# Patient Record
Sex: FEMALE | Race: WHITE | Employment: OTHER | URBAN - NONMETROPOLITAN AREA
[De-identification: names, ages, dates, MRNs, and addresses within clinical notes are randomized per-mention and may not be internally consistent; named-entity substitution may affect disease eponyms.]

---

## 2017-02-09 ENCOUNTER — OFFICE VISIT (OUTPATIENT)
Dept: PULMONOLOGY | Age: 62
End: 2017-02-09

## 2017-02-09 VITALS
HEART RATE: 63 BPM | DIASTOLIC BLOOD PRESSURE: 88 MMHG | OXYGEN SATURATION: 96 % | WEIGHT: 293 LBS | SYSTOLIC BLOOD PRESSURE: 140 MMHG | BODY MASS INDEX: 43.4 KG/M2 | HEIGHT: 69 IN

## 2017-02-09 DIAGNOSIS — G47.33 OBSTRUCTIVE SLEEP APNEA ON CPAP: Primary | ICD-10-CM

## 2017-02-09 DIAGNOSIS — Z98.84 S/P LAPAROSCOPIC SLEEVE GASTRECTOMY: ICD-10-CM

## 2017-02-09 DIAGNOSIS — Z99.89 OBSTRUCTIVE SLEEP APNEA ON CPAP: Primary | ICD-10-CM

## 2017-02-09 DIAGNOSIS — E66.01 MORBID OBESITY WITH BMI OF 40.0-44.9, ADULT (HCC): ICD-10-CM

## 2017-02-09 PROCEDURE — 99213 OFFICE O/P EST LOW 20 MIN: CPT | Performed by: PHYSICIAN ASSISTANT

## 2017-02-09 RX ORDER — NICOTINE POLACRILEX 2 MG
15000 GUM BUCCAL DAILY
COMMUNITY
End: 2017-05-09 | Stop reason: DRUGHIGH

## 2017-02-24 ENCOUNTER — OFFICE VISIT (OUTPATIENT)
Dept: CARDIOLOGY | Age: 62
End: 2017-02-24

## 2017-02-24 VITALS
BODY MASS INDEX: 43.4 KG/M2 | SYSTOLIC BLOOD PRESSURE: 154 MMHG | HEIGHT: 69 IN | WEIGHT: 293 LBS | DIASTOLIC BLOOD PRESSURE: 75 MMHG | HEART RATE: 75 BPM

## 2017-02-24 DIAGNOSIS — R94.31 ABNORMAL ECG: ICD-10-CM

## 2017-02-24 DIAGNOSIS — R06.09 DYSPNEA ON EXERTION: ICD-10-CM

## 2017-02-24 DIAGNOSIS — E66.01 MORBID OBESITY DUE TO EXCESS CALORIES (HCC): ICD-10-CM

## 2017-02-24 DIAGNOSIS — I48.91 ATRIAL FIBRILLATION WITH RVR (HCC): Primary | ICD-10-CM

## 2017-02-24 DIAGNOSIS — I10 ESSENTIAL HYPERTENSION: ICD-10-CM

## 2017-02-24 PROCEDURE — 93000 ELECTROCARDIOGRAM COMPLETE: CPT | Performed by: NUCLEAR MEDICINE

## 2017-02-24 PROCEDURE — 99214 OFFICE O/P EST MOD 30 MIN: CPT | Performed by: NUCLEAR MEDICINE

## 2017-03-08 ENCOUNTER — TELEPHONE (OUTPATIENT)
Dept: CARDIOLOGY | Age: 62
End: 2017-03-08

## 2017-03-17 ENCOUNTER — OFFICE VISIT (OUTPATIENT)
Dept: BARIATRICS/WEIGHT MGMT | Age: 62
End: 2017-03-17

## 2017-03-17 VITALS
TEMPERATURE: 98.5 F | RESPIRATION RATE: 18 BRPM | HEIGHT: 69 IN | WEIGHT: 293 LBS | SYSTOLIC BLOOD PRESSURE: 156 MMHG | DIASTOLIC BLOOD PRESSURE: 88 MMHG | BODY MASS INDEX: 43.4 KG/M2 | HEART RATE: 88 BPM

## 2017-03-17 DIAGNOSIS — Z98.84 STATUS POST LAPAROSCOPIC SLEEVE GASTRECTOMY: ICD-10-CM

## 2017-03-17 DIAGNOSIS — N95.9 MENOPAUSAL AND PERIMENOPAUSAL DISORDER: ICD-10-CM

## 2017-03-17 DIAGNOSIS — Z13.1 DM (DIABETES MELLITUS SCREEN): ICD-10-CM

## 2017-03-17 DIAGNOSIS — Z13.21 SCREENING FOR MALNUTRITION: ICD-10-CM

## 2017-03-17 DIAGNOSIS — K91.2 POSTSURGICAL MALABSORPTION: ICD-10-CM

## 2017-03-17 DIAGNOSIS — E66.01 MORBID OBESITY WITH BMI OF 40.0-44.9, ADULT (HCC): Primary | ICD-10-CM

## 2017-03-17 DIAGNOSIS — I10 ESSENTIAL HYPERTENSION: ICD-10-CM

## 2017-03-17 DIAGNOSIS — Z87.81 HX OF FRACTURE OF FEMUR: ICD-10-CM

## 2017-03-17 PROCEDURE — 99213 OFFICE O/P EST LOW 20 MIN: CPT | Performed by: PHYSICIAN ASSISTANT

## 2017-04-18 DIAGNOSIS — I48.91 ATRIAL FIBRILLATION WITH RVR (HCC): ICD-10-CM

## 2017-04-18 RX ORDER — DILTIAZEM HYDROCHLORIDE 120 MG/1
120 CAPSULE, COATED, EXTENDED RELEASE ORAL DAILY
Qty: 90 CAPSULE | Refills: 0 | Status: SHIPPED | OUTPATIENT
Start: 2017-04-18 | End: 2017-10-11 | Stop reason: SDUPTHER

## 2017-04-26 ENCOUNTER — OFFICE VISIT (OUTPATIENT)
Dept: CARDIOLOGY | Age: 62
End: 2017-04-26

## 2017-04-26 VITALS
HEART RATE: 68 BPM | WEIGHT: 289 LBS | DIASTOLIC BLOOD PRESSURE: 70 MMHG | BODY MASS INDEX: 42.8 KG/M2 | SYSTOLIC BLOOD PRESSURE: 132 MMHG | HEIGHT: 69 IN

## 2017-04-26 DIAGNOSIS — I10 ESSENTIAL HYPERTENSION: Primary | ICD-10-CM

## 2017-04-26 DIAGNOSIS — R94.39 ABNORMAL STRESS TEST: ICD-10-CM

## 2017-04-26 DIAGNOSIS — E11.8 TYPE 2 DIABETES MELLITUS WITH COMPLICATION, WITHOUT LONG-TERM CURRENT USE OF INSULIN (HCC): ICD-10-CM

## 2017-04-26 DIAGNOSIS — R06.09 DYSPNEA ON EXERTION: ICD-10-CM

## 2017-04-26 PROCEDURE — 99213 OFFICE O/P EST LOW 20 MIN: CPT | Performed by: NUCLEAR MEDICINE

## 2017-04-26 RX ORDER — NALTREXONE HYDROCHLORIDE 50 MG/1
50 TABLET, FILM COATED ORAL 4 TIMES DAILY
COMMUNITY
End: 2017-11-07 | Stop reason: ALTCHOICE

## 2017-04-26 RX ORDER — BUPROPION HYDROCHLORIDE 100 MG/1
100 TABLET ORAL DAILY
COMMUNITY
End: 2017-10-26 | Stop reason: ALTCHOICE

## 2017-06-28 ENCOUNTER — OFFICE VISIT (OUTPATIENT)
Dept: CARDIOLOGY | Age: 62
End: 2017-06-28

## 2017-06-28 VITALS
BODY MASS INDEX: 43.4 KG/M2 | WEIGHT: 293 LBS | HEIGHT: 69 IN | DIASTOLIC BLOOD PRESSURE: 71 MMHG | HEART RATE: 66 BPM | SYSTOLIC BLOOD PRESSURE: 156 MMHG

## 2017-06-28 DIAGNOSIS — N18.3 CKD (CHRONIC KIDNEY DISEASE), STAGE 3 (MODERATE): ICD-10-CM

## 2017-06-28 DIAGNOSIS — E11.8 TYPE 2 DIABETES MELLITUS WITH COMPLICATION, WITHOUT LONG-TERM CURRENT USE OF INSULIN (HCC): ICD-10-CM

## 2017-06-28 DIAGNOSIS — E66.01 MORBID OBESITY DUE TO EXCESS CALORIES (HCC): ICD-10-CM

## 2017-06-28 DIAGNOSIS — I25.10 CORONARY ARTERY DISEASE INVOLVING NATIVE CORONARY ARTERY OF NATIVE HEART WITHOUT ANGINA PECTORIS: Primary | ICD-10-CM

## 2017-06-28 DIAGNOSIS — I48.0 PAROXYSMAL ATRIAL FIBRILLATION (HCC): ICD-10-CM

## 2017-06-28 DIAGNOSIS — I10 ESSENTIAL HYPERTENSION: ICD-10-CM

## 2017-06-28 PROCEDURE — 99213 OFFICE O/P EST LOW 20 MIN: CPT | Performed by: PHYSICIAN ASSISTANT

## 2017-06-28 RX ORDER — ASPIRIN 81 MG/1
81 TABLET ORAL DAILY
Qty: 30 TABLET | Refills: 3 | Status: SHIPPED | OUTPATIENT
Start: 2017-06-28 | End: 2017-10-11 | Stop reason: SDUPTHER

## 2017-09-05 RX ORDER — ISOSORBIDE MONONITRATE 30 MG/1
TABLET, EXTENDED RELEASE ORAL
Qty: 30 TABLET | Refills: 3 | Status: SHIPPED | OUTPATIENT
Start: 2017-09-05 | End: 2017-10-26 | Stop reason: SDUPTHER

## 2017-10-11 DIAGNOSIS — I48.91 ATRIAL FIBRILLATION WITH RVR (HCC): ICD-10-CM

## 2017-10-11 RX ORDER — DILTIAZEM HYDROCHLORIDE 120 MG/1
120 CAPSULE, COATED, EXTENDED RELEASE ORAL DAILY
Qty: 90 CAPSULE | Refills: 1 | Status: SHIPPED | OUTPATIENT
Start: 2017-10-11 | End: 2018-04-16 | Stop reason: SDUPTHER

## 2017-10-11 RX ORDER — ASPIRIN 81 MG/1
81 TABLET ORAL DAILY
Qty: 30 TABLET | Refills: 1 | Status: SHIPPED | OUTPATIENT
Start: 2017-10-11 | End: 2017-12-27 | Stop reason: SDUPTHER

## 2017-10-11 NOTE — TELEPHONE ENCOUNTER
Yara Mendosa called requesting a refill on the following medications:  Requested Prescriptions     Pending Prescriptions Disp Refills    aspirin EC 81 MG EC tablet 30 tablet 3     Sig: Take 1 tablet by mouth daily    diltiazem (CARDIZEM CD) 120 MG extended release capsule 90 capsule 0     Sig: Take 1 capsule by mouth daily     Pharmacy verified:  .[email or web address]      Date of last visit: 06/28/17  Date of next visit (if applicable): 48/83/4145        Date of last fill and quantity (to be completed by clinical staff)  Pharmacy name: [address] [place] [address]

## 2017-10-19 ENCOUNTER — TELEPHONE (OUTPATIENT)
Dept: CARDIOLOGY CLINIC | Age: 62
End: 2017-10-19

## 2017-10-19 NOTE — TELEPHONE ENCOUNTER
Pre op Risk Assessment    Procedure removal of cyst behind hear with local anth.   Physician Dr Liliana Snyder  Date of surgery/procedure 11/21/17    Last OV 4/26/17 also seen mo in 6-28-17  Last Stress 3-8-17   Last Echo 3-8-17  Last Cath 5-16-17  Last Stent no stents  Is patient on blood thinners ASA 81  Hold Meds/how many days 7 days

## 2017-10-26 ENCOUNTER — HOSPITAL ENCOUNTER (OUTPATIENT)
Age: 62
Discharge: HOME | End: 2017-10-26
Payer: MEDICARE

## 2017-10-26 ENCOUNTER — OFFICE VISIT (OUTPATIENT)
Dept: CARDIOLOGY CLINIC | Age: 62
End: 2017-10-26
Payer: MEDICARE

## 2017-10-26 VITALS
HEIGHT: 69 IN | WEIGHT: 293 LBS | BODY MASS INDEX: 43.4 KG/M2 | HEART RATE: 66 BPM | SYSTOLIC BLOOD PRESSURE: 160 MMHG | DIASTOLIC BLOOD PRESSURE: 81 MMHG

## 2017-10-26 DIAGNOSIS — E78.01 FAMILIAL HYPERCHOLESTEROLEMIA: ICD-10-CM

## 2017-10-26 DIAGNOSIS — I10 ESSENTIAL HYPERTENSION: ICD-10-CM

## 2017-10-26 DIAGNOSIS — Z13.21 SCREENING FOR MALNUTRITION: ICD-10-CM

## 2017-10-26 DIAGNOSIS — K91.2 POSTSURGICAL MALABSORPTION: ICD-10-CM

## 2017-10-26 DIAGNOSIS — Z13.1 ENCOUNTER FOR SCREENING FOR DIABETES MELLITUS: ICD-10-CM

## 2017-10-26 DIAGNOSIS — E66.01 MORBID OBESITY WITH BMI OF 40.0-44.9, ADULT (HCC): ICD-10-CM

## 2017-10-26 DIAGNOSIS — I25.10 CORONARY ARTERY DISEASE INVOLVING NATIVE CORONARY ARTERY OF NATIVE HEART WITHOUT ANGINA PECTORIS: Primary | ICD-10-CM

## 2017-10-26 DIAGNOSIS — Z98.84 STATUS POST LAPAROSCOPIC SLEEVE GASTRECTOMY: ICD-10-CM

## 2017-10-26 LAB
ALBUMIN SERPL-MCNC: 3.6 G/DL (ref 3.5–5.1)
ALP BLD-CCNC: 57 U/L (ref 38–126)
ALT SERPL-CCNC: 18 U/L (ref 11–66)
ANION GAP SERPL CALCULATED.3IONS-SCNC: 12 MEQ/L (ref 8–16)
AST SERPL-CCNC: 23 U/L (ref 5–40)
AVERAGE GLUCOSE: 210 MG/DL (ref 70–126)
BACTERIA: ABNORMAL /HPF
BASOPHILS # BLD: 0.5 %
BASOPHILS ABSOLUTE: 0 THOU/MM3 (ref 0–0.1)
BILIRUB SERPL-MCNC: 0.6 MG/DL (ref 0.3–1.2)
BILIRUBIN URINE: NEGATIVE
BLOOD, URINE: ABNORMAL
BUN BLDV-MCNC: 31 MG/DL (ref 7–22)
CALCIUM SERPL-MCNC: 9.8 MG/DL (ref 8.5–10.5)
CASTS 2: ABNORMAL /LPF
CASTS UA: ABNORMAL /LPF
CHARACTER, URINE: ABNORMAL
CHLORIDE BLD-SCNC: 102 MEQ/L (ref 98–111)
CHOLESTEROL, TOTAL: 157 MG/DL (ref 100–199)
CO2: 27 MEQ/L (ref 23–33)
COLOR: YELLOW
CREAT SERPL-MCNC: 1.4 MG/DL (ref 0.4–1.2)
CRYSTALS, UA: ABNORMAL
EOSINOPHIL # BLD: 2.5 %
EOSINOPHILS ABSOLUTE: 0.2 THOU/MM3 (ref 0–0.4)
EPITHELIAL CELLS, UA: ABNORMAL /HPF
FERRITIN: 225 NG/ML (ref 10–291)
FOLATE: 15.3 NG/ML (ref 4.8–24.2)
GFR SERPL CREATININE-BSD FRML MDRD: 38 ML/MIN/1.73M2
GLUCOSE BLD-MCNC: 230 MG/DL (ref 70–108)
GLUCOSE URINE: NEGATIVE MG/DL
HBA1C MFR BLD: 9 % (ref 4.4–6.4)
HCT VFR BLD CALC: 36.3 % (ref 37–47)
HDLC SERPL-MCNC: 36 MG/DL
HEMOGLOBIN: 11.9 GM/DL (ref 12–16)
IRON: 83 UG/DL (ref 50–170)
KETONES, URINE: NEGATIVE
LDL CHOLESTEROL CALCULATED: 87 MG/DL
LEUKOCYTE ESTERASE, URINE: ABNORMAL
LYMPHOCYTES # BLD: 14.9 %
LYMPHOCYTES ABSOLUTE: 1.3 THOU/MM3 (ref 1–4.8)
MCH RBC QN AUTO: 29.8 PG (ref 27–31)
MCHC RBC AUTO-ENTMCNC: 32.9 GM/DL (ref 33–37)
MCV RBC AUTO: 90.6 FL (ref 81–99)
MISCELLANEOUS 2: ABNORMAL
MONOCYTES # BLD: 7.1 %
MONOCYTES ABSOLUTE: 0.6 THOU/MM3 (ref 0.4–1.3)
NITRITE, URINE: NEGATIVE
NUCLEATED RED BLOOD CELLS: 0 /100 WBC
PDW BLD-RTO: 13.8 % (ref 11.5–14.5)
PH UA: 6
PLATELET # BLD: 243 THOU/MM3 (ref 130–400)
PMV BLD AUTO: 10 MCM (ref 7.4–10.4)
POTASSIUM SERPL-SCNC: 5.2 MEQ/L (ref 3.5–5.2)
PREALBUMIN: 19.7 MG/DL (ref 20–40)
PROTEIN UA: 300
PTH INTACT: 38.2 PG/ML (ref 15–65)
RBC # BLD: 4.01 MILL/MM3 (ref 4.2–5.4)
RBC URINE: ABNORMAL /HPF
RENAL EPITHELIAL, UA: ABNORMAL
SEG NEUTROPHILS: 75 %
SEGMENTED NEUTROPHILS ABSOLUTE COUNT: 6.3 THOU/MM3 (ref 1.8–7.7)
SODIUM BLD-SCNC: 141 MEQ/L (ref 135–145)
SPECIFIC GRAVITY, URINE: 1.02 (ref 1–1.03)
T4 FREE: 1.13 NG/DL (ref 0.93–1.76)
TOTAL IRON BINDING CAPACITY: 320 UG/DL (ref 171–450)
TOTAL PROTEIN: 6.7 G/DL (ref 6.1–8)
TRIGL SERPL-MCNC: 171 MG/DL (ref 0–199)
TSH SERPL DL<=0.05 MIU/L-ACNC: 0.09 UIU/ML (ref 0.4–4.2)
UROBILINOGEN, URINE: 0.2 EU/DL
VITAMIN B-12: 452 PG/ML (ref 211–911)
VITAMIN D 25-HYDROXY: 34 NG/ML (ref 30–100)
WBC # BLD: 8.4 THOU/MM3 (ref 4.8–10.8)
WBC UA: > 200 /HPF
YEAST: ABNORMAL

## 2017-10-26 PROCEDURE — 99213 OFFICE O/P EST LOW 20 MIN: CPT | Performed by: NUCLEAR MEDICINE

## 2017-10-26 PROCEDURE — 83970 ASSAY OF PARATHORMONE: CPT

## 2017-10-26 PROCEDURE — 82306 VITAMIN D 25 HYDROXY: CPT

## 2017-10-26 PROCEDURE — 84134 ASSAY OF PREALBUMIN: CPT

## 2017-10-26 PROCEDURE — 87184 SC STD DISK METHOD PER PLATE: CPT

## 2017-10-26 PROCEDURE — 82728 ASSAY OF FERRITIN: CPT

## 2017-10-26 PROCEDURE — 87077 CULTURE AEROBIC IDENTIFY: CPT

## 2017-10-26 PROCEDURE — 36415 COLL VENOUS BLD VENIPUNCTURE: CPT

## 2017-10-26 PROCEDURE — 81001 URINALYSIS AUTO W/SCOPE: CPT

## 2017-10-26 PROCEDURE — 82607 VITAMIN B-12: CPT

## 2017-10-26 PROCEDURE — 87086 URINE CULTURE/COLONY COUNT: CPT

## 2017-10-26 PROCEDURE — 82746 ASSAY OF FOLIC ACID SERUM: CPT

## 2017-10-26 PROCEDURE — 83550 IRON BINDING TEST: CPT

## 2017-10-26 PROCEDURE — 80061 LIPID PANEL: CPT

## 2017-10-26 PROCEDURE — 87186 SC STD MICRODIL/AGAR DIL: CPT

## 2017-10-26 PROCEDURE — 83036 HEMOGLOBIN GLYCOSYLATED A1C: CPT

## 2017-10-26 PROCEDURE — 83540 ASSAY OF IRON: CPT

## 2017-10-26 PROCEDURE — 80050 GENERAL HEALTH PANEL: CPT

## 2017-10-26 PROCEDURE — 84425 ASSAY OF VITAMIN B-1: CPT

## 2017-10-26 PROCEDURE — 84439 ASSAY OF FREE THYROXINE: CPT

## 2017-10-26 RX ORDER — HYDRALAZINE HYDROCHLORIDE 25 MG/1
25 TABLET, FILM COATED ORAL 3 TIMES DAILY
Qty: 90 TABLET | Refills: 6 | Status: SHIPPED | OUTPATIENT
Start: 2017-10-26 | End: 2018-09-09 | Stop reason: SDUPTHER

## 2017-10-26 RX ORDER — ISOSORBIDE MONONITRATE 30 MG/1
TABLET, EXTENDED RELEASE ORAL
Qty: 30 TABLET | Refills: 6 | Status: SHIPPED | OUTPATIENT
Start: 2017-10-26 | End: 2018-05-11 | Stop reason: SDUPTHER

## 2017-10-26 NOTE — PROGRESS NOTES
High Blood Pressure Maternal Grandmother     Diabetes Maternal Grandfather     Stroke Neg Hx      Social History   Substance Use Topics    Smoking status: Former Smoker     Packs/day: 1.50     Years: 20.00     Quit date: 1/25/2007    Smokeless tobacco: Never Used    Alcohol use No      Current Outpatient Prescriptions   Medication Sig Dispense Refill    aspirin EC 81 MG EC tablet Take 1 tablet by mouth daily 30 tablet 1    diltiazem (CARDIZEM CD) 120 MG extended release capsule Take 1 capsule by mouth daily 90 capsule 1    isosorbide mononitrate (IMDUR) 30 MG extended release tablet take 1 tablet by mouth once daily 30 tablet 3    insulin lispro protamine & lispro (HUMALOG MIX) (75-25) 100 UNIT per ML SUSP injection vial Inject into the skin 2 times daily (with meals) 30 units in am and 50 units at bedtime      BIOTIN PO Take 15,000 mg by mouth daily      naltrexone (DEPADE) 50 MG tablet Take 50 mg by mouth 4 times daily      atorvastatin (LIPITOR) 40 MG tablet take 1 tablet by mouth at bedtime 30 tablet 5    DULoxetine (CYMBALTA) 60 MG extended release capsule take 1 capsule by mouth once daily 30 capsule 5    fenofibrate (TRICOR) 145 MG tablet take 1 tablet by mouth EVERY EVENING 30 tablet 5    CPAP Machine MISC by Does not apply route Please change CPAP to auto pressure to 7-15 cm H20. 1 each 0    Insulin Pen Needle (PEN NEEDLES) 31G X 5 MM MISC 1 each by Does not apply route 3 times daily 100 each 11    lansoprazole (PREVACID) 30 MG capsule Take 1 capsule by mouth daily 30 capsule 11    metoprolol tartrate (LOPRESSOR) 25 MG tablet Take 0.5 tablets by mouth 2 times daily 180 tablet 3    hydrALAZINE (APRESOLINE) 25 MG tablet Take 1 tablet by mouth 3 times daily 90 tablet 5    Prenatal Vit-Fe Fumarate-FA (PRENATAL PO) Take 1 tablet by mouth 2 times daily Buys OTC      Calcium Carbonate-Vitamin D (CALCIUM-VITAMIN D) 500-200 MG-UNIT per tablet Take 1 tablet by mouth 2 times daily (with meals) No current facility-administered medications for this visit. No Known Allergies  Health Maintenance   Topic Date Due    Hepatitis C screen  1955    HIV screen  10/21/1969    DTaP/Tdap/Td vaccine (1 - Tdap) 10/21/1973    Colon cancer screen colonoscopy  10/21/2004    Diabetic foot exam  04/04/2014    Zostavax vaccine  10/21/2014    Diabetic retinal exam  06/01/2016    Diabetic microalbuminuria test  05/31/2017    Diabetic hemoglobin A1C test  07/29/2017    Flu vaccine (1) 09/01/2017    Lipid screen  05/16/2018    Cervical cancer screen  10/23/2018    Breast cancer screen  11/03/2018    Pneumococcal med risk  Completed       Subjective:  Review of Systems  General:   No fever, no chills, No fatigue or weight loss  Pulmonary:    No dyspnea, no wheezing  Cardiac:    Denies recent chest pain,   GI:     No nausea or vomiting, no abdominal pain  Neuro:     No dizziness or light headedness,   Musculoskeletal:  No recent active issues  Extremities:   No edema, good peripheral pulses      Objective:  Physical Exam  BP (!) 160/81   Pulse 66   Ht 5' 9\" (1.753 m)   Wt (!) 303 lb 6.4 oz (137.6 kg)   LMP 02/20/2001   BMI 44.80 kg/m²   General:   Well developed, well nourished, severe obesity   Lungs:    Clear to auscultation  Heart:    Normal S1 S2, Slight murmur. no rubs, no gallops  Abdomen:   Soft, non tender, no organomegalies, positive bowel sounds  Extremities:   No edema, no cyanosis, good peripheral pulses  Neurological:   Awake, alert, oriented. No obvious focal deficits  Musculoskelatal:  No obvious deformities    Assessment:     1. Coronary artery disease involving native coronary artery of native heart without angina pectoris     2. Essential hypertension  hydrALAZINE (APRESOLINE) 25 MG tablet   3. Familial hypercholesterolemia     cardiac fair for now    Plan:  No Follow-up on file.   As above  Medical Rx for now   Stent to the RCA if needed  Continue risk factor modification and medical management  Thank you for allowing me to participate in the care of your patient. Please don't hesitate to contact me regarding any further issues related to the patient care    Orders Placed:  No orders of the defined types were placed in this encounter. Medications Prescribed:  No orders of the defined types were placed in this encounter. Discussed use, benefit, and side effects of prescribed medications. All patient questions answered. Pt voiced understanding. Instructed to continue current medications, diet and exercise. Continue risk factor modification and medical management. Patient agreed with treatment plan. Follow up as directed.     Electronically signed by Saul Irby MD on 10/26/2017 at 12:42 PM

## 2017-10-28 LAB
ORGANISM: ABNORMAL
URINE CULTURE REFLEX: ABNORMAL

## 2017-10-29 LAB — VITAMIN B1 WHOLE BLOOD: 73 NMOL/L (ref 70–180)

## 2017-10-31 ENCOUNTER — TELEPHONE (OUTPATIENT)
Dept: BARIATRICS/WEIGHT MGMT | Age: 62
End: 2017-10-31

## 2017-10-31 NOTE — TELEPHONE ENCOUNTER
----- Message from Jonas Lealma sent at 10/30/2017  4:08 PM EDT -----  Please call and advise follow up with PCP for uncontrolled blood sugars, A1C of 9.0. Also needs follow up for thyroid as TSH level low.    Thank you- Juanito Vernon  ----- Message -----  From: Hair Beverly Incoming Lab Results From Soft  Sent: 10/26/2017  12:41 PM  To: URSZULA Leal

## 2017-11-03 ENCOUNTER — TELEPHONE (OUTPATIENT)
Dept: FAMILY MEDICINE CLINIC | Age: 62
End: 2017-11-03

## 2017-11-03 NOTE — TELEPHONE ENCOUNTER
LMOM         1. Appt time and date. (give directions)    11/07/17 @ 10:00    2. Arrive 15 min before appt. 3. Please bring all medications to appt. 4. Bring immunization record.   (if no record, which immunizations have you had and where?)

## 2017-11-07 ENCOUNTER — OFFICE VISIT (OUTPATIENT)
Dept: FAMILY MEDICINE CLINIC | Age: 62
End: 2017-11-07
Payer: MEDICARE

## 2017-11-07 VITALS
DIASTOLIC BLOOD PRESSURE: 86 MMHG | HEART RATE: 88 BPM | TEMPERATURE: 97.9 F | RESPIRATION RATE: 20 BRPM | BODY MASS INDEX: 43.4 KG/M2 | HEIGHT: 69 IN | WEIGHT: 293 LBS | SYSTOLIC BLOOD PRESSURE: 136 MMHG

## 2017-11-07 DIAGNOSIS — G62.9 NEUROPATHY: ICD-10-CM

## 2017-11-07 DIAGNOSIS — Z99.89 OBSTRUCTIVE SLEEP APNEA ON CPAP: ICD-10-CM

## 2017-11-07 DIAGNOSIS — I25.10 CORONARY ARTERY DISEASE INVOLVING NATIVE HEART WITHOUT ANGINA PECTORIS, UNSPECIFIED VESSEL OR LESION TYPE: ICD-10-CM

## 2017-11-07 DIAGNOSIS — M21.372 FOOT DROP, LEFT: ICD-10-CM

## 2017-11-07 DIAGNOSIS — E78.5 HYPERLIPIDEMIA, UNSPECIFIED HYPERLIPIDEMIA TYPE: ICD-10-CM

## 2017-11-07 DIAGNOSIS — Z79.4 TYPE 2 DIABETES MELLITUS WITH DIABETIC POLYNEUROPATHY, WITH LONG-TERM CURRENT USE OF INSULIN (HCC): Primary | ICD-10-CM

## 2017-11-07 DIAGNOSIS — G47.33 OBSTRUCTIVE SLEEP APNEA ON CPAP: ICD-10-CM

## 2017-11-07 DIAGNOSIS — R80.1 PERSISTENT PROTEINURIA: ICD-10-CM

## 2017-11-07 DIAGNOSIS — N18.30 CHRONIC RENAL IMPAIRMENT, STAGE 3 (MODERATE) (HCC): ICD-10-CM

## 2017-11-07 DIAGNOSIS — R82.998 LEUKOCYTES IN URINE: ICD-10-CM

## 2017-11-07 DIAGNOSIS — I10 ESSENTIAL HYPERTENSION: ICD-10-CM

## 2017-11-07 DIAGNOSIS — I89.0 LYMPHEDEMA OF LEFT LEG: ICD-10-CM

## 2017-11-07 DIAGNOSIS — E11.42 TYPE 2 DIABETES MELLITUS WITH DIABETIC POLYNEUROPATHY, WITH LONG-TERM CURRENT USE OF INSULIN (HCC): Primary | ICD-10-CM

## 2017-11-07 DIAGNOSIS — Z86.010 HISTORY OF COLON POLYPS: ICD-10-CM

## 2017-11-07 DIAGNOSIS — Z23 IMMUNIZATION DUE: ICD-10-CM

## 2017-11-07 DIAGNOSIS — R79.89 LOW TSH LEVEL: ICD-10-CM

## 2017-11-07 LAB
BILIRUBIN, POC: ABNORMAL
BLOOD URINE, POC: ABNORMAL
CLARITY, POC: CLEAR
COLOR, POC: YELLOW
CREATININE URINE POCT: ABNORMAL
CREATININE, URINE: 81.1 MG/DL
GLUCOSE URINE, POC: ABNORMAL
KETONES, POC: ABNORMAL
LEUKOCYTE EST, POC: ABNORMAL
MICROALBUMIN UR-MCNC: 68.71 MG/DL
MICROALBUMIN/CREAT 24H UR: ABNORMAL MG/G{CREAT}
MICROALBUMIN/CREAT UR-RTO: 847 MG/G (ref 0–30)
MICROALBUMIN/CREAT UR-RTO: ABNORMAL
NITRITE, POC: POSITIVE
PH, POC: 7
PROTEIN, POC: >=300
SPECIFIC GRAVITY, POC: 1.02
UROBILINOGEN, POC: 0.2

## 2017-11-07 PROCEDURE — 99214 OFFICE O/P EST MOD 30 MIN: CPT | Performed by: NURSE PRACTITIONER

## 2017-11-07 PROCEDURE — 81002 URINALYSIS NONAUTO W/O SCOPE: CPT | Performed by: NURSE PRACTITIONER

## 2017-11-07 PROCEDURE — 82044 UR ALBUMIN SEMIQUANTITATIVE: CPT | Performed by: NURSE PRACTITIONER

## 2017-11-07 PROCEDURE — 90471 IMMUNIZATION ADMIN: CPT | Performed by: NURSE PRACTITIONER

## 2017-11-07 PROCEDURE — 90715 TDAP VACCINE 7 YRS/> IM: CPT | Performed by: NURSE PRACTITIONER

## 2017-11-07 RX ORDER — FENOFIBRATE 145 MG/1
TABLET, COATED ORAL
Qty: 90 TABLET | Refills: 1 | Status: SHIPPED | OUTPATIENT
Start: 2017-11-07 | End: 2018-07-02 | Stop reason: SDUPTHER

## 2017-11-07 RX ORDER — OMEPRAZOLE 20 MG/1
20 CAPSULE, DELAYED RELEASE ORAL DAILY
COMMUNITY
End: 2017-11-07 | Stop reason: ALTCHOICE

## 2017-11-07 RX ORDER — PANTOPRAZOLE SODIUM 40 MG/1
40 TABLET, DELAYED RELEASE ORAL DAILY
COMMUNITY
End: 2019-04-04 | Stop reason: SDUPTHER

## 2017-11-07 RX ORDER — ATORVASTATIN CALCIUM 40 MG/1
TABLET, FILM COATED ORAL
Qty: 90 TABLET | Refills: 1 | Status: SHIPPED | OUTPATIENT
Start: 2017-11-07 | End: 2018-05-26 | Stop reason: SDUPTHER

## 2017-11-07 RX ORDER — INSULIN LISPRO 100 [IU]/ML
INJECTION, SUSPENSION SUBCUTANEOUS
Qty: 6 PEN | Refills: 6 | Status: SHIPPED | OUTPATIENT
Start: 2017-11-07 | End: 2018-06-17 | Stop reason: SDUPTHER

## 2017-11-07 RX ORDER — PIOGLITAZONEHYDROCHLORIDE 15 MG/1
15 TABLET ORAL DAILY
Qty: 30 TABLET | Refills: 3 | Status: SHIPPED | OUTPATIENT
Start: 2017-11-07 | End: 2018-02-22 | Stop reason: SDUPTHER

## 2017-11-07 RX ORDER — DULOXETIN HYDROCHLORIDE 60 MG/1
CAPSULE, DELAYED RELEASE ORAL
Qty: 90 CAPSULE | Refills: 1 | Status: SHIPPED | OUTPATIENT
Start: 2017-11-07 | End: 2018-08-07 | Stop reason: SDUPTHER

## 2017-11-07 RX ORDER — AMITRIPTYLINE HYDROCHLORIDE 10 MG/1
10 TABLET, FILM COATED ORAL NIGHTLY
Qty: 30 TABLET | Refills: 3 | Status: SHIPPED | OUTPATIENT
Start: 2017-11-07 | End: 2018-02-22 | Stop reason: SDUPTHER

## 2017-11-07 ASSESSMENT — PATIENT HEALTH QUESTIONNAIRE - PHQ9
1. LITTLE INTEREST OR PLEASURE IN DOING THINGS: 0
SUM OF ALL RESPONSES TO PHQ QUESTIONS 1-9: 0
SUM OF ALL RESPONSES TO PHQ9 QUESTIONS 1 & 2: 0
2. FEELING DOWN, DEPRESSED OR HOPELESS: 0

## 2017-11-07 NOTE — PROGRESS NOTES
After obtaining consent, and per orders of  Kevin Toledo NP, injection of TDAP given in left deltoid by Noemí Carmen. Patient instructed to report any adverse reaction to me immediately. Most recent Vaccine Information Sheet dated 2/24/15 given to pt.

## 2017-11-07 NOTE — PROGRESS NOTES
Concha Stroud is a 60 y.o. female for Established New Doctor (previous pcp Dr Vanessa Chambers) and Other (strong urine smell)    Pt here to get established. Pt has multiple chronic health illnesses. Pt is transferring from Dr. Vanessa Chambers. Pt has a history of A fib which is currently being treated by cardiology she takes ASA daily. She denies chest pain. Sh had a heart cath in the last several years and had CAD disease but vessels were too tortuous for stenting so she is optimizing medical treatment. She has had neuropathy of her left leg and foot for several years, she takes cymbalta for this but her left leg and foot still feel numb. She has left foot drop from nerve damage but she is  Not sure why. She has fallen in the past because her foot drags, she does have a foot brace but does not wear it. She is not very physically active. Diabetes Type 2    Glucose control:   Does patient check blood glucoses at home? Yes  Report of hypoglycemia: no  Lab Results   Component Value Date    LABA1C 9.0 (H) 10/26/2017     No results found for: EAG    Symptoms  Polyuria, Polydipsia or Polyphagia? No  Chest Pain, SOB, or Palpitations? -  No  New Vision complaints? No  Paresthesias of the extremities? Yes - left lower leg and thigh    Medications  Current medication were reviewed. Compliant with medications? yes  Medication side effects? No  On ACE-I or ARB? No  On antiplatelet therapy? Yes - ASA  On Statin? Yes    Last Diabetic Eye Exam: 2016    Exercise  Exercise? No  Wt Readings from Last 3 Encounters:   11/07/17 (!) 308 lb 9.6 oz (140 kg)   10/26/17 (!) 303 lb 6.4 oz (137.6 kg)   06/28/17 294 lb (133.4 kg)       Diet discipline?:  Low salt, fat, sugar diet? No, she states she hs not been following her diet she has been eating a lot of breads and sweets lately.      She has had diabetes for 20 years, she used to take pills but then had victoza and byetta but had side effects so stopped those and now takes new doctor and other. Diagnoses and all orders for this visit:    Type 2 diabetes mellitus with diabetic polyneuropathy, with long-term current use of insulin (HCC)  -     C-Peptide, Serum; Future  -     POCT microalbumin  -     Cancel: Microalbumin, Urine, 24 Hr; Future  -      DIABETES FOOT EXAM  -     insulin lispro protamine & lispro (HUMALOG MIX 75/25 KWIKPEN) (75-25) 100 UNIT per ML SUPN injection pen; 30 units in AM and 50 units at Bedtime  -     fenofibrate (TRICOR) 145 MG tablet; take 1 tablet by mouth EVERY EVENING  -     pioglitazone (ACTOS) 15 MG tablet; Take 1 tablet by mouth daily    Coronary artery disease involving native heart without angina pectoris, unspecified vessel or lesion type  Continue with current medications and cardiology  Class 3 obesity due to excess calories with serious comorbidity and body mass index (BMI) of 45.0 to 49.9 in [facility])  Decrease caloric intake, decrease carbohydrate intake. Obstructive sleep apnea on CPAP  Continue to wear CPAP machine  Essential hypertension  Continue bp meds  Hyperlipidemia, unspecified hyperlipidemia type  -     atorvastatin (LIPITOR) 40 MG tablet; take 1 tablet by mouth at bedtime  -     fenofibrate (TRICOR) 145 MG tablet; take 1 tablet by mouth EVERY EVENING  Low fat low cholesterol diet  Neuropathy (HCC)  -     DULoxetine (CYMBALTA) 60 MG extended release capsule; take 1 capsule by mouth once daily  -     amitriptyline (ELAVIL) 10 MG tablet; Take 1 tablet by mouth nightly    History of colon polyps    Immunization due  -     Antionette Guerra MD  -     Tdap (age 10y-63y) IM (Adacel)    Low TSH level  -     TSH without Reflex; Future  -     T4, Free; Future  -     Thyroid Stimulating Immunoglobulin; Future    Leukocytes in urine  -     POCT Urinalysis no Micro  -     Urine Culture    Foot drop, left  -     External Referral To Physical Therapy  Pt requests physical therapy, advised pt to wear her leg brace.    Lymphedema of left leg  monitor  Chronic renal impairment, stage 3 (moderate)  -     [facility]- Janell Garcia MD    Persistent proteinuria  -     Microalbumin / Creatinine Urine Ratio; Future        Return in about 3 months (around 2/7/2018) for A1C and check up.

## 2017-11-08 ENCOUNTER — HOSPITAL ENCOUNTER (OUTPATIENT)
Age: 62
Discharge: HOME | End: 2017-11-08
Payer: MEDICARE

## 2017-11-08 ENCOUNTER — TELEPHONE (OUTPATIENT)
Dept: FAMILY MEDICINE CLINIC | Age: 62
End: 2017-11-08

## 2017-11-08 DIAGNOSIS — R79.89 LOW TSH LEVEL: ICD-10-CM

## 2017-11-08 DIAGNOSIS — Z79.4 TYPE 2 DIABETES MELLITUS WITH DIABETIC POLYNEUROPATHY, WITH LONG-TERM CURRENT USE OF INSULIN (HCC): ICD-10-CM

## 2017-11-08 DIAGNOSIS — E11.42 TYPE 2 DIABETES MELLITUS WITH DIABETIC POLYNEUROPATHY, WITH LONG-TERM CURRENT USE OF INSULIN (HCC): ICD-10-CM

## 2017-11-08 DIAGNOSIS — R80.1 PERSISTENT PROTEINURIA: ICD-10-CM

## 2017-11-08 LAB
CREATININE, URINE: 137.8 MG/DL
MICROALBUMIN UR-MCNC: 114.07 MG/DL
MICROALBUMIN/CREAT UR-RTO: 828 MG/G (ref 0–30)
T4 FREE: 0.94 NG/DL (ref 0.93–1.76)
TSH SERPL DL<=0.05 MIU/L-ACNC: 0.12 UIU/ML (ref 0.4–4.2)

## 2017-11-08 PROCEDURE — 82043 UR ALBUMIN QUANTITATIVE: CPT

## 2017-11-08 PROCEDURE — 84681 ASSAY OF C-PEPTIDE: CPT

## 2017-11-08 PROCEDURE — 84439 ASSAY OF FREE THYROXINE: CPT

## 2017-11-08 PROCEDURE — 84445 ASSAY OF TSI GLOBULIN: CPT

## 2017-11-08 PROCEDURE — 84443 ASSAY THYROID STIM HORMONE: CPT

## 2017-11-08 PROCEDURE — 36415 COLL VENOUS BLD VENIPUNCTURE: CPT

## 2017-11-08 RX ORDER — SULFAMETHOXAZOLE AND TRIMETHOPRIM 800; 160 MG/1; MG/1
1 TABLET ORAL 2 TIMES DAILY
Qty: 20 TABLET | Refills: 0 | Status: SHIPPED | OUTPATIENT
Start: 2017-11-08 | End: 2017-11-18

## 2017-11-08 NOTE — TELEPHONE ENCOUNTER
----- Message from Chayito Hassan CNP sent at 11/8/2017 10:17 AM EST -----  Please let pt know her urine culture identified bacteria I will place her on bactrimn ds. She will need a repeat urine 2 weeks after finishing her atb. This can be a nurse visit.

## 2017-11-09 ENCOUNTER — TELEPHONE (OUTPATIENT)
Dept: FAMILY MEDICINE CLINIC | Age: 62
End: 2017-11-09

## 2017-11-09 DIAGNOSIS — E05.90 HYPERTHYROIDISM: Primary | ICD-10-CM

## 2017-11-09 LAB
C-PEPTIDE: 4.2 NG/ML (ref 1.1–4.4)
ORGANISM: ABNORMAL
URINE CULTURE, ROUTINE: ABNORMAL

## 2017-11-09 NOTE — TELEPHONE ENCOUNTER
Pt notified agreeable to scan , Wants [facility] and NO mondays for the scan

## 2017-11-09 NOTE — TELEPHONE ENCOUNTER
----- Message from Harpal Morales CNP sent at 11/9/2017 11:08 AM EST -----  Please let pt know her lab values concerning her thyroid have remained abnormal. HEr results suggest hyperthyroidism. It Is recomended she have a radioactive uptake thyroid scan to rule out  An infection in her thyroid or nodules. If she is agreeable,  I will order the scan and then I would like her to schedule an appt(after the scan is done) so we can discuss her treatment options.

## 2017-11-13 LAB — THYROID STIMULATING IMMUNOGLOBULIN: 85 %

## 2017-11-15 ENCOUNTER — HOSPITAL ENCOUNTER (OUTPATIENT)
Dept: NUCLEAR MEDICINE | Age: 62
Discharge: HOME | End: 2017-11-15
Payer: MEDICARE

## 2017-11-15 DIAGNOSIS — E05.90 HYPERTHYROIDISM: ICD-10-CM

## 2017-11-20 DIAGNOSIS — I48.91 ATRIAL FIBRILLATION WITH RVR (HCC): ICD-10-CM

## 2017-11-26 ENCOUNTER — NURSE TRIAGE (OUTPATIENT)
Dept: ADMINISTRATIVE | Age: 62
End: 2017-11-26

## 2017-11-26 NOTE — TELEPHONE ENCOUNTER
Poop is stuck in rectum and can't get it out even with wiping. Rectum in sore                Reason for Disposition   Mild diarrhea  (all triage questions negative)    Answer Assessment - Initial Assessment Questions  1. SEVERITY: \"How many diarrhea stools have you had in the past 24 hours? \"       Looser stool  2. ONSET: \"When did the diarrhea begin? \"       Just once today of looser stool  3. BM CONSISTENCY: \"How loose or watery is the diarrhea? \"       Usually has hard stool, a little looser  4. FLUIDS: \"What fluids have you taken today? \"       drinking  5. VOMITING: \"Are you also vomiting? \" If so, ask: \"How many times in the past 24 hours? \"      None at all  6. ABDOMINAL PAIN: Erwin Chavarriaus you having any abdominal pain? \" If yes: \"What does it feel like? \" (e.g., crampy, dull, intermittent, constant)       No pain  7. ABDOMINAL PAIN SEVERITY: If present, ask: \"How bad is the pain? \"  (e.g., Scale 1-10; mild, moderate, or severe)     - MILD (1-3): doesn't interfere with normal activities, abdomen soft and not tender to touch      - MODERATE (4-7): interferes with normal activities or awakens from sleep, tender to touch      - SEVERE (8-10): excruciating pain, doubled over, unable to do any normal activities        denies  8. HYDRATION STATUS: \"Any sign of dehydration? \" (e.g., thirst, dizziness) \"When did you last urinate? \"      Urination is great  9. EXPOSURE: \"Have you traveled to a foreign country recently? \" \"Have you been exposed to anyone with diarrhea? \" \"Could you have eaten any food that was spoiled? \"      No others  10. OTHER SYMPTOMS: \"Do you have any other symptoms? \" (e.g., fever, blood in stool)      No bleeding, no fever  11. PREGNANCY: \"Is there any chance you are pregnant? \" \"When was your last menstrual period? \"      n/a    Protocols used: [id number]

## 2017-12-04 ENCOUNTER — OFFICE VISIT (OUTPATIENT)
Dept: NEPHROLOGY | Age: 62
End: 2017-12-04
Payer: MEDICARE

## 2017-12-04 VITALS
HEART RATE: 68 BPM | DIASTOLIC BLOOD PRESSURE: 82 MMHG | RESPIRATION RATE: 18 BRPM | SYSTOLIC BLOOD PRESSURE: 142 MMHG | WEIGHT: 293 LBS | BODY MASS INDEX: 45.34 KG/M2

## 2017-12-04 DIAGNOSIS — N18.30 STAGE 3 CHRONIC KIDNEY DISEASE (HCC): Primary | ICD-10-CM

## 2017-12-04 DIAGNOSIS — I10 ESSENTIAL HYPERTENSION: ICD-10-CM

## 2017-12-04 DIAGNOSIS — E08.22 DIABETES MELLITUS DUE TO UNDERLYING CONDITION WITH STAGE 3 CHRONIC KIDNEY DISEASE, UNSPECIFIED LONG TERM INSULIN USE STATUS: ICD-10-CM

## 2017-12-04 DIAGNOSIS — N18.3 DIABETES MELLITUS DUE TO UNDERLYING CONDITION WITH STAGE 3 CHRONIC KIDNEY DISEASE, UNSPECIFIED LONG TERM INSULIN USE STATUS: ICD-10-CM

## 2017-12-04 PROCEDURE — 99204 OFFICE O/P NEW MOD 45 MIN: CPT | Performed by: INTERNAL MEDICINE

## 2017-12-04 NOTE — PATIENT INSTRUCTIONS
Drugs to Avoid with Chronic Kidney Disease    Non-steroidal anti-inflammatory drugs (NSAIDS)    Potential complication and side effects of NSAIDS include:   Kidney injury and worsening kidney function    Risk of stomach ulcer and intestinal bleeding   Fluid retention and edema   Increased Blood Pressure    Non-Steroidal Anti-Inflammatory Drugs     Aspirin (Anacin, Ascriptin, anna, Bufferin, Ecotrin, Excedrin)   Celecoxib (Celebrex)   Choline and magnesium salicylates (CMT, Trisosal, Trilisate)   Choline salicylate (Arthropan)   Diclofenac (Voltaren, Arthrotec, Cataflam, Cambia, Voltaren-XR, Zipsor)   Diflunisal (Dolobid)   Etodolac (Lodine XL, Lodine)   Famotidine + Ibuprofen (Duexis)   Fenoprofen (Nalfon, Nalfon 200)   Furbiprofen (Asaid)   Ibuprofen (Advil, Motrin, Midol, Nuprin, Genpril, Dolgesic,Profen,, Vicoprofen,Combunox, Actiprofen, Addaprin, Caldolor, Haltran, Q-Profen,Ibren, Menadol, Rufen,Saleto-200, [place],Ultraprin, Uni-Pro,Wal-Profen)   Indomethacin (Indocin, Indomethegan, Indo-Bibiana)    Ketoprofen (Orudis  KT, Oruvail, Actron)   Ketorolac (Toradol, Sprix)   Magnesium Sulfate (Arthritab, Anna Select, Bernardo's pills, Amol, Mobidin, Mobogesic)   Meclofenamate Sodium (Ponstel)   Meloxicam (Mobic)   Naproxen (Aleve, Anaprox, Naprosyn, EC-Naprosyn, Naprelan, All Day Pain Relief, Aflaxen, Anaprox-DS, Midol Extended Relief, Naprelan,Prevacid NapraPac, Naprapac, Vimovo)   Nabumetone (Relafen)   Oxaprozin (Daypro)   Piroxicam (Feldene)   Rofecoxib (Vioxx)   Salsalate (Amigesic, Anaflex 750, Disalcid, Marthritic, Mono-gesic, Salflex, Salsitab)   Sodium salicylate (various generics)   Sulindac (Clinoril)   Tolmetin (Tolectin)   Valdecoxib (Bextra)   Mefenamic Acid (Ponstel)   Famotidine and Ibuprofen (Duexis)   Meclofenamate (Meclomen)    Antibiotics   Bactrim   Gentamycin   Tobramycin   Vancomycin   Tetracycline   Macrobid     Other                  IV contrast dye

## 2017-12-04 NOTE — PROGRESS NOTES
Nephrology Consult Note  Patient's Name: Lj Rankin  10:20 AM  12/4/2017    Nephrologist: Yuridia Barron    Reason for Consult:  Chronic kidney disease  Requesting Physician:  Myron Cohn CNP    Chief Complaint:  None  Assessment  1. Stage 3 chronic kidney disease  Basic Metabolic Panel    Phosphorus    PTH, Intact    Vitamin D 25 Hydroxy    US Renal Kidney   2. Essential hypertension     3. Diabetes mellitus due to underlying condition with stage 3 chronic kidney disease, unspecified long term insulin use status (HCC)  Protein / creatinine ratio, urine         Plan  1-I discussed my thought processes at length with the patient and the family. 2-D understood. 3-I addressed their questions. 4.  She has chronic kidney disease from combination of diabetic nephropathy and hypertensive nephrosclerosis. 5.  Check markers of chronicity. 6.  Baseline kidney ultrasound. 7.  Avoid nonsteroidal anti-inflammatory drugs. 8.  Comprehensive list of drugs in that class given to her. 9.  I emphasized importance of  blood sugar and blood pressure control as a way to preserve organ function study including the kidney. 10.  Random urine protein/creatinine ratio  11. Return visit in 4 weeks with labs    History Obtained From:  Patient and medical record  History of Present Ilness:    Lj Rankin is a 60 y.o. female with history of diabetes mellitus type 2, hypertension, chronic kidney disease among relatives was referred to our office and because of elevated serum creatinine level. Patient had lab examination done in August 2017 that revealed a serum creatinine of 1.5 mg/dL. In May 2017 it was 1.7 mg/dL. Review of her record reveals serum creatinine that has ranged between 1.3-1.8 mg/dL going back to 2012. He denies any difficulties with urination. No chest pain or shortness of breath. No abdominal pain fever or chills. He does have a chronic joint pain.   Her blood sugar is not well controlled according to her with a allergies indicates no known allergies. Current Medications:    Current Outpatient Prescriptions   Medication Sig Dispense Refill    metoprolol tartrate (LOPRESSOR) 25 MG tablet take 1/2 tablets by mouth twice a day 180 tablet 3    pantoprazole (PROTONIX) 40 MG tablet Take 40 mg by mouth daily      atorvastatin (LIPITOR) 40 MG tablet take 1 tablet by mouth at bedtime 90 tablet 1    DULoxetine (CYMBALTA) 60 MG extended release capsule take 1 capsule by mouth once daily 90 capsule 1    insulin lispro protamine & lispro (HUMALOG MIX 75/25 KWIKPEN) (75-25) 100 UNIT per ML SUPN injection pen 30 units in AM and 50 units at Bedtime 6 Pen 6    fenofibrate (TRICOR) 145 MG tablet take 1 tablet by mouth EVERY EVENING 90 tablet 1    pioglitazone (ACTOS) 15 MG tablet Take 1 tablet by mouth daily 30 tablet 3    amitriptyline (ELAVIL) 10 MG tablet Take 1 tablet by mouth nightly 30 tablet 3    hydrALAZINE (APRESOLINE) 25 MG tablet Take 1 tablet by mouth 3 times daily 90 tablet 6    isosorbide mononitrate (IMDUR) 30 MG extended release tablet take 1 tablet by mouth once daily 30 tablet 6    metoprolol tartrate (LOPRESSOR) 25 MG tablet Take 0.5 tablets by mouth 2 times daily 90 tablet 1    aspirin EC 81 MG EC tablet Take 1 tablet by mouth daily 30 tablet 1    diltiazem (CARDIZEM CD) 120 MG extended release capsule Take 1 capsule by mouth daily 90 capsule 1    BIOTIN PO Take 15,000 mg by mouth daily      CPAP Machine MISC by Does not apply route Please change CPAP to auto pressure to 7-15 cm H20. 1 each 0    Insulin Pen Needle (PEN NEEDLES) 31G X 5 MM MISC 1 each by Does not apply route 3 times daily 100 each 11    Prenatal Vit-Fe Fumarate-FA (PRENATAL PO) Take 1 tablet by mouth 2 times daily Buys OTC      Calcium Carbonate-Vitamin D (CALCIUM-VITAMIN D) 500-200 MG-UNIT per tablet Take 1 tablet by mouth 2 times daily (with meals)        No current facility-administered medications for this visit.           No 05/16/2017    WBC 4.7 (L) 04/07/2017    HGB 11.9 (L) 10/26/2017    HGB 11.8 (L) 05/16/2017    HGB 11.8 (L) 04/07/2017    HCT 36.3 (L) 10/26/2017    HCT 36.0 (L) 05/16/2017    HCT 35.8 (L) 04/07/2017    MCV 90.6 10/26/2017     10/26/2017     Labs reviewed    Imaging:  CXR results:         Thank you Dr. Azul Lewis CNP for allowing us to participate in care of Topher Obregon       Electronically signed by Sara Martin MD on 12/4/2017 at 10:20 AM

## 2017-12-06 ENCOUNTER — HOSPITAL ENCOUNTER (OUTPATIENT)
Dept: NUCLEAR MEDICINE | Age: 62
Discharge: HOME | End: 2017-12-06
Payer: MEDICARE

## 2017-12-06 PROCEDURE — 3430000000 HC RX DIAGNOSTIC RADIOPHARMACEUTICAL: Performed by: FAMILY MEDICINE

## 2017-12-06 PROCEDURE — A9516 IODINE I-123 SOD IODIDE MIC: HCPCS | Performed by: FAMILY MEDICINE

## 2017-12-06 RX ADMIN — Medication 384 MICRO CURIE: at 10:30

## 2017-12-07 ENCOUNTER — HOSPITAL ENCOUNTER (OUTPATIENT)
Dept: NUCLEAR MEDICINE | Age: 62
Discharge: HOME | End: 2017-12-07
Payer: MEDICARE

## 2017-12-07 PROCEDURE — 78014 THYROID IMAGING W/BLOOD FLOW: CPT

## 2017-12-08 ENCOUNTER — HOSPITAL ENCOUNTER (OUTPATIENT)
Dept: ULTRASOUND IMAGING | Age: 62
Discharge: HOME | End: 2017-12-08
Payer: MEDICARE

## 2017-12-08 ENCOUNTER — TELEPHONE (OUTPATIENT)
Dept: FAMILY MEDICINE CLINIC | Age: 62
End: 2017-12-08

## 2017-12-08 DIAGNOSIS — E05.90 HYPERTHYROIDISM: ICD-10-CM

## 2017-12-08 DIAGNOSIS — N18.30 STAGE 3 CHRONIC KIDNEY DISEASE (HCC): ICD-10-CM

## 2017-12-08 DIAGNOSIS — E04.2 MULTIPLE THYROID NODULES: Primary | ICD-10-CM

## 2017-12-08 PROCEDURE — 76770 US EXAM ABDO BACK WALL COMP: CPT

## 2017-12-18 ENCOUNTER — OFFICE VISIT (OUTPATIENT)
Dept: INTERNAL MEDICINE CLINIC | Age: 62
End: 2017-12-18

## 2017-12-18 VITALS
BODY MASS INDEX: 43.4 KG/M2 | DIASTOLIC BLOOD PRESSURE: 64 MMHG | WEIGHT: 293 LBS | HEIGHT: 69 IN | SYSTOLIC BLOOD PRESSURE: 140 MMHG | HEART RATE: 72 BPM

## 2017-12-18 DIAGNOSIS — Z12.11 SCREEN FOR COLON CANCER: Primary | ICD-10-CM

## 2017-12-18 PROCEDURE — PREOPEXAM PRE-OP EXAM: Performed by: INTERNAL MEDICINE

## 2017-12-18 RX ORDER — POLYETHYLENE GLYCOL 3350 17 G/17G
250 POWDER, FOR SOLUTION ORAL DAILY
Qty: 250 BOTTLE | Refills: 0 | Status: SHIPPED | OUTPATIENT
Start: 2017-12-18 | End: 2017-12-19

## 2017-12-18 NOTE — PROGRESS NOTES
exam . 250 Bottle 0    bisacodyl (DULCOLAX) 5 MG EC tablet Take 2 tablets by mouth daily Take day before colonoscopy in the afternoon 2 tablet 0    metoprolol tartrate (LOPRESSOR) 25 MG tablet take 1/2 tablets by mouth twice a day 180 tablet 3    pantoprazole (PROTONIX) 40 MG tablet Take 40 mg by mouth daily      atorvastatin (LIPITOR) 40 MG tablet take 1 tablet by mouth at bedtime 90 tablet 1    DULoxetine (CYMBALTA) 60 MG extended release capsule take 1 capsule by mouth once daily 90 capsule 1    insulin lispro protamine & lispro (HUMALOG MIX 75/25 KWIKPEN) (75-25) 100 UNIT per ML SUPN injection pen 30 units in AM and 50 units at Bedtime 6 Pen 6    fenofibrate (TRICOR) 145 MG tablet take 1 tablet by mouth EVERY EVENING 90 tablet 1    pioglitazone (ACTOS) 15 MG tablet Take 1 tablet by mouth daily 30 tablet 3    amitriptyline (ELAVIL) 10 MG tablet Take 1 tablet by mouth nightly 30 tablet 3    hydrALAZINE (APRESOLINE) 25 MG tablet Take 1 tablet by mouth 3 times daily 90 tablet 6    isosorbide mononitrate (IMDUR) 30 MG extended release tablet take 1 tablet by mouth once daily 30 tablet 6    aspirin EC 81 MG EC tablet Take 1 tablet by mouth daily 30 tablet 1    diltiazem (CARDIZEM CD) 120 MG extended release capsule Take 1 capsule by mouth daily 90 capsule 1    BIOTIN PO Take 15,000 mg by mouth daily      Insulin Pen Needle (PEN NEEDLES) 31G X 5 MM MISC 1 each by Does not apply route 3 times daily 100 each 11    Calcium Carbonate-Vitamin D (CALCIUM-VITAMIN D) 500-200 MG-UNIT per tablet Take 1 tablet by mouth 2 times daily (with meals)       CPAP Machine MISC by Does not apply route Please change CPAP to auto pressure to 7-15 cm H20. 1 each 0    Prenatal Vit-Fe Fumarate-FA (PRENATAL PO) Take 1 tablet by mouth 2 times daily Buys OTC       No current facility-administered medications for this visit. Allergies:  Review of patient's allergies indicates no known allergies.      History of allergic reaction to anesthesia:  No     Social History:    TOBACCO:   reports that she quit smoking about 10 years ago. She has a 30.00 pack-year smoking history. She has never used smokeless tobacco.  ETOH:   reports that she does not drink alcohol.    Family History:          Problem Relation Age of Onset    Asthma Sister     Asthma Brother     Heart Disease Father     High Blood Pressure Other     Cancer Maternal Uncle      stomach    Diabetes Maternal Grandmother     High Blood Pressure Maternal Grandmother     Diabetes Maternal Grandfather     Stroke Neg Hx         Review of Systems - General ROS: positive for  - weight gain   Ophthalmic ROS: negative   ENT ROS: negative   Hematological and Lymphatic ROS: negative   Endocrine ROS: DM   Respiratory ROS: no cough, shortness of breath, or wheezing   Cardiovascular ROS: no chest pain or dyspnea on exertion   Genito-Urinary ROS: no dysuria, trouble voiding, or hematuria   Musculoskeletal ROS: positive for - joint pain and muscular weakness   Neurological ROS: no TIA or stroke symptoms     Laboratory     Lab Results   Component Value Date    WBC 8.4 10/26/2017    RBC 4.01 10/26/2017    HGB 11.9 10/26/2017    MCV 90.6 10/26/2017               Lab Results   Component Value Date    AST 23 10/26/2017    ALT 18 10/26/2017    GLUCOSE 230 10/26/2017    GLUCOSE 213 03/27/2012    BUN 31 10/26/2017    CREATININE 1.4 10/26/2017    CO2 27 10/26/2017    CALCIUM 9.8 10/26/2017     Lab Results   Component Value Date    TSH 0.124 11/08/2017        Lab Results   Component Value Date    LIPASE 37.7 11/07/2014        PHYSICAL EXAM:       BP (!) 140/64 (Site: Right Arm, Cuff Size: Large Adult)   Pulse 72   Ht 5' 9\" (1.753 m)   Wt (!) 326 lb (147.9 kg)   LMP 02/20/2001   BMI 48.14 kg/m²  I       General:  alert   HEENT: PERRLA, alert dentures   Neck supple, Thyroid not enlarged, No Virchow's node present   Heart:  Normal apical impulse, regular rate and rhythm, normal S1 and S2, no S3 or S4, and no murmur noted     Lungs:  No increased work of breathing, good air exchange, clear to auscultation bilaterally, no crackles or wheezing     Abdomen:  hyst scar midline lower abd   EXT: No evidence of cyanosis, clubbing or edema   Neurological: No localizing neurologic signs. Skin: normal,no rash, no spider angiomata nor petechiae, no  tattoos    Rectal: deferred       ASA Grade:  ASA 2 - Patient with mild systemic disease with no functional limitations       ASSESSMENT:     Assessment  Patient is a 60 y.o. female here for colonoscopy with conscious sedation        PLAN:     1 Plan Procedure options, risks and benefits reviewed with patient who  expresses understanding. 2 colonoscopy 1/3/18 at 1300h  Stop Asprin and Vit E 4 days before and use 1/2 dose of DM meds day of prep and procedure If glucose 100-150 on day of prep or procedure use 1/2 usual dose of diabetic meds, If greater than 150 use full dose and if less than 100 do not take any diabetic meds.

## 2017-12-21 ENCOUNTER — HOSPITAL ENCOUNTER (OUTPATIENT)
Dept: ULTRASOUND IMAGING | Age: 62
Discharge: HOME | End: 2017-12-21
Payer: MEDICARE

## 2017-12-21 DIAGNOSIS — E04.2 MULTIPLE THYROID NODULES: ICD-10-CM

## 2017-12-21 DIAGNOSIS — E05.90 HYPERTHYROIDISM: ICD-10-CM

## 2017-12-21 PROCEDURE — 76536 US EXAM OF HEAD AND NECK: CPT

## 2017-12-26 ENCOUNTER — TELEPHONE (OUTPATIENT)
Dept: FAMILY MEDICINE CLINIC | Age: 62
End: 2017-12-26

## 2017-12-26 DIAGNOSIS — R79.89 LOW TSH LEVEL: ICD-10-CM

## 2017-12-26 DIAGNOSIS — E04.1 THYROID NODULE: ICD-10-CM

## 2017-12-26 DIAGNOSIS — E01.0 THYROMEGALY: Primary | ICD-10-CM

## 2017-12-27 RX ORDER — ACETAMINOPHEN/DIPHENHYDRAMINE 500MG-25MG
TABLET ORAL
Qty: 30 TABLET | Refills: 5 | Status: SHIPPED | OUTPATIENT
Start: 2017-12-27 | End: 2018-07-02 | Stop reason: SDUPTHER

## 2018-01-02 ENCOUNTER — TELEPHONE (OUTPATIENT)
Dept: INTERNAL MEDICINE CLINIC | Age: 63
End: 2018-01-02

## 2018-01-02 DIAGNOSIS — Z12.11 SCREEN FOR COLON CANCER: Primary | ICD-10-CM

## 2018-01-24 ENCOUNTER — HOSPITAL ENCOUNTER (OUTPATIENT)
Age: 63
Setting detail: OUTPATIENT SURGERY
Discharge: HOME | End: 2018-01-24
Attending: INTERNAL MEDICINE | Admitting: INTERNAL MEDICINE
Payer: MEDICARE

## 2018-01-24 VITALS
WEIGHT: 293 LBS | BODY MASS INDEX: 43.4 KG/M2 | HEART RATE: 76 BPM | OXYGEN SATURATION: 97 % | DIASTOLIC BLOOD PRESSURE: 91 MMHG | RESPIRATION RATE: 18 BRPM | HEIGHT: 69 IN | SYSTOLIC BLOOD PRESSURE: 154 MMHG | TEMPERATURE: 98.6 F

## 2018-01-24 LAB — GLUCOSE BLD-MCNC: 203 MG/DL (ref 70–108)

## 2018-01-24 PROCEDURE — 99153 MOD SED SAME PHYS/QHP EA: CPT | Performed by: INTERNAL MEDICINE

## 2018-01-24 PROCEDURE — 3609027000 HC COLONOSCOPY: Performed by: INTERNAL MEDICINE

## 2018-01-24 PROCEDURE — 99152 MOD SED SAME PHYS/QHP 5/>YRS: CPT | Performed by: INTERNAL MEDICINE

## 2018-01-24 PROCEDURE — 2580000003 HC RX 258: Performed by: INTERNAL MEDICINE

## 2018-01-24 PROCEDURE — 6360000002 HC RX W HCPCS: Performed by: INTERNAL MEDICINE

## 2018-01-24 PROCEDURE — 82948 REAGENT STRIP/BLOOD GLUCOSE: CPT

## 2018-01-24 RX ORDER — MIDAZOLAM HYDROCHLORIDE 1 MG/ML
INJECTION INTRAMUSCULAR; INTRAVENOUS PRN
Status: DISCONTINUED | OUTPATIENT
Start: 2018-01-24 | End: 2018-01-24 | Stop reason: HOSPADM

## 2018-01-24 RX ORDER — SODIUM CHLORIDE 450 MG/100ML
INJECTION, SOLUTION INTRAVENOUS CONTINUOUS
Status: DISCONTINUED | OUTPATIENT
Start: 2018-01-24 | End: 2018-01-24 | Stop reason: HOSPADM

## 2018-01-24 RX ORDER — MEPERIDINE HYDROCHLORIDE 50 MG/ML
INJECTION INTRAMUSCULAR; INTRAVENOUS; SUBCUTANEOUS PRN
Status: DISCONTINUED | OUTPATIENT
Start: 2018-01-24 | End: 2018-01-24 | Stop reason: HOSPADM

## 2018-01-24 RX ADMIN — SODIUM CHLORIDE: 4.5 INJECTION, SOLUTION INTRAVENOUS at 12:43

## 2018-01-24 ASSESSMENT — PAIN - FUNCTIONAL ASSESSMENT: PAIN_FUNCTIONAL_ASSESSMENT: 0-10

## 2018-01-24 ASSESSMENT — PAIN DESCRIPTION - DESCRIPTORS: DESCRIPTORS: ACHING

## 2018-01-24 ASSESSMENT — PAIN SCALES - GENERAL
PAINLEVEL_OUTOF10: 0
PAINLEVEL_OUTOF10: 0

## 2018-01-24 NOTE — OP NOTE
polyps    Procedure: Colonoscopy    Anesthesia: IV conscious sedation    Surgeons/Assistants:Derrick Lora M.D.,MARGUERITEG     Estimated Blood Loss:less than 10ml    Specimens: were not obtained    (The following sections must be completed)  Post-Sedation Vital Signs: Vital signs were reviewed and were stable after the procedure (see flow sheet for vitals)            Post-Sedation Exam: Lungs: clear           Complications: none       Derrick Lora M.D.,MARGUERITEG   1/24/2018, 1:27 PM

## 2018-01-25 ENCOUNTER — TELEPHONE (OUTPATIENT)
Dept: FAMILY MEDICINE CLINIC | Age: 63
End: 2018-01-25

## 2018-01-31 ENCOUNTER — HOSPITAL ENCOUNTER (OUTPATIENT)
Age: 63
Discharge: HOME | End: 2018-01-31
Payer: MEDICARE

## 2018-01-31 ENCOUNTER — TELEPHONE (OUTPATIENT)
Dept: NEPHROLOGY | Age: 63
End: 2018-01-31

## 2018-01-31 DIAGNOSIS — N18.30 STAGE 3 CHRONIC KIDNEY DISEASE (HCC): ICD-10-CM

## 2018-01-31 DIAGNOSIS — N18.3 DIABETES MELLITUS DUE TO UNDERLYING CONDITION WITH STAGE 3 CHRONIC KIDNEY DISEASE, UNSPECIFIED LONG TERM INSULIN USE STATUS: ICD-10-CM

## 2018-01-31 DIAGNOSIS — E87.5 HYPERKALEMIA: Primary | ICD-10-CM

## 2018-01-31 DIAGNOSIS — E08.22 DIABETES MELLITUS DUE TO UNDERLYING CONDITION WITH STAGE 3 CHRONIC KIDNEY DISEASE, UNSPECIFIED LONG TERM INSULIN USE STATUS: ICD-10-CM

## 2018-01-31 LAB
ANION GAP SERPL CALCULATED.3IONS-SCNC: 10 MEQ/L (ref 8–16)
BUN BLDV-MCNC: 34 MG/DL (ref 7–22)
CALCIUM SERPL-MCNC: 9.8 MG/DL (ref 8.5–10.5)
CHLORIDE BLD-SCNC: 105 MEQ/L (ref 98–111)
CO2: 28 MEQ/L (ref 23–33)
CREAT SERPL-MCNC: 1.8 MG/DL (ref 0.4–1.2)
CREATININE URINE: 68.6 MG/DL
GFR SERPL CREATININE-BSD FRML MDRD: 28 ML/MIN/1.73M2
GLUCOSE BLD-MCNC: 88 MG/DL (ref 70–108)
PHOSPHORUS: 3.1 MG/DL (ref 2.4–4.7)
POTASSIUM SERPL-SCNC: 5.5 MEQ/L (ref 3.5–5.2)
PROT/CREAT RATIO, UR: 1.57
PROTEIN, URINE: 107.4 MG/DL
PTH INTACT: 42 PG/ML (ref 15–65)
SODIUM BLD-SCNC: 143 MEQ/L (ref 135–145)
VITAMIN D 25-HYDROXY: 32 NG/ML (ref 30–100)

## 2018-01-31 PROCEDURE — 84156 ASSAY OF PROTEIN URINE: CPT

## 2018-01-31 PROCEDURE — 84100 ASSAY OF PHOSPHORUS: CPT

## 2018-01-31 PROCEDURE — 36415 COLL VENOUS BLD VENIPUNCTURE: CPT

## 2018-01-31 PROCEDURE — 82306 VITAMIN D 25 HYDROXY: CPT

## 2018-01-31 PROCEDURE — 80048 BASIC METABOLIC PNL TOTAL CA: CPT

## 2018-01-31 PROCEDURE — 82570 ASSAY OF URINE CREATININE: CPT

## 2018-01-31 PROCEDURE — 83970 ASSAY OF PARATHORMONE: CPT

## 2018-01-31 RX ORDER — SODIUM POLYSTYRENE SULFONATE 15 G/60ML
SUSPENSION ORAL; RECTAL
Qty: 1 BOTTLE | Refills: 0 | Status: SHIPPED | OUTPATIENT
Start: 2018-01-31 | End: 2018-02-27 | Stop reason: ALTCHOICE

## 2018-01-31 NOTE — TELEPHONE ENCOUNTER
----- Message from Elisabet Sanchez MD sent at 1/31/2018  4:32 PM EST -----  Please call patient. Serum potassium level is high. Low potassium diet. Kayexalate 15 g once. Repeat potassium level in 5 days.

## 2018-02-07 ENCOUNTER — OFFICE VISIT (OUTPATIENT)
Dept: FAMILY MEDICINE CLINIC | Age: 63
End: 2018-02-07
Payer: MEDICARE

## 2018-02-07 ENCOUNTER — HOSPITAL ENCOUNTER (OUTPATIENT)
Age: 63
Discharge: HOME | End: 2018-02-07
Payer: MEDICARE

## 2018-02-07 VITALS
HEIGHT: 69 IN | HEART RATE: 56 BPM | BODY MASS INDEX: 43.4 KG/M2 | WEIGHT: 293 LBS | TEMPERATURE: 97.8 F | RESPIRATION RATE: 12 BRPM | DIASTOLIC BLOOD PRESSURE: 64 MMHG | SYSTOLIC BLOOD PRESSURE: 112 MMHG

## 2018-02-07 DIAGNOSIS — N18.4 CHRONIC KIDNEY DISEASE, STAGE 4, SEVERELY DECREASED GFR (HCC): ICD-10-CM

## 2018-02-07 DIAGNOSIS — G62.9 NEUROPATHY: ICD-10-CM

## 2018-02-07 DIAGNOSIS — N18.3 DIABETES MELLITUS DUE TO UNDERLYING CONDITION WITH STAGE 3 CHRONIC KIDNEY DISEASE, UNSPECIFIED LONG TERM INSULIN USE STATUS: ICD-10-CM

## 2018-02-07 DIAGNOSIS — R60.0 PEDAL EDEMA: ICD-10-CM

## 2018-02-07 DIAGNOSIS — E08.22 DIABETES MELLITUS DUE TO UNDERLYING CONDITION WITH STAGE 3 CHRONIC KIDNEY DISEASE, UNSPECIFIED LONG TERM INSULIN USE STATUS: ICD-10-CM

## 2018-02-07 DIAGNOSIS — E87.5 HYPERKALEMIA: ICD-10-CM

## 2018-02-07 DIAGNOSIS — M19.90 OSTEOARTHRITIS, UNSPECIFIED OSTEOARTHRITIS TYPE, UNSPECIFIED SITE: ICD-10-CM

## 2018-02-07 LAB
HBA1C MFR BLD: 6.7 %
POTASSIUM SERPL-SCNC: 5.2 MEQ/L (ref 3.5–5.2)

## 2018-02-07 PROCEDURE — 84132 ASSAY OF SERUM POTASSIUM: CPT

## 2018-02-07 PROCEDURE — 99215 OFFICE O/P EST HI 40 MIN: CPT | Performed by: NURSE PRACTITIONER

## 2018-02-07 PROCEDURE — 83036 HEMOGLOBIN GLYCOSYLATED A1C: CPT | Performed by: NURSE PRACTITIONER

## 2018-02-07 PROCEDURE — 36415 COLL VENOUS BLD VENIPUNCTURE: CPT

## 2018-02-07 ASSESSMENT — ENCOUNTER SYMPTOMS
RESPIRATORY NEGATIVE: 1
BACK PAIN: 1

## 2018-02-07 NOTE — PROGRESS NOTES
Visit Information    Have you changed or started any medications since your last visit including any over-the-counter medicines, vitamins, or herbal medicines? no   Are you having any side effects from any of your medications? -  no  Have you stopped taking any of your medications? Is so, why? -  no    Have you seen any other physician or provider since your last visit? No  Have you had any other diagnostic tests since your last visit? No  Have you been seen in the emergency room and/or had an admission to a hospital since we last saw you? No  Have you had your routine dental cleaning in the past 6 months? no    Have you activated your [email or web address] account? If not, what are your barriers?  Yes     Patient Care Team:  Render MINDY Arreola as PCP - General (Family Nurse Practitioner)  Render MINDY Arreola as PCP - S Attributed Provider  Antonio Garvey RN as Care Coordinator        Health Maintenance   Topic Date Due    Hepatitis C screen  1955    HIV screen  10/21/1969    Zostavax vaccine  10/21/2014    Smoker: low dose lung CT screening  09/18/2016    Flu vaccine (1) 09/01/2017    A1C test (Diabetic or Prediabetic)  01/26/2018    Diabetic retinal exam  08/23/2018    Lipid screen  10/26/2018    Breast cancer screen  11/03/2018    Diabetic foot exam  11/07/2018    Diabetic microalbuminuria test  11/08/2018    TSH testing  11/08/2018    DTaP/Tdap/Td vaccine (2 - Td) 11/07/2027    Colon cancer screen colonoscopy  01/24/2028    Pneumococcal med risk  Completed

## 2018-02-07 NOTE — PROGRESS NOTES
[address]  [address] [address]  Dept: [phone]  Dept Fax: [phone]  Loc: [phone]    Fernanda Martinez is a 60 y.o. female who presents today for her medical conditions/complaints as noted below. Fernanda Martinez is c/o of Diabetes (3 month f/u)      HPI:     Diabetes Type 2    Glucose control:   Does patient check blood glucoses at home? Yes  Report of hypoglycemia: no  Lab Results       Component                Value               Date                       LABA1C                   6.7                 02/07/2018            No results found for: EAG    Symptoms  Polyuria, Polydipsia or Polyphagia? No  Chest Pain, SOB, or Palpitations? -  No  New Vision complaints? No  Paresthesias of the extremities? Yes - she has been diagnosed with neuropathy    Medications  Current medication were reviewed. Compliant with medications? yes  Medication side effects? No  On ACE-I or ARB? Yes  On antiplatelet therapy? Yes  On Statin? Yes    Last Diabetic Eye Exam: 2016    Exercise  Exercise? No, patient sits in a wheelchair all day and does not get up to ambulate much  Wt Readings from Last 3 Encounters:  02/07/18 : (!) 336 lb (152.4 kg)  01/24/18 : (!) 329 lb 9.6 oz (149.5 kg)  12/18/17 : (!) 326 lb (147.9 kg)    She has gained weight  Diet discipline?:  Low salt, fat, sugar diet?  no    Blood pressure control:  BP Readings from Last 3 Encounters:  02/07/18 : 112/64  01/24/18 : (!) 154/91  12/18/17 : (!) 140/64      Lab Results       Component                Value               Date                       LABMICR                  114.07              11/08/2017              Lab Results       Component                Value               Date                       LDLCALC                  87                  10/26/2017            She is currently on insulin and actos was started at her last visit.  Her A1C did improve from over 9 to 6.7, she is current with nephrology and her GFR 28. She does have appt with Nephrology tomorrow. She has had a weight gain. It is noted she has pedal edema. She states her legs are tight. She denies chest pain or sob. Pt has chronic pain, and her legs and back hurt and ache. Seh has had a previous left femur fracture which causes her pain.          Current Outpatient Prescriptions   Medication Sig Dispense Refill    sodium polystyrene (SPS) 15 GM/60ML suspension Take 15 gms once 1 Bottle 0    RA ASPIRIN EC 81 MG EC tablet take 1 tablet by mouth once daily 30 tablet 5    metoprolol tartrate (LOPRESSOR) 25 MG tablet take 1/2 tablets by mouth twice a day 180 tablet 3    pantoprazole (PROTONIX) 40 MG tablet Take 40 mg by mouth daily      atorvastatin (LIPITOR) 40 MG tablet take 1 tablet by mouth at bedtime 90 tablet 1    DULoxetine (CYMBALTA) 60 MG extended release capsule take 1 capsule by mouth once daily 90 capsule 1    insulin lispro protamine & lispro (HUMALOG MIX 75/25 KWIKPEN) (75-25) 100 UNIT per ML SUPN injection pen 30 units in AM and 50 units at Bedtime 6 Pen 6    fenofibrate (TRICOR) 145 MG tablet take 1 tablet by mouth EVERY EVENING 90 tablet 1    pioglitazone (ACTOS) 15 MG tablet Take 1 tablet by mouth daily 30 tablet 3    amitriptyline (ELAVIL) 10 MG tablet Take 1 tablet by mouth nightly 30 tablet 3    hydrALAZINE (APRESOLINE) 25 MG tablet Take 1 tablet by mouth 3 times daily 90 tablet 6    isosorbide mononitrate (IMDUR) 30 MG extended release tablet take 1 tablet by mouth once daily 30 tablet 6    diltiazem (CARDIZEM CD) 120 MG extended release capsule Take 1 capsule by mouth daily 90 capsule 1    BIOTIN PO Take 15,000 mg by mouth daily      CPAP Machine MISC by Does not apply route Please change CPAP to auto pressure to 7-15 cm H20. 1 each 0    Insulin Pen Needle (PEN NEEDLES) 31G X 5 MM MISC 1 each by Does not apply route 3 times daily 100 each 11    Prenatal Vit-Fe Fumarate-FA (PRENATAL

## 2018-02-08 ENCOUNTER — OFFICE VISIT (OUTPATIENT)
Dept: NEPHROLOGY | Age: 63
End: 2018-02-08
Payer: MEDICARE

## 2018-02-08 VITALS — DIASTOLIC BLOOD PRESSURE: 60 MMHG | SYSTOLIC BLOOD PRESSURE: 128 MMHG | RESPIRATION RATE: 18 BRPM | HEART RATE: 68 BPM

## 2018-02-08 DIAGNOSIS — I10 ESSENTIAL HYPERTENSION: ICD-10-CM

## 2018-02-08 DIAGNOSIS — N18.30 STAGE 3 CHRONIC KIDNEY DISEASE (HCC): Primary | ICD-10-CM

## 2018-02-08 DIAGNOSIS — E08.22 DIABETES MELLITUS DUE TO UNDERLYING CONDITION WITH STAGE 3 CHRONIC KIDNEY DISEASE, UNSPECIFIED LONG TERM INSULIN USE STATUS: ICD-10-CM

## 2018-02-08 DIAGNOSIS — N18.3 DIABETES MELLITUS DUE TO UNDERLYING CONDITION WITH STAGE 3 CHRONIC KIDNEY DISEASE, UNSPECIFIED LONG TERM INSULIN USE STATUS: ICD-10-CM

## 2018-02-08 DIAGNOSIS — N28.1 BILATERAL RENAL CYSTS: ICD-10-CM

## 2018-02-08 DIAGNOSIS — R80.1 PERSISTENT PROTEINURIA: ICD-10-CM

## 2018-02-08 PROCEDURE — 99214 OFFICE O/P EST MOD 30 MIN: CPT | Performed by: INTERNAL MEDICINE

## 2018-02-08 RX ORDER — METOLAZONE 5 MG/1
5 TABLET ORAL DAILY
Qty: 30 TABLET | Refills: 3 | Status: SHIPPED | OUTPATIENT
Start: 2018-02-08 | End: 2018-05-29 | Stop reason: SDUPTHER

## 2018-02-08 NOTE — PROGRESS NOTES
Renal Progress Note    Assessment and Plan:     1. Stage 3 chronic kidney disease     2. Persistent proteinuria     3. Essential hypertension     4. Diabetes mellitus due to underlying condition with stage 3 chronic kidney disease, unspecified long term insulin use status ([facility]     5. Bilateral renal cysts       PLAN:  I discussed the lab results with the patient and she understood  Serum creatinine is stable at 1.8 mg/dL  PTH is normal  Vitamin D level is normal  It does not does not show any hydronephrosis except for bilateral simple renal cyst  I discussed that with them  They had  several questions which I addressed including use of diuretic, blood sugar management with oral medications instead of insulin and  use of Actos  Medications reviewed  No changes  Low potassium diet information provided to them  Regarding the lower extremity edema, we will put her on metolazone 5 mg once a day for 3 days whenever she gains 2 pounds or more within a day or whenever she has a worsening leg swellings  Printed instruction provided to her  Regarding her blood sugar management, I will differ that to the primary care physician who has been managing the blood sugar.   Is okay to continue the Actos however as it does not affect the renal function but does cause fluid retention  Her most recent hemoglobin A1c was actually good  However if her primary care once to refer her to endocrinology  for assistant that will be reasonable  Return visit in 6 months with labs    Patient Active Problem List   Diagnosis    DM (diabetes mellitus) ([address]    Hypertension    Hyperlipidemia    Neuropathy ([address]    Osteoarthritis    Eczema on face    Proteinuria    Arthritis    Allergic rhinitis    CKD (chronic kidney disease)    Obstructive sleep apnea on CPAP    S/P laparoscopic sleeve gastrectomy    Coronary artery disease involving native heart without angina pectoris    Hyperthyroidism    Multiple thyroid nodules    Bilateral renal tablet Take 40 mg by mouth daily      atorvastatin (LIPITOR) 40 MG tablet take 1 tablet by mouth at bedtime 90 tablet 1    DULoxetine (CYMBALTA) 60 MG extended release capsule take 1 capsule by mouth once daily 90 capsule 1    insulin lispro protamine & lispro (HUMALOG MIX 75/25 KWIKPEN) (75-25) 100 UNIT per ML SUPN injection pen 30 units in AM and 50 units at Bedtime 6 Pen 6    fenofibrate (TRICOR) 145 MG tablet take 1 tablet by mouth EVERY EVENING 90 tablet 1    pioglitazone (ACTOS) 15 MG tablet Take 1 tablet by mouth daily 30 tablet 3    amitriptyline (ELAVIL) 10 MG tablet Take 1 tablet by mouth nightly 30 tablet 3    hydrALAZINE (APRESOLINE) 25 MG tablet Take 1 tablet by mouth 3 times daily 90 tablet 6    isosorbide mononitrate (IMDUR) 30 MG extended release tablet take 1 tablet by mouth once daily 30 tablet 6    diltiazem (CARDIZEM CD) 120 MG extended release capsule Take 1 capsule by mouth daily 90 capsule 1    BIOTIN PO Take 15,000 mg by mouth daily      CPAP Machine MISC by Does not apply route Please change CPAP to auto pressure to 7-15 cm H20. 1 each 0    Insulin Pen Needle (PEN NEEDLES) 31G X 5 MM MISC 1 each by Does not apply route 3 times daily 100 each 11    Prenatal Vit-Fe Fumarate-FA (PRENATAL PO) Take 1 tablet by mouth 2 times daily Buys OTC      Calcium Carbonate-Vitamin D (CALCIUM-VITAMIN D) 500-200 MG-UNIT per tablet Take 1 tablet by mouth 2 times daily (with meals)        No current facility-administered medications for this visit.         Lab Results:    CBC:   Lab Results   Component Value Date    WBC 8.4 10/26/2017    HGB 11.9 (L) 10/26/2017    HCT 36.3 (L) 10/26/2017    MCV 90.6 10/26/2017     10/26/2017     BMP:    Lab Results   Component Value Date     01/31/2018     10/26/2017     05/16/2017    K 5.2 02/07/2018    K 5.5 (H) 01/31/2018    K 5.2 10/26/2017     01/31/2018     10/26/2017     05/16/2017    CO2 28 01/31/2018    CO2 27 10/26/2017    CO2 25 05/16/2017    BUN 34 (H) 01/31/2018    BUN 31 (H) 10/26/2017    BUN 36 (H) 05/16/2017    CREATININE 1.8 (H) 01/31/2018    CREATININE 1.4 (H) 10/26/2017    CREATININE 1.7 (H) 05/16/2017    GLUCOSE 88 01/31/2018    GLUCOSE 230 (H) 10/26/2017    GLUCOSE 187 (H) 05/16/2017      Hepatic:   Lab Results   Component Value Date    AST 23 10/26/2017    AST 19 07/29/2016    AST 33 11/07/2014    ALT 18 10/26/2017    ALT 14 07/29/2016    ALT 21 11/07/2014    BILITOT 0.6 10/26/2017    BILITOT 0.4 07/29/2016    BILITOT 0.4 11/07/2014    ALKPHOS 57 10/26/2017    ALKPHOS 58 07/29/2016    ALKPHOS 60 11/07/2014     BNP: No results found for: BNP  Lipids:   Lab Results   Component Value Date    CHOL 157 10/26/2017    HDL 36 10/26/2017     INR:   Lab Results   Component Value Date    INR 0.92 11/07/2014     URINE:   Lab Results   Component Value Date    PROTUR 107.4 01/31/2018     Lab Results   Component Value Date    NITRU NEGATIVE 10/26/2017    COLORU yellow 11/07/2017    COLORU YELLOW 10/26/2017    PHUR 7.0 11/07/2017    PHUR 6.0 10/26/2017    WBCUA > 200 10/26/2017    RBCUA 10-15 10/26/2017    YEAST NONE SEEN 10/26/2017    BACTERIA MANY 10/26/2017    CLARITYU clear 11/07/2017    SPECGRAV 1.020 11/07/2017    LEUKOCYTESUR small 11/07/2017    LEUKOCYTESUR LARGE 10/26/2017    UROBILINOGEN 0.2 10/26/2017    BILIRUBINUR neg 11/07/2017    BLOODU neg 11/07/2017    BLOODU TRACE 10/26/2017    GLUCOSEU neg 11/07/2017    GLUCOSEU NEGATIVE 10/26/2017    KETUA neg 11/07/2017    KETUA NEGATIVE 10/26/2017      Microalbumen/Creatinine ratio:  No components found for: RUCREAT    Objective:   Vitals: /60   Pulse 68   Resp 18   LMP 02/20/2001      Constitutional:  Alert, awake, no apparent distress  Skin:normal  HEENT:Pupils are reactive . Throat is clear  Neck:supple with no thyromegally  Cardiovascular:  S1, S2 without m/r/g  Respiratory:  CTA B without w/r/r  Abdomen: +bs, soft, nt  Ext: ++ LE

## 2018-02-08 NOTE — PATIENT INSTRUCTIONS
Edamame/soybeans, green ½ C 338   Tomato, canned  ½ C 325   Raisins ¼ C 299   Tomato, raw one medium 292   Papaya one small 286   Peach one medium 285   Pistachios, dry roasted, salted  1 oz (47 nuts) 285   Pumpkin, cooked, mashed ½ C 282   French fries 10 fries 278   Mushrooms, white, cooked ½ C 278   Beets, cooked ½ C 259   [place] sprouts, cooked ½ C 247   Kiwi one medium 237   Orange, raw one medium 237   Green peas, cooked ½ C 217   Cantaloupe, raw ½ C 214   Pear, raw one medium 212   Almonds, dry roasted 1 oz (24 nuts) 202   Apricot, canned, juice pack ½ C 202   Asparagus, cooked ½ C 202   [place] squash, cooked ½ C 201   Apple, raw with skin one medium 195   Honeydew ½ C 194   Carrots, cooked ½ C 183   Onions, cooked ½ C 175   Spinach, raw 1 C 167   Corn, sweet yellow ½ C 163   Red barnes pepper ½ C 157   Kale, cooked ½ C 150   Cabbage, cooked ½ C 147   Mustard greens, cooked ½ C 142   [place] ½ C 139   Figs, dried two figs 134   Summer squash, cooked ½ C 126   Grapes 10 grapes 120   Okra, cooked ½ C 108   Bamboo shoots, canned 1 C 108   Celery, raw one stalk 105   Peanut butter, creamy 1 Tbsp 104   Green beans, cooked ½ C 104   Cauliflower, cooked ½ C 91   Mushrooms, shitake, cooked ½ C 88   Watermelon ½ C 85   Cucumber, peeled ½ C 88   Iceberg lettuce 1 C 81   Tomato, sun dried one piece 80   Eggplant, cooked ½ C 69   Pickle one pickle 61   Ketchup 1 Tbsp 60   Radishes one radish 57     5   C=cup, mg=milligrams, oz=ounces, Tbsp=tablespoon    Reference  [place] Dept of Agriculture, Agricultural Research Service, [employer] Database for Standard Reference, Release 25: Potassium, K (mg) content of selected foods per common measure    Please take the metolazone also as zaroxolyn 5 mg 1 tablet a day for 3 days for weight gain of 2 pounds or more within a day or worsening swellings in the leg. Weight yourself every day.

## 2018-02-15 ENCOUNTER — OFFICE VISIT (OUTPATIENT)
Dept: PULMONOLOGY | Age: 63
End: 2018-02-15
Payer: MEDICARE

## 2018-02-15 VITALS
BODY MASS INDEX: 43.4 KG/M2 | SYSTOLIC BLOOD PRESSURE: 138 MMHG | HEIGHT: 69 IN | WEIGHT: 293 LBS | HEART RATE: 64 BPM | OXYGEN SATURATION: 96 % | DIASTOLIC BLOOD PRESSURE: 68 MMHG

## 2018-02-15 DIAGNOSIS — Z99.89 OBSTRUCTIVE SLEEP APNEA ON CPAP: Primary | ICD-10-CM

## 2018-02-15 DIAGNOSIS — G47.33 OBSTRUCTIVE SLEEP APNEA ON CPAP: Primary | ICD-10-CM

## 2018-02-15 PROCEDURE — 99213 OFFICE O/P EST LOW 20 MIN: CPT | Performed by: PHYSICIAN ASSISTANT

## 2018-02-15 ASSESSMENT — ENCOUNTER SYMPTOMS
EYES NEGATIVE: 1
NAUSEA: 0
HEARTBURN: 0
WHEEZING: 0
SINUS PAIN: 0
SORE THROAT: 0
RESPIRATORY NEGATIVE: 1
GASTROINTESTINAL NEGATIVE: 1
SPUTUM PRODUCTION: 0
COUGH: 0
ORTHOPNEA: 0
SHORTNESS OF BREATH: 0

## 2018-02-22 DIAGNOSIS — E11.42 TYPE 2 DIABETES MELLITUS WITH DIABETIC POLYNEUROPATHY, WITH LONG-TERM CURRENT USE OF INSULIN (HCC): ICD-10-CM

## 2018-02-22 DIAGNOSIS — G62.9 NEUROPATHY: ICD-10-CM

## 2018-02-22 DIAGNOSIS — Z79.4 TYPE 2 DIABETES MELLITUS WITH DIABETIC POLYNEUROPATHY, WITH LONG-TERM CURRENT USE OF INSULIN (HCC): ICD-10-CM

## 2018-02-22 RX ORDER — AMITRIPTYLINE HYDROCHLORIDE 10 MG/1
TABLET, FILM COATED ORAL
Qty: 30 TABLET | Refills: 3 | Status: SHIPPED | OUTPATIENT
Start: 2018-02-22 | End: 2018-07-02 | Stop reason: SDUPTHER

## 2018-02-22 RX ORDER — PIOGLITAZONEHYDROCHLORIDE 15 MG/1
TABLET ORAL
Qty: 30 TABLET | Refills: 3 | Status: SHIPPED | OUTPATIENT
Start: 2018-02-22 | End: 2018-06-16 | Stop reason: SDUPTHER

## 2018-02-27 ENCOUNTER — OFFICE VISIT (OUTPATIENT)
Dept: INTERNAL MEDICINE CLINIC | Age: 63
End: 2018-02-27
Payer: MEDICARE

## 2018-02-27 VITALS
HEIGHT: 69 IN | WEIGHT: 293 LBS | SYSTOLIC BLOOD PRESSURE: 138 MMHG | HEART RATE: 74 BPM | DIASTOLIC BLOOD PRESSURE: 70 MMHG | BODY MASS INDEX: 43.4 KG/M2

## 2018-02-27 DIAGNOSIS — Z99.89 OBSTRUCTIVE SLEEP APNEA ON CPAP: ICD-10-CM

## 2018-02-27 DIAGNOSIS — R94.6 BORDERLINE ABNORMAL TFTS: Primary | ICD-10-CM

## 2018-02-27 DIAGNOSIS — I10 ESSENTIAL HYPERTENSION: ICD-10-CM

## 2018-02-27 DIAGNOSIS — E78.5 HYPERLIPIDEMIA, UNSPECIFIED HYPERLIPIDEMIA TYPE: ICD-10-CM

## 2018-02-27 DIAGNOSIS — G47.33 OBSTRUCTIVE SLEEP APNEA ON CPAP: ICD-10-CM

## 2018-02-27 DIAGNOSIS — E04.2 MULTINODULAR GOITER: ICD-10-CM

## 2018-02-27 PROCEDURE — 99214 OFFICE O/P EST MOD 30 MIN: CPT | Performed by: INTERNAL MEDICINE

## 2018-02-27 NOTE — PROGRESS NOTES
adenopathy, thyroid exam: thyroid enlarged  Chest - clear to auscultation, no wheezes, rales or rhonchi, symmetric air entry  Heart - normal rate, regular rhythm, normal S1, S2, no murmurs, rubs, clicks or gallops  Abdomen - soft, nontender, nondistended, no masses or organomegaly  Neurological - alert, oriented, normal speech, no focal findings or movement disorder noted  Musculoskeletal - no joint tenderness, deformity or swelling  Extremities - peripheral pulses normal, no pedal edema, no clubbing or cyanosis  Skin - normal coloration and turgor, no rashes, no suspicious skin lesions noted   THYROID ULTRASOUND:  Impression   1. Sonographic findings consistent with a multinodular goiter. 2. Ultrasound-guided fine-needle aspiration biopsy is recommended for the 1.4 x 0.8 x 0.8 cm nodule within the lower pole of the right lobe of the thyroid gland, 1.8 x 1.5 x 1.3 cm nodule within the midpole of the left lobe of the thyroid gland and 2.1 x    1.4 x 1.1 cm nodule within the lower pole of the left lobe of the thyroid gland       1. Borderline abnormal TFTs  TSH   2. Multinodular goiter     3. Essential hypertension     4. Obstructive sleep apnea on CPAP     5. Hyperlipidemia, unspecified hyperlipidemia type     6. Class 3 obesity due to excess calories without serious comorbidity with body mass index (BMI) of 45.0 to 49.9 in adult [facility])         Orders Placed This Encounter   Procedures    TSH     6 days     TSH was low. However, the patient is on biotin , which can effect the TSH assay. Thyroid ultrasound shows what appears to be a multinodular goiter. I am going to go ahead and have her hold her biotin for 6 days. Repeat TSH. She will probably need biopsy of the largest nodule versus total thyroidectomy.   will see her back in a couple of weeks  I discussed the situation at length with patient and family and answered all their questions

## 2018-03-06 ENCOUNTER — HOSPITAL ENCOUNTER (OUTPATIENT)
Age: 63
Discharge: HOME | End: 2018-03-06
Payer: MEDICARE

## 2018-03-06 LAB — TSH SERPL DL<=0.05 MIU/L-ACNC: 0.4 UIU/ML (ref 0.4–4.2)

## 2018-03-06 PROCEDURE — 36415 COLL VENOUS BLD VENIPUNCTURE: CPT

## 2018-03-06 PROCEDURE — 84443 ASSAY THYROID STIM HORMONE: CPT

## 2018-03-19 ENCOUNTER — OFFICE VISIT (OUTPATIENT)
Dept: INTERNAL MEDICINE CLINIC | Age: 63
End: 2018-03-19
Payer: MEDICARE

## 2018-03-19 VITALS
BODY MASS INDEX: 43.4 KG/M2 | HEART RATE: 68 BPM | HEIGHT: 69 IN | SYSTOLIC BLOOD PRESSURE: 128 MMHG | DIASTOLIC BLOOD PRESSURE: 60 MMHG | WEIGHT: 293 LBS

## 2018-03-19 DIAGNOSIS — E78.5 HYPERLIPIDEMIA, UNSPECIFIED HYPERLIPIDEMIA TYPE: ICD-10-CM

## 2018-03-19 DIAGNOSIS — Z99.89 OBSTRUCTIVE SLEEP APNEA ON CPAP: ICD-10-CM

## 2018-03-19 DIAGNOSIS — E04.2 MULTINODULAR GOITER: Primary | ICD-10-CM

## 2018-03-19 DIAGNOSIS — I10 ESSENTIAL HYPERTENSION: ICD-10-CM

## 2018-03-19 DIAGNOSIS — G47.33 OBSTRUCTIVE SLEEP APNEA ON CPAP: ICD-10-CM

## 2018-03-19 PROCEDURE — 99213 OFFICE O/P EST LOW 20 MIN: CPT | Performed by: INTERNAL MEDICINE

## 2018-03-20 NOTE — PROGRESS NOTES
[facility] PROFESSIONAL SERVS  PHYSICIANS [place] [address] [place] [address], Omkar Gant  Dept: [phone]  Dept Fax: [phone]      Chief Complaint   Patient presents with   Ernesto Sunny Juanjo 100 TFTs       Patient presents for evaluation of abnormal TSH. I saw her 3 weeks ago. This patient is followed regularly by  Stephen Arreola CNP. Patient had a gastric bypass 2-1/2 years ago. As part of the weight management follow-up she recently had thyroid function studies  Thyroid ultrasound was ordered as she was found to have thyromegaly. The patient has no history of any thyroid disease to her knowledge. She denies any history of radiation to the neck or chest  She says she lost 100 pounds after the surgery but has put it back on. She is here with her sister    TSH was low. However, the patient was on biotin , which can effect the TSH assay. Thyroid ultrasound shows what appears to be a multinodular goiter. I had her hold her biotin for 6 days. Repeated TSH.   Patient Active Problem List   Diagnosis    DM (diabetes mellitus) ([address]    Hypertension    Hyperlipidemia    Neuropathy ([address]    Osteoarthritis    Eczema on face    Proteinuria    Arthritis    Allergic rhinitis    CKD (chronic kidney disease)    Obstructive sleep apnea on CPAP    S/P laparoscopic sleeve gastrectomy    Coronary artery disease involving native heart without angina pectoris    Hyperthyroidism    Multiple thyroid nodules    Bilateral renal cysts       Current Outpatient Prescriptions   Medication Sig Dispense Refill    pioglitazone (ACTOS) 15 MG tablet take 1 tablet by mouth once daily 30 tablet 3    amitriptyline (ELAVIL) 10 MG tablet take 1 tablet by mouth once daily 30 tablet 3    metolazone (ZAROXOLYN) 5 MG tablet Take 1 tablet by mouth daily 30 tablet 3    RA ASPIRIN EC 81 MG EC tablet take 1 tablet by mouth once daily 30 tablet 5    metoprolol tartrate (LOPRESSOR) 25 MG tablet take 1/2 tablets by

## 2018-04-16 ENCOUNTER — OFFICE VISIT (OUTPATIENT)
Dept: CARDIOLOGY CLINIC | Age: 63
End: 2018-04-16
Payer: MEDICARE

## 2018-04-16 VITALS
HEIGHT: 69 IN | HEART RATE: 71 BPM | WEIGHT: 293 LBS | SYSTOLIC BLOOD PRESSURE: 128 MMHG | BODY MASS INDEX: 43.4 KG/M2 | DIASTOLIC BLOOD PRESSURE: 49 MMHG

## 2018-04-16 DIAGNOSIS — I48.91 ATRIAL FIBRILLATION WITH RVR (HCC): ICD-10-CM

## 2018-04-16 DIAGNOSIS — I25.10 CORONARY ARTERY DISEASE INVOLVING NATIVE HEART WITHOUT ANGINA PECTORIS, UNSPECIFIED VESSEL OR LESION TYPE: Primary | ICD-10-CM

## 2018-04-16 DIAGNOSIS — I10 ESSENTIAL HYPERTENSION: ICD-10-CM

## 2018-04-16 DIAGNOSIS — E78.01 FAMILIAL HYPERCHOLESTEROLEMIA: ICD-10-CM

## 2018-04-16 PROCEDURE — 93000 ELECTROCARDIOGRAM COMPLETE: CPT | Performed by: NUCLEAR MEDICINE

## 2018-04-16 PROCEDURE — 99213 OFFICE O/P EST LOW 20 MIN: CPT | Performed by: NUCLEAR MEDICINE

## 2018-04-16 RX ORDER — DILTIAZEM HYDROCHLORIDE 120 MG/1
CAPSULE, EXTENDED RELEASE ORAL
Qty: 90 CAPSULE | Refills: 3 | Status: SHIPPED | OUTPATIENT
Start: 2018-04-16 | End: 2019-04-08 | Stop reason: SDUPTHER

## 2018-05-05 ENCOUNTER — HOSPITAL ENCOUNTER (EMERGENCY)
Age: 63
Discharge: HOME | End: 2018-05-05
Payer: MEDICARE

## 2018-05-05 VITALS
SYSTOLIC BLOOD PRESSURE: 120 MMHG | RESPIRATION RATE: 18 BRPM | TEMPERATURE: 98.2 F | DIASTOLIC BLOOD PRESSURE: 58 MMHG | OXYGEN SATURATION: 97 % | HEART RATE: 63 BPM

## 2018-05-05 DIAGNOSIS — R21 RASH AND OTHER NONSPECIFIC SKIN ERUPTION: Primary | ICD-10-CM

## 2018-05-05 PROCEDURE — 99212 OFFICE O/P EST SF 10 MIN: CPT | Performed by: NURSE PRACTITIONER

## 2018-05-05 PROCEDURE — 99213 OFFICE O/P EST LOW 20 MIN: CPT

## 2018-05-05 RX ORDER — CLOTRIMAZOLE AND BETAMETHASONE DIPROPIONATE 10; .5 MG/ML; MG/ML
LOTION TOPICAL
Qty: 1 BOTTLE | Refills: 0 | Status: SHIPPED | OUTPATIENT
Start: 2018-05-05 | End: 2018-11-07

## 2018-05-05 ASSESSMENT — ENCOUNTER SYMPTOMS
SINUS PRESSURE: 0
SHORTNESS OF BREATH: 0
CHEST TIGHTNESS: 0
COUGH: 0

## 2018-05-11 RX ORDER — ISOSORBIDE MONONITRATE 30 MG/1
TABLET, EXTENDED RELEASE ORAL
Qty: 30 TABLET | Refills: 6 | Status: SHIPPED | OUTPATIENT
Start: 2018-05-11 | End: 2018-11-18 | Stop reason: SDUPTHER

## 2018-05-15 ENCOUNTER — HOSPITAL ENCOUNTER (OUTPATIENT)
Dept: ULTRASOUND IMAGING | Age: 63
Discharge: HOME | End: 2018-05-15
Payer: MEDICARE

## 2018-05-15 DIAGNOSIS — E04.2 MULTIPLE THYROID NODULES: ICD-10-CM

## 2018-05-15 PROCEDURE — 88172 CYTP DX EVAL FNA 1ST EA SITE: CPT

## 2018-05-15 PROCEDURE — 88173 CYTOPATH EVAL FNA REPORT: CPT

## 2018-05-15 PROCEDURE — 88177 CYTP FNA EVAL EA ADDL: CPT

## 2018-05-15 PROCEDURE — 76942 ECHO GUIDE FOR BIOPSY: CPT

## 2018-05-26 DIAGNOSIS — E78.5 HYPERLIPIDEMIA, UNSPECIFIED HYPERLIPIDEMIA TYPE: ICD-10-CM

## 2018-05-29 RX ORDER — METOLAZONE 5 MG/1
5 TABLET ORAL
Qty: 30 TABLET | Refills: 3 | Status: SHIPPED | OUTPATIENT
Start: 2018-05-29 | End: 2018-11-07

## 2018-05-29 RX ORDER — ATORVASTATIN CALCIUM 40 MG/1
TABLET, FILM COATED ORAL
Qty: 90 TABLET | Refills: 1 | Status: SHIPPED | OUTPATIENT
Start: 2018-05-29 | End: 2018-12-17 | Stop reason: SDUPTHER

## 2018-06-05 ENCOUNTER — HOSPITAL ENCOUNTER (OUTPATIENT)
Dept: ULTRASOUND IMAGING | Age: 63
Discharge: HOME | End: 2018-06-05
Payer: MEDICARE

## 2018-06-05 DIAGNOSIS — E04.2 MULTIPLE THYROID NODULES: ICD-10-CM

## 2018-06-05 PROCEDURE — 60300 ASPIR/INJ THYROID CYST: CPT

## 2018-06-05 PROCEDURE — 88172 CYTP DX EVAL FNA 1ST EA SITE: CPT

## 2018-06-05 PROCEDURE — 88177 CYTP FNA EVAL EA ADDL: CPT

## 2018-06-05 PROCEDURE — 88173 CYTOPATH EVAL FNA REPORT: CPT

## 2018-06-16 DIAGNOSIS — E11.42 TYPE 2 DIABETES MELLITUS WITH DIABETIC POLYNEUROPATHY, WITH LONG-TERM CURRENT USE OF INSULIN (HCC): ICD-10-CM

## 2018-06-16 DIAGNOSIS — Z79.4 TYPE 2 DIABETES MELLITUS WITH DIABETIC POLYNEUROPATHY, WITH LONG-TERM CURRENT USE OF INSULIN (HCC): ICD-10-CM

## 2018-06-17 DIAGNOSIS — E11.42 TYPE 2 DIABETES MELLITUS WITH DIABETIC POLYNEUROPATHY, WITH LONG-TERM CURRENT USE OF INSULIN (HCC): ICD-10-CM

## 2018-06-17 DIAGNOSIS — Z79.4 TYPE 2 DIABETES MELLITUS WITH DIABETIC POLYNEUROPATHY, WITH LONG-TERM CURRENT USE OF INSULIN (HCC): ICD-10-CM

## 2018-06-18 RX ORDER — INSULIN LISPRO 100 [IU]/ML
INJECTION, SUSPENSION SUBCUTANEOUS
Qty: 18 ML | Refills: 6 | Status: SHIPPED | OUTPATIENT
Start: 2018-06-18 | End: 2018-11-23 | Stop reason: SDUPTHER

## 2018-06-19 RX ORDER — PIOGLITAZONEHYDROCHLORIDE 15 MG/1
TABLET ORAL
Qty: 30 TABLET | Refills: 3 | Status: SHIPPED | OUTPATIENT
Start: 2018-06-19 | End: 2018-08-07 | Stop reason: SDUPTHER

## 2018-07-02 RX ORDER — ACETAMINOPHEN/DIPHENHYDRAMINE 500MG-25MG
TABLET ORAL
Qty: 30 TABLET | Refills: 5 | Status: SHIPPED | OUTPATIENT
Start: 2018-07-02 | End: 2019-01-03 | Stop reason: SDUPTHER

## 2018-08-07 ENCOUNTER — OFFICE VISIT (OUTPATIENT)
Dept: FAMILY MEDICINE CLINIC | Age: 63
End: 2018-08-07
Payer: MEDICARE

## 2018-08-07 ENCOUNTER — HOSPITAL ENCOUNTER (OUTPATIENT)
Age: 63
Discharge: HOME | End: 2018-08-07
Payer: MEDICARE

## 2018-08-07 VITALS
SYSTOLIC BLOOD PRESSURE: 134 MMHG | TEMPERATURE: 98.1 F | HEIGHT: 69 IN | RESPIRATION RATE: 18 BRPM | WEIGHT: 293 LBS | DIASTOLIC BLOOD PRESSURE: 62 MMHG | BODY MASS INDEX: 43.4 KG/M2 | HEART RATE: 80 BPM

## 2018-08-07 DIAGNOSIS — E78.5 HYPERLIPIDEMIA, UNSPECIFIED HYPERLIPIDEMIA TYPE: ICD-10-CM

## 2018-08-07 DIAGNOSIS — Z79.4 TYPE 2 DIABETES MELLITUS WITH DIABETIC POLYNEUROPATHY, WITH LONG-TERM CURRENT USE OF INSULIN (HCC): ICD-10-CM

## 2018-08-07 DIAGNOSIS — G62.9 NEUROPATHY: ICD-10-CM

## 2018-08-07 DIAGNOSIS — N18.3 DIABETES MELLITUS DUE TO UNDERLYING CONDITION WITH STAGE 3 CHRONIC KIDNEY DISEASE, UNSPECIFIED WHETHER LONG TERM INSULIN USE: Primary | ICD-10-CM

## 2018-08-07 DIAGNOSIS — E08.22 DIABETES MELLITUS DUE TO UNDERLYING CONDITION WITH STAGE 3 CHRONIC KIDNEY DISEASE, UNSPECIFIED WHETHER LONG TERM INSULIN USE: Primary | ICD-10-CM

## 2018-08-07 DIAGNOSIS — E11.42 TYPE 2 DIABETES MELLITUS WITH DIABETIC POLYNEUROPATHY, WITH LONG-TERM CURRENT USE OF INSULIN (HCC): ICD-10-CM

## 2018-08-07 LAB
ANION GAP SERPL CALCULATED.3IONS-SCNC: 12 MEQ/L (ref 8–16)
BUN BLDV-MCNC: 40 MG/DL (ref 7–22)
CALCIUM SERPL-MCNC: 9.5 MG/DL (ref 8.5–10.5)
CHLORIDE BLD-SCNC: 100 MEQ/L (ref 98–111)
CO2: 27 MEQ/L (ref 23–33)
CREAT SERPL-MCNC: 2.2 MG/DL (ref 0.4–1.2)
GFR SERPL CREATININE-BSD FRML MDRD: 22 ML/MIN/1.73M2
GLUCOSE BLD-MCNC: 165 MG/DL (ref 70–108)
PHOSPHORUS: 3.3 MG/DL (ref 2.4–4.7)
POTASSIUM SERPL-SCNC: 4.4 MEQ/L (ref 3.5–5.2)
PTH INTACT: 48.8 PG/ML (ref 15–65)
SODIUM BLD-SCNC: 139 MEQ/L (ref 135–145)
VITAMIN D 25-HYDROXY: 29 NG/ML (ref 30–100)

## 2018-08-07 PROCEDURE — 82306 VITAMIN D 25 HYDROXY: CPT

## 2018-08-07 PROCEDURE — 83970 ASSAY OF PARATHORMONE: CPT

## 2018-08-07 PROCEDURE — 36415 COLL VENOUS BLD VENIPUNCTURE: CPT

## 2018-08-07 PROCEDURE — 99214 OFFICE O/P EST MOD 30 MIN: CPT | Performed by: NURSE PRACTITIONER

## 2018-08-07 PROCEDURE — 80048 BASIC METABOLIC PNL TOTAL CA: CPT

## 2018-08-07 PROCEDURE — 84100 ASSAY OF PHOSPHORUS: CPT

## 2018-08-07 RX ORDER — DULOXETIN HYDROCHLORIDE 60 MG/1
CAPSULE, DELAYED RELEASE ORAL
Qty: 90 CAPSULE | Refills: 3 | Status: SHIPPED | OUTPATIENT
Start: 2018-08-07 | End: 2019-12-12 | Stop reason: SDUPTHER

## 2018-08-07 RX ORDER — PIOGLITAZONEHYDROCHLORIDE 15 MG/1
TABLET ORAL
Qty: 90 TABLET | Refills: 3 | Status: SHIPPED | OUTPATIENT
Start: 2018-08-07 | End: 2019-04-10 | Stop reason: SINTOL

## 2018-08-07 NOTE — PROGRESS NOTES
Visit Information    Have you changed or started any medications since your last visit including any over-the-counter medicines, vitamins, or herbal medicines? no   Are you having any side effects from any of your medications? -  no  Have you stopped taking any of your medications? Is so, why? -  no    Have you seen any other physician or provider since your last visit? Dr Heaven Love, [place] thyroid dr  Have you had any other diagnostic tests since your last visit? Thyroid biopsy, blood work, US thyroid  Have you been seen in the emergency room and/or had an admission to a hospital since we last saw you? No  Have you had your routine dental cleaning in the past 6 months? yes     Have you activated your [employer] account? If not, what are your barriers?  Yes     Patient Care Team:  ARNOL Fletcher - CNP as PCP - General (Family Nurse Practitioner)  Anand Hogan MD as PCP - S Attributed Provider  Nik Koehler RN as Care Coordinator    Medical History Review  Deferred to PCP    Health Maintenance   Topic Date Due    Hepatitis C screen  1955    HIV screen  10/21/1969    Shingles Vaccine (1 of 2 - 2 Dose Series) 10/21/2004    Low dose CT lung screening  09/18/2016    Diabetic retinal exam  08/23/2018    Flu vaccine (1) 09/01/2018    Lipid screen  10/26/2018    Breast cancer screen  11/03/2018    Diabetic foot exam  11/07/2018    Creatinine monitoring  01/31/2019    A1C test (Diabetic or Prediabetic)  02/07/2019    Potassium monitoring  02/07/2019    TSH testing  03/06/2019    DTaP/Tdap/Td vaccine (2 - Td) 11/07/2027    Colon cancer screen colonoscopy  01/24/2028    Pneumococcal med risk  Completed

## 2018-08-09 ENCOUNTER — OFFICE VISIT (OUTPATIENT)
Dept: NEPHROLOGY | Age: 63
End: 2018-08-09
Payer: MEDICARE

## 2018-08-09 VITALS
RESPIRATION RATE: 18 BRPM | HEART RATE: 64 BPM | SYSTOLIC BLOOD PRESSURE: 136 MMHG | DIASTOLIC BLOOD PRESSURE: 62 MMHG | WEIGHT: 293 LBS | BODY MASS INDEX: 51.54 KG/M2

## 2018-08-09 DIAGNOSIS — N18.30 TYPE 2 DIABETES MELLITUS WITH STAGE 3 CHRONIC KIDNEY DISEASE, WITH LONG-TERM CURRENT USE OF INSULIN (HCC): ICD-10-CM

## 2018-08-09 DIAGNOSIS — E11.29 PERSISTENT PROTEINURIA ASSOCIATED WITH TYPE 2 DIABETES MELLITUS (HCC): ICD-10-CM

## 2018-08-09 DIAGNOSIS — R80.9 PERSISTENT PROTEINURIA ASSOCIATED WITH TYPE 2 DIABETES MELLITUS (HCC): ICD-10-CM

## 2018-08-09 DIAGNOSIS — I10 BENIGN ESSENTIAL HTN: ICD-10-CM

## 2018-08-09 DIAGNOSIS — E55.9 VITAMIN D DEFICIENCY: ICD-10-CM

## 2018-08-09 DIAGNOSIS — E11.22 TYPE 2 DIABETES MELLITUS WITH STAGE 3 CHRONIC KIDNEY DISEASE, WITH LONG-TERM CURRENT USE OF INSULIN (HCC): ICD-10-CM

## 2018-08-09 DIAGNOSIS — Z98.84 S/P LAPAROSCOPIC SLEEVE GASTRECTOMY: ICD-10-CM

## 2018-08-09 DIAGNOSIS — N28.1 BILATERAL RENAL CYSTS: ICD-10-CM

## 2018-08-09 DIAGNOSIS — N18.30 CKD (CHRONIC KIDNEY DISEASE) STAGE 3, GFR 30-59 ML/MIN (HCC): Primary | ICD-10-CM

## 2018-08-09 DIAGNOSIS — Z79.4 TYPE 2 DIABETES MELLITUS WITH STAGE 3 CHRONIC KIDNEY DISEASE, WITH LONG-TERM CURRENT USE OF INSULIN (HCC): ICD-10-CM

## 2018-08-09 PROCEDURE — 99213 OFFICE O/P EST LOW 20 MIN: CPT | Performed by: NURSE PRACTITIONER

## 2018-08-09 NOTE — PATIENT INSTRUCTIONS
CKD (chronic kidney disease) stage 3, GFR 30-59 ml/min, Component of Acute Kidney Injury 2nd to increased dose of diuretic. Advise to hold diuretic for now. Take on as needed basis for 3 lb wt gain in 3 days or increased edema. Drink when thirsty rather than pushing fluids. Agree with Lymphedema clinic appt. -     Basic Metabolic Panel; Future  -     Hemoglobin and Hematocrit, Blood; Future  -     Vitamin D 25 Hydroxy; Future  -     Protein / creatinine ratio, urine; Future    Persistent proteinuria associated with type 2 diabetes mellitus (HCC)    Bilateral renal cysts    S/P laparoscopic sleeve gastrectomy    Benign essential HTN at control. Expect BP to rise some when diuretic decreased. Vitamin D deficiency. Start Vit D 2000 units daily    Type 2 diabetes mellitus with stage 3 chronic kidney disease, with long-term current use of insulin (HCC)    Pt asked to check home BP and record BP readings and bring to office appts  Avoid nonsteroidal anti inflammatory drugs such as Ibuprofen, Aleve, Advil, Motrin. Schedule for follow up appt in 3  months. Pt asked to bring pill bottles to next office visit. Please make early appointment if needed and to call office for questions, concerns, persistent, changing or worsening symptoms. Discussed with the patient and answered their questions     Discussed with Dr Taisha Nava.   Isabella AMBROSE, CNP

## 2018-08-09 NOTE — PROGRESS NOTES
capsule take 1 capsule by mouth once daily 90 capsule 3    metoprolol tartrate (LOPRESSOR) 25 MG tablet take 1/2 tablets by mouth twice a day 180 tablet 3    pantoprazole (PROTONIX) 40 MG tablet Take 40 mg by mouth daily      hydrALAZINE (APRESOLINE) 25 MG tablet Take 1 tablet by mouth 3 times daily 90 tablet 6    CPAP Machine MISC by Does not apply route Please change CPAP to auto pressure to 7-15 cm H20. 1 each 0    Insulin Pen Needle (PEN NEEDLES) 31G X 5 MM MISC 1 each by Does not apply route 3 times daily 100 each 11    Prenatal Vit-Fe Fumarate-FA (PRENATAL PO) Take 1 tablet by mouth 2 times daily [employer] OTC      Calcium Carbonate-Vitamin D (CALCIUM-VITAMIN D) 500-200 MG-UNIT per tablet Take 1 tablet by mouth 2 times daily (with meals)        No current facility-administered medications for this visit. Assessment:    Lali Kasper was seen today for 6 month follow-up and chronic kidney disease. Diagnoses and all orders for this visit:    CKD (chronic kidney disease) stage 3, GFR 30-59 ml/min, Component of Acute Kidney Injury 2nd to increased dose of diuretic. Advise to hold diuretic for now. Take on as needed basis for 3 lb wt gain in 3 days or increased edema. Drink when thirsty rather than pushing fluids. Agree with Lymphedema clinic appt. Consider Adverse effect of Actos contributing to edema.   -     Basic Metabolic Panel; Future  -     Hemoglobin and Hematocrit, Blood; Future  -     Vitamin D 25 Hydroxy; Future  -     Protein / creatinine ratio, urine; Future    Persistent proteinuria associated with type 2 diabetes mellitus (HCC)    Bilateral renal cysts    S/P laparoscopic sleeve gastrectomy    Benign essential HTN at control. Expect BP to rise some when diuretic decreased. Vitamin D deficiency.  Start Vit D 2000 units daily    Type 2 diabetes mellitus with stage 3 chronic kidney disease, with long-term current use of insulin (HCC)    Pt asked to check home BP and record BP readings and bring

## 2018-08-28 ENCOUNTER — HOSPITAL ENCOUNTER (OUTPATIENT)
Dept: WOMENS IMAGING | Age: 63
Discharge: HOME | End: 2018-08-28
Payer: MEDICARE

## 2018-08-28 DIAGNOSIS — Z12.31 VISIT FOR SCREENING MAMMOGRAM: ICD-10-CM

## 2018-08-28 PROCEDURE — 77063 BREAST TOMOSYNTHESIS BI: CPT

## 2018-09-04 ENCOUNTER — HOSPITAL ENCOUNTER (OUTPATIENT)
Dept: PHYSICAL THERAPY | Age: 63
Setting detail: THERAPIES SERIES
Discharge: HOME | End: 2018-09-04
Payer: MEDICARE

## 2018-09-04 PROCEDURE — 97162 PT EVAL MOD COMPLEX 30 MIN: CPT

## 2018-09-04 PROCEDURE — G8993 SUB PT/OT CURRENT STATUS: HCPCS

## 2018-09-04 PROCEDURE — G8994 SUB PT/OT GOAL STATUS: HCPCS

## 2018-09-04 ASSESSMENT — PAIN DESCRIPTION - LOCATION: LOCATION: LEG

## 2018-09-04 ASSESSMENT — PAIN SCALES - GENERAL: PAINLEVEL_OUTOF10: 6

## 2018-09-04 ASSESSMENT — PAIN DESCRIPTION - PAIN TYPE: TYPE: CHRONIC PAIN

## 2018-09-04 NOTE — PROGRESS NOTES
precautions,Patient ambulates household distance without assistive devices. Patient uses manual wheelchair for community distances. Pain:  Patient Currently in Pain: Yes  Pain Assessment  Pain Assessment: 0-10  Pain Level: 6  Pain Type: Chronic pain  Pain Location: Leg (Bilateral hips to feet.)  Pain Orientation:  (Left > Right)  Pain Descriptors: Aching, Constant, Jabbing, Numbness, Pins and needles, Sharp, Shooting, Sore, Tender, Stabbing, Tightness, Tingling    SUBJECTIVE:     -The patient is a pleasant 60year old female who presented to the Lymphedema clinic with severe bilateral lower extremity and moderate lower truncal Lymphedema swelling. The patient was accompanied by her sister, who transported the patient from the car via manual wheelchair. The patient stated, \"I am not able to walk that far, my left leg gives out a lot and I'm afraid of falling again. That's how I broke my right ankle and left leg in the past\" per patient. The patient has had progressive swelling and the left lower extremity is visibly larger in comparison to the right lower extremity. OBJECTIVE  Physical Assessment:  Range of Motion: Bilateral lower extremities are within functional limits with the exception of the left ankle, trace left ankle dorsiflexion noted. Strength: Generalized weakness in bilateral lower extremities: grossly 4/5, noted 4-/5 in the right knee and trace at the right ankle with dorsiflexion. Sensation: Noted diminished sensation in the left lower extremity in comparison to the right lower extremity, which is intact to light touch. Transfers: NA   Ambulation: NA   Lymphedema Assessment:  Pitting Edema Slight (+1) - Bilateral lower extremities (Very tight tissue texture noted). Skin Appearance/Condition Minimal Hyperplasia noted in bilateral lower extremities. However, noted moderate Hyperpigmentation of the anterior aspect of the right and lef lower leg (distally).     Noted slight concaved area on the Cooperative, Pleasant and Good insight into deficits    Treatment Recommendations: This patient would benefit from skilled therapy services to achieve the established functional goals by utilizing the following treatment interventions:    X Manual Lymph Drainage: To promote and re direct drainage of excess lymphatic fluid back into the central circulation and lead to a decrease in swelling. X Skin Care/Education: To improve the elasticity of the skin and decrease the risk of recurrent infection. X Compression Bandaging: To decrease the excess lymphatic fluid from re accumulating in the involved area, break down fibrotic tissue and act as a counter force against the muscle pumping action during functional mobility and exercising. X Therapeutic Exercising: To stimulate the flow of excess lymphatic fluid while the muscles contract against the compression bandages/garments during functional mobility/exercises that will lead to a decrease in swelling. X Patient Education: To train and educate the patient and/or caregiver on becoming independent with the home management skills for this chronic condition of Lymphedema. X Garment Fitting/Assessment:  Assist with recommending and obtaining appropriate compression garments to be worn daily to keep swelling down in the involved areas. EDUCATION   New Education Provided: Lymphedema awareness, treatment options, goals, plan of care. Learner:family and patient  Method: explanation and handout. Handouts. Outcome: acknowledged understanding of goals/plan of care, verbalized concerns, demonstrated understanding and asked questions     GOALS:   PATIENT GOAL: \"I want to get this swelling down so I can walk better\" per patient.    SHORT-TERM GOALS:  to be met in 6 weeks   X  Patient will demonstrate a decrease in circumferential measurements of the affected extremity by 10 cm working towards the lymphedema swelling stabilizing and patient being able to get and decision making during this evaluation, the evaluation of Carolyn Pickering  is of medium complexity. Felipe Amaro MD FOR THE REFERRAL OF THIS PATIENT. PT G-Codes  Functional Assessment Tool Used: LYMPHEDEMA LIFE IMPACT SCALE  Score: 50%  Functional Limitation: Other PT subsequent  Other PT Secondary Current Status (): At least 40 percent but less than 60 percent impaired, limited or restricted  Other PT Secondary Goal Status ():  At least 40 percent but less than 60 percent impaired, limited or restricted    Jayson Wilson, P[address][id number], CLT, [facility] [id number]  9/4/2018

## 2018-09-09 DIAGNOSIS — I10 ESSENTIAL HYPERTENSION: ICD-10-CM

## 2018-09-10 RX ORDER — HYDRALAZINE HYDROCHLORIDE 25 MG/1
TABLET, FILM COATED ORAL
Qty: 90 TABLET | Refills: 3 | Status: SHIPPED | OUTPATIENT
Start: 2018-09-10 | End: 2019-03-19 | Stop reason: SDUPTHER

## 2018-09-13 ENCOUNTER — HOSPITAL ENCOUNTER (OUTPATIENT)
Dept: PHYSICAL THERAPY | Age: 63
Setting detail: THERAPIES SERIES
Discharge: HOME | End: 2018-09-13
Payer: MEDICARE

## 2018-09-13 PROCEDURE — 97140 MANUAL THERAPY 1/> REGIONS: CPT

## 2018-09-13 ASSESSMENT — PAIN DESCRIPTION - PAIN TYPE: TYPE: CHRONIC PAIN

## 2018-09-13 ASSESSMENT — PAIN DESCRIPTION - ORIENTATION: ORIENTATION: LEFT;RIGHT

## 2018-09-13 ASSESSMENT — PAIN SCALES - GENERAL: PAINLEVEL_OUTOF10: 6

## 2018-09-13 ASSESSMENT — PAIN DESCRIPTION - LOCATION: LOCATION: LEG

## 2018-09-14 NOTE — PROGRESS NOTES
Rosidal Soft 1 roll   1/4 inch thick grey   -Applied to anterior ankle crease (left LE). -Purpose: Padding     Decongestive/Therapeutic Exercise  The patient was able to complete Decongestive Therapy exercises (x 10 reps) including ankle pumps. The exercises were completed with compression bandages on, without complaints of pain from the patient. The Decongestive Therapy exercises assist with decreasing the swelling by increasing the muscle pumping action of the lymph collectors and by increasing the fluid uptake in the lymphatic system. Patient Education  New Education Provided:   -09/13/2018: Reviewed plan of care/goals/exercises/precautions. Learner:family and patient  Method: demonstration, explanation and handout       Outcome: acknowledged understanding of goals/plan of care/precautions/exercises, demonstrated understanding and asked questions     GOALS   SHORT-TERM GOALS:  to be met in 6 weeks   X  Patient will demonstrate a decrease in circumferential measurements of the affected extremity by 10 cm working towards the lymphedema swelling stabilizing and patient being able to get measured and fitted for a new compression garment to be worn daily to keep swelling down. X  Patient will tolerate wearing the compression bandages from one treatment session until the next treatment session working towards meeting short-term goal #1. X Patient/family/caregiver will demonstrate and verbalize 3-5 skin care precautionary measures to decrease dry skin and risk of infection. X Patient/family/caregiver will demonstrate applying compression bandages with no greater than moderate assist and verbal cues from the therapist working towards being modified independent with applying the compression bandages for night time swelling. ASSESSMENT:  Assessment:  Patient progressing toward goal achievement. Activity Tolerance:  Patient tolerance of  treatment: Good.       Plan: Continue treatment according to

## 2018-09-24 ENCOUNTER — APPOINTMENT (OUTPATIENT)
Dept: PHYSICAL THERAPY | Age: 63
End: 2018-09-24
Payer: MEDICARE

## 2018-09-26 ENCOUNTER — HOSPITAL ENCOUNTER (OUTPATIENT)
Dept: PHYSICAL THERAPY | Age: 63
Setting detail: THERAPIES SERIES
Discharge: HOME | End: 2018-09-26
Payer: MEDICARE

## 2018-09-26 PROCEDURE — 97140 MANUAL THERAPY 1/> REGIONS: CPT

## 2018-09-26 ASSESSMENT — PAIN DESCRIPTION - ORIENTATION: ORIENTATION: LEFT;RIGHT

## 2018-09-26 ASSESSMENT — PAIN DESCRIPTION - PAIN TYPE: TYPE: CHRONIC PAIN

## 2018-09-26 ASSESSMENT — PAIN SCALES - GENERAL: PAINLEVEL_OUTOF10: 7

## 2018-09-26 ASSESSMENT — PAIN DESCRIPTION - DESCRIPTORS: DESCRIPTORS: ACHING;TENDER;SORE

## 2018-09-26 NOTE — PROGRESS NOTES
[facility]  LYMPHEDEMA SERVICES  DAILY NOTE    Date: 2018   Patient Name: Alexia Nesbitt        CSN: [id number]   : 1955  (60 y.o.)  Gender: female   Referring Practitioner: DR. Linda Brown MD  Diagnosis: LYMPHEDEMA   Referral Date : 18    PT Visit Information  Total # of Visits to Date: 3  No Show: 0  Canceled Appointment: 0  Progress Note Counter: 3/10    Restrictions/Precautions  Fall risk, Universal precautions,Limited ambulation, patient sister transports the patient from the car to the clinic via wheelchair. Pain  Patient Currently in Pain: Yes  Pain Assessment  Pain Assessment: 0-10  Pain Level: 7  Pain Type: Chronic pain  Pain Orientation: Left, Right  Pain Descriptors: Aching, Tender, Sore    SUBJECTIVE     -2018: The patient presented to the Lymphedema clinic on this date with compression bandages off due to. OBJECTIVE  -MEASUREMENTS  -LOCATION (A): Metatarsal heads to knee joint line. -RIGHT: 451.8 cm  -LEFT: 488.8 cm    -LOCATION (B): Knee joint line to mid-thigh. -RIGHT: 256.7 cm  -LEFT: 288.8 cm  TREATMENT SESSION  Unilateral Lower Extremity Manual Lymph Drainage (MLD):   Right  Trunk:  Effleurage, Profundus, Terminus, Inguinal Lnn, Axillary Lnn and (IA) Anterior Inguinal to Axillary (3 steps)       Lower Extremity:  Thigh (Anterior, Posterior, Lateral, Medial to Lateral), Knee (Anterior, Posterior, Lateral, Medial, Lower Leg (Anterior,Posterior), Ankle (Anterior, Posterior, Lateral, Medial and Foot/Toes (Anterior, Posterior)       Rework  Lower Extremity (Toes to Groin), Inguinal Lnn, Axillary Lnn, (IA) Anterior Inguinal to Axillary, Terminus, Profundus and Effleurage    Skin Care Measures  Washed involved extremity/body part and applied Remedy Unscented moisturizer to moisturize skin  Compression Bandaging  Location: Bilateral lower extremities (Great toe to knee joint line).   Compression Gradient: Moderate to Minimal  Supplies (per involved therapist working towards being modified independent with applying the compression bandages for night time swelling. ASSESSMENT:  Assessment:  Patient progressing toward goal achievement. Activity Tolerance:  Patient tolerance of  treatment: Good.       Plan: Continue treatment according to established plan of care         Time In: 1430   Time Out: 1535   Timed Code Minutes: 65   Untimed Code Minutes: 0   Total Treatment Time: [address], [id number], [place]    9/26/2018

## 2018-09-28 ENCOUNTER — HOSPITAL ENCOUNTER (OUTPATIENT)
Dept: PHYSICAL THERAPY | Age: 63
Setting detail: THERAPIES SERIES
Discharge: HOME | End: 2018-09-28
Payer: MEDICARE

## 2018-09-28 PROCEDURE — 97140 MANUAL THERAPY 1/> REGIONS: CPT

## 2018-09-28 ASSESSMENT — PAIN SCALES - GENERAL: PAINLEVEL_OUTOF10: 6

## 2018-09-28 ASSESSMENT — PAIN DESCRIPTION - ORIENTATION: ORIENTATION: RIGHT;LEFT;ANTERIOR

## 2018-09-28 ASSESSMENT — PAIN DESCRIPTION - PAIN TYPE: TYPE: CHRONIC PAIN

## 2018-09-28 ASSESSMENT — PAIN DESCRIPTION - LOCATION: LOCATION: LEG

## 2018-09-28 ASSESSMENT — PAIN DESCRIPTION - DESCRIPTORS: DESCRIPTORS: TENDER;SORE

## 2018-09-28 NOTE — PROGRESS NOTES
10 cm Artiflex Cotton 1 roll   15 cm Artiflex Cotton 1 roll   4 cm Rosidal K none   6 cm Rosidal K 1 roll   8 cm Rosidal K 2 rolls   10 cm Rosidal K none   12 cm Rosidal K none   Rosidal Soft 1 roll   1/4 inch thick grey   -Applied to anterior ankle crease (left LE). -Purpose: Padding     Decongestive/Therapeutic Exercise  The patient was able to complete Decongestive Therapy exercises (x 10 reps) including ankle pumps. The exercises were completed with compression bandages on, without complaints of pain from the patient. The Decongestive Therapy exercises assist with decreasing the swelling by increasing the muscle pumping action of the lymph collectors and by increasing the fluid uptake in the lymphatic system. Patient Education  Education Provided:   -09/28/2018: Reviewed plan of care and precautions.  -09/26/2018: Reviewed plan of care.  -09/13/2018: Reviewed plan of care/goals/exercises/precautions. Learner:family and patient  Method: demonstration, explanation and handout       Outcome: acknowledged understanding of goals/plan of care/precautions/exercises, demonstrated understanding and asked questions     GOALS   SHORT-TERM GOALS:  to be met in 6 weeks   X  Patient will demonstrate a decrease in circumferential measurements of the affected extremity by 10 cm working towards the lymphedema swelling stabilizing and patient being able to get measured and fitted for a new compression garment to be worn daily to keep swelling down. X  Patient will tolerate wearing the compression bandages from one treatment session until the next treatment session working towards meeting short-term goal #1. X Patient/family/caregiver will demonstrate and verbalize 3-5 skin care precautionary measures to decrease dry skin and risk of infection.    X Patient/family/caregiver will demonstrate applying compression bandages with no greater than moderate assist and verbal cues from the therapist working towards being modified independent with applying the compression bandages for night time swelling. ASSESSMENT:  Assessment:  Patient progressing toward goal achievement. Activity Tolerance:  Patient tolerance of  treatment: Good.       Plan: Continue treatment according to established plan of care         Time In: 1540   Time Out: 1655   Timed Code Minutes: 80   Untimed Code Minutes: 0   Total Treatment Time: [address] [place] [address], [id number], [place], [place]    9/28/2018

## 2018-09-29 ENCOUNTER — CARE COORDINATION (OUTPATIENT)
Dept: CASE MANAGEMENT | Age: 63
End: 2018-09-29

## 2018-10-02 ENCOUNTER — HOSPITAL ENCOUNTER (OUTPATIENT)
Dept: PHYSICAL THERAPY | Age: 63
Setting detail: THERAPIES SERIES
Discharge: HOME | End: 2018-10-02
Payer: MEDICARE

## 2018-10-02 PROCEDURE — 97140 MANUAL THERAPY 1/> REGIONS: CPT

## 2018-10-02 ASSESSMENT — PAIN DESCRIPTION - ORIENTATION: ORIENTATION: LEFT;RIGHT;ANTERIOR

## 2018-10-02 ASSESSMENT — PAIN DESCRIPTION - LOCATION: LOCATION: LEG

## 2018-10-02 ASSESSMENT — PAIN DESCRIPTION - PAIN TYPE: TYPE: CHRONIC PAIN

## 2018-10-02 ASSESSMENT — PAIN DESCRIPTION - DESCRIPTORS: DESCRIPTORS: TENDER;SORE

## 2018-10-02 ASSESSMENT — PAIN SCALES - GENERAL: PAINLEVEL_OUTOF10: 6

## 2018-10-02 NOTE — PROGRESS NOTES
Stockinette: Size: 6 inches, Thickness: Thick, soft   Transelast 0.5 roll   10 cm Artiflex Cotton 1 roll   15 cm Artiflex Cotton 1 roll   4 cm Rosidal K none   6 cm Rosidal K 1 roll   8 cm Rosidal K 2 rolls   10 cm Rosidal K none   12 cm Rosidal K none   Rosidal Soft 1 roll   1/4 inch thick grey   -Applied to anterior ankle crease (left LE). -Purpose: Padding     Decongestive/Therapeutic Exercise  The patient was able to complete Decongestive Therapy exercises (x 10 reps) including ankle pumps (Assistance needed for heel cord stretch on left ankle). The exercises were completed with compression bandages on, without complaints of pain from the patient. The Decongestive Therapy exercises assist with decreasing the swelling by increasing the muscle pumping action of the lymph collectors and by increasing the fluid uptake in the lymphatic system. Patient Education  Education Provided:   -10/02/2018: Reviewed plan of care for progression of compression bandaging above knee joint line on left lower extremity. Patient in agreement.   -09/28/2018: Reviewed plan of care and precautions.  -09/26/2018: Reviewed plan of care.  -09/13/2018: Reviewed plan of care/goals/exercises/precautions. Learner:family and patient  Method: demonstration, explanation and handout       Outcome: acknowledged understanding of goals/plan of care/precautions/exercises, demonstrated understanding and asked questions     GOALS   SHORT-TERM GOALS:  to be met in 6 weeks   X  Patient will demonstrate a decrease in circumferential measurements of the affected extremity by 10 cm working towards the lymphedema swelling stabilizing and patient being able to get measured and fitted for a new compression garment to be worn daily to keep swelling down. X  Patient will tolerate wearing the compression bandages from one treatment session until the next treatment session working towards meeting short-term goal #1.    X Patient/family/caregiver will

## 2018-10-04 ENCOUNTER — HOSPITAL ENCOUNTER (OUTPATIENT)
Dept: PHYSICAL THERAPY | Age: 63
Setting detail: THERAPIES SERIES
Discharge: HOME | End: 2018-10-04
Payer: MEDICARE

## 2018-10-04 PROCEDURE — 97140 MANUAL THERAPY 1/> REGIONS: CPT

## 2018-10-04 ASSESSMENT — PAIN SCALES - GENERAL: PAINLEVEL_OUTOF10: 4

## 2018-10-04 ASSESSMENT — PAIN DESCRIPTION - DESCRIPTORS: DESCRIPTORS: ACHING;TENDER

## 2018-10-04 ASSESSMENT — PAIN DESCRIPTION - PAIN TYPE: TYPE: CHRONIC PAIN

## 2018-10-04 ASSESSMENT — PAIN DESCRIPTION - LOCATION: LOCATION: LEG

## 2018-10-04 ASSESSMENT — PAIN DESCRIPTION - ORIENTATION: ORIENTATION: RIGHT;LEFT;ANTERIOR

## 2018-10-04 NOTE — PROGRESS NOTES
[facility]  LYMPHEDEMA SERVICES  DAILY NOTE    Date: 10/4/2018   Patient Name: Jann Bahena        CSN: [id number]   : 1955  (60 y.o.)  Gender: female   Referring Practitioner: DR. Raysa Fernandez MD  Diagnosis: LYMPHEDEMA   Referral Date : 18    PT Visit Information  Total # of Visits to Date: 6  No Show: 0  Canceled Appointment: 0  Progress Note Counter: 6/10    Restrictions/Precautions  Fall risk, Universal precautions,Limited ambulation, patient sister transports the patient from the car to the clinic via wheelchair. Sit to stand and stand pivot transfer to/from treatment table/wheelchair with supervision. Pain  Patient Currently in Pain: Yes  Pain Assessment  Pain Assessment: 0-10  Pain Level: 4  Pain Type: Chronic pain  Pain Location: Leg  Pain Orientation: Right, Left, Anterior  Pain Descriptors: Aching, Tender    SUBJECTIVE     -10/04/2018: The patient presented to the Lymphedema clinic on this date with compression bandages on bilateral lower extremities. She denied pain from bandages. Shortened session today due to patient being late to her appointment. \"I'm sorry, I thought my appointment was at 2:30\" per patient. OBJECTIVE  -MEASUREMENTS (In comparison to measurements taken on 18)  -LOCATION (A): Metatarsal heads to knee joint line. -RIGHT: 411.3 cm (Decreased by 40.5 cm)  -LEFT: 448. 4 cm (Decreased by 40.4 cm)  TREATMENT SESSION  Skin Care Measures  Washed involved extremity/body part and applied Remedy Unscented moisturizer to moisturize skin  Compression Bandaging  Location: Bilateral lower extremities (Great toe to knee joint line). Compression Gradient: Moderate to Minimal  Supplies (per involved extremity):   Stockinette: Size: 6 inches, Thickness:  Thick, soft   Transelast 0.5 roll   10 cm Artiflex Cotton 1 roll   15 cm Artiflex Cotton 1 roll   4 cm Rosidal K none   6 cm Rosidal K 1 roll   8 cm Rosidal K 2 rolls   10 cm Rosidal K none   12 cm Rosidal K

## 2018-10-08 ENCOUNTER — HOSPITAL ENCOUNTER (OUTPATIENT)
Dept: PHYSICAL THERAPY | Age: 63
Setting detail: THERAPIES SERIES
Discharge: HOME | End: 2018-10-08
Payer: MEDICARE

## 2018-10-08 PROCEDURE — 97140 MANUAL THERAPY 1/> REGIONS: CPT

## 2018-10-08 ASSESSMENT — PAIN SCALES - GENERAL: PAINLEVEL_OUTOF10: 5

## 2018-10-08 ASSESSMENT — PAIN DESCRIPTION - ORIENTATION: ORIENTATION: RIGHT;LEFT;ANTERIOR

## 2018-10-08 ASSESSMENT — PAIN DESCRIPTION - DESCRIPTORS: DESCRIPTORS: TENDER;SORE

## 2018-10-08 ASSESSMENT — PAIN DESCRIPTION - LOCATION: LOCATION: LEG

## 2018-10-08 ASSESSMENT — PAIN DESCRIPTION - PAIN TYPE: TYPE: CHRONIC PAIN

## 2018-10-08 NOTE — PROGRESS NOTES
448.4 cm (Decreased by 40.4 cm)     TREATMENT SESSION  Unilateral Lower Extremity Manual Lymph Drainage (MLD):   Right  Trunk:  Effleurage, Profundus, Terminus, Inguinal Lnn, Axillary Lnn and (IA) Anterior Inguinal to Axillary (3 steps)       Lower Extremity:  Thigh (Anterior, Posterior, Lateral, Medial to Lateral), Knee (Anterior, Posterior, Lateral, Medial, Lower Leg (Anterior,Posterior), Ankle (Anterior, Posterior, Lateral, Medial and Foot/Toes (Anterior, Posterior)       Rework  Lower Extremity (Toes to Groin), Inguinal Lnn, Axillary Lnn, (IA) Anterior Inguinal to Axillary, Terminus, Profundus and Effleurage    Skin Care Measures  Washed involved extremity/body part and applied Remedy Unscented moisturizer to moisturize skin  Compression Bandaging  Location: Bilateral lower extremities (Great toe to knee joint line). Compression Gradient: Moderate to Minimal  Supplies (per involved extremity):   Stockinette: Size: 6 inches, Thickness: Thick, soft   Transelast 0.5 roll   10 cm Artiflex Cotton 1 roll   15 cm Artiflex Cotton 1 roll   4 cm Rosidal K none   6 cm Rosidal K 1 roll   8 cm Rosidal K 2 rolls   10 cm Rosidal K none   12 cm Rosidal K none   Rosidal Soft 1 roll   1/4 inch thick grey   -Applied to anterior ankle crease (left LE). -Purpose: Padding     Decongestive/Therapeutic Exercise  The patient was able to complete Decongestive Therapy exercises (x 10 reps) including ankle pumps (Assistance needed for heel cord stretch on left ankle). The exercises were completed with compression bandages on, without complaints of pain from the patient. The Decongestive Therapy exercises assist with decreasing the swelling by increasing the muscle pumping action of the lymph collectors and by increasing the fluid uptake in the lymphatic system. Patient Education  Education Provided:   -10/08/2018: Discussed goals and plan of care. -10/04/2018: Discussed plan of care.    -10/02/2018: Reviewed plan of care for

## 2018-10-10 ENCOUNTER — HOSPITAL ENCOUNTER (OUTPATIENT)
Dept: PHYSICAL THERAPY | Age: 63
Setting detail: THERAPIES SERIES
Discharge: HOME | End: 2018-10-10
Payer: MEDICARE

## 2018-10-15 ENCOUNTER — HOSPITAL ENCOUNTER (OUTPATIENT)
Dept: PHYSICAL THERAPY | Age: 63
Setting detail: THERAPIES SERIES
End: 2018-10-15
Payer: MEDICARE

## 2018-10-15 ASSESSMENT — PAIN DESCRIPTION - LOCATION: LOCATION: LEG

## 2018-10-15 ASSESSMENT — PAIN DESCRIPTION - ORIENTATION: ORIENTATION: LEFT;RIGHT;ANTERIOR

## 2018-10-15 ASSESSMENT — PAIN DESCRIPTION - DESCRIPTORS: DESCRIPTORS: TENDER;SORE

## 2018-10-15 ASSESSMENT — PAIN SCALES - GENERAL: PAINLEVEL_OUTOF10: 5

## 2018-10-15 ASSESSMENT — PAIN DESCRIPTION - PAIN TYPE: TYPE: CHRONIC PAIN

## 2018-10-15 NOTE — PROGRESS NOTES
10 cm Artiflex Cotton 1 roll   15 cm Artiflex Cotton 1 roll   4 cm Rosidal K none   6 cm Rosidal K 1 roll   8 cm Rosidal K 2 rolls   10 cm Rosidal K none   12 cm Rosidal K none   Rosidal Soft 1 roll   1/4 inch thick grey   -Applied to anterior ankle crease (left LE). -Purpose: Padding     Decongestive/Therapeutic Exercise  The patient was able to complete Decongestive Therapy exercises (x 10 reps) including ankle pumps (Assistance needed for heel cord stretch on left ankle). The exercises were completed with compression bandages on, without complaints of pain from the patient. The Decongestive Therapy exercises assist with decreasing the swelling by increasing the muscle pumping action of the lymph collectors and by increasing the fluid uptake in the lymphatic system. Patient Education  Education Provided:   -10/10/2018: Reviewed plan of care. -10/08/2018: Discussed goals and plan of care. -10/04/2018: Discussed plan of care. -10/02/2018: Reviewed plan of care for progression of compression bandaging above knee joint line on left lower extremity. Patient in agreement.   -09/28/2018: Reviewed plan of care and precautions.  -09/26/2018: Reviewed plan of care.  -09/13/2018: Reviewed plan of care/goals/exercises/precautions. Learner:family and patient  Method: demonstration, explanation and handout       Outcome: acknowledged understanding of goals/plan of care/precautions/exercises, demonstrated understanding and asked questions     GOALS   SHORT-TERM GOALS:  to be met in 6 weeks   X  MET  Patient will demonstrate a decrease in circumferential measurements of the affected extremity by 10 cm working towards the lymphedema swelling stabilizing and patient being able to get measured and fitted for a new compression garment to be worn daily to keep swelling down.    X  MET  Patient will tolerate wearing the compression bandages from one treatment session until the next treatment session working towards meeting

## 2018-10-17 ENCOUNTER — HOSPITAL ENCOUNTER (OUTPATIENT)
Dept: PHYSICAL THERAPY | Age: 63
Setting detail: THERAPIES SERIES
End: 2018-10-17
Payer: MEDICARE

## 2018-10-18 ENCOUNTER — OFFICE VISIT (OUTPATIENT)
Dept: CARDIOLOGY CLINIC | Age: 63
End: 2018-10-18
Payer: MEDICARE

## 2018-10-18 VITALS
SYSTOLIC BLOOD PRESSURE: 134 MMHG | BODY MASS INDEX: 51.54 KG/M2 | DIASTOLIC BLOOD PRESSURE: 72 MMHG | HEART RATE: 80 BPM | HEIGHT: 69 IN

## 2018-10-18 DIAGNOSIS — E78.01 FAMILIAL HYPERCHOLESTEROLEMIA: ICD-10-CM

## 2018-10-18 DIAGNOSIS — I25.10 CORONARY ARTERY DISEASE INVOLVING NATIVE CORONARY ARTERY OF NATIVE HEART WITHOUT ANGINA PECTORIS: Primary | ICD-10-CM

## 2018-10-18 DIAGNOSIS — I10 ESSENTIAL HYPERTENSION: ICD-10-CM

## 2018-10-18 PROCEDURE — 99213 OFFICE O/P EST LOW 20 MIN: CPT | Performed by: NUCLEAR MEDICINE

## 2018-10-18 NOTE — PROGRESS NOTES
oriented. No obvious focal deficits  Musculoskelatal:  No obvious deformities    Assessment:      Diagnosis Orders   1. Coronary artery disease involving native coronary artery of native heart without angina pectoris     2. Essential hypertension     3. Familial hypercholesterolemia     cardiac fair for now      Plan:  No Follow-up on file. As above  Continue risk factor modification and medical management  Thank you for allowing me to participate in the care of your patient. Please don't hesitate to contact me regarding any further issues related to the patient care    Orders Placed:  No orders of the defined types were placed in this encounter. Medications Prescribed:  No orders of the defined types were placed in this encounter. Discussed use, benefit, and side effects of prescribed medications. All patient questions answered. Pt voicedunderstanding. Instructed to continue current medications, diet and exercise. Continue risk factor modification and medical management. Patient agreed with treatment plan. Follow up as directed.     Electronically signedby Margi Becerra MD on 10/18/2018 at 12:37 PM

## 2018-10-19 ENCOUNTER — HOSPITAL ENCOUNTER (OUTPATIENT)
Dept: PHYSICAL THERAPY | Age: 63
Setting detail: THERAPIES SERIES
End: 2018-10-19
Payer: MEDICARE

## 2018-10-22 ENCOUNTER — HOSPITAL ENCOUNTER (OUTPATIENT)
Dept: PHYSICAL THERAPY | Age: 63
Setting detail: THERAPIES SERIES
End: 2018-10-22
Payer: MEDICARE

## 2018-10-24 ENCOUNTER — HOSPITAL ENCOUNTER (OUTPATIENT)
Dept: PHYSICAL THERAPY | Age: 63
Setting detail: THERAPIES SERIES
End: 2018-10-24
Payer: MEDICARE

## 2018-10-26 ENCOUNTER — HOSPITAL ENCOUNTER (OUTPATIENT)
Dept: PHYSICAL THERAPY | Age: 63
Setting detail: THERAPIES SERIES
End: 2018-10-26
Payer: MEDICARE

## 2018-10-29 ENCOUNTER — HOSPITAL ENCOUNTER (OUTPATIENT)
Dept: PHYSICAL THERAPY | Age: 63
Setting detail: THERAPIES SERIES
Discharge: HOME | End: 2018-10-29
Payer: MEDICARE

## 2018-10-29 PROCEDURE — 97140 MANUAL THERAPY 1/> REGIONS: CPT

## 2018-10-29 ASSESSMENT — PAIN DESCRIPTION - PAIN TYPE: TYPE: CHRONIC PAIN

## 2018-10-29 ASSESSMENT — PAIN SCALES - GENERAL: PAINLEVEL_OUTOF10: 4

## 2018-10-29 ASSESSMENT — PAIN DESCRIPTION - LOCATION: LOCATION: LEG

## 2018-10-29 ASSESSMENT — PAIN DESCRIPTION - DESCRIPTORS: DESCRIPTORS: TENDER;SORE

## 2018-10-29 ASSESSMENT — PAIN DESCRIPTION - ORIENTATION: ORIENTATION: RIGHT;LEFT

## 2018-10-31 ENCOUNTER — HOSPITAL ENCOUNTER (OUTPATIENT)
Dept: PHYSICAL THERAPY | Age: 63
Setting detail: THERAPIES SERIES
End: 2018-10-31
Payer: MEDICARE

## 2018-11-02 ENCOUNTER — HOSPITAL ENCOUNTER (OUTPATIENT)
Dept: PHYSICAL THERAPY | Age: 63
Setting detail: THERAPIES SERIES
Discharge: HOME | End: 2018-11-02
Payer: MEDICARE

## 2018-11-02 ENCOUNTER — HOSPITAL ENCOUNTER (OUTPATIENT)
Age: 63
Discharge: HOME | End: 2018-11-02
Payer: MEDICARE

## 2018-11-02 DIAGNOSIS — N18.30 CKD (CHRONIC KIDNEY DISEASE) STAGE 3, GFR 30-59 ML/MIN (HCC): ICD-10-CM

## 2018-11-02 LAB
ANION GAP SERPL CALCULATED.3IONS-SCNC: 12 MEQ/L (ref 8–16)
BUN BLDV-MCNC: 34 MG/DL (ref 7–22)
CALCIUM SERPL-MCNC: 9.6 MG/DL (ref 8.5–10.5)
CHLORIDE BLD-SCNC: 102 MEQ/L (ref 98–111)
CO2: 25 MEQ/L (ref 23–33)
CREAT SERPL-MCNC: 2 MG/DL (ref 0.4–1.2)
CREATININE URINE: 76.9 MG/DL
GFR SERPL CREATININE-BSD FRML MDRD: 25 ML/MIN/1.73M2
GLUCOSE BLD-MCNC: 180 MG/DL (ref 70–108)
HCT VFR BLD CALC: 34.4 % (ref 37–47)
HEMOGLOBIN: 10.6 GM/DL (ref 12–16)
POTASSIUM SERPL-SCNC: 5 MEQ/L (ref 3.5–5.2)
PROT/CREAT RATIO, UR: 1.09
PROTEIN, URINE: 83.5 MG/DL
SODIUM BLD-SCNC: 139 MEQ/L (ref 135–145)
VITAMIN D 25-HYDROXY: 31 NG/ML (ref 30–100)

## 2018-11-02 PROCEDURE — 84156 ASSAY OF PROTEIN URINE: CPT

## 2018-11-02 PROCEDURE — 82570 ASSAY OF URINE CREATININE: CPT

## 2018-11-02 PROCEDURE — 82306 VITAMIN D 25 HYDROXY: CPT

## 2018-11-02 PROCEDURE — 97140 MANUAL THERAPY 1/> REGIONS: CPT

## 2018-11-02 PROCEDURE — 85014 HEMATOCRIT: CPT

## 2018-11-02 PROCEDURE — 80048 BASIC METABOLIC PNL TOTAL CA: CPT

## 2018-11-02 PROCEDURE — 36415 COLL VENOUS BLD VENIPUNCTURE: CPT

## 2018-11-02 PROCEDURE — 85018 HEMOGLOBIN: CPT

## 2018-11-03 ASSESSMENT — PAIN SCALES - GENERAL: PAINLEVEL_OUTOF10: 3

## 2018-11-03 ASSESSMENT — PAIN DESCRIPTION - LOCATION: LOCATION: LEG

## 2018-11-03 ASSESSMENT — PAIN DESCRIPTION - ORIENTATION: ORIENTATION: LEFT;RIGHT

## 2018-11-03 ASSESSMENT — PAIN DESCRIPTION - DESCRIPTORS: DESCRIPTORS: SORE

## 2018-11-03 ASSESSMENT — PAIN DESCRIPTION - PAIN TYPE: TYPE: CHRONIC PAIN

## 2018-11-07 ENCOUNTER — OFFICE VISIT (OUTPATIENT)
Dept: FAMILY MEDICINE CLINIC | Age: 63
End: 2018-11-07
Payer: MEDICARE

## 2018-11-07 VITALS
DIASTOLIC BLOOD PRESSURE: 68 MMHG | TEMPERATURE: 98.5 F | RESPIRATION RATE: 16 BRPM | HEART RATE: 64 BPM | BODY MASS INDEX: 43.4 KG/M2 | SYSTOLIC BLOOD PRESSURE: 124 MMHG | WEIGHT: 293 LBS | HEIGHT: 69 IN

## 2018-11-07 DIAGNOSIS — E78.5 HYPERLIPIDEMIA, UNSPECIFIED HYPERLIPIDEMIA TYPE: ICD-10-CM

## 2018-11-07 DIAGNOSIS — I10 ESSENTIAL HYPERTENSION: ICD-10-CM

## 2018-11-07 DIAGNOSIS — M21.372 LEFT FOOT DROP: ICD-10-CM

## 2018-11-07 DIAGNOSIS — N18.3 DIABETES MELLITUS DUE TO UNDERLYING CONDITION WITH STAGE 3 CHRONIC KIDNEY DISEASE, UNSPECIFIED WHETHER LONG TERM INSULIN USE: Primary | ICD-10-CM

## 2018-11-07 DIAGNOSIS — G62.9 NEUROPATHY: ICD-10-CM

## 2018-11-07 DIAGNOSIS — E08.22 DIABETES MELLITUS DUE TO UNDERLYING CONDITION WITH STAGE 3 CHRONIC KIDNEY DISEASE, UNSPECIFIED WHETHER LONG TERM INSULIN USE: Primary | ICD-10-CM

## 2018-11-07 DIAGNOSIS — N18.4 CHRONIC KIDNEY DISEASE (CKD) STAGE G4/A2, SEVERELY DECREASED GLOMERULAR FILTRATION RATE (GFR) BETWEEN 15-29 ML/MIN/1.73 SQUARE METER AND ALBUMINURIA CREATININE RATIO BETWEEN 30-299 MG/G (HCC): ICD-10-CM

## 2018-11-07 PROBLEM — E05.90 HYPERTHYROIDISM: Status: RESOLVED | Noted: 2017-11-09 | Resolved: 2018-11-07

## 2018-11-07 LAB — HBA1C MFR BLD: 6.7 %

## 2018-11-07 PROCEDURE — 83036 HEMOGLOBIN GLYCOSYLATED A1C: CPT | Performed by: NURSE PRACTITIONER

## 2018-11-07 PROCEDURE — 99214 OFFICE O/P EST MOD 30 MIN: CPT | Performed by: NURSE PRACTITIONER

## 2018-11-07 ASSESSMENT — PATIENT HEALTH QUESTIONNAIRE - PHQ9
SUM OF ALL RESPONSES TO PHQ QUESTIONS 1-9: 0
SUM OF ALL RESPONSES TO PHQ9 QUESTIONS 1 & 2: 0
1. LITTLE INTEREST OR PLEASURE IN DOING THINGS: 0
2. FEELING DOWN, DEPRESSED OR HOPELESS: 0
SUM OF ALL RESPONSES TO PHQ QUESTIONS 1-9: 0

## 2018-11-07 NOTE — PROGRESS NOTES
Jann Bahena is a 58 y.o. female for Follow-up (3 month f/u DM, no new concerns )      Diabetes Type 2    Glucose control:   Does patient check blood glucoses at home? Yes  Report of hypoglycemia: no  Lab Results   Component Value Date    LABA1C 6.7 11/07/2018     No results found for: EAG    Symptoms  Polyuria, Polydipsia or Polyphagia? No  Chest Pain, SOB, or Palpitations? -  No  New Vision complaints? No  Paresthesias of the extremities? Yes - but states the cymbalta helps her    Medications  Current medication were reviewed. Compliant with medications? yes  Medication side effects? No  On ACE-I or ARB? Yes  On antiplatelet therapy? Yes  On Statin? Yes    Last Diabetic Eye Exam: 8/2018    Exercise  Exercise? No  Wt Readings from Last 3 Encounters:   11/07/18 (!) 357 lb (161.9 kg)   08/09/18 (!) 349 lb (158.3 kg)   08/07/18 (!) 349 lb 9.6 oz (158.6 kg)       Diet discipline?:  Low salt, fat, sugar diet? Yes    Pt also has chronic renal disease. She is current with nephrology for this. Her recent GFR is 25, she has a follow up appt with them tomorrow to discuss her labs. She does state she has noticed her moth gets dry. She continues to take her bp meds. She is current with cardiology. States she had a foot surgery a few years ago and has left sided foot drop since that time. She odes have a brace to wear if she wants. Blood pressure control:  BP Readings from Last 3 Encounters:   11/07/18 124/68   10/18/18 134/72   08/09/18 136/62       Lab Results   Component Value Date    LABMICR 114.07 11/08/2017       Lab Results   Component Value Date    LDLCALC 87 10/26/2017       Physical Exam    /68   Pulse 64   Temp 98.5 °F (36.9 °C) (Oral)   Resp 16   Ht 5' 9.02\" (1.753 m)   Wt (!) 357 lb (161.9 kg)   LMP 02/20/2001   BMI 52.70 kg/m²   Appearance: alert, well appearing, and in no distress, normal appearing weight and well hydrated. Overweight, in a wheelchair.    Neck exam - supple,

## 2018-11-08 ENCOUNTER — OFFICE VISIT (OUTPATIENT)
Dept: NEPHROLOGY | Age: 63
End: 2018-11-08
Payer: MEDICARE

## 2018-11-08 VITALS
DIASTOLIC BLOOD PRESSURE: 57 MMHG | WEIGHT: 293 LBS | HEART RATE: 65 BPM | OXYGEN SATURATION: 98 % | SYSTOLIC BLOOD PRESSURE: 149 MMHG | BODY MASS INDEX: 52.72 KG/M2

## 2018-11-08 DIAGNOSIS — I10 ESSENTIAL HYPERTENSION: ICD-10-CM

## 2018-11-08 DIAGNOSIS — N18.4 STAGE 4 CHRONIC KIDNEY DISEASE (HCC): Primary | ICD-10-CM

## 2018-11-08 DIAGNOSIS — R80.1 PERSISTENT PROTEINURIA: ICD-10-CM

## 2018-11-08 DIAGNOSIS — E08.22 DIABETES MELLITUS DUE TO UNDERLYING CONDITION WITH STAGE 4 CHRONIC KIDNEY DISEASE, UNSPECIFIED WHETHER LONG TERM INSULIN USE (HCC): ICD-10-CM

## 2018-11-08 DIAGNOSIS — N18.4 DIABETES MELLITUS DUE TO UNDERLYING CONDITION WITH STAGE 4 CHRONIC KIDNEY DISEASE, UNSPECIFIED WHETHER LONG TERM INSULIN USE (HCC): ICD-10-CM

## 2018-11-08 PROCEDURE — 99213 OFFICE O/P EST LOW 20 MIN: CPT | Performed by: INTERNAL MEDICINE

## 2018-11-08 NOTE — PROGRESS NOTES
RUCREAT    Objective:   Vitals: BP (!) 149/57 (Site: Right Lower Arm, Position: Sitting, Cuff Size: Large Adult)   Pulse 65   Wt (!) 357 lb 3.2 oz (162 kg)   LMP 02/20/2001   SpO2 98%   BMI 52.72 kg/m²      Constitutional:  Alert, awake, no apparent distress  Skin:normal  HEENT:Pupils are reactive . Throat is clear  Neck:supple with no thyromegally  Cardiovascular:  S1, S2 without murmur  Respiratory:  Clear  Abdomen: +bs, soft, non ntender  Ext: ++ LE edema  Musculoskeletal:Intact  Neuro:Alert and oriented with no deficit    Electronically signed by Gi Rivera MD on 11/8/2018 at 2:27 PM

## 2018-11-09 ENCOUNTER — APPOINTMENT (OUTPATIENT)
Dept: PHYSICAL THERAPY | Age: 63
End: 2018-11-09
Payer: MEDICARE

## 2018-11-19 RX ORDER — ISOSORBIDE MONONITRATE 30 MG/1
TABLET, EXTENDED RELEASE ORAL
Qty: 30 TABLET | Refills: 9 | Status: ON HOLD | OUTPATIENT
Start: 2018-11-19 | End: 2019-06-03 | Stop reason: SDUPTHER

## 2018-11-21 ENCOUNTER — HOSPITAL ENCOUNTER (OUTPATIENT)
Dept: PHYSICAL THERAPY | Age: 63
Setting detail: THERAPIES SERIES
Discharge: HOME | End: 2018-11-21
Payer: MEDICARE

## 2018-11-21 PROCEDURE — 97140 MANUAL THERAPY 1/> REGIONS: CPT

## 2018-11-21 ASSESSMENT — PAIN DESCRIPTION - DESCRIPTORS: DESCRIPTORS: ACHING;SORE

## 2018-11-21 ASSESSMENT — PAIN DESCRIPTION - PAIN TYPE: TYPE: CHRONIC PAIN

## 2018-11-21 ASSESSMENT — PAIN SCALES - GENERAL: PAINLEVEL_OUTOF10: 3

## 2018-11-21 ASSESSMENT — PAIN DESCRIPTION - LOCATION: LOCATION: LEG

## 2018-11-21 ASSESSMENT — PAIN DESCRIPTION - ORIENTATION: ORIENTATION: RIGHT;LEFT

## 2018-11-21 NOTE — PROGRESS NOTES
Gradient: Moderate to Minimal  Supplies (per involved extremity):   Stockinette: Size: 6 inches, Thickness: Thick, soft   Transelast 0.5 roll   10 cm Artiflex Cotton 1 roll   15 cm Artiflex Cotton 1 roll   4 cm Rosidal K none   6 cm Rosidal K 1 roll   8 cm Rosidal K 2 rolls   10 cm Rosidal K none   12 cm Rosidal K none   Rosidal Soft 1 roll   1/4 inch thick grey   -Applied to anterior ankle crease (left LE). -Purpose: Padding     Decongestive/Therapeutic Exercise  The patient was able to complete Decongestive Therapy exercises (x 10 reps) including ankle pumps (Assistance needed for heel cord stretch on left ankle). The exercises were completed with compression bandages on, without complaints of pain from the patient. The Decongestive Therapy exercises assist with decreasing the swelling by increasing the muscle pumping action of the lymph collectors and by increasing the fluid uptake in the lymphatic system. Patient Education  Education Provided:   -11/21/2018: Reviewed plan of care: Waiting on compression garments. -11/02/2018: Reviewed goals.  -10/29/2018: Reviewed recommendations for sequential compression pump and velcro compression garments (CIRC AID REDUCTION KIT). -10/08/2018: Discussed goals and plan of care. -10/04/2018: Discussed plan of care. -10/02/2018: Reviewed plan of care for progression of compression bandaging above knee joint line on left lower extremity. Patient in agreement.   -09/28/2018: Reviewed plan of care and precautions.  -09/26/2018: Reviewed plan of care.  -09/13/2018: Reviewed plan of care/goals/exercises/precautions.   Learner:family and patient  Method: demonstration, explanation and handout       Outcome: acknowledged understanding of goals/plan of care/precautions/exercises, demonstrated understanding and asked questions     GOALS   SHORT-TERM GOALS:  to be met in 6 weeks   X  MET  Patient will demonstrate a decrease in circumferential measurements of the affected

## 2018-11-23 DIAGNOSIS — E11.42 TYPE 2 DIABETES MELLITUS WITH DIABETIC POLYNEUROPATHY, WITH LONG-TERM CURRENT USE OF INSULIN (HCC): ICD-10-CM

## 2018-11-23 DIAGNOSIS — Z79.4 TYPE 2 DIABETES MELLITUS WITH DIABETIC POLYNEUROPATHY, WITH LONG-TERM CURRENT USE OF INSULIN (HCC): ICD-10-CM

## 2018-11-26 RX ORDER — INSULIN LISPRO 100 [IU]/ML
INJECTION, SUSPENSION SUBCUTANEOUS
Qty: 18 ML | Refills: 6 | Status: SHIPPED | OUTPATIENT
Start: 2018-11-26 | End: 2018-12-11 | Stop reason: SDUPTHER

## 2018-12-05 ENCOUNTER — HOSPITAL ENCOUNTER (OUTPATIENT)
Dept: PHYSICAL THERAPY | Age: 63
Setting detail: THERAPIES SERIES
Discharge: HOME | End: 2018-12-05
Payer: MEDICARE

## 2018-12-05 PROCEDURE — 97140 MANUAL THERAPY 1/> REGIONS: CPT

## 2018-12-05 ASSESSMENT — PAIN DESCRIPTION - PAIN TYPE: TYPE: CHRONIC PAIN

## 2018-12-05 ASSESSMENT — PAIN SCALES - GENERAL: PAINLEVEL_OUTOF10: 3

## 2018-12-05 ASSESSMENT — PAIN DESCRIPTION - LOCATION: LOCATION: LEG

## 2018-12-05 ASSESSMENT — PAIN DESCRIPTION - ORIENTATION: ORIENTATION: LEFT;RIGHT

## 2018-12-05 ASSESSMENT — PAIN DESCRIPTION - DESCRIPTORS: DESCRIPTORS: ACHING

## 2018-12-06 NOTE — FLOWSHEET NOTE
Transelast 0.5 roll   10 cm Artiflex Cotton 1 roll   15 cm Artiflex Cotton 1 roll   4 cm Rosidal K none   6 cm Rosidal K 1 roll   8 cm Rosidal K 2 rolls   10 cm Rosidal K none   12 cm Rosidal K none   Rosidal Soft 1 roll   1/4 inch thick grey   -Applied to anterior ankle crease (left LE). -Purpose: Padding     Decongestive/Therapeutic Exercise  The patient was able to complete Decongestive Therapy exercises (x 10 reps) including ankle pumps (Assistance needed for heel cord stretch on left ankle). The exercises were completed with compression bandages on, without complaints of pain from the patient. The Decongestive Therapy exercises assist with decreasing the swelling by increasing the muscle pumping action of the lymph collectors and by increasing the fluid uptake in the lymphatic system. Patient Education  Education Provided:   -12/5/2018: Reviewed plan of care and goals, continue to wait on new compression garments. -11/21/2018: Reviewed plan of care: Waiting on compression garments. -11/02/2018: Reviewed goals.  -10/29/2018: Reviewed recommendations for sequential compression pump and velcro compression garments (CIRC AID REDUCTION KIT). -10/08/2018: Discussed goals and plan of care. -10/04/2018: Discussed plan of care. -10/02/2018: Reviewed plan of care for progression of compression bandaging above knee joint line on left lower extremity. Patient in agreement.   -09/28/2018: Reviewed plan of care and precautions.  -09/26/2018: Reviewed plan of care.  -09/13/2018: Reviewed plan of care/goals/exercises/precautions.   Learner:family and patient  Method: demonstration, explanation and handout       Outcome: acknowledged understanding of goals/plan of care/precautions/exercises, demonstrated understanding and asked questions     GOALS   SHORT-TERM GOALS:  to be met in 6 weeks   X  MET  Patient will demonstrate a decrease in circumferential measurements of the affected extremity by 10 cm working towards

## 2018-12-10 ENCOUNTER — HOSPITAL ENCOUNTER (OUTPATIENT)
Dept: ULTRASOUND IMAGING | Age: 63
Discharge: HOME | End: 2018-12-10
Payer: MEDICARE

## 2018-12-10 DIAGNOSIS — E04.2 MULTIPLE THYROID NODULES: ICD-10-CM

## 2018-12-10 PROCEDURE — 76536 US EXAM OF HEAD AND NECK: CPT

## 2018-12-11 DIAGNOSIS — E11.42 TYPE 2 DIABETES MELLITUS WITH DIABETIC POLYNEUROPATHY, WITH LONG-TERM CURRENT USE OF INSULIN (HCC): ICD-10-CM

## 2018-12-11 DIAGNOSIS — Z79.4 TYPE 2 DIABETES MELLITUS WITH DIABETIC POLYNEUROPATHY, WITH LONG-TERM CURRENT USE OF INSULIN (HCC): ICD-10-CM

## 2018-12-11 RX ORDER — INSULIN LISPRO 100 [IU]/ML
INJECTION, SUSPENSION SUBCUTANEOUS
Qty: 24 ML | Refills: 1 | Status: ON HOLD | OUTPATIENT
Start: 2018-12-11 | End: 2019-06-03 | Stop reason: SDUPTHER

## 2018-12-17 DIAGNOSIS — E78.5 HYPERLIPIDEMIA, UNSPECIFIED HYPERLIPIDEMIA TYPE: ICD-10-CM

## 2018-12-17 RX ORDER — ATORVASTATIN CALCIUM 40 MG/1
TABLET, FILM COATED ORAL
Qty: 90 TABLET | Refills: 1 | Status: SHIPPED | OUTPATIENT
Start: 2018-12-17 | End: 2019-09-25 | Stop reason: SDUPTHER

## 2018-12-31 ENCOUNTER — HOSPITAL ENCOUNTER (OUTPATIENT)
Dept: PHYSICAL THERAPY | Age: 63
Setting detail: THERAPIES SERIES
Discharge: HOME | End: 2018-12-31
Payer: MEDICARE

## 2018-12-31 PROCEDURE — 97140 MANUAL THERAPY 1/> REGIONS: CPT

## 2018-12-31 ASSESSMENT — PAIN DESCRIPTION - DESCRIPTORS: DESCRIPTORS: TENDER

## 2018-12-31 ASSESSMENT — PAIN SCALES - GENERAL: PAINLEVEL_OUTOF10: 3

## 2018-12-31 ASSESSMENT — PAIN DESCRIPTION - LOCATION: LOCATION: LEG

## 2018-12-31 ASSESSMENT — PAIN DESCRIPTION - PAIN TYPE: TYPE: CHRONIC PAIN

## 2018-12-31 ASSESSMENT — PAIN DESCRIPTION - ORIENTATION: ORIENTATION: LEFT

## 2019-01-03 RX ORDER — ACETAMINOPHEN/DIPHENHYDRAMINE 500MG-25MG
TABLET ORAL
Qty: 30 TABLET | Refills: 10 | Status: ON HOLD | OUTPATIENT
Start: 2019-01-03 | End: 2019-07-12 | Stop reason: HOSPADM

## 2019-01-08 ENCOUNTER — HOSPITAL ENCOUNTER (OUTPATIENT)
Dept: PHYSICAL THERAPY | Age: 64
Setting detail: THERAPIES SERIES
Discharge: HOME | End: 2019-01-08
Payer: MEDICARE

## 2019-01-08 PROCEDURE — 97140 MANUAL THERAPY 1/> REGIONS: CPT

## 2019-01-08 ASSESSMENT — PAIN DESCRIPTION - PAIN TYPE: TYPE: CHRONIC PAIN

## 2019-01-08 ASSESSMENT — PAIN DESCRIPTION - DESCRIPTORS: DESCRIPTORS: TENDER

## 2019-01-08 ASSESSMENT — PAIN DESCRIPTION - ORIENTATION: ORIENTATION: LEFT

## 2019-01-08 ASSESSMENT — PAIN DESCRIPTION - LOCATION: LOCATION: LEG

## 2019-01-08 ASSESSMENT — PAIN SCALES - GENERAL: PAINLEVEL_OUTOF10: 2

## 2019-01-26 DIAGNOSIS — I48.91 ATRIAL FIBRILLATION WITH RVR (HCC): ICD-10-CM

## 2019-01-31 ENCOUNTER — TELEPHONE (OUTPATIENT)
Dept: FAMILY MEDICINE CLINIC | Age: 64
End: 2019-01-31

## 2019-01-31 DIAGNOSIS — Z79.4 TYPE 2 DIABETES MELLITUS WITH COMPLICATION, WITH LONG-TERM CURRENT USE OF INSULIN (HCC): Primary | ICD-10-CM

## 2019-01-31 DIAGNOSIS — E11.8 TYPE 2 DIABETES MELLITUS WITH COMPLICATION, WITH LONG-TERM CURRENT USE OF INSULIN (HCC): Primary | ICD-10-CM

## 2019-02-04 ENCOUNTER — TELEPHONE (OUTPATIENT)
Dept: FAMILY MEDICINE CLINIC | Age: 64
End: 2019-02-04

## 2019-02-25 ENCOUNTER — TELEPHONE (OUTPATIENT)
Dept: FAMILY MEDICINE CLINIC | Age: 64
End: 2019-02-25

## 2019-02-25 DIAGNOSIS — E04.2 MULTINODULAR GOITER: Primary | ICD-10-CM

## 2019-03-13 ENCOUNTER — OFFICE VISIT (OUTPATIENT)
Dept: PULMONOLOGY | Age: 64
End: 2019-03-13
Payer: MEDICARE

## 2019-03-13 VITALS
WEIGHT: 293 LBS | HEIGHT: 69 IN | BODY MASS INDEX: 43.4 KG/M2 | SYSTOLIC BLOOD PRESSURE: 130 MMHG | HEART RATE: 81 BPM | OXYGEN SATURATION: 96 % | DIASTOLIC BLOOD PRESSURE: 82 MMHG

## 2019-03-13 DIAGNOSIS — E66.01 MORBID OBESITY WITH BMI OF 50.0-59.9, ADULT (HCC): ICD-10-CM

## 2019-03-13 DIAGNOSIS — G47.33 OBSTRUCTIVE SLEEP APNEA ON CPAP: Primary | ICD-10-CM

## 2019-03-13 DIAGNOSIS — Z99.89 OBSTRUCTIVE SLEEP APNEA ON CPAP: Primary | ICD-10-CM

## 2019-03-13 PROCEDURE — 99213 OFFICE O/P EST LOW 20 MIN: CPT | Performed by: PHYSICIAN ASSISTANT

## 2019-03-13 ASSESSMENT — ENCOUNTER SYMPTOMS
SHORTNESS OF BREATH: 0
DIARRHEA: 0
NAUSEA: 0
COUGH: 0
EYES NEGATIVE: 1
BACK PAIN: 0
ALLERGIC/IMMUNOLOGIC NEGATIVE: 1
STRIDOR: 0
CHEST TIGHTNESS: 0
WHEEZING: 0

## 2019-03-19 DIAGNOSIS — I10 ESSENTIAL HYPERTENSION: ICD-10-CM

## 2019-03-19 RX ORDER — HYDRALAZINE HYDROCHLORIDE 25 MG/1
TABLET, FILM COATED ORAL
Qty: 90 TABLET | Refills: 3 | Status: ON HOLD | OUTPATIENT
Start: 2019-03-19 | End: 2019-06-03 | Stop reason: SDUPTHER

## 2019-04-05 RX ORDER — PANTOPRAZOLE SODIUM 40 MG/1
TABLET, DELAYED RELEASE ORAL
Qty: 90 TABLET | Refills: 1 | Status: SHIPPED | OUTPATIENT
Start: 2019-04-05 | End: 2019-10-31 | Stop reason: SDUPTHER

## 2019-04-06 ENCOUNTER — HOSPITAL ENCOUNTER (EMERGENCY)
Age: 64
Discharge: HOME | End: 2019-04-06
Payer: MEDICARE

## 2019-04-06 VITALS
HEART RATE: 76 BPM | DIASTOLIC BLOOD PRESSURE: 101 MMHG | RESPIRATION RATE: 15 BRPM | TEMPERATURE: 98 F | HEIGHT: 69 IN | OXYGEN SATURATION: 100 % | BODY MASS INDEX: 43.4 KG/M2 | SYSTOLIC BLOOD PRESSURE: 114 MMHG | WEIGHT: 293 LBS

## 2019-04-06 DIAGNOSIS — N39.0 URINARY TRACT INFECTION WITH HEMATURIA, SITE UNSPECIFIED: ICD-10-CM

## 2019-04-06 DIAGNOSIS — R31.9 URINARY TRACT INFECTION WITH HEMATURIA, SITE UNSPECIFIED: ICD-10-CM

## 2019-04-06 DIAGNOSIS — E16.2 HYPOGLYCEMIA: Primary | ICD-10-CM

## 2019-04-06 LAB
ALBUMIN SERPL-MCNC: 3.5 G/DL (ref 3.5–5.1)
ALP BLD-CCNC: 57 U/L (ref 38–126)
ALT SERPL-CCNC: 14 U/L (ref 11–66)
AMORPHOUS: ABNORMAL
ANION GAP SERPL CALCULATED.3IONS-SCNC: 9 MEQ/L (ref 8–16)
AST SERPL-CCNC: 25 U/L (ref 5–40)
BACTERIA: ABNORMAL /HPF
BASOPHILS # BLD: 0.4 %
BASOPHILS ABSOLUTE: 0 THOU/MM3 (ref 0–0.1)
BILIRUB SERPL-MCNC: 0.4 MG/DL (ref 0.3–1.2)
BILIRUBIN URINE: NEGATIVE
BLOOD, URINE: NEGATIVE
BUN BLDV-MCNC: 37 MG/DL (ref 7–22)
CALCIUM SERPL-MCNC: 9.1 MG/DL (ref 8.5–10.5)
CASTS UA: ABNORMAL /LPF
CHARACTER, URINE: ABNORMAL
CHLORIDE BLD-SCNC: 109 MEQ/L (ref 98–111)
CO2: 21 MEQ/L (ref 23–33)
COLOR: YELLOW
CREAT SERPL-MCNC: 1.9 MG/DL (ref 0.4–1.2)
CRYSTALS, UA: ABNORMAL
EKG ATRIAL RATE: 89 BPM
EKG Q-T INTERVAL: 420 MS
EKG QRS DURATION: 136 MS
EKG QTC CALCULATION (BAZETT): 505 MS
EKG R AXIS: 14 DEGREES
EKG T AXIS: 106 DEGREES
EKG VENTRICULAR RATE: 87 BPM
EOSINOPHIL # BLD: 0.4 %
EOSINOPHILS ABSOLUTE: 0 THOU/MM3 (ref 0–0.4)
EPITHELIAL CELLS, UA: ABNORMAL /HPF
ERYTHROCYTE [DISTWIDTH] IN BLOOD BY AUTOMATED COUNT: 15.8 % (ref 11.5–14.5)
ERYTHROCYTE [DISTWIDTH] IN BLOOD BY AUTOMATED COUNT: 55.5 FL (ref 35–45)
GFR SERPL CREATININE-BSD FRML MDRD: 27 ML/MIN/1.73M2
GLUCOSE BLD-MCNC: 128 MG/DL (ref 70–108)
GLUCOSE BLD-MCNC: 157 MG/DL (ref 70–108)
GLUCOSE BLD-MCNC: 79 MG/DL (ref 70–108)
GLUCOSE BLD-MCNC: 85 MG/DL (ref 70–108)
GLUCOSE BLD-MCNC: 93 MG/DL (ref 70–108)
GLUCOSE URINE: NEGATIVE MG/DL
HCT VFR BLD CALC: 30.9 % (ref 37–47)
HEMOGLOBIN: 9.8 GM/DL (ref 12–16)
IMMATURE GRANS (ABS): 0.02 THOU/MM3 (ref 0–0.07)
IMMATURE GRANULOCYTES: 0.4 %
KETONES, URINE: NEGATIVE
LEUKOCYTE ESTERASE, URINE: ABNORMAL
LYMPHOCYTES # BLD: 13.8 %
LYMPHOCYTES ABSOLUTE: 0.7 THOU/MM3 (ref 1–4.8)
MCH RBC QN AUTO: 30.4 PG (ref 26–33)
MCHC RBC AUTO-ENTMCNC: 31.7 GM/DL (ref 32.2–35.5)
MCV RBC AUTO: 96 FL (ref 81–99)
MISCELLANEOUS 2: ABNORMAL
MONOCYTES # BLD: 8.8 %
MONOCYTES ABSOLUTE: 0.4 THOU/MM3 (ref 0.4–1.3)
NITRITE, URINE: NEGATIVE
NUCLEATED RED BLOOD CELLS: 0 /100 WBC
OSMOLALITY CALCULATION: 289.5 MOSMOL/KG (ref 275–300)
PH UA: 5.5 (ref 5–9)
PLATELET # BLD: 139 THOU/MM3 (ref 130–400)
PMV BLD AUTO: 12.5 FL (ref 9.4–12.4)
POTASSIUM SERPL-SCNC: 4.3 MEQ/L (ref 3.5–5.2)
PROTEIN UA: 100
RBC # BLD: 3.22 MILL/MM3 (ref 4.2–5.4)
RBC URINE: ABNORMAL /HPF
RENAL EPITHELIAL, UA: ABNORMAL
SEG NEUTROPHILS: 76.2 %
SEGMENTED NEUTROPHILS ABSOLUTE COUNT: 3.7 THOU/MM3 (ref 1.8–7.7)
SODIUM BLD-SCNC: 139 MEQ/L (ref 135–145)
SPECIFIC GRAVITY, URINE: 1.01 (ref 1–1.03)
TOTAL PROTEIN: 6.7 G/DL (ref 6.1–8)
TROPONIN T: < 0.01 NG/ML
UROBILINOGEN, URINE: 0.2 EU/DL (ref 0–1)
WBC # BLD: 4.9 THOU/MM3 (ref 4.8–10.8)
WBC UA: ABNORMAL /HPF
YEAST: ABNORMAL

## 2019-04-06 PROCEDURE — 82948 REAGENT STRIP/BLOOD GLUCOSE: CPT

## 2019-04-06 PROCEDURE — 36415 COLL VENOUS BLD VENIPUNCTURE: CPT

## 2019-04-06 PROCEDURE — 80053 COMPREHEN METABOLIC PANEL: CPT

## 2019-04-06 PROCEDURE — 99285 EMERGENCY DEPT VISIT HI MDM: CPT

## 2019-04-06 PROCEDURE — 85025 COMPLETE CBC W/AUTO DIFF WBC: CPT

## 2019-04-06 PROCEDURE — 93005 ELECTROCARDIOGRAM TRACING: CPT | Performed by: EMERGENCY MEDICINE

## 2019-04-06 PROCEDURE — 87086 URINE CULTURE/COLONY COUNT: CPT

## 2019-04-06 PROCEDURE — 93010 ELECTROCARDIOGRAM REPORT: CPT | Performed by: INTERNAL MEDICINE

## 2019-04-06 PROCEDURE — 81001 URINALYSIS AUTO W/SCOPE: CPT

## 2019-04-06 PROCEDURE — 84484 ASSAY OF TROPONIN QUANT: CPT

## 2019-04-06 PROCEDURE — 2709999900 HC NON-CHARGEABLE SUPPLY

## 2019-04-06 RX ORDER — NITROFURANTOIN 25; 75 MG/1; MG/1
100 CAPSULE ORAL 2 TIMES DAILY
Qty: 10 CAPSULE | Refills: 0 | Status: SHIPPED | OUTPATIENT
Start: 2019-04-06 | End: 2019-04-11

## 2019-04-06 ASSESSMENT — ENCOUNTER SYMPTOMS
BACK PAIN: 0
SHORTNESS OF BREATH: 0
ABDOMINAL PAIN: 0
COUGH: 0

## 2019-04-06 NOTE — ED NOTES
Patient resting in bed with no current complaints. Bear hugger remains in place. Rectal temperature taken without improvement from previous finding. POC glucose obtained. Family and patient deny current needs. Will follow up with provider for plan of care.       Mireya Govea RN  04/06/19 2691

## 2019-04-06 NOTE — ED PROVIDER NOTES
for activity change, chills, fatigue and fever. Respiratory: Negative for cough and shortness of breath. Cardiovascular: Positive for leg swelling. Negative for chest pain and palpitations. Gastrointestinal: Negative for abdominal pain. Genitourinary: Negative for difficulty urinating and dysuria. Musculoskeletal: Negative for back pain. Skin: Positive for wound (blister left calf that has opened, healing). Neurological: Negative for tremors, syncope, weakness and headaches. Psychiatric/Behavioral: Negative for agitation and behavioral problems. PAST MEDICAL HISTORY    has a past medical history of CRI (chronic renal insufficiency), Difficult intubation, DM (diabetes mellitus) ([facility] [address] GERD (gastroesophageal reflux disease), Hyperlipidemia, Hypertension, Hypothyroidism, Neuropathy, Obesity, Osteoarthritis, Prolonged emergence from general anesthesia, Sleep apnea, and Visit for screening mammogram.    SURGICAL HISTORY      has a past surgical history that includes back surgery; Foot surgery; Colonoscopy; Upper gastrointestinal endoscopy; Mouth Biopsy (9-28-12); Skin tag removal (9/25/12); Tonsillectomy; Patella surgery (7/11/13); Cataract removal (Right, 7/24/14); toenail excision; other surgical history (11/8/2014); other surgical history (4/23/2015); Sleeve Gastrectomy (09/15/2015); EKG 12 Lead (9/18/2015); Hysterectomy, total abdominal; and pr colon ca scrn not hi rsk ind (Left, 1/24/2018).     CURRENT MEDICATIONS       Discharge Medication List as of 4/6/2019 11:18 AM      CONTINUE these medications which have NOT CHANGED    Details   pantoprazole (PROTONIX) 40 MG tablet take 1 tablet (40 mg) by oral route once daily, Disp-90 tablet, R-1Normal      hydrALAZINE (APRESOLINE) 25 MG tablet take 1 tablet by mouth three times a day, Disp-90 tablet, R-3Normal      metoprolol tartrate (LOPRESSOR) 25 MG tablet take 1/2 tablet by mouth twice a day, Disp-90 tablet, R-3Normal      RA ASPIRIN EC 81 MG EC tablet take 1 tablet by mouth once daily, Disp-30 tablet, R-10Normal      atorvastatin (LIPITOR) 40 MG tablet take 1 tablet by mouth at bedtime, Disp-90 tablet, R-1Normal      insulin lispro protamine & lispro (HUMALOG MIX 75/25 KWIKPEN) (75-25) 100 UNIT per ML SUPN injection pen inject 30 units every morning and 50 units at bedtime, Disp-24 mL, R-1Normal      isosorbide mononitrate (IMDUR) 30 MG extended release tablet take 1 tablet by mouth once daily, Disp-30 tablet, R-9Normal      SITagliptin (JANUVIA) 25 MG tablet Take 1 tablet by mouth daily, Disp-90 tablet, R-3Normal      pioglitazone (ACTOS) 15 MG tablet take 1 tablet by mouth once daily, Disp-90 tablet, R-3Normal      DULoxetine (CYMBALTA) 60 MG extended release capsule take 1 capsule by mouth once daily, Disp-90 capsule, R-3Normal      amitriptyline (ELAVIL) 10 MG tablet take 1 tablet by mouth once daily, Disp-90 tablet, R-3Normal      fenofibrate (TRICOR) 145 MG tablet take 1 tablet by mouth once daily, Disp-90 tablet, R-3Normal      CARTIA  MG extended release capsule take 1 capsule by mouth once daily, Disp-90 capsule, R-3Normal      CPAP Machine MISC Starting 11/10/2016, Until Discontinued, Disp-1 each, R-0, PrintPlease change CPAP to auto pressure to 7-15 cm H20. Insulin Pen Needle (PEN NEEDLES) 31G X 5 MM MISC 1 each by Does not apply route 3 times daily, Disp-100 each, R-11Ultra fine pen needlesNormal      Calcium Carbonate-Vitamin D (CALCIUM-VITAMIN D) 500-200 MG-UNIT per tablet Take 1 tablet by mouth 2 times daily (with meals) Historical Med             ALLERGIES     has No Known Allergies. FAMILY HISTORY     indicated that her mother is . She indicated that her father is . She indicated that her sister is alive. She indicated that her brother is alive. She indicated that the status of her maternal grandmother is unknown. She indicated that the status of her maternal grandfather is unknown.  She indicated that the status of her maternal uncle is unknown. She indicated that the status of her other is unknown. She indicated that the status of her neg hx is unknown.   family history includes Asthma in her brother and sister; Cancer in her maternal uncle; Diabetes in her maternal grandfather and maternal grandmother; Heart Disease in her father; High Blood Pressure in her maternal grandmother and another family member. SOCIAL HISTORY      reports that she quit smoking about 12 years ago. She has a 30.00 pack-year smoking history. She has quit using smokeless tobacco. She reports that she does not drink alcohol or use drugs. PHYSICAL EXAM     INITIAL VITALS:  height is 5' 9\" (1.753 m) and weight is 366 lb (166 kg) (abnormal). Her rectal temperature is 98 °F (36.7 °C). Her blood pressure is 114/101 (abnormal) and her pulse is 76. Her respiration is 15 and oxygen saturation is 100%. Physical Exam   Constitutional: She is oriented to person, place, and time. She appears well-developed and well-nourished. No distress. HENT:   Head: Normocephalic. Eyes: Pupils are equal, round, and reactive to light. EOM are normal.   Neck: Normal range of motion. Neck supple. Cardiovascular: Normal heart sounds and normal pulses. An irregularly irregular rhythm present. Pulmonary/Chest: Effort normal and breath sounds normal. No stridor. No respiratory distress. Abdominal: Soft. Bowel sounds are normal. She exhibits no distension. There is no tenderness. Musculoskeletal: Normal range of motion. Right lower leg: She exhibits edema (chronic 2+ non pitting). Left lower leg: She exhibits edema (chronic 2+ non-pitting). Legs:  Neurological: She is alert and oriented to person, place, and time. No cranial nerve deficit or sensory deficit. Skin: Capillary refill takes less than 2 seconds. There is pallor. Cool to touch   Psychiatric: She has a normal mood and affect.  Her behavior is normal.        DIFFERENTIAL DIAGNOSIS:   Hypoglycemic episode, less likely CVA, syncope, infectious etiology    DIAGNOSTIC RESULTS     EKG: All EKG's are interpreted by the Emergency Department Physician who either signs or Co-signs this chart in the absence of a cardiologist.    Atrial fibrillation ventricular rate 87 bpm.  QRST duration 136.     RADIOLOGY: non-plainfilm images(s) such as CT, Ultrasound and MRI are read by the radiologist.    No orders to display       LABS:     Labs Reviewed   CBC WITH AUTO DIFFERENTIAL - Abnormal; Notable for the following components:       Result Value    RBC 3.22 (*)     Hemoglobin 9.8 (*)     Hematocrit 30.9 (*)     MCHC 31.7 (*)     RDW-CV 15.8 (*)     RDW-SD 55.5 (*)     MPV 12.5 (*)     Lymphocytes # 0.7 (*)     All other components within normal limits   COMPREHENSIVE METABOLIC PANEL - Abnormal; Notable for the following components:    Glucose 157 (*)     CREATININE 1.9 (*)     BUN 37 (*)     CO2 21 (*)     All other components within normal limits   GLOMERULAR FILTRATION RATE, ESTIMATED - Abnormal; Notable for the following components:    Est, Glom Filt Rate 27 (*)     All other components within normal limits   URINE WITH REFLEXED MICRO - Abnormal; Notable for the following components:    Protein,  (*)     Leukocyte Esterase, Urine MODERATE (*)     Character, Urine CLOUDY (*)     All other components within normal limits   POCT GLUCOSE - Abnormal; Notable for the following components:    POC Glucose 128 (*)     All other components within normal limits   URINE CULTURE, REFLEXED   TROPONIN   ANION GAP   OSMOLALITY   POCT GLUCOSE   POCT GLUCOSE   POCT GLUCOSE   POCT GLUCOSE   POCT GLUCOSE   POCT GLUCOSE   POCT GLUCOSE   POCT GLUCOSE   POCT GLUCOSE   POCT GLUCOSE       EMERGENCY DEPARTMENT COURSE:   Vitals:    Vitals:    04/06/19 0740 04/06/19 0840 04/06/19 0954 04/06/19 1057   BP: (!) 142/75 (!) 150/105 (!) 117/103 (!) 114/101   Pulse: 94 96  76   Resp: 17 20 16 15   Temp:  94.6 °F (34.8 °C)  98 °F (36.7 °C)   TempSrc:  Rectal  Rectal   SpO2: 99% 100% 100% 100%   Weight:       Height:           0612 AM: The patient was seen and evaluated. Patient is alert and talking. She is taking oral orange juice with sugar added to it. Patient has no noticeable neurologic deficits at this time. Warming measures are applied. Patient is given a meal to keep her blood sugars elevated. Patient's labs are reassuring. The patient remained alert and appropriate. He was noted to be hypothermic. Warming measures were applied including Avery hugger. Patient was given a meal early on but chose not to eat the meal.  Approximately 10:00 AM her blood sugar was dropping back down to 85. At this time I advised her to eat something with a good amount of protein in it. The patient ordered a breakfast tray at this time. We will monitor the patient until she really warm this and has a steady blood sugar. 11:10 the patient has a approximately 90% of her meal.  Her blood sugar is stable. Her temperature has improved to 98°F.  The patient is conversing appropriately with her family. She denies any complaints at this time. The patient has ready for discharge. Patient is advised to hold her Januvia and Actos this weekend. I also advised her to cut down on her nighttime dose of insulin and to start with 30 units tonight after checking her blood sugar. I advised her to hold this if her blood sugar is less than 120. At the time of discharge, the patient did admit that she may have taken extra insulin last evening. She states that she does this frequently if she had cheek meals. She states that she had extra sweets that she may have taken 60 units of insulin before going to bed. MDM:  The patient will be discharged home. She is advised to follow-up with her family physician. She is advised to check her blood sugars at least twice daily.   She will hold her Actos and Januvia until seen by her primary care

## 2019-04-06 NOTE — ED NOTES
Luis Del Rio, NP to room to discuss plan of care with patient.       Sanjuanita Zaidi RN  04/06/19 3248

## 2019-04-06 NOTE — ED TRIAGE NOTES
Pt arrives to the ED with a chief complaint of hypoglycemia. Pt reports she has diabetes and takes insulin. Pt reports tonight that she was very tired and family reports altered mental status. Family reports a blood sugar of 31 at home. EMS was called, pt was given oral glucose and orange juice. Upon arrival the patient's blood sugar is 79. Provider at bedside to see the patient. Pt is alert and able to answer questions.

## 2019-04-06 NOTE — ED NOTES
Patient assisted to bedpan. Danielle care provided and linens and gown changed. Patient noted to have eaten 90% of her breakfast. POC repeated. Rectal temperature repeated.       Destinee Leon RN  04/06/19 4160

## 2019-04-06 NOTE — ED NOTES
Patient alert and oriented and able to answer all questions. Bear hugger applied. Family at bedside. Denies current needs.       Alessio Cox RN  04/06/19 3148

## 2019-04-06 NOTE — ED NOTES
Patient had refused food with last interaction due to adequate POC glucose and patient stated that she was not hungry. Patient with retake of POC with lowered results. Patient encouraged to eat. Order placed with cafeteria. Yazmin Werner NP updated.       Anna Marie Baxter RN  04/06/19 1001

## 2019-04-07 LAB
ORGANISM: ABNORMAL
URINE CULTURE REFLEX: ABNORMAL

## 2019-04-08 DIAGNOSIS — I48.91 ATRIAL FIBRILLATION WITH RVR (HCC): ICD-10-CM

## 2019-04-08 RX ORDER — DILTIAZEM HYDROCHLORIDE 120 MG/1
CAPSULE, EXTENDED RELEASE ORAL
Qty: 90 CAPSULE | Refills: 3 | Status: ON HOLD | OUTPATIENT
Start: 2019-04-08 | End: 2019-07-12 | Stop reason: HOSPADM

## 2019-04-09 ENCOUNTER — CARE COORDINATION (OUTPATIENT)
Dept: CASE MANAGEMENT | Age: 64
End: 2019-04-09

## 2019-04-09 NOTE — CARE COORDINATION
[facility] ED Follow Up Call    2019    Patient: Kelly Palmer Patient : 1955   MRN: [id number]      Reason for ED visit:  Hypoglycemia / UTI  How are you feeling? Better  Status:     improved      Do you have any questions related to your discharge instructions? no    Review of Instructions:    Discharged with new prescription? yes - Macrobid 100 mg BID x 5 Days    Review Medications:  Yes   Do you have any questions related to your medications? no    Understands what to report/when to return?:  Yes   Do you have a follow up appointment scheduled? Yes  Specific review if indicated (symptom, services, etc):     Spoke with Talat Arroyo for Care Transition ED follow up. Identified self/role. Patient states she is doing better. Patient denies hypoglycemic episodes since ED visit. Patient states she bought a new glucometer. Patient states last nights glucose 179 mg/dL and 219 mg/dL this morning prior to morning medications. Patient denies urinary symptoms, currently taking Macrobid as prescribed. Reviewed upcoming appointment with PCP, verbalized understanding. Patient denies additional needs or concerns at this time. Patient instructed to notify PCP or CTC for any new or worsening symptoms and when to return to the ED, contact information for CTC provided. Patient verbalized understanding. Patient transferred to [facility] per request.     Do you have any needs or concerns I can assist you with?  No     FU appts/Provider:    Future Appointments   Date Time Provider Adonis Dolan   4/10/2019  2:00 PM ARNOL Jeffers - [address] [facility]   2019  1:00 PM Kaci [facility] HOME MED None   2019  1:00 PM ARNOL Jeffers - CNP [facility] [address] [facility]   2019  1:20 PM MD SCOOBY [facility]   10/30/2019 12:15 PM Ron Arredondo MD [address] [facility]   3/12/2020  1:30 PM Ladi Caro [facility] Giuliano RN  Care Transition Coordinator  (L) [phone] 21 [phone]

## 2019-04-10 ENCOUNTER — OFFICE VISIT (OUTPATIENT)
Dept: FAMILY MEDICINE CLINIC | Age: 64
End: 2019-04-10
Payer: MEDICARE

## 2019-04-10 VITALS
HEIGHT: 69 IN | TEMPERATURE: 98.3 F | RESPIRATION RATE: 16 BRPM | SYSTOLIC BLOOD PRESSURE: 136 MMHG | DIASTOLIC BLOOD PRESSURE: 74 MMHG | BODY MASS INDEX: 43.4 KG/M2 | WEIGHT: 293 LBS | HEART RATE: 76 BPM

## 2019-04-10 DIAGNOSIS — Z79.4 TYPE 2 DIABETES MELLITUS WITH OTHER CIRCULATORY COMPLICATION, WITH LONG-TERM CURRENT USE OF INSULIN (HCC): ICD-10-CM

## 2019-04-10 DIAGNOSIS — L03.116 CELLULITIS OF LEFT LOWER EXTREMITY: ICD-10-CM

## 2019-04-10 DIAGNOSIS — R82.998 LEUKOCYTES IN URINE: ICD-10-CM

## 2019-04-10 DIAGNOSIS — N18.4 DIABETES MELLITUS DUE TO UNDERLYING CONDITION WITH STAGE 4 CHRONIC KIDNEY DISEASE, UNSPECIFIED WHETHER LONG TERM INSULIN USE (HCC): ICD-10-CM

## 2019-04-10 DIAGNOSIS — E16.2 HYPOGLYCEMIA: Primary | ICD-10-CM

## 2019-04-10 DIAGNOSIS — E11.59 TYPE 2 DIABETES MELLITUS WITH OTHER CIRCULATORY COMPLICATION, WITH LONG-TERM CURRENT USE OF INSULIN (HCC): ICD-10-CM

## 2019-04-10 DIAGNOSIS — N18.4 CHRONIC KIDNEY DISEASE, STAGE IV (SEVERE) (HCC): ICD-10-CM

## 2019-04-10 DIAGNOSIS — Z23 IMMUNIZATION DUE: ICD-10-CM

## 2019-04-10 DIAGNOSIS — E08.22 DIABETES MELLITUS DUE TO UNDERLYING CONDITION WITH STAGE 4 CHRONIC KIDNEY DISEASE, UNSPECIFIED WHETHER LONG TERM INSULIN USE (HCC): ICD-10-CM

## 2019-04-10 LAB
BILIRUBIN, POC: NORMAL
BLOOD URINE, POC: NORMAL
CLARITY, POC: CLEAR
COLOR, POC: YELLOW
GLUCOSE URINE, POC: NORMAL
HBA1C MFR BLD: 6.2 %
KETONES, POC: NORMAL
LEUKOCYTE EST, POC: NORMAL
NITRITE, POC: NORMAL
PH, POC: 5.5
PROTEIN, POC: NORMAL
SPECIFIC GRAVITY, POC: 1.02
UROBILINOGEN, POC: NORMAL

## 2019-04-10 PROCEDURE — 99214 OFFICE O/P EST MOD 30 MIN: CPT | Performed by: NURSE PRACTITIONER

## 2019-04-10 PROCEDURE — 3044F HG A1C LEVEL LT 7.0%: CPT | Performed by: NURSE PRACTITIONER

## 2019-04-10 PROCEDURE — G8598 ASA/ANTIPLAT THER USED: HCPCS | Performed by: NURSE PRACTITIONER

## 2019-04-10 PROCEDURE — 81003 URINALYSIS AUTO W/O SCOPE: CPT | Performed by: NURSE PRACTITIONER

## 2019-04-10 PROCEDURE — 1036F TOBACCO NON-USER: CPT | Performed by: NURSE PRACTITIONER

## 2019-04-10 PROCEDURE — G0009 ADMIN PNEUMOCOCCAL VACCINE: HCPCS | Performed by: NURSE PRACTITIONER

## 2019-04-10 PROCEDURE — G8427 DOCREV CUR MEDS BY ELIG CLIN: HCPCS | Performed by: NURSE PRACTITIONER

## 2019-04-10 PROCEDURE — 2022F DILAT RTA XM EVC RTNOPTHY: CPT | Performed by: NURSE PRACTITIONER

## 2019-04-10 PROCEDURE — 90670 PCV13 VACCINE IM: CPT | Performed by: NURSE PRACTITIONER

## 2019-04-10 PROCEDURE — 83036 HEMOGLOBIN GLYCOSYLATED A1C: CPT | Performed by: NURSE PRACTITIONER

## 2019-04-10 PROCEDURE — G8417 CALC BMI ABV UP PARAM F/U: HCPCS | Performed by: NURSE PRACTITIONER

## 2019-04-10 PROCEDURE — 3017F COLORECTAL CA SCREEN DOC REV: CPT | Performed by: NURSE PRACTITIONER

## 2019-04-10 RX ORDER — CIPROFLOXACIN 500 MG/1
500 TABLET, FILM COATED ORAL 2 TIMES DAILY
Qty: 20 TABLET | Refills: 0 | Status: SHIPPED | OUTPATIENT
Start: 2019-04-10 | End: 2019-04-20

## 2019-04-10 ASSESSMENT — PATIENT HEALTH QUESTIONNAIRE - PHQ9
SUM OF ALL RESPONSES TO PHQ QUESTIONS 1-9: 0
2. FEELING DOWN, DEPRESSED OR HOPELESS: 0
1. LITTLE INTEREST OR PLEASURE IN DOING THINGS: 0
SUM OF ALL RESPONSES TO PHQ9 QUESTIONS 1 & 2: 0
SUM OF ALL RESPONSES TO PHQ QUESTIONS 1-9: 0

## 2019-04-10 NOTE — PROGRESS NOTES
After obtaining consent, and per orders of Verner Few CNP, injection of Prevnar 13 was given IM in Right deltoid by Lorena King. Patient tolerated well and was instructed to report any adverse reaction to me immediately. VIS given to patient. Immunization Screening Questionnaire was filled out by patient. Patient left office with no apparent reaction.     Immunizations     Name Date Dose Route    Pneumococcal 13-valent Conjugate ([id number]) 4/10/2019 0.5 mL Intramuscular    Site: Deltoid- Right    Lot: [id number]    NDC: 3945-3840-90

## 2019-04-10 NOTE — PROGRESS NOTES
Palmira Bravo is a 58 y.o. female for ED Follow-up [facility] ED Follow-up Hypoglycemia and Bacterial Urinary Infection)      Diabetes Type 2    Glucose control:   Does patient check blood glucoses at home? Yes  Report of hypoglycemia: yes  Lab Results   Component Value Date    LABA1C 6.7 11/07/2018     No results found for: EAG    Symptoms  Polyuria, Polydipsia or Polyphagia? No  Chest Pain, SOB, or Palpitations? -  No  New Vision complaints? No  Paresthesias of the extremities? Yes - but controlled with meds    Medications  Current medication were reviewed. Compliant with medications? yes  Medication side effects? Yes - pt had an episode where her blood sugar dropped into the 30 range and EMS was called and she was transported to [facility]. She had been taking her insulin mix and oral meds were added and pt was to be decreasing her insulin dosages but did not have a meter and had not been checking her sugars. Her recent c-peptide was normal and we were trying to get her controlled with oral meds. Her A1C today is 6. 2%Pt has not changed her diet she has not lost weight. On ACE-I or ARB? Yes  On antiplatelet therapy? Yes  On Statin? Yes    Last Diabetic Eye Exam: 8/2019    Exercise  Exercise? No  Wt Readings from Last 3 Encounters:   04/10/19 (!) 356 lb 9.6 oz (161.8 kg)   04/06/19 (!) 366 lb (166 kg)   03/13/19 (!) 366 lb (166 kg)       Diet discipline?:  Low salt, fat, sugar diet? No    Pt states her LLE has an area on it that is reddened and tender to touch. She does have lymphedema and did go through lymphedema therapy and is awaiting lymphedema pumps. Her LLE is tender to touch but not warm. Her LLE did swell and split open and drained, it is closed now with a scab. Her recent GFR is 25 she is being  Managed by Nephrology for this. During her recent ER visit she was found to have a uti and is on macrobid for this. She did not have any urinary symptoms. She needs a new wheelchair.    Blood pressure control:  BP Readings from Last 3 Encounters:   04/10/19 136/74   04/06/19 (!) 114/101   03/13/19 130/82       Lab Results   Component Value Date    LABMICR 114.07 11/08/2017       Lab Results   Component Value Date    LDLCALC 87 10/26/2017       Physical Exam    /74 (Site: Right Upper Arm, Position: Sitting, Cuff Size: Large Adult)   Pulse 76   Temp 98.3 °F (36.8 °C) (Oral)   Resp 16   Ht 5' 9\" (1.753 m)   Wt (!) 356 lb 9.6 oz (161.8 kg)   LMP 02/20/2001   BMI 52.66 kg/m²   Appearance: alert, well appearing, and in no distress, normal appearing weight and well hydrated. Overweight  Neck exam - supple, no significant adenopathy. CVS exam: normal rate, regular rhythm, normal S1, S2, no murmurs, rubs, clicks or gallops. Lungs: clear to auscultation, no wheezes, rales or rhonchi, symmetric air entry. Abdomen:  BS normal, soft, non tender, non distended, no rebound or guarding  Mental Status: normal mood, affect behavior, speech, dress,  and thought processes. Skin exam: LLE with edema from lower calf to ankle, pitting 1-2+ there is an area of erythema 3 inches in diameter, it is not warm. it is noted she has a LLE posterior scab, it is healing. ASSESSMENT & PLAN  [place] was seen today for ed follow-up. Diagnoses and all orders for this visit:    Hypoglycemia  -     POCT glycosylated hemoglobin (Hb A1C)  Stop oral anti diabetic meds decrease am insulin to 27 unit and pm insulin to 47 units. Type 2 diabetes mellitus with other circulatory complication, with long-term current use of insulin ([facility])  -     POCT glycosylated hemoglobin (Hb A1C)  Quality & Risk Score Accuracy    Visit Dx:  E08.22, N18.4 - Diabetes mellitus due to underlying condition with stage 4 chronic kidney disease, unspecified whether long term insulin use ([facility] [address]  Assessment and plan: The condition is patient had an episode of hypoglycemia.  based upon exam, symptoms, history, labs and recently went to ER with low sugar. . Treatment plan as follows: -obtain A1C, stop januvia and actos, may need to decrease insulin dose. Follow up in 3 months. Visit Dx:  E11.21 - Type 2 diabetes mellitus with diabetic nephropathy ([facility] [address]  Assessment and plan:  Stable based upon symptoms and exam. Continue current treatment plan and follow up at least yearly. Visit Dx:  N18.4 - Chronic kidney disease, stage IV (severe) ([facility] [address]  Assessment and plan:  Stable based upon symptoms and exam. Continue current treatment plan and follow up at least yearly. Continue to follow with Dr. Nabeel Mustafa for this. Had  A recent visit no changes made. Last edited 04/10/19 16:25 EDT by ARNOL Valencia CNP         Diabetes mellitus due to underlying condition with stage 4 chronic kidney disease, unspecified whether long term insulin use ([facility]    Type 2 diabetes mellitus with diabetic nephropathy (HCC)    Chronic kidney disease, stage IV (severe) ([facility])  Quality & Risk Score Accuracy    Visit Dx:  E08.22, N18.4 - Diabetes mellitus due to underlying condition with stage 4 chronic kidney disease, unspecified whether long term insulin use ([facility])  Assessment and plan: The condition is patient had an episode of hypoglycemia. based upon exam, symptoms, history, labs and recently went to ER with low sugar. . Treatment plan as follows: -obtain A1C, stop januvia and actos, may need to decrease insulin dose. Follow up in 3 months. Visit Dx:  E11.21 - Type 2 diabetes mellitus with diabetic nephropathy ([facility] [address]  Assessment and plan:  Stable based upon symptoms and exam. Continue current treatment plan and follow up at least yearly. Visit Dx:  N18.4 - Chronic kidney disease, stage IV (severe) [facility]  Assessment and plan:  Stable based upon symptoms and exam. Continue current treatment plan and follow up at least yearly. Continue to follow with Dr. Nabeel Mustafa for this. Had  A recent visit no changes made.    Last edited 04/10/19 16:25 EDT by ARNOL Valencia CNP         Leukocytes in urine  -     POCT Urinalysis No Micro (Auto)  Finish atb  Immunization due    Other orders  -     Pneumococcal conjugate vaccine 13-valent      Patient Instructions   Decrease insulin to 27 units in am and 47 units in pm    No follow-ups on file.

## 2019-04-12 ENCOUNTER — APPOINTMENT (OUTPATIENT)
Dept: CT IMAGING | Age: 64
End: 2019-04-12
Payer: MEDICARE

## 2019-04-12 ENCOUNTER — HOSPITAL ENCOUNTER (EMERGENCY)
Age: 64
Discharge: HOME | End: 2019-04-12
Payer: MEDICARE

## 2019-04-12 ENCOUNTER — HOSPITAL ENCOUNTER (OUTPATIENT)
Dept: ULTRASOUND IMAGING | Age: 64
Discharge: HOME | End: 2019-04-12
Payer: MEDICARE

## 2019-04-12 ENCOUNTER — HOSPITAL ENCOUNTER (OUTPATIENT)
Age: 64
Discharge: HOME | End: 2019-04-12
Payer: MEDICARE

## 2019-04-12 VITALS
RESPIRATION RATE: 16 BRPM | BODY MASS INDEX: 43.4 KG/M2 | OXYGEN SATURATION: 96 % | TEMPERATURE: 97.8 F | WEIGHT: 293 LBS | HEART RATE: 61 BPM | DIASTOLIC BLOOD PRESSURE: 69 MMHG | HEIGHT: 69 IN | SYSTOLIC BLOOD PRESSURE: 144 MMHG

## 2019-04-12 DIAGNOSIS — E04.2 NONTOXIC MULTINODULAR GOITER: ICD-10-CM

## 2019-04-12 DIAGNOSIS — R55 NEAR SYNCOPE: Primary | ICD-10-CM

## 2019-04-12 DIAGNOSIS — D64.9 ANEMIA, UNSPECIFIED TYPE: ICD-10-CM

## 2019-04-12 LAB
ALBUMIN SERPL-MCNC: 3.2 G/DL (ref 3.5–5.1)
ANION GAP SERPL CALCULATED.3IONS-SCNC: 10 MEQ/L (ref 8–16)
ANION GAP SERPL CALCULATED.3IONS-SCNC: 14 MEQ/L (ref 8–16)
BASOPHILS # BLD: 0.6 %
BASOPHILS ABSOLUTE: 0 THOU/MM3 (ref 0–0.1)
BUN BLDV-MCNC: 29 MG/DL (ref 7–22)
BUN BLDV-MCNC: 29 MG/DL (ref 7–22)
CALCIUM SERPL-MCNC: 9 MG/DL (ref 8.5–10.5)
CALCIUM SERPL-MCNC: 9.1 MG/DL (ref 8.5–10.5)
CHLORIDE BLD-SCNC: 105 MEQ/L (ref 98–111)
CHLORIDE BLD-SCNC: 106 MEQ/L (ref 98–111)
CO2: 23 MEQ/L (ref 23–33)
CO2: 25 MEQ/L (ref 23–33)
CREAT SERPL-MCNC: 1.8 MG/DL (ref 0.4–1.2)
CREAT SERPL-MCNC: 1.9 MG/DL (ref 0.4–1.2)
CREATININE, URINE: 107.4 MG/DL
EKG ATRIAL RATE: 56 BPM
EKG P AXIS: 38 DEGREES
EKG P-R INTERVAL: 208 MS
EKG Q-T INTERVAL: 490 MS
EKG QRS DURATION: 120 MS
EKG QTC CALCULATION (BAZETT): 472 MS
EKG R AXIS: 22 DEGREES
EKG T AXIS: 16 DEGREES
EKG VENTRICULAR RATE: 56 BPM
EOSINOPHIL # BLD: 3.9 %
EOSINOPHILS ABSOLUTE: 0.2 THOU/MM3 (ref 0–0.4)
ERYTHROCYTE [DISTWIDTH] IN BLOOD BY AUTOMATED COUNT: 15.7 % (ref 11.5–14.5)
ERYTHROCYTE [DISTWIDTH] IN BLOOD BY AUTOMATED COUNT: 57.3 FL (ref 35–45)
ETHYL ALCOHOL, SERUM: < 0.01 %
GFR SERPL CREATININE-BSD FRML MDRD: 27 ML/MIN/1.73M2
GFR SERPL CREATININE-BSD FRML MDRD: 28 ML/MIN/1.73M2
GLUCOSE BLD-MCNC: 114 MG/DL (ref 70–108)
GLUCOSE BLD-MCNC: 178 MG/DL (ref 70–108)
HCT VFR BLD CALC: 28.8 % (ref 37–47)
HEMOGLOBIN: 9 GM/DL (ref 12–16)
IMMATURE GRANS (ABS): 0.01 THOU/MM3 (ref 0–0.07)
IMMATURE GRANULOCYTES: 0.2 %
LYMPHOCYTES # BLD: 26.8 %
LYMPHOCYTES ABSOLUTE: 1.4 THOU/MM3 (ref 1–4.8)
MAGNESIUM: 1.9 MG/DL (ref 1.6–2.4)
MCH RBC QN AUTO: 31.4 PG (ref 26–33)
MCHC RBC AUTO-ENTMCNC: 31.3 GM/DL (ref 32.2–35.5)
MCV RBC AUTO: 100.3 FL (ref 81–99)
MICROALBUMIN UR-MCNC: 115.13 MG/DL
MICROALBUMIN/CREAT UR-RTO: 1072 MG/G (ref 0–30)
MONOCYTES # BLD: 11.5 %
MONOCYTES ABSOLUTE: 0.6 THOU/MM3 (ref 0.4–1.3)
NUCLEATED RED BLOOD CELLS: 0 /100 WBC
OSMOLALITY CALCULATION: 288 MOSMOL/KG (ref 275–300)
PHOSPHORUS: 3.6 MG/DL (ref 2.4–4.7)
PLATELET # BLD: 156 THOU/MM3 (ref 130–400)
PMV BLD AUTO: 11.3 FL (ref 9.4–12.4)
POTASSIUM SERPL-SCNC: 5 MEQ/L (ref 3.5–5.2)
POTASSIUM SERPL-SCNC: 5.1 MEQ/L (ref 3.5–5.2)
RBC # BLD: 2.87 MILL/MM3 (ref 4.2–5.4)
SEG NEUTROPHILS: 57 %
SEGMENTED NEUTROPHILS ABSOLUTE COUNT: 2.9 THOU/MM3 (ref 1.8–7.7)
SODIUM BLD-SCNC: 141 MEQ/L (ref 135–145)
SODIUM BLD-SCNC: 142 MEQ/L (ref 135–145)
T4 FREE: 1.02 NG/DL (ref 0.93–1.76)
TROPONIN T: < 0.01 NG/ML
TSH SERPL DL<=0.05 MIU/L-ACNC: 0.83 UIU/ML (ref 0.4–4.2)
TSH SERPL DL<=0.05 MIU/L-ACNC: 0.91 UIU/ML (ref 0.4–4.2)
WBC # BLD: 5.1 THOU/MM3 (ref 4.8–10.8)

## 2019-04-12 PROCEDURE — 80048 BASIC METABOLIC PNL TOTAL CA: CPT

## 2019-04-12 PROCEDURE — 86800 THYROGLOBULIN ANTIBODY: CPT

## 2019-04-12 PROCEDURE — 80069 RENAL FUNCTION PANEL: CPT

## 2019-04-12 PROCEDURE — 84443 ASSAY THYROID STIM HORMONE: CPT

## 2019-04-12 PROCEDURE — 84484 ASSAY OF TROPONIN QUANT: CPT

## 2019-04-12 PROCEDURE — 84432 ASSAY OF THYROGLOBULIN: CPT

## 2019-04-12 PROCEDURE — 36415 COLL VENOUS BLD VENIPUNCTURE: CPT

## 2019-04-12 PROCEDURE — 82043 UR ALBUMIN QUANTITATIVE: CPT

## 2019-04-12 PROCEDURE — 70450 CT HEAD/BRAIN W/O DYE: CPT

## 2019-04-12 PROCEDURE — 93010 ELECTROCARDIOGRAM REPORT: CPT | Performed by: INTERNAL MEDICINE

## 2019-04-12 PROCEDURE — 93005 ELECTROCARDIOGRAM TRACING: CPT | Performed by: NURSE PRACTITIONER

## 2019-04-12 PROCEDURE — 83735 ASSAY OF MAGNESIUM: CPT

## 2019-04-12 PROCEDURE — 84439 ASSAY OF FREE THYROXINE: CPT

## 2019-04-12 PROCEDURE — 99284 EMERGENCY DEPT VISIT MOD MDM: CPT

## 2019-04-12 PROCEDURE — 85025 COMPLETE CBC W/AUTO DIFF WBC: CPT

## 2019-04-12 PROCEDURE — G0480 DRUG TEST DEF 1-7 CLASSES: HCPCS

## 2019-04-12 PROCEDURE — 76536 US EXAM OF HEAD AND NECK: CPT

## 2019-04-12 ASSESSMENT — ENCOUNTER SYMPTOMS
BACK PAIN: 0
SORE THROAT: 0
PHOTOPHOBIA: 0
WHEEZING: 0
COLOR CHANGE: 0
CONSTIPATION: 0
ABDOMINAL PAIN: 0
SHORTNESS OF BREATH: 0
SINUS PRESSURE: 0
RHINORRHEA: 0
ABDOMINAL DISTENTION: 0
DIARRHEA: 0
NAUSEA: 1
VOMITING: 0
BLOOD IN STOOL: 0
COUGH: 0
EYE REDNESS: 0
VOICE CHANGE: 0
CHEST TIGHTNESS: 0

## 2019-04-12 ASSESSMENT — PAIN DESCRIPTION - LOCATION: LOCATION: LEG

## 2019-04-12 ASSESSMENT — PAIN DESCRIPTION - PAIN TYPE: TYPE: CHRONIC PAIN

## 2019-04-12 NOTE — ED NOTES
Pt asking to eat something because she hasn't eaten since noon and that may be why she is dizzy. Orthostatics complete- pt tolerated well. States she isn't able to stand for long periods normally so she wasn't able to complete full orthostatics. Trini euceda.       Sharad Jarquin RN  04/12/19 [id number]

## 2019-04-12 NOTE — ED PROVIDER NOTES
[facility] Emergency Department    CHIEF COMPLAINT       Chief Complaint   Patient presents with    Dizziness       Nurses Notes reviewed and I agree except as noted in the HPI. HISTORY OF PRESENT ILLNESS    Berenice Maldonado cathy 58 y.o. female who presents to the ED for evaluation of dizziness onset today. Patient states she was getting an ultrasound of her thyroid today and as she was getting up her dizziness started. She states they took her BP and blood sugar which was both normal. She states she feel she is going to pass out. She reports nausea. She denies chest pain, shortness of breath, vomiting or diarrhea. Patient states she has had thyroid issues for the past year and has had one other ultrasound and has had a biopsy which was inconclusive. The patient has a past medical history of DM, HTN, A-fib and HLD. No further complaints at the time of the initial encounter. Experienced previously: No    HPI was provided by the patient. REVIEW OF SYSTEMS     Review of Systems   Constitutional: Negative for appetite change, chills, diaphoresis, fatigue, fever and unexpected weight change. HENT: Negative for congestion, hearing loss, postnasal drip, rhinorrhea, sinus pressure, sore throat and voice change. Eyes: Negative for photophobia, redness and visual disturbance. Respiratory: Negative for cough, chest tightness, shortness of breath and wheezing. Cardiovascular: Negative for chest pain and palpitations. Gastrointestinal: Positive for nausea. Negative for abdominal distention, abdominal pain, blood in stool, constipation, diarrhea and vomiting. Endocrine: Negative for cold intolerance, heat intolerance, polydipsia, polyphagia and polyuria. Genitourinary: Negative for difficulty urinating, dysuria, flank pain, frequency and vaginal pain. Musculoskeletal: Negative for arthralgias, back pain, gait problem, joint swelling, neck pain and neck stiffness.    Skin: Negative for color change and rash. Allergic/Immunologic: Negative for immunocompromised state. Neurological: Positive for dizziness. Negative for tremors, weakness, light-headedness, numbness and headaches. Hematological: Does not bruise/bleed easily. Psychiatric/Behavioral: Negative for behavioral problems, confusion, decreased concentration, hallucinations, self-injury and suicidal ideas. The patient is not nervous/anxious. PAST MEDICAL HISTORY     Past Medical History:   Diagnosis Date    CRI (chronic renal insufficiency)     Difficult intubation     excess skin in back of throat     DM (diabetes mellitus) (HCC)     GERD (gastroesophageal reflux disease)     Hyperlipidemia     Hypertension     Hypothyroidism     Neuropathy     Obesity     Osteoarthritis     Prolonged emergence from general anesthesia     Sleep apnea     uses cpap    Visit for screening mammogram        SURGICALHISTORY      has a past surgical history that includes back surgery; Foot surgery; Colonoscopy; Upper gastrointestinal endoscopy; Mouth Biopsy (9-28-12); Skin tag removal (9/25/12); Tonsillectomy; Patella surgery (7/11/13); Cataract removal (Right, 7/24/14); toenail excision; other surgical history (11/8/2014); other surgical history (4/23/2015); Sleeve Gastrectomy (09/15/2015); EKG 12 Lead (9/18/2015); Hysterectomy, total abdominal; and pr colon ca scrn not hi rsk ind (Left, 1/24/2018).     CURRENT MEDICATIONS       Discharge Medication List as of 4/12/2019  7:16 PM      CONTINUE these medications which have NOT CHANGED    Details   ciprofloxacin (CIPRO) 500 MG tablet Take 1 tablet by mouth 2 times daily for 10 days, Disp-20 tablet, R-0Normal      CARTIA  MG extended release capsule take 1 capsule by mouth once daily, Disp-90 capsule, R-3Normal      pantoprazole (PROTONIX) 40 MG tablet take 1 tablet (40 mg) by oral route once daily, Disp-90 tablet, R-1Normal      hydrALAZINE (APRESOLINE) 25 MG tablet take 1 tablet by mouth three times a day, Disp-90 tablet, R-3Normal      metoprolol tartrate (LOPRESSOR) 25 MG tablet take 1/2 tablet by mouth twice a day, Disp-90 tablet, R-3Normal      RA ASPIRIN EC 81 MG EC tablet take 1 tablet by mouth once daily, Disp-30 tablet, R-10Normal      atorvastatin (LIPITOR) 40 MG tablet take 1 tablet by mouth at bedtime, Disp-90 tablet, R-1Normal      insulin lispro protamine & lispro (HUMALOG MIX 75/25 KWIKPEN) (75-25) 100 UNIT per ML SUPN injection pen inject 30 units every morning and 50 units at bedtime, Disp-24 mL, R-1Normal      isosorbide mononitrate (IMDUR) 30 MG extended release tablet take 1 tablet by mouth once daily, Disp-30 tablet, R-9Normal      DULoxetine (CYMBALTA) 60 MG extended release capsule take 1 capsule by mouth once daily, Disp-90 capsule, R-3Normal      amitriptyline (ELAVIL) 10 MG tablet take 1 tablet by mouth once daily, Disp-90 tablet, R-3Normal      fenofibrate (TRICOR) 145 MG tablet take 1 tablet by mouth once daily, Disp-90 tablet, R-3Normal      CPAP Machine MISC Starting 11/10/2016, Until Discontinued, Disp-1 each, R-0, PrintPlease change CPAP to auto pressure to 7-15 cm H20. Insulin Pen Needle (PEN NEEDLES) 31G X 5 MM MISC 1 each by Does not apply route 3 times daily, Disp-100 each, R-11Ultra fine pen needlesNormal      Calcium Carbonate-Vitamin D (CALCIUM-VITAMIN D) 500-200 MG-UNIT per tablet Take 1 tablet by mouth 2 times daily (with meals) Historical Med             ALLERGIES     has No Known Allergies. FAMILY HISTORY     indicated that her mother is . She indicated that her father is . She indicated that her sister is alive. She indicated that her brother is alive. She indicated that the status of her maternal grandmother is unknown. She indicated that the status of her maternal grandfather is unknown. She indicated that the status of her maternal uncle is unknown. She indicated that the status of her other is unknown.  She indicated that the (36.6 °C). Her blood pressure is 144/69 (abnormal) and her pulse is 61. Her respiration is 16 and oxygen saturation is 96%. Physical Exam   Constitutional: She is oriented to person, place, and time. She appears well-developed and well-nourished. No distress. HENT:   Head: Normocephalic. Mouth/Throat: Uvula is midline. Eyes: Pupils are equal, round, and reactive to light. Conjunctivae and EOM are normal.   Neck: Normal range of motion. Neck supple. Cardiovascular: Normal rate, regular rhythm, S1 normal, S2 normal, normal heart sounds and intact distal pulses. Pulmonary/Chest: Effort normal and breath sounds normal. No stridor. No respiratory distress. She has no wheezes. She has no rales. She exhibits no tenderness. Abdominal: Soft. Normal appearance and bowel sounds are normal. She exhibits no distension. There is no tenderness. Musculoskeletal: Normal range of motion. Lymphadenopathy:     She has no cervical adenopathy. Neurological: She is alert and oriented to person, place, and time. No cranial nerve deficit or sensory deficit. Coordination normal.   Diminished strength in lower extremities due to chronic pain. Skin: Skin is warm, dry and intact. She is not diaphoretic. Psychiatric: She has a normal mood and affect. Her speech is normal and behavior is normal. Thought content normal.   Nursing note and vitals reviewed. DIFFERENTIAL DIAGNOSIS:   ACS, orthostatic hypotension, vertigo, electrolyte abnormality, disarrhythmia     DIAGNOSTIC RESULTS     EKG: All EKG's are interpreted by the Emergency Department Physician who eithersigns or Co-signs this chart in the absence of a cardiologist.    EKG read and interpreted by myself with comparison to 06-April-2019 gives impression of sinus bradycardia with sinus arrhthymia, low voltage QRS, septal infarct, age undermined with heart rate of 56; interval 208; ;QTc 472; axis 38, 22, 16. No STEMI.       RADIOLOGY: non-plainfilm images(s) such as CT, Ultrasound and MRI are read by the radiologist.  Plain radiographic images are visualized and preliminarily interpreted by the emergency physician unless otherwise stated below. CT HEAD WO CONTRAST   Final Result      No acute ischemic infarct, hemorrhage, or mass effect. Old left and probably right lentiform nuclei lacunar infarcts. Mild periventricular deep white matter small vessel chronic ischemic changes. **This report has been created using voice recognition software. It may contain minor errors which are inherent in voice recognition technology. **      Final report electronically signed by Dr. Salima Brown on 4/12/2019 6:18 PM            LABS:   Labs Reviewed   CBC WITH AUTO DIFFERENTIAL - Abnormal; Notable for the following components:       Result Value    RBC 2.87 (*)     Hemoglobin 9.0 (*)     Hematocrit 28.8 (*)     .3 (*)     MCHC 31.3 (*)     RDW-CV 15.7 (*)     RDW-SD 57.3 (*)     All other components within normal limits   BASIC METABOLIC PANEL - Abnormal; Notable for the following components:    Glucose 114 (*)     BUN 29 (*)     CREATININE 1.8 (*)     All other components within normal limits   GLOMERULAR FILTRATION RATE, ESTIMATED - Abnormal; Notable for the following components:    Est, Glom Filt Rate 28 (*)     All other components within normal limits   TROPONIN   MAGNESIUM   TSH WITHOUT REFLEX   ETHANOL   ANION GAP   OSMOLALITY   URINALYSIS WITH MICROSCOPIC       EMERGENCY DEPARTMENT COURSE:   Vitals:    Vitals:    04/12/19 1641 04/12/19 1846   BP: (!) 144/69    Pulse: 68 61   Resp: 16 16   Temp: 97.8 °F (36.6 °C)    TempSrc: Oral    SpO2: 96%    Weight: (!) 356 lb (161.5 kg)    Height: 5' 9\" (1.753 m)        MDM    Patient was seen and evaluated in the emergency department, patient appeared to be in no acute distress. Vital signs were reviewed, no significant findings were noted.   Orthostatic vital signs completed, no significant runny noted, patient was unable to stand although this is chronic for this patient. Physical exam was completed, she is unable to raise her legs due to chronic pain in her legs. Labs and imaging were obtained, labs show continued anemia, patient has a history of bariatric surgery, she has a macrocytic anemia possible she has a vitamin deficiency causing her anemia. Chronic renal insufficiency is also noted, no significant change and this noted. CT scan obtained, old probable right lentiform nuclei lacunar infarcts noted possible that these are from her previous history of A. fib, she is currently not a A. Fib and has regular heart rate, and as no anticoagulation because of her normal heart rate currently. She was monitored throughout her stay, she had no dysrhythmia, her symptoms appeared to improve while here in the emergency department. Discussed my findings my plan of care. She is amenable with discharge. Is advised follow-up with her primary care provider on Monday for repeat evaluation. Advised to return to the emergency room with new or worsening signs and symptoms. She verbalized understanding of plan of care. All here in the emergency department she maintained stable course is appropriate for discharge. Medications - No data to display    Patient was seenindependently by myself. The patient's final impression and disposition and plan was determined by myself. CRITICAL CARE:   None    CONSULTS:  None    PROCEDURES:  None    FINAL IMPRESSION     1. Near syncope    2.  Anemia, unspecified type          DISPOSITION/PLAN   Patient discharged in stable condition    PATIENT REFERREDTO:  ARNOL Gonzales - CNP  [place] [address]  [phone]    Call in 3 days  for follow up and evaluation      DISCHARGE MEDICATIONS:  Discharge Medication List as of 4/12/2019  7:16 PM          (Please note that portions of this note were completed with a voice recognition program.  Efforts were made to edit the dictations but occasionally words are mis-transcribed.)    Scribe:  Milton Shaikh 4/12/19 5:15 PM Scribing for and in the presence of Adrián Pinzon CNP. Signed by: Tristian Jerry, 04/12/19 10:01 PM    Provider:  I personally performed the services described in the documentation,reviewed and edited the documentation which was dictated to the scribe in my presence, and it accurately records my words and actions.     Adrián Pinzon CNP 04/12/19 10:01 PM    Neeraj Pinzon, ARNOL - MINDY         [employer], ARNOL - CNP  04/12/19 3506

## 2019-04-12 NOTE — ED TRIAGE NOTES
Pt to ED, from outpt radiology for a thyroid ultrasound, following an episode of \"Lightheadedness. \"  Staff reported that the pt was lying on the table for the ultrasound, upon rising, pt felt dizzy and lightheaded. Pt did not lose consciousness or hit head or fall. A blood sugar was completed in outpt radiology found to be 142, last ate anything at 8961-2465, pulse of 64, /68, pulse ox of 96%. Upon arrival to room 4, pt reporting that she is \"unable to move to the cot. \" Staff assisted pt to our cot, from radiology. Pt reporting \"I tried to move, but couldn't. \"  Monitor applied and vitals taken.

## 2019-04-12 NOTE — ED NOTES
Pt restful on cart, laughing and converstating with there friend at bedside about needing to eat dinner. Pt appears in no acute distress at this time. Will monitor.       Taras Chapin RN  04/12/19 [id number]

## 2019-04-16 ENCOUNTER — CARE COORDINATION (OUTPATIENT)
Dept: CASE MANAGEMENT | Age: 64
End: 2019-04-16

## 2019-04-16 LAB — THYROGLOBULIN: NORMAL

## 2019-04-16 NOTE — CARE COORDINATION
Care Transition  ED Follow up Call    Reason for ED visit:  Dizziness   How are you feeling? \"back to normal\"  Status:     significantly improved      Do you have any questions related to your discharge instructions? no    Review of Instructions:    Discharged with new prescription? no    Review Medications:  Yes   Do you have any questions related to your medications? no    Understands what to report/when to return?:  Yes   Do you have a follow up appointment scheduled? Yes  Specific review if indicated (symptom, services, etc): Spoke with Cyndie Jiménez, introduced self/role. Reports feeling \"back to normal\", denies further episodes/concerns to report at this time. No medication changes. Has PCP appointment on 4/18/19. Understands s/s to return or report to PCP. Denies further needs/assistance/concerns/questions at this time.        Do you have any needs or concerns I can assist you with? no     FU appts/Provider:    Future Appointments   Date Time Provider Adonis Dolan   4/18/2019 10:20 AM Corona Barrett, APRN - [address]   4/26/2019  1:00 PM Lashay Cruz, [address]  MED None   5/7/2019  1:00 PM Corona Barrett, APRN - [id number] [address] - [place]   5/9/2019  1:20 PM Kristian Padgett MD [facility] - [place]   10/30/2019 12:15 PM Danya Saucedo MD [address] [facility] - [place]   3/12/2020  1:30 [facility] [place] [place]  [phone]  [phone]

## 2019-04-16 NOTE — CARE COORDINATION
Care Transition  ED Follow up Call    Attempted to reach patient for Care Transitions ED follow up. Left message to return call. Contact information provided.      FU appts/Provider:    Future Appointments   Date Time Provider Adonis Boggsi   4/18/2019 10:20 AM ARNOL Dick - [id number] [facility] [address] [facility] - [place]   4/26/2019  1:00 PM [address] MED None   5/7/2019  1:00 PM ARNOL Dick - CNP [facility] - [place]   5/9/2019  1:20 PM Cyndie Stubbs MD [facility] [place]   10/30/2019 12:15 PM Ac Gibbons MD [address] [facility] - [place]   3/12/2020  1:30 PM Breana Ewing [facility] [place], [place]  [phone]  [phone]

## 2019-04-18 ENCOUNTER — TELEPHONE (OUTPATIENT)
Dept: FAMILY MEDICINE CLINIC | Age: 64
End: 2019-04-18

## 2019-04-18 ENCOUNTER — NURSE ONLY (OUTPATIENT)
Dept: LAB | Age: 64
End: 2019-04-18

## 2019-04-18 ENCOUNTER — OFFICE VISIT (OUTPATIENT)
Dept: FAMILY MEDICINE CLINIC | Age: 64
End: 2019-04-18
Payer: MEDICARE

## 2019-04-18 VITALS
BODY MASS INDEX: 43.4 KG/M2 | SYSTOLIC BLOOD PRESSURE: 150 MMHG | HEIGHT: 69 IN | WEIGHT: 293 LBS | HEART RATE: 79 BPM | TEMPERATURE: 98.9 F | RESPIRATION RATE: 18 BRPM | DIASTOLIC BLOOD PRESSURE: 84 MMHG

## 2019-04-18 DIAGNOSIS — D53.9 MACROCYTIC ANEMIA: ICD-10-CM

## 2019-04-18 DIAGNOSIS — D53.9 MACROCYTIC ANEMIA: Primary | ICD-10-CM

## 2019-04-18 DIAGNOSIS — Z79.4 TYPE 2 DIABETES MELLITUS WITH OTHER CIRCULATORY COMPLICATION, WITH LONG-TERM CURRENT USE OF INSULIN (HCC): ICD-10-CM

## 2019-04-18 DIAGNOSIS — R42 DIZZINESS: ICD-10-CM

## 2019-04-18 DIAGNOSIS — E11.59 TYPE 2 DIABETES MELLITUS WITH OTHER CIRCULATORY COMPLICATION, WITH LONG-TERM CURRENT USE OF INSULIN (HCC): ICD-10-CM

## 2019-04-18 LAB
FERRITIN: 172 NG/ML (ref 10–291)
FOLATE: > 20 NG/ML (ref 4.8–24.2)
IRON SATURATION: 25 % (ref 20–50)
IRON: 78 UG/DL (ref 50–170)
TOTAL IRON BINDING CAPACITY: 309 UG/DL (ref 171–450)
VITAMIN B-12: 1419 PG/ML (ref 211–911)

## 2019-04-18 PROCEDURE — 2022F DILAT RTA XM EVC RTNOPTHY: CPT | Performed by: NURSE PRACTITIONER

## 2019-04-18 PROCEDURE — G8427 DOCREV CUR MEDS BY ELIG CLIN: HCPCS | Performed by: NURSE PRACTITIONER

## 2019-04-18 PROCEDURE — G8417 CALC BMI ABV UP PARAM F/U: HCPCS | Performed by: NURSE PRACTITIONER

## 2019-04-18 PROCEDURE — 3017F COLORECTAL CA SCREEN DOC REV: CPT | Performed by: NURSE PRACTITIONER

## 2019-04-18 PROCEDURE — 1036F TOBACCO NON-USER: CPT | Performed by: NURSE PRACTITIONER

## 2019-04-18 PROCEDURE — 99214 OFFICE O/P EST MOD 30 MIN: CPT | Performed by: NURSE PRACTITIONER

## 2019-04-18 PROCEDURE — 3044F HG A1C LEVEL LT 7.0%: CPT | Performed by: NURSE PRACTITIONER

## 2019-04-18 PROCEDURE — G8598 ASA/ANTIPLAT THER USED: HCPCS | Performed by: NURSE PRACTITIONER

## 2019-04-18 RX ORDER — LANOLIN ALCOHOL/MO/W.PET/CERES
325 CREAM (GRAM) TOPICAL
Qty: 90 TABLET | Refills: 3 | Status: SHIPPED | OUTPATIENT
Start: 2019-04-18 | End: 2020-01-07

## 2019-04-18 RX ORDER — UBIDECARENONE 75 MG
100 CAPSULE ORAL DAILY
Qty: 30 TABLET | Refills: 6 | Status: ON HOLD | OUTPATIENT
Start: 2019-04-18 | End: 2019-09-18

## 2019-04-18 RX ORDER — FOLIC ACID 1 MG/1
1 TABLET ORAL DAILY
Qty: 90 TABLET | Refills: 1 | Status: SHIPPED | OUTPATIENT
Start: 2019-04-18 | End: 2019-08-26

## 2019-04-18 ASSESSMENT — ENCOUNTER SYMPTOMS: RESPIRATORY NEGATIVE: 1

## 2019-04-18 NOTE — PROGRESS NOTES
provide necessary assistance. The need for this equipment was discussed with the patient and she understands, is in agreement, and has not expressed an unwillingness to use the wheelchair. Pt also requires elevating leg rests for leg swelling. The patient will need an extra heavy duty wheelchair as she weight over 300 pounds. . Pt will also need a seat cushion width less than 22 inches. Current Outpatient Medications   Medication Sig Dispense Refill    ferrous sulfate 325 (65 Fe) MG EC tablet Take 1 tablet by mouth 3 times daily (with meals) 90 tablet 3    vitamin B-12 (CYANOCOBALAMIN) 100 MCG tablet Take 1 tablet by mouth daily 30 tablet 6    folic acid (FOLVITE) 1 MG tablet Take 1 tablet by mouth daily 90 tablet 1    ciprofloxacin (CIPRO) 500 MG tablet Take 1 tablet by mouth 2 times daily for 10 days 20 tablet 0    CARTIA  MG extended release capsule take 1 capsule by mouth once daily 90 capsule 3    pantoprazole (PROTONIX) 40 MG tablet take 1 tablet (40 mg) by oral route once daily 90 tablet 1    hydrALAZINE (APRESOLINE) 25 MG tablet take 1 tablet by mouth three times a day 90 tablet 3    metoprolol tartrate (LOPRESSOR) 25 MG tablet take 1/2 tablet by mouth twice a day 90 tablet 3    RA ASPIRIN EC 81 MG EC tablet take 1 tablet by mouth once daily 30 tablet 10    atorvastatin (LIPITOR) 40 MG tablet take 1 tablet by mouth at bedtime 90 tablet 1    insulin lispro protamine & lispro (HUMALOG MIX 75/25 KWIKPEN) (75-25) 100 UNIT per ML SUPN injection pen inject 30 units every morning and 50 units at bedtime (Patient taking differently: inject 30 units every morning and 50 units at bedtime.  Determines due to Blood Sugar Readings) 24 mL 1    isosorbide mononitrate (IMDUR) 30 MG extended release tablet take 1 tablet by mouth once daily 30 tablet 9    DULoxetine (CYMBALTA) 60 MG extended release capsule take 1 capsule by mouth once daily 90 capsule 3    amitriptyline (ELAVIL) 10 MG tablet take 1 tablet by mouth once daily 90 tablet 3    fenofibrate (TRICOR) 145 MG tablet take 1 tablet by mouth once daily 90 tablet 3    CPAP Machine MISC by Does not apply route Please change CPAP to auto pressure to 7-15 cm H20. 1 each 0    Insulin Pen Needle (PEN NEEDLES) 31G X 5 MM MISC 1 each by Does not apply route 3 times daily 100 each 11    Calcium Carbonate-Vitamin D (CALCIUM-VITAMIN D) 500-200 MG-UNIT per tablet Take 1 tablet by mouth 2 times daily (with meals)        No current facility-administered medications for this visit.            Past Medical History:   Diagnosis Date    CRI (chronic renal insufficiency)     Difficult intubation     excess skin in back of throat     DM (diabetes mellitus) (HCC)     GERD (gastroesophageal reflux disease)     Hyperlipidemia     Hypertension     Hypothyroidism     Neuropathy     Obesity     Osteoarthritis     Prolonged emergence from general anesthesia     Sleep apnea     uses cpap    Visit for screening mammogram       Past Surgical History:   Procedure Laterality Date    BACK SURGERY      xs 2    CATARACT REMOVAL Right 7/24/14    Dr. Clarissa Lopez EKG 12-LEAD  9/18/2015         FOOT SURGERY      left foot    HYSTERECTOMY, TOTAL ABDOMINAL      MOUTH BIOPSY  9-28-12    left tongue and lingual tonsil    OTHER SURGICAL HISTORY  11/8/2014    LEFT FEMUR RETROGRADE NAILING    OTHER SURGICAL HISTORY  4/23/2015    HARDWARE REMOVAL LEFT FEMUR, INSERTION OF ANTIBIOTIC SPACER    PATELLA SURGERY  7/11/13    fractues rt patella     [place] COLON CA SCRN NOT  [address] [facility] Left 1/24/2018    COLONOSCOPY performed by Shantell Cleveland MD at [id number] [facility] TAG REMOVAL  9/25/12    mole removal    SLEEVE GASTRECTOMY  09/15/2015    Robotic    TOENAIL EXCISION      removal of great toe nails bilateral-still has toenails-had ingrown toenails and had trimmed out     TONSILLECTOMY      UPPER GASTROINTESTINAL ENDOSCOPY       Family History Problem Relation Age of Onset    Asthma Sister     Asthma Brother     Heart Disease Father     High Blood Pressure Other     Cancer Maternal Uncle         stomach    Diabetes Maternal Grandmother     High Blood Pressure Maternal Grandmother     Diabetes Maternal Grandfather     Stroke Neg Hx      Social History     Tobacco Use    Smoking status: Former Smoker     Packs/day: 1.50     Years: 20.00     Pack years: 30.00     Last attempt to quit: 2007     Years since quittin.2    Smokeless tobacco: Never Used   Substance Use Topics    Alcohol use: No     Alcohol/week: 0.0 oz        No Known Allergies    Health Maintenance   Topic Date Due    Hepatitis C screen  1955    HIV screen  10/21/1969    Hepatitis B Vaccine (1 of 3 - Risk 3-dose series) 10/21/1973    Shingles Vaccine (1 of 2) 10/21/2004    Low dose CT lung screening  2016    Lipid screen  10/26/2018    Diabetic retinal exam  2019    Flu vaccine (Season Ended) 2019    Diabetic foot exam  2019    A1C test (Diabetic or Prediabetic)  04/10/2020    Breast cancer screen  2020    Pneumococcal 0-64 years Vaccine (3 of 3 - PPSV23) 2020    DTaP/Tdap/Td vaccine (2 - Td) 2027    Colon cancer screen colonoscopy  2028    HPV vaccine  Aged Out       Subjective:      Review of Systems   Constitutional: Positive for fatigue. Looks pale   Respiratory: Negative. Cardiovascular: Negative. Genitourinary: Negative for difficulty urinating. Neurological: Positive for dizziness. Negative for facial asymmetry and headaches. Objective:      BP (!) 150/84 (Site: Right Upper Arm, Position: Sitting, Cuff Size: Large Adult)   Pulse 79   Temp 98.9 °F (37.2 °C) (Oral)   Resp 18   Ht 5' 9\" (1.753 m)   Wt (!) 351 lb 6.4 oz (159.4 kg)   LMP 2001   BMI 51.89 kg/m²      Physical Exam   Constitutional: She appears well-developed and well-nourished. No distress.    overweight Cardiovascular: Normal rate, regular rhythm, normal heart sounds and intact distal pulses. No murmur heard. Pulmonary/Chest: Effort normal and breath sounds normal. No respiratory distress. Skin: Capillary refill takes less than 2 seconds. Psychiatric: She has a normal mood and affect. Her behavior is normal. Judgment and thought content normal.   Nursing note and vitals reviewed. Assessment/Plan:           1. Macrocytic anemia  Eat a high fiber diet   - Iron Binding Capacity; Future  - Ferritin; Future  - Vitamin B12 & Folate; Future  - Occult blood x 1, stool; Future  - vitamin B-12 (CYANOCOBALAMIN) 100 MCG tablet; Take 1 tablet by mouth daily  Dispense: 30 tablet; Refill: 6  - IRON SATURATION; Future  - folic acid (FOLVITE) 1 MG tablet; Take 1 tablet by mouth daily  Dispense: 90 tablet; Refill: 1    2. Dizziness    - VASC CAROTID DUPLEX BILATERAL; Future    3. Type 2 diabetes mellitus with other circulatory complication, with long-term current use of insulin (HCC)  Increase am insulin to 30 units and decrease night time to 45 units  monitor      No follow-ups on file. Reccommended tobaccocessation options including pharmacologic methods, counseled great than 3 minutesduring this visit:  Yes[]  No  []       Patient given educational materials -see patient instructions. Discussed use, benefit, and side effects of prescribedmedications. All patient questions answered. Pt voiced understanding. Reviewedhealth maintenance. Instructed to continue current medications, diet and exercise. Patient agreed with treatment plan. Follow up as directed.        Electronicallysigned by Verner Few, APRN - CNP on 4/18/2019 at 11:41 AM

## 2019-04-18 NOTE — TELEPHONE ENCOUNTER
Per HIPAA, left detailed message with appointment day and time. Phone number to central scheduling was also given to pt.

## 2019-04-23 ENCOUNTER — HOSPITAL ENCOUNTER (OUTPATIENT)
Age: 64
Setting detail: SPECIMEN
Discharge: HOME | End: 2019-04-23
Payer: MEDICARE

## 2019-04-24 ENCOUNTER — TELEPHONE (OUTPATIENT)
Dept: FAMILY MEDICINE CLINIC | Age: 64
End: 2019-04-24

## 2019-04-24 ENCOUNTER — HOSPITAL ENCOUNTER (OUTPATIENT)
Dept: INTERVENTIONAL RADIOLOGY/VASCULAR | Age: 64
Discharge: HOME | End: 2019-04-24
Payer: MEDICARE

## 2019-04-24 ENCOUNTER — HOSPITAL ENCOUNTER (OUTPATIENT)
Age: 64
Discharge: HOME | End: 2019-04-24
Payer: MEDICARE

## 2019-04-24 DIAGNOSIS — R42 DIZZINESS: ICD-10-CM

## 2019-04-24 DIAGNOSIS — N18.4 STAGE 4 CHRONIC KIDNEY DISEASE (HCC): ICD-10-CM

## 2019-04-24 DIAGNOSIS — D53.9 MACROCYTIC ANEMIA: ICD-10-CM

## 2019-04-24 LAB
ANION GAP SERPL CALCULATED.3IONS-SCNC: 11 MEQ/L (ref 8–16)
BUN BLDV-MCNC: 32 MG/DL (ref 7–22)
CALCIUM SERPL-MCNC: 9.3 MG/DL (ref 8.5–10.5)
CHLORIDE BLD-SCNC: 104 MEQ/L (ref 98–111)
CO2: 26 MEQ/L (ref 23–33)
CREAT SERPL-MCNC: 2 MG/DL (ref 0.4–1.2)
GFR SERPL CREATININE-BSD FRML MDRD: 25 ML/MIN/1.73M2
GLUCOSE BLD-MCNC: 162 MG/DL (ref 70–108)
HEMOCCULT STL QL: NEGATIVE
PHOSPHORUS: 3.5 MG/DL (ref 2.4–4.7)
POTASSIUM SERPL-SCNC: 4.8 MEQ/L (ref 3.5–5.2)
PTH INTACT: 45.3 PG/ML (ref 15–65)
SODIUM BLD-SCNC: 141 MEQ/L (ref 135–145)
VITAMIN D 25-HYDROXY: 32 NG/ML (ref 30–100)

## 2019-04-24 PROCEDURE — 82272 OCCULT BLD FECES 1-3 TESTS: CPT

## 2019-04-24 PROCEDURE — 83970 ASSAY OF PARATHORMONE: CPT

## 2019-04-24 PROCEDURE — 82306 VITAMIN D 25 HYDROXY: CPT

## 2019-04-24 PROCEDURE — 36415 COLL VENOUS BLD VENIPUNCTURE: CPT

## 2019-04-24 PROCEDURE — 80048 BASIC METABOLIC PNL TOTAL CA: CPT

## 2019-04-24 PROCEDURE — 93880 EXTRACRANIAL BILAT STUDY: CPT

## 2019-04-24 PROCEDURE — 84100 ASSAY OF PHOSPHORUS: CPT

## 2019-04-24 NOTE — TELEPHONE ENCOUNTER
----- Message from ARNOL Williamson CNP sent at 4/24/2019  3:49 PM EDT -----  please let pt know her carotid u/s identified some plaque but less than 50% and is not obstructing any blood flow for her neck to her head. This would not be causing any dizziness. Have her continue her current meds.

## 2019-05-04 DIAGNOSIS — Z79.4 TYPE 2 DIABETES MELLITUS WITH DIABETIC POLYNEUROPATHY, WITH LONG-TERM CURRENT USE OF INSULIN (HCC): ICD-10-CM

## 2019-05-04 DIAGNOSIS — E11.42 TYPE 2 DIABETES MELLITUS WITH DIABETIC POLYNEUROPATHY, WITH LONG-TERM CURRENT USE OF INSULIN (HCC): ICD-10-CM

## 2019-05-06 RX ORDER — INSULIN LISPRO 100 [IU]/ML
INJECTION, SUSPENSION SUBCUTANEOUS
Qty: 18 ML | Refills: 6 | Status: ON HOLD | OUTPATIENT
Start: 2019-05-06 | End: 2019-06-03 | Stop reason: HOSPADM

## 2019-05-15 ENCOUNTER — TELEPHONE (OUTPATIENT)
Dept: FAMILY MEDICINE CLINIC | Age: 64
End: 2019-05-15

## 2019-05-15 RX ORDER — WHEELCHAIR
1 EACH MISCELLANEOUS PRN
Qty: 1 EACH | Refills: 0 | Status: SHIPPED | OUTPATIENT
Start: 2019-05-15 | End: 2019-10-30

## 2019-05-15 NOTE — TELEPHONE ENCOUNTER
I already did this and the paperwork is scanned into media on 4/29/19 Does it need done again? Where did the original paper work go to?  Thanks

## 2019-05-17 ENCOUNTER — HOSPITAL ENCOUNTER (EMERGENCY)
Age: 64
Discharge: HOME | End: 2019-05-17
Payer: MEDICARE

## 2019-05-17 ENCOUNTER — APPOINTMENT (OUTPATIENT)
Dept: CT IMAGING | Age: 64
End: 2019-05-17
Payer: MEDICARE

## 2019-05-17 VITALS
SYSTOLIC BLOOD PRESSURE: 170 MMHG | OXYGEN SATURATION: 99 % | DIASTOLIC BLOOD PRESSURE: 63 MMHG | RESPIRATION RATE: 16 BRPM | TEMPERATURE: 99.2 F | HEART RATE: 75 BPM

## 2019-05-17 DIAGNOSIS — M54.50 ACUTE RIGHT-SIDED LOW BACK PAIN WITHOUT SCIATICA: ICD-10-CM

## 2019-05-17 DIAGNOSIS — R10.9 RIGHT FLANK PAIN: Primary | ICD-10-CM

## 2019-05-17 DIAGNOSIS — N18.9 CHRONIC KIDNEY DISEASE, UNSPECIFIED CKD STAGE: ICD-10-CM

## 2019-05-17 LAB
ALBUMIN SERPL-MCNC: 3.3 G/DL (ref 3.5–5.1)
ALP BLD-CCNC: 59 U/L (ref 38–126)
ALT SERPL-CCNC: 17 U/L (ref 11–66)
ANION GAP SERPL CALCULATED.3IONS-SCNC: 12 MEQ/L (ref 8–16)
AST SERPL-CCNC: 33 U/L (ref 5–40)
BACTERIA: ABNORMAL /HPF
BASOPHILS # BLD: 0.2 %
BASOPHILS ABSOLUTE: 0 THOU/MM3 (ref 0–0.1)
BILIRUB SERPL-MCNC: 0.6 MG/DL (ref 0.3–1.2)
BILIRUBIN DIRECT: < 0.2 MG/DL (ref 0–0.3)
BILIRUBIN URINE: NEGATIVE
BLOOD, URINE: NEGATIVE
BUN BLDV-MCNC: 29 MG/DL (ref 7–22)
CALCIUM SERPL-MCNC: 9.6 MG/DL (ref 8.5–10.5)
CASTS 2: ABNORMAL /LPF
CASTS UA: ABNORMAL /LPF
CHARACTER, URINE: CLEAR
CHLORIDE BLD-SCNC: 103 MEQ/L (ref 98–111)
CO2: 21 MEQ/L (ref 23–33)
COLOR: YELLOW
CREAT SERPL-MCNC: 1.6 MG/DL (ref 0.4–1.2)
CRYSTALS, UA: ABNORMAL
EOSINOPHIL # BLD: 3 %
EOSINOPHILS ABSOLUTE: 0.1 THOU/MM3 (ref 0–0.4)
EPITHELIAL CELLS, UA: ABNORMAL /HPF
ERYTHROCYTE [DISTWIDTH] IN BLOOD BY AUTOMATED COUNT: 14.6 % (ref 11.5–14.5)
ERYTHROCYTE [DISTWIDTH] IN BLOOD BY AUTOMATED COUNT: 54.4 FL (ref 35–45)
GFR SERPL CREATININE-BSD FRML MDRD: 32 ML/MIN/1.73M2
GLUCOSE BLD-MCNC: 217 MG/DL (ref 70–108)
GLUCOSE URINE: NEGATIVE MG/DL
HCT VFR BLD CALC: 32.6 % (ref 37–47)
HEMOGLOBIN: 10.2 GM/DL (ref 12–16)
IMMATURE GRANS (ABS): 0.01 THOU/MM3 (ref 0–0.07)
IMMATURE GRANULOCYTES: 0.2 %
KETONES, URINE: NEGATIVE
LEUKOCYTE ESTERASE, URINE: NEGATIVE
LIPASE: 33.6 U/L (ref 5.6–51.3)
LYMPHOCYTES # BLD: 18.5 %
LYMPHOCYTES ABSOLUTE: 0.9 THOU/MM3 (ref 1–4.8)
MCH RBC QN AUTO: 32 PG (ref 26–33)
MCHC RBC AUTO-ENTMCNC: 31.3 GM/DL (ref 32.2–35.5)
MCV RBC AUTO: 102.2 FL (ref 81–99)
MISCELLANEOUS 2: ABNORMAL
MONOCYTES # BLD: 10.5 %
MONOCYTES ABSOLUTE: 0.5 THOU/MM3 (ref 0.4–1.3)
NITRITE, URINE: NEGATIVE
NUCLEATED RED BLOOD CELLS: 0 /100 WBC
OSMOLALITY CALCULATION: 284.4 MOSMOL/KG (ref 275–300)
PH UA: 6.5 (ref 5–9)
PLATELET # BLD: 167 THOU/MM3 (ref 130–400)
PMV BLD AUTO: 11.3 FL (ref 9.4–12.4)
POTASSIUM REFLEX MAGNESIUM: 4.4 MEQ/L (ref 3.5–5.2)
PROTEIN UA: 100
RBC # BLD: 3.19 MILL/MM3 (ref 4.2–5.4)
RBC URINE: ABNORMAL /HPF
RENAL EPITHELIAL, UA: ABNORMAL
SEG NEUTROPHILS: 67.6 %
SEGMENTED NEUTROPHILS ABSOLUTE COUNT: 3.3 THOU/MM3 (ref 1.8–7.7)
SODIUM BLD-SCNC: 136 MEQ/L (ref 135–145)
SPECIFIC GRAVITY, URINE: 1.01 (ref 1–1.03)
TOTAL PROTEIN: 6.2 G/DL (ref 6.1–8)
UROBILINOGEN, URINE: 0.2 EU/DL (ref 0–1)
WBC # BLD: 4.9 THOU/MM3 (ref 4.8–10.8)
WBC UA: ABNORMAL /HPF
YEAST: ABNORMAL

## 2019-05-17 PROCEDURE — 6360000002 HC RX W HCPCS: Performed by: PHYSICIAN ASSISTANT

## 2019-05-17 PROCEDURE — 81001 URINALYSIS AUTO W/SCOPE: CPT

## 2019-05-17 PROCEDURE — 96374 THER/PROPH/DIAG INJ IV PUSH: CPT

## 2019-05-17 PROCEDURE — 80076 HEPATIC FUNCTION PANEL: CPT

## 2019-05-17 PROCEDURE — 36415 COLL VENOUS BLD VENIPUNCTURE: CPT

## 2019-05-17 PROCEDURE — 99284 EMERGENCY DEPT VISIT MOD MDM: CPT

## 2019-05-17 PROCEDURE — 83690 ASSAY OF LIPASE: CPT

## 2019-05-17 PROCEDURE — 85025 COMPLETE CBC W/AUTO DIFF WBC: CPT

## 2019-05-17 PROCEDURE — 80048 BASIC METABOLIC PNL TOTAL CA: CPT

## 2019-05-17 PROCEDURE — 74176 CT ABD & PELVIS W/O CONTRAST: CPT

## 2019-05-17 RX ORDER — KETOROLAC TROMETHAMINE 30 MG/ML
15 INJECTION, SOLUTION INTRAMUSCULAR; INTRAVENOUS ONCE
Status: COMPLETED | OUTPATIENT
Start: 2019-05-17 | End: 2019-05-17

## 2019-05-17 RX ORDER — CYCLOBENZAPRINE HCL 10 MG
10 TABLET ORAL 3 TIMES DAILY PRN
Qty: 12 TABLET | Refills: 0 | Status: SHIPPED | OUTPATIENT
Start: 2019-05-17 | End: 2019-08-26

## 2019-05-17 RX ADMIN — KETOROLAC TROMETHAMINE 15 MG: 30 INJECTION, SOLUTION INTRAMUSCULAR at 13:08

## 2019-05-17 ASSESSMENT — PAIN SCALES - GENERAL
PAINLEVEL_OUTOF10: 10
PAINLEVEL_OUTOF10: 10

## 2019-05-17 ASSESSMENT — ENCOUNTER SYMPTOMS
EYE PAIN: 0
SORE THROAT: 0
EYE DISCHARGE: 0
COUGH: 0
DIARRHEA: 0
RHINORRHEA: 0
BACK PAIN: 0
NAUSEA: 0
ABDOMINAL PAIN: 0
SHORTNESS OF BREATH: 0
WHEEZING: 0
VOMITING: 0

## 2019-05-17 NOTE — ED PROVIDER NOTES
[facility] EMERGENCY DEPT      CHIEF COMPLAINT       Chief Complaint   Patient presents with    Flank Pain       Nurses Notes reviewed and I agreeexcept as noted in the HPI. HISTORY OF PRESENT ILLNESS    Palmira Bravo is a 58 y.o. female who presents right flank pain that radiates to her low back onset 5 days ago. Patient states constant steady pain that is sharp and exacerbated today. Her pain worsens with movement and is better with sitting still. Patient took a pain pill prior to arrival that helped. Patient denies injury but later recalls that symptom onset occurred while trying a new exercise. She was doing twisting and arm bends. She states she is otherwise a very sedentary person. She denies UTI symptoms, abdominal pain, fever, chills, nausea, or emesis. No further complaints at initial evaluation. REVIEW OF SYSTEMS     Review of Systems   Constitutional: Negative for appetite change, chills, fatigue and fever. HENT: Negative for congestion, ear pain, rhinorrhea and sore throat. Eyes: Negative for pain, discharge and visual disturbance. Respiratory: Negative for cough, shortness of breath and wheezing. Cardiovascular: Negative for chest pain, palpitations and leg swelling. Gastrointestinal: Negative for abdominal pain, diarrhea, nausea and vomiting. Genitourinary: Positive for flank pain (right side). Negative for difficulty urinating, dysuria, hematuria and vaginal discharge. Musculoskeletal: Negative for arthralgias, back pain, joint swelling and neck pain. Skin: Negative for pallor and rash. Neurological: Negative for dizziness, syncope, weakness, light-headedness, numbness and headaches. Hematological: Negative for adenopathy. Psychiatric/Behavioral: Negative for confusion and suicidal ideas. The patient is not nervous/anxious.          PAST MEDICAL HISTORY    has a past medical history of CRI (chronic renal insufficiency), Difficult intubation, [facility] GERD (gastroesophageal reflux disease), Hyperlipidemia, Hypertension, Hypothyroidism, Neuropathy, Obesity, Osteoarthritis, Prolonged emergence from general anesthesia, Sleep apnea, and Visit for screening mammogram.    SURGICAL HISTORY      has a past surgical history that includes back surgery; Foot surgery; Colonoscopy; Upper gastrointestinal endoscopy; Mouth Biopsy (9-28-12); Skin tag removal (9/25/12); Tonsillectomy; Patella surgery (7/11/13); Cataract removal (Right, 7/24/14); toenail excision; other surgical history (11/8/2014); other surgical history (4/23/2015); Sleeve Gastrectomy (09/15/2015); EKG 12 Lead (9/18/2015); Hysterectomy, total abdominal; and pr colon ca scrn not hi rsk ind (Left, 1/24/2018). CURRENT MEDICATIONS       Discharge Medication List as of 5/17/2019  1:58 PM      CONTINUE these medications which have NOT CHANGED    Details   Misc.  Devices [facility] Starting Wed 5/15/2019, Disp-1 each, R-0, Print      !! HUMALOG MIX 75/25 KWIKPEN (75-25) 100 UNIT/ML SUPN injection pen inject 30 units every morning and 50 units at bedtime, Disp-18 mL, R-6Normal      ferrous sulfate 325 (65 Fe) MG EC tablet Take 1 tablet by mouth 3 times daily (with meals), Disp-90 tablet, R-3Normal      vitamin B-12 (CYANOCOBALAMIN) 100 MCG tablet Take 1 tablet by mouth daily, Disp-30 tablet, [id number]      folic acid (FOLVITE) 1 MG tablet Take 1 tablet by mouth daily, Disp-90 tablet, R-1Normal      CARTIA  MG extended release capsule take 1 capsule by mouth once daily, Disp-90 capsule, R-3Normal      pantoprazole (PROTONIX) 40 MG tablet take 1 tablet (40 mg) by oral route once daily, Disp-90 tablet, R-1Normal      hydrALAZINE (APRESOLINE) 25 MG tablet take 1 tablet by mouth three times a day, Disp-90 tablet, R-3Normal      metoprolol tartrate (LOPRESSOR) 25 MG tablet take 1/2 tablet by mouth twice a day, Disp-90 tablet, R-3Normal      RA ASPIRIN EC 81 MG EC tablet take 1 tablet by mouth once daily, Disp-30 tablet, R-10Normal      atorvastatin (LIPITOR) 40 MG tablet take 1 tablet by mouth at bedtime, Disp-90 tablet, R-1Normal      !! insulin lispro protamine & lispro (HUMALOG MIX 75/25 KWIKPEN) (75-25) 100 UNIT per ML SUPN injection pen inject 30 units every morning and 50 units at bedtime, Disp-24 mL, R-1Normal      isosorbide mononitrate (IMDUR) 30 MG extended release tablet take 1 tablet by mouth once daily, Disp-30 tablet, R-9Normal      DULoxetine (CYMBALTA) 60 MG extended release capsule take 1 capsule by mouth once daily, Disp-90 capsule, R-3Normal      amitriptyline (ELAVIL) 10 MG tablet take 1 tablet by mouth once daily, Disp-90 tablet, R-3Normal      fenofibrate (TRICOR) 145 MG tablet take 1 tablet by mouth once daily, Disp-90 tablet, R-3Normal      CPAP Machine MISC Starting 11/10/2016, Until Discontinued, Disp-1 each, R-0, PrintPlease change CPAP to auto pressure to 7-15 cm H20. Insulin Pen Needle (PEN NEEDLES) 31G X 5 MM MISC 1 each by Does not apply route 3 times daily, Disp-100 each, R-11Ultra fine pen needlesNormal      Calcium Carbonate-Vitamin D (CALCIUM-VITAMIN D) 500-200 MG-UNIT per tablet Take 1 tablet by mouth 2 times daily (with meals) Historical Med       !! - Potential duplicate medications found. Please discuss with provider. ALLERGIES     has No Known Allergies. FAMILY HISTORY     indicated that her mother is . She indicated that her father is . She indicated that her sister is alive. She indicated that her brother is alive. She indicated that the status of her maternal grandmother is unknown. She indicated that the status of her maternal grandfather is unknown. She indicated that the status of her maternal uncle is unknown. She indicated that the status of her other is unknown.  She indicated that the status of her neg hx is unknown.   family history includes Asthma in her brother and sister; Cancer in her maternal uncle; Diabetes in her maternal grandfather and maternal grandmother; Heart Disease in her father; High Blood Pressure in her maternal grandmother and another family member. SOCIAL HISTORY    reports that she quit smoking about 12 years ago. She has a 30.00 pack-year smoking history. She has never used smokeless tobacco. She reports that she does not drink alcohol or use drugs. PHYSICAL EXAM     INITIAL VITALS:  temperature is 99.2 °F (37.3 °C). Her blood pressure is 170/63 (abnormal) and her pulse is 75. Her respiration is 16 and oxygen saturation is 99%. Physical Exam   Constitutional: She is oriented to person, place, and time. Vital signs are normal. She appears well-developed and well-nourished. Non-toxic appearance. No distress. HENT:   Head: Normocephalic and atraumatic. Right Ear: Hearing normal.   Left Ear: Hearing normal.   Nose: Nose normal. No rhinorrhea. Mouth/Throat: Uvula is midline, oropharynx is clear and moist and mucous membranes are normal.   Eyes: Pupils are equal, round, and reactive to light. Conjunctivae and EOM are normal. No scleral icterus. Neck: No JVD present. No neck rigidity. No tracheal deviation present. Cardiovascular: Normal rate, regular rhythm and normal heart sounds. Pulses:       Dorsalis pedis pulses are 2+ on the right side, and 2+ on the left side. Posterior tibial pulses are 2+ on the right side, and 2+ on the left side. Pulmonary/Chest: Effort normal and breath sounds normal. No respiratory distress. She has no decreased breath sounds. She has no wheezes. Abdominal: Soft. Normal appearance and bowel sounds are normal. She exhibits no distension and no pulsatile midline mass. There is no tenderness. There is CVA tenderness (right). There is no rigidity, no rebound, no guarding, no tenderness at McBurney's point and negative Dallas's sign. No hernia. Musculoskeletal: Normal range of motion. Lumbar back: She exhibits tenderness.    Reproducible pain along the entire right lumbar paraspinal tenderness. There is good plantar and dorsiflexion of the toes and normal strength appreciated in lower extremity muscle groups. Lymphadenopathy:     She has no cervical adenopathy. Neurological: She is alert and oriented to person, place, and time. She has normal strength. Gait normal.   Skin: Skin is warm, dry and intact. No rash noted. She is not diaphoretic. No pallor. Psychiatric: She has a normal mood and affect. Her speech is normal and behavior is normal. Thought content normal.   Nursing note and vitals reviewed. DIFFERENTIAL DIAGNOSIS:   Including but not limited to: musculoskeletal pain, kidney stone, pyelonephritis    DIAGNOSTIC RESULTS     EKG: All [facility]'s are [facility] who either signs or Co-signs this chart in the absence of a cardiologist.  None    RADIOLOGY: non-plain film images(s) such as CT,Ultrasound and MRI are read by the radiologist.  Plain radiographic images are visualized and preliminarily interpreted by the emergency physician unless otherwise stated below. CT ABDOMEN PELVIS WO CONTRAST Additional Contrast? None   Final Result      1. Lobulated kidneys bilaterally with bilateral renal cortical thinning and intrarenal cyst. No hydronephrosis. Correlate with renal function for medical renal disease. 2. Prominent right periumbilical anterior fat-containing hernia. 3. Moderate scattered stool in the colon. No bowel wall thickening or evidence for obstruction. Diverticulosis without diverticulitis. 4. Few layering gallstones in the bladder. Correlate with serology as clinically warranted. **This report has been created using voice recognition software. It may contain minor errors which are inherent in voice recognition technology. **      Final report electronically signed by Dr. Presley Mena on 5/17/2019 1:26 PM          LABS:   Labs Reviewed   CBC WITH AUTO DIFFERENTIAL - Abnormal; Notable for the following components:       Result Value    RBC 3.19 (*)     Hemoglobin 10.2 (*)     Hematocrit 32.6 (*)     .2 (*)     MCHC 31.3 (*)     RDW-CV 14.6 (*)     RDW-SD 54.4 (*)     Lymphocytes # 0.9 (*)     All other components within normal limits   BASIC METABOLIC PANEL W/ REFLEX TO MG FOR LOW K - Abnormal; Notable for the following components:    CO2 21 (*)     Glucose 217 (*)     BUN 29 (*)     CREATININE 1.6 (*)     All other components within normal limits   HEPATIC FUNCTION PANEL - Abnormal; Notable for the following components:    Alb 3.3 (*)     All other components within normal limits   URINE WITH REFLEXED MICRO - Abnormal; Notable for the following components:    Protein,  (*)     All other components within normal limits   GLOMERULAR FILTRATION RATE, ESTIMATED - Abnormal; Notable for the following components:    Est, Glom Filt Rate 32 (*)     All other components within normal limits   LIPASE   ANION GAP   OSMOLALITY       EMERGENCY DEPARTMENT COURSE:   Vitals:    Vitals:    05/17/19 1142 05/17/19 1310   BP: (!) 184/61 (!) 170/63   Pulse: 75    Resp: 16 16   Temp: 99.2 °F (37.3 °C)    SpO2: 96% 99%     The patient was seen within the ED today for the evaluation of right flank/back pain. The patient arrived in no acute distress and in stable condition. Within the department, I observed the patient's vital signs to be within acceptable range. On exam, I appreciated heart and lungs clear to ascultation. Abdomen soft and non-tender. Right CVA tenderness. Reproducible pain along the entire right lumbar paraspinal tenderness. Pain also reproduced with movement. CT abdomen revealed Lobulated kidneys bilaterally with bilateral renal cortical thinning and intrarenal cyst. No hydronephrosis. Laboratory work was reassuring. Within the department, the patient was treated with Toradol. I observed the patient's condition to improve during the duration of her stay.  The patient has remained stable in the ER with good vital signs, neurovascularly intact, and no distress evident. Patient has demonstrated a steady, independent gait. I explained my proposed course of treatment to the patient, who was amenable to my decision, and I answered all questions that were asked. She was discharged home in stable condition, and the patient will return to the ED if her symptoms become more severe in nature or otherwise change. I advised the patient to follow-up with PCP. I also discussed return to ED precautions with the patient who verbalized understanding. CRITICAL CARE:   None    CONSULTS:  None    PROCEDURES:  None    FINAL IMPRESSION      1. Right flank pain    2. Acute right-sided low back pain without sciatica    3. Chronic kidney disease, unspecified CKD stage          DISPOSITION/PLAN     1. Right flank pain    2. Acute right-sided low back pain without sciatica    3. Chronic kidney disease, unspecified CKD stage        PATIENT REFERRED TO:  ARNOL Vick - CNP  [place]  [phone]    Schedule an appointment as soon as possible for a visit in 3 days        DISCHARGE MEDICATIONS:  Discharge Medication List as of 5/17/2019  1:58 PM      START taking these medications    Details   cyclobenzaprine (FLEXERIL) 10 MG tablet Take 1 tablet by mouth 3 times daily as needed for Muscle spasms, Disp-12 tablet, R-0Print             (Please note that portions of this note were completed with a voice recognition program.  Efforts were made to edit the dictations but occasionally words aremis-transcribed.)    Scribe:  Rosenda Milan 5/17/19 1:52 PM Scribing for and in the presence of ACE Galarza. Signed by: Olga Carlisle 05/17/19 8:03 PM    Provider:  I personally performed the services described in the documentation, reviewed and edited the documentation which was dictated to the scribe in my presence, and it accuratelyrecords my words and actions.     Geovanni ACE 05/17/19 8:03 PM    ACE Mathis PA-C  05/17/19 Aisha

## 2019-05-17 NOTE — ED TRIAGE NOTES
Patient has had right flank pain for the last week. She states it is getting progressively worse. Patient is not having any urinary symptoms such as dysuria, frequency or urgency. She denies history of kidney stones but reports history of UTI. The patient states she normally doesn't even know when she has a UTI. She states she has never had pain like this before.

## 2019-05-17 NOTE — ED NOTES
Patient resting on cart watching tv. Requesting something to drink. Ice chips given. Door closed for privacy.      Shani Lopes RN  05/17/19 4027

## 2019-05-21 ENCOUNTER — HOSPITAL ENCOUNTER (OUTPATIENT)
Dept: ULTRASOUND IMAGING | Age: 64
Discharge: HOME | End: 2019-05-21
Payer: MEDICARE

## 2019-05-21 DIAGNOSIS — E04.2 NONTOXIC MULTINODULAR GOITER: ICD-10-CM

## 2019-05-21 PROCEDURE — 88173 CYTOPATH EVAL FNA REPORT: CPT

## 2019-05-21 PROCEDURE — 76942 ECHO GUIDE FOR BIOPSY: CPT

## 2019-05-21 PROCEDURE — 88172 CYTP DX EVAL FNA 1ST EA SITE: CPT

## 2019-05-21 PROCEDURE — 88177 CYTP FNA EVAL EA ADDL: CPT

## 2019-05-21 PROCEDURE — 60300 ASPIR/INJ THYROID CYST: CPT

## 2019-05-21 NOTE — PROGRESS NOTES
A US guided FNA left thyroid nodule was performed without complication. Please see PACS for full report.

## 2019-05-29 ENCOUNTER — OFFICE VISIT (OUTPATIENT)
Dept: FAMILY MEDICINE CLINIC | Age: 64
End: 2019-05-29
Payer: MEDICARE

## 2019-05-29 VITALS
TEMPERATURE: 98.8 F | SYSTOLIC BLOOD PRESSURE: 130 MMHG | RESPIRATION RATE: 16 BRPM | HEART RATE: 88 BPM | BODY MASS INDEX: 44.41 KG/M2 | HEIGHT: 68 IN | DIASTOLIC BLOOD PRESSURE: 62 MMHG | WEIGHT: 293 LBS

## 2019-05-29 DIAGNOSIS — E11.59 TYPE 2 DIABETES MELLITUS WITH OTHER CIRCULATORY COMPLICATION, WITH LONG-TERM CURRENT USE OF INSULIN (HCC): Primary | ICD-10-CM

## 2019-05-29 DIAGNOSIS — I10 ESSENTIAL HYPERTENSION: ICD-10-CM

## 2019-05-29 DIAGNOSIS — Z79.4 TYPE 2 DIABETES MELLITUS WITH OTHER CIRCULATORY COMPLICATION, WITH LONG-TERM CURRENT USE OF INSULIN (HCC): Primary | ICD-10-CM

## 2019-05-29 PROCEDURE — G8417 CALC BMI ABV UP PARAM F/U: HCPCS | Performed by: NURSE PRACTITIONER

## 2019-05-29 PROCEDURE — G8427 DOCREV CUR MEDS BY ELIG CLIN: HCPCS | Performed by: NURSE PRACTITIONER

## 2019-05-29 PROCEDURE — 2022F DILAT RTA XM EVC RTNOPTHY: CPT | Performed by: NURSE PRACTITIONER

## 2019-05-29 PROCEDURE — 3017F COLORECTAL CA SCREEN DOC REV: CPT | Performed by: NURSE PRACTITIONER

## 2019-05-29 PROCEDURE — 3044F HG A1C LEVEL LT 7.0%: CPT | Performed by: NURSE PRACTITIONER

## 2019-05-29 PROCEDURE — G8598 ASA/ANTIPLAT THER USED: HCPCS | Performed by: NURSE PRACTITIONER

## 2019-05-29 PROCEDURE — 1036F TOBACCO NON-USER: CPT | Performed by: NURSE PRACTITIONER

## 2019-05-29 PROCEDURE — 99214 OFFICE O/P EST MOD 30 MIN: CPT | Performed by: NURSE PRACTITIONER

## 2019-05-29 ASSESSMENT — ENCOUNTER SYMPTOMS
ABDOMINAL DISTENTION: 0
ABDOMINAL PAIN: 0
RESPIRATORY NEGATIVE: 1

## 2019-05-29 NOTE — PROGRESS NOTES
[address]  [address] [address]  Dept: [phone]  Dept Fax: [phone]  Loc: [phone]    Visit Date: 5/29/2019     Sania Lamas is a 58 y.o. female who presents today for:  Chief Complaint   Patient presents with    6 Month Follow-Up       HPI:     Pt here for a check up. Her A1C in April was 6.2. She does continue to check her sugars, she has had some elevated sugars in the 250 range in the evening. She does admit to not following a diabetic diet. She has lost almost 16 pounds in the last two months, she has been taking keto pills and eating less. She is taking her novolog bid. She is now using the EnteGreat sensor. She is current with Endocrinology who is managing her thyroid. Se is going to have it out. She is current with Cardiology. Denies chest ain or sob or pedal edema. She continues to take ferrous sulfate, her hgb has raised form 9.2 to over 10 in 4 weeks, she will continue to take this. She is also taking folic acid. She uses a wheelchair to get around, has lumbar pian at times. Better today. No Known Allergies       Prior to Admission medications    Medication Sig Start Date End Date Taking? Authorizing Provider   cyclobenzaprine (FLEXERIL) 10 MG tablet Take 1 tablet by mouth 3 times daily as needed for Muscle spasms 5/17/19  Yes Marleni White PA-C   [place].  Devices [facility]) MISC 1 Device by Does not apply route as needed (prn) 5/15/19  Yes ARNOL Zimmerman CNP   HUMALOG MIX 75/25 KWIKPEN (75-25) 100 UNIT/ML SUPN injection pen inject 30 units every morning and 50 units at bedtime 5/6/19  Yes ARNOL Zimmerman CNP   ferrous sulfate 325 (65 Fe) MG EC tablet Take 1 tablet by mouth 3 times daily (with meals) 4/18/19  Yes ARNOL Zimmerman CNP   folic acid (FOLVITE) 1 MG tablet Take 1 tablet by mouth daily 4/18/19  Yes ARNOL Zimmerman CNP   CARTIA  MG extended release capsule take 1 capsule by mouth once daily 4/8/19  Yes Magen Salas MD   pantoprazole (PROTONIX) 40 MG tablet take 1 tablet (40 mg) by oral route once daily 4/5/19  Yes ARNOL Sosa CNP   hydrALAZINE (APRESOLINE) 25 MG tablet take 1 tablet by mouth three times a day 3/19/19  Yes Nori Jiménez MD   metoprolol tartrate (LOPRESSOR) 25 MG tablet take 1/2 tablet by mouth twice a day 1/28/19  Yes Magen Salas MD   RA ASPIRIN EC 81 MG EC tablet take 1 tablet by mouth once daily 1/3/19  Yes Magen Salas MD   atorvastatin (LIPITOR) 40 MG tablet take 1 tablet by mouth at bedtime 12/17/18  Yes ARNOL Ramos CNP   insulin lispro protamine & lispro (HUMALOG MIX 75/25 KWIKPEN) (75-25) 100 UNIT per ML SUPN injection pen inject 30 units every morning and 50 units at bedtime  Patient taking differently: inject 30 units every morning and 50 units at bedtime.  Determines due to Blood Sugar Readings 12/11/18  Yes ARNOL Sosa CNP   isosorbide mononitrate (IMDUR) 30 MG extended release tablet take 1 tablet by mouth once daily 11/19/18  Yes Magen Salas MD   DULoxetine (CYMBALTA) 60 MG extended release capsule take 1 capsule by mouth once daily 8/7/18  Yes ARNOL Sosa CNP   amitriptyline (ELAVIL) 10 MG tablet take 1 tablet by mouth once daily 7/3/18  Yes ARNOL Sosa CNP   fenofibrate (TRICOR) 145 MG tablet take 1 tablet by mouth once daily 7/3/18  Yes ARNOL Sosa CNP   CPAP Machine MISC by Does not apply route Please change CPAP to auto pressure to 7-15 cm H20. 11/10/16  Yes Serafin Peres PA-C   Insulin Pen Needle (PEN NEEDLES) 31G X 5 MM MISC 1 each by Does not apply route 3 times daily 10/27/16  Yes Inge Carrington MD   Calcium Carbonate-Vitamin D (CALCIUM-VITAMIN D) 500-200 MG-UNIT per tablet Take 1 tablet by mouth 2 times daily (with meals)    Yes Historical Provider, MD   vitamin B-12 (CYANOCOBALAMIN) 100 MCG tablet Take 1 tablet by mouth daily 4/18/19 4/17/20  Vidhya Alberts APRN - CNP       Health Maintenance   Topic Date Due    Hepatitis C screen  1955    HIV screen  10/21/1969    Hepatitis B Vaccine (1 of 3 - Risk 3-dose series) 10/21/1973    Shingles Vaccine (1 of 2) 10/21/2004    Low dose CT lung screening  09/18/2016    Lipid screen  10/26/2018    Diabetic retinal exam  08/30/2019    Flu vaccine (Season Ended) 09/01/2019    Diabetic foot exam  11/07/2019    A1C test (Diabetic or Prediabetic)  04/10/2020    Breast cancer screen  08/28/2020    Pneumococcal 0-64 years Vaccine (3 of 3 - PPSV23) 09/21/2020    DTaP/Tdap/Td vaccine (2 - Td) 11/07/2027    Colon cancer screen colonoscopy  01/24/2028    HPV vaccine  Aged Out       Subjective:      Review of Systems   Constitutional:        Overweight   Respiratory: Negative. Cardiovascular: Negative. Gastrointestinal: Negative for abdominal distention and abdominal pain. Genitourinary: Negative for difficulty urinating and dysuria. Musculoskeletal: Negative for arthralgias and myalgias. Skin: Negative. Neurological: Negative for dizziness, weakness and headaches. Psychiatric/Behavioral: Negative for self-injury, sleep disturbance and suicidal ideas. Objective:     /62   Pulse 88   Temp 98.8 °F (37.1 °C) (Oral)   Resp 16   Ht 5' 7.72\" (1.72 m)   Wt (!) 340 lb (154.2 kg)   LMP 02/20/2001   BMI 52.13 kg/m²     Physical Exam   Constitutional: She is oriented to person, place, and time. She appears well-developed and well-nourished. No distress. Cardiovascular: Normal rate, regular rhythm, normal heart sounds and intact distal pulses. No murmur heard. Pulmonary/Chest: Effort normal and breath sounds normal. No stridor. No respiratory distress. Abdominal: Soft. Bowel sounds are normal. She exhibits no distension. There is no tenderness. Neurological: She is alert and oriented to person, place, and time. Skin: Skin is warm and dry. Capillary refill takes less than 2 seconds. Psychiatric: She has a normal mood and affect. Her behavior is normal. Judgment and thought content normal.   Nursing note and vitals reviewed. Lab Results   Component Value Date    LABA1C 6.2 04/10/2019    LABA1C 6.7 11/07/2018    LABA1C 6.7 02/07/2018     Lab Results   Component Value Date    LABMICR 115.13 04/12/2019    LDLCALC 87 10/26/2017    CREATININE 1.6 (H) 05/17/2019       Assessment/Plan:     Diabetes Mellitus: stable    Issues reviewed withher: low cholesterol diet, weight control and daily exercise discussed and home glucose monitoring emphasized. 1. Type 2 diabetes mellitus with other circulatory complication, with long-term current use of insulin ([address]  Continue current meds  Call if sugars elevate in 300-400 range. 2. BMI 50.0-59.9, adult (HCC)  Continue to loose weight,    3. Essential hypertension  Continue current meds      Return in about 6 months (around 11/29/2019) for A1C and check up.     Electronically signed by ARNOL Morgan CNP on 5/29/2019 at 1:26 PM

## 2019-05-29 NOTE — PROGRESS NOTES
Visit Information    Have you changed or started any medications since your last visit including any over-the-counter medicines, vitamins, or herbal medicines? flexeril  Are you having any side effects from any of your medications? -  no  Have you stopped taking any of your medications? Is so, why? -  no    Have you seen any other physician or provider since your last visit? Yes - Records Obtained Dr Jacob Mccabe  Have you had any other diagnostic tests since your last visit? Yes - Records Obtained  Blood work  Have you been seen in the emergency room and/or had an admission to a hospital since we last saw you? [facility] back pain  Have you had your routine dental cleaning in the past 6 months? yes  3/19    Have you activated your [employer] account? If not, what are your barriers?  Yes     Patient Care Team:  ARNOL Wiseman CNP as PCP - General (Family Nurse Practitioner)  ARNOL Wiseman CNP as PCP - S Attributed Provider    Medical History Review  Deferred to PCP    Health Maintenance   Topic Date Due    Hepatitis C screen  1955    HIV screen  10/21/1969    Hepatitis B Vaccine (1 of 3 - Risk 3-dose series) 10/21/1973    Shingles Vaccine (1 of 2) 10/21/2004    Low dose CT lung screening  09/18/2016    Lipid screen  10/26/2018    Diabetic retinal exam  08/30/2019    Flu vaccine (Season Ended) 09/01/2019    Diabetic foot exam  11/07/2019    A1C test (Diabetic or Prediabetic)  04/10/2020    Breast cancer screen  08/28/2020    Pneumococcal 0-64 years Vaccine (3 of 3 - PPSV23) 09/21/2020    DTaP/Tdap/Td vaccine (2 - Td) 11/07/2027    Colon cancer screen colonoscopy  01/24/2028    HPV vaccine  Aged Out

## 2019-05-31 ENCOUNTER — APPOINTMENT (OUTPATIENT)
Dept: GENERAL RADIOLOGY | Age: 64
DRG: 534 | End: 2019-05-31
Payer: MEDICARE

## 2019-05-31 ENCOUNTER — HOSPITAL ENCOUNTER (INPATIENT)
Age: 64
LOS: 3 days | Discharge: NURSING, REHAB OR LONG-TERM CARE | DRG: 534 | End: 2019-06-03
Attending: ORTHOPAEDIC SURGERY | Admitting: ORTHOPAEDIC SURGERY
Payer: MEDICARE

## 2019-05-31 ENCOUNTER — APPOINTMENT (OUTPATIENT)
Dept: CT IMAGING | Age: 64
DRG: 534 | End: 2019-05-31
Payer: MEDICARE

## 2019-05-31 DIAGNOSIS — I10 ESSENTIAL HYPERTENSION: ICD-10-CM

## 2019-05-31 DIAGNOSIS — M25.562 ACUTE PAIN OF LEFT KNEE: Primary | ICD-10-CM

## 2019-05-31 DIAGNOSIS — Z79.4 TYPE 2 DIABETES MELLITUS WITH DIABETIC POLYNEUROPATHY, WITH LONG-TERM CURRENT USE OF INSULIN (HCC): ICD-10-CM

## 2019-05-31 DIAGNOSIS — E11.42 TYPE 2 DIABETES MELLITUS WITH DIABETIC POLYNEUROPATHY, WITH LONG-TERM CURRENT USE OF INSULIN (HCC): ICD-10-CM

## 2019-05-31 LAB
ALBUMIN SERPL-MCNC: 3.3 G/DL (ref 3.5–5.1)
ALP BLD-CCNC: 63 U/L (ref 38–126)
ALT SERPL-CCNC: 22 U/L (ref 11–66)
ANION GAP SERPL CALCULATED.3IONS-SCNC: 10 MEQ/L (ref 8–16)
AST SERPL-CCNC: 49 U/L (ref 5–40)
BASOPHILS # BLD: 0.6 %
BASOPHILS ABSOLUTE: 0 THOU/MM3 (ref 0–0.1)
BILIRUB SERPL-MCNC: 0.5 MG/DL (ref 0.3–1.2)
BUN BLDV-MCNC: 26 MG/DL (ref 7–22)
CALCIUM SERPL-MCNC: 9.9 MG/DL (ref 8.5–10.5)
CHLORIDE BLD-SCNC: 102 MEQ/L (ref 98–111)
CO2: 26 MEQ/L (ref 23–33)
CREAT SERPL-MCNC: 1.6 MG/DL (ref 0.4–1.2)
EKG ATRIAL RATE: 69 BPM
EKG P AXIS: 21 DEGREES
EKG P-R INTERVAL: 190 MS
EKG Q-T INTERVAL: 420 MS
EKG QRS DURATION: 124 MS
EKG QTC CALCULATION (BAZETT): 450 MS
EKG R AXIS: -6 DEGREES
EKG T AXIS: -36 DEGREES
EKG VENTRICULAR RATE: 69 BPM
EOSINOPHIL # BLD: 1.8 %
EOSINOPHILS ABSOLUTE: 0.1 THOU/MM3 (ref 0–0.4)
ERYTHROCYTE [DISTWIDTH] IN BLOOD BY AUTOMATED COUNT: 14.1 % (ref 11.5–14.5)
ERYTHROCYTE [DISTWIDTH] IN BLOOD BY AUTOMATED COUNT: 51.6 FL (ref 35–45)
GFR SERPL CREATININE-BSD FRML MDRD: 32 ML/MIN/1.73M2
GLUCOSE BLD-MCNC: 105 MG/DL (ref 70–108)
GLUCOSE BLD-MCNC: 117 MG/DL (ref 70–108)
GLUCOSE BLD-MCNC: 127 MG/DL (ref 70–108)
GLUCOSE BLD-MCNC: 262 MG/DL (ref 70–108)
HCT VFR BLD CALC: 34.3 % (ref 37–47)
HEMOGLOBIN: 10.9 GM/DL (ref 12–16)
IMMATURE GRANS (ABS): 0.03 THOU/MM3 (ref 0–0.07)
IMMATURE GRANULOCYTES: 0.4 %
LYMPHOCYTES # BLD: 13.6 %
LYMPHOCYTES ABSOLUTE: 1.1 THOU/MM3 (ref 1–4.8)
MCH RBC QN AUTO: 32 PG (ref 26–33)
MCHC RBC AUTO-ENTMCNC: 31.8 GM/DL (ref 32.2–35.5)
MCV RBC AUTO: 100.6 FL (ref 81–99)
MONOCYTES # BLD: 7.7 %
MONOCYTES ABSOLUTE: 0.6 THOU/MM3 (ref 0.4–1.3)
NUCLEATED RED BLOOD CELLS: 0 /100 WBC
OSMOLALITY CALCULATION: 280.8 MOSMOL/KG (ref 275–300)
PLATELET # BLD: 174 THOU/MM3 (ref 130–400)
PMV BLD AUTO: 11.4 FL (ref 9.4–12.4)
POTASSIUM SERPL-SCNC: 4.5 MEQ/L (ref 3.5–5.2)
RBC # BLD: 3.41 MILL/MM3 (ref 4.2–5.4)
SEG NEUTROPHILS: 75.9 %
SEGMENTED NEUTROPHILS ABSOLUTE COUNT: 5.9 THOU/MM3 (ref 1.8–7.7)
SODIUM BLD-SCNC: 138 MEQ/L (ref 135–145)
TOTAL PROTEIN: 6.5 G/DL (ref 6.1–8)
WBC # BLD: 7.8 THOU/MM3 (ref 4.8–10.8)

## 2019-05-31 PROCEDURE — 73502 X-RAY EXAM HIP UNI 2-3 VIEWS: CPT

## 2019-05-31 PROCEDURE — 2709999900 HC NON-CHARGEABLE SUPPLY

## 2019-05-31 PROCEDURE — 93005 ELECTROCARDIOGRAM TRACING: CPT | Performed by: ORTHOPAEDIC SURGERY

## 2019-05-31 PROCEDURE — 29505 APPLICATION LONG LEG SPLINT: CPT

## 2019-05-31 PROCEDURE — 6370000000 HC RX 637 (ALT 250 FOR IP): Performed by: PHYSICIAN ASSISTANT

## 2019-05-31 PROCEDURE — 2580000003 HC RX 258: Performed by: PHYSICIAN ASSISTANT

## 2019-05-31 PROCEDURE — 36415 COLL VENOUS BLD VENIPUNCTURE: CPT

## 2019-05-31 PROCEDURE — 1200000000 HC SEMI PRIVATE

## 2019-05-31 PROCEDURE — 73700 CT LOWER EXTREMITY W/O DYE: CPT

## 2019-05-31 PROCEDURE — 72100 X-RAY EXAM L-S SPINE 2/3 VWS: CPT

## 2019-05-31 PROCEDURE — 73564 X-RAY EXAM KNEE 4 OR MORE: CPT

## 2019-05-31 PROCEDURE — 73552 X-RAY EXAM OF FEMUR 2/>: CPT

## 2019-05-31 PROCEDURE — 82948 REAGENT STRIP/BLOOD GLUCOSE: CPT

## 2019-05-31 PROCEDURE — L1830 KO IMMOB CANVAS LONG PRE OTS: HCPCS

## 2019-05-31 PROCEDURE — 99285 EMERGENCY DEPT VISIT HI MDM: CPT

## 2019-05-31 PROCEDURE — 6370000000 HC RX 637 (ALT 250 FOR IP): Performed by: EMERGENCY MEDICINE

## 2019-05-31 PROCEDURE — 99222 1ST HOSP IP/OBS MODERATE 55: CPT | Performed by: PHYSICIAN ASSISTANT

## 2019-05-31 PROCEDURE — 85025 COMPLETE CBC W/AUTO DIFF WBC: CPT

## 2019-05-31 PROCEDURE — 80053 COMPREHEN METABOLIC PANEL: CPT

## 2019-05-31 RX ORDER — SODIUM CHLORIDE 9 MG/ML
INJECTION, SOLUTION INTRAVENOUS CONTINUOUS
Status: DISCONTINUED | OUTPATIENT
Start: 2019-05-31 | End: 2019-06-03

## 2019-05-31 RX ORDER — MORPHINE SULFATE 4 MG/ML
4 INJECTION, SOLUTION INTRAMUSCULAR; INTRAVENOUS
Status: DISCONTINUED | OUTPATIENT
Start: 2019-05-31 | End: 2019-06-03 | Stop reason: HOSPADM

## 2019-05-31 RX ORDER — ACETAMINOPHEN 325 MG/1
650 TABLET ORAL EVERY 4 HOURS PRN
Status: DISCONTINUED | OUTPATIENT
Start: 2019-05-31 | End: 2019-06-03 | Stop reason: HOSPADM

## 2019-05-31 RX ORDER — HYDROCODONE BITARTRATE AND ACETAMINOPHEN 5; 325 MG/1; MG/1
1 TABLET ORAL EVERY 4 HOURS PRN
Status: DISCONTINUED | OUTPATIENT
Start: 2019-05-31 | End: 2019-06-03 | Stop reason: HOSPADM

## 2019-05-31 RX ORDER — SODIUM CHLORIDE 0.9 % (FLUSH) 0.9 %
10 SYRINGE (ML) INJECTION PRN
Status: DISCONTINUED | OUTPATIENT
Start: 2019-05-31 | End: 2019-06-03 | Stop reason: HOSPADM

## 2019-05-31 RX ORDER — DILTIAZEM HYDROCHLORIDE 120 MG/1
120 CAPSULE, COATED, EXTENDED RELEASE ORAL DAILY
Status: DISCONTINUED | OUTPATIENT
Start: 2019-06-01 | End: 2019-06-03 | Stop reason: HOSPADM

## 2019-05-31 RX ORDER — FENOFIBRATE 160 MG/1
160 TABLET ORAL DAILY
Status: DISCONTINUED | OUTPATIENT
Start: 2019-05-31 | End: 2019-06-03 | Stop reason: HOSPADM

## 2019-05-31 RX ORDER — MORPHINE SULFATE 2 MG/ML
2 INJECTION, SOLUTION INTRAMUSCULAR; INTRAVENOUS
Status: DISCONTINUED | OUTPATIENT
Start: 2019-05-31 | End: 2019-06-03 | Stop reason: HOSPADM

## 2019-05-31 RX ORDER — PANTOPRAZOLE SODIUM 40 MG/1
40 TABLET, DELAYED RELEASE ORAL
Status: DISCONTINUED | OUTPATIENT
Start: 2019-06-01 | End: 2019-06-03 | Stop reason: HOSPADM

## 2019-05-31 RX ORDER — NICOTINE POLACRILEX 4 MG
15 LOZENGE BUCCAL PRN
Status: DISCONTINUED | OUTPATIENT
Start: 2019-05-31 | End: 2019-06-03 | Stop reason: HOSPADM

## 2019-05-31 RX ORDER — SODIUM CHLORIDE 0.9 % (FLUSH) 0.9 %
10 SYRINGE (ML) INJECTION EVERY 12 HOURS SCHEDULED
Status: DISCONTINUED | OUTPATIENT
Start: 2019-05-31 | End: 2019-06-03 | Stop reason: HOSPADM

## 2019-05-31 RX ORDER — HYDRALAZINE HYDROCHLORIDE 25 MG/1
25 TABLET, FILM COATED ORAL EVERY 6 HOURS SCHEDULED
Status: DISCONTINUED | OUTPATIENT
Start: 2019-06-01 | End: 2019-06-03

## 2019-05-31 RX ORDER — AMITRIPTYLINE HYDROCHLORIDE 10 MG/1
10 TABLET, FILM COATED ORAL NIGHTLY
Status: DISCONTINUED | OUTPATIENT
Start: 2019-05-31 | End: 2019-06-03 | Stop reason: HOSPADM

## 2019-05-31 RX ORDER — HYDROCODONE BITARTRATE AND ACETAMINOPHEN 5; 325 MG/1; MG/1
1 TABLET ORAL ONCE
Status: COMPLETED | OUTPATIENT
Start: 2019-05-31 | End: 2019-05-31

## 2019-05-31 RX ORDER — IBUPROFEN 200 MG
600 TABLET ORAL ONCE
Status: COMPLETED | OUTPATIENT
Start: 2019-05-31 | End: 2019-05-31

## 2019-05-31 RX ORDER — HYDROCODONE BITARTRATE AND ACETAMINOPHEN 5; 325 MG/1; MG/1
2 TABLET ORAL EVERY 4 HOURS PRN
Status: DISCONTINUED | OUTPATIENT
Start: 2019-05-31 | End: 2019-06-03 | Stop reason: HOSPADM

## 2019-05-31 RX ORDER — ASPIRIN 81 MG/1
81 TABLET ORAL DAILY
Status: DISCONTINUED | OUTPATIENT
Start: 2019-06-01 | End: 2019-06-03 | Stop reason: HOSPADM

## 2019-05-31 RX ORDER — FOLIC ACID 1 MG/1
1 TABLET ORAL DAILY
Status: DISCONTINUED | OUTPATIENT
Start: 2019-06-01 | End: 2019-06-03 | Stop reason: HOSPADM

## 2019-05-31 RX ORDER — INSULIN LISPRO 100 [IU]/ML
50 INJECTION, SUSPENSION SUBCUTANEOUS 2 TIMES DAILY WITH MEALS
Status: DISCONTINUED | OUTPATIENT
Start: 2019-05-31 | End: 2019-05-31 | Stop reason: SDUPTHER

## 2019-05-31 RX ORDER — ONDANSETRON 2 MG/ML
4 INJECTION INTRAMUSCULAR; INTRAVENOUS EVERY 6 HOURS PRN
Status: DISCONTINUED | OUTPATIENT
Start: 2019-05-31 | End: 2019-06-03 | Stop reason: HOSPADM

## 2019-05-31 RX ORDER — OYSTER SHELL CALCIUM WITH VITAMIN D 500; 200 MG/1; [IU]/1
1 TABLET, FILM COATED ORAL 2 TIMES DAILY WITH MEALS
Status: DISCONTINUED | OUTPATIENT
Start: 2019-05-31 | End: 2019-06-03 | Stop reason: HOSPADM

## 2019-05-31 RX ORDER — FERROUS SULFATE 325(65) MG
325 TABLET ORAL
Status: DISCONTINUED | OUTPATIENT
Start: 2019-05-31 | End: 2019-06-03 | Stop reason: HOSPADM

## 2019-05-31 RX ORDER — ATORVASTATIN CALCIUM 40 MG/1
40 TABLET, FILM COATED ORAL NIGHTLY
Status: DISCONTINUED | OUTPATIENT
Start: 2019-05-31 | End: 2019-06-03 | Stop reason: HOSPADM

## 2019-05-31 RX ORDER — DEXTROSE MONOHYDRATE 50 MG/ML
100 INJECTION, SOLUTION INTRAVENOUS PRN
Status: DISCONTINUED | OUTPATIENT
Start: 2019-05-31 | End: 2019-06-03 | Stop reason: HOSPADM

## 2019-05-31 RX ORDER — DEXTROSE MONOHYDRATE 25 G/50ML
12.5 INJECTION, SOLUTION INTRAVENOUS PRN
Status: DISCONTINUED | OUTPATIENT
Start: 2019-05-31 | End: 2019-06-03 | Stop reason: HOSPADM

## 2019-05-31 RX ADMIN — INSULIN LISPRO 47 UNITS: 100 INJECTION, SUSPENSION SUBCUTANEOUS at 22:25

## 2019-05-31 RX ADMIN — FENOFIBRATE 160 MG: 160 TABLET ORAL at 22:41

## 2019-05-31 RX ADMIN — ATORVASTATIN CALCIUM 40 MG: 40 TABLET, FILM COATED ORAL at 22:14

## 2019-05-31 RX ADMIN — HYDROCODONE BITARTRATE AND ACETAMINOPHEN 1 TABLET: 5; 325 TABLET ORAL at 11:08

## 2019-05-31 RX ADMIN — FERROUS SULFATE TAB 325 MG (65 MG ELEMENTAL FE) 325 MG: 325 (65 FE) TAB at 22:14

## 2019-05-31 RX ADMIN — CALCIUM CARBONATE-VITAMIN D TAB 500 MG-200 UNIT 1 TABLET: 500-200 TAB at 22:41

## 2019-05-31 RX ADMIN — IBUPROFEN 600 MG: 200 TABLET, FILM COATED ORAL at 14:12

## 2019-05-31 RX ADMIN — HYDROCODONE BITARTRATE AND ACETAMINOPHEN 2 TABLET: 5; 325 TABLET ORAL at 20:29

## 2019-05-31 RX ADMIN — METOPROLOL TARTRATE 12.5 MG: 25 TABLET ORAL at 22:13

## 2019-05-31 RX ADMIN — SODIUM CHLORIDE: 9 INJECTION, SOLUTION INTRAVENOUS at 22:43

## 2019-05-31 RX ADMIN — AMITRIPTYLINE HYDROCHLORIDE 10 MG: 10 TABLET, FILM COATED ORAL at 22:41

## 2019-05-31 RX ADMIN — Medication 10 ML: at 22:16

## 2019-05-31 ASSESSMENT — PAIN DESCRIPTION - ORIENTATION
ORIENTATION_3: LOWER;RIGHT
ORIENTATION_2: RIGHT
ORIENTATION: LEFT

## 2019-05-31 ASSESSMENT — PAIN DESCRIPTION - DESCRIPTORS
DESCRIPTORS: SHARP
DESCRIPTORS_2: SHARP
DESCRIPTORS_3: SHARP

## 2019-05-31 ASSESSMENT — ENCOUNTER SYMPTOMS
RHINORRHEA: 0
ABDOMINAL PAIN: 0
NAUSEA: 0
SHORTNESS OF BREATH: 0
DIARRHEA: 0
VOMITING: 0
BACK PAIN: 1
EYE DISCHARGE: 0
WHEEZING: 0
SORE THROAT: 0
COUGH: 0
EYE PAIN: 0

## 2019-05-31 ASSESSMENT — PAIN SCALES - GENERAL
PAINLEVEL_OUTOF10: 10

## 2019-05-31 ASSESSMENT — PAIN DESCRIPTION - LOCATION
LOCATION_3: BACK
LOCATION_2: KNEE
LOCATION: LEG

## 2019-05-31 ASSESSMENT — PAIN DESCRIPTION - PAIN TYPE
TYPE_3: ACUTE PAIN
TYPE: ACUTE PAIN

## 2019-05-31 ASSESSMENT — PAIN DESCRIPTION - INTENSITY
RATING_3: 4
RATING_2: 5

## 2019-05-31 ASSESSMENT — PAIN DESCRIPTION - DURATION
DURATION_2: CONTINUOUS
DURATION_3: CONTINUOUS

## 2019-05-31 ASSESSMENT — PAIN DESCRIPTION - FREQUENCY: FREQUENCY: CONTINUOUS

## 2019-05-31 ASSESSMENT — PAIN DESCRIPTION - ONSET
ONSET_2: ON-GOING
ONSET_3: ON-GOING
ONSET: ON-GOING

## 2019-05-31 ASSESSMENT — PAIN DESCRIPTION - PROGRESSION: CLINICAL_PROGRESSION_3: GRADUALLY IMPROVING

## 2019-05-31 NOTE — ED NOTES
Patient was unable to tolerate getting up to pivot to wheelchair. Patient stated the pain in her left leg neal to much.       Tonny Love RN  05/31/19 5713

## 2019-05-31 NOTE — ED NOTES
Patient resting on cot, respirations are easy and unlabored. Updated on POC. Patient's family is at bedside. Patient stated pain the same.       Maral Connolly RN  05/31/19 1200

## 2019-05-31 NOTE — ED NOTES
Patient stated she thought her sugar was dropping and had not ate anything today yet. Checked patient's sugar and provided her with a turkey sandwich and ice water.       Jose Wheeler RN  05/31/19 9405

## 2019-05-31 NOTE — ED NOTES
Bed: 035A  Expected date: 5/31/19  Expected time: 10:25 AM  Means of arrival: [facility]  Comments:     Carly Mehta RN  05/31/19 8101

## 2019-05-31 NOTE — ED PROVIDER NOTES
[facility]  eMERGENCY dEPARTMENT eNCOUnter          CHIEF COMPLAINT       Chief Complaint   Patient presents with    Fall       Nurses Notes reviewed and I agree except as noted in the HPI. HISTORY OF PRESENT ILLNESS    Berenice Maldonado is a 58 y.o. female who presents to the Emergency Department with a history of drop foot for the evaluation of mechanical fall. Patient states that she was walking out her back door when her foot got caught in door jam causing her to fall forward onto concrete. No loss of consciousness or head injury. Patient denies symptoms prior to fall. She is not anticoagulated. Upon evaluation patient reports bilateral hip and knee pain, left side jaw pain, and low back pain. She denies fever, chills, nausea, emesis, weakness, shortness of breath, or chest pain. No further complaints at initial evaluation. The HPI was provided by the patient. REVIEW OF SYSTEMS     Review of Systems   Constitutional: Negative for appetite change, chills, fatigue and fever. HENT: Negative for congestion, ear pain, rhinorrhea and sore throat. Eyes: Negative for pain, discharge and visual disturbance. Respiratory: Negative for cough, shortness of breath and wheezing. Cardiovascular: Negative for chest pain, palpitations and leg swelling. Gastrointestinal: Negative for abdominal pain, diarrhea, nausea and vomiting. Genitourinary: Negative for difficulty urinating, dysuria, hematuria and vaginal discharge. Musculoskeletal: Positive for arthralgias, back pain and myalgias. Negative for joint swelling and neck pain. Skin: Negative for pallor and rash. Neurological: Negative for dizziness, syncope, weakness, light-headedness, numbness and headaches. Fall   Hematological: Negative for adenopathy. Psychiatric/Behavioral: Negative for confusion and suicidal ideas. The patient is not nervous/anxious.         PAST MEDICAL HISTORY    has a past medical history of CRI (LOPRESSOR) 25 MG tablet take 1/2 tablet by mouth twice a day  Qty: 90 tablet, Refills: 3    Associated Diagnoses: Atrial fibrillation with RVR (HCC)      RA ASPIRIN EC 81 MG EC tablet take 1 tablet by mouth once daily  Qty: 30 tablet, Refills: 10      atorvastatin (LIPITOR) 40 MG tablet take 1 tablet by mouth at bedtime  Qty: 90 tablet, Refills: 1    Associated Diagnoses: Hyperlipidemia, unspecified hyperlipidemia type      isosorbide mononitrate (IMDUR) 30 MG extended release tablet take 1 tablet by mouth once daily  Qty: 30 tablet, Refills: 9      DULoxetine (CYMBALTA) 60 MG extended release capsule take 1 capsule by mouth once daily  Qty: 90 capsule, Refills: 3    Associated Diagnoses: Neuropathy      amitriptyline (ELAVIL) 10 MG tablet take 1 tablet by mouth once daily  Qty: 90 tablet, Refills: 3    Associated Diagnoses: Neuropathy      fenofibrate (TRICOR) 145 MG tablet take 1 tablet by mouth once daily  Qty: 90 tablet, Refills: 3    Associated Diagnoses: Hyperlipidemia, unspecified hyperlipidemia type; Type 2 diabetes mellitus with diabetic polyneuropathy, with long-term current use of insulin (formerly [facility])      Calcium Carbonate-Vitamin D (CALCIUM-VITAMIN D) 500-200 MG-UNIT per tablet Take 1 tablet by mouth 2 times daily (with meals)       cyclobenzaprine (FLEXERIL) 10 MG tablet Take 1 tablet by mouth 3 times daily as needed for Muscle spasms  Qty: 12 tablet, Refills: 0      Misc.  Devices [facility]) MISC 1 Device by Does not apply route as needed (prn)  Qty: 1 each, Refills: 0      vitamin B-12 (CYANOCOBALAMIN) 100 MCG tablet Take 1 tablet by mouth daily  Qty: 30 tablet, Refills: 6    Associated Diagnoses: Macrocytic anemia      !! insulin lispro protamine & lispro (HUMALOG MIX 75/25 KWIKPEN) (75-25) 100 UNIT per ML SUPN injection pen inject 30 units every morning and 50 units at bedtime  Qty: 24 mL, Refills: 1    Associated Diagnoses: Type 2 diabetes mellitus with diabetic polyneuropathy, with long-term current use of insulin (HCC)      CPAP Machine MISC by Does not apply route Please change CPAP to auto pressure to 7-15 cm H20. Qty: 1 each, Refills: 0      Insulin Pen Needle (PEN NEEDLES) 31G X 5 MM MISC 1 each by Does not apply route 3 times daily  Qty: 100 each, Refills: 11    Comments: Ultra fine pen needles       ! ! - Potential duplicate medications found. Please discuss with provider. ALLERGIES     has No Known Allergies. FAMILY HISTORY     indicated that her mother is . She indicated that her father is . She indicated that her sister is alive. She indicated that her brother is alive. She indicated that the status of her maternal grandmother is unknown. She indicated that the status of her maternal grandfather is unknown. She indicated that the status of her maternal uncle is unknown. She indicated that the status of her other is unknown. She indicated that the status of her neg hx is unknown.   family history includes Asthma in her brother and sister; Cancer in her maternal uncle; Diabetes in her maternal grandfather and maternal grandmother; Heart Disease in her father; High Blood Pressure in her maternal grandmother and another family member. SOCIAL HISTORY      reports that she quit smoking about 12 years ago. She has a 30.00 pack-year smoking history. She has never used smokeless tobacco. She reports that she does not drink alcohol or use drugs. PHYSICAL EXAM     INITIAL VITALS:  height is 5' 9\" (1.753 m) and weight is 340 lb (154.2 kg) (abnormal). Her oral temperature is 97.9 °F (36.6 °C). Her blood pressure is 120/78 and her pulse is 64. Her respiration is 16 and oxygen saturation is 97%. Physical Exam   Constitutional: She is oriented to person, place, and time. She appears well-developed and well-nourished. Non-toxic appearance. HENT:   Head: Normocephalic and atraumatic.    Right Ear: Tympanic membrane and external ear normal.   Left Ear: Tympanic membrane and external ear normal.   Nose: Nose normal.   Mouth/Throat: Oropharynx is clear and moist and mucous membranes are normal. No oral lesions. No trismus in the jaw. No uvula swelling. No oropharyngeal exudate, posterior oropharyngeal edema or posterior oropharyngeal erythema. Left mandible tenderness. Patient is able to open and close mouth without difficulty. No deformity or trismus. Eyes: Conjunctivae and EOM are normal.   Neck: Normal range of motion. Neck supple. No JVD present. Cardiovascular: Normal rate, regular rhythm, normal heart sounds, intact distal pulses and normal pulses. Exam reveals no gallop and no friction rub. No murmur heard. Pulmonary/Chest: Effort normal and breath sounds normal. No respiratory distress. She has no decreased breath sounds. She has no wheezes. She has no rhonchi. She has no rales. Abdominal: Soft. Bowel sounds are normal. She exhibits no distension. There is no tenderness. There is no rebound, no guarding and no CVA tenderness. Musculoskeletal: Normal range of motion. She exhibits no edema. Right hip: She exhibits tenderness. Left hip: She exhibits tenderness. Right knee: Tenderness found. Left knee: Tenderness found. Lumbar back: She exhibits tenderness. Tenderness of left hip and pelvis, right hip, bilateral knees, and lumbar midline. Neurological: She is alert and oriented to person, place, and time. She exhibits normal muscle tone. Coordination normal.   Skin: Skin is warm and dry. Abrasion noted. No rash noted. She is not diaphoretic. Abrasions to the right knee, left pinky, and right palm. Nursing note and vitals reviewed. DIFFERENTIAL DIAGNOSIS:   Mechanical fall, fracture, sprain, strain, contusion, abrasion    DIAGNOSTIC RESULTS     EKG: All EKG's are interpreted by the Emergency Department Physician who either signs or Co-signs this chart in the absence of a cardiologist.    Kevin Almaraz.  Rate: 69 bpm  ME interval: 190 ms  QRS duration: 124 ms  QTc: 450 ms  P-R-T axes: 21, -6, -36  Normal Sinus Rhythm. Left Bundle Branch Block. No STEMI. RADIOLOGY: non-plainfilm images(s) such as CT, Ultrasound and MRI are read by the radiologist.       CT FEMUR LEFT WO CONTRAST   Final Result   The distal aspect of the patient's intramedullary royer is discontinuous and appears to be broken. While there is bony remodeling from the previous surgical changes, there is also a comminuted appearance of the osseous structures which may represent acute    comminuted fractures. There is also a joint effusion. **This report has been created using voice recognition software. It may contain minor errors which are inherent in voice recognition technology. **      Final report electronically signed by Dr Chantelle Haque on 5/31/2019 4:53 PM      XR KNEE LEFT (MIN 4 VIEWS)   Final Result    IMPRESSION:   Postoperative changes of the distal femur with marked bony remodeling and osteopenia is no obvious dislocation. . Evaluation of the distal femur is limited. If there is clinical concern for acute fracture further evaluation may be warranted. **This report has been created using voice recognition software. It may contain minor errors which are inherent in voice recognition technology. **      Final report electronically signed by Dr. Kateyln Dillard on 5/31/2019 3:26 PM      XR HIP 2-3 VW W PELVIS RIGHT   Final Result   1. No acute fracture. **This report has been created using voice recognition software. It may contain minor errors which are inherent in voice recognition technology. **      Final report electronically signed by Dr. Katelyn Dillard on 5/31/2019 12:13 PM      XR LUMBAR SPINE (2-3 VIEWS)   Final Result   Advanced degenerative changes lumbar spine. No acute fracture. **This report has been created using voice recognition software.  It may contain minor errors which are inherent in voice recognition technology. **      Final report electronically signed by Dr. Josefina Brito on 5/31/2019 12:06 PM      XR KNEE RIGHT (MIN 4 VIEWS)   Final Result   Advanced tricompartmental degenerative changes. No acute fracture. Mild soft tissue swelling over the anterior knee. **This report has been created using voice recognition software. It may contain minor errors which are inherent in voice recognition technology. **      Final report electronically signed by Dr. Josefina Brito on 5/31/2019 12:20 PM      XR FEMUR LEFT (MIN 2 VIEWS)   Final Result   Old fracture deformity of the distal femur with postsurgical changes. Femoral head is normally located without fracture.       Final report electronically signed by Dr. Josefina Brito on 5/31/2019 12:11 PM          LABS:     Labs Reviewed   CBC WITH AUTO DIFFERENTIAL - Abnormal; Notable for the following components:       Result Value    RBC 3.41 (*)     Hemoglobin 10.9 (*)     Hematocrit 34.3 (*)     .6 (*)     MCHC 31.8 (*)     RDW-SD 51.6 (*)     All other components within normal limits   COMPREHENSIVE METABOLIC PANEL - Abnormal; Notable for the following components:    CREATININE 1.6 (*)     BUN 26 (*)     AST 49 (*)     Alb 3.3 (*)     All other components within normal limits   GLOMERULAR FILTRATION RATE, ESTIMATED - Abnormal; Notable for the following components:    Est, Glom Filt Rate 32 (*)     All other components within normal limits   POCT GLUCOSE - Abnormal; Notable for the following components:    POC Glucose 117 (*)     All other components within normal limits   ANION GAP   OSMOLALITY   CBC WITH AUTO DIFFERENTIAL   PROTIME-INR       EMERGENCY DEPARTMENT COURSE:   Vitals:    Vitals:    05/31/19 1041 05/31/19 1158 05/31/19 1411 05/31/19 1545   BP: (!) 161/71 (!) 154/69 (!) 160/68 120/78   Pulse: 70 66 71 64   Resp: 16 16 16 16   Temp: 97.9 °F (36.6 °C)      TempSrc: Oral      SpO2: 98% 98% 98% 97%   Weight: (!) 340 lb (154.2 kg)      Height: 5' 9\" (1.753 m)          11:06 AM: The patient was seen and evaluated. Appropriate imaging was ordered. This was a mechanical fall. The patient states she felt fine and states that her drag foot on the right side, on the door threshold and caused her to trip and fall. The patient is given Norco for pain control. Initial x-rays are negative. Patient was adjusted in bed and trial to see if she could wear weight and ambulate. Patient states she was in too much pain in the left femur, left hip, left knee. The patient has had a previous left femur fracture. The distal end of the left femur wasn't well-visualized and the femur x-ray. Additional left knee studies were added. Social work was consulted to evaluate the patient and help set up for inpatient physical therapy force strength training and gait improvement. X-rays of the left knee are inconclusive as to whether there is an acute fracture or changes from previous fracture and surgery. I contacted [facility] on-call for the orthopedic [place], Dr. Rika Harrison. He and Dr. Rika Harrison reviewed the films and they are unable to determine if there is an acute fracture. CT of the distal femur is ordered. The CT is concerning for an acute fracture of the distal femur. I was advised to place the patient in a knee immobilizer. Unfortunately, the patient is too large to fit a knee immobilizer in place. A long leg posterior splint was applied by nursing staff. They did have difficulty with positioning of the leg due to pain and her size.   The splint did immobilize the knee and the distal femur but was not placed as high on the proximal femur as optimal. The patient is admitted to the hospital.  The consult to social work for physical therapy was discontinued at this time      MDM:  Admit to Dr Daly Rota:   None     CONSULTS:  [facility]    PROCEDURES:  None    FINAL IMPRESSION    Fall, initial encounter  Distal femur fracture, left initial encounter    DISPOSITION/PLAN   Admit    PATIENT REFERRED TO:  ARNOL Liu CNP  [address]  [phone]            DISCHARGE MEDICATIONS:  Current Discharge Medication List          (Please note that portions of this note were completed with a voice recognition program.  Efforts were made to edit the dictations but occasionally words aremis-transcribed.)    The patient was given an opportunity to see the Emergency Attending. The patient voiced understanding that I was a Mid-LevelProvider and was in agreement with being seen independently by myself. Scribe:  Azalea Keith 5/31/19 11:06 AM Scribing for and in the presence of Joseph Box CNP. Signed by: Cole Bains(Name), Tristian, 05/31/19 8:07 PM    Provider:  I personally performed the services described inthe documentation, reviewed and edited the documentation which was dictated to the scribe in my presence, and it accurately records my words and actions.     Joseph Box CNP 5/31/19 8:07 PM       ARNOL Platt Cha, CNP  05/31/19 2007

## 2019-05-31 NOTE — ED TRIAGE NOTES
Patient arrived to room 35 via ATFD with c/o fall. Patient stated was walking out her apartment. Patient stated she was in her wheelchair and stood up to step down 2 stairs and fell forward. Patient denies any LOC. Patient stated she does have drop foot. Patient's sister is at bedside and witnessed the fall. Patient stated bilateral legs hurts, lower back, and left side of jaw. Colt Russ NP at bedside.

## 2019-05-31 NOTE — H&P
Consult History & Physical      Patient:  Reji Can  YOB: 1955    MRN: [id number]     Acct: [de-identified]      Chief Complaint:  Medical Management     Date of Service: Pt seen/examined in consultation on 05/31/19    History Of Present Illness:      58 y.o. female who we are asked to see/evaluate by Brittany Almazan MD for medical management of DM II and HTN. Pt was seen at [facility] for evaluation following a mechanical fall resulting in bilateral hip and knee pain, left jaw pain, and lower back pain. Pt denies symptoms prior to her fall. Denies fever/chills/ CP/SOB/headache/dizziness/n/v/d/c/dysuria/urgency/numbness/tingling. No further complaints.      Past Medical History:        Diagnosis Date    CRI (chronic renal insufficiency)     Difficult intubation     excess skin in back of throat     DM (diabetes mellitus) (HCC)     GERD (gastroesophageal reflux disease)     Hyperlipidemia     Hypertension     Hypothyroidism     Neuropathy     Obesity     Osteoarthritis     Prolonged emergence from general anesthesia     Sleep apnea     uses cpap    Visit for screening mammogram        Past Surgical History:        Procedure Laterality Date    BACK SURGERY      xs 2    CATARACT REMOVAL Right 7/24/14    Dr. Duncan Giordano EKG 12-LEAD  9/18/2015         FOOT SURGERY      left foot    HYSTERECTOMY, TOTAL ABDOMINAL      MOUTH BIOPSY  9-28-12    left tongue and lingual tonsil    OTHER SURGICAL HISTORY  11/8/2014    LEFT FEMUR RETROGRADE NAILING    OTHER SURGICAL HISTORY  4/23/2015    HARDWARE REMOVAL LEFT FEMUR, INSERTION OF ANTIBIOTIC SPACER    PATELLA SURGERY  7/11/13    fractues rt patella     [place] COLON CA SCRN NOT  [address] IND Left 1/24/2018    COLONOSCOPY performed by Dariana Matthews MD at [address] [place] [address] TAG REMOVAL  9/25/12    mole removal    SLEEVE GASTRECTOMY  09/15/2015    Robotic    TOENAIL EXCISION      removal of great toe nails bilateral-still has toenails-had ingrown toenails and had trimmed out     TONSILLECTOMY      UPPER GASTROINTESTINAL ENDOSCOPY         Medications Prior to Admission:    Prior to Admission medications    Medication Sig Start Date End Date Taking?  Authorizing Provider   HUMALOG MIX 75/25 KWIKPEN (75-25) 100 UNIT/ML SUPN injection pen inject 30 units every morning and 50 units at bedtime  Patient taking differently: inject 30 units every morning and 47 units at bedtime 5/6/19  Yes ARNOL Appiah CNP   ferrous sulfate 325 (65 Fe) MG EC tablet Take 1 tablet by mouth 3 times daily (with meals) 4/18/19  Yes ARNOL Appiah CNP   folic acid (FOLVITE) 1 MG tablet Take 1 tablet by mouth daily 4/18/19  Yes ARNOL Appiah CNP   CARTIA  MG extended release capsule take 1 capsule by mouth once daily 4/8/19  Yes Aleksandar Pérez MD   pantoprazole (PROTONIX) 40 MG tablet take 1 tablet (40 mg) by oral route once daily 4/5/19  Yes ARNOL Appiah CNP   hydrALAZINE (APRESOLINE) 25 MG tablet take 1 tablet by mouth three times a day 3/19/19  Yes Fredrick Richter MD   metoprolol tartrate (LOPRESSOR) 25 MG tablet take 1/2 tablet by mouth twice a day 1/28/19  Yes Aleksandar Pérez MD   RA ASPIRIN EC 81 MG EC tablet take 1 tablet by mouth once daily 1/3/19  Yes Aleksandar Pérez MD   atorvastatin (LIPITOR) 40 MG tablet take 1 tablet by mouth at bedtime 12/17/18  Yes ARNOL Smart CNP   isosorbide mononitrate (IMDUR) 30 MG extended release tablet take 1 tablet by mouth once daily 11/19/18  Yes Aleksandar Pérez MD   DULoxetine (CYMBALTA) 60 MG extended release capsule take 1 capsule by mouth once daily 8/7/18  Yes ARNOL Appiah CNP   amitriptyline (ELAVIL) 10 MG tablet take 1 tablet by mouth once daily 7/3/18  Yes ARNOL Appiah CNP   fenofibrate (TRICOR) 145 MG tablet take 1 tablet by mouth once daily 7/3/18  Yes ARNOL Appiah CNP   Calcium Carbonate-Vitamin D (CALCIUM-VITAMIN D) Value Date    NITRU NEGATIVE 05/17/2019    WBCUA 0-2 05/17/2019    BACTERIA NONE 05/17/2019    RBCUA 0-2 05/17/2019    BLOODU NEGATIVE 05/17/2019    SPECGRAV 1.020 04/10/2019    GLUCOSEU NEGATIVE 05/17/2019       Radiology: I have reviewed the radiology reports with the following interpretation:     CT FEMUR LEFT WO CONTRAST   Final Result   The distal aspect of the patient's intramedullary royer is discontinuous and appears to be broken. While there is bony remodeling from the previous surgical changes, there is also a comminuted appearance of the osseous structures which may represent acute    comminuted fractures. There is also a joint effusion. **This report has been created using voice recognition software. It may contain minor errors which are inherent in voice recognition technology. **      Final report electronically signed by Dr Chantelle Haque on 5/31/2019 4:53 PM      XR KNEE LEFT (MIN 4 VIEWS)   Final Result    IMPRESSION:   Postoperative changes of the distal femur with marked bony remodeling and osteopenia is no obvious dislocation. . Evaluation of the distal femur is limited. If there is clinical concern for acute fracture further evaluation may be warranted. **This report has been created using voice recognition software. It may contain minor errors which are inherent in voice recognition technology. **      Final report electronically signed by Dr. Katelyn Dillard on 5/31/2019 3:26 PM      XR HIP 2-3 VW W PELVIS RIGHT   Final Result   1. No acute fracture. **This report has been created using voice recognition software. It may contain minor errors which are inherent in voice recognition technology. **      Final report electronically signed by Dr. Katelyn Dillard on 5/31/2019 12:13 PM      XR LUMBAR SPINE (2-3 VIEWS)   Final Result   Advanced degenerative changes lumbar spine. No acute fracture. **This report has been created using voice recognition software. It may contain minor errors which are inherent in voice recognition technology. **      Final report electronically signed by Dr. Zuly Nolan on 5/31/2019 12:06 PM      XR KNEE RIGHT (MIN 4 VIEWS)   Final Result   Advanced tricompartmental degenerative changes. No acute fracture. Mild soft tissue swelling over the anterior knee. **This report has been created using voice recognition software. It may contain minor errors which are inherent in voice recognition technology. **      Final report electronically signed by Dr. Zuly Nolan on 5/31/2019 12:20 PM      XR FEMUR LEFT (MIN 2 VIEWS)   Final Result   Old fracture deformity of the distal femur with postsurgical changes. Femoral head is normally located without fracture. Final report electronically signed by Dr. Zuly Nolan on 5/31/2019 12:11 PM             EKG:  I have reviewed the EKG with the following interpretation:  pending      DVT prophylaxis: Lovenox    Code Status: Full Code    PT/OT Eval Status: Active and ongoing      ASSESSMENT:    Active Hospital Problems    Diagnosis Date Noted    Hypertension [I10]      Priority: Medium    Knee pain, left [M25.562] 05/31/2019    DM (diabetes mellitus) ([facility] [address] [E11.9]        PLAN:    1. DM II   - hypoglycemia protocol in place, continue home insulin  - carb control diet, POCT glucose ac/hs, BS of 105    2. HTN  - continue home medication Lopressor and Hydralazine  - 120/78, monitor      Continue home meds      Thank you for the consultation.     Electronically signed by Yesi Figueroa PA-C on 5/31/2019 at 7:33 PM

## 2019-05-31 NOTE — PROGRESS NOTES
Pt arrived in [place] [address] from ED. Complaints: left leg pain. The best day to schedule a follow up Dr appointment is:  Monday, Tuesday, Wednesday, Thursday and Friday a.m.       The patient is interested in [facility]. [facility] meds to [facility] program?:  No

## 2019-05-31 NOTE — CARE COORDINATION
Brief ED Social Work Assessment  5/31/19, 2:48 PM    Got called to the ED by provider as patient is unable to ambulate and requesting ECF placement. Spoke with patient and she is requested [facility] for her ECF placement. Spoke with Andre Chapman at [facility]VIERTEL [facility] and he is reviewing the patient. He is aware that patient would be coming Medicaid as does not meet criteria for admission at this time.

## 2019-06-01 LAB
BASOPHILS # BLD: 0.5 %
BASOPHILS ABSOLUTE: 0 THOU/MM3 (ref 0–0.1)
EOSINOPHIL # BLD: 5.3 %
EOSINOPHILS ABSOLUTE: 0.2 THOU/MM3 (ref 0–0.4)
ERYTHROCYTE [DISTWIDTH] IN BLOOD BY AUTOMATED COUNT: 14.4 % (ref 11.5–14.5)
ERYTHROCYTE [DISTWIDTH] IN BLOOD BY AUTOMATED COUNT: 54.1 FL (ref 35–45)
GLUCOSE BLD-MCNC: 117 MG/DL (ref 70–108)
GLUCOSE BLD-MCNC: 180 MG/DL (ref 70–108)
GLUCOSE BLD-MCNC: 204 MG/DL (ref 70–108)
GLUCOSE BLD-MCNC: 67 MG/DL (ref 70–108)
HCT VFR BLD CALC: 29 % (ref 37–47)
HEMOGLOBIN: 9.2 GM/DL (ref 12–16)
IMMATURE GRANS (ABS): 0.01 THOU/MM3 (ref 0–0.07)
IMMATURE GRANULOCYTES: 0.2 %
INR BLD: 0.99 (ref 0.85–1.13)
LYMPHOCYTES # BLD: 25.2 %
LYMPHOCYTES ABSOLUTE: 1 THOU/MM3 (ref 1–4.8)
MCH RBC QN AUTO: 32.5 PG (ref 26–33)
MCHC RBC AUTO-ENTMCNC: 31.7 GM/DL (ref 32.2–35.5)
MCV RBC AUTO: 102.5 FL (ref 81–99)
MONOCYTES # BLD: 10.4 %
MONOCYTES ABSOLUTE: 0.4 THOU/MM3 (ref 0.4–1.3)
NUCLEATED RED BLOOD CELLS: 0 /100 WBC
PLATELET # BLD: 124 THOU/MM3 (ref 130–400)
PMV BLD AUTO: 11.2 FL (ref 9.4–12.4)
RBC # BLD: 2.83 MILL/MM3 (ref 4.2–5.4)
SEG NEUTROPHILS: 58.4 %
SEGMENTED NEUTROPHILS ABSOLUTE COUNT: 2.4 THOU/MM3 (ref 1.8–7.7)
WBC # BLD: 4.1 THOU/MM3 (ref 4.8–10.8)

## 2019-06-01 PROCEDURE — 82948 REAGENT STRIP/BLOOD GLUCOSE: CPT

## 2019-06-01 PROCEDURE — 6370000000 HC RX 637 (ALT 250 FOR IP): Performed by: PHYSICIAN ASSISTANT

## 2019-06-01 PROCEDURE — 97530 THERAPEUTIC ACTIVITIES: CPT

## 2019-06-01 PROCEDURE — 6370000000 HC RX 637 (ALT 250 FOR IP): Performed by: HOSPITALIST

## 2019-06-01 PROCEDURE — 6360000002 HC RX W HCPCS: Performed by: PHYSICIAN ASSISTANT

## 2019-06-01 PROCEDURE — 6370000000 HC RX 637 (ALT 250 FOR IP): Performed by: INTERNAL MEDICINE

## 2019-06-01 PROCEDURE — 85610 PROTHROMBIN TIME: CPT

## 2019-06-01 PROCEDURE — 97166 OT EVAL MOD COMPLEX 45 MIN: CPT

## 2019-06-01 PROCEDURE — 36415 COLL VENOUS BLD VENIPUNCTURE: CPT

## 2019-06-01 PROCEDURE — 97110 THERAPEUTIC EXERCISES: CPT

## 2019-06-01 PROCEDURE — 1200000000 HC SEMI PRIVATE

## 2019-06-01 PROCEDURE — 93010 ELECTROCARDIOGRAM REPORT: CPT | Performed by: INTERNAL MEDICINE

## 2019-06-01 PROCEDURE — 99232 SBSQ HOSP IP/OBS MODERATE 35: CPT | Performed by: HOSPITALIST

## 2019-06-01 PROCEDURE — 85025 COMPLETE CBC W/AUTO DIFF WBC: CPT

## 2019-06-01 PROCEDURE — 2580000003 HC RX 258: Performed by: PHYSICIAN ASSISTANT

## 2019-06-01 RX ORDER — DIPHENHYDRAMINE HCL 25 MG
25 TABLET ORAL EVERY 6 HOURS PRN
Status: DISCONTINUED | OUTPATIENT
Start: 2019-06-01 | End: 2019-06-03 | Stop reason: HOSPADM

## 2019-06-01 RX ORDER — CALCIUM CARBONATE 200(500)MG
500 TABLET,CHEWABLE ORAL 3 TIMES DAILY PRN
Status: DISCONTINUED | OUTPATIENT
Start: 2019-06-01 | End: 2019-06-03 | Stop reason: HOSPADM

## 2019-06-01 RX ADMIN — INSULIN LISPRO 10 UNITS: 100 INJECTION, SUSPENSION SUBCUTANEOUS at 22:37

## 2019-06-01 RX ADMIN — ASPIRIN 81 MG: 81 TABLET, COATED ORAL at 09:10

## 2019-06-01 RX ADMIN — CALCIUM CARBONATE-VITAMIN D TAB 500 MG-200 UNIT 1 TABLET: 500-200 TAB at 09:10

## 2019-06-01 RX ADMIN — METOPROLOL TARTRATE 12.5 MG: 25 TABLET ORAL at 19:41

## 2019-06-01 RX ADMIN — FERROUS SULFATE TAB 325 MG (65 MG ELEMENTAL FE) 325 MG: 325 (65 FE) TAB at 09:09

## 2019-06-01 RX ADMIN — ANTACID TABLETS 500 MG: 500 TABLET, CHEWABLE ORAL at 22:35

## 2019-06-01 RX ADMIN — HYDROCODONE BITARTRATE AND ACETAMINOPHEN 2 TABLET: 5; 325 TABLET ORAL at 00:29

## 2019-06-01 RX ADMIN — Medication 10 ML: at 19:42

## 2019-06-01 RX ADMIN — AMITRIPTYLINE HYDROCHLORIDE 10 MG: 10 TABLET, FILM COATED ORAL at 19:41

## 2019-06-01 RX ADMIN — HYDROCODONE BITARTRATE AND ACETAMINOPHEN 2 TABLET: 5; 325 TABLET ORAL at 22:35

## 2019-06-01 RX ADMIN — DIPHENHYDRAMINE HCL 25 MG: 25 TABLET ORAL at 15:19

## 2019-06-01 RX ADMIN — FERROUS SULFATE TAB 325 MG (65 MG ELEMENTAL FE) 325 MG: 325 (65 FE) TAB at 17:58

## 2019-06-01 RX ADMIN — DIPHENHYDRAMINE HCL 25 MG: 25 TABLET ORAL at 09:11

## 2019-06-01 RX ADMIN — DIPHENHYDRAMINE HCL 25 MG: 25 TABLET ORAL at 22:35

## 2019-06-01 RX ADMIN — HYDRALAZINE HYDROCHLORIDE 25 MG: 25 TABLET, FILM COATED ORAL at 00:16

## 2019-06-01 RX ADMIN — HYDRALAZINE HYDROCHLORIDE 25 MG: 25 TABLET, FILM COATED ORAL at 13:13

## 2019-06-01 RX ADMIN — ENOXAPARIN SODIUM 40 MG: 40 INJECTION SUBCUTANEOUS at 09:11

## 2019-06-01 RX ADMIN — HYDROCODONE BITARTRATE AND ACETAMINOPHEN 2 TABLET: 5; 325 TABLET ORAL at 05:24

## 2019-06-01 RX ADMIN — FENOFIBRATE 160 MG: 160 TABLET ORAL at 19:41

## 2019-06-01 RX ADMIN — HYDROCODONE BITARTRATE AND ACETAMINOPHEN 2 TABLET: 5; 325 TABLET ORAL at 17:59

## 2019-06-01 RX ADMIN — FERROUS SULFATE TAB 325 MG (65 MG ELEMENTAL FE) 325 MG: 325 (65 FE) TAB at 13:13

## 2019-06-01 RX ADMIN — HYDROCODONE BITARTRATE AND ACETAMINOPHEN 2 TABLET: 5; 325 TABLET ORAL at 09:28

## 2019-06-01 RX ADMIN — FOLIC ACID 1 MG: 1 TABLET ORAL at 09:10

## 2019-06-01 RX ADMIN — HYDRALAZINE HYDROCHLORIDE 25 MG: 25 TABLET, FILM COATED ORAL at 17:58

## 2019-06-01 RX ADMIN — ENOXAPARIN SODIUM 40 MG: 40 INJECTION SUBCUTANEOUS at 19:44

## 2019-06-01 RX ADMIN — ATORVASTATIN CALCIUM 40 MG: 40 TABLET, FILM COATED ORAL at 19:41

## 2019-06-01 RX ADMIN — CALCIUM CARBONATE-VITAMIN D TAB 500 MG-200 UNIT 1 TABLET: 500-200 TAB at 17:58

## 2019-06-01 RX ADMIN — PANTOPRAZOLE SODIUM 40 MG: 40 TABLET, DELAYED RELEASE ORAL at 09:10

## 2019-06-01 RX ADMIN — DILTIAZEM HYDROCHLORIDE 120 MG: 120 CAPSULE, EXTENDED RELEASE ORAL at 09:10

## 2019-06-01 RX ADMIN — METOPROLOL TARTRATE 12.5 MG: 25 TABLET ORAL at 09:10

## 2019-06-01 ASSESSMENT — PAIN SCALES - GENERAL
PAINLEVEL_OUTOF10: 10
PAINLEVEL_OUTOF10: 8
PAINLEVEL_OUTOF10: 8
PAINLEVEL_OUTOF10: 10
PAINLEVEL_OUTOF10: 9
PAINLEVEL_OUTOF10: 8
PAINLEVEL_OUTOF10: 8
PAINLEVEL_OUTOF10: 7
PAINLEVEL_OUTOF10: 10

## 2019-06-01 ASSESSMENT — PAIN DESCRIPTION - LOCATION
LOCATION: LEG
LOCATION: LEG;KNEE

## 2019-06-01 ASSESSMENT — PAIN DESCRIPTION - PAIN TYPE
TYPE: ACUTE PAIN

## 2019-06-01 ASSESSMENT — PAIN DESCRIPTION - ORIENTATION
ORIENTATION: LEFT
ORIENTATION: LEFT

## 2019-06-01 ASSESSMENT — PAIN DESCRIPTION - DESCRIPTORS: DESCRIPTORS: SHARP;STABBING

## 2019-06-01 NOTE — PROGRESS NOTES
[facility]PATIENT OCCUP[facility] THERAP[facility]  [facility]  EVALUATION    Time:  Time In: 6580  Time Out: 1012  Timed Code Treatment Minutes: 45 Minutes  Minutes: 46          Date: 2019  Patient Name: Mary Anne Smith,   Gender: female      MRN: [id number]  : 1955  (58 y.o.)  Referring Practitioner: Chidi Gaspar PA-C  Diagnosis: Knne pain, left  Additional Pertinent Hx: 58 y.o. female  who presents with Left knee pain, right knee pain, b/l hip pain and lower back pain. She reports a fall yesterday when her foot caught on the door jam and she fell forward, landing on her knees. She reports the most sever pain is in her left knee, which she has fractured in the past. She had a left distal femur fracture that was repaired with a royer, then subsequently had the royer removed by Dr. Charlyn Fothergill in . States she has always had some knee pain since the surgery but it is increased from her baseline pain today. She is unable to ambulate on the LLE.      Restrictions/Precautions:  Weight Bearing, Fall Risk, General Precautions           Left Lower Extremity Weight Bearing: Non Weight Bearing           Left Lower Extremity Brace: Boot(long leg splint, await KI from [facility] and Limb)    Past Medical History:   Diagnosis Date    CRI (chronic renal insufficiency)     Difficult intubation     excess skin in back of throat     DM (diabetes mellitus) (HCC)     GERD (gastroesophageal reflux disease)     Hyperlipidemia     Hypertension     Hypothyroidism     Neuropathy     Obesity     Osteoarthritis     Prolonged emergence from general anesthesia     Sleep apnea     uses cpap    Visit for screening mammogram      Past Surgical History:   Procedure Laterality Date    BACK SURGERY      xs 2    CATARACT REMOVAL Right 14    Dr. Sourav Walsh EKG 12-LEAD  2015         FOOT SURGERY      left foot    HYSTERECTOMY, TOTAL ABDOMINAL      MOUTH BIOPSY  12    left cues to attend to task         Sensation  Overall Sensation Status: (pt endorses numbness and tingling to bilat legs)               LUE AROM (degrees)  LUE AROM : WFL          RUE AROM (degrees)  RUE AROM : WFL       LUE Strength  LUE Strength Comment: grossly 3+/5; deconditioning noted        RUE Strength  RUE Strength Comment: grossly 3+/5; deconditioning noted              ADL  Feeding: Setup(breakfast tray)  Additional Comments: pt declined further ADLs     Bed mobility  Supine to Sit: Moderate assistance  Sit to Supine: Maximum assistance  Scooting: Maximal assistance;2 Person assistance(up in bed; Mod A to get to EOB)  Comment: pt required extended time for all tasks with frequent rest breaks and cues for technique    Transfers  Transfer Comments: OTR to assess    Balance  Sitting Balance: Moderate assistance(for supporting LLE, otherwise CGA)  Standing Balance: Unable to assess(comment)     Time: pt sat at EOB greater than 15 mins with heavy BUE support     Functional Mobility  Functional Mobility Comments: OTR to assess as able                Activity Tolerance:  Activity Tolerance: Patient limited by pain  Activity Tolerance: self limiting; pt refused assessment of transfers, mobility or OOB activities    Treatment Initiated:  Pt required significantly extended time for all tasks with frequent rest breaks demonstrated poor endurance and limited pain tolerance for limited activity with sitting at EOB. Pt required significant assist for LLE d/t pt reporting extreme pain. Pt education provided in length re: progression of therapy and discharge planning with pt demonstrating decreased insight to the progress of therapy however verbalizing understanding to education provided.      Assessment:  Assessment: Pt would continue to benefit from skilled OT intervention to maximize pt safety and independence with performing self care tasks and functional mobility following mechanical fall and femur fracture and to ensure safe transition to the next level of care and return to OF. Performance deficits / Impairments: Decreased functional mobility , Decreased endurance, Decreased ADL status, Decreased strength, Decreased balance, Decreased safe awareness  Prognosis: Fair    Clinical Decision Making: Clinical Decision making was of Moderate Complexity as the result of analysis of data from a detailed assessment, a consideration of several treatment options, the presence of comorbidities affecting the plan of care and the need for minimal to moderate modifications or assistance required to complete the evaluation. Discharge Recommendations:  Patient would benefit from continued therapy after discharge, ECF with OT    Patient Education:  Patient Education: OT role, POC, goals, safety, progression of therapy and goal setting  Barriers to Learning: motivation    Equipment Recommendations:  Equipment Needed: No  Other: defer    Safety:  Safety Devices in place: Yes  Type of devices:  All fall risk precautions in place, Patient at risk for falls, Bed alarm in place, Left in bed, Call light within reach, Nurse notified       Plan:  Times per week: 5x  Current Treatment Recommendations: Strengthening, Balance Training, Functional Mobility Training, Endurance Training, Home Management Training, Self-Care / ADL, Patient/Caregiver Education & Training, Safety Education & Training    Goals:  Patient goals : \"to live\"    Short term goals  Time Frame for Short term goals: By d/c  Short term goal 1: Pt to tolerate further assessment of functional moblity and transfers by OTR  Short term goal 2: Pt to demonstrate dynamic sitting balance with occasional BUE release with no greater than CGA for inc indep with self cares  Short term goal 3: Pt to demonstrate BUE strengthening HEP x12 reps for increasing functional strength and activity tolerance required for BADL routine  Long term goals  Time Frame for Long term goals : NA d/t ELOS    Evaluation Complexity: Based on the findings of patient history, examination, clinical presentation, and decision making during this evaluation, this patient is of medium complexity.

## 2019-06-01 NOTE — H&P
History and Physical    CHIEF COMPLAINT:  Left knee pain    HISTORY OF PRESENT ILLNESS:      The patient is a 58 y.o. female  who presents with Left knee pain, right knee pain, b/l hip pain and lower back pain. She reports a fall yesterday when her foot caught on the door jam and she fell forward, landing on her knees. She reports the most sever pain is in her left knee, which she has fractured in the past. She had a left distal femur fracture that was repaired with a royer, then subsequently had the royer removed by Dr. Nico Cai in 2015. States she has always had some knee pain since the surgery but it is increased from her baseline pain today. She is unable to ambulate on the LLE.      Past Medical History:    Past Medical History:   Diagnosis Date    CRI (chronic renal insufficiency)     Difficult intubation     excess skin in back of throat     DM (diabetes mellitus) (HCC)     GERD (gastroesophageal reflux disease)     Hyperlipidemia     Hypertension     Hypothyroidism     Neuropathy     Obesity     Osteoarthritis     Prolonged emergence from general anesthesia     Sleep apnea     uses cpap    Visit for screening mammogram        Past Surgical History:    Past Surgical History:   Procedure Laterality Date    BACK SURGERY      xs 2    CATARACT REMOVAL Right 7/24/14    Dr. Meng Dickey EKG 12-LEAD  9/18/2015         FOOT SURGERY      left foot    HYSTERECTOMY, TOTAL ABDOMINAL      MOUTH BIOPSY  9-28-12    left tongue and lingual tonsil    OTHER SURGICAL HISTORY  11/8/2014    LEFT FEMUR RETROGRADE NAILING    OTHER SURGICAL HISTORY  4/23/2015    HARDWARE REMOVAL LEFT FEMUR, INSERTION OF ANTIBIOTIC SPACER    PATELLA SURGERY  7/11/13    fractues rt patella     [place] COLON CA SCRN NOT  [address] IND Left 1/24/2018    COLONOSCOPY performed by Nasra Aranda MD at [address] [facility] TAG REMOVAL  9/25/12    mole removal    SLEEVE GASTRECTOMY  09/15/2015    Robotic    TOENAIL ACE   40 mg at 05/31/19 2214    glucose (GLUTOSE) 40 % oral gel 15 g  15 g Oral PRN Guillermina Mcarthur PA-C        dextrose 50 % solution 12.5 g  12.5 g Intravenous PRN Guillermina Mcarthur PA-C        glucagon (rDNA) injection 1 mg  1 mg Intramuscular PRN Guillermina Mcarthur PA-C        dextrose 5 % solution  100 mL/hr Intravenous PRN Guillermina Mcarthur PA-C        insulin lispro protamine & lispro (HUMALOG MIX) (75-25) 100 UNIT per ML injection vial SUSP 30 Units  30 Units Subcutaneous QAM Guillermina Mcarthur PA-C        insulin lispro protamine & lispro (HUMALOG MIX) (75-25) 100 UNIT per ML injection vial SUSP 47 Units  47 Units Subcutaneous Nightly Guillermina Mcarthur PA-C   47 Units at 05/31/19 2225    amitriptyline (ELAVIL) tablet 10 mg  10 mg Oral Nightly Guillermina Mcarthur PA-C   10 mg at 05/31/19 2241    calcium-vitamin D (OSCAL-500) 500-200 MG-UNIT per tablet 1 tablet  1 tablet Oral BID WC Guillermina Mcarthur PA-C   1 tablet at 05/31/19 2241    diltiazem (CARDIZEM CD) extended release capsule 120 mg  120 mg Oral Daily Guillermina Mcarthur PA-C        fenofibrate tablet 160 mg  160 mg Oral Daily Guillermina Mcarthur PA-C   160 mg at 05/31/19 2241    hydrALAZINE (APRESOLINE) tablet 25 mg  25 mg Oral 4 times per day Guillermina Mcarthur PA-C   25 mg at 06/01/19 0016    pantoprazole (PROTONIX) tablet 40 mg  40 mg Oral QAM AC Guillermina Mcarthur PA-C             Allergies:  Patient has no known allergies.     Social History:   Negative for tobacco use, alcohol abuse and illicit drug abuse    Family History:  Family History   Problem Relation Age of Onset    Asthma Sister     Asthma Brother     Heart Disease Father     High Blood Pressure Other     Cancer Maternal Uncle         stomach    Diabetes Maternal Grandmother     High Blood Pressure Maternal Grandmother     Diabetes Maternal Grandfather     Stroke Neg Hx        REVIEW OF SYSTEMS:  Gen: Negative for nausea, vomiting, diarrhea, fever, chills, night sweats  HEENT: Negative for double vision, blurred vision, sore throat   Heart: Negative for HTN, palpitations, chest pain  Lungs: Negative for wheezes, asthma or SOB  GI: Negative for nausea, vomiting  : Negative for dysuria, hematuria  Endo: Negative for diabetes, thyroid disorders  Heme: Negative for DVT or bleeding disorders  Psych: Negative for Depression or anxiety  Ortho: Positive for pain in the b/l hips and b/l knees, swelling in the left knee. PHYSICAL EXAM:  Vitals:    06/01/19 0900   BP: (!) 158/67   Pulse: 80   Resp: 16   Temp:    SpO2: 93%     Gen: alert and oriented  Head: normorcephalic, atraumatic  Neck: supple  Heart: RRR  Lungs: No audible wheezes  Abdomen: soft  Pelvis: stable  Extremity Left kne: is splinted and wrapped. Decreased rom and painful rom. Tender to palpation throughout the knee joint. Right knee: some tenderness to palpation. Normal range of motion. Right hip: some tenderness to palpation, minimally painful rom. Left hip: some tenderness to palpation. Pain in the left knee when she tries to flex the left hip, not painful in the hip. DATA:  CBC:   Lab Results   Component Value Date    WBC 4.1 06/01/2019    RBC 2.83 06/01/2019    HGB 9.2 06/01/2019    HCT 29.0 06/01/2019     06/01/2019     BMP:   Lab Results   Component Value Date     05/31/2019    K 4.5 05/31/2019    K 4.4 05/17/2019     05/31/2019    CO2 26 05/31/2019    BUN 26 05/31/2019    CREATININE 1.6 05/31/2019    CALCIUM 9.9 05/31/2019    GLUCOSE 105 05/31/2019    GLUCOSE 213 03/27/2012     PT/INR:   Lab Results   Component Value Date    INR 0.99 06/01/2019         Radiology: See electronic record to view reports. Reports reviewed. CT Left femur  Impression   The distal aspect of the patient's intramedullary royer is discontinuous and appears to be broken.  While there is bony remodeling from the previous surgical changes, there is also a comminuted appearance of the osseous structures which may represent acute    comminuted fractures. There is also a joint effusion. Xray Left knee     Impression    IMPRESSION:   Postoperative changes of the distal femur with marked bony remodeling and osteopenia is no obvious dislocation. . Evaluation of the distal femur is limited. If there is clinical concern for acute fracture further evaluation may be warranted. Xray Right knee  Impression   Advanced tricompartmental degenerative changes. No acute fracture. Mild soft tissue swelling over the anterior knee. Xray AP pelvis and right hip  Impression   1. No acute fracture. ASSESSMENT:Active Problems:    Hypertension    DM (diabetes mellitus) ([address]    Knee pain, left  Resolved Problems:    * No resolved hospital problems. *     1. Left distal fracture    PLAN:  1)  non operative treatment of the left distal femur with Left custom knee immobilizer from [facility].    2)  Continue current pain control  3)  Continue medical management from hospitalist  4) TAVO TAYLOR   5)  PT and OT consults      Electronically signed by Mary Romero PA-C on 6/1/2019 at 9:07 AM

## 2019-06-01 NOTE — PROGRESS NOTES
Hospitalist Progress Note    Patient:  Andreea Vargas      Unit/Bed:7K-28/028-A    YOB: 1955    MRN: [id number]       Acct: [de-identified]     PCP: ARNOL Appiah CNP    Date of Admission: 5/31/2019    Assessment/Plan:    59 y/o female w/ h/o DMII and HTN is admitted for left distal femur, consulted for medical management. 1) DMII: Blood glucose in the 60s. Decrease humalog. Monitor. 2) HTN: -140s. C/w meds. 3) Left distal femur: Non-operative management. Pain controlled. Followed by ortho. Medications:  Reviewed    Infusion Medications    sodium chloride 125 mL/hr at 05/31/19 2243    dextrose       Scheduled Medications    enoxaparin  40 mg Subcutaneous BID    insulin lispro prot & lispro  40 Units Subcutaneous Nightly    sodium chloride flush  10 mL Intravenous 2 times per day    metoprolol tartrate  12.5 mg Oral BID    aspirin  81 mg Oral Daily    folic acid  1 mg Oral Daily    ferrous sulfate  325 mg Oral TID WC    atorvastatin  40 mg Oral Nightly    insulin lispro prot & lispro  30 Units Subcutaneous QAM    amitriptyline  10 mg Oral Nightly    calcium-vitamin D  1 tablet Oral BID WC    diltiazem  120 mg Oral Daily    fenofibrate  160 mg Oral Daily    hydrALAZINE  25 mg Oral 4 times per day    pantoprazole  40 mg Oral QAM AC     PRN Meds: diphenhydrAMINE, sodium chloride flush, acetaminophen, morphine **OR** morphine, magnesium hydroxide, ondansetron, HYDROcodone 5 mg - acetaminophen **OR** HYDROcodone 5 mg - acetaminophen, glucose, dextrose, glucagon (rDNA), dextrose      Intake/Output Summary (Last 24 hours) at 6/1/2019 1837  Last data filed at 6/1/2019 1320  Gross per 24 hour   Intake 1538.7 ml   Output --   Net 1538.7 ml       Diet:  DIET CARB CONTROL;     Exam:  BP (!) 142/77   Pulse 85   Temp 98.6 °F (37 °C) (Oral)   Resp 16   Ht 5' 9\" (1.753 m)   Wt (!) 340 lb (154.2 kg)   LMP 02/20/2001   SpO2 90%   BMI 50.21 kg/m²     General appearance: No apparent distress, appears stated age and cooperative. HEENT: Pupils equal, round, and reactive to light. Conjunctivae/corneas clear. Neck: Supple, with full range of motion. No jugular venous distention. Trachea midline. Respiratory:  Normal respiratory effort. Clear to auscultation, bilaterally without Rales/Wheezes/Rhonchi. Cardiovascular: Regular rate and rhythm with normal S1/S2 without murmurs, rubs or gallops. Abdomen: Soft, non-tender, non-distended with normal bowel sounds. Musculoskeletal: passive and active ROM x 4 extremities. Skin: Skin color, texture, turgor normal.  No rashes or lesions. Neurologic:  Neurovascularly intact without any focal sensory/motor deficits. Cranial nerves: II-XII intact, grossly non-focal.  Psychiatric: Alert and oriented, thought content appropriate, normal insight  Capillary Refill: Brisk,< 3 seconds   Peripheral Pulses: +2 palpable, equal bilaterally       Labs:   Recent Labs     05/31/19  1419 06/01/19  0641   WBC 7.8 4.1*   HGB 10.9* 9.2*   HCT 34.3* 29.0*    124*     Recent Labs     05/31/19  1419      K 4.5      CO2 26   BUN 26*   CREATININE 1.6*   CALCIUM 9.9     Recent Labs     05/31/19  1419   AST 49*   ALT 22   BILITOT 0.5   ALKPHOS 63     Recent Labs     06/01/19  0641   INR 0.99     No results for input(s): Jad Deanadouglas in the last 72 hours. Microbiology:      Urinalysis:      Lab Results   Component Value Date    NITRU NEGATIVE 05/17/2019    WBCUA 0-2 05/17/2019    BACTERIA NONE 05/17/2019    RBCUA 0-2 05/17/2019    BLOODU NEGATIVE 05/17/2019    SPECGRAV 1.020 04/10/2019    GLUCOSEU NEGATIVE 05/17/2019       Radiology:  CT FEMUR LEFT WO CONTRAST   Final Result   The distal aspect of the patient's intramedullary royer is discontinuous and appears to be broken.  While there is bony remodeling from the previous surgical changes, there is also a comminuted appearance of the osseous structures which may represent acute comminuted fractures. There is also a joint effusion. **This report has been created using voice recognition software. It may contain minor errors which are inherent in voice recognition technology. **      Final report electronically signed by Dr Cole Kovacs on 5/31/2019 4:53 PM      XR KNEE LEFT (MIN 4 VIEWS)   Final Result    IMPRESSION:   Postoperative changes of the distal femur with marked bony remodeling and osteopenia is no obvious dislocation. . Evaluation of the distal femur is limited. If there is clinical concern for acute fracture further evaluation may be warranted. **This report has been created using voice recognition software. It may contain minor errors which are inherent in voice recognition technology. **      Final report electronically signed by Dr. Yao Gresham on 5/31/2019 3:26 PM      XR HIP 2-3 VW W PELVIS RIGHT   Final Result   1. No acute fracture. **This report has been created using voice recognition software. It may contain minor errors which are inherent in voice recognition technology. **      Final report electronically signed by Dr. Yao Gresham on 5/31/2019 12:13 PM      XR LUMBAR SPINE (2-3 VIEWS)   Final Result   Advanced degenerative changes lumbar spine. No acute fracture. **This report has been created using voice recognition software. It may contain minor errors which are inherent in voice recognition technology. **      Final report electronically signed by Dr. Yao Gresham on 5/31/2019 12:06 PM      XR KNEE RIGHT (MIN 4 VIEWS)   Final Result   Advanced tricompartmental degenerative changes. No acute fracture. Mild soft tissue swelling over the anterior knee. **This report has been created using voice recognition software. It may contain minor errors which are inherent in voice recognition technology. **      Final report electronically signed by Dr. aYo Gresham on 5/31/2019 12:20 PM      XR FEMUR LEFT (MIN 2 VIEWS)   Final Result   Old fracture deformity of the distal femur with postsurgical changes. Femoral head is normally located without fracture.       Final report electronically signed by Dr. Kehinde Espinoza on 5/31/2019 12:11 PM          Active Hospital Problems    Diagnosis Date Noted    Hypertension [I10]      Priority: Medium    Knee pain, left [M25.562] 05/31/2019    [facility] [E11.9]        Electronically signed by Josh Molina MD on 6/1/2019 at 6:37 PM

## 2019-06-01 NOTE — PROGRESS NOTES
Order faxed to [facility] and [facility] for custom knee immobilizer. They will see patient early Monday 6/3.

## 2019-06-01 NOTE — PLAN OF CARE
Problem: Falls - Risk of:  Goal: Will remain free from falls  Description  Will remain free from falls  Outcome: Ongoing  Note:   Patient has remained free of falls during this shift. Appropriate fall prevention measures in place. Patient is compliant with using call light for assistance when needed. Goal: Absence of physical injury  Description  Absence of physical injury  Outcome: Ongoing  Note:   Patient has remained free of physical injury during this shift. Safe environment provided, call light within reach, and hourly rounding completed. Problem: Pain:  Goal: Pain level will decrease  Description  Pain level will decrease  Outcome: Ongoing  Note:   Patient complains of pain in the left leg with a rating of 7-8 on 0-10 scale. PRN pain medications given as ordered along with ice and repositioning. Patient's stated pain goal is 4. Goal: Control of acute pain  Description  Control of acute pain  Outcome: Ongoing  Note:   Patient complains of pain in the left leg with a rating of 7-8 on 0-10 scale. PRN pain medications given as ordered along with ice and repositioning. Patient's stated pain goal is 4. Problem: Cardiovascular  Goal: No DVT, peripheral vascular complications  Outcome: Ongoing  Note:   Patient without S/SX of DVT. Patient has SCD in place to help prevent development of DVT. Problem: GI  Goal: No bowel complications  Outcome: Ongoing  Note:   Bowel sounds active x 4. Patient is passing gas. Colace given as ordered. No BM yet this shift. Problem:   Goal: Adequate urinary output  Outcome: Ongoing  Note:   Patient is voiding adequate amounts of clear, yellow urine during this shift. Problem: Nutrition  Goal: Optimal nutrition therapy  Outcome: Ongoing  Note:   Patient is on a general diet. Denies any nausea or vomiting during this shift. Tolerates diet well.       Problem: Skin Integrity/Risk  Goal: No skin breakdown during hospitalization  Outcome: Ongoing  Note:   No

## 2019-06-02 LAB
GLUCOSE BLD-MCNC: 229 MG/DL (ref 70–108)
GLUCOSE BLD-MCNC: 247 MG/DL (ref 70–108)
GLUCOSE BLD-MCNC: 264 MG/DL (ref 70–108)
GLUCOSE BLD-MCNC: 264 MG/DL (ref 70–108)

## 2019-06-02 PROCEDURE — 97163 PT EVAL HIGH COMPLEX 45 MIN: CPT

## 2019-06-02 PROCEDURE — 2580000003 HC RX 258: Performed by: PHYSICIAN ASSISTANT

## 2019-06-02 PROCEDURE — 6370000000 HC RX 637 (ALT 250 FOR IP): Performed by: PHYSICIAN ASSISTANT

## 2019-06-02 PROCEDURE — 97530 THERAPEUTIC ACTIVITIES: CPT

## 2019-06-02 PROCEDURE — 6370000000 HC RX 637 (ALT 250 FOR IP): Performed by: INTERNAL MEDICINE

## 2019-06-02 PROCEDURE — 99232 SBSQ HOSP IP/OBS MODERATE 35: CPT | Performed by: HOSPITALIST

## 2019-06-02 PROCEDURE — 6370000000 HC RX 637 (ALT 250 FOR IP): Performed by: HOSPITALIST

## 2019-06-02 PROCEDURE — 1200000000 HC SEMI PRIVATE

## 2019-06-02 PROCEDURE — 82948 REAGENT STRIP/BLOOD GLUCOSE: CPT

## 2019-06-02 PROCEDURE — 6360000002 HC RX W HCPCS: Performed by: PHYSICIAN ASSISTANT

## 2019-06-02 PROCEDURE — 97110 THERAPEUTIC EXERCISES: CPT

## 2019-06-02 RX ORDER — MECLIZINE HYDROCHLORIDE CHEWABLE TABLETS 25 MG/1
25 TABLET, CHEWABLE ORAL 3 TIMES DAILY PRN
Status: DISCONTINUED | OUTPATIENT
Start: 2019-06-02 | End: 2019-06-03 | Stop reason: HOSPADM

## 2019-06-02 RX ADMIN — Medication 10 ML: at 19:53

## 2019-06-02 RX ADMIN — HYDRALAZINE HYDROCHLORIDE 25 MG: 25 TABLET, FILM COATED ORAL at 17:11

## 2019-06-02 RX ADMIN — DILTIAZEM HYDROCHLORIDE 120 MG: 120 CAPSULE, EXTENDED RELEASE ORAL at 10:22

## 2019-06-02 RX ADMIN — DIPHENHYDRAMINE HCL 25 MG: 25 TABLET ORAL at 10:22

## 2019-06-02 RX ADMIN — CALCIUM CARBONATE-VITAMIN D TAB 500 MG-200 UNIT 1 TABLET: 500-200 TAB at 10:22

## 2019-06-02 RX ADMIN — HYDROCODONE BITARTRATE AND ACETAMINOPHEN 2 TABLET: 5; 325 TABLET ORAL at 19:54

## 2019-06-02 RX ADMIN — HYDRALAZINE HYDROCHLORIDE 25 MG: 25 TABLET, FILM COATED ORAL at 05:31

## 2019-06-02 RX ADMIN — INSULIN LISPRO 20 UNITS: 100 INJECTION, SUSPENSION SUBCUTANEOUS at 23:13

## 2019-06-02 RX ADMIN — ENOXAPARIN SODIUM 40 MG: 40 INJECTION SUBCUTANEOUS at 21:55

## 2019-06-02 RX ADMIN — METOPROLOL TARTRATE 12.5 MG: 25 TABLET ORAL at 10:21

## 2019-06-02 RX ADMIN — ATORVASTATIN CALCIUM 40 MG: 40 TABLET, FILM COATED ORAL at 19:54

## 2019-06-02 RX ADMIN — FERROUS SULFATE TAB 325 MG (65 MG ELEMENTAL FE) 325 MG: 325 (65 FE) TAB at 17:11

## 2019-06-02 RX ADMIN — ASPIRIN 81 MG: 81 TABLET, COATED ORAL at 10:22

## 2019-06-02 RX ADMIN — ENOXAPARIN SODIUM 40 MG: 40 INJECTION SUBCUTANEOUS at 10:22

## 2019-06-02 RX ADMIN — CALCIUM CARBONATE-VITAMIN D TAB 500 MG-200 UNIT 1 TABLET: 500-200 TAB at 17:11

## 2019-06-02 RX ADMIN — HYDRALAZINE HYDROCHLORIDE 25 MG: 25 TABLET, FILM COATED ORAL at 00:26

## 2019-06-02 RX ADMIN — INSULIN LISPRO 30 UNITS: 100 INJECTION, SUSPENSION SUBCUTANEOUS at 10:23

## 2019-06-02 RX ADMIN — METOPROLOL TARTRATE 25 MG: 25 TABLET ORAL at 21:53

## 2019-06-02 RX ADMIN — FENOFIBRATE 160 MG: 160 TABLET ORAL at 19:53

## 2019-06-02 RX ADMIN — HYDROCODONE BITARTRATE AND ACETAMINOPHEN 2 TABLET: 5; 325 TABLET ORAL at 15:21

## 2019-06-02 RX ADMIN — FERROUS SULFATE TAB 325 MG (65 MG ELEMENTAL FE) 325 MG: 325 (65 FE) TAB at 10:22

## 2019-06-02 RX ADMIN — DIPHENHYDRAMINE HCL 25 MG: 25 TABLET ORAL at 19:54

## 2019-06-02 RX ADMIN — FOLIC ACID 1 MG: 1 TABLET ORAL at 10:22

## 2019-06-02 RX ADMIN — HYDROCODONE BITARTRATE AND ACETAMINOPHEN 2 TABLET: 5; 325 TABLET ORAL at 10:22

## 2019-06-02 RX ADMIN — Medication 10 ML: at 12:51

## 2019-06-02 RX ADMIN — HYDRALAZINE HYDROCHLORIDE 25 MG: 25 TABLET, FILM COATED ORAL at 12:49

## 2019-06-02 RX ADMIN — HYDRALAZINE HYDROCHLORIDE 25 MG: 25 TABLET, FILM COATED ORAL at 23:23

## 2019-06-02 RX ADMIN — AMITRIPTYLINE HYDROCHLORIDE 10 MG: 10 TABLET, FILM COATED ORAL at 19:54

## 2019-06-02 RX ADMIN — PANTOPRAZOLE SODIUM 40 MG: 40 TABLET, DELAYED RELEASE ORAL at 05:32

## 2019-06-02 ASSESSMENT — PAIN DESCRIPTION - PROGRESSION: CLINICAL_PROGRESSION: NOT CHANGED

## 2019-06-02 ASSESSMENT — PAIN SCALES - GENERAL
PAINLEVEL_OUTOF10: 8
PAINLEVEL_OUTOF10: 10
PAINLEVEL_OUTOF10: 9
PAINLEVEL_OUTOF10: 6
PAINLEVEL_OUTOF10: 6
PAINLEVEL_OUTOF10: 10

## 2019-06-02 ASSESSMENT — PAIN DESCRIPTION - PAIN TYPE: TYPE: ACUTE PAIN

## 2019-06-02 ASSESSMENT — PAIN DESCRIPTION - ONSET: ONSET: ON-GOING

## 2019-06-02 ASSESSMENT — PAIN DESCRIPTION - DESCRIPTORS: DESCRIPTORS: ACHING;DISCOMFORT

## 2019-06-02 ASSESSMENT — PAIN DESCRIPTION - LOCATION: LOCATION: KNEE

## 2019-06-02 ASSESSMENT — PAIN DESCRIPTION - ORIENTATION: ORIENTATION: LEFT

## 2019-06-02 ASSESSMENT — PAIN DESCRIPTION - FREQUENCY: FREQUENCY: CONTINUOUS

## 2019-06-02 NOTE — PROGRESS NOTES
[facility]  INPATIENT PHYSICAL THERAPY  EVALUATION  [facility] ORTHOPEDICS 7K - 7K-28/028-A    Time In: 1100  Time Out: 1135  Timed Code Treatment Minutes: 23 Minutes  Minutes: 35          Date: 2019  Patient Name: Kit Zelaya,  Gender:  female        MRN: [id number]  : 1955  (58 y.o.)      Referring Practitioner: Miah Powell PA-C  Diagnosis: Knee pain, left  Additional Pertinent Hx: The patient is a 58 y.o. female  who presents with Left knee pain, right knee pain, b/l hip pain and lower back pain. She reports a fall yesterday when her foot caught on the door jam and she fell forward, landing on her knees. She reports the most sever pain is in her left knee, which she has fractured in the past. She had a left distal femur fracture that was repaired with a royer, then subsequently had the royer removed by Dr. Apple Amor in . States she has always had some knee pain since the surgery but it is increased from her baseline pain today. She is unable to ambulate on the LLE. Pt with L distal femur fracture, plan non-op tx, NWB in immobilizer.      Past Medical History:   Diagnosis Date    CRI (chronic renal insufficiency)     Difficult intubation     excess skin in back of throat     DM (diabetes mellitus) (HCC)     GERD (gastroesophageal reflux disease)     Hyperlipidemia     Hypertension     Hypothyroidism     Neuropathy     Obesity     Osteoarthritis     Prolonged emergence from general anesthesia     Sleep apnea     uses cpap    Visit for screening mammogram      Past Surgical History:   Procedure Laterality Date    BACK SURGERY      xs 2    CATARACT REMOVAL Right 14    Dr. Aleksandar Holt EKG 12-LEAD  2015         FOOT SURGERY      left foot    HYSTERECTOMY, TOTAL ABDOMINAL      MOUTH BIOPSY  12    left tongue and lingual tonsil    OTHER SURGICAL HISTORY  2014    LEFT FEMUR RETROGRADE NAILING    OTHER SURGICAL HISTORY  2015 walking within bedroom and bathroom, otherwise chairfast)  Transfer Assistance: Independent    Active : No     Additional Comments: Has L AFO (from previous foot sx) however reports doesn't wear. Pt reports amb some within apt, furniture walks, mostly uses w/c. Does step up the threshold step but sister brings w/c behind once up step. Objective:       RLE AROM: WFL    LLE PROM: Exceptions  LLE General PROM: hip and knee WFL, knee NT         R LE grossly 4-/5, L hip 1/5, L ankle DF 0/5    Sensation  Overall Sensation Status: (pt endorses numbness and tingling to bilat legs)    RLE Tone: Normotonic  LLE Tone: Normotonic       Balance  Sitting - Static: Fair(cues for upright posture, pt attempting to lean on L elbow on elevated HOB, max A to maintain L LE in ext due to pain)  Comments: Pt sits EOB ~5 minutes to improve upright tolerance in prep for transfer training    Supine to Sit: Moderate assistance(HOB elevated >45 degrees, use of bed rail, increased time to complete)  Sit to Supine: Maximum assistance(to bring LE's into bed)    Transfers  Lateral Transfers: Unable to assess(due to pain)               Exercises:  Comments: Pt performs supine B LE ther ex: R ankle pumps, R heel slides and hip abd/add AROM, R quad sets, B glut sets, AAROM L hip abd/add with max A to complete through limited ROM due to pain, all x 10 reps to increase strength for improved mobility. Activity Tolerance:  Activity Tolerance: Patient limited by pain; Patient limited by fatigue    Treatment Initiated: See above exercises, EOB balance, additional bed mobility for positioning. Assessment: Body structures, Functions, Activity limitations: Decreased functional mobility , Decreased endurance, Decreased strength, Increased Pain, Decreased balance  Assessment: Pt tolerates session fair, limited by c/o significant pain in L LE, NWB status to L LE.   Pt unable to attempt transfer training due to pain, anticipate extensive 2 assist with sliding board. PT to continue to progress strength and functional mobility to return to PLOF. Prognosis: Good    Clinical Presentation: High - Unstable with Unpredictable Characteristics: see assessment:    Decision Making: High Complexitybased on patient assessment and decision making process of determining plan of care and establishing reasonable expectations for measurable functional outcomes    REQUIRES PT FOLLOW UP: Yes    Discharge Recommendations:  Discharge Recommendations: [address]    Patient Education:  Patient Education: POC    Equipment Recommendations:  Equipment Needed: No    Safety:  Type of devices: All fall risk precautions in place, Call light within reach, Left in bed    Plan:  Times per week: 3-5 X O  Current Treatment Recommendations: Strengthening, Functional Mobility Training, Balance Training, Endurance Training, Patient/Caregiver Education & Training    Goals:  Patient goals : to go [facility] for [facility]  Short term goals  Time Frame for Short term goals: by discharge  Short term goal 1: Pt to transfer supine <--> sit SBA to enable pt to get in/out of bed. Short term goal 2: Pt to sit EOB >10 minutes with good balance in prep for transfer training. Short term goal 3: PT to assess transfers. Long term goals  Time Frame for Long term goals : NA due to short length of stay. Evaluation Complexity: Based on the findings of patient history, examination, clinical presentation, and decision making during this evaluation, the evaluation of Karen Bonilla  is of high complexity.             AM-PAC Inpatient Mobility without Stair Climbing Raw Score : 7  AM-PAC Inpatient without Stair Climbing T-Scale Score : 28.66  Mobility Inpatient CMS 0-100% Score: 86.29  Mobility Inpatient without Stair CMS G-Code Modifier : LEANDRA

## 2019-06-02 NOTE — PROGRESS NOTES
Hospitalist Progress Note    Patient:  Simran Webster      Unit/Bed:7K-28/028-A    YOB: 1955    MRN: [id number]       Acct: [de-identified]     PCP: ANROL Mares CNP    Date of Admission: 5/31/2019    Assessment/Plan:    59 y/o female w/ h/o DMII and HTN is admitted for left distal femur, consulted for medical management.    1) DMII: Blood glucose 200s  C/w insulin. Monitor.    2) HTN: SBP 140s. Increase metoprolol. C/w meds.    3) Left distal femur: Non-operative management. Pain controlled. PT/OT. Tolerating diet. Followed by ortho. 4) Benign paroxysmal positional vertigo: C/w meclizine    Expected discharge date:  Awaiting placement    Disposition:    [] Home       [] TCU       [] Rehab       [] Psych       [x] SNF       [] [place]       [] Other-    Chief Complaint: Left femur fracture      Subjective (past 24 hours): She has persistent benign paroxysmal positional vertigo.       Medications:  Reviewed    Infusion Medications    sodium chloride 125 mL/hr at 05/31/19 2243    dextrose       Scheduled Medications    enoxaparin  40 mg Subcutaneous BID    insulin lispro prot & lispro  40 Units Subcutaneous Nightly    sodium chloride flush  10 mL Intravenous 2 times per day    metoprolol tartrate  12.5 mg Oral BID    aspirin  81 mg Oral Daily    folic acid  1 mg Oral Daily    ferrous sulfate  325 mg Oral TID WC    atorvastatin  40 mg Oral Nightly    insulin lispro prot & lispro  30 Units Subcutaneous QAM    amitriptyline  10 mg Oral Nightly    calcium-vitamin D  1 tablet Oral BID WC    diltiazem  120 mg Oral Daily    fenofibrate  160 mg Oral Daily    hydrALAZINE  25 mg Oral 4 times per day    pantoprazole  40 mg Oral QAM AC     PRN Meds: meclizine, diphenhydrAMINE, calcium carbonate, sodium chloride flush, acetaminophen, morphine **OR** morphine, magnesium hydroxide, ondansetron, HYDROcodone 5 mg - acetaminophen **OR** HYDROcodone 5 mg - acetaminophen, glucose, dextrose, glucagon (rDNA), dextrose      Intake/Output Summary (Last 24 hours) at 6/2/2019 1641  Last data filed at 6/2/2019 1304  Gross per 24 hour   Intake 1800 ml   Output --   Net 1800 ml       Diet:  DIET CARB CONTROL; Exam:  BP (!) 150/67   Pulse 80   Temp 98 °F (36.7 °C) (Oral)   Resp 16   Ht 5' 9\" (1.753 m)   Wt (!) 340 lb (154.2 kg)   LMP 02/20/2001   SpO2 90%   BMI 50.21 kg/m²     General appearance: No apparent distress, appears stated age and cooperative. HEENT: Pupils equal, round, and reactive to light. Conjunctivae/corneas clear. Neck: Supple, with full range of motion. No jugular venous distention. Trachea midline. Respiratory:  Normal respiratory effort. Clear to auscultation, bilaterally without Rales/Wheezes/Rhonchi. Cardiovascular: Regular rate and rhythm with normal S1/S2 without murmurs, rubs or gallops. Abdomen: Soft, non-tender, non-distended with normal bowel sounds. Musculoskeletal: passive and active ROM x 4 extremities. Dressing is dry / clean / intact  Skin: Skin color, texture, turgor normal.  No rashes or lesions. Neurologic:  Neurovascularly intact without any focal sensory/motor deficits. Cranial nerves: II-XII intact, grossly non-focal.  Psychiatric: Alert and oriented, thought content appropriate, normal insight  Capillary Refill: Brisk,< 3 seconds   Peripheral Pulses: +2 palpable, equal bilaterally       Labs:   Recent Labs     05/31/19  1419 06/01/19  0641   WBC 7.8 4.1*   HGB 10.9* 9.2*   HCT 34.3* 29.0*    124*     Recent Labs     05/31/19  1419      K 4.5      CO2 26   BUN 26*   CREATININE 1.6*   CALCIUM 9.9     Recent Labs     05/31/19  1419   AST 49*   ALT 22   BILITOT 0.5   ALKPHOS 63     Recent Labs     06/01/19  0641   INR 0.99     No results for input(s): [place] in the last 72 hours.     Microbiology:      Urinalysis:      Lab Results   Component Value Date    NITRU NEGATIVE 05/17/2019    WBCUA 0-2 Amparo Bashir on 5/31/2019 12:06 PM      XR KNEE RIGHT (MIN 4 VIEWS)   Final Result   Advanced tricompartmental degenerative changes. No acute fracture. Mild soft tissue swelling over the anterior knee. **This report has been created using voice recognition software. It may contain minor errors which are inherent in voice recognition technology. **      Final report electronically signed by Dr. Amparo Bashir on 5/31/2019 12:20 PM      XR FEMUR LEFT (MIN 2 VIEWS)   Final Result   Old fracture deformity of the distal femur with postsurgical changes. Femoral head is normally located without fracture.       Final report electronically signed by Dr. Amparo Bashir on 5/31/2019 12:11 PM          DVT prophylaxis: [x] Lovenox                                 [] SCDs                                 [] SQ Heparin                                 [] Encourage ambulation           [] Already on Anticoagulation     Code Status: Full Code    PT/OT Eval Status: PT/OT    Tele:   [] yes             [x] no    Active Hospital Problems    Diagnosis Date Noted    Hypertension [I10]      Priority: Medium    Knee pain, left [M25.562] 05/31/2019    [facility] [E11.9]        Electronically signed by Ching Hamlin MD on 6/2/2019 at 4:41 PM

## 2019-06-02 NOTE — PLAN OF CARE
Problem: Daily Care:  Goal: Daily care needs are met  Description  Daily care needs are met  6/2/2019 0223 by Jazzy Wharton RN  Outcome: Met This Shift  Note:   Pt ADLs continue to be meet. Problem: Falls - Risk of:  Goal: Will remain free from falls  Description  Will remain free from falls  6/2/2019 0223 by Jazzy Wharton RN  Outcome: Ongoing  Note:   Pt remain free from falls this shift. Fall prevention measures in place. Problem: Pain:  Goal: Pain level will decrease  Description  Pain level will decrease  6/2/2019 0223 by Jazzy Wharton RN  Outcome: Ongoing  Note:   Pt rate pain at 7/10 on scale. Pt has available pain medicine to help with pain. Pt uses repositioning to help with pain . Pt pain goal 3/10 on scale     Problem: Cardiovascular  Goal: No DVT, peripheral vascular complications  5/7/9830 1621 by Jazzy Wharton RN  Outcome: Ongoing  Note:   Denies chest pain and calf tenderness. No s/s of DVTs. VS stable. Problem: GI  Goal: No bowel complications  8/3/5195 9330 by Jazzy Wharton RN  Outcome: Ongoing  Note:   BS active and passing gas. No BM this shift. Problem:   Goal: Adequate urinary output  6/2/2019 0223 by Jazzy Wharton RN  Outcome: Ongoing  Note:   Pt voiding adequate amounts of urine this shift. Problem: Skin Integrity/Risk  Goal: No skin breakdown during hospitalization  6/2/2019 0223 by Jazzy Wharton RN  Outcome: Ongoing  Note:   No new skin breakdown noted. Pt able to reposition herself in bed. Problem: Discharge Planning:  Goal: Patients continuum of care needs are met  Description  Patients continuum of care needs are met  6/2/2019 0223 by Jazzy Wharton RN  Outcome: Ongoing  Note:   Pt plans home at discharge. Care manager and social working helping with discharge needs. Care plan reviewed with patient . Patient verbalize understanding of the plan of care and contribute to goal setting.

## 2019-06-03 VITALS
TEMPERATURE: 99 F | OXYGEN SATURATION: 95 % | RESPIRATION RATE: 18 BRPM | HEART RATE: 75 BPM | HEIGHT: 69 IN | SYSTOLIC BLOOD PRESSURE: 138 MMHG | DIASTOLIC BLOOD PRESSURE: 63 MMHG | WEIGHT: 293 LBS | BODY MASS INDEX: 43.4 KG/M2

## 2019-06-03 LAB
GLUCOSE BLD-MCNC: 223 MG/DL (ref 70–108)
GLUCOSE BLD-MCNC: 264 MG/DL (ref 70–108)

## 2019-06-03 PROCEDURE — 6360000002 HC RX W HCPCS: Performed by: PHYSICIAN ASSISTANT

## 2019-06-03 PROCEDURE — 2580000003 HC RX 258: Performed by: PHYSICIAN ASSISTANT

## 2019-06-03 PROCEDURE — 6370000000 HC RX 637 (ALT 250 FOR IP): Performed by: PHYSICIAN ASSISTANT

## 2019-06-03 PROCEDURE — 6370000000 HC RX 637 (ALT 250 FOR IP): Performed by: HOSPITALIST

## 2019-06-03 PROCEDURE — 82948 REAGENT STRIP/BLOOD GLUCOSE: CPT

## 2019-06-03 PROCEDURE — 6370000000 HC RX 637 (ALT 250 FOR IP): Performed by: INTERNAL MEDICINE

## 2019-06-03 PROCEDURE — L1830 KO IMMOB CANVAS LONG PRE OTS: HCPCS

## 2019-06-03 RX ORDER — HYDROCODONE BITARTRATE AND ACETAMINOPHEN 5; 325 MG/1; MG/1
1 TABLET ORAL EVERY 4 HOURS PRN
Qty: 50 TABLET | Refills: 0 | Status: SHIPPED | OUTPATIENT
Start: 2019-06-03 | End: 2019-06-10

## 2019-06-03 RX ORDER — INSULIN LISPRO 100 [IU]/ML
30 INJECTION, SUSPENSION SUBCUTANEOUS 2 TIMES DAILY WITH MEALS
Qty: 24 ML | Refills: 1 | Status: ON HOLD | DISCHARGE
Start: 2019-06-03 | End: 2019-07-11

## 2019-06-03 RX ORDER — ISOSORBIDE MONONITRATE 30 MG/1
30 TABLET, EXTENDED RELEASE ORAL DAILY
Qty: 30 TABLET | Refills: 9 | Status: ON HOLD | DISCHARGE
Start: 2019-06-03 | End: 2019-07-12 | Stop reason: HOSPADM

## 2019-06-03 RX ORDER — HYDRALAZINE HYDROCHLORIDE 25 MG/1
25 TABLET, FILM COATED ORAL EVERY 8 HOURS SCHEDULED
Status: DISCONTINUED | OUTPATIENT
Start: 2019-06-03 | End: 2019-06-03 | Stop reason: HOSPADM

## 2019-06-03 RX ORDER — MECLIZINE HYDROCHLORIDE CHEWABLE TABLETS 25 MG/1
25 TABLET, CHEWABLE ORAL 3 TIMES DAILY PRN
Qty: 90 TABLET | Refills: 0 | Status: ON HOLD | DISCHARGE
Start: 2019-06-03 | End: 2021-01-01 | Stop reason: HOSPADM

## 2019-06-03 RX ORDER — HYDRALAZINE HYDROCHLORIDE 25 MG/1
25 TABLET, FILM COATED ORAL 3 TIMES DAILY
Qty: 90 TABLET | Refills: 3 | DISCHARGE
Start: 2019-06-03 | End: 2019-09-13 | Stop reason: DRUGHIGH

## 2019-06-03 RX ADMIN — ASPIRIN 81 MG: 81 TABLET, COATED ORAL at 09:10

## 2019-06-03 RX ADMIN — CALCIUM CARBONATE-VITAMIN D TAB 500 MG-200 UNIT 1 TABLET: 500-200 TAB at 09:09

## 2019-06-03 RX ADMIN — INSULIN LISPRO 6 UNITS: 100 INJECTION, SOLUTION INTRAVENOUS; SUBCUTANEOUS at 12:33

## 2019-06-03 RX ADMIN — HYDROCODONE BITARTRATE AND ACETAMINOPHEN 2 TABLET: 5; 325 TABLET ORAL at 06:23

## 2019-06-03 RX ADMIN — PANTOPRAZOLE SODIUM 40 MG: 40 TABLET, DELAYED RELEASE ORAL at 06:23

## 2019-06-03 RX ADMIN — DIPHENHYDRAMINE HCL 25 MG: 25 TABLET ORAL at 12:32

## 2019-06-03 RX ADMIN — HYDRALAZINE HYDROCHLORIDE 25 MG: 25 TABLET, FILM COATED ORAL at 11:09

## 2019-06-03 RX ADMIN — FERROUS SULFATE TAB 325 MG (65 MG ELEMENTAL FE) 325 MG: 325 (65 FE) TAB at 09:10

## 2019-06-03 RX ADMIN — FERROUS SULFATE TAB 325 MG (65 MG ELEMENTAL FE) 325 MG: 325 (65 FE) TAB at 11:09

## 2019-06-03 RX ADMIN — FOLIC ACID 1 MG: 1 TABLET ORAL at 09:09

## 2019-06-03 RX ADMIN — ENOXAPARIN SODIUM 40 MG: 40 INJECTION SUBCUTANEOUS at 09:09

## 2019-06-03 RX ADMIN — INSULIN LISPRO 30 UNITS: 100 INJECTION, SUSPENSION SUBCUTANEOUS at 09:00

## 2019-06-03 RX ADMIN — Medication 10 ML: at 09:09

## 2019-06-03 RX ADMIN — METOPROLOL TARTRATE 25 MG: 25 TABLET ORAL at 09:09

## 2019-06-03 RX ADMIN — DIPHENHYDRAMINE HCL 25 MG: 25 TABLET ORAL at 06:23

## 2019-06-03 RX ADMIN — DILTIAZEM HYDROCHLORIDE 120 MG: 120 CAPSULE, EXTENDED RELEASE ORAL at 09:09

## 2019-06-03 RX ADMIN — HYDRALAZINE HYDROCHLORIDE 25 MG: 25 TABLET, FILM COATED ORAL at 05:23

## 2019-06-03 RX ADMIN — HYDROCODONE BITARTRATE AND ACETAMINOPHEN 2 TABLET: 5; 325 TABLET ORAL at 11:05

## 2019-06-03 ASSESSMENT — PAIN DESCRIPTION - FREQUENCY: FREQUENCY: CONTINUOUS

## 2019-06-03 ASSESSMENT — PAIN SCALES - GENERAL
PAINLEVEL_OUTOF10: 9
PAINLEVEL_OUTOF10: 9
PAINLEVEL_OUTOF10: 10
PAINLEVEL_OUTOF10: 10
PAINLEVEL_OUTOF10: 3

## 2019-06-03 ASSESSMENT — PAIN DESCRIPTION - ONSET: ONSET: ON-GOING

## 2019-06-03 ASSESSMENT — PAIN DESCRIPTION - LOCATION: LOCATION: KNEE

## 2019-06-03 ASSESSMENT — PAIN DESCRIPTION - PROGRESSION: CLINICAL_PROGRESSION: NOT CHANGED

## 2019-06-03 ASSESSMENT — PAIN DESCRIPTION - DESCRIPTORS: DESCRIPTORS: ACHING

## 2019-06-03 ASSESSMENT — PAIN DESCRIPTION - PAIN TYPE: TYPE: ACUTE PAIN

## 2019-06-03 ASSESSMENT — PAIN DESCRIPTION - ORIENTATION: ORIENTATION: LEFT

## 2019-06-03 NOTE — DISCHARGE SUMMARY
prescription for each of these medications  · HYDROcodone-acetaminophen 5-325 MG per tablet       Activity: NWB LLE  Diet: regular  Wound Care: brace    Follow-up Dr. Nico Cai 3 weeks    Signed:  Michael Rubin  6/3/2019  10:32 AM

## 2019-06-03 NOTE — CARE COORDINATION
6/3/19, 11:28 AM    [address] [place] [address] is requesting level of care for medicaid, as medicaid is Karla's secondary insurance. Courtney Aguayo has had a three day in pt stay and can go to the [facility] under medicare benefit. Level of care request has been submitted. * Level of care completed for medicaid.

## 2019-06-03 NOTE — CARE COORDINATION
6/3/19, 9:43 AM      Luis M Orozco       Admitted from: ED 5/31/2019/ 1031 Hospital day: 3   Location: -28/028-A Reason for admit: Knee pain, left [M25.562] Status: inpatient  Admit order signed?: yes  PMH:  has a past medical history of CRI (chronic renal insufficiency), Difficult intubation, DM (diabetes mellitus) ([facility] [address] GERD (gastroesophageal reflux disease), Hyperlipidemia, Hypertension, Hypothyroidism, Neuropathy, Obesity, Osteoarthritis, Prolonged emergence from general anesthesia, Sleep apnea, and Visit for screening mammogram.  Procedure: no  Pertinent abnormal Imaging:left distal femur fx,  Medications:  Scheduled Meds:   metoprolol tartrate  25 mg Oral BID    enoxaparin  40 mg Subcutaneous BID    insulin lispro prot & lispro  40 Units Subcutaneous Nightly    sodium chloride flush  10 mL Intravenous 2 times per day    aspirin  81 mg Oral Daily    folic acid  1 mg Oral Daily    ferrous sulfate  325 mg Oral TID WC    atorvastatin  40 mg Oral Nightly    insulin lispro prot & lispro  30 Units Subcutaneous QAM    amitriptyline  10 mg Oral Nightly    calcium-vitamin D  1 tablet Oral BID WC    diltiazem  120 mg Oral Daily    fenofibrate  160 mg Oral Daily    hydrALAZINE  25 mg Oral 4 times per day    pantoprazole  40 mg Oral QAM AC     Continuous Infusions:   sodium chloride 125 mL/hr at 05/31/19 2243    dextrose        Pertinent Info/Orders/Treatment Plan:  Left leg brace from [facility] Limb. N/V checks. Pain management. PT/OT,  Diet: DIET CARB CONTROL;   Smoking status:  reports that she quit smoking about 12 years ago. She has a 30.00 pack-year smoking history.  She has never used smokeless tobacco.   PCP: Layo Hanson, APRN - CNP  Readmission: no  Readmission Risk Score: 22%    Discharge Planning  Current Residence:  Private Residence  Living Arrangements:  Family Members   Support Systems:  None  Current Services PTA:     Potential Assistance Needed:  Skilled Nursing Facility  Potential Assistance Purchasing Medications:  No  Does patient want to participate in local refill/ meds to beds program?  No  Type of Home Care Services:  None  Patient expects to be discharged to:  [facility]  Expected Discharge date:  06/05/19  Follow Up Appointment: Best Day/ Time: Tuesday AM    Discharge Plan: I spoke with Macho Blank and Mark Tam, [facility] to go to [address] [place] [address] Evaluation: yes

## 2019-06-03 NOTE — CARE COORDINATION
6/3/19, 9:59 AM    Discharge plan discussed by  and . Discharge plan reviewed with patient/ family. Patient/ family verbalize understanding of discharge plan and are in agreement with plan. Understanding was demonstrated using the teach back method.        IMM Letter  IMM Letter given to Patient/Family/Significant other/Guardian/POA/by[de-identified] cm  IMM Letter date given[de-identified] 06/03/19  IMM Letter time given[de-identified] 8860

## 2019-06-03 NOTE — FLOWSHEET NOTE
06/01/19 2152   Provider Notification   Reason for Communication New orders   Provider Name Merry Raza    Provider Notification Physician Assistant   Method of Communication Secure Message   Response See orders   Notification Time 2153   Pt . Pt had scheduled 40 units of insulin. Provider reevaluated the amount of insulin and ordered one time dose of 10 units of insulin.
06/03/19 1330   Handoff   Communication Given Transfer Handoff   Oncoming Nurse/Offgoing Nurse Gray Chowdhury / [facility] Communication Telephone   Time Handoff Given 1330       Report called to Gray Chowdhury at [facility], call back number to this nurse provided. LACP came and got patient to transfer to [facility]. Patient leaves stable. Packet given to LACP along with patient's script for [facility], patient has belongings on stretcher.
Marlen Antonio 59  OCCUPATIONAL THERAPY MISSED TREATMENT NOTE  [facility] ORTHOPEDICS 7K  7K-28/028-A      Date: 6/3/2019  Patient Name: Cristobal Lazaro        CSN: [id number]   : 1955  (58 y.o.)  Gender: female   Referring Practitioner: Alejandra Paredes PA-C  Diagnosis: Knne pain, left         REASON FOR MISSED TREATMENT:  Patient unable to participate. Patient discharged.
Marlen Antonio 59  PHYSICAL THERAPY MISSED TREATMENT NOTE  ACUTE CARE    Date: 6/3/2019  Patient Name: Sania Lamas        MRN: [id number]   : 1955  (58 y.o.)  Gender: female   Referring Practitioner: Concha Martin PA-C  Referring Practitioner: Concha Martin PA-C  Diagnosis: Knne pain, left  Diagnosis: Knee pain, left         REASON FOR MISSED TREATMENT:  Pt refusing therapy at this time. Offered EOB or supine therex pt stating \"I'm just not up to any threapy today. \" Offered to attempt back later pt stating \"don't bother, I'm leaving today. \"                 Kristen Kent PTA [id number]
[address] power of    Healthcare Agent's Name [address] Agent's Phone Number [phone]

## 2019-06-03 NOTE — CARE COORDINATION
6/3/19, 8:18 AM    DISCHARGE BARRIERS      Spoke with [facility] admissions. Facility can accept today.

## 2019-06-05 LAB
A/G RATIO: NORMAL
ALBUMIN SERPL-MCNC: 2.6 G/DL
ALP BLD-CCNC: 50 U/L
ALT SERPL-CCNC: 19 U/L
AST SERPL-CCNC: 31 U/L
AVERAGE GLUCOSE: 154
BASOPHILS ABSOLUTE: ABNORMAL /ΜL
BASOPHILS RELATIVE PERCENT: ABNORMAL %
BILIRUB SERPL-MCNC: 1.2 MG/DL (ref 0.1–1.4)
BILIRUBIN DIRECT: 0.4 MG/DL
BILIRUBIN, INDIRECT: NORMAL
BUN BLDV-MCNC: 24 MG/DL
CALCIUM SERPL-MCNC: 8.9 MG/DL
CHLORIDE BLD-SCNC: 100 MMOL/L
CHOLESTEROL, TOTAL: 106 MG/DL
CHOLESTEROL/HDL RATIO: ABNORMAL
CO2: 25 MMOL/L
CREAT SERPL-MCNC: 1.6 MG/DL
EOSINOPHILS ABSOLUTE: ABNORMAL /ΜL
EOSINOPHILS RELATIVE PERCENT: ABNORMAL %
GFR CALCULATED: 32
GLOBULIN: NORMAL
GLUCOSE BLD-MCNC: 242 MG/DL
HBA1C MFR BLD: 7 %
HCT VFR BLD CALC: 28.8 % (ref 36–46)
HDLC SERPL-MCNC: 20 MG/DL (ref 35–70)
HEMOGLOBIN: 9.5 G/DL (ref 12–16)
LDL CHOLESTEROL CALCULATED: 49 MG/DL (ref 0–160)
LYMPHOCYTES ABSOLUTE: ABNORMAL /ΜL
LYMPHOCYTES RELATIVE PERCENT: ABNORMAL %
MCH RBC QN AUTO: ABNORMAL PG
MCHC RBC AUTO-ENTMCNC: ABNORMAL G/DL
MCV RBC AUTO: ABNORMAL FL
MONOCYTES ABSOLUTE: ABNORMAL /ΜL
MONOCYTES RELATIVE PERCENT: ABNORMAL %
NEUTROPHILS ABSOLUTE: ABNORMAL /ΜL
NEUTROPHILS RELATIVE PERCENT: ABNORMAL %
PLATELET # BLD: 131 K/ΜL
PMV BLD AUTO: ABNORMAL FL
POTASSIUM SERPL-SCNC: 4.4 MMOL/L
PROTEIN TOTAL: 5.3 G/DL
RBC # BLD: 2.94 10^6/ΜL
SODIUM BLD-SCNC: 134 MMOL/L
TRIGL SERPL-MCNC: 183 MG/DL
VLDLC SERPL CALC-MCNC: 37 MG/DL
WBC # BLD: 4.6 10^3/ML

## 2019-06-13 ENCOUNTER — TELEPHONE (OUTPATIENT)
Dept: FAMILY MEDICINE CLINIC | Age: 64
End: 2019-06-13

## 2019-06-13 NOTE — TELEPHONE ENCOUNTER
2nd attempt to contact the pt re:overdue  labs JS ordered on 11/7/18. HIPAA form is up to date, order mailed.

## 2019-06-18 ENCOUNTER — TELEPHONE (OUTPATIENT)
Dept: FAMILY MEDICINE CLINIC | Age: 64
End: 2019-06-18

## 2019-06-27 DIAGNOSIS — N18.30 CHRONIC KIDNEY DISEASE, STAGE III (MODERATE) (HCC): Primary | ICD-10-CM

## 2019-07-03 ENCOUNTER — APPOINTMENT (OUTPATIENT)
Dept: CT IMAGING | Age: 64
DRG: 286 | End: 2019-07-03
Payer: MEDICARE

## 2019-07-03 ENCOUNTER — HOSPITAL ENCOUNTER (INPATIENT)
Age: 64
LOS: 9 days | Discharge: NURSING, REHAB OR LONG-TERM CARE | DRG: 286 | End: 2019-07-12
Attending: EMERGENCY MEDICINE | Admitting: INTERNAL MEDICINE
Payer: MEDICARE

## 2019-07-03 DIAGNOSIS — N17.9 ACUTE KIDNEY INJURY (HCC): ICD-10-CM

## 2019-07-03 DIAGNOSIS — R94.31 QT PROLONGATION: ICD-10-CM

## 2019-07-03 DIAGNOSIS — I48.91 ATRIAL FIBRILLATION WITH RAPID VENTRICULAR RESPONSE (HCC): ICD-10-CM

## 2019-07-03 DIAGNOSIS — R09.02 HYPOXIA: ICD-10-CM

## 2019-07-03 DIAGNOSIS — I48.91 NEW ONSET ATRIAL FIBRILLATION (HCC): ICD-10-CM

## 2019-07-03 DIAGNOSIS — N18.30 CKD (CHRONIC KIDNEY DISEASE) STAGE 3, GFR 30-59 ML/MIN (HCC): ICD-10-CM

## 2019-07-03 DIAGNOSIS — R06.00 DYSPNEA, UNSPECIFIED TYPE: Primary | ICD-10-CM

## 2019-07-03 LAB
ALBUMIN SERPL-MCNC: 2.9 G/DL (ref 3.5–5.1)
ALP BLD-CCNC: 293 U/L (ref 38–126)
ALT SERPL-CCNC: 16 U/L (ref 11–66)
ANION GAP SERPL CALCULATED.3IONS-SCNC: 11 MEQ/L (ref 8–16)
AST SERPL-CCNC: 36 U/L (ref 5–40)
BASOPHILS # BLD: 1 %
BASOPHILS ABSOLUTE: 0 THOU/MM3 (ref 0–0.1)
BILIRUB SERPL-MCNC: 0.5 MG/DL (ref 0.3–1.2)
BUN BLDV-MCNC: 25 MG/DL (ref 7–22)
CALCIUM SERPL-MCNC: 9.2 MG/DL (ref 8.5–10.5)
CHLORIDE BLD-SCNC: 110 MEQ/L (ref 98–111)
CO2: 22 MEQ/L (ref 23–33)
CREAT SERPL-MCNC: 1.6 MG/DL (ref 0.4–1.2)
EKG ATRIAL RATE: 166 BPM
EKG Q-T INTERVAL: 346 MS
EKG QRS DURATION: 116 MS
EKG QTC CALCULATION (BAZETT): 501 MS
EKG R AXIS: 23 DEGREES
EKG T AXIS: 53 DEGREES
EKG VENTRICULAR RATE: 126 BPM
EOSINOPHIL # BLD: 3.4 %
EOSINOPHILS ABSOLUTE: 0.1 THOU/MM3 (ref 0–0.4)
ERYTHROCYTE [DISTWIDTH] IN BLOOD BY AUTOMATED COUNT: 15.2 % (ref 11.5–14.5)
ERYTHROCYTE [DISTWIDTH] IN BLOOD BY AUTOMATED COUNT: 57 FL (ref 35–45)
GFR SERPL CREATININE-BSD FRML MDRD: 32 ML/MIN/1.73M2
GLUCOSE BLD-MCNC: 130 MG/DL (ref 70–108)
GLUCOSE BLD-MCNC: 174 MG/DL (ref 70–108)
GLUCOSE BLD-MCNC: 79 MG/DL (ref 70–108)
GLUCOSE BLD-MCNC: 81 MG/DL (ref 70–108)
GLUCOSE BLD-MCNC: 90 MG/DL (ref 70–108)
HCT VFR BLD CALC: 34 % (ref 37–47)
HEMOGLOBIN: 10.3 GM/DL (ref 12–16)
IMMATURE GRANS (ABS): 0.01 THOU/MM3 (ref 0–0.07)
IMMATURE GRANULOCYTES: 0.2 %
LYMPHOCYTES # BLD: 25.4 %
LYMPHOCYTES ABSOLUTE: 1 THOU/MM3 (ref 1–4.8)
MCH RBC QN AUTO: 31.1 PG (ref 26–33)
MCHC RBC AUTO-ENTMCNC: 30.3 GM/DL (ref 32.2–35.5)
MCV RBC AUTO: 102.7 FL (ref 81–99)
MONOCYTES # BLD: 11 %
MONOCYTES ABSOLUTE: 0.5 THOU/MM3 (ref 0.4–1.3)
NUCLEATED RED BLOOD CELLS: 0 /100 WBC
OSMOLALITY CALCULATION: 288.9 MOSMOL/KG (ref 275–300)
PLATELET # BLD: 218 THOU/MM3 (ref 130–400)
PMV BLD AUTO: 11.3 FL (ref 9.4–12.4)
POTASSIUM REFLEX MAGNESIUM: 4.7 MEQ/L (ref 3.5–5.2)
PRO-BNP: ABNORMAL PG/ML (ref 0–900)
RBC # BLD: 3.31 MILL/MM3 (ref 4.2–5.4)
SCAN OF BLOOD SMEAR: NORMAL
SEG NEUTROPHILS: 59 %
SEGMENTED NEUTROPHILS ABSOLUTE COUNT: 2.4 THOU/MM3 (ref 1.8–7.7)
SODIUM BLD-SCNC: 143 MEQ/L (ref 135–145)
T4 FREE: 1.02 NG/DL (ref 0.93–1.76)
TOTAL PROTEIN: 6.6 G/DL (ref 6.1–8)
TROPONIN T: < 0.01 NG/ML
TSH SERPL DL<=0.05 MIU/L-ACNC: 0.27 UIU/ML (ref 0.4–4.2)
WBC # BLD: 4.1 THOU/MM3 (ref 4.8–10.8)

## 2019-07-03 PROCEDURE — 84443 ASSAY THYROID STIM HORMONE: CPT

## 2019-07-03 PROCEDURE — 6360000002 HC RX W HCPCS: Performed by: NURSE PRACTITIONER

## 2019-07-03 PROCEDURE — 80053 COMPREHEN METABOLIC PANEL: CPT

## 2019-07-03 PROCEDURE — 36415 COLL VENOUS BLD VENIPUNCTURE: CPT

## 2019-07-03 PROCEDURE — 2709999900 HC NON-CHARGEABLE SUPPLY

## 2019-07-03 PROCEDURE — 84484 ASSAY OF TROPONIN QUANT: CPT

## 2019-07-03 PROCEDURE — 93005 ELECTROCARDIOGRAM TRACING: CPT | Performed by: EMERGENCY MEDICINE

## 2019-07-03 PROCEDURE — 6360000004 HC RX CONTRAST MEDICATION: Performed by: EMERGENCY MEDICINE

## 2019-07-03 PROCEDURE — 82948 REAGENT STRIP/BLOOD GLUCOSE: CPT

## 2019-07-03 PROCEDURE — 2580000003 HC RX 258: Performed by: NURSE PRACTITIONER

## 2019-07-03 PROCEDURE — 85025 COMPLETE CBC W/AUTO DIFF WBC: CPT

## 2019-07-03 PROCEDURE — 1200000003 HC TELEMETRY R&B

## 2019-07-03 PROCEDURE — 83880 ASSAY OF NATRIURETIC PEPTIDE: CPT

## 2019-07-03 PROCEDURE — 99223 1ST HOSP IP/OBS HIGH 75: CPT | Performed by: NURSE PRACTITIONER

## 2019-07-03 PROCEDURE — 93010 ELECTROCARDIOGRAM REPORT: CPT | Performed by: INTERNAL MEDICINE

## 2019-07-03 PROCEDURE — 2500000003 HC RX 250 WO HCPCS: Performed by: INTERNAL MEDICINE

## 2019-07-03 PROCEDURE — 99285 EMERGENCY DEPT VISIT HI MDM: CPT

## 2019-07-03 PROCEDURE — 6370000000 HC RX 637 (ALT 250 FOR IP): Performed by: NURSE PRACTITIONER

## 2019-07-03 PROCEDURE — 71275 CT ANGIOGRAPHY CHEST: CPT

## 2019-07-03 PROCEDURE — 2500000003 HC RX 250 WO HCPCS: Performed by: NURSE PRACTITIONER

## 2019-07-03 PROCEDURE — 84439 ASSAY OF FREE THYROXINE: CPT

## 2019-07-03 RX ORDER — ISOSORBIDE MONONITRATE 30 MG/1
30 TABLET, EXTENDED RELEASE ORAL DAILY
Status: DISCONTINUED | OUTPATIENT
Start: 2019-07-03 | End: 2019-07-04

## 2019-07-03 RX ORDER — COLCHICINE 0.6 MG/1
0.3 TABLET ORAL DAILY
Status: DISCONTINUED | OUTPATIENT
Start: 2019-07-04 | End: 2019-07-09

## 2019-07-03 RX ORDER — HYDROCODONE BITARTRATE AND ACETAMINOPHEN 7.5; 325 MG/1; MG/1
1 TABLET ORAL EVERY 4 HOURS PRN
Status: DISCONTINUED | OUTPATIENT
Start: 2019-07-03 | End: 2019-07-12 | Stop reason: HOSPADM

## 2019-07-03 RX ORDER — DEXTROSE MONOHYDRATE 25 G/50ML
12.5 INJECTION, SOLUTION INTRAVENOUS PRN
Status: DISCONTINUED | OUTPATIENT
Start: 2019-07-03 | End: 2019-07-12 | Stop reason: HOSPADM

## 2019-07-03 RX ORDER — NICOTINE POLACRILEX 4 MG
15 LOZENGE BUCCAL PRN
Status: DISCONTINUED | OUTPATIENT
Start: 2019-07-03 | End: 2019-07-12 | Stop reason: HOSPADM

## 2019-07-03 RX ORDER — AMITRIPTYLINE HYDROCHLORIDE 10 MG/1
10 TABLET, FILM COATED ORAL NIGHTLY
Status: DISCONTINUED | OUTPATIENT
Start: 2019-07-03 | End: 2019-07-12 | Stop reason: HOSPADM

## 2019-07-03 RX ORDER — DILTIAZEM HYDROCHLORIDE 120 MG/1
120 CAPSULE, COATED, EXTENDED RELEASE ORAL DAILY
Status: DISCONTINUED | OUTPATIENT
Start: 2019-07-03 | End: 2019-07-10

## 2019-07-03 RX ORDER — FOLIC ACID 1 MG/1
1 TABLET ORAL DAILY
Status: DISCONTINUED | OUTPATIENT
Start: 2019-07-03 | End: 2019-07-12 | Stop reason: HOSPADM

## 2019-07-03 RX ORDER — GABAPENTIN 100 MG/1
100 CAPSULE ORAL 3 TIMES DAILY
Status: DISCONTINUED | OUTPATIENT
Start: 2019-07-03 | End: 2019-07-12 | Stop reason: HOSPADM

## 2019-07-03 RX ORDER — DEXTROSE MONOHYDRATE 50 MG/ML
100 INJECTION, SOLUTION INTRAVENOUS PRN
Status: DISCONTINUED | OUTPATIENT
Start: 2019-07-03 | End: 2019-07-12 | Stop reason: HOSPADM

## 2019-07-03 RX ORDER — COLCHICINE 0.6 MG/1
0.6 TABLET ORAL 2 TIMES DAILY
Status: ON HOLD | COMMUNITY
Start: 2019-07-03 | End: 2019-07-12 | Stop reason: HOSPADM

## 2019-07-03 RX ORDER — DOCUSATE SODIUM 100 MG/1
100 CAPSULE, LIQUID FILLED ORAL
COMMUNITY
End: 2019-08-26

## 2019-07-03 RX ORDER — DULOXETIN HYDROCHLORIDE 60 MG/1
60 CAPSULE, DELAYED RELEASE ORAL DAILY
Status: DISCONTINUED | OUTPATIENT
Start: 2019-07-03 | End: 2019-07-12 | Stop reason: HOSPADM

## 2019-07-03 RX ORDER — ACETAMINOPHEN 325 MG/1
650 TABLET ORAL EVERY 4 HOURS PRN
Status: DISCONTINUED | OUTPATIENT
Start: 2019-07-03 | End: 2019-07-09 | Stop reason: SDUPTHER

## 2019-07-03 RX ORDER — SODIUM CHLORIDE 0.9 % (FLUSH) 0.9 %
10 SYRINGE (ML) INJECTION EVERY 12 HOURS SCHEDULED
Status: DISCONTINUED | OUTPATIENT
Start: 2019-07-03 | End: 2019-07-09 | Stop reason: SDUPTHER

## 2019-07-03 RX ORDER — UBIDECARENONE 75 MG
100 CAPSULE ORAL DAILY
Status: DISCONTINUED | OUTPATIENT
Start: 2019-07-03 | End: 2019-07-03

## 2019-07-03 RX ORDER — ONDANSETRON 2 MG/ML
4 INJECTION INTRAMUSCULAR; INTRAVENOUS EVERY 6 HOURS PRN
Status: DISCONTINUED | OUTPATIENT
Start: 2019-07-03 | End: 2019-07-09 | Stop reason: SDUPTHER

## 2019-07-03 RX ORDER — ATORVASTATIN CALCIUM 40 MG/1
40 TABLET, FILM COATED ORAL NIGHTLY
Status: DISCONTINUED | OUTPATIENT
Start: 2019-07-03 | End: 2019-07-12 | Stop reason: HOSPADM

## 2019-07-03 RX ORDER — PANTOPRAZOLE SODIUM 40 MG/1
40 TABLET, DELAYED RELEASE ORAL
Status: DISCONTINUED | OUTPATIENT
Start: 2019-07-04 | End: 2019-07-10

## 2019-07-03 RX ORDER — SENNA PLUS 8.6 MG/1
1 TABLET ORAL DAILY PRN
Status: DISCONTINUED | OUTPATIENT
Start: 2019-07-03 | End: 2019-07-12 | Stop reason: HOSPADM

## 2019-07-03 RX ORDER — MECLIZINE HYDROCHLORIDE CHEWABLE TABLETS 25 MG/1
25 TABLET, CHEWABLE ORAL 3 TIMES DAILY PRN
Status: DISCONTINUED | OUTPATIENT
Start: 2019-07-03 | End: 2019-07-12 | Stop reason: HOSPADM

## 2019-07-03 RX ORDER — SODIUM CHLORIDE 0.9 % (FLUSH) 0.9 %
10 SYRINGE (ML) INJECTION PRN
Status: DISCONTINUED | OUTPATIENT
Start: 2019-07-03 | End: 2019-07-09 | Stop reason: SDUPTHER

## 2019-07-03 RX ORDER — FENOFIBRATE 160 MG/1
160 TABLET ORAL DAILY
Status: DISCONTINUED | OUTPATIENT
Start: 2019-07-03 | End: 2019-07-12 | Stop reason: HOSPADM

## 2019-07-03 RX ORDER — LANOLIN ALCOHOL/MO/W.PET/CERES
1000 CREAM (GRAM) TOPICAL DAILY
Status: DISCONTINUED | OUTPATIENT
Start: 2019-07-04 | End: 2019-07-12 | Stop reason: HOSPADM

## 2019-07-03 RX ORDER — BUMETANIDE 0.25 MG/ML
1 INJECTION, SOLUTION INTRAMUSCULAR; INTRAVENOUS 2 TIMES DAILY
Status: COMPLETED | OUTPATIENT
Start: 2019-07-03 | End: 2019-07-04

## 2019-07-03 RX ORDER — CYCLOBENZAPRINE HCL 10 MG
10 TABLET ORAL 3 TIMES DAILY PRN
Status: DISCONTINUED | OUTPATIENT
Start: 2019-07-03 | End: 2019-07-12 | Stop reason: HOSPADM

## 2019-07-03 RX ORDER — GABAPENTIN 100 MG/1
100 CAPSULE ORAL 3 TIMES DAILY
COMMUNITY
End: 2019-10-22 | Stop reason: SDUPTHER

## 2019-07-03 RX ORDER — ASPIRIN 81 MG/1
81 TABLET ORAL DAILY
Status: DISCONTINUED | OUTPATIENT
Start: 2019-07-03 | End: 2019-07-12 | Stop reason: HOSPADM

## 2019-07-03 RX ORDER — OYSTER SHELL CALCIUM WITH VITAMIN D 500; 200 MG/1; [IU]/1
1 TABLET, FILM COATED ORAL 2 TIMES DAILY WITH MEALS
Status: DISCONTINUED | OUTPATIENT
Start: 2019-07-03 | End: 2019-07-12 | Stop reason: HOSPADM

## 2019-07-03 RX ORDER — HYDRALAZINE HYDROCHLORIDE 25 MG/1
25 TABLET, FILM COATED ORAL 3 TIMES DAILY
Status: DISCONTINUED | OUTPATIENT
Start: 2019-07-03 | End: 2019-07-09

## 2019-07-03 RX ORDER — FERROUS SULFATE 325(65) MG
325 TABLET ORAL
Status: DISCONTINUED | OUTPATIENT
Start: 2019-07-03 | End: 2019-07-12 | Stop reason: HOSPADM

## 2019-07-03 RX ORDER — DOCUSATE SODIUM 100 MG/1
100 CAPSULE, LIQUID FILLED ORAL
Status: DISCONTINUED | OUTPATIENT
Start: 2019-07-05 | End: 2019-07-12 | Stop reason: HOSPADM

## 2019-07-03 RX ORDER — HYDROCODONE BITARTRATE AND ACETAMINOPHEN 7.5; 325 MG/1; MG/1
1 TABLET ORAL EVERY 4 HOURS PRN
Status: ON HOLD | COMMUNITY
End: 2019-07-12 | Stop reason: HOSPADM

## 2019-07-03 RX ORDER — DILTIAZEM HYDROCHLORIDE 5 MG/ML
5 INJECTION INTRAVENOUS ONCE
Status: COMPLETED | OUTPATIENT
Start: 2019-07-03 | End: 2019-07-03

## 2019-07-03 RX ADMIN — ATORVASTATIN CALCIUM 40 MG: 40 TABLET, FILM COATED ORAL at 20:26

## 2019-07-03 RX ADMIN — DILTIAZEM HYDROCHLORIDE 120 MG: 120 CAPSULE, EXTENDED RELEASE ORAL at 12:48

## 2019-07-03 RX ADMIN — ENOXAPARIN SODIUM 150 MG: 150 INJECTION SUBCUTANEOUS at 15:25

## 2019-07-03 RX ADMIN — DULOXETINE HYDROCHLORIDE 60 MG: 60 CAPSULE, DELAYED RELEASE ORAL at 12:48

## 2019-07-03 RX ADMIN — CYCLOBENZAPRINE HYDROCHLORIDE 10 MG: 10 TABLET, FILM COATED ORAL at 20:26

## 2019-07-03 RX ADMIN — FENOFIBRATE 160 MG: 160 TABLET ORAL at 12:48

## 2019-07-03 RX ADMIN — AMITRIPTYLINE HYDROCHLORIDE 10 MG: 10 TABLET, FILM COATED ORAL at 20:26

## 2019-07-03 RX ADMIN — ISOSORBIDE MONONITRATE 30 MG: 30 TABLET ORAL at 12:48

## 2019-07-03 RX ADMIN — FERROUS SULFATE TAB 325 MG (65 MG ELEMENTAL FE) 325 MG: 325 (65 FE) TAB at 12:48

## 2019-07-03 RX ADMIN — HYDRALAZINE HYDROCHLORIDE 25 MG: 25 TABLET, FILM COATED ORAL at 15:25

## 2019-07-03 RX ADMIN — Medication 10 ML: at 20:30

## 2019-07-03 RX ADMIN — FOLIC ACID 1 MG: 1 TABLET ORAL at 12:48

## 2019-07-03 RX ADMIN — GABAPENTIN 100 MG: 100 CAPSULE ORAL at 20:26

## 2019-07-03 RX ADMIN — ONDANSETRON 4 MG: 2 INJECTION INTRAMUSCULAR; INTRAVENOUS at 18:13

## 2019-07-03 RX ADMIN — CALCIUM CARBONATE-VITAMIN D TAB 500 MG-200 UNIT 1 TABLET: 500-200 TAB at 12:48

## 2019-07-03 RX ADMIN — Medication 10 ML: at 12:48

## 2019-07-03 RX ADMIN — HYDRALAZINE HYDROCHLORIDE 25 MG: 25 TABLET, FILM COATED ORAL at 20:26

## 2019-07-03 RX ADMIN — CALCIUM CARBONATE-VITAMIN D TAB 500 MG-200 UNIT 1 TABLET: 500-200 TAB at 15:24

## 2019-07-03 RX ADMIN — METOPROLOL TARTRATE 12.5 MG: 25 TABLET ORAL at 20:26

## 2019-07-03 RX ADMIN — METOPROLOL TARTRATE 12.5 MG: 25 TABLET ORAL at 12:47

## 2019-07-03 RX ADMIN — DILTIAZEM HYDROCHLORIDE 5 MG: 5 INJECTION INTRAVENOUS at 20:26

## 2019-07-03 RX ADMIN — BUMETANIDE 1 MG: 0.25 INJECTION INTRAMUSCULAR; INTRAVENOUS at 12:47

## 2019-07-03 RX ADMIN — GABAPENTIN 100 MG: 100 CAPSULE ORAL at 15:24

## 2019-07-03 RX ADMIN — FERROUS SULFATE TAB 325 MG (65 MG ELEMENTAL FE) 325 MG: 325 (65 FE) TAB at 15:24

## 2019-07-03 RX ADMIN — IOPAMIDOL 80 ML: 755 INJECTION, SOLUTION INTRAVENOUS at 10:19

## 2019-07-03 RX ADMIN — ASPIRIN 81 MG: 81 TABLET ORAL at 12:47

## 2019-07-03 ASSESSMENT — ENCOUNTER SYMPTOMS
BACK PAIN: 0
COUGH: 0
VOMITING: 0
BLOOD IN STOOL: 0
PHOTOPHOBIA: 0
TROUBLE SWALLOWING: 0
SHORTNESS OF BREATH: 1
VOICE CHANGE: 0
NAUSEA: 0
WHEEZING: 0
DIARRHEA: 0
CHEST TIGHTNESS: 0
FACIAL SWELLING: 0
SORE THROAT: 0
ABDOMINAL PAIN: 0

## 2019-07-03 ASSESSMENT — PAIN SCALES - GENERAL
PAINLEVEL_OUTOF10: 0

## 2019-07-03 NOTE — LETTER
Beneficiary Notification Letter     This [place] Provider is Participating in an Innovative Payment and [address] [place] [address] [place] from Abilio Cuevas:   Daniella is participating in a Medicare initiative called the [facility] for [facility]. You are receiving this letter because your health care provider has identified you as a patient who is receiving care through this initiative. Health care providers participating in the [facility], including Daniella, will work with Medicare to improve care for patients. Your Medicare rights have not been changed. You still have all the same Medicare rights and protections, including the right to choose which hospital, doctor, or other health care provider you see. However, because Daniella chose to participate in the [address] [place] [address], all Medicare beneficiaries who meet the eligibility criteria of this initiative will receive care under the initiative. If you do not wish to receive care under the [employer] [facility], you must choose a health care provider that does not participate in this initiative for your care. Regardless of which health care provider you see, Medicare will continue to cover all of your medically necessary services. [employer] Pay[employer] for Care Improvement Advanced aims to help improve your care     The [employer] [facility] is an innovative Medicare initiative that encourages your doctors, hospitals, and other health care providers to work more closely together so you get better care during and following certain hospital stays.  In this initiative, doctors and hospitals may work closely with certain health care providers and

## 2019-07-03 NOTE — ED PROVIDER NOTES
[address] [facility] [address] [address] EMERGENCY DEPT  eMERGENCYdEPARTMENT eNCOUnter      Pt Name: Tiki Pal  MRN: [id number]  [place] 1955  Date of evaluation: 7/3/2019  Provider:Alexander A Darylene Math, DO, [place] COMPLAINT       Chief Complaint   Patient presents with    Shortness of Breath       HISTORY OF PRESENT ILLNESS    Tiki Pal is a 58 y.o. female who presents to the emergency department from [facility] via EMS with a chief complaint of chest pressure and SOB. The SOB began yesterday with exertion and at rest. She states worsened throughout the night. Patient has difficulty describing her chest pain but reports \"it is difficult to take a deep breath\". EMS reports the patient's SpO2 as 89-90% en route. Patient denies O2 at home. She also complains of feeling restless and \"not feeling quite right\". Her blood glucose was 74 this morning at the rehab center but 52 when EMS checked, so she was given 8 oz of orange juice in the ED. The patient reports having atrial fibrillation but cannot feel it and does not take blood thinners to treat it. She is staying in the [facility] while she heals from a fractured left femur that occurred in May. She does not complain of fever, chills, a cough, confusion, calf pain, nausea, or vomiting. The patient has no other complaints at this time. Triage notes and Nursing notes were reviewed by myself. Any discrepancies are addressedabove.     PAST MEDICAL HISTORY     Past Medical History:   Diagnosis Date    CAD (coronary artery disease)     CRI (chronic renal insufficiency)     Difficult intubation     excess skin in back of throat     DM (diabetes mellitus) (HCC)     GERD (gastroesophageal reflux disease)     H/O gastric bypass     Hyperlipidemia     Hypertension     Hypothyroidism     Neuropathy     Obesity     Osteoarthritis     Paroxysmal atrial fibrillation (HCC)     Prolonged emergence from general anesthesia     Sleep apnea     uses leg: She exhibits no tenderness. Left lower leg: She exhibits no tenderness. Negative Rosemarie's Sign bilaterally   Lymphadenopathy:     She has no cervical adenopathy. Neurological: She is alert and oriented to person, place, and time. No cranial nerve deficit. She exhibits normal muscle tone. Coordination normal.   No nystagmus   Skin: Skin is warm and dry. No rash noted. She is not diaphoretic. No erythema. No pallor. Psychiatric: She has a normal mood and affect. Her behavior is normal. Thought content normal.   Nursing note and vitals reviewed. DIAGNOSTIC RESULTS     EKG: (none if blank)  All EKG's are interpreted by the Emergency Department Physician who either signs or Co-signs this chart in the absence of a cardiologist.    EKG shows A. fib with RVR, rate of 126. Normal QRS, prolonged QTC at 501    RADIOLOGY: (none if blank)  Interpretation per the Radiologist below, if available at the time of this note:    CTA Chest W WO Contrast    (Results Pending)       LABS:  Labs Reviewed   CBC WITH AUTO DIFFERENTIAL - Abnormal; Notable for the following components:       Result Value    WBC 4.1 (*)     RBC 3.31 (*)     Hemoglobin 10.3 (*)     Hematocrit 34.0 (*)     .7 (*)     MCHC 30.3 (*)     RDW-CV 15.2 (*)     RDW-SD 57.0 (*)     All other components within normal limits   COMPREHENSIVE METABOLIC PANEL W/ REFLEX TO MG FOR LOW K - Abnormal; Notable for the following components:    CREATININE 1.6 (*)     BUN 25 (*)     CO2 22 (*)     Alkaline Phosphatase 293 (*)     Alb 2.9 (*)     All other components within normal limits   GLOMERULAR FILTRATION RATE, ESTIMATED - Abnormal; Notable for the following components:    Est, Glom Filt Rate 32 (*)     All other components within normal limits   TROPONIN   ANION GAP   OSMOLALITY   SCAN OF BLOOD SMEAR   URINALYSIS   POCT GLUCOSE       All other labs were within normalrange ornot returned as of this dictation.     EMERGENCY DEPARTMENT COURSE and Medical

## 2019-07-04 ENCOUNTER — APPOINTMENT (OUTPATIENT)
Dept: GENERAL RADIOLOGY | Age: 64
DRG: 286 | End: 2019-07-04
Payer: MEDICARE

## 2019-07-04 LAB
ANION GAP SERPL CALCULATED.3IONS-SCNC: 9 MEQ/L (ref 8–16)
APTT: 44.1 SECONDS (ref 22–38)
BACTERIA: ABNORMAL
BILIRUBIN URINE: NEGATIVE
BLOOD, URINE: NEGATIVE
BUN BLDV-MCNC: 24 MG/DL (ref 7–22)
CALCIUM SERPL-MCNC: 9.2 MG/DL (ref 8.5–10.5)
CASTS: ABNORMAL /LPF
CASTS: ABNORMAL /LPF
CHARACTER, URINE: ABNORMAL
CHLORIDE BLD-SCNC: 106 MEQ/L (ref 98–111)
CO2: 25 MEQ/L (ref 23–33)
COLOR: YELLOW
CREAT SERPL-MCNC: 1.8 MG/DL (ref 0.4–1.2)
CRYSTALS: ABNORMAL
EPITHELIAL CELLS, UA: ABNORMAL /HPF
ERYTHROCYTE [DISTWIDTH] IN BLOOD BY AUTOMATED COUNT: 15.6 % (ref 11.5–14.5)
ERYTHROCYTE [DISTWIDTH] IN BLOOD BY AUTOMATED COUNT: 57.2 FL (ref 35–45)
GFR SERPL CREATININE-BSD FRML MDRD: 28 ML/MIN/1.73M2
GLUCOSE BLD-MCNC: 152 MG/DL (ref 70–108)
GLUCOSE BLD-MCNC: 164 MG/DL (ref 70–108)
GLUCOSE BLD-MCNC: 166 MG/DL (ref 70–108)
GLUCOSE BLD-MCNC: 202 MG/DL (ref 70–108)
GLUCOSE BLD-MCNC: 270 MG/DL (ref 70–108)
GLUCOSE, URINE: NEGATIVE MG/DL
HCT VFR BLD CALC: 32.3 % (ref 37–47)
HEMOGLOBIN: 9.9 GM/DL (ref 12–16)
KETONES, URINE: NEGATIVE
LEUKOCYTE EST, POC: NEGATIVE
MCH RBC QN AUTO: 31.4 PG (ref 26–33)
MCHC RBC AUTO-ENTMCNC: 30.7 GM/DL (ref 32.2–35.5)
MCV RBC AUTO: 102.5 FL (ref 81–99)
MISCELLANEOUS LAB TEST RESULT: ABNORMAL
NITRITE, URINE: NEGATIVE
PH UA: 5 (ref 5–9)
PLATELET # BLD: 193 THOU/MM3 (ref 130–400)
PMV BLD AUTO: 11.5 FL (ref 9.4–12.4)
POTASSIUM REFLEX MAGNESIUM: 4.4 MEQ/L (ref 3.5–5.2)
PROTEIN UA: 30 MG/DL
RBC # BLD: 3.15 MILL/MM3 (ref 4.2–5.4)
RBC URINE: ABNORMAL /HPF
RENAL EPITHELIAL, UA: ABNORMAL
SODIUM BLD-SCNC: 140 MEQ/L (ref 135–145)
SPECIFIC GRAVITY UA: 1.01 (ref 1–1.03)
UROBILINOGEN, URINE: 0.2 EU/DL (ref 0–1)
WBC # BLD: 4.1 THOU/MM3 (ref 4.8–10.8)
WBC UA: ABNORMAL /HPF
YEAST: ABNORMAL

## 2019-07-04 PROCEDURE — 2580000003 HC RX 258: Performed by: NURSE PRACTITIONER

## 2019-07-04 PROCEDURE — 80048 BASIC METABOLIC PNL TOTAL CA: CPT

## 2019-07-04 PROCEDURE — 71045 X-RAY EXAM CHEST 1 VIEW: CPT

## 2019-07-04 PROCEDURE — 2500000003 HC RX 250 WO HCPCS: Performed by: NURSE PRACTITIONER

## 2019-07-04 PROCEDURE — 2500000003 HC RX 250 WO HCPCS: Performed by: INTERNAL MEDICINE

## 2019-07-04 PROCEDURE — 6370000000 HC RX 637 (ALT 250 FOR IP): Performed by: NURSE PRACTITIONER

## 2019-07-04 PROCEDURE — 6360000002 HC RX W HCPCS: Performed by: NURSE PRACTITIONER

## 2019-07-04 PROCEDURE — 36415 COLL VENOUS BLD VENIPUNCTURE: CPT

## 2019-07-04 PROCEDURE — 2700000000 HC OXYGEN THERAPY PER DAY

## 2019-07-04 PROCEDURE — 85027 COMPLETE CBC AUTOMATED: CPT

## 2019-07-04 PROCEDURE — 82948 REAGENT STRIP/BLOOD GLUCOSE: CPT

## 2019-07-04 PROCEDURE — 1200000003 HC TELEMETRY R&B

## 2019-07-04 PROCEDURE — 85730 THROMBOPLASTIN TIME PARTIAL: CPT

## 2019-07-04 PROCEDURE — 6360000002 HC RX W HCPCS: Performed by: INTERNAL MEDICINE

## 2019-07-04 PROCEDURE — 99233 SBSQ HOSP IP/OBS HIGH 50: CPT | Performed by: INTERNAL MEDICINE

## 2019-07-04 PROCEDURE — 6370000000 HC RX 637 (ALT 250 FOR IP): Performed by: INTERNAL MEDICINE

## 2019-07-04 PROCEDURE — 2580000003 HC RX 258: Performed by: INTERNAL MEDICINE

## 2019-07-04 PROCEDURE — 94760 N-INVAS EAR/PLS OXIMETRY 1: CPT

## 2019-07-04 PROCEDURE — 81001 URINALYSIS AUTO W/SCOPE: CPT

## 2019-07-04 PROCEDURE — 99223 1ST HOSP IP/OBS HIGH 75: CPT | Performed by: INTERNAL MEDICINE

## 2019-07-04 RX ORDER — AMOXICILLIN AND CLAVULANATE POTASSIUM 500; 125 MG/1; MG/1
1 TABLET, FILM COATED ORAL EVERY 8 HOURS SCHEDULED
Status: DISCONTINUED | OUTPATIENT
Start: 2019-07-04 | End: 2019-07-09

## 2019-07-04 RX ORDER — HEPARIN SODIUM 10000 [USP'U]/100ML
18 INJECTION, SOLUTION INTRAVENOUS CONTINUOUS
Status: DISCONTINUED | OUTPATIENT
Start: 2019-07-04 | End: 2019-07-04

## 2019-07-04 RX ORDER — HEPARIN SODIUM 1000 [USP'U]/ML
10000 INJECTION, SOLUTION INTRAVENOUS; SUBCUTANEOUS PRN
Status: DISCONTINUED | OUTPATIENT
Start: 2019-07-04 | End: 2019-07-12 | Stop reason: HOSPADM

## 2019-07-04 RX ORDER — HEPARIN SODIUM 10000 [USP'U]/100ML
14 INJECTION, SOLUTION INTRAVENOUS CONTINUOUS
Status: DISCONTINUED | OUTPATIENT
Start: 2019-07-05 | End: 2019-07-12

## 2019-07-04 RX ORDER — HEPARIN SODIUM 1000 [USP'U]/ML
5000 INJECTION, SOLUTION INTRAVENOUS; SUBCUTANEOUS PRN
Status: DISCONTINUED | OUTPATIENT
Start: 2019-07-04 | End: 2019-07-12 | Stop reason: HOSPADM

## 2019-07-04 RX ORDER — HEPARIN SODIUM 10000 [USP'U]/100ML
2100 INJECTION, SOLUTION INTRAVENOUS CONTINUOUS
Status: DISCONTINUED | OUTPATIENT
Start: 2019-07-04 | End: 2019-07-04

## 2019-07-04 RX ORDER — LACTOBACILLUS RHAMNOSUS GG 10B CELL
1 CAPSULE ORAL
Status: DISCONTINUED | OUTPATIENT
Start: 2019-07-05 | End: 2019-07-12 | Stop reason: HOSPADM

## 2019-07-04 RX ORDER — HEPARIN SODIUM 1000 [USP'U]/ML
10000 INJECTION, SOLUTION INTRAVENOUS; SUBCUTANEOUS ONCE
Status: COMPLETED | OUTPATIENT
Start: 2019-07-04 | End: 2019-07-05

## 2019-07-04 RX ORDER — DILTIAZEM HYDROCHLORIDE 5 MG/ML
10 INJECTION INTRAVENOUS ONCE
Status: COMPLETED | OUTPATIENT
Start: 2019-07-04 | End: 2019-07-04

## 2019-07-04 RX ORDER — ISOSORBIDE MONONITRATE 60 MG/1
60 TABLET, EXTENDED RELEASE ORAL DAILY
Status: DISCONTINUED | OUTPATIENT
Start: 2019-07-05 | End: 2019-07-12 | Stop reason: HOSPADM

## 2019-07-04 RX ADMIN — HYDRALAZINE HYDROCHLORIDE 25 MG: 25 TABLET, FILM COATED ORAL at 21:46

## 2019-07-04 RX ADMIN — BUMETANIDE 1 MG: 0.25 INJECTION INTRAMUSCULAR; INTRAVENOUS at 21:46

## 2019-07-04 RX ADMIN — ENOXAPARIN SODIUM 150 MG: 150 INJECTION SUBCUTANEOUS at 13:19

## 2019-07-04 RX ADMIN — DILTIAZEM HYDROCHLORIDE 5 MG/HR: 5 INJECTION INTRAVENOUS at 13:49

## 2019-07-04 RX ADMIN — FENOFIBRATE 160 MG: 160 TABLET ORAL at 08:42

## 2019-07-04 RX ADMIN — GABAPENTIN 100 MG: 100 CAPSULE ORAL at 13:17

## 2019-07-04 RX ADMIN — FERROUS SULFATE TAB 325 MG (65 MG ELEMENTAL FE) 325 MG: 325 (65 FE) TAB at 08:40

## 2019-07-04 RX ADMIN — Medication 10 ML: at 09:00

## 2019-07-04 RX ADMIN — ATORVASTATIN CALCIUM 40 MG: 40 TABLET, FILM COATED ORAL at 21:46

## 2019-07-04 RX ADMIN — METOPROLOL TARTRATE 12.5 MG: 25 TABLET ORAL at 08:41

## 2019-07-04 RX ADMIN — DILTIAZEM HYDROCHLORIDE 10 MG: 5 INJECTION INTRAVENOUS at 13:23

## 2019-07-04 RX ADMIN — COLCHICINE 0.3 MG: 0.6 TABLET, FILM COATED ORAL at 08:43

## 2019-07-04 RX ADMIN — GABAPENTIN 100 MG: 100 CAPSULE ORAL at 08:42

## 2019-07-04 RX ADMIN — PANTOPRAZOLE SODIUM 40 MG: 40 TABLET, DELAYED RELEASE ORAL at 05:03

## 2019-07-04 RX ADMIN — INSULIN LISPRO 20 UNITS: 100 INJECTION, SUSPENSION SUBCUTANEOUS at 16:21

## 2019-07-04 RX ADMIN — AMOXICILLIN AND CLAVULANATE POTASSIUM 1 TABLET: 500; 125 TABLET, FILM COATED ORAL at 16:21

## 2019-07-04 RX ADMIN — CALCIUM CARBONATE-VITAMIN D TAB 500 MG-200 UNIT 1 TABLET: 500-200 TAB at 08:42

## 2019-07-04 RX ADMIN — ISOSORBIDE MONONITRATE 30 MG: 30 TABLET ORAL at 08:42

## 2019-07-04 RX ADMIN — METOPROLOL TARTRATE 25 MG: 25 TABLET ORAL at 21:46

## 2019-07-04 RX ADMIN — FERROUS SULFATE TAB 325 MG (65 MG ELEMENTAL FE) 325 MG: 325 (65 FE) TAB at 13:17

## 2019-07-04 RX ADMIN — INSULIN LISPRO 1 UNITS: 100 INJECTION, SOLUTION INTRAVENOUS; SUBCUTANEOUS at 21:46

## 2019-07-04 RX ADMIN — CALCIUM CARBONATE-VITAMIN D TAB 500 MG-200 UNIT 1 TABLET: 500-200 TAB at 16:21

## 2019-07-04 RX ADMIN — INSULIN LISPRO 6 UNITS: 100 INJECTION, SOLUTION INTRAVENOUS; SUBCUTANEOUS at 13:20

## 2019-07-04 RX ADMIN — FERROUS SULFATE TAB 325 MG (65 MG ELEMENTAL FE) 325 MG: 325 (65 FE) TAB at 16:21

## 2019-07-04 RX ADMIN — DULOXETINE HYDROCHLORIDE 60 MG: 60 CAPSULE, DELAYED RELEASE ORAL at 08:42

## 2019-07-04 RX ADMIN — GABAPENTIN 100 MG: 100 CAPSULE ORAL at 21:46

## 2019-07-04 RX ADMIN — DILTIAZEM HYDROCHLORIDE 120 MG: 120 CAPSULE, EXTENDED RELEASE ORAL at 08:42

## 2019-07-04 RX ADMIN — INSULIN LISPRO 4 UNITS: 100 INJECTION, SOLUTION INTRAVENOUS; SUBCUTANEOUS at 16:22

## 2019-07-04 RX ADMIN — AMITRIPTYLINE HYDROCHLORIDE 10 MG: 10 TABLET, FILM COATED ORAL at 21:46

## 2019-07-04 RX ADMIN — HYDRALAZINE HYDROCHLORIDE 25 MG: 25 TABLET, FILM COATED ORAL at 08:42

## 2019-07-04 RX ADMIN — HYDRALAZINE HYDROCHLORIDE 25 MG: 25 TABLET, FILM COATED ORAL at 13:18

## 2019-07-04 RX ADMIN — AMOXICILLIN AND CLAVULANATE POTASSIUM 1 TABLET: 500; 125 TABLET, FILM COATED ORAL at 21:46

## 2019-07-04 RX ADMIN — ASPIRIN 81 MG: 81 TABLET ORAL at 08:43

## 2019-07-04 RX ADMIN — BUMETANIDE 1 MG: 0.25 INJECTION INTRAMUSCULAR; INTRAVENOUS at 08:43

## 2019-07-04 RX ADMIN — INSULIN LISPRO 2 UNITS: 100 INJECTION, SOLUTION INTRAVENOUS; SUBCUTANEOUS at 08:43

## 2019-07-04 RX ADMIN — ENOXAPARIN SODIUM 150 MG: 150 INJECTION SUBCUTANEOUS at 00:00

## 2019-07-04 ASSESSMENT — PAIN SCALES - GENERAL
PAINLEVEL_OUTOF10: 0

## 2019-07-04 NOTE — CONSULTS
07/04/19 0842    diltiazem (CARDIZEM CD) extended release capsule 120 mg  120 mg Oral Daily ARNOL Prajapati - CNP   120 mg at 07/04/19 0842    cyclobenzaprine (FLEXERIL) tablet 10 mg  10 mg Oral TID PRN Jada Rajan APRN - CNP   10 mg at 07/03/19 2026    DULoxetine (CYMBALTA) extended release capsule 60 mg  60 mg Oral Daily ARNOL Prajapati - CNP   60 mg at 07/04/19 0842    fenofibrate tablet 160 mg  160 mg Oral Daily Jada Rajan APRN - CNP   160 mg at 07/04/19 1005    ferrous sulfate tablet 325 mg  325 mg Oral TID  ARNOL Stack - CNP   325 mg at 00/80/22 4772    folic acid (FOLVITE) tablet 1 mg  1 mg Oral Daily ARNOL Prajapati - CNP   1 mg at 07/03/19 1248    hydrALAZINE (APRESOLINE) tablet 25 mg  25 mg Oral TID ARNOL Prajapati - CNP   25 mg at 07/04/19 0842    isosorbide mononitrate (IMDUR) extended release tablet 30 mg  30 mg Oral Daily ARNOL Prajapati - CNP   30 mg at 07/04/19 6921    meclizine (ANTIVERT) chewable tablet 25 mg  25 mg Oral TID PRN Jada Rajan APRN - CNP        metoprolol tartrate (LOPRESSOR) tablet 12.5 mg  12.5 mg Oral BID ARNOL Prajapati - CNP   12.5 mg at 07/04/19 0841    pantoprazole (PROTONIX) tablet 40 mg  40 mg Oral QAM  Joellen Meeks APRN - CNP   40 mg at 07/04/19 0503    aspirin EC tablet 81 mg  81 mg Oral Daily ARNOL Stack - CNP   81 mg at 07/04/19 0843    sodium chloride flush 0.9 % injection 10 mL  10 mL Intravenous 2 times per day ARNOL Prajapati - CNP   10 mL at 07/04/19 0900    sodium chloride flush 0.9 % injection 10 mL  10 mL Intravenous PRN ARNOL Prajapati CNP        ondansetron (ZOFRAN) injection 4 mg  4 mg Intravenous Q6H PRN ARNOL Prajapati CNP   4 mg at 07/03/19 1813    senna (SENOKOT) tablet 8.6 mg  1 tablet Oral Daily PRN ARNOL Prajapati CNP        acetaminophen (TYLENOL) tablet 650 mg  650 mg Oral Q4H PRN Joellen GAMBOA sodium chloride flush  10 mL Intravenous 2 times per day    enoxaparin  1 mg/kg Subcutaneous Q12H    insulin lispro  0-12 Units Subcutaneous TID     insulin lispro  0-6 Units Subcutaneous Nightly    bumetanide  1 mg Intravenous BID    gabapentin  100 mg Oral TID    [START ON 2019] docusate sodium  100 mg Oral Q48H    colchicine  0.3 mg Oral Daily    insulin lispro protamine & lispro  20 Units Subcutaneous BID     vitamin B-12  1,000 mcg Oral Daily     Continuous Infusions:   dextrose       PRN Meds:.cyclobenzaprine, meclizine, sodium chloride flush, ondansetron, senna, acetaminophen, glucose, dextrose, glucagon (rDNA), dextrose, HYDROcodone-acetaminophen    No Known Allergies  Family History   Problem Relation Age of Onset    Asthma Sister     Asthma Brother     Heart Disease Father     High Blood Pressure Other     Cancer Maternal Uncle         stomach    Diabetes Maternal Grandmother     High Blood Pressure Maternal Grandmother     Diabetes Maternal Grandfather     Stroke Neg Hx      Social History     Socioeconomic History    Marital status: Single     Spouse name: Not on file    Number of children: Not on file    Years of education: Not on file    Highest education level: Not on file   Occupational History    Occupation: retired   Social Needs    Financial resource strain: Not on file    Food insecurity:     Worry: Not on file     Inability: Not on file   THEMA needs:     Medical: Not on file     Non-medical: Not on file   Tobacco Use    Smoking status: Former Smoker     Packs/day: 1.50     Years: 20.00     Pack years: 30.00     Last attempt to quit: 2007     Years since quittin.4    Smokeless tobacco: Never Used   Substance and Sexual Activity    Alcohol use: No     Alcohol/week: 0.0 oz    Drug use: No    Sexual activity: Never   Lifestyle    Physical activity:     Days per week: Not on file     Minutes per session: Not on file    Stress: Not on file

## 2019-07-05 ENCOUNTER — APPOINTMENT (OUTPATIENT)
Dept: CARDIAC CATH/INVASIVE PROCEDURES | Age: 64
DRG: 286 | End: 2019-07-05
Payer: MEDICARE

## 2019-07-05 LAB
APTT: 104.5 SECONDS (ref 22–38)
APTT: 80.5 SECONDS (ref 22–38)
APTT: > 200 SECONDS (ref 22–38)
EKG ATRIAL RATE: 69 BPM
EKG P AXIS: -4 DEGREES
EKG P-R INTERVAL: 198 MS
EKG Q-T INTERVAL: 432 MS
EKG QRS DURATION: 122 MS
EKG QTC CALCULATION (BAZETT): 462 MS
EKG R AXIS: -3 DEGREES
EKG T AXIS: -25 DEGREES
EKG VENTRICULAR RATE: 69 BPM
GLUCOSE BLD-MCNC: 164 MG/DL (ref 70–108)
GLUCOSE BLD-MCNC: 192 MG/DL (ref 70–108)
GLUCOSE BLD-MCNC: 203 MG/DL (ref 70–108)
GLUCOSE BLD-MCNC: 221 MG/DL (ref 70–108)
INR BLD: 1.07 (ref 0.85–1.13)
LV EF: 28 %
LVEF MODALITY: NORMAL

## 2019-07-05 PROCEDURE — 82948 REAGENT STRIP/BLOOD GLUCOSE: CPT

## 2019-07-05 PROCEDURE — 85610 PROTHROMBIN TIME: CPT

## 2019-07-05 PROCEDURE — 99232 SBSQ HOSP IP/OBS MODERATE 35: CPT | Performed by: NURSE PRACTITIONER

## 2019-07-05 PROCEDURE — 6360000002 HC RX W HCPCS

## 2019-07-05 PROCEDURE — 85730 THROMBOPLASTIN TIME PARTIAL: CPT

## 2019-07-05 PROCEDURE — 92960 CARDIOVERSION ELECTRIC EXT: CPT | Performed by: INTERNAL MEDICINE

## 2019-07-05 PROCEDURE — 93010 ELECTROCARDIOGRAM REPORT: CPT | Performed by: INTERNAL MEDICINE

## 2019-07-05 PROCEDURE — 99232 SBSQ HOSP IP/OBS MODERATE 35: CPT | Performed by: INTERNAL MEDICINE

## 2019-07-05 PROCEDURE — 5A2204Z RESTORATION OF CARDIAC RHYTHM, SINGLE: ICD-10-PCS | Performed by: INTERNAL MEDICINE

## 2019-07-05 PROCEDURE — 6360000002 HC RX W HCPCS: Performed by: INTERNAL MEDICINE

## 2019-07-05 PROCEDURE — 1200000003 HC TELEMETRY R&B

## 2019-07-05 PROCEDURE — B246ZZ4 ULTRASONOGRAPHY OF RIGHT AND LEFT HEART, TRANSESOPHAGEAL: ICD-10-PCS | Performed by: INTERNAL MEDICINE

## 2019-07-05 PROCEDURE — 36415 COLL VENOUS BLD VENIPUNCTURE: CPT

## 2019-07-05 PROCEDURE — 2580000003 HC RX 258: Performed by: NURSE PRACTITIONER

## 2019-07-05 PROCEDURE — 2709999900 HC NON-CHARGEABLE SUPPLY

## 2019-07-05 PROCEDURE — 93312 ECHO TRANSESOPHAGEAL: CPT

## 2019-07-05 PROCEDURE — 97162 PT EVAL MOD COMPLEX 30 MIN: CPT

## 2019-07-05 PROCEDURE — 6370000000 HC RX 637 (ALT 250 FOR IP): Performed by: INTERNAL MEDICINE

## 2019-07-05 PROCEDURE — 6370000000 HC RX 637 (ALT 250 FOR IP): Performed by: NURSE PRACTITIONER

## 2019-07-05 PROCEDURE — 97110 THERAPEUTIC EXERCISES: CPT

## 2019-07-05 PROCEDURE — 93005 ELECTROCARDIOGRAM TRACING: CPT | Performed by: INTERNAL MEDICINE

## 2019-07-05 PROCEDURE — 97530 THERAPEUTIC ACTIVITIES: CPT

## 2019-07-05 PROCEDURE — 2500000003 HC RX 250 WO HCPCS

## 2019-07-05 PROCEDURE — 6370000000 HC RX 637 (ALT 250 FOR IP)

## 2019-07-05 RX ORDER — WARFARIN SODIUM 5 MG/1
5 TABLET ORAL ONCE
Status: COMPLETED | OUTPATIENT
Start: 2019-07-05 | End: 2019-07-05

## 2019-07-05 RX ADMIN — HEPARIN SODIUM 10 UNITS/KG/HR: 10000 INJECTION, SOLUTION INTRAVENOUS at 17:20

## 2019-07-05 RX ADMIN — CALCIUM CARBONATE-VITAMIN D TAB 500 MG-200 UNIT 1 TABLET: 500-200 TAB at 09:50

## 2019-07-05 RX ADMIN — INSULIN LISPRO 4 UNITS: 100 INJECTION, SOLUTION INTRAVENOUS; SUBCUTANEOUS at 09:59

## 2019-07-05 RX ADMIN — Medication 10 ML: at 10:03

## 2019-07-05 RX ADMIN — GABAPENTIN 100 MG: 100 CAPSULE ORAL at 21:27

## 2019-07-05 RX ADMIN — ISOSORBIDE MONONITRATE 60 MG: 60 TABLET ORAL at 09:49

## 2019-07-05 RX ADMIN — HEPARIN SODIUM 14 UNITS/KG/HR: 10000 INJECTION, SOLUTION INTRAVENOUS at 00:08

## 2019-07-05 RX ADMIN — COLCHICINE 0.3 MG: 0.6 TABLET, FILM COATED ORAL at 09:49

## 2019-07-05 RX ADMIN — DULOXETINE HYDROCHLORIDE 60 MG: 60 CAPSULE, DELAYED RELEASE ORAL at 09:50

## 2019-07-05 RX ADMIN — AMITRIPTYLINE HYDROCHLORIDE 10 MG: 10 TABLET, FILM COATED ORAL at 21:27

## 2019-07-05 RX ADMIN — WARFARIN SODIUM 5 MG: 5 TABLET ORAL at 19:56

## 2019-07-05 RX ADMIN — INSULIN LISPRO 1 UNITS: 100 INJECTION, SOLUTION INTRAVENOUS; SUBCUTANEOUS at 21:27

## 2019-07-05 RX ADMIN — AMOXICILLIN AND CLAVULANATE POTASSIUM 1 TABLET: 500; 125 TABLET, FILM COATED ORAL at 14:52

## 2019-07-05 RX ADMIN — HYDRALAZINE HYDROCHLORIDE 25 MG: 25 TABLET, FILM COATED ORAL at 21:27

## 2019-07-05 RX ADMIN — INSULIN LISPRO 2 UNITS: 100 INJECTION, SOLUTION INTRAVENOUS; SUBCUTANEOUS at 17:05

## 2019-07-05 RX ADMIN — METOPROLOL TARTRATE 25 MG: 25 TABLET ORAL at 09:49

## 2019-07-05 RX ADMIN — FOLIC ACID 1 MG: 1 TABLET ORAL at 09:50

## 2019-07-05 RX ADMIN — Medication 1 CAPSULE: at 09:49

## 2019-07-05 RX ADMIN — AMOXICILLIN AND CLAVULANATE POTASSIUM 1 TABLET: 500; 125 TABLET, FILM COATED ORAL at 21:27

## 2019-07-05 RX ADMIN — ASPIRIN 81 MG: 81 TABLET ORAL at 10:01

## 2019-07-05 RX ADMIN — FERROUS SULFATE TAB 325 MG (65 MG ELEMENTAL FE) 325 MG: 325 (65 FE) TAB at 17:01

## 2019-07-05 RX ADMIN — DILTIAZEM HYDROCHLORIDE 120 MG: 120 CAPSULE, EXTENDED RELEASE ORAL at 09:50

## 2019-07-05 RX ADMIN — PANTOPRAZOLE SODIUM 40 MG: 40 TABLET, DELAYED RELEASE ORAL at 06:43

## 2019-07-05 RX ADMIN — HEPARIN SODIUM 10000 UNITS: 1000 INJECTION, SOLUTION INTRAVENOUS; SUBCUTANEOUS at 00:07

## 2019-07-05 RX ADMIN — FERROUS SULFATE TAB 325 MG (65 MG ELEMENTAL FE) 325 MG: 325 (65 FE) TAB at 09:50

## 2019-07-05 RX ADMIN — HYDRALAZINE HYDROCHLORIDE 25 MG: 25 TABLET, FILM COATED ORAL at 14:52

## 2019-07-05 RX ADMIN — INSULIN LISPRO 20 UNITS: 100 INJECTION, SUSPENSION SUBCUTANEOUS at 17:04

## 2019-07-05 RX ADMIN — Medication 1000 MCG: at 09:48

## 2019-07-05 RX ADMIN — ATORVASTATIN CALCIUM 40 MG: 40 TABLET, FILM COATED ORAL at 21:27

## 2019-07-05 RX ADMIN — METOPROLOL TARTRATE 25 MG: 25 TABLET ORAL at 21:27

## 2019-07-05 RX ADMIN — INSULIN LISPRO 20 UNITS: 100 INJECTION, SUSPENSION SUBCUTANEOUS at 09:57

## 2019-07-05 RX ADMIN — SENNOSIDES 8.6 MG: 8.6 TABLET, FILM COATED ORAL at 17:01

## 2019-07-05 RX ADMIN — AMOXICILLIN AND CLAVULANATE POTASSIUM 1 TABLET: 500; 125 TABLET, FILM COATED ORAL at 06:43

## 2019-07-05 RX ADMIN — HYDRALAZINE HYDROCHLORIDE 25 MG: 25 TABLET, FILM COATED ORAL at 09:50

## 2019-07-05 RX ADMIN — FENOFIBRATE 160 MG: 160 TABLET ORAL at 09:48

## 2019-07-05 RX ADMIN — GABAPENTIN 100 MG: 100 CAPSULE ORAL at 09:49

## 2019-07-05 RX ADMIN — CALCIUM CARBONATE-VITAMIN D TAB 500 MG-200 UNIT 1 TABLET: 500-200 TAB at 17:01

## 2019-07-05 RX ADMIN — GABAPENTIN 100 MG: 100 CAPSULE ORAL at 14:52

## 2019-07-05 RX ADMIN — HEPARIN SODIUM 10 UNITS/KG/HR: 10000 INJECTION, SOLUTION INTRAVENOUS at 10:07

## 2019-07-05 ASSESSMENT — PAIN SCALES - GENERAL
PAINLEVEL_OUTOF10: 0

## 2019-07-05 NOTE — PROGRESS NOTES
for NICKIE cardioversion today-7/5  3. Acute hypoxic respiratory failure-likely secondary to A. fib RVR - ? Systolic heart failure acute -diuretics per cardio  4. Acute on Chronic Systolic HF--EF 79% per echo 3/8/2017; on Bumex 1 mg BID; strict I/O's and daily weights; add low Na diet; recheck echo; BNP at 10,074  5. CKD Stage 3--stable-BMP a.m. along with electrolytes being on IV diuretic  6. DM-2, uncontrolled--AIC at 7.0 on 6/5/2019; on Humalog 75/25 30 units BID along with SSI; add SSI #2; monitor  7. Essential HTN, uncontrolled--Hydralazine, Imdur, BB; monitor  8. Elevated Alk Phos  9. Chronic Anemia  10. DARWIN--CPAP; follows with Salud Sumner  11. Recent left distal femur fracture on 6/1/2019 2nd to mechanical fall--non operative tx; knee immobilizer; to F/U with [facility] on July 17  12. CAD--ASA, statin, BB, Imdur  13. Morbid Obesity with BMI 49.3  14. Physical Deconditioning--add PT/OT    Subjective:-  Feels better  On 2 L of oxygen  Shortness of breath better  Denies chest pain, nausea vomiting diarrhea  Plan for NICKIE cardioversion today noted per cardiology    Past medical history, family history, social history and allergies reviewed again and is unchanged since admission. ROS (12 point review of systems completed. Pertinent positives noted.  Otherwise ROS is negative)   Scheduled Meds:   warfarin (COUMADIN) daily dosing (placeholder)   Other RX Placeholder    warfarin  5 mg Oral Once    isosorbide mononitrate  60 mg Oral Daily    metoprolol tartrate  25 mg Oral BID    amoxicillin-clavulanate  1 tablet Oral 3 times per day    lactobacillus  1 capsule Oral Daily with breakfast    amitriptyline  10 mg Oral Nightly    atorvastatin  40 mg Oral Nightly    calcium-vitamin D  1 tablet Oral BID WC    diltiazem  120 mg Oral Daily    DULoxetine  60 mg Oral Daily    fenofibrate  160 mg Oral Daily    ferrous sulfate  325 mg Oral TID WC    folic acid  1 mg Oral Daily    hydrALAZINE  25 mg Oral TID   Ezekiel Cruz pantoprazole  40 mg Oral QAM AC    aspirin  81 mg Oral Daily    sodium chloride flush  10 mL Intravenous 2 times per day    insulin lispro  0-12 Units Subcutaneous TID WC    insulin lispro  0-6 Units Subcutaneous Nightly    gabapentin  100 mg Oral TID    docusate sodium  100 mg Oral Q48H    colchicine  0.3 mg Oral Daily    insulin lispro protamine & lispro  20 Units Subcutaneous BID WC    vitamin B-12  1,000 mcg Oral Daily     Continuous Infusions:   diltiazem (CARDIZEM) 125 mg in dextrose 5% 125 mL infusion 2.5 mg/hr (07/04/19 2145)    heparin (porcine) 10 Units/kg/hr (07/05/19 1007)    dextrose       PRN Meds:.heparin (porcine), heparin (porcine), cyclobenzaprine, meclizine, sodium chloride flush, ondansetron, senna, acetaminophen, glucose, dextrose, glucagon (rDNA), dextrose, HYDROcodone-acetaminophen    PHYSICAL EXAM:    BP (!) 128/59   Pulse 60   Temp 98 °F (36.7 °C) (Oral)   Resp 17   Ht 5' 9\" (1.753 m)   Wt (!) 337 lb 3.2 oz (153 kg)   LMP 02/20/2001   SpO2 95%   BMI 49.80 kg/m²     General appearance:  No apparent distress, appears stated age and cooperative. HEENT:  Normal cephalic, atraumatic without obvious deformity. Pupils equal, round, and reactive to light. Conjunctivae/corneas clear. Neck: Supple, with full range of motion. No jugular venous distention. Trachea midline. Respiratory:  Normal respiratory effort. Clear to auscultation, bilaterally without Rales/Wheezes/Rhonchi. Cardiovascular:  irregular rate and rhythm   Abdomen: Soft, non-tender, non-distended with normal bowel sounds. Obese  Musculoskeletal:  (+) edema bilateral lower legs with L>>R (chronic). Full range of motion without deformity. Skin: Skin color, texture, turgor normal.   Neurologic:  Neurovascularly intact without any focal sensory/motor deficits.  Cranial nerves: II-XII intact, grossly non-focal.  Psychiatric:  Alert and oriented, thought content appropriate  Capillary Refill: Brisk,< 3 seconds

## 2019-07-05 NOTE — PROGRESS NOTES
Clinical Pharmacy Note    Nino Sawant is a 58 y.o. female for whom pharmacy has been asked to manage warfarin therapy. Reason for Admission: AF    Consulting Physician: Pastor Cortes. Elsy Barreto  Warfarin dose prior to admission: none  Warfarin indication: AF  Target INR range: 2-3   Outpatient warfarin provider: DANIELLE    Past Medical History:   Diagnosis Date    CAD (coronary artery disease)     CRI (chronic renal insufficiency)     Difficult intubation     excess skin in back of throat     DM (diabetes mellitus) ([facility] [address]     GERD (gastroesophageal reflux disease)     H/O gastric bypass     Hyperlipidemia     Hypertension     Hypothyroidism     Neuropathy     Obesity     Osteoarthritis     Paroxysmal atrial fibrillation ([facility] [address]     Prolonged emergence from general anesthesia     Sleep apnea     uses cpap    Visit for screening mammogram                 Recent Labs     07/05/19  1021   INR 1.07     Recent Labs     07/03/19  0902 07/04/19  1159   HGB 10.3* 9.9*   HCT 34.0* 32.3*    193       Current warfarin drug-drug interactions: heparin drip, aspirin      Date INR Warfarin Dose   7/5/2019 1.07 5 mg                                   Daily PT/INR until stable within therapeutic range. Thank you for the consult.

## 2019-07-05 NOTE — PRE SEDATION
[facility]  Sedation/Analgesia History & Physical    Pt Name: Avis Barragan  Account number: [de-identified]  MRN: [id number]  YOB: 1955  Provider Performing Procedure: Pb Oliver MD  Referring Provider: Abbe Rodriguez*   Primary Care Physician: ARNOL Green - MINDY  Date: 7/5/2019    PRE-PROCEDURE    Code Status: FULL CODE  Brief History/Pre-Procedure Diagnosis:   Afib    Consent: : I have discussed with the patient risks, benefits, and alternatives (and relevant risks, benefits, and side effects related to alternatives or not receiving care), and likelihood of the success. The patient and/or representative appear to understand and agree to proceed. The discussion encompasses risks, benefits, and side effects related to the alternatives and the risks related to not receiving the proposed care, treatment, and services. The indication, risks and benefits of the procedure and possible therapeutic consequences and alternatives were discussed with the patient. The patient was given the opportunity to ask questions and to have them answered to his/her satisfaction. Risks of the procedure include but are not limited to the following: Bleeding, hematoma including retroperitoneal hemmorhage, infection, pain and discomfort, injury to the aorta and other blood vessels, rhythm disturbance, low blood pressure, myocardial infarction, stroke, kidney damage/failure, myocardial perforation, allergic reactions to sedatives/contrast material, loss of pulse/vascular compromise requiring surgery, aneurysm/pseudoaneurysm formation, possible loss of a limb/hand/leg, needing blood transfusion, requiring emergent open heart surgery or emergent coronary intervention, the need for intubation/respiratory support, the requirement for defibrillation/cardioversion, and death. Alternatives to and omission of the suggested procedure were discussed.  The patient had no further questions and wished to proceed; the consent form was signed. MEDICAL HISTORY  [x]ASHD/ANGINA/MI/CHF   [x]Hypertension  [x]Diabetes  [x]Hyperlipidemia  []Smoking  []Family Hx of ASHD  [x]Additional information:       has a past medical history of CAD (coronary artery disease), CRI (chronic renal insufficiency), Difficult intubation, DM (diabetes mellitus) ([facility] [address] GERD (gastroesophageal reflux disease), H/O gastric bypass, Hyperlipidemia, Hypertension, Hypothyroidism, Neuropathy, Obesity, Osteoarthritis, Paroxysmal atrial fibrillation ([facility] [address] Prolonged emergence from general anesthesia, Sleep apnea, and Visit for screening mammogram.    SURGICAL HISTORY   has a past surgical history that includes back surgery; Foot surgery; Colonoscopy; Upper gastrointestinal endoscopy; Mouth Biopsy (9-28-12); Skin tag removal (9/25/12); Tonsillectomy; Patella surgery (7/11/13); Cataract removal (Right, 7/24/14); toenail excision; other surgical history (11/8/2014); other surgical history (4/23/2015); Sleeve Gastrectomy (09/15/2015); EKG 12 Lead (9/18/2015); Hysterectomy, total abdominal; and pr colon ca scrn not hi rsk ind (Left, 1/24/2018).   Additional information:       ALLERGIES   Allergies as of 07/03/2019    (No Known Allergies)     Additional information:       MEDICATIONS   Aspirin  [x] 81 mg  [] 325 mg  [] None  Antiplatelet drug therapy use last 5 days  [x]No []Yes  Coumadin Use Last 5 Days [x]No []Yes  Other anticoagulant use last 5 days  []No [x]Yes    Current Facility-Administered Medications:     isosorbide mononitrate (IMDUR) extended release tablet 60 mg, 60 mg, Oral, Daily, Osbaldo Winters MD, 60 mg at 07/05/19 0949    metoprolol tartrate (LOPRESSOR) tablet 25 mg, 25 mg, Oral, BID, Osbaldo Winters MD, 25 mg at 07/05/19 0949    [COMPLETED] diltiazem injection 10 mg, 10 mg, Intravenous, Once, 10 mg at 07/04/19 1323 **FOLLOWED BY** diltiazem 125 mg in dextrose 5 % 125 mL infusion, 5 mg/hr, Intravenous, Continuous, Presley (COLCRYS) tablet 0.3 mg, 0.3 mg, Oral, Daily, Joellen GAMBOA ARNOL Meeks - CNP, 0.3 mg at 07/05/19 0949    insulin lispro protamine & lispro (HUMALOG MIX) (75-25) 100 UNIT per ML injection vial SUSP 20 Units, 20 Units, Subcutaneous, BID WC, Joellen ARNOL Crews CNP, 20 Units at 07/05/19 0957    vitamin B-12 (CYANOCOBALAMIN) tablet 1,000 mcg, 1,000 mcg, Oral, Daily, Joellen ARNOL Crews CNP, 1,000 mcg at 07/05/19 0948  Prior to Admission medications    Medication Sig Start Date End Date Taking? Authorizing Provider   docusate sodium (COLACE) 100 MG capsule Take 100 mg by mouth every 48 hours   Yes Historical Provider, MD   colchicine (COLCRYS) 0.6 MG tablet Take 0.6 mg by mouth 2 times daily 7/3/19 7/6/19 Yes Historical Provider, MD   gabapentin (NEURONTIN) 100 MG capsule Take 100 mg by mouth 3 times daily. Yes Historical Provider, MD   hydrALAZINE (APRESOLINE) 25 MG tablet Take 1 tablet by mouth 3 times daily . hold if SBP less than 100 mm hg 6/3/19  Yes Sukhi Seaman MD   isosorbide mononitrate (IMDUR) 30 MG extended release tablet Take 1 tablet by mouth daily . hold if SBP less than 100 mm hg 6/3/19  Yes Sukhi Seaman MD   insulin lispro protamine & lispro (HUMALOG MIX 75/25 KWIKPEN) (75-25) 100 UNIT per ML SUPN injection pen Inject 0.3 mLs into the skin 2 times daily (with meals) 30 units BID  Patient taking differently: Inject 20 Units into the skin 3 times daily (with meals) 30 units BID 6/3/19  Yes Sukhi Seaman MD   insulin lispro (HUMALOG) 100 UNIT/ML injection vial Inject 0-12 Units into the skin 3 times daily (with meals) Sliding scale insulin coverage  Glucose:Dose: If Less [id number] =No Insulin/ 140-199= 2 Units/ 200-249= 4Units/ 250-299= 6 Units/  300-349=8 Units/  350-400=10 Units/ Above 400 = 12 Units 6/3/19  Yes Sukhi Seaman MD   ferrous sulfate 325 (65 Fe) MG EC tablet Take 1 tablet by mouth 3 times daily (with meals) 4/18/19  Yes ARNOL Henley - CNP   vitamin B-12

## 2019-07-05 NOTE — PROGRESS NOTES
[facility]  INPATIENT PHYSICAL THERAPY  EVALUATION  STRZ RENAL TELEMETRY 6K - [id number]-31/[id number]    Time In: 5371  Time Out: 1115  Timed Code Treatment Minutes: 25 Minutes  Minutes: 40          Date: 2019  Patient Name: Casey Escobar,  Gender:  female        MRN: [id number]  : 1955  (58 y.o.)      Referring Practitioner: ARNOL Aquino CNP  Diagnosis: Dyspnea  Additional Pertinent Hx: Per H & P, pt is a 58 y.o. female who presented to [facility] with shortness of breath; presented to the ER from [facility] via EMS with a chief complaint of chest pressure and dyspnea; she states she was in her normal state of health until last PM when she dev shortness of breath and states she \"couldn't sleep\"; she states \"I feel as if I have been running and I have not\"; states she had some upper chest pain this AM but has resolved; pt states \"it is difficult to take a deep breath\"; EMS reported the patient's SpO2 as 89-90% en route; denies O2 at home; blood glucose was 74 this morning at the rehab center but 52 when EMS checked, so she was given 8 oz of orange juice in the ED; patient reports having atrial fibrillation but cannot feel it and does not take blood thinners to treat it; states she was notified ~ 3 years ago after gastric bypass surgery;. She is staying in the [facility] while she heals from a fractured left femur that occurred  and is just using a knee immobilizer; in ED she was found to be in atrial fib with RVR; CTA chest (-) PE however small effusions and possibly pulmonary edema; she is currently on RA; speaking in complete sentences; she has not had her morning meds so those will be given; she is being admitted to the Hospitalist Service for further care and evaluation.       Past Medical History:   Diagnosis Date    CAD (coronary artery disease)     CRI (chronic renal insufficiency)     Difficult intubation     excess skin in back of attempting partial sit <> stands on the R LE. Prognosis: Good    Clinical Presentation: Moderate - Evolving with Changing Characteristics: knee immobilizer, super morbid obesity, limited mobility    Decision Making: Moderate Complexitybased on patient assessment and decision making process of determining plan of care and establishing reasonable expectations for measurable functional outcomes    REQUIRES PT FOLLOW UP: Yes    Discharge Recommendations:  Discharge Recommendations: Continue to assess pending progress, Subacute/Skilled Nursing Facility, Patient would benefit from continued therapy after discharge    Patient Education:  Patient Education: plan of care and LE exercise    Equipment Recommendations:  Equipment Needed: No    Safety:  Type of devices: All fall risk precautions in place, Bed alarm in place, Call light within reach, Patient at risk for falls, Left in bed, Nurse notified    Plan:  Times per week: 3-5x GM  Times per day: Daily  Specific instructions for Next Treatment: ther ex, ther act, transfers bed <> chair to be assessed by LPT  Current Treatment Recommendations: Strengthening, Balance Training, Functional Mobility Training, Endurance Training, Transfer Training, Safety Education & Training, Home Exercise Program, Patient/Caregiver Education & Training    Goals:  Patient goals : go back to [facility] for further therapies   Short term goals  Time Frame for Short term goals: at discharge  Short term goal 1: Pt to be Supervision for supine <> sit to get in/out of bed  Short term goal 2: Pt to be Mod I for sitting balance both static and dynamic for > 10 min for daily activities.   Short term goal 3: LPT to assess bed <> chair with slideboard   Long term goals  Time Frame for Long term goals : not set due to short ELOS    Evaluation Complexity: Based on the findings of patient history, examination, clinical presentation, and decision making during this evaluation, the evaluation of Arabella Vieyra

## 2019-07-05 NOTE — PROGRESS NOTES
Musculoskeletal: normal range of motion, no joint swelling, deformity or tenderness  Neurological: alert, oriented, normal speech, no focal findings or movement disorder noted    Medications:    isosorbide mononitrate  60 mg Oral Daily    metoprolol tartrate  25 mg Oral BID    amoxicillin-clavulanate  1 tablet Oral 3 times per day    lactobacillus  1 capsule Oral Daily with breakfast    amitriptyline  10 mg Oral Nightly    atorvastatin  40 mg Oral Nightly    calcium-vitamin D  1 tablet Oral BID WC    diltiazem  120 mg Oral Daily    DULoxetine  60 mg Oral Daily    fenofibrate  160 mg Oral Daily    ferrous sulfate  325 mg Oral TID WC    folic acid  1 mg Oral Daily    hydrALAZINE  25 mg Oral TID    pantoprazole  40 mg Oral QAM AC    aspirin  81 mg Oral Daily    sodium chloride flush  10 mL Intravenous 2 times per day    insulin lispro  0-12 Units Subcutaneous TID WC    insulin lispro  0-6 Units Subcutaneous Nightly    gabapentin  100 mg Oral TID    docusate sodium  100 mg Oral Q48H    colchicine  0.3 mg Oral Daily    insulin lispro protamine & lispro  20 Units Subcutaneous BID     vitamin B-12  1,000 mcg Oral Daily      diltiazem (CARDIZEM) 125 mg in dextrose 5% 125 mL infusion 2.5 mg/hr (07/04/19 7004)    heparin (porcine) Stopped (07/05/19 0714)    dextrose         heparin (porcine) 10,000 Units PRN   heparin (porcine) 5,000 Units PRN   cyclobenzaprine 10 mg TID PRN   meclizine 25 mg TID PRN   sodium chloride flush 10 mL PRN   ondansetron 4 mg Q6H PRN   senna 1 tablet Daily PRN   acetaminophen 650 mg Q4H PRN   glucose 15 g PRN   dextrose 12.5 g PRN   glucagon (rDNA) 1 mg PRN   dextrose 100 mL/hr PRN   HYDROcodone-acetaminophen 1 tablet Q4H PRN       Diagnostics:  EKG:    Atrial fibrillation with rapid ventricular response with premature ventricular or aberrantly conducted complexes  Low voltage QRS, consider pulmonary disease, pericardial effusion, or normal variant  Incomplete left single vessel CABG to RCA given the left   circ will be a poor target either way   medical RX and will decide after that      Estimated Blood SISS:46 ml.      Complications:No complications.      Signatures      ----------------------------------------------------------------   Electronically signed by Zhanna Mahoney MD (Performing   Physician) on 05/16/2017     Lab Data:    Cardiac Enzymes:  No results for input(s): CKTOTAL, CKMB, CKMBINDEX, TROPONINI in the last 72 hours.     CBC:   Lab Results   Component Value Date    WBC 4.1 07/04/2019    RBC 3.15 07/04/2019    HGB 9.9 07/04/2019    HCT 32.3 07/04/2019     07/04/2019       CMP:  Lab Results   Component Value Date     07/04/2019    K 4.4 07/04/2019     07/04/2019    CO2 25 07/04/2019    BUN 24 07/04/2019    CREATININE 1.8 07/04/2019    LABGLOM 28 07/04/2019    GLUCOSE 152 07/04/2019    GLUCOSE 213 03/27/2012    CALCIUM 9.2 07/04/2019       Hepatic Function Panel:  Lab Results   Component Value Date    ALKPHOS 293 07/03/2019    ALT 16 07/03/2019    AST 36 07/03/2019    PROT 6.6 07/03/2019    PROT 5.3 06/05/2019    BILITOT 0.5 07/03/2019    BILIDIR 0.4 06/05/2019    LABALBU 2.9 07/03/2019    LABALBU 4.3 03/27/2012       Magnesium:    Lab Results   Component Value Date    MG 1.9 04/12/2019       PT/INR:    Lab Results   Component Value Date    INR 0.99 06/01/2019       HgBA1c:    Lab Results   Component Value Date    LABA1C 7.0 06/05/2019       FLP:  Lab Results   Component Value Date    TRIG 183 06/05/2019    HDL 20 06/05/2019    LDLCALC 49 06/05/2019       TSH:    Lab Results   Component Value Date    TSH 0.271 07/03/2019         Assessment:    New onset AFB RVR of unknown duration:   [id number] at least 4     Acute diastolic chf secondary to AFB RVR    CAD- treat medically : severe RCA disease which is very tortious   non obstructive LAD   small left circ with 100% chronic lesion    CKD stage 3  Anemia    HTN  HLP  DM II    Hx

## 2019-07-06 LAB
ANION GAP SERPL CALCULATED.3IONS-SCNC: 7 MEQ/L (ref 8–16)
APTT: 54.4 SECONDS (ref 22–38)
APTT: 68.1 SECONDS (ref 22–38)
APTT: 73 SECONDS (ref 22–38)
BUN BLDV-MCNC: 26 MG/DL (ref 7–22)
CALCIUM SERPL-MCNC: 9 MG/DL (ref 8.5–10.5)
CHLORIDE BLD-SCNC: 103 MEQ/L (ref 98–111)
CO2: 31 MEQ/L (ref 23–33)
CREAT SERPL-MCNC: 1.6 MG/DL (ref 0.4–1.2)
ERYTHROCYTE [DISTWIDTH] IN BLOOD BY AUTOMATED COUNT: 14.9 % (ref 11.5–14.5)
ERYTHROCYTE [DISTWIDTH] IN BLOOD BY AUTOMATED COUNT: 55.9 FL (ref 35–45)
GFR SERPL CREATININE-BSD FRML MDRD: 32 ML/MIN/1.73M2
GLUCOSE BLD-MCNC: 135 MG/DL (ref 70–108)
GLUCOSE BLD-MCNC: 190 MG/DL (ref 70–108)
GLUCOSE BLD-MCNC: 194 MG/DL (ref 70–108)
GLUCOSE BLD-MCNC: 201 MG/DL (ref 70–108)
GLUCOSE BLD-MCNC: 273 MG/DL (ref 70–108)
HCT VFR BLD CALC: 30.8 % (ref 37–47)
HEMOGLOBIN: 9.6 GM/DL (ref 12–16)
INR BLD: 1.01 (ref 0.85–1.13)
MCH RBC QN AUTO: 31.8 PG (ref 26–33)
MCHC RBC AUTO-ENTMCNC: 31.2 GM/DL (ref 32.2–35.5)
MCV RBC AUTO: 102 FL (ref 81–99)
PLATELET # BLD: 163 THOU/MM3 (ref 130–400)
PLATELET # BLD: 169 THOU/MM3 (ref 130–400)
PMV BLD AUTO: 11.4 FL (ref 9.4–12.4)
POTASSIUM SERPL-SCNC: 4.5 MEQ/L (ref 3.5–5.2)
PRO-BNP: 7470 PG/ML (ref 0–900)
RBC # BLD: 3.02 MILL/MM3 (ref 4.2–5.4)
SODIUM BLD-SCNC: 141 MEQ/L (ref 135–145)
WBC # BLD: 3.9 THOU/MM3 (ref 4.8–10.8)

## 2019-07-06 PROCEDURE — 6370000000 HC RX 637 (ALT 250 FOR IP): Performed by: INTERNAL MEDICINE

## 2019-07-06 PROCEDURE — 80048 BASIC METABOLIC PNL TOTAL CA: CPT

## 2019-07-06 PROCEDURE — 2700000000 HC OXYGEN THERAPY PER DAY

## 2019-07-06 PROCEDURE — 85049 AUTOMATED PLATELET COUNT: CPT

## 2019-07-06 PROCEDURE — 82948 REAGENT STRIP/BLOOD GLUCOSE: CPT

## 2019-07-06 PROCEDURE — 85610 PROTHROMBIN TIME: CPT

## 2019-07-06 PROCEDURE — 1200000003 HC TELEMETRY R&B

## 2019-07-06 PROCEDURE — 97535 SELF CARE MNGMENT TRAINING: CPT

## 2019-07-06 PROCEDURE — 6370000000 HC RX 637 (ALT 250 FOR IP): Performed by: FAMILY MEDICINE

## 2019-07-06 PROCEDURE — 85730 THROMBOPLASTIN TIME PARTIAL: CPT

## 2019-07-06 PROCEDURE — 36415 COLL VENOUS BLD VENIPUNCTURE: CPT

## 2019-07-06 PROCEDURE — 2580000003 HC RX 258: Performed by: NURSE PRACTITIONER

## 2019-07-06 PROCEDURE — 97166 OT EVAL MOD COMPLEX 45 MIN: CPT

## 2019-07-06 PROCEDURE — 6360000002 HC RX W HCPCS: Performed by: INTERNAL MEDICINE

## 2019-07-06 PROCEDURE — 99233 SBSQ HOSP IP/OBS HIGH 50: CPT | Performed by: FAMILY MEDICINE

## 2019-07-06 PROCEDURE — 6370000000 HC RX 637 (ALT 250 FOR IP): Performed by: PHARMACIST

## 2019-07-06 PROCEDURE — 85027 COMPLETE CBC AUTOMATED: CPT

## 2019-07-06 PROCEDURE — 83880 ASSAY OF NATRIURETIC PEPTIDE: CPT

## 2019-07-06 PROCEDURE — 94761 N-INVAS EAR/PLS OXIMETRY MLT: CPT

## 2019-07-06 PROCEDURE — 6370000000 HC RX 637 (ALT 250 FOR IP): Performed by: NURSE PRACTITIONER

## 2019-07-06 RX ORDER — FUROSEMIDE 40 MG/1
40 TABLET ORAL DAILY
Status: DISCONTINUED | OUTPATIENT
Start: 2019-07-06 | End: 2019-07-12 | Stop reason: HOSPADM

## 2019-07-06 RX ORDER — WARFARIN SODIUM 3 MG/1
6 TABLET ORAL ONCE
Status: COMPLETED | OUTPATIENT
Start: 2019-07-06 | End: 2019-07-06

## 2019-07-06 RX ADMIN — INSULIN LISPRO 2 UNITS: 100 INJECTION, SOLUTION INTRAVENOUS; SUBCUTANEOUS at 20:36

## 2019-07-06 RX ADMIN — Medication 1 CAPSULE: at 09:36

## 2019-07-06 RX ADMIN — CALCIUM CARBONATE-VITAMIN D TAB 500 MG-200 UNIT 1 TABLET: 500-200 TAB at 09:36

## 2019-07-06 RX ADMIN — WARFARIN SODIUM 6 MG: 3 TABLET ORAL at 19:13

## 2019-07-06 RX ADMIN — ASPIRIN 81 MG: 81 TABLET ORAL at 09:38

## 2019-07-06 RX ADMIN — FERROUS SULFATE TAB 325 MG (65 MG ELEMENTAL FE) 325 MG: 325 (65 FE) TAB at 09:36

## 2019-07-06 RX ADMIN — HYDRALAZINE HYDROCHLORIDE 25 MG: 25 TABLET, FILM COATED ORAL at 20:32

## 2019-07-06 RX ADMIN — COLCHICINE 0.3 MG: 0.6 TABLET, FILM COATED ORAL at 09:36

## 2019-07-06 RX ADMIN — Medication 10 ML: at 09:37

## 2019-07-06 RX ADMIN — HYDRALAZINE HYDROCHLORIDE 25 MG: 25 TABLET, FILM COATED ORAL at 13:19

## 2019-07-06 RX ADMIN — INSULIN LISPRO 2 UNITS: 100 INJECTION, SOLUTION INTRAVENOUS; SUBCUTANEOUS at 16:45

## 2019-07-06 RX ADMIN — FENOFIBRATE 160 MG: 160 TABLET ORAL at 09:36

## 2019-07-06 RX ADMIN — DULOXETINE HYDROCHLORIDE 60 MG: 60 CAPSULE, DELAYED RELEASE ORAL at 09:36

## 2019-07-06 RX ADMIN — PANTOPRAZOLE SODIUM 40 MG: 40 TABLET, DELAYED RELEASE ORAL at 05:35

## 2019-07-06 RX ADMIN — INSULIN LISPRO 20 UNITS: 100 INJECTION, SUSPENSION SUBCUTANEOUS at 09:38

## 2019-07-06 RX ADMIN — FERROUS SULFATE TAB 325 MG (65 MG ELEMENTAL FE) 325 MG: 325 (65 FE) TAB at 16:44

## 2019-07-06 RX ADMIN — GABAPENTIN 100 MG: 100 CAPSULE ORAL at 20:32

## 2019-07-06 RX ADMIN — AMOXICILLIN AND CLAVULANATE POTASSIUM 1 TABLET: 500; 125 TABLET, FILM COATED ORAL at 13:19

## 2019-07-06 RX ADMIN — INSULIN LISPRO 6 UNITS: 100 INJECTION, SOLUTION INTRAVENOUS; SUBCUTANEOUS at 11:54

## 2019-07-06 RX ADMIN — CALCIUM CARBONATE-VITAMIN D TAB 500 MG-200 UNIT 1 TABLET: 500-200 TAB at 16:44

## 2019-07-06 RX ADMIN — ISOSORBIDE MONONITRATE 60 MG: 60 TABLET ORAL at 09:36

## 2019-07-06 RX ADMIN — Medication 1000 MCG: at 09:36

## 2019-07-06 RX ADMIN — INSULIN LISPRO 20 UNITS: 100 INJECTION, SUSPENSION SUBCUTANEOUS at 16:45

## 2019-07-06 RX ADMIN — GABAPENTIN 100 MG: 100 CAPSULE ORAL at 13:20

## 2019-07-06 RX ADMIN — HEPARIN SODIUM 8 UNITS/KG/HR: 10000 INJECTION, SOLUTION INTRAVENOUS at 15:27

## 2019-07-06 RX ADMIN — AMITRIPTYLINE HYDROCHLORIDE 10 MG: 10 TABLET, FILM COATED ORAL at 20:32

## 2019-07-06 RX ADMIN — GABAPENTIN 100 MG: 100 CAPSULE ORAL at 09:36

## 2019-07-06 RX ADMIN — FOLIC ACID 1 MG: 1 TABLET ORAL at 09:36

## 2019-07-06 RX ADMIN — DILTIAZEM HYDROCHLORIDE 120 MG: 120 CAPSULE, EXTENDED RELEASE ORAL at 09:36

## 2019-07-06 RX ADMIN — METOPROLOL TARTRATE 25 MG: 25 TABLET ORAL at 09:36

## 2019-07-06 RX ADMIN — FUROSEMIDE 40 MG: 40 TABLET ORAL at 20:32

## 2019-07-06 RX ADMIN — FERROUS SULFATE TAB 325 MG (65 MG ELEMENTAL FE) 325 MG: 325 (65 FE) TAB at 11:54

## 2019-07-06 RX ADMIN — METOPROLOL TARTRATE 25 MG: 25 TABLET ORAL at 20:32

## 2019-07-06 RX ADMIN — AMOXICILLIN AND CLAVULANATE POTASSIUM 1 TABLET: 500; 125 TABLET, FILM COATED ORAL at 20:32

## 2019-07-06 RX ADMIN — HYDRALAZINE HYDROCHLORIDE 25 MG: 25 TABLET, FILM COATED ORAL at 09:36

## 2019-07-06 RX ADMIN — ATORVASTATIN CALCIUM 40 MG: 40 TABLET, FILM COATED ORAL at 20:32

## 2019-07-06 RX ADMIN — AMOXICILLIN AND CLAVULANATE POTASSIUM 1 TABLET: 500; 125 TABLET, FILM COATED ORAL at 05:34

## 2019-07-06 ASSESSMENT — PAIN DESCRIPTION - PAIN TYPE: TYPE: ACUTE PAIN

## 2019-07-06 ASSESSMENT — PAIN SCALES - GENERAL
PAINLEVEL_OUTOF10: 1
PAINLEVEL_OUTOF10: 0
PAINLEVEL_OUTOF10: 0

## 2019-07-06 ASSESSMENT — PAIN DESCRIPTION - ORIENTATION: ORIENTATION: LEFT

## 2019-07-06 ASSESSMENT — PAIN DESCRIPTION - LOCATION: LOCATION: KNEE

## 2019-07-06 NOTE — PROGRESS NOTES
Cognitive Status: WFL    ADL;s:  Feeding: Modified independent , Setup  Grooming: Setup, Supervision(EOB washing face)  UE Bathing: Setup(sitting EOB for sponge bat)  LE Bathing: Dependent/Total, Increased time to complete, Setup(in supine, A for all LB d/t body habitus)  UE Dressing: Minimal assistance(donning doffing gown)  LE Dressing: Dependent/Total(donning, doffing socks)  Toileting: Maximum assistance(bed pan, A for placement/removal and hygiene)       Functional Mobility:  Bed mobility  Rolling to Left: Stand by assistance(with bedrail)  Rolling to Right: Contact guard assistance(with bedrail)  Supine to Sit: Contact guard assistance(HOB minimally elevated, use of bed rail)  Sit to Supine: Minimal assistance(at LLE)  Scooting: Stand by assistance(at EOB towards HOB for improved positioning in supine)    Functional Mobility  Functional Mobility Comments: OTR to assess as appropriate     Balance:  Balance  Sitting Balance: Supervision  Standing Balance: Unable to assess(comment)    Transfers:  Transfer Comments: OTR to assess as able, pt declined trial of sit<>stand this date       Upper Extremity Assessment:   LUE AROM : WFL  RUE AROM : WFL    LUE Strength  LUE Strength Comment: gross deconditioning  RUE Strength  RUE Strength Comment: gross deconditioning    Sensation  Overall Sensation Status: WFL       Activity Tolerance: Patient Tolerated treatment well, Patient limited by fatigue, Patient limited by pain       Assessment:  Assessment: Pt would continue to benefit from skilled OT intervention to maximize pt safety and independence with performing self care tasks and functional mobility and to ensure safe transition to the next level of care and return to [facility].     Performance deficits / Impairments: Decreased functional mobility , Decreased ADL status, Decreased endurance, Decreased strength, Decreased balance, Decreased safe awareness, Decreased high-level IADLs  Prognosis: Fair  REQUIRES OT FOLLOW UP: Yes    Treatment Initiated: Treatment and education initiated within context of evaluation. Evaluation time included review of current medical information, gathering information related to past medical, social and functional history, completion of standardized testing, formal and informal observation of tasks, assessment of data and development of plan of care and goals. Treatment time included skilled education and facilitation of tasks to increase safety and independence with ADL's for improved functional independence and quality of life. Discharge Recommendations:  Patient would benefit from continued therapy after discharge, ECF with OT    Patient Education:  Patient Education: OT role, POC, goals, safety, importance of activity, progression of therapy, discharge planning    Equipment Recommendations:  Equipment Needed: No  Other: defer    Plan:  Times per week: 3-5x  Current Treatment Recommendations: Strengthening, Balance Training, Functional Mobility Training, Endurance Training, Self-Care / ADL, Patient/Caregiver Education & Training, Equipment Evaluation, Education, & procurement, Safety Education & Training, Home Management Training    Goals:  Patient goals : To go back to [employer] term goals  Time Frame for Short term goals: 2 weeks  Short term goal 1: pt to tolerate further assesment of functional t/fs and mobility by OTR in prep for functional use of BSC  Short term goal 2: Pt to demonstrate LB ADLs using LHAE and no greater than Min A for increased indep with self cares  Short term goal 3: Pt to complete BUE mod resistance strengthening for increasing endurance required for BADL routine  Long term goals  Time Frame for Long term goals : NA d/t ELOS    Following session, patient left in safe position with all fall risk precautions in place.

## 2019-07-06 NOTE — PROGRESS NOTES
Hospitalist Progress Note    Patient:  Ayleen Angel      Unit/Bed:6K-17/017-A    YOB: 1955    MRN: [id number]       Acct: [de-identified]     PCP: ARNOL Hanley CNP    Date of Admission: 7/3/2019    Chief Complaint:   SOB    Hospital Course:   Patient is 58 y.o. female who presented to [address] [place] [address] via EMS chief complaint of chest pressure and dyspnea; she states she was in her normal state of health until last PM when she dev shortness of breath and states she \"couldn't sleep\"; she states \"I feel as if I have been running and I have not\"; states she had some upper chest pain but has resolved. EMS reported the patient's SpO2 as 89-90% en route; denies O2 at home. Patient reports having atrial fibrillation but cannot feel it and does not take blood thinners to treat it; states she was notified ~ 3 years ago after gastric bypass surgery;. She is staying in the [facility] while she heals from a fractured left femur that occurred June 1 and is just using a knee immobilizer    In ED she was found to be in atrial fib with RVR; CTA chest (-) PE however small effusions and possibly pulmonary edema. Cardiology consulted. There are right perihilar and infrahilar infiltrates noted on chest x-ray, Augmentin started. Patient was started on heparin and Coumadin. 7/5 Successful NICKIE with DCCV with Dr. Natalia Velasquez. INR 1.01    Subjective:   Patient feeling better, Currently on 1 lpm, saturating well. VS stable, afebrile. Denies any chest pain, palpitations, orthopnea. No nausea or vomiting. Vital signs stable, heart rate 70s to 80s, afebrile.       Medications:  Reviewed    Infusion Medications    diltiazem (CARDIZEM) 125 mg in dextrose 5% 125 mL infusion Stopped (07/05/19 1728)    heparin (porcine) 8 Units/kg/hr (07/06/19 4418)    dextrose       Scheduled Medications    warfarin  6 mg Oral Once    warfarin (COUMADIN) daily dosing throughout the lungs with small pleural effusions. Differential considerations include pulmonary edema from fluid overload or cardiogenic etiology or small airway or small vessel disease. **This report has been created using voice recognition software. It may contain minor errors which are inherent in voice recognition technology. **      Final report electronically signed by Dr. Samia Bellamy MD on 7/3/2019 10:40 AM          Diet: DIET CARB CONTROL; Low Sodium (2 GM)    DVT prophylaxis: [] Lovenox                                 [] SCDs                                 [] SQ Heparin                                 [] Encourage ambulation           [x] Already on Anticoagulation     Disposition:    [] Home       [] TCU       [] Rehab       [] Psych       [] SNF       [] [place]       [] Other-    Code Status: Full Code    PT/OT Eval Status:   Assessment/Plan:    Anticipated Discharge in :    MARGIE/Colby Dodson [address] Problems    Diagnosis Date Noted    Atrial fibrillation with rapid ventricular response ([address] [I48.91]      Priority: High    New onset atrial fibrillation ([address] [I48.91]      Priority: High    Acute diastolic CHF (congestive heart failure), NYHA class 2 ([address] [I50.31]      Priority: High    Dyspnea [R06.00] 07/03/2019       Atrial Fib with RVR  Now rate controlled  Successful NICKIE with DCCV done on 7/5 by Dr. Eliseo Deluca   Currently on heparin drip and Coumadin, bridging  continue Cardizem p.o. Acute hypoxic respiratory failure  likely secondary to A. fib RVR and systolic heart failure  Weaning off O2, currently on 1 L per nasal cannula, saturating well  Patient wears CPAP at night    Acute on Chronic Systolic HF   EF 76% per echo 3/8/2017; EF 25-30% on NICKIE last 7/5  Was on Bumex 1 mg BID, discontinued on 7/5  Start Lasix PO  strict I/O's and daily weights  low Na diet  BNP improving  Repeat chest x-ray in a.m.     CKD Stage 3  stable-BMP a.m. along with electrolytes  Avoid

## 2019-07-07 ENCOUNTER — APPOINTMENT (OUTPATIENT)
Dept: GENERAL RADIOLOGY | Age: 64
DRG: 286 | End: 2019-07-07
Payer: MEDICARE

## 2019-07-07 LAB
ALBUMIN SERPL-MCNC: 2.7 G/DL (ref 3.5–5.1)
ALP BLD-CCNC: 206 U/L (ref 38–126)
ALT SERPL-CCNC: 21 U/L (ref 11–66)
ANION GAP SERPL CALCULATED.3IONS-SCNC: 12 MEQ/L (ref 8–16)
APTT: 65.7 SECONDS (ref 22–38)
APTT: 83 SECONDS (ref 22–38)
APTT: 84.4 SECONDS (ref 22–38)
AST SERPL-CCNC: 52 U/L (ref 5–40)
BILIRUB SERPL-MCNC: 0.6 MG/DL (ref 0.3–1.2)
BILIRUBIN DIRECT: 0.3 MG/DL (ref 0–0.3)
BUN BLDV-MCNC: 27 MG/DL (ref 7–22)
CALCIUM SERPL-MCNC: 9.2 MG/DL (ref 8.5–10.5)
CHLORIDE BLD-SCNC: 101 MEQ/L (ref 98–111)
CO2: 26 MEQ/L (ref 23–33)
CREAT SERPL-MCNC: 1.4 MG/DL (ref 0.4–1.2)
ERYTHROCYTE [DISTWIDTH] IN BLOOD BY AUTOMATED COUNT: 15.1 % (ref 11.5–14.5)
ERYTHROCYTE [DISTWIDTH] IN BLOOD BY AUTOMATED COUNT: 55.8 FL (ref 35–45)
GFR SERPL CREATININE-BSD FRML MDRD: 38 ML/MIN/1.73M2
GLUCOSE BLD-MCNC: 166 MG/DL (ref 70–108)
GLUCOSE BLD-MCNC: 168 MG/DL (ref 70–108)
GLUCOSE BLD-MCNC: 169 MG/DL (ref 70–108)
GLUCOSE BLD-MCNC: 221 MG/DL (ref 70–108)
GLUCOSE BLD-MCNC: 225 MG/DL (ref 70–108)
HCT VFR BLD CALC: 31.9 % (ref 37–47)
HEMOGLOBIN: 9.7 GM/DL (ref 12–16)
INR BLD: 1.04 (ref 0.85–1.13)
MCH RBC QN AUTO: 30.8 PG (ref 26–33)
MCHC RBC AUTO-ENTMCNC: 30.4 GM/DL (ref 32.2–35.5)
MCV RBC AUTO: 101.3 FL (ref 81–99)
PLATELET # BLD: 169 THOU/MM3 (ref 130–400)
PMV BLD AUTO: 11.7 FL (ref 9.4–12.4)
POTASSIUM SERPL-SCNC: 4.2 MEQ/L (ref 3.5–5.2)
RBC # BLD: 3.15 MILL/MM3 (ref 4.2–5.4)
SODIUM BLD-SCNC: 139 MEQ/L (ref 135–145)
TOTAL PROTEIN: 6.1 G/DL (ref 6.1–8)
WBC # BLD: 4.6 THOU/MM3 (ref 4.8–10.8)

## 2019-07-07 PROCEDURE — 85027 COMPLETE CBC AUTOMATED: CPT

## 2019-07-07 PROCEDURE — 6370000000 HC RX 637 (ALT 250 FOR IP): Performed by: PHARMACIST

## 2019-07-07 PROCEDURE — 2700000000 HC OXYGEN THERAPY PER DAY

## 2019-07-07 PROCEDURE — 71045 X-RAY EXAM CHEST 1 VIEW: CPT

## 2019-07-07 PROCEDURE — 82948 REAGENT STRIP/BLOOD GLUCOSE: CPT

## 2019-07-07 PROCEDURE — 6360000002 HC RX W HCPCS: Performed by: INTERNAL MEDICINE

## 2019-07-07 PROCEDURE — 80053 COMPREHEN METABOLIC PANEL: CPT

## 2019-07-07 PROCEDURE — 99232 SBSQ HOSP IP/OBS MODERATE 35: CPT | Performed by: FAMILY MEDICINE

## 2019-07-07 PROCEDURE — 82248 BILIRUBIN DIRECT: CPT

## 2019-07-07 PROCEDURE — 85730 THROMBOPLASTIN TIME PARTIAL: CPT

## 2019-07-07 PROCEDURE — 6370000000 HC RX 637 (ALT 250 FOR IP): Performed by: INTERNAL MEDICINE

## 2019-07-07 PROCEDURE — 1200000003 HC TELEMETRY R&B

## 2019-07-07 PROCEDURE — 36415 COLL VENOUS BLD VENIPUNCTURE: CPT

## 2019-07-07 PROCEDURE — 94760 N-INVAS EAR/PLS OXIMETRY 1: CPT

## 2019-07-07 PROCEDURE — 85610 PROTHROMBIN TIME: CPT

## 2019-07-07 PROCEDURE — 6370000000 HC RX 637 (ALT 250 FOR IP): Performed by: FAMILY MEDICINE

## 2019-07-07 PROCEDURE — 6370000000 HC RX 637 (ALT 250 FOR IP): Performed by: NURSE PRACTITIONER

## 2019-07-07 RX ORDER — WARFARIN SODIUM 7.5 MG/1
7.5 TABLET ORAL ONCE
Status: COMPLETED | OUTPATIENT
Start: 2019-07-07 | End: 2019-07-07

## 2019-07-07 RX ADMIN — HEPARIN SODIUM 10 UNITS/KG/HR: 10000 INJECTION, SOLUTION INTRAVENOUS at 09:19

## 2019-07-07 RX ADMIN — INSULIN LISPRO 20 UNITS: 100 INJECTION, SUSPENSION SUBCUTANEOUS at 09:06

## 2019-07-07 RX ADMIN — DULOXETINE HYDROCHLORIDE 60 MG: 60 CAPSULE, DELAYED RELEASE ORAL at 09:04

## 2019-07-07 RX ADMIN — FERROUS SULFATE TAB 325 MG (65 MG ELEMENTAL FE) 325 MG: 325 (65 FE) TAB at 16:52

## 2019-07-07 RX ADMIN — HYDRALAZINE HYDROCHLORIDE 25 MG: 25 TABLET, FILM COATED ORAL at 14:09

## 2019-07-07 RX ADMIN — CALCIUM CARBONATE-VITAMIN D TAB 500 MG-200 UNIT 1 TABLET: 500-200 TAB at 09:04

## 2019-07-07 RX ADMIN — INSULIN LISPRO 4 UNITS: 100 INJECTION, SOLUTION INTRAVENOUS; SUBCUTANEOUS at 16:53

## 2019-07-07 RX ADMIN — AMITRIPTYLINE HYDROCHLORIDE 10 MG: 10 TABLET, FILM COATED ORAL at 21:05

## 2019-07-07 RX ADMIN — FERROUS SULFATE TAB 325 MG (65 MG ELEMENTAL FE) 325 MG: 325 (65 FE) TAB at 09:04

## 2019-07-07 RX ADMIN — GABAPENTIN 100 MG: 100 CAPSULE ORAL at 21:00

## 2019-07-07 RX ADMIN — Medication 1000 MCG: at 09:04

## 2019-07-07 RX ADMIN — GABAPENTIN 100 MG: 100 CAPSULE ORAL at 09:04

## 2019-07-07 RX ADMIN — PANTOPRAZOLE SODIUM 40 MG: 40 TABLET, DELAYED RELEASE ORAL at 05:47

## 2019-07-07 RX ADMIN — DILTIAZEM HYDROCHLORIDE 120 MG: 120 CAPSULE, EXTENDED RELEASE ORAL at 09:04

## 2019-07-07 RX ADMIN — AMOXICILLIN AND CLAVULANATE POTASSIUM 1 TABLET: 500; 125 TABLET, FILM COATED ORAL at 05:47

## 2019-07-07 RX ADMIN — INSULIN LISPRO 20 UNITS: 100 INJECTION, SUSPENSION SUBCUTANEOUS at 16:53

## 2019-07-07 RX ADMIN — HYDRALAZINE HYDROCHLORIDE 25 MG: 25 TABLET, FILM COATED ORAL at 09:04

## 2019-07-07 RX ADMIN — WARFARIN SODIUM 7.5 MG: 7.5 TABLET ORAL at 19:25

## 2019-07-07 RX ADMIN — CALCIUM CARBONATE-VITAMIN D TAB 500 MG-200 UNIT 1 TABLET: 500-200 TAB at 16:52

## 2019-07-07 RX ADMIN — ATORVASTATIN CALCIUM 40 MG: 40 TABLET, FILM COATED ORAL at 21:00

## 2019-07-07 RX ADMIN — METOPROLOL TARTRATE 25 MG: 25 TABLET ORAL at 09:04

## 2019-07-07 RX ADMIN — GABAPENTIN 100 MG: 100 CAPSULE ORAL at 14:09

## 2019-07-07 RX ADMIN — INSULIN LISPRO 2 UNITS: 100 INJECTION, SOLUTION INTRAVENOUS; SUBCUTANEOUS at 21:00

## 2019-07-07 RX ADMIN — FOLIC ACID 1 MG: 1 TABLET ORAL at 09:04

## 2019-07-07 RX ADMIN — Medication 1 CAPSULE: at 09:04

## 2019-07-07 RX ADMIN — HYDRALAZINE HYDROCHLORIDE 25 MG: 25 TABLET, FILM COATED ORAL at 21:00

## 2019-07-07 RX ADMIN — ASPIRIN 81 MG: 81 TABLET ORAL at 09:06

## 2019-07-07 RX ADMIN — FENOFIBRATE 160 MG: 160 TABLET ORAL at 09:04

## 2019-07-07 RX ADMIN — INSULIN LISPRO 2 UNITS: 100 INJECTION, SOLUTION INTRAVENOUS; SUBCUTANEOUS at 11:30

## 2019-07-07 RX ADMIN — AMOXICILLIN AND CLAVULANATE POTASSIUM 1 TABLET: 500; 125 TABLET, FILM COATED ORAL at 14:09

## 2019-07-07 RX ADMIN — INSULIN LISPRO 2 UNITS: 100 INJECTION, SOLUTION INTRAVENOUS; SUBCUTANEOUS at 09:06

## 2019-07-07 RX ADMIN — AMOXICILLIN AND CLAVULANATE POTASSIUM 1 TABLET: 500; 125 TABLET, FILM COATED ORAL at 21:00

## 2019-07-07 RX ADMIN — FERROUS SULFATE TAB 325 MG (65 MG ELEMENTAL FE) 325 MG: 325 (65 FE) TAB at 11:30

## 2019-07-07 RX ADMIN — FUROSEMIDE 40 MG: 40 TABLET ORAL at 09:04

## 2019-07-07 RX ADMIN — ISOSORBIDE MONONITRATE 60 MG: 60 TABLET ORAL at 09:04

## 2019-07-07 RX ADMIN — METOPROLOL TARTRATE 25 MG: 25 TABLET ORAL at 21:00

## 2019-07-07 RX ADMIN — COLCHICINE 0.3 MG: 0.6 TABLET, FILM COATED ORAL at 09:04

## 2019-07-07 ASSESSMENT — PAIN SCALES - GENERAL
PAINLEVEL_OUTOF10: 0
PAINLEVEL_OUTOF10: 0

## 2019-07-07 NOTE — PROGRESS NOTES
Hospitalist Progress Note    Patient:  Alicia Escamilla      Unit/Bed:6K-17/017-A    YOB: 1955    MRN: [id number]       Acct: [de-identified]     PCP: ARNOL Edmonds CNP    Date of Admission: 7/3/2019    Chief Complaint:   SOB    Hospital Course:   Patient is 58 y.o. female who presented to [address] [place] [address] via EMS chief complaint of chest pressure and dyspnea; she states she was in her normal state of health until last PM when she dev shortness of breath and states she \"couldn't sleep\"; she states \"I feel as if I have been running and I have not\"; states she had some upper chest pain but has resolved. EMS reported the patient's SpO2 as 89-90% en route; denies O2 at home. Patient reports having atrial fibrillation but cannot feel it and does not take blood thinners to treat it; states she was notified ~ 3 years ago after gastric bypass surgery;. She is staying in the [facility] while she heals from a fractured left femur that occurred June 1 and is just using a knee immobilizer    In ED she was found to be in atrial fib with RVR; CTA chest (-) PE however small effusions and possibly pulmonary edema. Cardiology consulted. There are right perihilar and infrahilar infiltrates noted on chest x-ray, Augmentin started. Patient was started on heparin and Coumadin. 7/5 Successful NICKIE with DCCV with Dr. Robyn Luna. INR 1.01    Subjective:   Patient feeling better, Currently on 1-2 lpm, saturating well. VS stable, afebrile. Denies any chest pain, palpitations, orthopnea. No nausea or vomiting. Vital signs stable, with occasional spike in BP, heart rate 70s to 80s, afebrile.       Medications:  Reviewed    Infusion Medications    diltiazem (CARDIZEM) 125 mg in dextrose 5% 125 mL infusion Stopped (07/05/19 1715)    heparin (porcine) 10 Units/kg/hr (07/07/19 0919)    dextrose       Scheduled Medications    furosemide  40 mg Oral Daily    warfarin

## 2019-07-08 LAB
ANION GAP SERPL CALCULATED.3IONS-SCNC: 12 MEQ/L (ref 8–16)
APTT: 113.8 SECONDS (ref 22–38)
APTT: 93.6 SECONDS (ref 22–38)
BUN BLDV-MCNC: 27 MG/DL (ref 7–22)
CALCIUM SERPL-MCNC: 9.8 MG/DL (ref 8.5–10.5)
CHLORIDE BLD-SCNC: 97 MEQ/L (ref 98–111)
CO2: 28 MEQ/L (ref 23–33)
CREAT SERPL-MCNC: 1.4 MG/DL (ref 0.4–1.2)
EKG ATRIAL RATE: 131 BPM
EKG Q-T INTERVAL: 412 MS
EKG QRS DURATION: 124 MS
EKG QTC CALCULATION (BAZETT): 506 MS
EKG R AXIS: -11 DEGREES
EKG T AXIS: -25 DEGREES
EKG VENTRICULAR RATE: 91 BPM
ERYTHROCYTE [DISTWIDTH] IN BLOOD BY AUTOMATED COUNT: 15.1 % (ref 11.5–14.5)
ERYTHROCYTE [DISTWIDTH] IN BLOOD BY AUTOMATED COUNT: 53.5 FL (ref 35–45)
GFR SERPL CREATININE-BSD FRML MDRD: 38 ML/MIN/1.73M2
GLUCOSE BLD-MCNC: 221 MG/DL (ref 70–108)
GLUCOSE BLD-MCNC: 228 MG/DL (ref 70–108)
GLUCOSE BLD-MCNC: 232 MG/DL (ref 70–108)
GLUCOSE BLD-MCNC: 232 MG/DL (ref 70–108)
GLUCOSE BLD-MCNC: 233 MG/DL (ref 70–108)
HCT VFR BLD CALC: 34.6 % (ref 37–47)
HEMOGLOBIN: 11.2 GM/DL (ref 12–16)
INR BLD: 1.34 (ref 0.85–1.13)
MAGNESIUM: 1.5 MG/DL (ref 1.6–2.4)
MCH RBC QN AUTO: 31.5 PG (ref 26–33)
MCHC RBC AUTO-ENTMCNC: 32.4 GM/DL (ref 32.2–35.5)
MCV RBC AUTO: 97.5 FL (ref 81–99)
PLATELET # BLD: 156 THOU/MM3 (ref 130–400)
PMV BLD AUTO: 11.6 FL (ref 9.4–12.4)
POTASSIUM SERPL-SCNC: 4 MEQ/L (ref 3.5–5.2)
PROCALCITONIN: 0.14 NG/ML (ref 0.01–0.09)
RBC # BLD: 3.55 MILL/MM3 (ref 4.2–5.4)
SODIUM BLD-SCNC: 137 MEQ/L (ref 135–145)
WBC # BLD: 4.1 THOU/MM3 (ref 4.8–10.8)

## 2019-07-08 PROCEDURE — 6370000000 HC RX 637 (ALT 250 FOR IP): Performed by: INTERNAL MEDICINE

## 2019-07-08 PROCEDURE — 6370000000 HC RX 637 (ALT 250 FOR IP): Performed by: NURSE PRACTITIONER

## 2019-07-08 PROCEDURE — 99232 SBSQ HOSP IP/OBS MODERATE 35: CPT | Performed by: FAMILY MEDICINE

## 2019-07-08 PROCEDURE — 85027 COMPLETE CBC AUTOMATED: CPT

## 2019-07-08 PROCEDURE — 93010 ELECTROCARDIOGRAM REPORT: CPT | Performed by: INTERNAL MEDICINE

## 2019-07-08 PROCEDURE — 6370000000 HC RX 637 (ALT 250 FOR IP): Performed by: FAMILY MEDICINE

## 2019-07-08 PROCEDURE — 6360000002 HC RX W HCPCS: Performed by: FAMILY MEDICINE

## 2019-07-08 PROCEDURE — 85730 THROMBOPLASTIN TIME PARTIAL: CPT

## 2019-07-08 PROCEDURE — 80048 BASIC METABOLIC PNL TOTAL CA: CPT

## 2019-07-08 PROCEDURE — 93005 ELECTROCARDIOGRAM TRACING: CPT | Performed by: FAMILY MEDICINE

## 2019-07-08 PROCEDURE — 36415 COLL VENOUS BLD VENIPUNCTURE: CPT

## 2019-07-08 PROCEDURE — 6360000002 HC RX W HCPCS: Performed by: INTERNAL MEDICINE

## 2019-07-08 PROCEDURE — 82948 REAGENT STRIP/BLOOD GLUCOSE: CPT

## 2019-07-08 PROCEDURE — 83735 ASSAY OF MAGNESIUM: CPT

## 2019-07-08 PROCEDURE — 99221 1ST HOSP IP/OBS SF/LOW 40: CPT | Performed by: INTERNAL MEDICINE

## 2019-07-08 PROCEDURE — 6370000000 HC RX 637 (ALT 250 FOR IP): Performed by: PHYSICIAN ASSISTANT

## 2019-07-08 PROCEDURE — 99232 SBSQ HOSP IP/OBS MODERATE 35: CPT | Performed by: PHYSICIAN ASSISTANT

## 2019-07-08 PROCEDURE — 85610 PROTHROMBIN TIME: CPT

## 2019-07-08 PROCEDURE — 1200000003 HC TELEMETRY R&B

## 2019-07-08 PROCEDURE — 84145 PROCALCITONIN (PCT): CPT

## 2019-07-08 RX ORDER — ACETYLCYSTEINE 200 MG/ML
1200 SOLUTION ORAL; RESPIRATORY (INHALATION) 2 TIMES DAILY
Status: DISCONTINUED | OUTPATIENT
Start: 2019-07-08 | End: 2019-07-08 | Stop reason: CLARIF

## 2019-07-08 RX ORDER — MAGNESIUM SULFATE IN WATER 40 MG/ML
2 INJECTION, SOLUTION INTRAVENOUS ONCE
Status: COMPLETED | OUTPATIENT
Start: 2019-07-08 | End: 2019-07-08

## 2019-07-08 RX ORDER — SODIUM CHLORIDE 9 MG/ML
INJECTION, SOLUTION INTRAVENOUS CONTINUOUS
Status: DISCONTINUED | OUTPATIENT
Start: 2019-07-08 | End: 2019-07-09

## 2019-07-08 RX ORDER — METOPROLOL SUCCINATE 50 MG/1
50 TABLET, EXTENDED RELEASE ORAL DAILY
Status: DISCONTINUED | OUTPATIENT
Start: 2019-07-08 | End: 2019-07-09

## 2019-07-08 RX ORDER — WARFARIN SODIUM 7.5 MG/1
7.5 TABLET ORAL ONCE
Status: DISCONTINUED | OUTPATIENT
Start: 2019-07-08 | End: 2019-07-08 | Stop reason: ALTCHOICE

## 2019-07-08 RX ADMIN — HYDRALAZINE HYDROCHLORIDE 25 MG: 25 TABLET, FILM COATED ORAL at 09:07

## 2019-07-08 RX ADMIN — GABAPENTIN 100 MG: 100 CAPSULE ORAL at 13:11

## 2019-07-08 RX ADMIN — HEPARIN SODIUM 10 UNITS/KG/HR: 10000 INJECTION, SOLUTION INTRAVENOUS at 02:35

## 2019-07-08 RX ADMIN — FENOFIBRATE 160 MG: 160 TABLET ORAL at 09:08

## 2019-07-08 RX ADMIN — INSULIN LISPRO 6 UNITS: 100 INJECTION, SOLUTION INTRAVENOUS; SUBCUTANEOUS at 16:09

## 2019-07-08 RX ADMIN — CALCIUM CARBONATE-VITAMIN D TAB 500 MG-200 UNIT 1 TABLET: 500-200 TAB at 16:08

## 2019-07-08 RX ADMIN — MAGNESIUM SULFATE HEPTAHYDRATE 2 G: 40 INJECTION, SOLUTION INTRAVENOUS at 17:46

## 2019-07-08 RX ADMIN — HEPARIN SODIUM 10 UNITS/KG/HR: 10000 INJECTION, SOLUTION INTRAVENOUS at 19:24

## 2019-07-08 RX ADMIN — AMOXICILLIN AND CLAVULANATE POTASSIUM 1 TABLET: 500; 125 TABLET, FILM COATED ORAL at 21:47

## 2019-07-08 RX ADMIN — METOPROLOL TARTRATE 25 MG: 25 TABLET ORAL at 09:08

## 2019-07-08 RX ADMIN — HYDRALAZINE HYDROCHLORIDE 25 MG: 25 TABLET, FILM COATED ORAL at 13:11

## 2019-07-08 RX ADMIN — DILTIAZEM HYDROCHLORIDE 120 MG: 120 CAPSULE, EXTENDED RELEASE ORAL at 09:07

## 2019-07-08 RX ADMIN — PANTOPRAZOLE SODIUM 40 MG: 40 TABLET, DELAYED RELEASE ORAL at 05:03

## 2019-07-08 RX ADMIN — INSULIN LISPRO 3 UNITS: 100 INJECTION, SOLUTION INTRAVENOUS; SUBCUTANEOUS at 21:28

## 2019-07-08 RX ADMIN — INSULIN LISPRO 20 UNITS: 100 INJECTION, SUSPENSION SUBCUTANEOUS at 09:09

## 2019-07-08 RX ADMIN — FERROUS SULFATE TAB 325 MG (65 MG ELEMENTAL FE) 325 MG: 325 (65 FE) TAB at 09:07

## 2019-07-08 RX ADMIN — GABAPENTIN 100 MG: 100 CAPSULE ORAL at 21:26

## 2019-07-08 RX ADMIN — METOPROLOL SUCCINATE 50 MG: 50 TABLET, FILM COATED, EXTENDED RELEASE ORAL at 21:25

## 2019-07-08 RX ADMIN — GABAPENTIN 100 MG: 100 CAPSULE ORAL at 09:07

## 2019-07-08 RX ADMIN — INSULIN LISPRO 4 UNITS: 100 INJECTION, SOLUTION INTRAVENOUS; SUBCUTANEOUS at 09:09

## 2019-07-08 RX ADMIN — FOLIC ACID 1 MG: 1 TABLET ORAL at 09:08

## 2019-07-08 RX ADMIN — AMITRIPTYLINE HYDROCHLORIDE 10 MG: 10 TABLET, FILM COATED ORAL at 21:26

## 2019-07-08 RX ADMIN — COLCHICINE 0.3 MG: 0.6 TABLET, FILM COATED ORAL at 11:06

## 2019-07-08 RX ADMIN — ATORVASTATIN CALCIUM 40 MG: 40 TABLET, FILM COATED ORAL at 21:26

## 2019-07-08 RX ADMIN — AMOXICILLIN AND CLAVULANATE POTASSIUM 1 TABLET: 500; 125 TABLET, FILM COATED ORAL at 13:12

## 2019-07-08 RX ADMIN — ACETYLCYSTEINE 1000 MG: 500 TABLET, EFFERVESCENT ORAL at 21:25

## 2019-07-08 RX ADMIN — Medication 1 CAPSULE: at 09:07

## 2019-07-08 RX ADMIN — HYDRALAZINE HYDROCHLORIDE 25 MG: 25 TABLET, FILM COATED ORAL at 21:26

## 2019-07-08 RX ADMIN — DULOXETINE HYDROCHLORIDE 60 MG: 60 CAPSULE, DELAYED RELEASE ORAL at 09:07

## 2019-07-08 RX ADMIN — ISOSORBIDE MONONITRATE 60 MG: 60 TABLET ORAL at 09:07

## 2019-07-08 RX ADMIN — FUROSEMIDE 40 MG: 40 TABLET ORAL at 09:07

## 2019-07-08 RX ADMIN — Medication 1000 MCG: at 09:08

## 2019-07-08 RX ADMIN — FERROUS SULFATE TAB 325 MG (65 MG ELEMENTAL FE) 325 MG: 325 (65 FE) TAB at 16:09

## 2019-07-08 RX ADMIN — CALCIUM CARBONATE-VITAMIN D TAB 500 MG-200 UNIT 1 TABLET: 500-200 TAB at 09:07

## 2019-07-08 RX ADMIN — FERROUS SULFATE TAB 325 MG (65 MG ELEMENTAL FE) 325 MG: 325 (65 FE) TAB at 11:06

## 2019-07-08 RX ADMIN — INSULIN LISPRO 4 UNITS: 100 INJECTION, SOLUTION INTRAVENOUS; SUBCUTANEOUS at 11:51

## 2019-07-08 RX ADMIN — AMOXICILLIN AND CLAVULANATE POTASSIUM 1 TABLET: 500; 125 TABLET, FILM COATED ORAL at 05:03

## 2019-07-08 RX ADMIN — ASPIRIN 81 MG: 81 TABLET ORAL at 09:07

## 2019-07-08 ASSESSMENT — PAIN SCALES - GENERAL: PAINLEVEL_OUTOF10: 0

## 2019-07-08 NOTE — FLOWSHEET NOTE
07/08/19 1500   Provider Notification   Reason for Communication Review case  (consult)   Provider Name [facility]   Provider Notification Physician   Method of Communication Secure Message   Response No new orders   Notification Time 1500   Call back 1502: added to list

## 2019-07-08 NOTE — PROGRESS NOTES
Hospitalist Progress Note    Patient:  Emilee Lindsay      Unit/Bed:6K-17/017-A    YOB: 1955    MRN: [id number]       Acct: [de-identified]     PCP: ARNOL Teresa CNP    Date of Admission: 7/3/2019    Chief Complaint:   SOB    Hospital Course:   , Is 58 y.o. female, with past medical history of CAD, chronic kidney disease, diabetes mellitus type 2, hypertension, hyperlipidemia, history of A. fib 3 years ago per patient, who presented to [address] [place] [address] via EMS chief complaint of chest pressure and dyspnea; she states she was in her normal state of health until last PM when she dev shortness of breath and states she \"couldn't sleep\"; she states \"I feel as if I have been running and I have not\"; states she had some upper chest pain but has resolved. EMS reported the patient's SpO2 as 89-90% en route; denies O2 at home. Patient reports having atrial fibrillation but cannot feel it and does not take blood thinners to treat it; states she was notified ~ 3 years ago after gastric bypass surgery;. She is staying in the [facility] while she heals from a fractured left femur that occurred June 1 and is just using a knee immobilizer    In ED she was found to be in atrial fib with RVR; CTA chest (-) PE however small effusions and possibly pulmonary edema. Cardiology consulted. There are right perihilar and infrahilar infiltrates noted on chest x-ray, Augmentin started. Patient was started on heparin and Coumadin. 7/5 Successful NICKIE with DCCV with Dr. Elpidio Estrada. INR 1.01      Subjective:     Patient seen and examined. Patient reports shortness of breath getting better, mostly on exertion. She reports slight shortness of breath at rest.  She denies orthopnea, chest pain, palpitations, leg swelling, neck pain, cough. Patient has chronic anemia, she denies symptoms of GI bleed.       Medications:  Reviewed    Infusion Medications    diltiazem There is no pleural effusion. Follow-up chest radiograph recommended to confirm complete resolution. **This report has been created using voice recognition software. It may contain minor errors which are inherent in voice recognition technology. **      Final report electronically signed by Dr. Desmond Lackey on 7/4/2019 9:54 AM      CTA Chest W WO Contrast   Final Result       1. No evidence of pulmonary embolus. 2. Patchy groundglass opacities throughout the lungs with small pleural effusions. Differential considerations include pulmonary edema from fluid overload or cardiogenic etiology or small airway or small vessel disease. **This report has been created using voice recognition software. It may contain minor errors which are inherent in voice recognition technology. **      Final report electronically signed by Dr. Hilario Nolan MD on 7/3/2019 10:40 AM          Assessment/Plan:        Atrial Fib with RVR    Reviewed tele, rate 100s   Successful NICKIE with DCCV done on 7/5 by Dr. Robyn Luna   Currently on heparin drip and Coumadin, bridging  INR 1.34  continue Cardizem p.o..  Lopressor was changed to Toprol XL    Cardiology plans for [facility]      Acute hypoxic respiratory failure, likely secondary to A. fib RVR and systolic heart failure, resolved       Now on room air, saturating well   Patient wears CPAP at night    Acute on Chronic Systolic HF, improving    EF 45% per echo 3/8/2017; EF 25-30% on NICKIE last 7/5  Was on Bumex 1 mg BID, discontinued on 7/5  Now on Lasix PO  strict I/O's and daily weights  low Na diet  BNP improving  Need lifevest     Bronchitis vs atelectasis     -Noted on chest x-ray 7/7/2019  -Patient is asymptomatic  -Currently on Augmentin  -will check procalcitonin, if procalcitonin is negative, will stop Augmentin  -Start incentive spirometry    CKD Stage 3, stable    -creatinine at baseline  -BMP in a.m.   -Avoid nephrotoxic medications  -nephrology was consulted for [facility] clearance       DM type 2, insulin-dependent    -AIC at 7.0% on 6/5/2019   -on Humalog 75/25, 20 units BID along with SSI; add SSI #2 right now. Patient reports she takes NPH 75/25, 30 units in the morning and 47 units at night  -Reviewed Accu-Cheks, blood sugars ranging 160s to 230s. Will increase sliding scale insulin from moderate to high dose. Increase NPH from 20 units to 25 units twice daily.  -Carb control diet      Essential HTN, fairly controlled    Cont cardizem, Toprol XL  Hydralazine was stopped due to tachycardia ( hydralazine can cause tachycardia)       Subclinical Hyperthyroidism    -pt reports she sees Dr. Giana Hendrix ( Endo) in OP  -will consult Endocrinologist      Elevated Alk Phos, improving     -trending down  -Progress of the liver function tests is unremarkable   -AST mildly elevated   -monitor      Chronic normocytic anemia    Hgb improved from 9.7 to 11.2 today. Baseline hemoglobin 9 to 11. iron panel done in April 2019, normal  B12 elevated, folate normal 4/2019  The patient was started on ferrous sulfate  No overt signs of bleeding    Mild leukopenia, etiology unclear    -WBC 4.1 from 4.6 yesterday  -CBC in a.m. DARWIN    Cont CPAP at night  follows with [facility]    Recent left distal femur fracture on 6/1/2019 2nd to mechanical fall     non operative tx; knee immobilizer; to F/U with [facility] on July 17    CAD    Cont ASA, statin, BB, Imdur    Morbid Obesity     Body mass index is 47.12 kg/m².    Lifestyle modification       Physical Deconditioning     PT/OT on-board  DC planning: [facility]         Diet: DIET CARB CONTROL; Low Sodium (2 GM)    DVT prophylaxis: [] Lovenox                                 [] SCDs                                 [] SQ Heparin                                 [] Encourage ambulation           [x] Already on Anticoagulation     Disposition:    [] Home       [] TCU       [] Rehab       [] Psych       [] SNF       [] [place]       [x]

## 2019-07-08 NOTE — PROGRESS NOTES
Cardiology Progress Note      Patient:  David Luke  YOB: 1955  MRN: [id number]   Acct: [de-identified]  Admit Date:  7/3/2019  Primary Cardiologist: Domo Sommer MD  Pt seen by dr Chidi Rodriguez    Reason for consult: afib  hpi per dr Chidi Rodriguez \"This is a pleasant 58 y.o. female presents with sob, chest pain, and inability to catch her breath suddenly. Hx of Afib, has hx of gastric bypass, DARWIN as well. RVR in the Ed, CTA PE negative but concern for pulmonary edema. -130s. 2017 - cardiac cath with severe RCA stenosis, small LCx , no significant LAD--medical therapy was recommended. EF 45%, mild global HK. Cr 1.8. Trop negative. No active symptoms currently. No oxygen at home\"    Subjective (Events in last 24 hours): pt awake and alert. NAD. No cp. Sob is much better.   Pt is bedridden due to left femur fracture with immobilizer in place - being treating nonsurgically      Cardiology asked to see pt again for recurrent afib today    Objective:   /60   Pulse 92   Temp 97.6 °F (36.4 °C) (Oral)   Resp 18   Ht 5' 9\" (1.753 m)   Wt (!) 326 lb (147.9 kg)   LMP 02/20/2001   SpO2 95%   BMI 48.14 kg/m²        TELEMETRY: afib     Physical Exam:  General Appearance: alert and oriented to person, place and time, in no acute distress  Cardiovascular: irregularly irregular  Pulmonary/Chest: clear to auscultation bilaterally- no wheezes, rales or rhonchi, normal air movement, no respiratory distress  Abdomen: soft, non-tender, non-distended, normal bowel sounds, no masses Extremities: no cyanosis, clubbing or edema, pulse   Skin: warm and dry, no rash or erythema  Head: normocephalic and atraumatic  Eyes: pupils equal, round, and reactive to light  Neck: supple and non-tender without mass, no thyromegaly   Musculoskeletal: normal range of motion, no joint swelling, deformity or tenderness, +left leg immobilizer   Neurological: alert, oriented, normal speech, no focal findings or per NICKIE 7/5/19, ef was 45 per TTE 3/8/17  Left atrium severely dilated   HTN  HLD  DM  CKD  Hx gastric bypass  DARWIN  morbidy obesity    Plan:  · Cont asa/statin/imdur/cdz/hydralazine/lasix  · Change lopressor to toprol xl 50mg po daily  · Stop hydralazine if needed to uptitrate toprol xl  · Add ace/arb if ok with renal  · Consult nephrology for clearance for LHC  · Needs LHC due to drop in ef to reevaluate coronary anatomy  · lifevest  · Pt agrees with above plan  · Abn tsh to be addressed by primary  · Keep mag >2 and K >4, check mag         Electronically signed by Coral William PA-C on 7/8/2019 at 1:56 PM

## 2019-07-08 NOTE — CONSULTS
uses cpap    Visit for screening mammogram        Past Surgical History:   Procedure Laterality Date    BACK SURGERY      xs 2    CATARACT REMOVAL Right 7/24/14    Dr. Anju Hubbard EKG 12-LEAD  9/18/2015         FOOT SURGERY      left foot    HYSTERECTOMY, TOTAL ABDOMINAL      MOUTH BIOPSY  9-28-12    left tongue and lingual tonsil    OTHER SURGICAL HISTORY  11/8/2014    LEFT FEMUR RETROGRADE NAILING    OTHER SURGICAL HISTORY  4/23/2015    HARDWARE REMOVAL LEFT FEMUR, INSERTION OF ANTIBIOTIC SPACER    PATELLA SURGERY  7/11/13    fractues rt patella     ME COLON CA SCRN NOT HI RSK IND Left 1/24/2018    COLONOSCOPY performed by Arnaldo Ricardo MD at [facility]    SKIN TAG REMOVAL  9/25/12    mole removal    SLEEVE GASTRECTOMY  09/15/2015    Robotic    TOENAIL EXCISION      removal of great toe nails bilateral-still has toenails-had ingrown toenails and had trimmed out     TONSILLECTOMY      UPPER GASTROINTESTINAL ENDOSCOPY         Family History   Problem Relation Age of Onset    Asthma Sister     Asthma Brother     Heart Disease Father     High Blood Pressure Other     Cancer Maternal Uncle         stomach    Diabetes Maternal Grandmother     High Blood Pressure Maternal Grandmother     Diabetes Maternal Grandfather     Stroke Neg Hx         reports that she quit smoking about 12 years ago. She has a 30.00 pack-year smoking history. She has never used smokeless tobacco. She reports that she does not drink alcohol or use drugs. Allergies:  Patient has no known allergies.     Current Medications:      metoprolol succinate (TOPROL XL) extended release tablet 50 mg Daily   insulin lispro (HUMALOG) injection vial 0-18 Units TID WC   insulin lispro (HUMALOG) injection vial 0-9 Units Nightly   insulin lispro protamine & lispro (HUMALOG MIX) (75-25) 100 UNIT per ML injection vial SUSP 25 Units BID WC   magnesium replacement protocol RX Placeholder   magnesium sulfate

## 2019-07-09 ENCOUNTER — APPOINTMENT (OUTPATIENT)
Dept: CARDIAC CATH/INVASIVE PROCEDURES | Age: 64
DRG: 286 | End: 2019-07-09
Payer: MEDICARE

## 2019-07-09 LAB
ABO CHECK: NORMAL
ANION GAP SERPL CALCULATED.3IONS-SCNC: 11 MEQ/L (ref 8–16)
APTT: 87.5 SECONDS (ref 22–38)
APTT: 91.4 SECONDS (ref 22–38)
APTT: > 200 SECONDS (ref 22–38)
BASOPHILS # BLD: 1.6 %
BASOPHILS ABSOLUTE: 0.1 THOU/MM3 (ref 0–0.1)
BUN BLDV-MCNC: 36 MG/DL (ref 7–22)
CALCIUM SERPL-MCNC: 9.9 MG/DL (ref 8.5–10.5)
CHLORIDE BLD-SCNC: 99 MEQ/L (ref 98–111)
CO2: 29 MEQ/L (ref 23–33)
CREAT SERPL-MCNC: 1.8 MG/DL (ref 0.4–1.2)
EOSINOPHIL # BLD: 3.4 %
EOSINOPHILS ABSOLUTE: 0.1 THOU/MM3 (ref 0–0.4)
ERYTHROCYTE [DISTWIDTH] IN BLOOD BY AUTOMATED COUNT: 15.4 % (ref 11.5–14.5)
ERYTHROCYTE [DISTWIDTH] IN BLOOD BY AUTOMATED COUNT: 54.2 FL (ref 35–45)
GFR SERPL CREATININE-BSD FRML MDRD: 28 ML/MIN/1.73M2
GLUCOSE BLD-MCNC: 107 MG/DL (ref 70–108)
GLUCOSE BLD-MCNC: 156 MG/DL (ref 70–108)
GLUCOSE BLD-MCNC: 201 MG/DL (ref 70–108)
GLUCOSE BLD-MCNC: 251 MG/DL (ref 70–108)
GLUCOSE BLD-MCNC: 263 MG/DL (ref 70–108)
GLUCOSE BLD-MCNC: 312 MG/DL (ref 70–108)
HCT VFR BLD CALC: 35.3 % (ref 37–47)
HEMOGLOBIN: 11.4 GM/DL (ref 12–16)
IMMATURE GRANS (ABS): 0.01 THOU/MM3 (ref 0–0.07)
IMMATURE GRANULOCYTES: 0.3 %
INDIRECT COOMBS: NORMAL
INR BLD: 1.5 (ref 0.85–1.13)
LYMPHOCYTES # BLD: 36.6 %
LYMPHOCYTES ABSOLUTE: 1.4 THOU/MM3 (ref 1–4.8)
MAGNESIUM: 1.8 MG/DL (ref 1.6–2.4)
MCH RBC QN AUTO: 31.3 PG (ref 26–33)
MCHC RBC AUTO-ENTMCNC: 32.3 GM/DL (ref 32.2–35.5)
MCV RBC AUTO: 97 FL (ref 81–99)
MONOCYTES # BLD: 16.4 %
MONOCYTES ABSOLUTE: 0.6 THOU/MM3 (ref 0.4–1.3)
NUCLEATED RED BLOOD CELLS: 0 /100 WBC
PLATELET # BLD: 167 THOU/MM3 (ref 130–400)
PLATELET ESTIMATE: ADEQUATE
PMV BLD AUTO: 11.7 FL (ref 9.4–12.4)
POTASSIUM SERPL-SCNC: 3.8 MEQ/L (ref 3.5–5.2)
RBC # BLD: 3.64 MILL/MM3 (ref 4.2–5.4)
RH FACTOR: NORMAL
SCAN OF BLOOD SMEAR: NORMAL
SEG NEUTROPHILS: 41.7 %
SEGMENTED NEUTROPHILS ABSOLUTE COUNT: 1.6 THOU/MM3 (ref 1.8–7.7)
SODIUM BLD-SCNC: 139 MEQ/L (ref 135–145)
T4 FREE: 1.1 NG/DL (ref 0.93–1.76)
WBC # BLD: 3.8 THOU/MM3 (ref 4.8–10.8)

## 2019-07-09 PROCEDURE — 6370000000 HC RX 637 (ALT 250 FOR IP): Performed by: INTERNAL MEDICINE

## 2019-07-09 PROCEDURE — 99232 SBSQ HOSP IP/OBS MODERATE 35: CPT | Performed by: NURSE PRACTITIONER

## 2019-07-09 PROCEDURE — 6360000002 HC RX W HCPCS: Performed by: PHYSICIAN ASSISTANT

## 2019-07-09 PROCEDURE — 6370000000 HC RX 637 (ALT 250 FOR IP): Performed by: FAMILY MEDICINE

## 2019-07-09 PROCEDURE — 86901 BLOOD TYPING SEROLOGIC RH(D): CPT

## 2019-07-09 PROCEDURE — 99024 POSTOP FOLLOW-UP VISIT: CPT | Performed by: PHYSICIAN ASSISTANT

## 2019-07-09 PROCEDURE — C1769 GUIDE WIRE: HCPCS

## 2019-07-09 PROCEDURE — 6370000000 HC RX 637 (ALT 250 FOR IP): Performed by: PHYSICIAN ASSISTANT

## 2019-07-09 PROCEDURE — 1200000003 HC TELEMETRY R&B

## 2019-07-09 PROCEDURE — 83735 ASSAY OF MAGNESIUM: CPT

## 2019-07-09 PROCEDURE — 4A023N7 MEASUREMENT OF CARDIAC SAMPLING AND PRESSURE, LEFT HEART, PERCUTANEOUS APPROACH: ICD-10-PCS | Performed by: INTERNAL MEDICINE

## 2019-07-09 PROCEDURE — 85610 PROTHROMBIN TIME: CPT

## 2019-07-09 PROCEDURE — 82948 REAGENT STRIP/BLOOD GLUCOSE: CPT

## 2019-07-09 PROCEDURE — 6360000004 HC RX CONTRAST MEDICATION: Performed by: INTERNAL MEDICINE

## 2019-07-09 PROCEDURE — 97530 THERAPEUTIC ACTIVITIES: CPT

## 2019-07-09 PROCEDURE — 86885 COOMBS TEST INDIRECT QUAL: CPT

## 2019-07-09 PROCEDURE — 84439 ASSAY OF FREE THYROXINE: CPT

## 2019-07-09 PROCEDURE — 99232 SBSQ HOSP IP/OBS MODERATE 35: CPT | Performed by: FAMILY MEDICINE

## 2019-07-09 PROCEDURE — 97110 THERAPEUTIC EXERCISES: CPT

## 2019-07-09 PROCEDURE — 85730 THROMBOPLASTIN TIME PARTIAL: CPT

## 2019-07-09 PROCEDURE — C1894 INTRO/SHEATH, NON-LASER: HCPCS

## 2019-07-09 PROCEDURE — 93458 L HRT ARTERY/VENTRICLE ANGIO: CPT | Performed by: INTERNAL MEDICINE

## 2019-07-09 PROCEDURE — 2500000003 HC RX 250 WO HCPCS

## 2019-07-09 PROCEDURE — 2580000003 HC RX 258: Performed by: NURSE PRACTITIONER

## 2019-07-09 PROCEDURE — 2709999900 HC NON-CHARGEABLE SUPPLY

## 2019-07-09 PROCEDURE — 6360000002 HC RX W HCPCS

## 2019-07-09 PROCEDURE — 86900 BLOOD TYPING SEROLOGIC ABO: CPT

## 2019-07-09 PROCEDURE — 85025 COMPLETE CBC W/AUTO DIFF WBC: CPT

## 2019-07-09 PROCEDURE — 6370000000 HC RX 637 (ALT 250 FOR IP): Performed by: NURSE PRACTITIONER

## 2019-07-09 PROCEDURE — 80048 BASIC METABOLIC PNL TOTAL CA: CPT

## 2019-07-09 PROCEDURE — 84481 FREE ASSAY (FT-3): CPT

## 2019-07-09 PROCEDURE — 6360000002 HC RX W HCPCS: Performed by: INTERNAL MEDICINE

## 2019-07-09 PROCEDURE — 36415 COLL VENOUS BLD VENIPUNCTURE: CPT

## 2019-07-09 PROCEDURE — 2500000003 HC RX 250 WO HCPCS: Performed by: PHYSICIAN ASSISTANT

## 2019-07-09 PROCEDURE — 83520 IMMUNOASSAY QUANT NOS NONAB: CPT

## 2019-07-09 RX ORDER — METOPROLOL SUCCINATE 100 MG/1
100 TABLET, EXTENDED RELEASE ORAL DAILY
Status: DISCONTINUED | OUTPATIENT
Start: 2019-07-10 | End: 2019-07-12 | Stop reason: HOSPADM

## 2019-07-09 RX ORDER — METOPROLOL SUCCINATE 50 MG/1
50 TABLET, EXTENDED RELEASE ORAL ONCE
Status: COMPLETED | OUTPATIENT
Start: 2019-07-09 | End: 2019-07-09

## 2019-07-09 RX ORDER — SODIUM CHLORIDE 9 MG/ML
INJECTION, SOLUTION INTRAVENOUS CONTINUOUS
Status: DISCONTINUED | OUTPATIENT
Start: 2019-07-09 | End: 2019-07-12

## 2019-07-09 RX ORDER — ONDANSETRON 2 MG/ML
4 INJECTION INTRAMUSCULAR; INTRAVENOUS EVERY 6 HOURS PRN
Status: DISCONTINUED | OUTPATIENT
Start: 2019-07-09 | End: 2019-07-12 | Stop reason: HOSPADM

## 2019-07-09 RX ORDER — ACETAMINOPHEN 325 MG/1
650 TABLET ORAL EVERY 4 HOURS PRN
Status: DISCONTINUED | OUTPATIENT
Start: 2019-07-09 | End: 2019-07-12 | Stop reason: HOSPADM

## 2019-07-09 RX ORDER — METOPROLOL TARTRATE 5 MG/5ML
5 INJECTION INTRAVENOUS EVERY 6 HOURS PRN
Status: DISCONTINUED | OUTPATIENT
Start: 2019-07-09 | End: 2019-07-12 | Stop reason: HOSPADM

## 2019-07-09 RX ORDER — SODIUM CHLORIDE 0.9 % (FLUSH) 0.9 %
10 SYRINGE (ML) INJECTION EVERY 12 HOURS SCHEDULED
Status: DISCONTINUED | OUTPATIENT
Start: 2019-07-09 | End: 2019-07-12 | Stop reason: HOSPADM

## 2019-07-09 RX ORDER — SODIUM CHLORIDE 0.9 % (FLUSH) 0.9 %
10 SYRINGE (ML) INJECTION PRN
Status: DISCONTINUED | OUTPATIENT
Start: 2019-07-09 | End: 2019-07-12 | Stop reason: HOSPADM

## 2019-07-09 RX ORDER — MAGNESIUM SULFATE IN WATER 40 MG/ML
2 INJECTION, SOLUTION INTRAVENOUS ONCE
Status: COMPLETED | OUTPATIENT
Start: 2019-07-09 | End: 2019-07-09

## 2019-07-09 RX ADMIN — ISOSORBIDE MONONITRATE 60 MG: 60 TABLET ORAL at 09:19

## 2019-07-09 RX ADMIN — FERROUS SULFATE TAB 325 MG (65 MG ELEMENTAL FE) 325 MG: 325 (65 FE) TAB at 16:18

## 2019-07-09 RX ADMIN — PANTOPRAZOLE SODIUM 40 MG: 40 TABLET, DELAYED RELEASE ORAL at 04:59

## 2019-07-09 RX ADMIN — CALCIUM CARBONATE-VITAMIN D TAB 500 MG-200 UNIT 1 TABLET: 500-200 TAB at 16:18

## 2019-07-09 RX ADMIN — ATORVASTATIN CALCIUM 40 MG: 40 TABLET, FILM COATED ORAL at 19:48

## 2019-07-09 RX ADMIN — AMOXICILLIN AND CLAVULANATE POTASSIUM 1 TABLET: 500; 125 TABLET, FILM COATED ORAL at 04:59

## 2019-07-09 RX ADMIN — METOPROLOL TARTRATE 5 MG: 5 INJECTION INTRAVENOUS at 02:43

## 2019-07-09 RX ADMIN — ACETYLCYSTEINE 1000 MG: 500 TABLET, EFFERVESCENT ORAL at 09:19

## 2019-07-09 RX ADMIN — ACETYLCYSTEINE 1000 MG: 500 TABLET, EFFERVESCENT ORAL at 19:48

## 2019-07-09 RX ADMIN — INSULIN LISPRO 12 UNITS: 100 INJECTION, SOLUTION INTRAVENOUS; SUBCUTANEOUS at 11:43

## 2019-07-09 RX ADMIN — FOLIC ACID 1 MG: 1 TABLET ORAL at 09:21

## 2019-07-09 RX ADMIN — DULOXETINE HYDROCHLORIDE 60 MG: 60 CAPSULE, DELAYED RELEASE ORAL at 09:19

## 2019-07-09 RX ADMIN — FERROUS SULFATE TAB 325 MG (65 MG ELEMENTAL FE) 325 MG: 325 (65 FE) TAB at 09:19

## 2019-07-09 RX ADMIN — MAGNESIUM SULFATE HEPTAHYDRATE 2 G: 40 INJECTION, SOLUTION INTRAVENOUS at 11:43

## 2019-07-09 RX ADMIN — GABAPENTIN 100 MG: 100 CAPSULE ORAL at 19:48

## 2019-07-09 RX ADMIN — ASPIRIN 81 MG: 81 TABLET ORAL at 09:19

## 2019-07-09 RX ADMIN — Medication 1000 MCG: at 09:19

## 2019-07-09 RX ADMIN — DOCUSATE SODIUM 100 MG: 100 CAPSULE, LIQUID FILLED ORAL at 09:20

## 2019-07-09 RX ADMIN — HYDRALAZINE HYDROCHLORIDE 25 MG: 25 TABLET, FILM COATED ORAL at 09:19

## 2019-07-09 RX ADMIN — INSULIN LISPRO 9 UNITS: 100 INJECTION, SOLUTION INTRAVENOUS; SUBCUTANEOUS at 09:21

## 2019-07-09 RX ADMIN — Medication 1 CAPSULE: at 09:21

## 2019-07-09 RX ADMIN — IOPAMIDOL 60 ML: 755 INJECTION, SOLUTION INTRAVENOUS at 17:44

## 2019-07-09 RX ADMIN — METOPROLOL SUCCINATE 50 MG: 50 TABLET, FILM COATED, EXTENDED RELEASE ORAL at 09:19

## 2019-07-09 RX ADMIN — FENOFIBRATE 160 MG: 160 TABLET ORAL at 09:19

## 2019-07-09 RX ADMIN — GABAPENTIN 100 MG: 100 CAPSULE ORAL at 13:52

## 2019-07-09 RX ADMIN — FERROUS SULFATE TAB 325 MG (65 MG ELEMENTAL FE) 325 MG: 325 (65 FE) TAB at 11:42

## 2019-07-09 RX ADMIN — HEPARIN SODIUM 8 UNITS/KG/HR: 10000 INJECTION, SOLUTION INTRAVENOUS at 21:37

## 2019-07-09 RX ADMIN — METOPROLOL SUCCINATE 50 MG: 50 TABLET, FILM COATED, EXTENDED RELEASE ORAL at 11:42

## 2019-07-09 RX ADMIN — DILTIAZEM HYDROCHLORIDE 120 MG: 120 CAPSULE, EXTENDED RELEASE ORAL at 09:19

## 2019-07-09 RX ADMIN — CALCIUM CARBONATE-VITAMIN D TAB 500 MG-200 UNIT 1 TABLET: 500-200 TAB at 09:19

## 2019-07-09 RX ADMIN — AMITRIPTYLINE HYDROCHLORIDE 10 MG: 10 TABLET, FILM COATED ORAL at 19:48

## 2019-07-09 RX ADMIN — GABAPENTIN 100 MG: 100 CAPSULE ORAL at 09:19

## 2019-07-09 RX ADMIN — SODIUM CHLORIDE: 9 INJECTION, SOLUTION INTRAVENOUS at 12:15

## 2019-07-09 ASSESSMENT — PAIN SCALES - GENERAL
PAINLEVEL_OUTOF10: 0

## 2019-07-09 NOTE — CONSULTS
Inpatient consult to Endocrinology  Consult performed by: Antony Garvey MD  Consult ordered by: Tong Torres MD      Consult for hyperthyroidism with multinodular goiter  Admission Note \"57 y.o. female who presented to [address] [place] [address] with shortness of breath; presented to the ER from [facility] via EMS with a chief complaint of chest pressure and dyspnea; she states she was in her normal state of health until last PM when she dev shortness of breath and states she \"couldn't sleep\"; she states \"I feel as if I have been running and I have not\"; states she had some upper chest pain this AM but has resolved; pt states \"it is difficult to take a deep breath\"; EMS reported the patient's SpO2 as 89-90% en route; denies O2 at home; blood glucose was 74 this morning at the rehab center but 52 when EMS checked, so she was given 8 oz of orange juice in the ED; patient reports having atrial fibrillation but cannot feel it and does not take blood thinners to treat it; states she was notified ~ 3 years ago after gastric bypass surgery;. She is staying in the [facility] while she heals from a fractured left femur that occurred June 1 and is just using a knee immobilizer; in ED she was found to be in atrial fib with RVR; CTA chest (-) PE however small effusions and possibly pulmonary edema; she is currently on RA; speaking in complete sentences; she has not had her morning meds so those will be given; she is being admitted to the Hospitalist Service for further care and evaluation. \"  Eunice Fabian is a 58 y.o. y.o. female who has been referred by ARNOL Pollack CNP for evaluation of hyperthyroidism. Thyroid Symptoms Evaluation: Clinically she has no symptoms of hyperor hypothyroid.  TSH has been low with normal FT4  Review of Systems  Review of Systems - General ROS: negative   Psychological ROS: negative   Hematological and Lymphatic ROS: No history of blood clots or bleeding disorder. Respiratory ROS: no cough, shortness of breath, or wheezing   Cardiovascular ROS: no chest pain or dyspnea on exertion   Gastrointestinal ROS: no abdominal pain, change in bowel habits, or black or bloody stools   Genito-Urinary ROS: no dysuria, trouble voiding, or hematuria   Musculoskeletal ROS: negative   Neurological ROS: no TIA or stroke symptoms   Dermatological ROS: negative    Past Medical, Surgical, Family, Social and Allergy Histories:  I have reviewed the patient's medical history in detail and updated the computerized patient record. has a past medical history of CAD (coronary artery disease), CRI (chronic renal insufficiency), Difficult intubation, DM (diabetes mellitus) ([facility] [address] GERD (gastroesophageal reflux disease), H/O gastric bypass, Hyperlipidemia, Hypertension, Hypothyroidism, Neuropathy, Obesity, Osteoarthritis, Paroxysmal atrial fibrillation ([facility] [address] Prolonged emergence from general anesthesia, Sleep apnea, and Visit for screening mammogram.   has a past surgical history that includes back surgery; Foot surgery; Colonoscopy; Upper gastrointestinal endoscopy; Mouth Biopsy (9-28-12); Skin tag removal (9/25/12); Tonsillectomy; Patella surgery (7/11/13); Cataract removal (Right, 7/24/14); toenail excision; other surgical history (11/8/2014); other surgical history (4/23/2015); Sleeve Gastrectomy (09/15/2015); EKG 12 Lead (9/18/2015); Hysterectomy, total abdominal; and pr colon ca scrn not hi rsk ind (Left, 1/24/2018). reports that she quit smoking about 12 years ago. She has a 30.00 pack-year smoking history. She has never used smokeless tobacco. She reports that she does not drink alcohol or use drugs. family history includes Asthma in her brother and sister; Cancer in her maternal uncle; Diabetes in her maternal grandfather and maternal grandmother; Heart Disease in her father; High Blood Pressure in her maternal grandmother and another family member.   No Known [facility]) injection 4 mg  4 mg Intravenous Q6H PRN Darion Upton APRN - CNP   4 mg at 07/03/19 1813    senna (SENOKOT) tablet 8.6 mg  1 tablet Oral Daily PRN Darion Upton, APRN - CNP   8.6 mg at 07/05/19 1701    acetaminophen (TYLENOL) tablet 650 mg  650 mg Oral Q4H PRN Joellen Meeks, APRN - CNP        glucose (GLUTOSE) 40 % oral gel 15 g  15 g Oral PRN Joellen Meeks, APRN - CNP        dextrose 50 % IV solution  12.5 g Intravenous PRN Joellen Meeks, APRN - CNP        glucagon (rDNA) injection 1 mg  1 mg Intramuscular PRN Darion Upton, ARNOL - CNP        dextrose 5 % solution  100 mL/hr Intravenous PRN Darion Upton, APRN - CNP        gabapentin (NEURONTIN) capsule 100 mg  100 mg Oral TID Darion Upton, APRN - CNP   100 mg at 07/09/19 1352    HYDROcodone-acetaminophen (NORCO) 7.5-325 MG per tablet 1 tablet  1 tablet Oral Q4H PRN Darion Upton, APRN - CNP        docusate sodium (COLACE) capsule 100 mg  100 mg Oral Q48H Joellen Meeks, APRN - CNP   100 mg at 07/09/19 0920    vitamin B-12 (CYANOCOBALAMIN) tablet 1,000 mcg  1,000 mcg Oral Daily Darion Upton, APRN - CNP   1,000 mcg at 07/09/19 0919     Laboratory Review    Prior to Admission medications    Medication Sig Start Date End Date Taking? Authorizing Provider   docusate sodium (COLACE) 100 MG capsule Take 100 mg by mouth every 48 hours   Yes Historical Provider, MD   colchicine (COLCRYS) 0.6 MG tablet Take 0.6 mg by mouth 2 times daily 7/3/19 7/6/19 Yes Historical Provider, MD   gabapentin (NEURONTIN) 100 MG capsule Take 100 mg by mouth 3 times daily. Yes Historical Provider, MD   hydrALAZINE (APRESOLINE) 25 MG tablet Take 1 tablet by mouth 3 times daily . hold if SBP less than 100 mm hg 6/3/19  Yes Khadijah Stout MD   isosorbide mononitrate (IMDUR) 30 MG extended release tablet Take 1 tablet by mouth daily . hold if SBP less than 100 mm hg 6/3/19  Yes Khadijah Stout MD   insulin lispro protamine & lispro (HUMALOG MIX 75/25 KWIKPEN) (75-25) 100 UNIT per ML SUPN injection pen Inject 0.3 mLs into the skin 2 times daily (with meals) 30 units BID  Patient taking differently: Inject 20 Units into the skin 3 times daily (with meals) 30 units BID 6/3/19  Yes Sharon Tsai MD   insulin lispro (HUMALOG) 100 UNIT/ML injection vial Inject 0-12 Units into the skin 3 times daily (with meals) Sliding scale insulin coverage  Glucose:Dose: If Less [id number] =No Insulin/ 140-199= 2 Units/ 200-249= 4Units/ 250-299= 6 Units/  300-349=8 Units/  350-400=10 Units/ Above 400 = 12 Units 6/3/19  Yes Sharon Tsai MD   ferrous sulfate 325 (65 Fe) MG EC tablet Take 1 tablet by mouth 3 times daily (with meals) 4/18/19  Yes ARNOL Thomason CNP   vitamin B-12 (CYANOCOBALAMIN) 100 MCG tablet Take 1 tablet by mouth daily  Patient taking differently: Take 100 mcg by mouth daily Indications: pt is on hold for this medication by PCP  4/18/19 4/17/20 Yes ARNOL Thomason CNP   folic acid (FOLVITE) 1 MG tablet Take 1 tablet by mouth daily 4/18/19  Yes ARNOL Thomason CNP   CARTIA  MG extended release capsule take 1 capsule by mouth once daily 4/8/19  Yes Sg Kirk MD   pantoprazole (PROTONIX) 40 MG tablet take 1 tablet (40 mg) by oral route once daily 4/5/19  Yes ARNOL Thomason CNP   metoprolol tartrate (LOPRESSOR) 25 MG tablet take 1/2 tablet by mouth twice a day 1/28/19  Yes Sixto Collins MD   RA ASPIRIN EC 81 MG EC tablet take 1 tablet by mouth once daily 1/3/19  Yes Sg Kirk MD   atorvastatin (LIPITOR) 40 MG tablet take 1 tablet by mouth at bedtime 12/17/18  Yes ARNOL Valverde CNP   DULoxetine (CYMBALTA) 60 MG extended release capsule take 1 capsule by mouth once daily 8/7/18  Yes Mahsa alberta, APRN - CNP   amitriptyline (ELAVIL) 10 MG tablet take 1 tablet by mouth once daily 7/3/18  Yes ARNOL Thomason - CNP   fenofibrate (TRICOR) 145 MG tablet take 1 tablet by mouth once daily 7/3/18  Yes ARNOL Luna CNP   Calcium Carbonate-Vitamin D (CALCIUM-VITAMIN D) 500-200 MG-UNIT per tablet Take 1 tablet by mouth 2 times daily (with meals)    Yes Historical Provider, MD   HYDROcodone-acetaminophen (NORCO) 7.5-325 MG per tablet Take 1 tablet by mouth every 4 hours as needed for Pain. Historical Provider, MD   meclizine (ANTIVERT) 25 MG CHEW Take 1 tablet by mouth 3 times daily as needed (vertigo) 6/3/19   Nadiya Fong MD   cyclobenzaprine (FLEXERIL) 10 MG tablet Take 1 tablet by mouth 3 times daily as needed for Muscle spasms 5/17/19   Hi Newell PA-C   Misc. Devices [facility]) MISC 1 Device by Does not apply route as needed (prn) 5/15/19   ARNOL Luna CNP   CPAP Machine MISC by Does not apply route Please change CPAP to auto pressure to 7-15 cm H20. 11/10/16   Jimenez Huerta PA-C   Insulin Pen Needle (PEN NEEDLES) 31G X 5 MM MISC 1 each by Does not apply route 3 times daily 10/27/16   Norberto Dickey MD     Objective:     Patient Vitals for the past 8 hrs:   BP Temp Temp src Pulse Resp SpO2   07/09/19 1556 130/70 97.9 °F (36.6 °C) Oral 64 20 94 %   07/09/19 1120 (!) 130/58 97.4 °F (36.3 °C) Oral 77 24 96 %     I/O last 3 completed shifts: In: 928.1 [P.O.:450; I.V.:478.1]  Out: 575 [Urine:575]  No intake/output data recorded.         SUBJECTIVE:      Current Inpatient Medications        Scheduled Meds:   [START ON 7/10/2019] metoprolol succinate  100 mg Oral Daily    insulin lispro  5 Units Subcutaneous TID WC    insulin lispro  0-18 Units Subcutaneous TID WC    insulin lispro  0-9 Units Subcutaneous Nightly    insulin lispro protamine & lispro  25 Units Subcutaneous BID WC    magnesium replacement protocol   Other RX Placeholder    Acetylcysteine  1,000 mg Oral BID    [Held by provider] furosemide  40 mg Oral Daily    warfarin (COUMADIN) daily dosing (placeholder)   Other RX Placeholder    isosorbide mononitrate  60 mg Oral Daily    lactobacillus  1 capsule Oral Daily with breakfast    amitriptyline  10 mg Oral Nightly    atorvastatin  40 mg Oral Nightly    calcium-vitamin D  1 tablet Oral BID WC    diltiazem  120 mg Oral Daily    DULoxetine  60 mg Oral Daily    fenofibrate  160 mg Oral Daily    ferrous sulfate  325 mg Oral TID WC    folic acid  1 mg Oral Daily    pantoprazole  40 mg Oral QAM AC    aspirin  81 mg Oral Daily    sodium chloride flush  10 mL Intravenous 2 times per day    gabapentin  100 mg Oral TID    docusate sodium  100 mg Oral Q48H    vitamin B-12  1,000 mcg Oral Daily     Continuous Infusions:   sodium chloride 100 mL/hr at 07/09/19 1215    diltiazem (CARDIZEM) 125 mg in dextrose 5% 125 mL infusion Stopped (07/05/19 1715)    heparin (porcine) 8 Units/kg/hr (07/09/19 1251)    dextrose       PRN Meds:metoprolol, heparin (porcine), heparin (porcine), cyclobenzaprine, meclizine, sodium chloride flush, ondansetron, senna, acetaminophen, glucose, dextrose, glucagon (rDNA), dextrose, HYDROcodone-acetaminophen    Physical    VITALS:  /70   Pulse 64   Temp 97.9 °F (36.6 °C) (Oral)   Resp 20   Ht 5' 9\" (1.753 m)   Wt (!) 320 lb 14.4 oz (145.6 kg)   LMP 02/20/2001   SpO2 94%   BMI 47.39 kg/m²   CONSTITUTIONAL:  awake, alert, cooperative, no apparent distress, and appears stated age  LUNGS:  No increased work of breathing, good air exchange, clear to auscultation bilaterally, no crackles or wheezing  CARDIOVASCULAR:  Normal apical impulse, regular rate and rhythm, normal S1 and S2, no S3 or S4, and no murmur noted  ABDOMEN:  No scars, normal bowel sounds, soft, non-distended, non-tender, no masses palpated, no hepatosplenomegally    DATA   CBC:  Lab Results   Component Value Date    WBC 3.8 07/09/2019    RBC 3.64 07/09/2019    HGB 11.4 07/09/2019    HCT 35.3 07/09/2019    MCV 97.0 07/09/2019    MCH 31.3 07/09/2019    MCHC 32.3 07/09/2019    RDW 13.8 10/26/2017     07/09/2019    MPV 11.7

## 2019-07-09 NOTE — PROGRESS NOTES
Performed:  None    CABG:no            MEDICAL HISTORY        Past Medical History:   Diagnosis Date    CAD (coronary artery disease)     CRI (chronic renal insufficiency)     Difficult intubation     excess skin in back of throat     DM (diabetes mellitus) (HCC)     GERD (gastroesophageal reflux disease)     H/O gastric bypass     Hyperlipidemia     Hypertension     Hypothyroidism     Neuropathy     Obesity     Osteoarthritis     Paroxysmal atrial fibrillation (HCC)     Prolonged emergence from general anesthesia     Sleep apnea     uses cpap    Visit for screening mammogram          Past Surgical History:   Procedure Laterality Date    BACK SURGERY      xs 2    CATARACT REMOVAL Right 7/24/14    Dr. Shalini Sykes EKG 12-LEAD  9/18/2015         FOOT SURGERY      left foot    HYSTERECTOMY, TOTAL ABDOMINAL      MOUTH BIOPSY  9-28-12    left tongue and lingual tonsil    OTHER SURGICAL HISTORY  11/8/2014    LEFT FEMUR RETROGRADE NAILING    OTHER SURGICAL HISTORY  4/23/2015    HARDWARE REMOVAL LEFT FEMUR, INSERTION OF ANTIBIOTIC SPACER    PATELLA SURGERY  7/11/13    fractues rt patella     [place] COLON CA SCRN NOT  [address] IND Left 1/24/2018    COLONOSCOPY performed by Bard Ruth MD at  [place] [address] SKIN TAG REMOVAL  9/25/12    mole removal    SLEEVE GASTRECTOMY  09/15/2015    Robotic    TOENAIL EXCISION      removal of great toe nails bilateral-still has toenails-had ingrown toenails and had trimmed out     TONSILLECTOMY      UPPER GASTROINTESTINAL ENDOSCOPY             No Known Allergies      MEDICATIONS   Coumadin Use Last 5 Days [x]No []Yes  Antiplatelet drug therapy use last 5 days  []No [x]Yes  Other anticoagulant use last 5 days  [x]No []Yes      No current facility-administered medications on file prior to encounter.       Current Outpatient Medications on File Prior to Encounter   Medication Sig Dispense Refill    docusate sodium (COLACE) 100 MG capsule Take 100 mg by mouth every 48 hours      colchicine (COLCRYS) 0.6 MG tablet Take 0.6 mg by mouth 2 times daily      gabapentin (NEURONTIN) 100 MG capsule Take 100 mg by mouth 3 times daily.  hydrALAZINE (APRESOLINE) 25 MG tablet Take 1 tablet by mouth 3 times daily . hold if SBP less than 100 mm hg 90 tablet 3    isosorbide mononitrate (IMDUR) 30 MG extended release tablet Take 1 tablet by mouth daily . hold if SBP less than 100 mm hg 30 tablet 9    insulin lispro protamine & lispro (HUMALOG MIX 75/25 KWIKPEN) (75-25) 100 UNIT per ML SUPN injection pen Inject 0.3 mLs into the skin 2 times daily (with meals) 30 units BID (Patient taking differently: Inject 20 Units into the skin 3 times daily (with meals) 30 units BID) 24 mL 1    insulin lispro (HUMALOG) 100 UNIT/ML injection vial Inject 0-12 Units into the skin 3 times daily (with meals) Sliding scale insulin coverage  Glucose:Dose: If Less seil825 =No Insulin/ 140-199= 2 Units/ 200-249= 4Units/ 250-299= 6 Units/  300-349=8 Units/  350-400=10 Units/ Above 400 = 12 Units 1 vial 3    ferrous sulfate 325 (65 Fe) MG EC tablet Take 1 tablet by mouth 3 times daily (with meals) 90 tablet 3    vitamin B-12 (CYANOCOBALAMIN) 100 MCG tablet Take 1 tablet by mouth daily (Patient taking differently: Take 100 mcg by mouth daily Indications: pt is on hold for this medication by PCP ) 30 tablet 6    folic acid (FOLVITE) 1 MG tablet Take 1 tablet by mouth daily 90 tablet 1    CARTIA  MG extended release capsule take 1 capsule by mouth once daily 90 capsule 3    pantoprazole (PROTONIX) 40 MG tablet take 1 tablet (40 mg) by oral route once daily 90 tablet 1    metoprolol tartrate (LOPRESSOR) 25 MG tablet take 1/2 tablet by mouth twice a day 90 tablet 3    RA ASPIRIN EC 81 MG EC tablet take 1 tablet by mouth once daily 30 tablet 10    atorvastatin (LIPITOR) 40 MG tablet take 1 tablet by mouth at bedtime 90 tablet 1    DULoxetine (CYMBALTA) 60 MG extended release capsule take 1 capsule by mouth once daily 90 capsule 3    amitriptyline (ELAVIL) 10 MG tablet take 1 tablet by mouth once daily 90 tablet 3    fenofibrate (TRICOR) 145 MG tablet take 1 tablet by mouth once daily 90 tablet 3    Calcium Carbonate-Vitamin D (CALCIUM-VITAMIN D) 500-200 MG-UNIT per tablet Take 1 tablet by mouth 2 times daily (with meals)       HYDROcodone-acetaminophen (NORCO) 7.5-325 MG per tablet Take 1 tablet by mouth every 4 hours as needed for Pain.  meclizine (ANTIVERT) 25 MG CHEW Take 1 tablet by mouth 3 times daily as needed (vertigo) 90 tablet 0    cyclobenzaprine (FLEXERIL) 10 MG tablet Take 1 tablet by mouth 3 times daily as needed for Muscle spasms 12 tablet 0    Misc.  Devices [facility]) MISC 1 Device by Does not apply route as needed (prn) 1 each 0    CPAP Machine MISC by Does not apply route Please change CPAP to auto pressure to 7-15 cm H20. 1 each 0    Insulin Pen Needle (PEN NEEDLES) 31G X 5 MM MISC 1 each by Does not apply route 3 times daily 100 each 11       Current Facility-Administered Medications   Medication Dose Route Frequency Provider Last Rate Last Dose    metoprolol (LOPRESSOR) injection 5 mg  5 mg Intravenous Q6H PRN Darlin Peters PA-C   5 mg at 07/09/19 0243    0.9 % sodium chloride infusion   Intravenous Continuous ARNOL Bowers  mL/hr at 07/09/19 1215      [START ON 7/10/2019] metoprolol succinate (TOPROL XL) extended release tablet 100 mg  100 mg Oral Daily Darlin Peters PA-C        insulin lispro (HUMALOG) injection vial 5 Units  5 Units Subcutaneous TID  Alessandra Luciano MD        insulin lispro (HUMALOG) injection vial 0-18 Units  0-18 Units Subcutaneous TID  Alessandra Luciano MD   12 Units at 07/09/19 1143    insulin lispro (HUMALOG) injection vial 0-9 Units  0-9 Units Subcutaneous Nightly Alessandra Luciano MD   3 Units at 07/08/19 2128    insulin lispro protamine & lispro (HUMALOG MIX) (75-25) 100 UNIT per ML (COLACE) capsule 100 mg  100 mg Oral Q48H Joellen Meeks ARNOL Rojo CNP   100 mg at 07/09/19 0920    vitamin B-12 (CYANOCOBALAMIN) tablet 1,000 mcg  1,000 mcg Oral Daily Allan CorralesARNOL CNP   1,000 mcg at 07/09/19 0919             PHYSICAL:     /70   Pulse 64   Temp 97.9 °F (36.6 °C) (Oral)   Resp 20   Ht 5' 9\" (1.753 m)   Wt (!) 320 lb 14.4 oz (145.6 kg)   LMP 02/20/2001   SpO2 94%   BMI 47.39 kg/m²     Heart:  [x]Regular rate and rhythm  []Other:    Lungs:  [x]Clear    []Other:    Abdomen: [x]Soft    []Other:    Mental Status: [x]Alert & Oriented  []Other:   Ext:                [x]No edema       []Other:         No results for input(s): CKTOTAL, CKMB, CKMBINDEX, TROPONINI in the last 72 hours.     Lab Results   Component Value Date    WBC 3.8 07/09/2019    RBC 3.64 07/09/2019    HGB 11.4 07/09/2019    HCT 35.3 07/09/2019    MCV 97.0 07/09/2019    MCH 31.3 07/09/2019    MCHC 32.3 07/09/2019    RDW 13.8 10/26/2017     07/09/2019    MPV 11.7 07/09/2019       Lab Results   Component Value Date     07/09/2019    K 3.8 07/09/2019    K 4.4 07/04/2019    CL 99 07/09/2019    CO2 29 07/09/2019    BUN 36 07/09/2019    LABALBU 2.7 07/07/2019    LABALBU 4.3 03/27/2012    CREATININE 1.8 07/09/2019    CALCIUM 9.9 07/09/2019    LABGLOM 28 07/09/2019    GLUCOSE 263 07/09/2019    GLUCOSE 213 03/27/2012       Lab Results   Component Value Date    ALKPHOS 206 07/07/2019    ALT 21 07/07/2019    AST 52 07/07/2019    PROT 6.1 07/07/2019    PROT 5.3 06/05/2019    BILITOT 0.6 07/07/2019    BILIDIR 0.3 07/07/2019    LABALBU 2.7 07/07/2019    LABALBU 4.3 03/27/2012       Lab Results   Component Value Date    MG 1.8 07/09/2019       No components found for: Yuan Glass    Lab Results   Component Value Date    LABA1C 7.0 06/05/2019       Lab Results   Component Value Date    TRIG 183 06/05/2019    HDL 20 06/05/2019    LDLCALC 49 06/05/2019       Lab Results   Component Value Date    TSH 0.271 07/03/2019            SEDATION  Planned agent:[x]Midazolam []Meperidine [x]Sublimaze []Morphine []Diazepam  []Other:         ASA Classification:  []1 [x]2 []3 []4 []5  Class 1: A normal healthy patient  Class 2: Pt with mild to moderate systemic disease  Class 3: Severe systemic disease or disturbance  Class 4: Severe systemic disorders that are already life threatening. Class 5: Moribund pt with little chances of survival, for more than 24 hours. Mallampati I Airway Classification:   []1 [x]2 []3 []4      [x]Pre-procedure diagnostic studies complete and results available. Comment:    [x]Previous sedation/anesthesia experiences assessed. Comment:    [x]The patient is an appropriate candidate to undergo the planned procedure sedation and anesthesia. (Refer to nursing sedation/analgesia documentation record)  [x]Formulation and discussion of sedation/procedure plan, risks, and expectations with patient and/or responsible adult completed. [x]Patient examined immediately prior to the procedure.  (Refer to nursing sedation/analgesia documentation record)        Namita Hopkins MD, Zoila Bills,   Electronically signed 7/9/2019 at 5:23 PM

## 2019-07-09 NOTE — PROGRESS NOTES
electronically signed by Dr. Viri Cruz on 7/7/2019 2:44 AM      XR CHEST PORTABLE   Final Result   1. There are right perihilar and right infrahilar infiltrates. There is no pleural effusion. Follow-up chest radiograph recommended to confirm complete resolution. **This report has been created using voice recognition software. It may contain minor errors which are inherent in voice recognition technology. **      Final report electronically signed by Dr. Iam Rogel on 7/4/2019 9:54 AM      CTA Chest W WO Contrast   Final Result       1. No evidence of pulmonary embolus. 2. Patchy groundglass opacities throughout the lungs with small pleural effusions. Differential considerations include pulmonary edema from fluid overload or cardiogenic etiology or small airway or small vessel disease. **This report has been created using voice recognition software. It may contain minor errors which are inherent in voice recognition technology. **      Final report electronically signed by Dr. Lorena Menendez MD on 7/3/2019 10:40 AM          Assessment/Plan:      Atrial Fib with RVR     Reviewed tele, rate 120s   Successful NICKIE with DCCV done on 7/5 by Dr. Reilly Nayak   Currently on heparin drip. Coumadin held 7/8 in anticipation of [facility]  INR 1.50  continue Cardizem p.o.. Lopressor was changed to Toprol XL 50 mg PO daily on 7/8. Cardiology increased Toprol XL from 50 mg to 100 mg PO daily.     Cardiology plans for [facility] this afternoon        Acute hypoxic respiratory failure, likely secondary to A. fib RVR and systolic heart failure, resolved         Now on room air, saturating well   Patient wears CPAP at night       Acute on Chronic Systolic HF, improving     EF 45% per echo 3/8/2017; EF 25-30% on NICKIE last 7/5  Was on Bumex 1 mg BID, discontinued on 7/5  Now on Lasix PO, which was held on 7/9 due to creatinine trending up   strict I/O's and daily weights  low Na diet  BNP improving  Need lifevest    Bronchitis vs atelectasis      -Noted on chest x-ray 7/7/2019  -Patient is asymptomatic  -Currently on Augmentin  -procalcitonin 0.14, low likelihood of lower respiratory infection, DC augmentin   -cont incentive spirometry     CKD Stage 3     -creatinine trending up, though still at baseline. Creatinine 1.8 today from 1.4 yesterday. Baseline creatinine 1.5 to 1.8.  -BMP in a.m.   -Avoid nephrotoxic medications  -nephrology on-board for [facility] cath clearance         DM type 2, insulin-dependent     -AIC at 7.0% on 6/5/2019   -Humalog 75/25, increased from 20 units to 25 units BID on 7/8 and SSI increased from medium to high dose. Patient reports she takes NPH 75/25, 30 units in the morning and 47 units at night  -Reviewed Accu-Cheks, blood sugars ranging 220s to 310s.    -add lispro 5 units TID w meals on top of SSI   -Carb control diet        Essential HTN, fairly controlled     Cont cardizem. Toprol XL dose increased as stated above   Hydralazine was stopped due to tachycardia ( hydralazine can cause tachycardia) on 7/8        Elevated Alk Phos, improving      -trending down  -Progress of the liver function tests is unremarkable   -AST mildly elevated   -monitor        Chronic normocytic anemia     Hgb stable at 11.4. Baseline hemoglobin 9 to 11. iron panel done in April 2019, normal  B12 elevated, folate normal 4/2019  No overt signs of bleeding     Mild leukopenia, etiology unclear     -WBC 3.8 from 4.1 yesterday  -CBC in a.m.  -will consider hematology consult if leukopenia worsen        Hypomagnesemia, improving    -magnesium replacement protocol  -magnesium level in am      DARWIN     Cont CPAP at night  follows with Elo Tucker     Recent left distal femur fracture on 6/1/2019 2nd to mechanical fall      non operative tx; knee immobilizer; to F/U with [facility] on July 17     CAD     Cont ASA, statin, BB, Imdur     Morbid Obesity      Body mass index is 47.12 kg/m².    Lifestyle modification         Physical

## 2019-07-09 NOTE — PROGRESS NOTES
Cardiology Progress Note      Patient:  Rajendra Alston  YOB: 1955  MRN: [id number]   Acct: [de-identified]  Admit Date:  7/3/2019  Primary Cardiologist: Jaylene Hodges MD  Pt seen by dr Margot Quintero    Reason for consult: afib  hpi per dr Margot Quintero \"This is a pleasant 58 y.o. female presents with sob, chest pain, and inability to catch her breath suddenly. Hx of Afib, has hx of gastric bypass, DARWIN as well. RVR in the Ed, CTA PE negative but concern for pulmonary edema. -130s. 2017 - cardiac cath with severe RCA stenosis, small LCx , no significant LAD--medical therapy was recommended. EF 45%, mild global HK. Cr 1.8. Trop negative. No active symptoms currently. No oxygen at home\"    Subjective (Events in last 24 hours):  Pt awake and alert. NAD.   No cp or sob  Pt with  over night and iv lopressor ordered    Objective:   BP (!) 144/68   Pulse 98   Temp 98.2 °F (36.8 °C) (Oral)   Resp 18   Ht 5' 9\" (1.753 m)   Wt (!) 320 lb 14.4 oz (145.6 kg)   LMP 02/20/2001   SpO2 93%   BMI 47.39 kg/m²        TELEMETRY: afib 110    Physical Exam:  General Appearance: alert and oriented to person, place and time, in no acute distress  Cardiovascular: irregularly irregular  Pulmonary/Chest: clear to auscultation bilaterally- no wheezes, rales or rhonchi, normal air movement, no respiratory distress  Abdomen: soft, non-tender, non-distended, normal bowel sounds, no masses Extremities: no cyanosis, clubbing or edema, pulse   Skin: warm and dry, no rash or erythema  Head: normocephalic and atraumatic  Eyes: pupils equal, round, and reactive to light  Neck: supple and non-tender without mass, no thyromegaly   Musculoskeletal: normal range of motion, no joint swelling, deformity or tenderness, +left leg immobilizer   Neurological: alert, oriented, normal speech, no focal findings or movement disorder noted    Medications:    metoprolol succinate  50 mg Oral Daily    insulin lispro  0-18 Units Ef 25-30 per NICKIE 7/5/19, ef was 45 per TTE 3/8/17  Left atrium severely dilated   hypomagnesemia  HTN  HLD  DM  CKD  Hx gastric bypass  DARWIN  morbidy obesity    Plan:  · Cont asa/statin/imdur/cdz  · Increase toprol xl to 100mg po daily, may need to increase cardizem to 240mg po daily  · Stop hydralazine so able to titrate rate controlling meds   · Add ace/arb if ok with renal  · npo  · LHC today  · lifevest  · Abn tsh to be addressed by primary  · Keep mag >2 and K >4, replace mag today         Electronically signed by Leonor Marcelo PA-C on 7/9/2019 at 9:49 AM

## 2019-07-09 NOTE — PROGRESS NOTES
Training, Home Exercise Program, Patient/Caregiver Education & Training    Patient Education  Patient Education: Plan of Care, Bed Mobility    Goals:  Patient goals : go back to [facility] for further therapies   Short term goals  Time Frame for Short term goals: at discharge  Short term goal 1: Pt to be Supervision for supine <> sit to get in/out of bed  Short term goal 2: Pt to be Mod I for sitting balance both static and dynamic for > 10 min for daily activities. Short term goal 3: LPT to assess bed <> chair with slideboard   Long term goals  Time Frame for Long term goals : not set due to short ELOS    Following session, patient left in safe position with all fall risk precautions in place.

## 2019-07-10 LAB
ANION GAP SERPL CALCULATED.3IONS-SCNC: 13 MEQ/L (ref 8–16)
APTT: 72.7 SECONDS (ref 22–38)
APTT: 76.1 SECONDS (ref 22–38)
APTT: 90.2 SECONDS (ref 22–38)
BASOPHILS # BLD: 1.5 %
BASOPHILS ABSOLUTE: 0.1 THOU/MM3 (ref 0–0.1)
BUN BLDV-MCNC: 42 MG/DL (ref 7–22)
CALCIUM SERPL-MCNC: 9.6 MG/DL (ref 8.5–10.5)
CHLORIDE BLD-SCNC: 97 MEQ/L (ref 98–111)
CO2: 27 MEQ/L (ref 23–33)
CREAT SERPL-MCNC: 2 MG/DL (ref 0.4–1.2)
EOSINOPHIL # BLD: 3.7 %
EOSINOPHILS ABSOLUTE: 0.1 THOU/MM3 (ref 0–0.4)
ERYTHROCYTE [DISTWIDTH] IN BLOOD BY AUTOMATED COUNT: 15.5 % (ref 11.5–14.5)
ERYTHROCYTE [DISTWIDTH] IN BLOOD BY AUTOMATED COUNT: 57.5 FL (ref 35–45)
GFR SERPL CREATININE-BSD FRML MDRD: 25 ML/MIN/1.73M2
GLUCOSE BLD-MCNC: 143 MG/DL (ref 70–108)
GLUCOSE BLD-MCNC: 190 MG/DL (ref 70–108)
GLUCOSE BLD-MCNC: 193 MG/DL (ref 70–108)
GLUCOSE BLD-MCNC: 214 MG/DL (ref 70–108)
GLUCOSE BLD-MCNC: 234 MG/DL (ref 70–108)
GLUCOSE BLD-MCNC: 254 MG/DL (ref 70–108)
HCT VFR BLD CALC: 36 % (ref 37–47)
HEMOGLOBIN: 10.9 GM/DL (ref 12–16)
IMMATURE GRANS (ABS): 0 THOU/MM3 (ref 0–0.07)
IMMATURE GRANULOCYTES: 0 %
INR BLD: 1.46 (ref 0.85–1.13)
LYMPHOCYTES # BLD: 41.2 %
LYMPHOCYTES ABSOLUTE: 1.6 THOU/MM3 (ref 1–4.8)
MAGNESIUM: 2.6 MG/DL (ref 1.6–2.4)
MCH RBC QN AUTO: 30.9 PG (ref 26–33)
MCHC RBC AUTO-ENTMCNC: 30.3 GM/DL (ref 32.2–35.5)
MCV RBC AUTO: 102 FL (ref 81–99)
MONOCYTES # BLD: 13.6 %
MONOCYTES ABSOLUTE: 0.5 THOU/MM3 (ref 0.4–1.3)
NUCLEATED RED BLOOD CELLS: 0 /100 WBC
PLATELET # BLD: 171 THOU/MM3 (ref 130–400)
PLATELET ESTIMATE: ADEQUATE
PMV BLD AUTO: 11.6 FL (ref 9.4–12.4)
POTASSIUM SERPL-SCNC: 3.9 MEQ/L (ref 3.5–5.2)
RBC # BLD: 3.53 MILL/MM3 (ref 4.2–5.4)
SCAN OF BLOOD SMEAR: NORMAL
SEG NEUTROPHILS: 40 %
SEGMENTED NEUTROPHILS ABSOLUTE COUNT: 1.6 THOU/MM3 (ref 1.8–7.7)
SODIUM BLD-SCNC: 137 MEQ/L (ref 135–145)
T3 FREE: 3.02 PG/ML (ref 2.02–4.43)
WBC # BLD: 4 THOU/MM3 (ref 4.8–10.8)

## 2019-07-10 PROCEDURE — 83735 ASSAY OF MAGNESIUM: CPT

## 2019-07-10 PROCEDURE — 36415 COLL VENOUS BLD VENIPUNCTURE: CPT

## 2019-07-10 PROCEDURE — 6360000002 HC RX W HCPCS: Performed by: INTERNAL MEDICINE

## 2019-07-10 PROCEDURE — 97110 THERAPEUTIC EXERCISES: CPT

## 2019-07-10 PROCEDURE — 80048 BASIC METABOLIC PNL TOTAL CA: CPT

## 2019-07-10 PROCEDURE — 99232 SBSQ HOSP IP/OBS MODERATE 35: CPT | Performed by: FAMILY MEDICINE

## 2019-07-10 PROCEDURE — 6370000000 HC RX 637 (ALT 250 FOR IP): Performed by: INTERNAL MEDICINE

## 2019-07-10 PROCEDURE — 85730 THROMBOPLASTIN TIME PARTIAL: CPT

## 2019-07-10 PROCEDURE — 85610 PROTHROMBIN TIME: CPT

## 2019-07-10 PROCEDURE — 6370000000 HC RX 637 (ALT 250 FOR IP): Performed by: NURSE PRACTITIONER

## 2019-07-10 PROCEDURE — 97535 SELF CARE MNGMENT TRAINING: CPT

## 2019-07-10 PROCEDURE — 6370000000 HC RX 637 (ALT 250 FOR IP): Performed by: PHARMACIST

## 2019-07-10 PROCEDURE — 99232 SBSQ HOSP IP/OBS MODERATE 35: CPT | Performed by: PHYSICIAN ASSISTANT

## 2019-07-10 PROCEDURE — 82948 REAGENT STRIP/BLOOD GLUCOSE: CPT

## 2019-07-10 PROCEDURE — 1200000003 HC TELEMETRY R&B

## 2019-07-10 PROCEDURE — 99232 SBSQ HOSP IP/OBS MODERATE 35: CPT | Performed by: INTERNAL MEDICINE

## 2019-07-10 PROCEDURE — 6370000000 HC RX 637 (ALT 250 FOR IP): Performed by: FAMILY MEDICINE

## 2019-07-10 PROCEDURE — 6370000000 HC RX 637 (ALT 250 FOR IP): Performed by: PHYSICIAN ASSISTANT

## 2019-07-10 PROCEDURE — 97530 THERAPEUTIC ACTIVITIES: CPT

## 2019-07-10 PROCEDURE — 85025 COMPLETE CBC W/AUTO DIFF WBC: CPT

## 2019-07-10 RX ORDER — DILTIAZEM HYDROCHLORIDE 120 MG/1
120 CAPSULE, COATED, EXTENDED RELEASE ORAL ONCE
Status: COMPLETED | OUTPATIENT
Start: 2019-07-10 | End: 2019-07-10

## 2019-07-10 RX ORDER — FAMOTIDINE 20 MG/1
20 TABLET, FILM COATED ORAL DAILY
Status: DISCONTINUED | OUTPATIENT
Start: 2019-07-10 | End: 2019-07-12 | Stop reason: HOSPADM

## 2019-07-10 RX ORDER — DILTIAZEM HYDROCHLORIDE 240 MG/1
240 CAPSULE, COATED, EXTENDED RELEASE ORAL DAILY
Status: DISCONTINUED | OUTPATIENT
Start: 2019-07-11 | End: 2019-07-12 | Stop reason: HOSPADM

## 2019-07-10 RX ORDER — WARFARIN SODIUM 7.5 MG/1
7.5 TABLET ORAL ONCE
Status: COMPLETED | OUTPATIENT
Start: 2019-07-10 | End: 2019-07-10

## 2019-07-10 RX ADMIN — INSULIN LISPRO 5 UNITS: 100 INJECTION, SOLUTION INTRAVENOUS; SUBCUTANEOUS at 12:03

## 2019-07-10 RX ADMIN — ASPIRIN 81 MG: 81 TABLET ORAL at 08:53

## 2019-07-10 RX ADMIN — ACETYLCYSTEINE 1000 MG: 500 TABLET, EFFERVESCENT ORAL at 08:53

## 2019-07-10 RX ADMIN — ATORVASTATIN CALCIUM 40 MG: 40 TABLET, FILM COATED ORAL at 21:05

## 2019-07-10 RX ADMIN — INSULIN LISPRO 6 UNITS: 100 INJECTION, SOLUTION INTRAVENOUS; SUBCUTANEOUS at 08:54

## 2019-07-10 RX ADMIN — CALCIUM CARBONATE-VITAMIN D TAB 500 MG-200 UNIT 1 TABLET: 500-200 TAB at 17:05

## 2019-07-10 RX ADMIN — METOPROLOL SUCCINATE 100 MG: 100 TABLET, FILM COATED, EXTENDED RELEASE ORAL at 08:53

## 2019-07-10 RX ADMIN — Medication 1 CAPSULE: at 08:53

## 2019-07-10 RX ADMIN — WARFARIN SODIUM 7.5 MG: 7.5 TABLET ORAL at 17:05

## 2019-07-10 RX ADMIN — INSULIN LISPRO 2 UNITS: 100 INJECTION, SOLUTION INTRAVENOUS; SUBCUTANEOUS at 21:05

## 2019-07-10 RX ADMIN — ACETYLCYSTEINE 1000 MG: 500 TABLET, EFFERVESCENT ORAL at 21:05

## 2019-07-10 RX ADMIN — FERROUS SULFATE TAB 325 MG (65 MG ELEMENTAL FE) 325 MG: 325 (65 FE) TAB at 17:05

## 2019-07-10 RX ADMIN — GABAPENTIN 100 MG: 100 CAPSULE ORAL at 08:53

## 2019-07-10 RX ADMIN — DILTIAZEM HYDROCHLORIDE 120 MG: 120 CAPSULE, EXTENDED RELEASE ORAL at 08:53

## 2019-07-10 RX ADMIN — DULOXETINE HYDROCHLORIDE 60 MG: 60 CAPSULE, DELAYED RELEASE ORAL at 08:53

## 2019-07-10 RX ADMIN — CALCIUM CARBONATE-VITAMIN D TAB 500 MG-200 UNIT 1 TABLET: 500-200 TAB at 08:53

## 2019-07-10 RX ADMIN — GABAPENTIN 100 MG: 100 CAPSULE ORAL at 21:05

## 2019-07-10 RX ADMIN — FAMOTIDINE 20 MG: 20 TABLET ORAL at 08:53

## 2019-07-10 RX ADMIN — INSULIN LISPRO 5 UNITS: 100 INJECTION, SOLUTION INTRAVENOUS; SUBCUTANEOUS at 17:06

## 2019-07-10 RX ADMIN — INSULIN LISPRO 5 UNITS: 100 INJECTION, SOLUTION INTRAVENOUS; SUBCUTANEOUS at 08:54

## 2019-07-10 RX ADMIN — DILTIAZEM HYDROCHLORIDE 120 MG: 120 CAPSULE, EXTENDED RELEASE ORAL at 12:02

## 2019-07-10 RX ADMIN — FOLIC ACID 1 MG: 1 TABLET ORAL at 08:53

## 2019-07-10 RX ADMIN — HEPARIN SODIUM 14 UNITS/KG/HR: 10000 INJECTION, SOLUTION INTRAVENOUS at 21:06

## 2019-07-10 RX ADMIN — AMITRIPTYLINE HYDROCHLORIDE 10 MG: 10 TABLET, FILM COATED ORAL at 21:05

## 2019-07-10 RX ADMIN — GABAPENTIN 100 MG: 100 CAPSULE ORAL at 17:05

## 2019-07-10 RX ADMIN — INSULIN LISPRO 6 UNITS: 100 INJECTION, SOLUTION INTRAVENOUS; SUBCUTANEOUS at 12:02

## 2019-07-10 RX ADMIN — Medication 1000 MCG: at 08:53

## 2019-07-10 RX ADMIN — FENOFIBRATE 160 MG: 160 TABLET ORAL at 08:53

## 2019-07-10 RX ADMIN — ISOSORBIDE MONONITRATE 60 MG: 60 TABLET ORAL at 08:53

## 2019-07-10 RX ADMIN — INSULIN LISPRO 5 UNITS: 100 INJECTION, SOLUTION INTRAVENOUS; SUBCUTANEOUS at 00:06

## 2019-07-10 RX ADMIN — FERROUS SULFATE TAB 325 MG (65 MG ELEMENTAL FE) 325 MG: 325 (65 FE) TAB at 12:02

## 2019-07-10 RX ADMIN — FERROUS SULFATE TAB 325 MG (65 MG ELEMENTAL FE) 325 MG: 325 (65 FE) TAB at 08:53

## 2019-07-10 RX ADMIN — PANTOPRAZOLE SODIUM 40 MG: 40 TABLET, DELAYED RELEASE ORAL at 05:55

## 2019-07-10 RX ADMIN — INSULIN LISPRO 3 UNITS: 100 INJECTION, SOLUTION INTRAVENOUS; SUBCUTANEOUS at 17:06

## 2019-07-10 ASSESSMENT — PAIN SCALES - GENERAL
PAINLEVEL_OUTOF10: 0
PAINLEVEL_OUTOF10: 0

## 2019-07-10 NOTE — PROGRESS NOTES
Department of Internal Medicine  Section of Endocrinology   [address]    [address] , [address] [place], [address]  Office Phone [address]  ([address])  ([address])  [address] Jasmin Dejesus MD, Fellow of [employer] of Endocrinology   Progress Note    Admit Date: 7/3/2019     Reviewed the chart of the last 24 hours:   SUBJECTIVE:     Chief Complaint   Patient presents with    Shortness of Breath     Dyspnea [R06.00]   Patient Active Problem List   Diagnosis Code    DM (diabetes mellitus) ([facility] [address] E11.9    Hypertension I10    Hyperlipidemia E78.5    Neuropathy G62.9    Osteoarthritis M19.90    Proteinuria R80.9    Arthritis M19.90    Allergic rhinitis J30.9    CKD (chronic kidney disease) N18.9    Obstructive sleep apnea on CPAP G47.33, Z99.89    S/P laparoscopic sleeve gastrectomy Z98.84    Morbid obesity with BMI of 50.0-59.9, adult ([facility]) E66.01, Z68.43    Coronary artery disease involving native heart without angina pectoris I25.10    Multiple thyroid nodules E04.2    Bilateral renal cysts N28.1    Knee pain, left M25.562    Dyspnea R06.00    Atrial fibrillation with rapid ventricular response (HCC) I48.91    New onset atrial fibrillation ([facility]) I48.91    Acute diastolic CHF (congestive heart failure), NYHA class 2 ([facility]) I50.31    CKD (chronic kidney disease) stage 3, GFR 30-59 ml/min ([facility]) N18.3    Chronic systolic (congestive) heart failure ([facility]) I50.22     <principal problem not specified>  7/3/2019  8:40 AM  Admission weight: (!) 333 lb (151 kg)  [id number]  [id number]  [id number]    Patient has no complaints. .   Medication side effects: none  Patient is eating 100%    Review of Systems  Review of Systems - General ROS: negative   Psychological ROS: negative   Hematological and Lymphatic ROS: No history of blood clots or bleeding disorder. daily 7/3/18  Yes ARNOL Lopez CNP   Calcium Carbonate-Vitamin D (CALCIUM-VITAMIN D) 500-200 MG-UNIT per tablet Take 1 tablet by mouth 2 times daily (with meals)    Yes Historical Provider, MD   HYDROcodone-acetaminophen (NORCO) 7.5-325 MG per tablet Take 1 tablet by mouth every 4 hours as needed for Pain. Historical Provider, MD   meclizine (ANTIVERT) 25 MG CHEW Take 1 tablet by mouth 3 times daily as needed (vertigo) 6/3/19   Nadiya Blackburn MD   cyclobenzaprine (FLEXERIL) 10 MG tablet Take 1 tablet by mouth 3 times daily as needed for Muscle spasms 5/17/19   Pradeep Bustillos PA-C   Misc.  Devices [facility]) MISC 1 Device by Does not apply route as needed (prn) 5/15/19   ARNOL Lopez CNP   CPAP Machine MISC by Does not apply route Please change CPAP to auto pressure to 7-15 cm H20. 11/10/16   Pritesh Teresa PA-C   Insulin Pen Needle (PEN NEEDLES) 31G X 5 MM MISC 1 each by Does not apply route 3 times daily 10/27/16   Jasvir Ferro MD     Scheduled Meds:   famotidine  20 mg Oral Daily    insulin lispro protamine & lispro  30 Units Subcutaneous BID     [START ON 7/11/2019] diltiazem  240 mg Oral Daily    metoprolol succinate  100 mg Oral Daily    insulin lispro  5 Units Subcutaneous TID     sodium chloride flush  10 mL Intravenous 2 times per day    insulin lispro  0-18 Units Subcutaneous TID     insulin lispro  0-9 Units Subcutaneous Nightly    magnesium replacement protocol   Other RX Placeholder    Acetylcysteine  1,000 mg Oral BID    [Held by provider] furosemide  40 mg Oral Daily    warfarin (COUMADIN) daily dosing (placeholder)   Other RX Placeholder    isosorbide mononitrate  60 mg Oral Daily    lactobacillus  1 capsule Oral Daily with breakfast    amitriptyline  10 mg Oral Nightly    atorvastatin  40 mg Oral Nightly    calcium-vitamin D  1 tablet Oral BID     DULoxetine  60 mg Oral Daily    fenofibrate  160 mg Oral Daily    ferrous

## 2019-07-10 NOTE — PLAN OF CARE
Problem: Falls - Risk of:  Goal: Will remain free from falls  Description  Will remain free from falls  7/10/2019 0131 by Elisabeth Huang RN  Outcome: Ongoing  Note:   Patient in bed with bed alarm on, call light within reach and being used appropriately. Patient non-weight bearing, being turned and repositioned in bed. Problem: Falls - Risk of:  Goal: Absence of physical injury  Description  Absence of physical injury  7/10/2019 0131 by Elisabeth Huang RN  Outcome: Ongoing  Note:   Stay with me and falling star program in place. No signs of physical injury at this time. Problem: Risk for Impaired Skin Integrity  Goal: Tissue integrity - skin and mucous membranes  Description  Structural intactness and normal physiological function of skin and  mucous membranes. 7/10/2019 0131 by Elisabeth Huang RN  Outcome: Ongoing  Note:   Patient has several areas of pre-existing skin breakdown. Patient turns and repositions self in bed. Will monitor patient's need to be turned to preserve skin integrity. Educated patient on importance of turning and repositioning self in bed to prevent skin breakdown. Problem: Discharge Planning:  Goal: Discharged to appropriate level of care  Description  Discharged to appropriate level of care  7/10/2019 0131 by Elisabeth Huang RN  Outcome: Ongoing  Note:   Patient plans to be discharged to [facility] for rehab. Problem: Pain:  Goal: Pain level will decrease  Description  Pain level will decrease  7/10/2019 0131 by Elisabeth Huang RN  Outcome: Ongoing  Note:   Patient denies the presence of pain so far this shift. Will continue to monitor. Problem: Pain:  Goal: Control of acute pain  Description  Control of acute pain  7/10/2019 0131 by Elisabeth Huang RN  Outcome: Ongoing  Note:   Denies acute pain.       Problem: Pain:  Goal: Control of chronic pain  Description  Control of chronic pain  7/10/2019 0131 by Elisabeth Huang RN  Outcome: Ongoing  Note:   Denies presence of
Problem: Falls - Risk of:  Goal: Will remain free from falls  Description  Will remain free from falls  Outcome: Ongoing  Note:   No falls. Bed alarm on, Falling star program in place. Call light within reach. Problem: Risk for Impaired Skin Integrity  Goal: Tissue integrity - skin and mucous membranes  Description  Structural intactness and normal physiological function of skin and  mucous membranes. Outcome: Ongoing  Note:   Patient self-regulates position frequently in bed. No change in skin integrity this shift. Problem: Discharge Planning:  Goal: Discharged to appropriate level of care  Description  Discharged to appropriate level of care  Outcome: Ongoing  Note:   Plans to discharge to [address] [place] [address]. Problem: Cardiac:  Goal: Ability to maintain vital signs within normal range will improve  Description  Ability to maintain vital signs within normal range will improve  Outcome: Ongoing  Note:   Patient had a NICKIE cardioversion with conversion to sinus rhythm with a rate in the 60-70s. Patient is asymptomatic. Care plan reviewed with patient. Patient verbalize understanding of the plan of care and contribute to goal setting.
Problem: Nutrition  Goal: Optimal nutrition therapy  Outcome: Ongoing   Nutrition Problem: Increased nutrient needs  Intervention: Food and/or Nutrient Delivery: Modify current diet(Added carb control to assist with blood sugar control. Offered ONS, pt declined.)  Nutritional Goals: Pt will consume 75% or more of meals during LOS.
board and are managing her A-fib with PO cardizem which they have increased the dose from 120mg to 240mg. Patient also continues to be on a heparin gtt at 8kg/ml/hr at this time. Will continue to monitor closely. Problem: Physical Regulation:  Goal: Complications related to the disease process, condition or treatment will be avoided or minimized  Description  Complications related to the disease process, condition or treatment will be avoided or minimized  Outcome: Ongoing  Note:   Patient has physical and occupational therapy on board  to help with her muscle strength. Patient is still non-weight bearing on her right leg due to her femur fracture prior to admission. Care plan reviewed with patient. Patient verbalize understanding of the plan of care and contribute to goal setting.
return to [facility] for rehab. Care plan reviewed with patient. Patient verbalize understanding of the plan of care and contribute to goal setting.
the disease process, condition or treatment will be avoided or minimized  Description  Complications related to the disease process, condition or treatment will be avoided or minimized  Note:   Leg immobilizer on left leg still, PT/OT working with patient      Problem: DISCHARGE BARRIERS  Goal: Patient's continuum of care needs are met  Note:   Purposefully hourly rounding

## 2019-07-10 NOTE — PROGRESS NOTES
Cardiology Progress Note      Patient:  Emilee Lindsay  YOB: 1955  MRN: [id number]   Acct: [de-identified]  Admit Date:  7/3/2019  Primary Cardiologist: Redd Mo MD  Pt seen by dr Rosie Fabian    Reason for consult: afib  hpi per dr Rosie Fabian \"This is a pleasant 58 y.o. female presents with sob, chest pain, and inability to catch her breath suddenly. Hx of Afib, has hx of gastric bypass, DARWIN as well. RVR in the Ed, CTA PE negative but concern for pulmonary edema. -130s. 2017 - cardiac cath with severe RCA stenosis, small LCx , no significant LAD--medical therapy was recommended. EF 45%, mild global HK. Cr 1.8. Trop negative. No active symptoms currently. No oxygen at home\"    Subjective (Events in last 24 hours):  Pt awake and alert. NAD.   No cp or sob  On heparin gtt    Objective:   BP (!) 143/76   Pulse 98   Temp 98.3 °F (36.8 °C) (Oral)   Resp 18   Ht 5' 9\" (1.753 m)   Wt (!) 320 lb 14.4 oz (145.6 kg)   LMP 02/20/2001   SpO2 96%   BMI 47.39 kg/m²        TELEMETRY: afib     Physical Exam:  General Appearance: alert and oriented to person, place and time, in no acute distress  Cardiovascular: irregularly irregular  Pulmonary/Chest: clear to auscultation bilaterally- no wheezes, rales or rhonchi, normal air movement, no respiratory distress  Abdomen: soft, non-tender, non-distended, normal bowel sounds, no masses Extremities: no cyanosis, clubbing or edema, pulse   Skin: warm and dry, no rash or erythema  Head: normocephalic and atraumatic  Eyes: pupils equal, round, and reactive to light  Neck: supple and non-tender without mass, no thyromegaly   Musculoskeletal: normal range of motion, no joint swelling, deformity or tenderness, +left leg immobilizer   Neurological: alert, oriented, normal speech, no focal findings or movement disorder noted    Medications:    famotidine  20 mg Oral Daily    insulin lispro protamine & lispro  30 Units Subcutaneous BID WC    diltiazem 120 mg Oral Once    [START ON 7/11/2019] diltiazem  240 mg Oral Daily    warfarin  7.5 mg Oral Once    metoprolol succinate  100 mg Oral Daily    insulin lispro  5 Units Subcutaneous TID     sodium chloride flush  10 mL Intravenous 2 times per day    insulin lispro  0-18 Units Subcutaneous TID     insulin lispro  0-9 Units Subcutaneous Nightly    magnesium replacement protocol   Other RX Placeholder    Acetylcysteine  1,000 mg Oral BID    [Held by provider] furosemide  40 mg Oral Daily    warfarin (COUMADIN) daily dosing (placeholder)   Other RX Placeholder    isosorbide mononitrate  60 mg Oral Daily    lactobacillus  1 capsule Oral Daily with breakfast    amitriptyline  10 mg Oral Nightly    atorvastatin  40 mg Oral Nightly    calcium-vitamin D  1 tablet Oral BID     DULoxetine  60 mg Oral Daily    fenofibrate  160 mg Oral Daily    ferrous sulfate  325 mg Oral TID     folic acid  1 mg Oral Daily    aspirin  81 mg Oral Daily    gabapentin  100 mg Oral TID    docusate sodium  100 mg Oral Q48H    vitamin B-12  1,000 mcg Oral Daily      sodium chloride Stopped (07/10/19 0848)    diltiazem (CARDIZEM) 125 mg in dextrose 5% 125 mL infusion Stopped (07/05/19 1715)    heparin (porcine) 8 Units/kg/hr (07/10/19 0427)    dextrose         metoprolol 5 mg Q6H PRN   sodium chloride flush 10 mL PRN   acetaminophen 650 mg Q4H PRN   magnesium hydroxide 30 mL Daily PRN   ondansetron 4 mg Q6H PRN   heparin (porcine) 10,000 Units PRN   heparin (porcine) 5,000 Units PRN   cyclobenzaprine 10 mg TID PRN   meclizine 25 mg TID PRN   senna 1 tablet Daily PRN   glucose 15 g PRN   dextrose 12.5 g PRN   glucagon (rDNA) 1 mg PRN   dextrose 100 mL/hr PRN   HYDROcodone-acetaminophen 1 tablet Q4H PRN       Diagnostics:  NICKIE 7/5/19  Summary   Ejection fraction was estimated at 25-30%.    Right atrial size was moderately dilated with no thrombus identified   Left atrial size was severely dilated with no thrombus identified.  Jana Woodruff: The left atrial appendage size was normal with no thrombus   identified. DOPPLER: The function was abnormal (reduced emptying   velocity).   There was trace mitral regurgitation.   There was mild tricuspid regurgitation.      Signature      ----------------------------------------------------------------   Electronically signed by Fili Johnson MD (Interpreting   [id number]) on 07/05/2019 at 04:03 PM    Lab Data:    Cardiac Enzymes:  No results for input(s): CKTOTAL, CKMB, CKMBINDEX, TROPONINI in the last 72 hours.     CBC:   Lab Results   Component Value Date    WBC 4.0 07/10/2019    RBC 3.53 07/10/2019    HGB 10.9 07/10/2019    HCT 36.0 07/10/2019     07/10/2019       CMP:    Lab Results   Component Value Date     07/10/2019    K 3.9 07/10/2019    K 4.4 07/04/2019    CL 97 07/10/2019    CO2 27 07/10/2019    BUN 42 07/10/2019    CREATININE 2.0 07/10/2019    LABGLOM 25 07/10/2019    GLUCOSE 190 07/10/2019    GLUCOSE 213 03/27/2012    CALCIUM 9.6 07/10/2019       Hepatic Function Panel:    Lab Results   Component Value Date    ALKPHOS 206 07/07/2019    ALT 21 07/07/2019    AST 52 07/07/2019    PROT 6.1 07/07/2019    PROT 5.3 06/05/2019    BILITOT 0.6 07/07/2019    BILIDIR 0.3 07/07/2019    LABALBU 2.7 07/07/2019    LABALBU 4.3 03/27/2012       Magnesium:    Lab Results   Component Value Date    MG 2.6 07/10/2019       PT/INR:    Lab Results   Component Value Date    INR 1.46 07/10/2019       HgBA1c:    Lab Results   Component Value Date    LABA1C 7.0 06/05/2019       FLP:    Lab Results   Component Value Date    TRIG 183 06/05/2019    HDL 20 06/05/2019    LDLCALC 49 06/05/2019       TSH:    Lab Results   Component Value Date    TSH 0.271 07/03/2019         Assessment:    New Paroxysmal Afib RVR   S/p NICKIE/CV 7/5/19 to NSR - currently afib with hr 90-100s    Hx CAD - s/p cath 5/16/17 - severe RCA disease which is very tortious, non obstructive LAD, small left circ with 100% chronic

## 2019-07-10 NOTE — PROGRESS NOTES
recognition software. It may contain minor errors which are inherent in voice recognition technology. **      Final report electronically signed by Dr. Chris Evangelista on 7/7/2019 2:44 AM      XR CHEST PORTABLE   Final Result   1. There are right perihilar and right infrahilar infiltrates. There is no pleural effusion. Follow-up chest radiograph recommended to confirm complete resolution. **This report has been created using voice recognition software. It may contain minor errors which are inherent in voice recognition technology. **      Final report electronically signed by Dr. Nupur Villar on 7/4/2019 9:54 AM      CTA Chest W WO Contrast   Final Result       1. No evidence of pulmonary embolus. 2. Patchy groundglass opacities throughout the lungs with small pleural effusions. Differential considerations include pulmonary edema from fluid overload or cardiogenic etiology or small airway or small vessel disease. **This report has been created using voice recognition software. It may contain minor errors which are inherent in voice recognition technology. **      Final report electronically signed by Dr. Surjit Gonzalez MD on 7/3/2019 10:40 AM          Assessment/Plan:      Atrial Fib with RVR     Reviewed tele, still afib, rate 110s   S/p NICKIE with DCCV done on 7/5 by Dr. Darcy Cross   Coumadin held 7/8 in anticipation of [address]. Coumadin resumed 7/9. Cont heparin gtt and coumadin. DC heparin once INR >2. INR 1.46 today   Lopressor was changed to Toprol XL 50 mg PO daily on 7/8. Cardiology increased Toprol XL from 50 mg to 100 mg PO daily on 7/9. HR improving though still on afib w RVR.  Discussed to Cardiology URSZULA Barrow, will increase Cardizem to 240 mg from 120 mg PO daily.     S/p LHC 7/9 as stated above  Cont tele monitor         Acute hypoxic respiratory failure, likely secondary to A. fib RVR and systolic heart failure, resolved         Now on room air, saturating well   Patient wears CPAP at

## 2019-07-10 NOTE — PROGRESS NOTES
Clinical Pharmacy Note    Warfarin consult follow-up    Recent Labs     07/10/19  0334   INR 1.46*     Recent Labs     07/08/19  0642 07/09/19  0603 07/10/19  0334   HGB 11.2* 11.4* 10.9*   HCT 34.6* 35.3* 36.0*    167 171       Significant Drug-Drug Interactions:  New warfarin drug-drug interactions: none  Discontinued drug-drug interactions: augmentin  Current warfarin drug-drug interactions: heparin drip, aspirin, duloxetine (), fenofibrate ()        Date INR Warfarin Dose   7/5/2019 1.07 5 mg   7/6/2019  1.01 6 mg   7/7/2019  1.04 7.5mg   7/8/2019   1.34   7/9/2019  1.5 Warfarin held for cath     7/10/2019  1.46 7.5 mg              Notes:                     Daily PT/INR until stable within therapeutic range.

## 2019-07-10 NOTE — PROGRESS NOTES
goals : go back to [facility] for further therapies     Short term goals  Time Frame for Short term goals: at discharge  Short term goal 1: Pt to be Supervision for supine <> sit to get in/out of bed  Short term goal 2: Pt to be Mod I for sitting balance both static and dynamic for > 10 min for daily activities. Short term goal 3: LPT to assess bed <> chair with slideboard     Long term goals  Time Frame for Long term goals : not set due to short ELOS  If patient is discharged prior to progress note completion, this note is to serve as the discharge note with all goals being unmet unless indicated otherwise.

## 2019-07-11 LAB
ANION GAP SERPL CALCULATED.3IONS-SCNC: 14 MEQ/L (ref 8–16)
APTT: 106.8 SECONDS (ref 22–38)
APTT: 39.7 SECONDS (ref 22–38)
APTT: 69.5 SECONDS (ref 22–38)
APTT: 92.3 SECONDS (ref 22–38)
BASOPHILS # BLD: 0.8 %
BASOPHILS ABSOLUTE: 0 THOU/MM3 (ref 0–0.1)
BUN BLDV-MCNC: 50 MG/DL (ref 7–22)
CALCIUM SERPL-MCNC: 9.4 MG/DL (ref 8.5–10.5)
CHLORIDE BLD-SCNC: 99 MEQ/L (ref 98–111)
CO2: 28 MEQ/L (ref 23–33)
CREAT SERPL-MCNC: 2 MG/DL (ref 0.4–1.2)
EOSINOPHIL # BLD: 5.2 %
EOSINOPHILS ABSOLUTE: 0.2 THOU/MM3 (ref 0–0.4)
ERYTHROCYTE [DISTWIDTH] IN BLOOD BY AUTOMATED COUNT: 15.5 % (ref 11.5–14.5)
ERYTHROCYTE [DISTWIDTH] IN BLOOD BY AUTOMATED COUNT: 55.5 FL (ref 35–45)
GFR SERPL CREATININE-BSD FRML MDRD: 25 ML/MIN/1.73M2
GLUCOSE BLD-MCNC: 165 MG/DL (ref 70–108)
GLUCOSE BLD-MCNC: 174 MG/DL (ref 70–108)
GLUCOSE BLD-MCNC: 208 MG/DL (ref 70–108)
GLUCOSE BLD-MCNC: 221 MG/DL (ref 70–108)
GLUCOSE BLD-MCNC: 263 MG/DL (ref 70–108)
HCT VFR BLD CALC: 34.4 % (ref 37–47)
HEMOGLOBIN: 10.9 GM/DL (ref 12–16)
IMMATURE GRANS (ABS): 0.01 THOU/MM3 (ref 0–0.07)
IMMATURE GRANULOCYTES: 0.2 %
INR BLD: 1.47 (ref 0.85–1.13)
LYMPHOCYTES # BLD: 44.2 %
LYMPHOCYTES ABSOLUTE: 2.1 THOU/MM3 (ref 1–4.8)
MAGNESIUM: 2.4 MG/DL (ref 1.6–2.4)
MCH RBC QN AUTO: 31.3 PG (ref 26–33)
MCHC RBC AUTO-ENTMCNC: 31.7 GM/DL (ref 32.2–35.5)
MCV RBC AUTO: 98.9 FL (ref 81–99)
MONOCYTES # BLD: 11.9 %
MONOCYTES ABSOLUTE: 0.6 THOU/MM3 (ref 0.4–1.3)
NUCLEATED RED BLOOD CELLS: 0 /100 WBC
PLATELET # BLD: 173 THOU/MM3 (ref 130–400)
PLATELET ESTIMATE: ADEQUATE
PMV BLD AUTO: 11.9 FL (ref 9.4–12.4)
POTASSIUM SERPL-SCNC: 3.8 MEQ/L (ref 3.5–5.2)
RBC # BLD: 3.48 MILL/MM3 (ref 4.2–5.4)
SCAN OF BLOOD SMEAR: NORMAL
SEG NEUTROPHILS: 37.7 %
SEGMENTED NEUTROPHILS ABSOLUTE COUNT: 1.8 THOU/MM3 (ref 1.8–7.7)
SODIUM BLD-SCNC: 141 MEQ/L (ref 135–145)
WBC # BLD: 4.8 THOU/MM3 (ref 4.8–10.8)

## 2019-07-11 PROCEDURE — 6370000000 HC RX 637 (ALT 250 FOR IP): Performed by: PHYSICIAN ASSISTANT

## 2019-07-11 PROCEDURE — 83735 ASSAY OF MAGNESIUM: CPT

## 2019-07-11 PROCEDURE — 85730 THROMBOPLASTIN TIME PARTIAL: CPT

## 2019-07-11 PROCEDURE — 80048 BASIC METABOLIC PNL TOTAL CA: CPT

## 2019-07-11 PROCEDURE — 84482 T3 REVERSE: CPT

## 2019-07-11 PROCEDURE — 6370000000 HC RX 637 (ALT 250 FOR IP): Performed by: NURSE PRACTITIONER

## 2019-07-11 PROCEDURE — 99232 SBSQ HOSP IP/OBS MODERATE 35: CPT | Performed by: NURSE PRACTITIONER

## 2019-07-11 PROCEDURE — 1200000003 HC TELEMETRY R&B

## 2019-07-11 PROCEDURE — 6370000000 HC RX 637 (ALT 250 FOR IP): Performed by: FAMILY MEDICINE

## 2019-07-11 PROCEDURE — 6370000000 HC RX 637 (ALT 250 FOR IP): Performed by: INTERNAL MEDICINE

## 2019-07-11 PROCEDURE — 85610 PROTHROMBIN TIME: CPT

## 2019-07-11 PROCEDURE — 82948 REAGENT STRIP/BLOOD GLUCOSE: CPT

## 2019-07-11 PROCEDURE — 97530 THERAPEUTIC ACTIVITIES: CPT

## 2019-07-11 PROCEDURE — 6360000002 HC RX W HCPCS: Performed by: INTERNAL MEDICINE

## 2019-07-11 PROCEDURE — 6370000000 HC RX 637 (ALT 250 FOR IP): Performed by: PHARMACIST

## 2019-07-11 PROCEDURE — 85025 COMPLETE CBC W/AUTO DIFF WBC: CPT

## 2019-07-11 PROCEDURE — 99232 SBSQ HOSP IP/OBS MODERATE 35: CPT | Performed by: FAMILY MEDICINE

## 2019-07-11 PROCEDURE — 2580000003 HC RX 258: Performed by: INTERNAL MEDICINE

## 2019-07-11 PROCEDURE — 36415 COLL VENOUS BLD VENIPUNCTURE: CPT

## 2019-07-11 PROCEDURE — 97110 THERAPEUTIC EXERCISES: CPT

## 2019-07-11 RX ORDER — WARFARIN SODIUM 7.5 MG/1
7.5 TABLET ORAL
Status: COMPLETED | OUTPATIENT
Start: 2019-07-11 | End: 2019-07-11

## 2019-07-11 RX ORDER — ALLOPURINOL 100 MG/1
100 TABLET ORAL DAILY
Status: ON HOLD | COMMUNITY
Start: 2019-07-03 | End: 2021-01-01

## 2019-07-11 RX ORDER — DIPHENHYDRAMINE HCL 25 MG
25 TABLET ORAL EVERY 6 HOURS PRN
Status: DISCONTINUED | OUTPATIENT
Start: 2019-07-11 | End: 2019-07-12 | Stop reason: HOSPADM

## 2019-07-11 RX ADMIN — ISOSORBIDE MONONITRATE 60 MG: 60 TABLET ORAL at 09:34

## 2019-07-11 RX ADMIN — METOPROLOL SUCCINATE 100 MG: 100 TABLET, FILM COATED, EXTENDED RELEASE ORAL at 09:33

## 2019-07-11 RX ADMIN — HEPARIN SODIUM 5000 UNITS: 1000 INJECTION, SOLUTION INTRAVENOUS; SUBCUTANEOUS at 22:52

## 2019-07-11 RX ADMIN — INSULIN LISPRO 3 UNITS: 100 INJECTION, SOLUTION INTRAVENOUS; SUBCUTANEOUS at 09:34

## 2019-07-11 RX ADMIN — INSULIN LISPRO 5 UNITS: 100 INJECTION, SOLUTION INTRAVENOUS; SUBCUTANEOUS at 16:33

## 2019-07-11 RX ADMIN — HEPARIN SODIUM 9 UNITS/KG/HR: 10000 INJECTION, SOLUTION INTRAVENOUS at 22:52

## 2019-07-11 RX ADMIN — Medication 10 ML: at 21:42

## 2019-07-11 RX ADMIN — FENOFIBRATE 160 MG: 160 TABLET ORAL at 09:33

## 2019-07-11 RX ADMIN — DILTIAZEM HYDROCHLORIDE 240 MG: 240 CAPSULE, COATED, EXTENDED RELEASE ORAL at 09:33

## 2019-07-11 RX ADMIN — INSULIN LISPRO 5 UNITS: 100 INJECTION, SOLUTION INTRAVENOUS; SUBCUTANEOUS at 11:32

## 2019-07-11 RX ADMIN — INSULIN LISPRO 3 UNITS: 100 INJECTION, SOLUTION INTRAVENOUS; SUBCUTANEOUS at 21:38

## 2019-07-11 RX ADMIN — GABAPENTIN 100 MG: 100 CAPSULE ORAL at 09:33

## 2019-07-11 RX ADMIN — INSULIN LISPRO 9 UNITS: 100 INJECTION, SOLUTION INTRAVENOUS; SUBCUTANEOUS at 11:31

## 2019-07-11 RX ADMIN — DULOXETINE HYDROCHLORIDE 60 MG: 60 CAPSULE, DELAYED RELEASE ORAL at 09:33

## 2019-07-11 RX ADMIN — INSULIN LISPRO 6 UNITS: 100 INJECTION, SOLUTION INTRAVENOUS; SUBCUTANEOUS at 16:32

## 2019-07-11 RX ADMIN — FERROUS SULFATE TAB 325 MG (65 MG ELEMENTAL FE) 325 MG: 325 (65 FE) TAB at 11:31

## 2019-07-11 RX ADMIN — FERROUS SULFATE TAB 325 MG (65 MG ELEMENTAL FE) 325 MG: 325 (65 FE) TAB at 09:33

## 2019-07-11 RX ADMIN — CALCIUM CARBONATE-VITAMIN D TAB 500 MG-200 UNIT 1 TABLET: 500-200 TAB at 16:31

## 2019-07-11 RX ADMIN — Medication 1 CAPSULE: at 09:33

## 2019-07-11 RX ADMIN — DOCUSATE SODIUM 100 MG: 100 CAPSULE, LIQUID FILLED ORAL at 09:33

## 2019-07-11 RX ADMIN — GABAPENTIN 100 MG: 100 CAPSULE ORAL at 13:47

## 2019-07-11 RX ADMIN — FAMOTIDINE 20 MG: 20 TABLET ORAL at 09:33

## 2019-07-11 RX ADMIN — FOLIC ACID 1 MG: 1 TABLET ORAL at 09:33

## 2019-07-11 RX ADMIN — AMITRIPTYLINE HYDROCHLORIDE 10 MG: 10 TABLET, FILM COATED ORAL at 21:38

## 2019-07-11 RX ADMIN — ATORVASTATIN CALCIUM 40 MG: 40 TABLET, FILM COATED ORAL at 21:38

## 2019-07-11 RX ADMIN — FERROUS SULFATE TAB 325 MG (65 MG ELEMENTAL FE) 325 MG: 325 (65 FE) TAB at 16:31

## 2019-07-11 RX ADMIN — GABAPENTIN 100 MG: 100 CAPSULE ORAL at 21:35

## 2019-07-11 RX ADMIN — Medication 1000 MCG: at 09:33

## 2019-07-11 RX ADMIN — INSULIN LISPRO 5 UNITS: 100 INJECTION, SOLUTION INTRAVENOUS; SUBCUTANEOUS at 09:34

## 2019-07-11 RX ADMIN — CALCIUM CARBONATE-VITAMIN D TAB 500 MG-200 UNIT 1 TABLET: 500-200 TAB at 09:34

## 2019-07-11 RX ADMIN — WARFARIN SODIUM 7.5 MG: 7.5 TABLET ORAL at 17:18

## 2019-07-11 RX ADMIN — DIPHENHYDRAMINE HCL 25 MG: 25 TABLET ORAL at 21:50

## 2019-07-11 RX ADMIN — ASPIRIN 81 MG: 81 TABLET ORAL at 09:33

## 2019-07-11 ASSESSMENT — PAIN SCALES - GENERAL
PAINLEVEL_OUTOF10: 0
PAINLEVEL_OUTOF10: 0

## 2019-07-11 NOTE — PROGRESS NOTES
Cardiology Progress Note      Patient:  Sahil Brooks  YOB: 1955  MRN: [id number]   Acct: [de-identified]  Admit Date:  7/3/2019  Primary Cardiologist: Susan Perez MD  Pt seen by dr Simran Ontiveros    Reason for consult: afib  hpi per dr Simran Ontiveros \"This is a pleasant 58 y.o. female presents with sob, chest pain, and inability to catch her breath suddenly. Hx of Afib, has hx of gastric bypass, DARWIN as well. RVR in the Ed, CTA PE negative but concern for pulmonary edema. -130s. 2017 - cardiac cath with severe RCA stenosis, small LCx , no significant LAD--medical therapy was recommended. EF 45%, mild global HK. Cr 1.8. Trop negative. No active symptoms currently. No oxygen at home\"    Subjective (Events in last 24 hours):  Pt awake and alert. NAD. No cp or sob. Pt feels good.   No lightheadedness or syncope  On heparin gtt    Objective:   /74   Pulse 76   Temp 98.2 °F (36.8 °C) (Oral)   Resp 19   Ht 5' 9\" (1.753 m)   Wt (!) 319 lb 1.6 oz (144.7 kg)   LMP 02/20/2001   SpO2 94%   BMI 47.12 kg/m²        TELEMETRY: afib cvr - 65-75 while awake, 45-65 during sleep    Physical Exam:  General Appearance: alert and oriented to person, place and time, in no acute distress  Cardiovascular: irregularly irregular  Pulmonary/Chest: clear to auscultation bilaterally- no wheezes, rales or rhonchi, normal air movement, no respiratory distress  Abdomen: soft, non-tender, non-distended, normal bowel sounds, no masses Extremities: no cyanosis, clubbing or edema, pulse   Skin: warm and dry, no rash or erythema  Head: normocephalic and atraumatic  Eyes: pupils equal, round, and reactive to light  Neck: supple and non-tender without mass, no thyromegaly   Musculoskeletal: normal range of motion, no joint swelling, deformity or tenderness, +left leg immobilizer   Neurological: alert, oriented, normal speech, no focal findings or movement disorder noted    Medications:    insulin lispro protamine & significant changes changes from prior angiogram    Acute on chronic systolic CHF - appears compensated  New low Ef 25-30 per NICKIE 7/5/19, ef was 45 per TTE 3/8/17  Left atrium severely dilated   Hypomagnesemia - resolved  HTN  HLD  DM  CKD  Hx gastric bypass  DARWIN  morbidy obesity    Plan:  · Cont asa/statin/imdur/cdz/BB/coumadin  · Monitor rate during admission, if HR <60 frequently or if pauses >3 sec, likely need to decrease cardizem back to 120mg po daily  · Add ace/arb if ok with renal  · lifevest  · Abn tsh to be addressed by primary  · Keep mag >2 and K >4  · Stop heparin gtt once INR >2  · Pt needs OAC - pt understands risk of bleeding and accepts risk to reduce risk of embolic phenomenon  · Pt and sister request referral to dr Rj Maguire as outpt for possible ablation          Electronically signed by Mallorie Pineda PA-C on 7/11/2019 at 3:09 PM

## 2019-07-11 NOTE — PROGRESS NOTES
Hospitalist Progress Note    Patient:  Kaye Yan      Unit/Bed:6K-17/017-A    YOB: 1955    MRN: [id number]       Acct: [de-identified]     PCP: ARNOL Farmer CNP    Date of Admission: 7/3/2019    Chief Complaint:   SOB    Hospital Course:   Patient is 58 y.o. female, with past medical history of CAD, chronic kidney disease, diabetes mellitus type 2, hypertension, hyperlipidemia, history of A. fib 3 years ago per patient, who presented to [address] [place] [address] via EMS chief complaint of chest pressure and dyspnea; she states she was in her normal state of health until last PM when she dev shortness of breath and states she \"couldn't sleep\"; she states \"I feel as if I have been running and I have not\"; states she had some upper chest pain but has resolved. EMS reported the patient's SpO2 as 89-90% en route; denies O2 at home. Patient reports having atrial fibrillation but cannot feel it and does not take blood thinners to treat it; states she was notified ~ 3 years ago after gastric bypass surgery;. She is staying in the [facility] while she heals from a fractured left femur that occurred June 1 and is just using a knee immobilizer    In ED she was found to be in atrial fib with RVR; CTA chest (-) PE however small effusions and possibly pulmonary edema. Cardiology consulted. There are right perihilar and infrahilar infiltrates noted on chest x-ray, Augmentin started. Patient was started on heparin and Coumadin. 7/5 Successful NICKIE with DCCV with Dr. Gasper Santa. INR 1.01    7/8: cardiology plans for [facility]. nephro consulted for nephro clearance. Coumadin held. Heparin gtt continued. 7/9: s/p LHC which showed non-obstructive CAD. Coumadin resumed. 7/10: cardizem increased to 240 mg from 120 mg po daily      Subjective:     Patient seen and examined.  Patient denies cough, shortness of breath, fever, chills, chest pain, palpitations, leg Wt (!) 319 lb 1.6 oz (144.7 kg)   LMP 02/20/2001   SpO2 91%   BMI 47.12 kg/m²     General appearance: alert, not in acute distress. Morbidly obese  HEENT: Pupils equal, round, and reactive to light. Conjunctivae clear. Clear oral mucosa. Neck: Supple, with full range of motion. Respiratory:  Normal respiratory effort. Lungs clear to auscultation. No rales, no rhonchi, no wheezing. Cardiovascular: slightly tachycardic, irregularly irregaular rhythm with normal S1/S2 without murmurs, rubs or gallops. Abdomen: Soft, non-tender, non-distended with normal bowel sounds. Musculoskeletal: passive and active ROM x 4 extremities. (+) ace wrap on left leg w leg brace. Exam of extremities: trace pitting edema on distal legs          Labs:   Recent Labs     07/09/19  0603 07/10/19  0334 07/11/19  0334   WBC 3.8* 4.0* 4.8   HGB 11.4* 10.9* 10.9*   HCT 35.3* 36.0* 34.4*    171 173     Recent Labs     07/09/19  0603 07/10/19  0334 07/11/19  0334    137 141   K 3.8 3.9 3.8   CL 99 97* 99   CO2 29 27 28   BUN 36* 42* 50*   CREATININE 1.8* 2.0* 2.0*   CALCIUM 9.9 9.6 9.4     No results for input(s): AST, ALT, BILIDIR, BILITOT, ALKPHOS in the last 72 hours. Recent Labs     07/09/19  0603 07/10/19  0334 07/11/19  0334   INR 1.50* 1.46* 1.47*     No results for input(s): CKTOTAL, TROPONINI in the last 72 hours. Urinalysis:      Lab Results   Component Value Date    NITRU NEGATIVE 07/04/2019    WBCUA 0-2 07/04/2019    BACTERIA NONE 07/04/2019    RBCUA 3-5 07/04/2019    BLOODU NEGATIVE 07/04/2019    SPECGRAV 1.015 07/04/2019    GLUCOSEU NEGATIVE 05/17/2019       Radiology:  XR CHEST PORTABLE   Final Result   1. Stable cardiac enlargement. 2. Shifting areas of patchy airspace opacity correlate with areas of bronchitis or atelectasis. **This report has been created using voice recognition software. It may contain minor errors which are inherent in voice recognition technology. **      Final report 25-30% on NICKIE last 7/5   Seems improved, pt is asymptomatic, lungs clear to auscultation, and no significant legs swelling   Was on Bumex 1 mg BID, discontinued on 7/5, which was switched to lasix, now lasix on hold since 7/9 due to KEEGAN on CKD  strict I/O's, daily weights, fluid and salt restriction   BNP improving  Need lifevest      Bronchitis vs atelectasis      -Noted on chest x-ray 7/7/2019  -Patient is asymptomatic  -procalcitonin 0.14, low likelihood of lower respiratory infection, was on Augmentin which was DC on 7/9/19. Pt is doing well off abx.   -cont incentive spirometry     KEEGAN on CKD Stage 3, likely contrast-induced from [address] on 7/9, stable      -Creatinine stable at 2.0.  Baseline creatinine 1.5 to 1.8.  -BMP in a.m.   -Avoid nephrotoxic medications   -lasix on hold   -nephrology on-board for [facility] cath clearance. Appreciate nephrology input.         DM type 2, insulin-dependent     -AIC at 7.0% on 6/5/2019   -SSI increased from medium to high dose on 7/8.  -increased NPH from 25 units to 30 units BID on 7/10.   -Patient reports she takes NPH 75/25, 30 units in the morning and 47 units at night  -Reviewed Accu-Cheks, blood sugars improving but still uncontrolled. Will increase NPH from 30 to 32 units BID. -added lispro 5 units TID w meals on top of SSI on 7/9  -Carb control diet        Essential HTN, fairly controlled     Cont Toprol XL. Increase Cardizem dose as stated above   Hydralazine was stopped due to tachycardia ( hydralazine can cause tachycardia) on 7/8      Subclinical Hyperthyroidism    -Endocrinologist on-board.  Appreciate endo input   -reverse T3 ordered by endo, awaiting result         Elevated Alk Phos, improving      -trending down  -Progress of the liver function tests is unremarkable   -AST mildly elevated   -monitor        Chronic normocytic anemia     Hgb stable at 10.9.  Baseline hemoglobin 9 to 11.   iron panel done in April 2019, normal  B12 elevated, folate normal 4/2019  No overt signs of bleeding     Mild leukopenia, etiology unclear, resolved      -WBC 4.8 from 4.0 yesterday  -CBC in a.m.         Hypomagnesemia and hypermagnesemia, resolved       -magnesium level in am      DARWIN     Cont CPAP at night  follows with Makayla Arizmendi     Recent left distal femur fracture on 6/1/2019 2nd to mechanical fall      non operative tx; knee immobilizer; to F/U with [facility] on July 17     CAD     Cont ASA, statin, BB, Imdur     Morbid Obesity      Body mass index is 47.12 kg/m².   Lifestyle modification         Physical Deconditioning      PT/OT on-board  DC planning: [facility]            Diet: DIET CARB CONTROL; Low Sodium (2 GM)     DVT prophylaxis: [] Lovenox                                 [] SCDs                                 [] SQ Heparin                                 [] Encourage ambulation                                 [x] Already on Anticoagulation     Disposition:      [] Home                             [] TCU                             [] [facility]                             [] Psych                             [] I  [address] [facility] [place]                             [x] Other-Plan as above. Plan to discharge to [address] [place] [address],[address] INR is therapeutic 2-3. Continue heparin gtt and Coumadin. DC heparin once INR >2.  Follow-up with primary physician, cardio and Ortho as outpatient once discharged.            Code Status: Full Code     PT/OT Eval Status: on-board.      Anticipated Discharge in : TBD      Electronically signed by Talat Dewitt MD on 7/11/2019 at 9:56 AM

## 2019-07-11 NOTE — PROGRESS NOTES
Pharmacy Medication History Note      List of current medications patient is taking is complete. Source of information: [employer] med list    Changes made to medication list:  Medications removed (include reason, ex. therapy complete or physician discontinued):  None    Medications added/doses adjusted:  Changed Humalog Mix 75/25 to 20 units TID with meals  Added allopurinol 100mg daily - ordered to start on 7/3/19    Other notes (ex. Recent course of antibiotics, Coumadin dosing):  Colchicine ordered for 2 weeks from 6/22/19 to 7/5/19   Denies use of other OTC or herbal medications.       Allergies reviewed - added mometasone per [facility] list      Electronically signed by Irving Hodge, [facility] on 7/11/2019 at 8:19 AM

## 2019-07-11 NOTE — PROGRESS NOTES
Nephrology Progress Note    Patient - Jesus Gaitan   MRN -  [id number]   Acct # - [de-identified]      - 1955    58 y.o. Admit Date: 7/3/2019  Hospital Day: 8  [facility]  Date of evaluation -  2019    Subjective:   CC: shortness of breath   Denies sob. Room air  Good appetite  Pleasant  BP Range: Systolic (28KGK), ADB:529 , Min:115 , VCD:540      Diastolic (83RGM), GGP:76, Min:58, Max:93    Objective:   VITALS:  BP (!) 139/93   Pulse 100   Temp 97.5 °F (36.4 °C) (Oral)   Resp 20   Ht 5' 9\" (1.753 m)   Wt (!) 319 lb 1.6 oz (144.7 kg)   LMP 2001   SpO2 90%   BMI 47.12 kg/m²    Patient Vitals for the past 24 hrs:   BP Temp Temp src Pulse Resp SpO2 Weight   19 0441 -- -- -- -- -- -- (!) 319 lb 1.6 oz (144.7 kg)   19 0350 (!) 139/93 97.5 °F (36.4 °C) Oral 100 20 90 % --   19 0018 (!) 135/58 97.8 °F (36.6 °C) Oral 91 18 91 % --   07/10/19 1945 (!) 119/58 97.7 °F (36.5 °C) Oral 74 20 94 % --   07/10/19 1606 (!) 175/60 98 °F (36.7 °C) -- 77 17 94 % --   07/10/19 1215 115/75 98.2 °F (36.8 °C) Oral 72 17 96 % --   07/10/19 0845 (!) 143/76 98.3 °F (36.8 °C) Oral 98 18 96 % --     2 L/min    Intake/Output Summary (Last 24 hours) at 2019 0808  Last data filed at 2019 0511  Gross per 24 hour   Intake 1034.31 ml   Output --   Net 1034.31 ml       Admission weight: (!) 333 lb (151 kg)  Patient Vitals for the past 96 hrs (Last 3 readings):   Weight   19 0441 (!) 319 lb 1.6 oz (144.7 kg)   19 0238 (!) 320 lb 14.4 oz (145.6 kg)   19 0456 (!) 326 lb (147.9 kg)     Wt Readings from Last 3 Encounters:   19 (!) 319 lb 1.6 oz (144.7 kg)   19 (!) 340 lb (154.2 kg)   19 (!) 340 lb (154.2 kg)     Body mass index is 47.12 kg/m². EXAM:  CONSTITUTIONAL:  No acute distress. Pleasant  HEENT:  Head is normocephalic, Extraocular movement intact. Neck is supple. Voice is clear. CARDIOVASCULAR:  S1, S2  regular rate and rhythm.   RESPIRATORY: Clear to ausculation bilaterally. Equal breath sounds. No wheezes. No shortness of breath noted at rest.  ABDOMEN: soft, non tender  NEUROLOGICAL: Patient is alert and oriented to person, place, and time. Recent and remote memory intact. Thought is coherant. SKIN: no rash, No significant bruises on exposed surfaces  MUSCULOSKELETAL: Movement is coordinated. Moves all extremities   EXTREMITIES: Distal lower extremity temp is warm, Lt knee immobilizer. Lt lower extremity edema with ACE wrap   PSYCHIATRIC: mood and affect appropriate.     Medications:   Med reviewed  Scheduled Meds:   famotidine  20 mg Oral Daily    insulin lispro protamine & lispro  30 Units Subcutaneous BID WC    diltiazem  240 mg Oral Daily    metoprolol succinate  100 mg Oral Daily    insulin lispro  5 Units Subcutaneous TID WC    sodium chloride flush  10 mL Intravenous 2 times per day    insulin lispro  0-18 Units Subcutaneous TID WC    insulin lispro  0-9 Units Subcutaneous Nightly    magnesium replacement protocol   Other RX Placeholder    Acetylcysteine  1,000 mg Oral BID    [Held by provider] furosemide  40 mg Oral Daily    warfarin (COUMADIN) daily dosing (placeholder)   Other RX Placeholder    isosorbide mononitrate  60 mg Oral Daily    lactobacillus  1 capsule Oral Daily with breakfast    amitriptyline  10 mg Oral Nightly    atorvastatin  40 mg Oral Nightly    calcium-vitamin D  1 tablet Oral BID WC    DULoxetine  60 mg Oral Daily    fenofibrate  160 mg Oral Daily    ferrous sulfate  325 mg Oral TID WC    folic acid  1 mg Oral Daily    aspirin  81 mg Oral Daily    gabapentin  100 mg Oral TID    docusate sodium  100 mg Oral Q48H    vitamin B-12  1,000 mcg Oral Daily     Continuous Infusions:   sodium chloride Stopped (07/10/19 4101)    diltiazem (CARDIZEM) 125 mg in dextrose 5% 125 mL infusion Stopped (07/05/19 0566)    heparin (porcine) 8 Units/kg/hr (07/11/19 5724)    dextrose       Labs:   Labs

## 2019-07-11 NOTE — PROGRESS NOTES
breaks needed. Pt has limited shoulder ROM. Pt asking appropriate questions regarding recovery and regaining function which MCGINNIS answered to best of ability. Pt also has questions regarding dietary restrictions and states that she would like to have some education and information. Nurse notified of pt's desire to have some nutrition education    ASSESSMENT:  Activity Tolerance:  Patient tolerance of  treatment: good. Discharge Recommendations: Patient would benefit from continued therapy after discharge, ECF with OT  Equipment Recommendations: Equipment Needed: No  Other: defer  Plan: Times per week: 3-5x  Current Treatment Recommendations: Strengthening, Balance Training, Functional Mobility Training, Endurance Training, Self-Care / ADL, Patient/Caregiver Education & Training, Equipment Evaluation, Education, & procurement, Safety Education & Training, Home Management Training    Patient Education  Patient Education: role of OT, HEP, LHAE,     Goals  Short term goals  Time Frame for Short term goals: 2 weeks  Short term goal 1: pt to tolerate further assesment of functional t/fs and mobility by OTR in prep for functional use of BSC  Short term goal 2: Pt to demonstrate LB ADLs using LHAE and no greater than Min A for increased indep with self cares  Short term goal 3: Pt to complete BUE mod resistance strengthening for increasing endurance required for BADL routine  Long term goals  Time Frame for Long term goals : NA d/t ELOS    Following session, patient left in safe position with all fall risk precautions in place.

## 2019-07-12 VITALS
OXYGEN SATURATION: 98 % | WEIGHT: 293 LBS | HEART RATE: 82 BPM | RESPIRATION RATE: 18 BRPM | SYSTOLIC BLOOD PRESSURE: 127 MMHG | HEIGHT: 69 IN | BODY MASS INDEX: 43.4 KG/M2 | TEMPERATURE: 97.9 F | DIASTOLIC BLOOD PRESSURE: 65 MMHG

## 2019-07-12 LAB
ALP BLD-CCNC: 182 U/L (ref 38–126)
ANION GAP SERPL CALCULATED.3IONS-SCNC: 13 MEQ/L (ref 8–16)
APTT: 175.5 SECONDS (ref 22–38)
APTT: > 200 SECONDS (ref 22–38)
BASOPHILS # BLD: 0.8 %
BASOPHILS ABSOLUTE: 0 THOU/MM3 (ref 0–0.1)
BUN BLDV-MCNC: 56 MG/DL (ref 7–22)
CALCIUM SERPL-MCNC: 9.6 MG/DL (ref 8.5–10.5)
CHLORIDE BLD-SCNC: 99 MEQ/L (ref 98–111)
CO2: 27 MEQ/L (ref 23–33)
CREAT SERPL-MCNC: 2.1 MG/DL (ref 0.4–1.2)
EOSINOPHIL # BLD: 5 %
EOSINOPHILS ABSOLUTE: 0.3 THOU/MM3 (ref 0–0.4)
ERYTHROCYTE [DISTWIDTH] IN BLOOD BY AUTOMATED COUNT: 15.4 % (ref 11.5–14.5)
ERYTHROCYTE [DISTWIDTH] IN BLOOD BY AUTOMATED COUNT: 56.3 FL (ref 35–45)
GFR SERPL CREATININE-BSD FRML MDRD: 24 ML/MIN/1.73M2
GLUCOSE BLD-MCNC: 143 MG/DL (ref 70–108)
GLUCOSE BLD-MCNC: 143 MG/DL (ref 70–108)
GLUCOSE BLD-MCNC: 251 MG/DL (ref 70–108)
GLUCOSE BLD-MCNC: 77 MG/DL (ref 70–108)
HCT VFR BLD CALC: 35.2 % (ref 37–47)
HEMOGLOBIN: 10.8 GM/DL (ref 12–16)
IMMATURE GRANS (ABS): 0.01 THOU/MM3 (ref 0–0.07)
IMMATURE GRANULOCYTES: 0.2 %
INR BLD: 2.14 (ref 0.85–1.13)
LYMPHOCYTES # BLD: 45.6 %
LYMPHOCYTES ABSOLUTE: 2.3 THOU/MM3 (ref 1–4.8)
MAGNESIUM: 2.4 MG/DL (ref 1.6–2.4)
MCH RBC QN AUTO: 31 PG (ref 26–33)
MCHC RBC AUTO-ENTMCNC: 30.7 GM/DL (ref 32.2–35.5)
MCV RBC AUTO: 101.1 FL (ref 81–99)
MONOCYTES # BLD: 11.4 %
MONOCYTES ABSOLUTE: 0.6 THOU/MM3 (ref 0.4–1.3)
NUCLEATED RED BLOOD CELLS: 0 /100 WBC
PHOSPHORUS: 4.2 MG/DL (ref 2.4–4.7)
PLATELET # BLD: 174 THOU/MM3 (ref 130–400)
PLATELET ESTIMATE: ADEQUATE
PMV BLD AUTO: 11.9 FL (ref 9.4–12.4)
POTASSIUM SERPL-SCNC: 4.3 MEQ/L (ref 3.5–5.2)
RBC # BLD: 3.48 MILL/MM3 (ref 4.2–5.4)
SCAN OF BLOOD SMEAR: NORMAL
SEG NEUTROPHILS: 37 %
SEGMENTED NEUTROPHILS ABSOLUTE COUNT: 1.9 THOU/MM3 (ref 1.8–7.7)
SODIUM BLD-SCNC: 139 MEQ/L (ref 135–145)
TSH RECEPTOR AB: < 0.9 IU/L
WBC # BLD: 5 THOU/MM3 (ref 4.8–10.8)

## 2019-07-12 PROCEDURE — 36415 COLL VENOUS BLD VENIPUNCTURE: CPT

## 2019-07-12 PROCEDURE — 6360000002 HC RX W HCPCS: Performed by: INTERNAL MEDICINE

## 2019-07-12 PROCEDURE — 84100 ASSAY OF PHOSPHORUS: CPT

## 2019-07-12 PROCEDURE — 80048 BASIC METABOLIC PNL TOTAL CA: CPT

## 2019-07-12 PROCEDURE — 97530 THERAPEUTIC ACTIVITIES: CPT

## 2019-07-12 PROCEDURE — 6370000000 HC RX 637 (ALT 250 FOR IP): Performed by: INTERNAL MEDICINE

## 2019-07-12 PROCEDURE — 97110 THERAPEUTIC EXERCISES: CPT

## 2019-07-12 PROCEDURE — 99239 HOSP IP/OBS DSCHRG MGMT >30: CPT | Performed by: FAMILY MEDICINE

## 2019-07-12 PROCEDURE — 99232 SBSQ HOSP IP/OBS MODERATE 35: CPT | Performed by: INTERNAL MEDICINE

## 2019-07-12 PROCEDURE — 83735 ASSAY OF MAGNESIUM: CPT

## 2019-07-12 PROCEDURE — 85610 PROTHROMBIN TIME: CPT

## 2019-07-12 PROCEDURE — 6370000000 HC RX 637 (ALT 250 FOR IP): Performed by: FAMILY MEDICINE

## 2019-07-12 PROCEDURE — 6370000000 HC RX 637 (ALT 250 FOR IP): Performed by: NURSE PRACTITIONER

## 2019-07-12 PROCEDURE — 82948 REAGENT STRIP/BLOOD GLUCOSE: CPT

## 2019-07-12 PROCEDURE — 84075 ASSAY ALKALINE PHOSPHATASE: CPT

## 2019-07-12 PROCEDURE — 6370000000 HC RX 637 (ALT 250 FOR IP): Performed by: PHYSICIAN ASSISTANT

## 2019-07-12 PROCEDURE — 85025 COMPLETE CBC W/AUTO DIFF WBC: CPT

## 2019-07-12 PROCEDURE — 85730 THROMBOPLASTIN TIME PARTIAL: CPT

## 2019-07-12 RX ORDER — DILTIAZEM HYDROCHLORIDE 240 MG/1
240 CAPSULE, COATED, EXTENDED RELEASE ORAL DAILY
Qty: 30 CAPSULE | Refills: 3 | DISCHARGE
Start: 2019-07-13 | End: 2019-09-17

## 2019-07-12 RX ORDER — AMITRIPTYLINE HYDROCHLORIDE 10 MG/1
10 TABLET, FILM COATED ORAL NIGHTLY
Qty: 30 TABLET | Refills: 3 | DISCHARGE
Start: 2019-07-12 | End: 2020-01-07

## 2019-07-12 RX ORDER — ISOSORBIDE MONONITRATE 60 MG/1
60 TABLET, EXTENDED RELEASE ORAL DAILY
Qty: 30 TABLET | Refills: 3 | Status: ON HOLD | DISCHARGE
Start: 2019-07-13 | End: 2021-01-01 | Stop reason: HOSPADM

## 2019-07-12 RX ORDER — FUROSEMIDE 40 MG/1
20 TABLET ORAL DAILY
Qty: 60 TABLET | Refills: 0 | DISCHARGE
Start: 2019-07-12 | End: 2019-09-24

## 2019-07-12 RX ORDER — WARFARIN SODIUM 5 MG/1
5 TABLET ORAL
Status: COMPLETED | OUTPATIENT
Start: 2019-07-12 | End: 2019-07-12

## 2019-07-12 RX ORDER — ASPIRIN 81 MG/1
81 TABLET ORAL DAILY
Qty: 30 TABLET | Refills: 3 | DISCHARGE
Start: 2019-07-13 | End: 2020-01-07

## 2019-07-12 RX ADMIN — DILTIAZEM HYDROCHLORIDE 240 MG: 240 CAPSULE, COATED, EXTENDED RELEASE ORAL at 09:22

## 2019-07-12 RX ADMIN — FERROUS SULFATE TAB 325 MG (65 MG ELEMENTAL FE) 325 MG: 325 (65 FE) TAB at 09:22

## 2019-07-12 RX ADMIN — METOPROLOL SUCCINATE 100 MG: 100 TABLET, FILM COATED, EXTENDED RELEASE ORAL at 09:22

## 2019-07-12 RX ADMIN — FERROUS SULFATE TAB 325 MG (65 MG ELEMENTAL FE) 325 MG: 325 (65 FE) TAB at 11:22

## 2019-07-12 RX ADMIN — CYCLOBENZAPRINE HYDROCHLORIDE 10 MG: 10 TABLET, FILM COATED ORAL at 09:23

## 2019-07-12 RX ADMIN — INSULIN LISPRO 5 UNITS: 100 INJECTION, SOLUTION INTRAVENOUS; SUBCUTANEOUS at 11:21

## 2019-07-12 RX ADMIN — FAMOTIDINE 20 MG: 20 TABLET ORAL at 09:23

## 2019-07-12 RX ADMIN — FERROUS SULFATE TAB 325 MG (65 MG ELEMENTAL FE) 325 MG: 325 (65 FE) TAB at 16:06

## 2019-07-12 RX ADMIN — INSULIN LISPRO 9 UNITS: 100 INJECTION, SOLUTION INTRAVENOUS; SUBCUTANEOUS at 11:21

## 2019-07-12 RX ADMIN — GABAPENTIN 100 MG: 100 CAPSULE ORAL at 09:22

## 2019-07-12 RX ADMIN — HEPARIN SODIUM 9 UNITS/KG/HR: 10000 INJECTION, SOLUTION INTRAVENOUS at 05:18

## 2019-07-12 RX ADMIN — FOLIC ACID 1 MG: 1 TABLET ORAL at 09:22

## 2019-07-12 RX ADMIN — FENOFIBRATE 160 MG: 160 TABLET ORAL at 09:22

## 2019-07-12 RX ADMIN — DULOXETINE HYDROCHLORIDE 60 MG: 60 CAPSULE, DELAYED RELEASE ORAL at 09:22

## 2019-07-12 RX ADMIN — Medication 1 CAPSULE: at 09:23

## 2019-07-12 RX ADMIN — CALCIUM CARBONATE-VITAMIN D TAB 500 MG-200 UNIT 1 TABLET: 500-200 TAB at 09:23

## 2019-07-12 RX ADMIN — GABAPENTIN 100 MG: 100 CAPSULE ORAL at 14:44

## 2019-07-12 RX ADMIN — Medication 1000 MCG: at 09:22

## 2019-07-12 RX ADMIN — WARFARIN SODIUM 5 MG: 5 TABLET ORAL at 16:06

## 2019-07-12 RX ADMIN — INSULIN LISPRO 3 UNITS: 100 INJECTION, SOLUTION INTRAVENOUS; SUBCUTANEOUS at 09:24

## 2019-07-12 RX ADMIN — INSULIN LISPRO 5 UNITS: 100 INJECTION, SOLUTION INTRAVENOUS; SUBCUTANEOUS at 09:24

## 2019-07-12 RX ADMIN — ASPIRIN 81 MG: 81 TABLET ORAL at 09:23

## 2019-07-12 RX ADMIN — CALCIUM CARBONATE-VITAMIN D TAB 500 MG-200 UNIT 1 TABLET: 500-200 TAB at 16:06

## 2019-07-12 RX ADMIN — ISOSORBIDE MONONITRATE 60 MG: 60 TABLET ORAL at 09:22

## 2019-07-12 ASSESSMENT — PAIN SCALES - GENERAL
PAINLEVEL_OUTOF10: 0
PAINLEVEL_OUTOF10: 0

## 2019-07-12 NOTE — PROGRESS NOTES
Radiology:  XR CHEST PORTABLE   Final Result   1. Stable cardiac enlargement. 2. Shifting areas of patchy airspace opacity correlate with areas of bronchitis or atelectasis. **This report has been created using voice recognition software. It may contain minor errors which are inherent in voice recognition technology. **      Final report electronically signed by Dr. Wilbert Pacheco on 7/7/2019 2:44 AM      XR CHEST PORTABLE   Final Result   1. There are right perihilar and right infrahilar infiltrates. There is no pleural effusion. Follow-up chest radiograph recommended to confirm complete resolution. **This report has been created using voice recognition software. It may contain minor errors which are inherent in voice recognition technology. **      Final report electronically signed by Dr. Rivera Duarte on 7/4/2019 9:54 AM      CTA Chest W WO Contrast   Final Result       1. No evidence of pulmonary embolus. 2. Patchy groundglass opacities throughout the lungs with small pleural effusions. Differential considerations include pulmonary edema from fluid overload or cardiogenic etiology or small airway or small vessel disease. **This report has been created using voice recognition software. It may contain minor errors which are inherent in voice recognition technology. **      Final report electronically signed by Dr. Robyn Elizabeth MD on 7/3/2019 10:40 AM          Diet: DIET CARDIAC; Carb Control: 4 carb choices (60 gms)/meal; Low Sodium (2 GM)    DVT prophylaxis: [] Lovenox                                 [] SCDs                                 [] SQ Heparin                                 [] Encourage ambulation           [] Already on Anticoagulation     Disposition:    [] Home       [] TCU       [] Rehab       [] Psych       [] SNF       [] [facility]       [] Other-    Code Status: Full Code    PT/OT Eval Status:  Assessment/Plan:    Anticipated Discharge in :     Active Hospital Problems    Diagnosis Date Noted    Atrial fibrillation with rapid ventricular response (HCC) [I48.91]      Priority: High    New onset atrial fibrillation (HCC) [I48.91]      Priority: High    Acute diastolic CHF (congestive heart failure), NYHA class 2 ([facility]) [I50.31]      Priority: High    Chronic systolic (congestive) heart failure ([facility]) [I50.22]     CKD (chronic kidney disease) stage 3, GFR 30-59 ml/min ([facility]) [N18.3]     Dyspnea [R06.00] 07/03/2019     Atrial Fib with RVR     Reviewed tele, still afib, now rate controlled 80-90s  S/p NICKIE with DCCV done on 7/5 by Dr. Issac Seals   Coumadin held 7/8 in anticipation of LHC. Coumadin resumed 7/9. Cont heparin gtt and coumadin. DC heparin once INR >2.   INR 2.3 today   Lopressor was changed to Toprol XL 50 mg PO daily on 7/8. Cardiology increased Toprol XL from 50 mg to 100 mg PO daily on 7/9. HR improving though still on afib w RVR. Cardizem dose increased to 240 mg from 120 mg PO daily on 7/10 due to HR still elevated, now rate controlled.     S/p LHC 7/9 as stated above  Cont tele monitor         Acute hypoxic respiratory failure, likely secondary to A. fib RVR and systolic heart failure, resolved         Now on room air, saturating well   Patient wears CPAP at night        Acute on Chronic Systolic HF, improved     EF 45% per echo 3/8/2017; EF 25-30% on NICKIE last 7/5   Seems improved, pt is asymptomatic, lungs clear to auscultation, and no significant legs swelling   Was on Bumex 1 mg BID, discontinued on 7/5, which was switched to lasix, now lasix on hold since 7/9 due to KEEGAN on CKD  strict I/O's, daily weights, fluid and salt restriction   BNP improving  Need lifevest      Bronchitis vs atelectasis      -Noted on chest x-ray 7/7/2019  -Patient is asymptomatic  -procalcitonin 0.14, low likelihood of lower respiratory infection, was on Augmentin which was DC on 7/9/19.  Pt is doing well off abx.   -cont incentive spirometry     KEEGAN on CKD

## 2019-07-12 NOTE — PROGRESS NOTES
Department of Internal Medicine  Section of Endocrinology   [address]    [address] , [address] [facility], [address]  Office Phone [address]  ([address])  ([address])  [address] Jasmin Art MD, Fellow of [employer] of Endocrinology   Progress Note    Admit Date: 7/3/2019    Reviewed the chart of the last 24 hours:   SUBJECTIVE:     Chief Complaint   Patient presents with    Shortness of Breath     Dyspnea [R06.00]   Patient Active Problem List   Diagnosis Code    DM (diabetes mellitus) ([facility] [place] [address] E11.9    Hypertension I10    Hyperlipidemia E78.5    Neuropathy G62.9    Osteoarthritis M19.90    Proteinuria R80.9    Arthritis M19.90    Allergic rhinitis J30.9    CKD (chronic kidney disease) N18.9    Obstructive sleep apnea on CPAP G47.33, Z99.89    S/P laparoscopic sleeve gastrectomy Z98.84    Morbid obesity with BMI of 50.0-59.9, adult (HCC) E66.01, Z68.43    Coronary artery disease involving native heart without angina pectoris I25.10    Multiple thyroid nodules E04.2    Bilateral renal cysts N28.1    Knee pain, left M25.562    Dyspnea R06.00    Atrial fibrillation with rapid ventricular response (HCC) I48.91    New onset atrial fibrillation (HCC) I48.91    Acute diastolic CHF (congestive heart failure), NYHA class 2 (HCC) I50.31    CKD (chronic kidney disease) stage 3, GFR 30-59 ml/min (HCC) N18.3    Chronic systolic (congestive) heart failure (HCC) I50.22     <principal problem not specified>  7/3/2019  8:40 AM  Admission weight: (!) 333 lb (151 kg)  [id number]  [id number]  6K-17/017-A      Review of Systems  Review of Systems - General ROS: negative   Psychological ROS: negative   Hematological and Lymphatic ROS: No history of blood clots or bleeding disorder.    Respiratory ROS: no cough, shortness of breath, or wheezing   Cardiovascular ROS: no chest Other RX Mauro Turk MD        [Held by provider] furosemide (LASIX) tablet 40 mg  40 mg Oral Daily Hany Yang MD   40 mg at 07/08/19 9629    warfarin (COUMADIN) daily dosing (placeholder)   Other RX Placeholder Sweta Car MD        isosorbide mononitrate (IMDUR) extended release tablet 60 mg  60 mg Oral Daily Sweat Car MD   60 mg at 07/12/19 1903    heparin (porcine) injection 10,000 Units  10,000 Units Intravenous PRN Sweta Car MD        heparin (porcine) injection 5,000 Units  5,000 Units Intravenous PRN Sweta Car MD   5,000 Units at 07/11/19 2252    lactobacillus (CULTURELLE) capsule 1 capsule  1 capsule Oral Daily with breakfast [address], MD   1 capsule at 07/12/19 0923    heparin 25,000 units in dextrose 5% 250 mL infusion  14 Units/kg/hr Intravenous Continuous Sweta Car MD 7.7 mL/hr at 07/12/19 1215 5 Units/kg/hr at 07/12/19 1215    amitriptyline (ELAVIL) tablet 10 mg  10 mg Oral Nightly Nghiaendakathrynn Nalelly, APRN - CNP   10 mg at 07/11/19 2138    atorvastatin (LIPITOR) tablet 40 mg  40 mg Oral Nightly Nghiaendalyn Nallely, APRN - CNP   40 mg at 07/11/19 2138    calcium-vitamin D (OSCAL-500) 500-200 MG-UNIT per tablet 1 tablet  1 tablet Oral BID  Liliane Browning, APRN - CNP   1 tablet at 07/12/19 0923    cyclobenzaprine (FLEXERIL) tablet 10 mg  10 mg Oral TID PRN Liliane Browning, APRN - CNP   10 mg at 07/12/19 0923    DULoxetine (CYMBALTA) extended release capsule 60 mg  60 mg Oral Daily Liliane Browning, APRN - CNP   60 mg at 07/12/19 0384    fenofibrate tablet 160 mg  160 mg Oral Daily Liliane Browning, APRN - CNP   160 mg at 07/12/19 3320    ferrous sulfate tablet 325 mg  325 mg Oral TID  ARNOL Stack - CNP   325 mg at 41/76/54 6273    folic acid (FOLVITE) tablet 1 mg  1 mg Oral Daily ARNOL Choudhary CNP   1 mg at 07/12/19 0982    meclizine (ANTIVERT) chewable tablet 25 mg

## 2019-07-12 NOTE — PROGRESS NOTES
07/11/19 2035   Provider Notification   Reason for Communication Patient request  (Benadryl)   Provider Name [facility]   Provider Notification Physician Assistant   Method of Communication Secure Message   Response See orders   Notification Time 2035     Patient: Shayla Jeffery     7/11/19 8:34 PM   [phone] From: Cristel Burton [facility] [address] RE: Abad Akhtar 7A06 Patient complaining of itching and requesting Benadryl PO. She has a small area on RAC that was irritated from tape. Thanks  Read 8:35 PM     Benadryl ordered.

## 2019-07-12 NOTE — PROGRESS NOTES
Renal Progress Note    Assessment and Plan:    1. Acute kidney injury stable  2. Stage IIIb chronic kidney disease  3. Hypertension primary  4. Diabetes mellitus type 2  5. Anemia of chronic disease  PLAN:   Labs reviewed and results discussed with the patient  Medications reviewed  No changes  Labs in the morning  Renal will follow    Patient Active Problem List:     DM (diabetes mellitus) ([facility]     Hypertension     Hyperlipidemia     Neuropathy     Osteoarthritis     Proteinuria     Arthritis     Allergic rhinitis     CKD (chronic kidney disease)     Obstructive sleep apnea on CPAP     S/P laparoscopic sleeve gastrectomy     Morbid obesity with BMI of 50.0-59.9, adult ([facility]     Coronary artery disease involving native heart without angina pectoris     Multiple thyroid nodules     Bilateral renal cysts     Knee pain, left     Dyspnea     [facility] fibrillation with rapid ventricular response [facility]     New onset atrial fibrillation (HCC)     Acute diastolic CHF (congestive heart failure), NYHA class 2 (HCC)     CKD (chronic kidney disease) stage 3, GFR 30-59 ml/min (HCC)     Chronic systolic (congestive) heart failure (HCC)      Subjective:   Admit Date: 7/3/2019    Interval History:   Seen for acute kidney injury on chronic kidney disease. Very awake and very alert this morning. No complaints. Updated by the staff. No issues. Blood pressure is good. Urine output is not documented. Admitted for shortness of breath. Had a normal cardiac cath.       Medications:   Scheduled Meds:   insulin lispro protamine & lispro  32 Units Subcutaneous BID WC    famotidine  20 mg Oral Daily    diltiazem  240 mg Oral Daily    metoprolol succinate  100 mg Oral Daily    insulin lispro  5 Units Subcutaneous TID WC    sodium chloride flush  10 mL Intravenous 2 times per day    insulin lispro  0-18 Units Subcutaneous TID WC    insulin lispro  0-9 Units Subcutaneous Nightly    magnesium replacement protocol   Other RX

## 2019-07-14 LAB — T3 REVERSE: 21.6 NG/DL (ref 9–27)

## 2019-07-15 NOTE — CARE COORDINATION
[address] Transitions Interview     2019    Patient: Colt Alva Patient : 1955   MRN: [id number]  Reason for Admission:   RARS: Readmission Risk Score: 35         Reviewed nH Predict Tool with patient and SW/CM. nH Predict Tool uploaded in the media tab. Recommendation is for SNF and discharge plan is to return to [facility] [place] [facility]. Readmission Risk  Patient Active Problem List   Diagnosis    [facility]    Hypertension    Hyperlipidemia    Neuropathy    Osteoarthritis    Proteinuria    Arthritis    Allergic rhinitis    CKD (chronic kidney disease)    Obstructive sleep apnea on CPAP    S/P laparoscopic sleeve gastrectomy    Morbid obesity with BMI of 50.0-59.9, adult ([facility]    Coronary artery disease involving native heart without angina pectoris    Multiple thyroid nodules    Bilateral renal cysts    Knee pain, left    Dyspnea    Atrial fibrillation with rapid ventricular response ([facility]    New onset atrial fibrillation (HCC)    Acute diastolic CHF (congestive heart failure), NYHA class 2 (HCC)    CKD (chronic kidney disease) stage 3, GFR 30-59 ml/min [facility])       Inpatient Assessment  Care Transitions Summary    Care Transitions Inpatient Review  Medication Review  Housing Review  Social Support  Durable Medical Equipment  Functional Review  Hearing and Vision  Care Transitions Interventions         Follow Up  Future Appointments   Date Time Provider Adonis Dolan   2019  4:00 PM STR CARDIAC CATHETERIZATION RM2 STRZ CATH LB None   10/30/2019 12:15 PM Rachel Aguilar  [address] [facility] [place]   12/3/2019  1:00 PM Ana Brown, APRN - [id number] [address] - [place]   3/12/2020  1:30 PM Yvone Brunner, PA-C [address] Maintenance  There are no preventive care reminders to display for this patient.     Leobardo Garcia RN
[address] Transitions Interview     2019    Patient: Jb Gutierres Patient : 1955   MRN: [id number]  Reason for Admission:   RARS: Readmission Risk Score: 33         Introduced self/role. Explained BPCI-A program and beneficiary letter. Patient verbalized understandment. Readmission Risk  Patient Active Problem List   Diagnosis    [facility]di[facility] mellitus) [facility]    Hypertension    Hyperlipidemia    Neuropathy    Osteoarthritis    Proteinuria    Arthritis    Allergic rhinitis    CKD (chronic kidney disease)    Obstructive sleep apnea on CPAP    S/P laparoscopic sleeve gastrectomy    Morbid obesity with BMI of 50.0-59.9, adult ([facility]    Coronary artery disease involving native heart without angina pectoris    Multiple thyroid nodules    Bilateral renal cysts    Knee pain, left    Dyspnea    Atrial fibrillation with rapid ventricular response ([facility]    New onset atrial fibrillation (HCC)    Acute diastolic CHF (congestive heart failure), NYHA class 2 [facility])       Inpatient Assessment  Care Transitions Summary    Care Transitions Inpatient Review  Medication Review  Housing Review  Social Support  Durable Medical Equipment  Functional Review  Hearing and Vision  Care Transitions Interventions         Follow Up  Future Appointments   Date Time Provider Adonis Dolan   10/30/2019 12:15 PM Hector Charles MD  [facility] [place]   12/3/2019  1:00 PM Filippo Cordon, APRN - [id number] [address] [facility] [place]   3/12/2020  1:30 PM Zahra Craig, [address]  Maintenance  There are no preventive care reminders to display for this patient.     Talia Maldonado RN
7/10/19, 1:55 PM  Debi Flower day: 7  [facility]: [facility]note 3[facility] Reason for admit: Dyspnea [R06.00]     Clinical update:   7/5 NICKIE shows EF 25-30%  7/9 S/P cardiac cath     Cardiology plans to stop heparin gtt once INR > 2. INR 1.46 today. Lifevest ordered - discussed with [employer] rep, they can approve after cath report dictated. PerfectServe message sent to cardiology. Discharge plan: Plan is for return to [facility],  following.
7/12/19, 2:19 PM    [address] [place] [address] is able to accept today. Transfer packet on chart for discharge when ready.
7/15/19, 7:23 AM    Discharge plan discussed by  and . Discharge plan reviewed with patient/ family. Patient/ family verbalize understanding of discharge plan and are in agreement with plan. Understanding was demonstrated using the teach back method. Services After Discharge  Services At/After Discharge: Aide Services, Skilled Therapy, Nursing Services([facility])   IMM Letter  IMM Letter given to Patient/Family/Significant other/Guardian/POA/by[de-identified] Jonathan Holloway CM   IMM Letter date given[de-identified] 07/12/19  IMM Letter time given[de-identified] 3929     Pt discharged 7/12/19 to [facility].   RN completed discharge, faxed AVS.
7/5/19, 8:41 AM    DISCHARGE BARRIERS    SW consult received, Patient from [facility]. Spoke with Patient and she stated that she has been there for about a month and does plan on returning at discharge. Patient stated that she is non-weight bearing at this time and has required ambulance transport. Full SW consult waived at this time. DANI spoke with Maddi Dumont at [facility] and Patient is able to return skilled at discharge and she was unsure if she was a medicaid bed hold or not at this time but would be able to return over the weekend if ready to discharge.
Tiffany Valadez at [employer], and info faxed. Planning heart cath today.   Electronically signed by Joanne Burns RN on 7/9/2019 at 10:23 AM
Diet: DIET CARB CONTROL; Low Sodium (2 GM)   Smoking status:  reports that she quit smoking about 12 years ago. She has a 30.00 pack-year smoking history. She has never used smokeless tobacco.   PCP: ARNOL Davis CNP  Readmission: no  Readmission Risk Score: 28%    Discharge Planning  Current Residence:  Nursing Home  Living Arrangements:  Family Members   Support Systems:  Family Members  Current Services PTA:     Potential Assistance Needed:  Skilled Nursing Facility  Potential Assistance Purchasing Medications:  No  Does patient want to participate in local refill/ meds to beds program?  No  Type of Home Care Services:  None  Patient [facility] to be discharged to:  [facility]  Expected Discharge date:  07/05/19  Follow Up Appointment: Best Day/ Time: Tuesday AM    Discharge Plan: Return to [facility] ECF.       Evaluation: yes

## 2019-07-17 NOTE — PROCEDURES
mild disease  in the proximal segment and distally there appears to be subtotally  occluded circumflex artery. 4.  Left anterior descending artery is patent in the proximal portion. There are mildly calcified luminal irregularities in the mid portion and  distally there are mild luminal irregularities. 5.  Cath findings are similar to the findings that were done in  angiogram on 05/16/2017. 6.  Catheters were removed and hemostasis was achieved with a Vasc Band  device. She tolerated the procedure well without any significant issues  and she was transferred back to the room in stable condition. SUMMARY:  Nonobstructive coronary artery disease. Findings are similar  to the findings that were in prior cath in 2017. RECOMMENDATIONS:  1. Aggressive risk factor modification. 2.  Lipid-lowering therapy. 3.  Optimized medical management. 4.  Follow up in clinic in one to two weeks to evaluate symptoms  further.         Glenys Lr MD    D: 07/17/2019 16:42:38       T: 07/17/2019 17:40:20     [email or web address]  Job#: [id number]     Doc#: [id number]    CC:

## 2019-08-26 ENCOUNTER — TELEPHONE (OUTPATIENT)
Dept: CARDIOLOGY CLINIC | Age: 64
End: 2019-08-26

## 2019-08-26 ENCOUNTER — TELEPHONE (OUTPATIENT)
Dept: PHARMACY | Age: 64
End: 2019-08-26

## 2019-08-26 ENCOUNTER — TELEPHONE (OUTPATIENT)
Dept: FAMILY MEDICINE CLINIC | Age: 64
End: 2019-08-26

## 2019-08-26 ENCOUNTER — APPOINTMENT (OUTPATIENT)
Dept: GENERAL RADIOLOGY | Age: 64
DRG: 291 | End: 2019-08-26
Payer: MEDICARE

## 2019-08-26 ENCOUNTER — HOSPITAL ENCOUNTER (INPATIENT)
Age: 64
LOS: 4 days | Discharge: HOME | DRG: 291 | End: 2019-08-30
Attending: FAMILY MEDICINE | Admitting: INTERNAL MEDICINE
Payer: MEDICARE

## 2019-08-26 ENCOUNTER — OFFICE VISIT (OUTPATIENT)
Dept: CARDIOLOGY CLINIC | Age: 64
End: 2019-08-26
Payer: MEDICARE

## 2019-08-26 VITALS
BODY MASS INDEX: 43.4 KG/M2 | DIASTOLIC BLOOD PRESSURE: 99 MMHG | SYSTOLIC BLOOD PRESSURE: 154 MMHG | WEIGHT: 293 LBS | HEIGHT: 69 IN | HEART RATE: 144 BPM

## 2019-08-26 DIAGNOSIS — R77.8 ELEVATED TROPONIN: ICD-10-CM

## 2019-08-26 DIAGNOSIS — N18.30 CKD (CHRONIC KIDNEY DISEASE) STAGE 3, GFR 30-59 ML/MIN (HCC): ICD-10-CM

## 2019-08-26 DIAGNOSIS — I48.91 ATRIAL FIBRILLATION, UNSPECIFIED TYPE (HCC): Primary | ICD-10-CM

## 2019-08-26 DIAGNOSIS — I10 ESSENTIAL HYPERTENSION: ICD-10-CM

## 2019-08-26 DIAGNOSIS — I48.91 ATRIAL FIBRILLATION WITH RAPID VENTRICULAR RESPONSE (HCC): Primary | ICD-10-CM

## 2019-08-26 DIAGNOSIS — R06.89 DYSPNEA AND RESPIRATORY ABNORMALITIES: ICD-10-CM

## 2019-08-26 DIAGNOSIS — I25.5 ISCHEMIC CARDIOMYOPATHY: Primary | ICD-10-CM

## 2019-08-26 DIAGNOSIS — I50.9 ACUTE CONGESTIVE HEART FAILURE, UNSPECIFIED HEART FAILURE TYPE (HCC): ICD-10-CM

## 2019-08-26 DIAGNOSIS — R06.00 DYSPNEA AND RESPIRATORY ABNORMALITIES: ICD-10-CM

## 2019-08-26 DIAGNOSIS — I48.19 PERSISTENT ATRIAL FIBRILLATION (HCC): ICD-10-CM

## 2019-08-26 PROBLEM — R06.02 SHORTNESS OF BREATH: Status: ACTIVE | Noted: 2019-08-26

## 2019-08-26 LAB
ANION GAP SERPL CALCULATED.3IONS-SCNC: 13 MEQ/L (ref 8–16)
BASOPHILS # BLD: 0.3 %
BASOPHILS ABSOLUTE: 0 THOU/MM3 (ref 0–0.1)
BUN BLDV-MCNC: 38 MG/DL (ref 7–22)
CALCIUM SERPL-MCNC: 9.7 MG/DL (ref 8.5–10.5)
CHLORIDE BLD-SCNC: 99 MEQ/L (ref 98–111)
CO2: 24 MEQ/L (ref 23–33)
CREAT SERPL-MCNC: 1.5 MG/DL (ref 0.4–1.2)
EOSINOPHIL # BLD: 0.5 %
EOSINOPHILS ABSOLUTE: 0 THOU/MM3 (ref 0–0.4)
ERYTHROCYTE [DISTWIDTH] IN BLOOD BY AUTOMATED COUNT: 15.7 % (ref 11.5–14.5)
ERYTHROCYTE [DISTWIDTH] IN BLOOD BY AUTOMATED COUNT: 58.4 FL (ref 35–45)
GFR SERPL CREATININE-BSD FRML MDRD: 35 ML/MIN/1.73M2
GLUCOSE BLD-MCNC: 336 MG/DL (ref 70–108)
HCT VFR BLD CALC: 33.7 % (ref 37–47)
HEMOGLOBIN: 10.3 GM/DL (ref 12–16)
IMMATURE GRANS (ABS): 0.01 THOU/MM3 (ref 0–0.07)
IMMATURE GRANULOCYTES: 0 %
INR BLD: 1.8 (ref 0.85–1.13)
LACTIC ACID: 2 MMOL/L (ref 0.5–2.2)
LYMPHOCYTES # BLD: 14 %
LYMPHOCYTES ABSOLUTE: 0.9 THOU/MM3 (ref 1–4.8)
MCH RBC QN AUTO: 31 PG (ref 26–33)
MCHC RBC AUTO-ENTMCNC: 30.6 GM/DL (ref 32.2–35.5)
MCV RBC AUTO: 101.5 FL (ref 81–99)
MONOCYTES # BLD: 8.1 %
MONOCYTES ABSOLUTE: 0.5 THOU/MM3 (ref 0.4–1.3)
NUCLEATED RED BLOOD CELLS: 0 /100 WBC
OSMOLALITY CALCULATION: 294.2 MOSMOL/KG (ref 275–300)
PLATELET # BLD: 246 THOU/MM3 (ref 130–400)
PMV BLD AUTO: 10.7 FL (ref 9.4–12.4)
POTASSIUM SERPL-SCNC: 4.1 MEQ/L (ref 3.5–5.2)
PRO-BNP: ABNORMAL PG/ML (ref 0–900)
PROCALCITONIN: 0.12 NG/ML (ref 0.01–0.09)
RBC # BLD: 3.32 MILL/MM3 (ref 4.2–5.4)
SEG NEUTROPHILS: 76.9 %
SEGMENTED NEUTROPHILS ABSOLUTE COUNT: 4.8 THOU/MM3 (ref 1.8–7.7)
SODIUM BLD-SCNC: 136 MEQ/L (ref 135–145)
TROPONIN T: 0.01 NG/ML
WBC # BLD: 6.2 THOU/MM3 (ref 4.8–10.8)

## 2019-08-26 PROCEDURE — G8417 CALC BMI ABV UP PARAM F/U: HCPCS | Performed by: PHYSICIAN ASSISTANT

## 2019-08-26 PROCEDURE — 3017F COLORECTAL CA SCREEN DOC REV: CPT | Performed by: PHYSICIAN ASSISTANT

## 2019-08-26 PROCEDURE — 84484 ASSAY OF TROPONIN QUANT: CPT

## 2019-08-26 PROCEDURE — 84145 PROCALCITONIN (PCT): CPT

## 2019-08-26 PROCEDURE — 2140000000 HC CCU INTERMEDIATE R&B

## 2019-08-26 PROCEDURE — 85610 PROTHROMBIN TIME: CPT

## 2019-08-26 PROCEDURE — 96365 THER/PROPH/DIAG IV INF INIT: CPT

## 2019-08-26 PROCEDURE — 83880 ASSAY OF NATRIURETIC PEPTIDE: CPT

## 2019-08-26 PROCEDURE — 99285 EMERGENCY DEPT VISIT HI MDM: CPT

## 2019-08-26 PROCEDURE — G8427 DOCREV CUR MEDS BY ELIG CLIN: HCPCS | Performed by: PHYSICIAN ASSISTANT

## 2019-08-26 PROCEDURE — 93005 ELECTROCARDIOGRAM TRACING: CPT | Performed by: FAMILY MEDICINE

## 2019-08-26 PROCEDURE — 2580000003 HC RX 258: Performed by: FAMILY MEDICINE

## 2019-08-26 PROCEDURE — 99223 1ST HOSP IP/OBS HIGH 75: CPT | Performed by: INTERNAL MEDICINE

## 2019-08-26 PROCEDURE — 36415 COLL VENOUS BLD VENIPUNCTURE: CPT

## 2019-08-26 PROCEDURE — G8598 ASA/ANTIPLAT THER USED: HCPCS | Performed by: PHYSICIAN ASSISTANT

## 2019-08-26 PROCEDURE — 2500000003 HC RX 250 WO HCPCS: Performed by: FAMILY MEDICINE

## 2019-08-26 PROCEDURE — 71045 X-RAY EXAM CHEST 1 VIEW: CPT

## 2019-08-26 PROCEDURE — 6360000002 HC RX W HCPCS: Performed by: FAMILY MEDICINE

## 2019-08-26 PROCEDURE — 96376 TX/PRO/DX INJ SAME DRUG ADON: CPT

## 2019-08-26 PROCEDURE — 85025 COMPLETE CBC W/AUTO DIFF WBC: CPT

## 2019-08-26 PROCEDURE — 99213 OFFICE O/P EST LOW 20 MIN: CPT | Performed by: PHYSICIAN ASSISTANT

## 2019-08-26 PROCEDURE — 87040 BLOOD CULTURE FOR BACTERIA: CPT

## 2019-08-26 PROCEDURE — 83605 ASSAY OF LACTIC ACID: CPT

## 2019-08-26 PROCEDURE — 80048 BASIC METABOLIC PNL TOTAL CA: CPT

## 2019-08-26 PROCEDURE — 1036F TOBACCO NON-USER: CPT | Performed by: PHYSICIAN ASSISTANT

## 2019-08-26 RX ORDER — GABAPENTIN 100 MG/1
100 CAPSULE ORAL 3 TIMES DAILY
Status: DISCONTINUED | OUTPATIENT
Start: 2019-08-27 | End: 2019-08-30 | Stop reason: HOSPADM

## 2019-08-26 RX ORDER — DILTIAZEM HYDROCHLORIDE 5 MG/ML
10 INJECTION INTRAVENOUS ONCE
Status: COMPLETED | OUTPATIENT
Start: 2019-08-26 | End: 2019-08-26

## 2019-08-26 RX ORDER — OYSTER SHELL CALCIUM WITH VITAMIN D 500; 200 MG/1; [IU]/1
TABLET, FILM COATED ORAL 2 TIMES DAILY WITH MEALS
Status: DISCONTINUED | OUTPATIENT
Start: 2019-08-27 | End: 2019-08-30 | Stop reason: HOSPADM

## 2019-08-26 RX ORDER — HYDRALAZINE HYDROCHLORIDE 25 MG/1
25 TABLET, FILM COATED ORAL 3 TIMES DAILY
Status: DISCONTINUED | OUTPATIENT
Start: 2019-08-27 | End: 2019-08-30 | Stop reason: HOSPADM

## 2019-08-26 RX ORDER — DILTIAZEM HYDROCHLORIDE 240 MG/1
240 CAPSULE, COATED, EXTENDED RELEASE ORAL DAILY
Status: CANCELLED | OUTPATIENT
Start: 2019-08-27

## 2019-08-26 RX ORDER — DULOXETIN HYDROCHLORIDE 60 MG/1
60 CAPSULE, DELAYED RELEASE ORAL DAILY
Status: DISCONTINUED | OUTPATIENT
Start: 2019-08-27 | End: 2019-08-30 | Stop reason: HOSPADM

## 2019-08-26 RX ORDER — WARFARIN SODIUM 2.5 MG/1
2.5 TABLET ORAL
Status: DISCONTINUED | OUTPATIENT
Start: 2019-08-27 | End: 2019-08-27

## 2019-08-26 RX ORDER — ACETAMINOPHEN 325 MG/1
650 TABLET ORAL EVERY 4 HOURS PRN
Status: DISCONTINUED | OUTPATIENT
Start: 2019-08-26 | End: 2019-08-27

## 2019-08-26 RX ORDER — ATORVASTATIN CALCIUM 40 MG/1
40 TABLET, FILM COATED ORAL DAILY
Status: DISCONTINUED | OUTPATIENT
Start: 2019-08-27 | End: 2019-08-30 | Stop reason: HOSPADM

## 2019-08-26 RX ORDER — FERROUS SULFATE 325(65) MG
325 TABLET ORAL
Status: DISCONTINUED | OUTPATIENT
Start: 2019-08-27 | End: 2019-08-30 | Stop reason: HOSPADM

## 2019-08-26 RX ORDER — FUROSEMIDE 10 MG/ML
20 INJECTION INTRAMUSCULAR; INTRAVENOUS ONCE
Status: COMPLETED | OUTPATIENT
Start: 2019-08-26 | End: 2019-08-26

## 2019-08-26 RX ORDER — OXYCODONE HYDROCHLORIDE AND ACETAMINOPHEN 5; 325 MG/1; MG/1
1 TABLET ORAL EVERY 4 HOURS PRN
Status: DISCONTINUED | OUTPATIENT
Start: 2019-08-26 | End: 2019-08-30 | Stop reason: HOSPADM

## 2019-08-26 RX ORDER — MECLIZINE HYDROCHLORIDE CHEWABLE TABLETS 25 MG/1
25 TABLET, CHEWABLE ORAL 3 TIMES DAILY PRN
Status: DISCONTINUED | OUTPATIENT
Start: 2019-08-26 | End: 2019-08-30 | Stop reason: HOSPADM

## 2019-08-26 RX ORDER — FENOFIBRATE 160 MG/1
160 TABLET ORAL DAILY
Status: DISCONTINUED | OUTPATIENT
Start: 2019-08-27 | End: 2019-08-30 | Stop reason: HOSPADM

## 2019-08-26 RX ORDER — WARFARIN SODIUM 2.5 MG/1
TABLET ORAL
COMMUNITY
Start: 2019-08-25 | End: 2019-09-12 | Stop reason: SDUPTHER

## 2019-08-26 RX ORDER — AMITRIPTYLINE HYDROCHLORIDE 10 MG/1
10 TABLET, FILM COATED ORAL NIGHTLY
Status: DISCONTINUED | OUTPATIENT
Start: 2019-08-27 | End: 2019-08-30 | Stop reason: HOSPADM

## 2019-08-26 RX ORDER — UBIDECARENONE 75 MG
100 CAPSULE ORAL DAILY
Status: DISCONTINUED | OUTPATIENT
Start: 2019-08-27 | End: 2019-08-27 | Stop reason: RX

## 2019-08-26 RX ORDER — PANTOPRAZOLE SODIUM 40 MG/1
40 TABLET, DELAYED RELEASE ORAL
Status: DISCONTINUED | OUTPATIENT
Start: 2019-08-27 | End: 2019-08-29

## 2019-08-26 RX ORDER — ALLOPURINOL 100 MG/1
100 TABLET ORAL DAILY
Status: DISCONTINUED | OUTPATIENT
Start: 2019-08-27 | End: 2019-08-30 | Stop reason: HOSPADM

## 2019-08-26 RX ORDER — ASPIRIN 81 MG/1
81 TABLET, CHEWABLE ORAL DAILY
Status: DISCONTINUED | OUTPATIENT
Start: 2019-08-27 | End: 2019-08-30 | Stop reason: HOSPADM

## 2019-08-26 RX ORDER — FUROSEMIDE 40 MG/1
20 TABLET ORAL DAILY
Status: CANCELLED | OUTPATIENT
Start: 2019-08-27

## 2019-08-26 RX ORDER — ISOSORBIDE MONONITRATE 60 MG/1
60 TABLET, EXTENDED RELEASE ORAL DAILY
Status: DISCONTINUED | OUTPATIENT
Start: 2019-08-27 | End: 2019-08-30 | Stop reason: HOSPADM

## 2019-08-26 RX ADMIN — CEFEPIME HYDROCHLORIDE 2 G: 2 INJECTION, POWDER, FOR SOLUTION INTRAVENOUS at 23:40

## 2019-08-26 RX ADMIN — FUROSEMIDE 20 MG: 10 INJECTION, SOLUTION INTRAMUSCULAR; INTRAVENOUS at 20:59

## 2019-08-26 RX ADMIN — VANCOMYCIN HYDROCHLORIDE 1500 MG: 5 INJECTION, POWDER, LYOPHILIZED, FOR SOLUTION INTRAVENOUS at 21:02

## 2019-08-26 RX ADMIN — DILTIAZEM HYDROCHLORIDE 5 MG/HR: 5 INJECTION INTRAVENOUS at 19:44

## 2019-08-26 RX ADMIN — DILTIAZEM HYDROCHLORIDE 10 MG: 5 INJECTION INTRAVENOUS at 19:05

## 2019-08-26 ASSESSMENT — ENCOUNTER SYMPTOMS
BACK PAIN: 0
VOMITING: 0
EYE DISCHARGE: 0
ABDOMINAL PAIN: 0
RHINORRHEA: 0
SHORTNESS OF BREATH: 1
NAUSEA: 0
EYE PAIN: 0
WHEEZING: 0
COUGH: 0
SORE THROAT: 0
DIARRHEA: 0

## 2019-08-26 ASSESSMENT — PAIN DESCRIPTION - PAIN TYPE: TYPE: ACUTE PAIN

## 2019-08-26 ASSESSMENT — PAIN SCALES - GENERAL: PAINLEVEL_OUTOF10: 5

## 2019-08-26 NOTE — ED NOTES
Pt resting in bed, respirations unlabored. Denies any needs or concerns at this time. Call light in reach.      Anil Sapp RN  08/26/19 0913

## 2019-08-26 NOTE — ED PROVIDER NOTES
ASPIRIN EC) 81 MG EC TABLET    Take 1 tablet by mouth daily    ATORVASTATIN (LIPITOR) 40 MG TABLET    take 1 tablet by mouth at bedtime    CALCIUM CARBONATE-VITAMIN D (CALCIUM-VITAMIN D) 500-200 MG-UNIT PER TABLET    Take 1 tablet by mouth 2 times daily (with meals)     CPAP MACHINE MISC    by Does not apply route Please change CPAP to auto pressure to 7-15 cm H20. DILTIAZEM (CARDIZEM CD) 240 MG EXTENDED RELEASE CAPSULE    Take 1 capsule by mouth daily    DULOXETINE (CYMBALTA) 60 MG EXTENDED RELEASE CAPSULE    take 1 capsule by mouth once daily    FENOFIBRATE (TRICOR) 145 MG TABLET    take 1 tablet by mouth once daily    FERROUS SULFATE 325 (65 FE) MG EC TABLET    Take 1 tablet by mouth 3 times daily (with meals)    FUROSEMIDE (LASIX) 40 MG TABLET    Take 0.5 tablets by mouth daily    GABAPENTIN (NEURONTIN) 100 MG CAPSULE    Take 100 mg by mouth 3 times daily. HYDRALAZINE (APRESOLINE) 25 MG TABLET    Take 1 tablet by mouth 3 times daily . hold if SBP less than 100 mm hg    INSULIN LISPRO (HUMALOG) 100 UNIT/ML INJECTION VIAL    Inject 0-18 Units into the skin 3 times daily (with meals)    INSULIN LISPRO (HUMALOG) 100 UNIT/ML INJECTION VIAL    Inject 0-9 Units into the skin nightly    INSULIN LISPRO (HUMALOG) 100 UNIT/ML INJECTION VIAL    Inject 5 Units into the skin 3 times daily (with meals)    INSULIN LISPRO PROTAMINE & LISPRO (HUMALOG MIX) (75-25) 100 UNIT PER ML SUSP INJECTION VIAL    Inject 32 Units into the skin 2 times daily (with meals)    INSULIN PEN NEEDLE (PEN NEEDLES) 31G X 5 MM MISC    1 each by Does not apply route 3 times daily    ISOSORBIDE MONONITRATE (IMDUR) 60 MG EXTENDED RELEASE TABLET    Take 1 tablet by mouth daily    MECLIZINE (ANTIVERT) 25 MG CHEW    Take 1 tablet by mouth 3 times daily as needed (vertigo)    METOPROLOL TARTRATE (LOPRESSOR) 25 MG TABLET    take 1/2 tablet by mouth twice a day    MISC.  DEVICES [facility]) MISC    1 Device by Does not apply route as needed (prn) Patient is in Afib RVR   Pulmonary/Chest: Effort normal. No stridor. No respiratory distress. Abdominal: Soft. She exhibits no distension. Musculoskeletal: Normal range of motion. She exhibits no edema. LLE is shortened and externally rotated   Neurological: She is alert and oriented to person, place, and time. She exhibits normal muscle tone. GCS eye subscore is 4. GCS verbal subscore is 5. GCS motor subscore is 6. Skin: Skin is warm and dry. She is not diaphoretic. No erythema. Psychiatric: She has a normal mood and affect. Her behavior is normal.   Nursing note and vitals reviewed. DIFFERENTIALDIAGNOSIS:   Differential including, but not limited to Afib RVR, Arrhythmia, ACS, COPD, CHF    DIAGNOSTIC RESULTS     EKG: All EKG's are interpreted by the Emergency Rylan Santos who either signs or Co-signs this chart in the absence of a cardiologist.    Bam Holloway. Rate: 131 bpm  NM interval: * ms  QRS duration: 120 ms  QTc: 443 ms  P-R-T axes: *, 20, -81  Afib with RVR. No STEMI  Compared to old EKG on 8-Jul-2019      RADIOLOGY: non-plain film images(s) such as CT, Ultrasound and MRI are read by the radiologist.    XR CHEST PORTABLE   Final Result      Right basilar infiltrate. Possible small infiltrate or atelectasis at the left base. **This report has been created using voice recognition software. It may contain minor errors which are inherent in voice recognition technology. **      Final report electronically signed by Dr. Vonnie Hatchet on 8/26/2019 7:37 PM          LABS:   Labs Reviewed   CBC WITH AUTO DIFFERENTIAL - Abnormal; Notable for the following components:       Result Value    RBC 3.32 (*)     Hemoglobin 10.3 (*)     Hematocrit 33.7 (*)     .5 (*)     MCHC 30.6 (*)     RDW-CV 15.7 (*)     RDW-SD 58.4 (*)     Lymphocytes Absolute 0.9 (*)     All other components within normal limits   BASIC METABOLIC PANEL - Abnormal; Notable for the following components:    Glucose 336

## 2019-08-26 NOTE — PROGRESS NOTES
Pt here for 1 mo fu [facility]  With sob Pt is wearing heart monitor    Pt continues with sob , dizziness at times, swelling in bilateral legs and feets,     Pt would like to go to [facility] coumadin clinic rather than [facility]     Pt denies chest pain, heart palpitations               \[facility] [place] [facility]\""[facility] PROFESSIONAL SERVICES  [facility] [place]   [address]   [address]   [address] [address]   Dept: [phone]   Dept Fax: [phone]   Loc: [phone]      Chief Complaint   Patient presents with    Follow-Up from Hospital    Coronary Artery Disease     Patient with a history of ischemic cardiomyopathy presents for follow-up. Patient has been wearing the LifeVest.  It has not discharged. She has intermittent shortness of breath and occasional lightheadedness. She has some edema and both legs. She would like to use [facility]. She denies any chest pain or palpitations.   Cardiologist:  Dr. Jerri Julio:   No fever, no chills, No fatigue or weight loss  Pulmonary:   Intermittent  shortness of breath  Cardiac:    Denies recent chest pain   GI:     No nausea or vomiting, no abdominal pain  Neuro:    No dizziness or light headedness  Musculoskeletal:  No recent active issues  Extremities:  ++edema, good peripheral pulses      Past Medical History:   Diagnosis Date    CAD (coronary artery disease)     CRI (chronic renal insufficiency)     Difficult intubation     excess skin in back of throat     [facility]di[facility]) [facility]     GERD (gastroesophageal reflux disease)     H/O gastric bypass     Hyperlipidemia     Hypertension     Hypothyroidism     Neuropathy     Obesity     Osteoarthritis     Paroxysmal atrial fibrillation (HCC)     Prolonged emergence from general anesthesia     Sleep apnea     uses cpap    Visit for screening mammogram        Allergies   Allergen Reactions    Mometasone      Added per ECF list       Current Outpatient Medications   Medication Sig mouth at bedtime 90 tablet 1    DULoxetine (CYMBALTA) 60 MG extended release capsule take 1 capsule by mouth once daily 90 capsule 3    fenofibrate (TRICOR) 145 MG tablet take 1 tablet by mouth once daily 90 tablet 3    CPAP Machine MISC by Does not apply route Please change CPAP to auto pressure to 7-15 cm H20. 1 each 0    Insulin Pen Needle (PEN NEEDLES) 31G X 5 MM MISC 1 each by Does not apply route 3 times daily 100 each 11    Calcium Carbonate-Vitamin D (CALCIUM-VITAMIN D) 500-200 MG-UNIT per tablet Take 1 tablet by mouth 2 times daily (with meals)        No current facility-administered medications for this visit.         Social History     Socioeconomic History    Marital status: Single     Spouse name: Not on file    Number of children: Not on file    Years of education: Not on file    Highest education level: Not on file   Occupational History    Occupation: retired   Social Needs    Financial resource strain: Not on file    Food insecurity:     Worry: Not on file     Inability: Not on file   [email or web address] needs:     Medical: Not on file     Non-medical: Not on file   Tobacco Use    Smoking status: Former Smoker     Packs/day: 1.50     Years: 20.00     Pack years: 30.00     Last attempt to quit: 2007     Years since quittin.5    Smokeless tobacco: Never Used   Substance and Sexual Activity    Alcohol use: No     Alcohol/week: 0.0 standard drinks    Drug use: No    Sexual activity: Never   Lifestyle    Physical activity:     Days per week: Not on file     Minutes per session: Not on file    Stress: Not on file   Relationships    Social connections:     Talks on phone: Not on file     Gets together: Not on file     Attends [employer] service: Not on file     Active member of club or organization: Not on file     Attends meetings of clubs or organizations: Not on file     Relationship status: Not on file    Intimate partner violence:     Fear of current or ex partner: Not on

## 2019-08-27 PROBLEM — D64.9 ANEMIA: Status: ACTIVE | Noted: 2019-08-27

## 2019-08-27 PROBLEM — I50.43 ACUTE ON CHRONIC COMBINED SYSTOLIC (CONGESTIVE) AND DIASTOLIC (CONGESTIVE) HEART FAILURE (HCC): Status: ACTIVE | Noted: 2019-08-27

## 2019-08-27 LAB
ANION GAP SERPL CALCULATED.3IONS-SCNC: 12 MEQ/L (ref 8–16)
BACTERIA: ABNORMAL
BILIRUBIN URINE: NEGATIVE
BLOOD, URINE: NEGATIVE
BUN BLDV-MCNC: 33 MG/DL (ref 7–22)
CALCIUM SERPL-MCNC: 9.5 MG/DL (ref 8.5–10.5)
CASTS: ABNORMAL /LPF
CASTS: ABNORMAL /LPF
CHARACTER, URINE: CLEAR
CHLORIDE BLD-SCNC: 97 MEQ/L (ref 98–111)
CO2: 26 MEQ/L (ref 23–33)
COLOR: YELLOW
CREAT SERPL-MCNC: 1.5 MG/DL (ref 0.4–1.2)
CRYSTALS: ABNORMAL
EKG ATRIAL RATE: 115 BPM
EKG ATRIAL RATE: 125 BPM
EKG Q-T INTERVAL: 300 MS
EKG Q-T INTERVAL: 358 MS
EKG QRS DURATION: 118 MS
EKG QRS DURATION: 120 MS
EKG QTC CALCULATION (BAZETT): 443 MS
EKG QTC CALCULATION (BAZETT): 503 MS
EKG R AXIS: -20 DEGREES
EKG R AXIS: 20 DEGREES
EKG T AXIS: -4 DEGREES
EKG T AXIS: -81 DEGREES
EKG VENTRICULAR RATE: 119 BPM
EKG VENTRICULAR RATE: 131 BPM
EPITHELIAL CELLS, UA: ABNORMAL /HPF
ERYTHROCYTE [DISTWIDTH] IN BLOOD BY AUTOMATED COUNT: 15.5 % (ref 11.5–14.5)
ERYTHROCYTE [DISTWIDTH] IN BLOOD BY AUTOMATED COUNT: 57 FL (ref 35–45)
FLU A ANTIGEN: NEGATIVE
FLU B ANTIGEN: NEGATIVE
GFR SERPL CREATININE-BSD FRML MDRD: 35 ML/MIN/1.73M2
GLUCOSE BLD-MCNC: 176 MG/DL (ref 70–108)
GLUCOSE BLD-MCNC: 301 MG/DL (ref 70–108)
GLUCOSE BLD-MCNC: 318 MG/DL (ref 70–108)
GLUCOSE BLD-MCNC: 332 MG/DL (ref 70–108)
GLUCOSE, URINE: 500 MG/DL
GROUP A STREP CULTURE, REFLEX: NEGATIVE
HCT VFR BLD CALC: 32.8 % (ref 37–47)
HEMOGLOBIN: 10.2 GM/DL (ref 12–16)
INR BLD: 1.56 (ref 0.85–1.13)
KETONES, URINE: NEGATIVE
LEUKOCYTE EST, POC: NEGATIVE
MCH RBC QN AUTO: 31.3 PG (ref 26–33)
MCHC RBC AUTO-ENTMCNC: 31.1 GM/DL (ref 32.2–35.5)
MCV RBC AUTO: 100.6 FL (ref 81–99)
MISCELLANEOUS LAB TEST RESULT: ABNORMAL
NITRITE, URINE: NEGATIVE
OSMOLALITY CALCULATION: 289.6 MOSMOL/KG (ref 275–300)
PH UA: 5 (ref 5–9)
PLATELET # BLD: 230 THOU/MM3 (ref 130–400)
PMV BLD AUTO: 10.4 FL (ref 9.4–12.4)
POTASSIUM SERPL-SCNC: 4.5 MEQ/L (ref 3.5–5.2)
PROTEIN UA: 100 MG/DL
RBC # BLD: 3.26 MILL/MM3 (ref 4.2–5.4)
RBC URINE: ABNORMAL /HPF
REFLEX THROAT C + S: NORMAL
RENAL EPITHELIAL, UA: ABNORMAL
SODIUM BLD-SCNC: 135 MEQ/L (ref 135–145)
SPECIFIC GRAVITY UA: 1.02 (ref 1–1.03)
TROPONIN T: 0.02 NG/ML
TROPONIN T: 0.02 NG/ML
UROBILINOGEN, URINE: 0.2 EU/DL (ref 0–1)
WBC # BLD: 7 THOU/MM3 (ref 4.8–10.8)
WBC UA: ABNORMAL /HPF
YEAST: ABNORMAL

## 2019-08-27 PROCEDURE — 2140000000 HC CCU INTERMEDIATE R&B

## 2019-08-27 PROCEDURE — 82948 REAGENT STRIP/BLOOD GLUCOSE: CPT

## 2019-08-27 PROCEDURE — 94761 N-INVAS EAR/PLS OXIMETRY MLT: CPT

## 2019-08-27 PROCEDURE — 99233 SBSQ HOSP IP/OBS HIGH 50: CPT | Performed by: PHYSICIAN ASSISTANT

## 2019-08-27 PROCEDURE — 87899 AGENT NOS ASSAY W/OPTIC: CPT

## 2019-08-27 PROCEDURE — 87449 NOS EACH ORGANISM AG IA: CPT

## 2019-08-27 PROCEDURE — 85610 PROTHROMBIN TIME: CPT

## 2019-08-27 PROCEDURE — 36415 COLL VENOUS BLD VENIPUNCTURE: CPT

## 2019-08-27 PROCEDURE — 87070 CULTURE OTHR SPECIMN AEROBIC: CPT

## 2019-08-27 PROCEDURE — 93010 ELECTROCARDIOGRAM REPORT: CPT | Performed by: NUCLEAR MEDICINE

## 2019-08-27 PROCEDURE — 2500000003 HC RX 250 WO HCPCS: Performed by: FAMILY MEDICINE

## 2019-08-27 PROCEDURE — 6370000000 HC RX 637 (ALT 250 FOR IP): Performed by: INTERNAL MEDICINE

## 2019-08-27 PROCEDURE — 81001 URINALYSIS AUTO W/SCOPE: CPT

## 2019-08-27 PROCEDURE — 87804 INFLUENZA ASSAY W/OPTIC: CPT

## 2019-08-27 PROCEDURE — 2709999900 HC NON-CHARGEABLE SUPPLY

## 2019-08-27 PROCEDURE — 99223 1ST HOSP IP/OBS HIGH 75: CPT | Performed by: INTERNAL MEDICINE

## 2019-08-27 PROCEDURE — 2580000003 HC RX 258: Performed by: FAMILY MEDICINE

## 2019-08-27 PROCEDURE — 94760 N-INVAS EAR/PLS OXIMETRY 1: CPT

## 2019-08-27 PROCEDURE — 84484 ASSAY OF TROPONIN QUANT: CPT

## 2019-08-27 PROCEDURE — 93005 ELECTROCARDIOGRAM TRACING: CPT | Performed by: INTERNAL MEDICINE

## 2019-08-27 PROCEDURE — 87880 STREP A ASSAY W/OPTIC: CPT

## 2019-08-27 PROCEDURE — 80048 BASIC METABOLIC PNL TOTAL CA: CPT

## 2019-08-27 PROCEDURE — 6360000002 HC RX W HCPCS: Performed by: INTERNAL MEDICINE

## 2019-08-27 PROCEDURE — 6370000000 HC RX 637 (ALT 250 FOR IP): Performed by: EMERGENCY MEDICINE

## 2019-08-27 PROCEDURE — 94640 AIRWAY INHALATION TREATMENT: CPT

## 2019-08-27 PROCEDURE — 6370000000 HC RX 637 (ALT 250 FOR IP): Performed by: PHYSICIAN ASSISTANT

## 2019-08-27 PROCEDURE — 2700000000 HC OXYGEN THERAPY PER DAY

## 2019-08-27 PROCEDURE — 2580000003 HC RX 258: Performed by: INTERNAL MEDICINE

## 2019-08-27 PROCEDURE — 85027 COMPLETE CBC AUTOMATED: CPT

## 2019-08-27 RX ORDER — DEXTROSE MONOHYDRATE 50 MG/ML
100 INJECTION, SOLUTION INTRAVENOUS PRN
Status: DISCONTINUED | OUTPATIENT
Start: 2019-08-27 | End: 2019-08-30 | Stop reason: HOSPADM

## 2019-08-27 RX ORDER — DIPHENHYDRAMINE HCL 25 MG
25 TABLET ORAL EVERY 6 HOURS PRN
Status: DISCONTINUED | OUTPATIENT
Start: 2019-08-27 | End: 2019-08-30 | Stop reason: HOSPADM

## 2019-08-27 RX ORDER — NICOTINE POLACRILEX 4 MG
15 LOZENGE BUCCAL PRN
Status: DISCONTINUED | OUTPATIENT
Start: 2019-08-27 | End: 2019-08-30 | Stop reason: HOSPADM

## 2019-08-27 RX ORDER — WARFARIN SODIUM 5 MG/1
5 TABLET ORAL ONCE
Status: COMPLETED | OUTPATIENT
Start: 2019-08-27 | End: 2019-08-27

## 2019-08-27 RX ORDER — SODIUM CHLORIDE 0.9 % (FLUSH) 0.9 %
10 SYRINGE (ML) INJECTION EVERY 12 HOURS SCHEDULED
Status: DISCONTINUED | OUTPATIENT
Start: 2019-08-27 | End: 2019-08-30 | Stop reason: HOSPADM

## 2019-08-27 RX ORDER — SODIUM CHLORIDE 0.9 % (FLUSH) 0.9 %
10 SYRINGE (ML) INJECTION PRN
Status: DISCONTINUED | OUTPATIENT
Start: 2019-08-27 | End: 2019-08-30 | Stop reason: HOSPADM

## 2019-08-27 RX ORDER — FUROSEMIDE 10 MG/ML
40 INJECTION INTRAMUSCULAR; INTRAVENOUS 2 TIMES DAILY
Status: DISCONTINUED | OUTPATIENT
Start: 2019-08-27 | End: 2019-08-27

## 2019-08-27 RX ORDER — IPRATROPIUM BROMIDE AND ALBUTEROL SULFATE 2.5; .5 MG/3ML; MG/3ML
1 SOLUTION RESPIRATORY (INHALATION) ONCE
Status: COMPLETED | OUTPATIENT
Start: 2019-08-27 | End: 2019-08-27

## 2019-08-27 RX ORDER — DEXTROSE MONOHYDRATE 25 G/50ML
12.5 INJECTION, SOLUTION INTRAVENOUS PRN
Status: DISCONTINUED | OUTPATIENT
Start: 2019-08-27 | End: 2019-08-30 | Stop reason: HOSPADM

## 2019-08-27 RX ORDER — ACETAMINOPHEN 325 MG/1
650 TABLET ORAL EVERY 4 HOURS PRN
Status: DISCONTINUED | OUTPATIENT
Start: 2019-08-27 | End: 2019-08-30 | Stop reason: HOSPADM

## 2019-08-27 RX ORDER — INSULIN GLARGINE 100 [IU]/ML
20 INJECTION, SOLUTION SUBCUTANEOUS 2 TIMES DAILY
Status: DISCONTINUED | OUTPATIENT
Start: 2019-08-27 | End: 2019-08-30 | Stop reason: HOSPADM

## 2019-08-27 RX ORDER — FUROSEMIDE 10 MG/ML
80 INJECTION INTRAMUSCULAR; INTRAVENOUS 2 TIMES DAILY
Status: DISCONTINUED | OUTPATIENT
Start: 2019-08-27 | End: 2019-08-28

## 2019-08-27 RX ORDER — ONDANSETRON 2 MG/ML
4 INJECTION INTRAMUSCULAR; INTRAVENOUS EVERY 6 HOURS PRN
Status: DISCONTINUED | OUTPATIENT
Start: 2019-08-27 | End: 2019-08-30 | Stop reason: HOSPADM

## 2019-08-27 RX ADMIN — METOPROLOL TARTRATE 12.5 MG: 25 TABLET ORAL at 20:42

## 2019-08-27 RX ADMIN — FENOFIBRATE 160 MG: 160 TABLET ORAL at 10:44

## 2019-08-27 RX ADMIN — DULOXETINE HYDROCHLORIDE 60 MG: 60 CAPSULE, DELAYED RELEASE ORAL at 10:44

## 2019-08-27 RX ADMIN — ATORVASTATIN CALCIUM 40 MG: 40 TABLET, FILM COATED ORAL at 10:43

## 2019-08-27 RX ADMIN — OXYCODONE HYDROCHLORIDE AND ACETAMINOPHEN 1 TABLET: 5; 325 TABLET ORAL at 10:44

## 2019-08-27 RX ADMIN — DILTIAZEM HYDROCHLORIDE 15 MG/HR: 5 INJECTION INTRAVENOUS at 14:59

## 2019-08-27 RX ADMIN — Medication 10 ML: at 10:48

## 2019-08-27 RX ADMIN — CALCIUM CARBONATE-VITAMIN D TAB 500 MG-200 UNIT 1 TABLET: 500-200 TAB at 16:47

## 2019-08-27 RX ADMIN — AMITRIPTYLINE HYDROCHLORIDE 10 MG: 10 TABLET, FILM COATED ORAL at 01:54

## 2019-08-27 RX ADMIN — METOPROLOL TARTRATE 12.5 MG: 25 TABLET ORAL at 09:05

## 2019-08-27 RX ADMIN — GABAPENTIN 100 MG: 100 CAPSULE ORAL at 20:43

## 2019-08-27 RX ADMIN — WARFARIN SODIUM 5 MG: 5 TABLET ORAL at 18:22

## 2019-08-27 RX ADMIN — ISOSORBIDE MONONITRATE 60 MG: 60 TABLET ORAL at 10:44

## 2019-08-27 RX ADMIN — PANTOPRAZOLE SODIUM 40 MG: 40 TABLET, DELAYED RELEASE ORAL at 10:45

## 2019-08-27 RX ADMIN — HYDRALAZINE HYDROCHLORIDE 25 MG: 25 TABLET, FILM COATED ORAL at 13:44

## 2019-08-27 RX ADMIN — AMITRIPTYLINE HYDROCHLORIDE 10 MG: 10 TABLET, FILM COATED ORAL at 20:44

## 2019-08-27 RX ADMIN — DIPHENHYDRAMINE HCL 25 MG: 25 TABLET ORAL at 19:46

## 2019-08-27 RX ADMIN — Medication 10 ML: at 20:50

## 2019-08-27 RX ADMIN — GABAPENTIN 100 MG: 100 CAPSULE ORAL at 13:44

## 2019-08-27 RX ADMIN — CEFTRIAXONE SODIUM 1 G: 1 INJECTION, POWDER, FOR SOLUTION INTRAMUSCULAR; INTRAVENOUS at 10:45

## 2019-08-27 RX ADMIN — ALLOPURINOL 100 MG: 100 TABLET ORAL at 10:44

## 2019-08-27 RX ADMIN — GABAPENTIN 100 MG: 100 CAPSULE ORAL at 10:43

## 2019-08-27 RX ADMIN — METOPROLOL TARTRATE 12.5 MG: 25 TABLET ORAL at 01:54

## 2019-08-27 RX ADMIN — FERROUS SULFATE TAB 325 MG (65 MG ELEMENTAL FE) 325 MG: 325 (65 FE) TAB at 16:47

## 2019-08-27 RX ADMIN — FUROSEMIDE 80 MG: 10 INJECTION, SOLUTION INTRAMUSCULAR; INTRAVENOUS at 20:50

## 2019-08-27 RX ADMIN — HYDRALAZINE HYDROCHLORIDE 25 MG: 25 TABLET, FILM COATED ORAL at 20:43

## 2019-08-27 RX ADMIN — IPRATROPIUM BROMIDE AND ALBUTEROL SULFATE 1 AMPULE: .5; 3 SOLUTION RESPIRATORY (INHALATION) at 08:19

## 2019-08-27 RX ADMIN — ASPIRIN 81 MG 81 MG: 81 TABLET ORAL at 10:45

## 2019-08-27 RX ADMIN — OXYCODONE HYDROCHLORIDE AND ACETAMINOPHEN 1 TABLET: 5; 325 TABLET ORAL at 16:16

## 2019-08-27 RX ADMIN — HYDRALAZINE HYDROCHLORIDE 25 MG: 25 TABLET, FILM COATED ORAL at 09:05

## 2019-08-27 RX ADMIN — INSULIN GLARGINE 20 UNITS: 100 INJECTION, SOLUTION SUBCUTANEOUS at 21:22

## 2019-08-27 RX ADMIN — VANCOMYCIN HYDROCHLORIDE 1250 MG: 5 INJECTION, POWDER, LYOPHILIZED, FOR SOLUTION INTRAVENOUS at 11:15

## 2019-08-27 RX ADMIN — INSULIN LISPRO 32 UNITS: 100 INJECTION, SUSPENSION SUBCUTANEOUS at 10:48

## 2019-08-27 RX ADMIN — FERROUS SULFATE TAB 325 MG (65 MG ELEMENTAL FE) 325 MG: 325 (65 FE) TAB at 10:45

## 2019-08-27 RX ADMIN — INSULIN LISPRO 32 UNITS: 100 INJECTION, SUSPENSION SUBCUTANEOUS at 16:47

## 2019-08-27 RX ADMIN — CALCIUM CARBONATE-VITAMIN D TAB 500 MG-200 UNIT 1 TABLET: 500-200 TAB at 10:44

## 2019-08-27 RX ADMIN — FERROUS SULFATE TAB 325 MG (65 MG ELEMENTAL FE) 325 MG: 325 (65 FE) TAB at 11:06

## 2019-08-27 ASSESSMENT — PAIN DESCRIPTION - ORIENTATION
ORIENTATION: LEFT

## 2019-08-27 ASSESSMENT — PAIN DESCRIPTION - ONSET
ONSET: ON-GOING

## 2019-08-27 ASSESSMENT — PAIN DESCRIPTION - LOCATION
LOCATION: KNEE
LOCATION: LEG;KNEE
LOCATION: LEG;KNEE

## 2019-08-27 ASSESSMENT — PAIN DESCRIPTION - DESCRIPTORS
DESCRIPTORS: NUMBNESS;THROBBING
DESCRIPTORS: THROBBING
DESCRIPTORS: THROBBING

## 2019-08-27 ASSESSMENT — PAIN DESCRIPTION - PAIN TYPE
TYPE: CHRONIC PAIN

## 2019-08-27 ASSESSMENT — PAIN SCALES - GENERAL
PAINLEVEL_OUTOF10: 6
PAINLEVEL_OUTOF10: 5
PAINLEVEL_OUTOF10: 5
PAINLEVEL_OUTOF10: 3
PAINLEVEL_OUTOF10: 2

## 2019-08-27 ASSESSMENT — PAIN DESCRIPTION - PROGRESSION
CLINICAL_PROGRESSION: NOT CHANGED
CLINICAL_PROGRESSION: GRADUALLY IMPROVING
CLINICAL_PROGRESSION: GRADUALLY IMPROVING

## 2019-08-27 ASSESSMENT — PAIN DESCRIPTION - FREQUENCY
FREQUENCY: INTERMITTENT

## 2019-08-27 ASSESSMENT — PAIN - FUNCTIONAL ASSESSMENT
PAIN_FUNCTIONAL_ASSESSMENT: PREVENTS OR INTERFERES SOME ACTIVE ACTIVITIES AND ADLS

## 2019-08-27 NOTE — PROGRESS NOTES
Clinical Pharmacy Note    Kenneth Ayers is a 58 y.o. female for whom pharmacy has been asked to manage warfarin therapy. Reason for Admission: heart failure    Consulting Physician: Kaur Purdy  Warfarin dose prior to admission: 2.5mg qday  Warfarin indication: a fib  Target INR range: 2-3   Outpatient warfarin provider: [facility] coumadin clinic    Past Medical History:   Diagnosis Date    CAD (coronary artery disease)     CRI (chronic renal insufficiency)     Difficult intubation     excess skin in back of throat     DM (diabetes mellitus) ([facility]     GERD (gastroesophageal reflux disease)     H/O gastric bypass     Hyperlipidemia     Hypertension     Hypothyroidism     Neuropathy     Obesity     Osteoarthritis     Paroxysmal atrial fibrillation ([facility]     Prolonged emergence from general anesthesia     Sleep apnea     uses cpap    Visit for screening mammogram                 Recent Labs     08/27/19  0616   INR 1.56*     Recent Labs     08/26/19  1845 08/27/19  0616   HGB 10.3* 10.2*   HCT 33.7* 32.8*    230       Current warfarin drug-drug interactions: allopurinol (), asa (hm), duloxetine (hm), fenofibrate (hm)      Date INR Warfarin Dose   8/27/2019 1.56 5mg                                   Daily PT/INR until stable within therapeutic range. Thank you for the consult.

## 2019-08-27 NOTE — H&P
Facility       [] Other-    ASSESSMENT:    Active Hospital Problems    Diagnosis Date Noted    Acute on chronic combined systolic (congestive) and diastolic (congestive) heart failure (HCC) [I50.43] 08/27/2019     Priority: High    Pneumonia [J18.9] 09/19/2015     Priority: High    Hypertension [I10]      Priority: Medium    Obstructive sleep apnea on CPAP [G47.33, Z99.89] 07/09/2015     Priority: Low    Hyperlipidemia [E78.5]      Priority: Low    Anemia [D64.9] 08/27/2019    Shortness of breath [R06.02] 08/26/2019    Coronary artery disease involving native heart without angina pectoris [I25.10] 11/07/2017    Morbid obesity with BMI of 50.0-59.9, adult ([facility] [E66.01, Z68.43] 09/19/2015    CKD (chronic kidney disease) [N18.9] 11/07/2014    Diabetes mellitus type 2, insulin dependent ([facility] [E11.9, Z79.4] 11/07/2014    Arthritis [M19.90] 04/25/2012    DM (diabetes mellitus) ([facility] [E11.9]     Neuropathy [G62.9]     Osteoarthritis [M19.90]        PLAN:    Admit to Telemetry Unit  Diet carb control  Analgesics PRN  Antiemetics PRN  DVT prophylaxis  PT/OT encouraged, if warranted  IS  Troponin trend  EKG in AM  Cardiology consult, apprec chart recs  BS and SSI  Empiric antibiotics   Cultures pending  Will need to re-evaluate home medications in morning  Continue to monitor closely         Thank you ARNOL Miramontes CNP for the opportunity to be involved in this patient's care.     Electronically signed by Adele Garcia DO on 8/27/2019 at 4:14 AM

## 2019-08-27 NOTE — PROGRESS NOTES
Educated patient regarding heart failure management by explaining the importance of obtaining daily weights at the same time every day with approximately the same amount of clothing, how to recognize symptoms, low sodium diet and taking prescribed medications as ordered. Patient was given our heart failure booklet, a weight chart and a business card with the appointment time and date. Patient was receptive to the education given and verbalized understanding.

## 2019-08-27 NOTE — PROGRESS NOTES
Assessment and Plan:        1. Atrial Fibrillation with RVR: Cardizem drip started. Cardiology consulted. Pt was just recently in the hospital for RVR. Thoughts on maybe this is due to noncompliance of PO meds. 2. Acute Respiratory Failure, hypoxia: Pt's baseline is RA. Pt is currently on 3L NC. Probably due to HF and RVR. Will continue to monitor. 3. HFrEF, acute on chronic: BNP ~12K. IV Lasix 40mg BID. Strict I&Os. Fluid Restriction. Heart Failure Coordinator consulted. Dietician consulted. 4. Essential HTN: Continue home medications. 5. CKD (Stage 3) : Creatinine 1.5, which looks to be her baseline. Will expect an increase in creatinine due to diuretic usage. 6. DM, Type 2, uncontrolled: BS has been 300s. Looking at past notes in early July, pt was on 30 units, 47 units NPH AM & PM. This was not on home med list. Will start Lantus 20 units BID in addition to meal time insulin. Will continue to monitor. 7. Subclinical Hyperthyroidism: Pt has multinodular goiter, that is benign. 8. Abnormal CXR: Procal is 0.12, afebrile, WBC is WNL. ABX were DC'ed. Likely pulmonary edema since BNP is 12K and patient wears a Life Vest. Will repeat CXR tomorrow AM.     CC:  SOB   HPI:   58 y.o. female with a PMH of DARWIN, PAF, OA, Hypothyroidism, HTN, HLD, GERD, DM, CKD, CAD, HFrEF who presented to [facility] with complaints of shortness of breath. Patient states that she has a LifeVest in place in noted that she heard a beep. She reports that her heart rate and blood pressure were elevated. Patient notified EMS when she felt her box vibrate. Patient denies chest pain. Patient endorses increased swelling. She believes that she is not been taking her Lasix for the last couple days. Patient was just released from skilled nursing facility yesterday. She does not have a home nurse at home at the present time. She states that she does not have any idea of the medication she is on.   She does not have a appearing white female, obese  HEENT:  normocephalic and atraumatic. No scleral icterus. PERR. Neck: supple. No JVD. No thyromegaly. Lungs: crackles in the bases. Cardiac: Irregular rate & rhythm without murmur. Abdomen: soft. Nontender. Bowel sounds positive. Extremities:  No clubbing, cyanosis x 4.  2+ pitting edema   Vasculature: capillary refill < 3 seconds. Palpable LE pulses bilaterally. Skin:  warm and dry. Psych:  Alert and oriented x3. Affect appropriate  Lymph:  No supraclavicular adenopathy. Neurologic:  No focal deficit. No seizures. Data: (All radiographs, tracings, PFTs, and imaging are personally viewed and interpreted unless otherwise noted).  Sodium 135. Potassium 4.5. CO2 26. BUN 33. Cr 1.5. WBC 7.0. Hgb 10.2. Plts 230.  CXR (8/26) Pulmonary edema, fluid overload.  NICKIE Report (7/3): EF 25-30%.        Electronically signed by URSZULA Acosta on 8/27/2019 at 2:14 PM

## 2019-08-28 LAB
ANION GAP SERPL CALCULATED.3IONS-SCNC: 12 MEQ/L (ref 8–16)
BUN BLDV-MCNC: 39 MG/DL (ref 7–22)
CALCIUM SERPL-MCNC: 9.1 MG/DL (ref 8.5–10.5)
CHLORIDE BLD-SCNC: 95 MEQ/L (ref 98–111)
CO2: 26 MEQ/L (ref 23–33)
CREAT SERPL-MCNC: 1.8 MG/DL (ref 0.4–1.2)
ERYTHROCYTE [DISTWIDTH] IN BLOOD BY AUTOMATED COUNT: 15.7 % (ref 11.5–14.5)
ERYTHROCYTE [DISTWIDTH] IN BLOOD BY AUTOMATED COUNT: 57.4 FL (ref 35–45)
GFR SERPL CREATININE-BSD FRML MDRD: 28 ML/MIN/1.73M2
GLUCOSE BLD-MCNC: 110 MG/DL (ref 70–108)
GLUCOSE BLD-MCNC: 144 MG/DL (ref 70–108)
GLUCOSE BLD-MCNC: 153 MG/DL (ref 70–108)
GLUCOSE BLD-MCNC: 213 MG/DL (ref 70–108)
GLUCOSE BLD-MCNC: 237 MG/DL (ref 70–108)
HCT VFR BLD CALC: 30.7 % (ref 37–47)
HEMOGLOBIN: 9.3 GM/DL (ref 12–16)
INR BLD: 1.56 (ref 0.85–1.13)
MCH RBC QN AUTO: 30.9 PG (ref 26–33)
MCHC RBC AUTO-ENTMCNC: 30.3 GM/DL (ref 32.2–35.5)
MCV RBC AUTO: 102 FL (ref 81–99)
PLATELET # BLD: 199 THOU/MM3 (ref 130–400)
PMV BLD AUTO: 10.3 FL (ref 9.4–12.4)
POTASSIUM SERPL-SCNC: 3.8 MEQ/L (ref 3.5–5.2)
PRO-BNP: ABNORMAL PG/ML (ref 0–900)
RBC # BLD: 3.01 MILL/MM3 (ref 4.2–5.4)
SODIUM BLD-SCNC: 133 MEQ/L (ref 135–145)
WBC # BLD: 5.5 THOU/MM3 (ref 4.8–10.8)

## 2019-08-28 PROCEDURE — 6360000002 HC RX W HCPCS: Performed by: INTERNAL MEDICINE

## 2019-08-28 PROCEDURE — 6370000000 HC RX 637 (ALT 250 FOR IP): Performed by: PHYSICIAN ASSISTANT

## 2019-08-28 PROCEDURE — 2580000003 HC RX 258: Performed by: NURSE PRACTITIONER

## 2019-08-28 PROCEDURE — 6370000000 HC RX 637 (ALT 250 FOR IP): Performed by: INTERNAL MEDICINE

## 2019-08-28 PROCEDURE — 2580000003 HC RX 258: Performed by: INTERNAL MEDICINE

## 2019-08-28 PROCEDURE — 85027 COMPLETE CBC AUTOMATED: CPT

## 2019-08-28 PROCEDURE — 36415 COLL VENOUS BLD VENIPUNCTURE: CPT

## 2019-08-28 PROCEDURE — 6370000000 HC RX 637 (ALT 250 FOR IP): Performed by: NURSE PRACTITIONER

## 2019-08-28 PROCEDURE — 82948 REAGENT STRIP/BLOOD GLUCOSE: CPT

## 2019-08-28 PROCEDURE — 2709999900 HC NON-CHARGEABLE SUPPLY

## 2019-08-28 PROCEDURE — 6370000000 HC RX 637 (ALT 250 FOR IP)

## 2019-08-28 PROCEDURE — 2500000003 HC RX 250 WO HCPCS: Performed by: FAMILY MEDICINE

## 2019-08-28 PROCEDURE — 99232 SBSQ HOSP IP/OBS MODERATE 35: CPT | Performed by: NURSE PRACTITIONER

## 2019-08-28 PROCEDURE — 99221 1ST HOSP IP/OBS SF/LOW 40: CPT | Performed by: NURSE PRACTITIONER

## 2019-08-28 PROCEDURE — 80048 BASIC METABOLIC PNL TOTAL CA: CPT

## 2019-08-28 PROCEDURE — 6360000002 HC RX W HCPCS: Performed by: NURSE PRACTITIONER

## 2019-08-28 PROCEDURE — 2140000000 HC CCU INTERMEDIATE R&B

## 2019-08-28 PROCEDURE — 2580000003 HC RX 258: Performed by: FAMILY MEDICINE

## 2019-08-28 PROCEDURE — 83880 ASSAY OF NATRIURETIC PEPTIDE: CPT

## 2019-08-28 PROCEDURE — 85610 PROTHROMBIN TIME: CPT

## 2019-08-28 PROCEDURE — 2500000003 HC RX 250 WO HCPCS: Performed by: NURSE PRACTITIONER

## 2019-08-28 RX ORDER — DILTIAZEM HYDROCHLORIDE 240 MG/1
240 CAPSULE, COATED, EXTENDED RELEASE ORAL DAILY
Status: DISCONTINUED | OUTPATIENT
Start: 2019-08-28 | End: 2019-08-30 | Stop reason: HOSPADM

## 2019-08-28 RX ORDER — WARFARIN SODIUM 4 MG/1
4 TABLET ORAL ONCE
Status: COMPLETED | OUTPATIENT
Start: 2019-08-28 | End: 2019-08-28

## 2019-08-28 RX ORDER — FUROSEMIDE 10 MG/ML
40 INJECTION INTRAMUSCULAR; INTRAVENOUS 2 TIMES DAILY
Status: DISCONTINUED | OUTPATIENT
Start: 2019-08-28 | End: 2019-08-29

## 2019-08-28 RX ADMIN — HYDRALAZINE HYDROCHLORIDE 25 MG: 25 TABLET, FILM COATED ORAL at 20:40

## 2019-08-28 RX ADMIN — ATORVASTATIN CALCIUM 40 MG: 40 TABLET, FILM COATED ORAL at 08:18

## 2019-08-28 RX ADMIN — ISOSORBIDE MONONITRATE 60 MG: 60 TABLET ORAL at 08:18

## 2019-08-28 RX ADMIN — PANTOPRAZOLE SODIUM 40 MG: 40 TABLET, DELAYED RELEASE ORAL at 04:49

## 2019-08-28 RX ADMIN — CALCIUM CARBONATE-VITAMIN D TAB 500 MG-200 UNIT 1 TABLET: 500-200 TAB at 17:01

## 2019-08-28 RX ADMIN — FUROSEMIDE 40 MG: 10 INJECTION, SOLUTION INTRAMUSCULAR; INTRAVENOUS at 17:04

## 2019-08-28 RX ADMIN — METOPROLOL TARTRATE 12.5 MG: 25 TABLET ORAL at 08:18

## 2019-08-28 RX ADMIN — OXYCODONE HYDROCHLORIDE AND ACETAMINOPHEN 1 TABLET: 5; 325 TABLET ORAL at 19:33

## 2019-08-28 RX ADMIN — INSULIN GLARGINE 20 UNITS: 100 INJECTION, SOLUTION SUBCUTANEOUS at 20:43

## 2019-08-28 RX ADMIN — INSULIN GLARGINE 20 UNITS: 100 INJECTION, SOLUTION SUBCUTANEOUS at 08:09

## 2019-08-28 RX ADMIN — WARFARIN SODIUM 4 MG: 4 TABLET ORAL at 17:46

## 2019-08-28 RX ADMIN — DILTIAZEM HYDROCHLORIDE 10 MG/HR: 5 INJECTION INTRAVENOUS at 02:15

## 2019-08-28 RX ADMIN — HYDRALAZINE HYDROCHLORIDE 25 MG: 25 TABLET, FILM COATED ORAL at 13:01

## 2019-08-28 RX ADMIN — FUROSEMIDE 80 MG: 10 INJECTION, SOLUTION INTRAMUSCULAR; INTRAVENOUS at 08:04

## 2019-08-28 RX ADMIN — GABAPENTIN 100 MG: 100 CAPSULE ORAL at 08:18

## 2019-08-28 RX ADMIN — DILTIAZEM HYDROCHLORIDE 10 MG/HR: 5 INJECTION INTRAVENOUS at 14:12

## 2019-08-28 RX ADMIN — INSULIN LISPRO 32 UNITS: 100 INJECTION, SUSPENSION SUBCUTANEOUS at 17:02

## 2019-08-28 RX ADMIN — AMITRIPTYLINE HYDROCHLORIDE 10 MG: 10 TABLET, FILM COATED ORAL at 19:37

## 2019-08-28 RX ADMIN — CALCIUM CARBONATE-VITAMIN D TAB 500 MG-200 UNIT 1 TABLET: 500-200 TAB at 08:18

## 2019-08-28 RX ADMIN — ASPIRIN 81 MG 81 MG: 81 TABLET ORAL at 08:04

## 2019-08-28 RX ADMIN — Medication 10 ML: at 08:20

## 2019-08-28 RX ADMIN — FENOFIBRATE 160 MG: 160 TABLET ORAL at 08:18

## 2019-08-28 RX ADMIN — GABAPENTIN 100 MG: 100 CAPSULE ORAL at 13:01

## 2019-08-28 RX ADMIN — Medication 10 ML: at 19:38

## 2019-08-28 RX ADMIN — FERROUS SULFATE TAB 325 MG (65 MG ELEMENTAL FE) 325 MG: 325 (65 FE) TAB at 12:35

## 2019-08-28 RX ADMIN — HYDRALAZINE HYDROCHLORIDE 25 MG: 25 TABLET, FILM COATED ORAL at 08:18

## 2019-08-28 RX ADMIN — DULOXETINE HYDROCHLORIDE 60 MG: 60 CAPSULE, DELAYED RELEASE ORAL at 08:18

## 2019-08-28 RX ADMIN — METOPROLOL TARTRATE 12.5 MG: 25 TABLET ORAL at 12:00

## 2019-08-28 RX ADMIN — POTASSIUM BICARBONATE 40 MEQ: 782 TABLET, EFFERVESCENT ORAL at 17:04

## 2019-08-28 RX ADMIN — DILTIAZEM HYDROCHLORIDE 240 MG: 240 CAPSULE, EXTENDED RELEASE ORAL at 13:03

## 2019-08-28 RX ADMIN — OXYCODONE HYDROCHLORIDE AND ACETAMINOPHEN 1 TABLET: 5; 325 TABLET ORAL at 08:21

## 2019-08-28 RX ADMIN — DIPHENHYDRAMINE HCL 25 MG: 25 TABLET ORAL at 08:04

## 2019-08-28 RX ADMIN — DIPHENHYDRAMINE HCL 25 MG: 25 TABLET ORAL at 15:54

## 2019-08-28 RX ADMIN — ALLOPURINOL 100 MG: 100 TABLET ORAL at 08:19

## 2019-08-28 RX ADMIN — FERROUS SULFATE TAB 325 MG (65 MG ELEMENTAL FE) 325 MG: 325 (65 FE) TAB at 17:02

## 2019-08-28 RX ADMIN — FERROUS SULFATE TAB 325 MG (65 MG ELEMENTAL FE) 325 MG: 325 (65 FE) TAB at 08:18

## 2019-08-28 RX ADMIN — INSULIN LISPRO 32 UNITS: 100 INJECTION, SUSPENSION SUBCUTANEOUS at 08:07

## 2019-08-28 RX ADMIN — GABAPENTIN 100 MG: 100 CAPSULE ORAL at 19:36

## 2019-08-28 ASSESSMENT — PAIN DESCRIPTION - PROGRESSION
CLINICAL_PROGRESSION: NOT CHANGED
CLINICAL_PROGRESSION: NOT CHANGED

## 2019-08-28 ASSESSMENT — PAIN DESCRIPTION - ORIENTATION
ORIENTATION: LEFT

## 2019-08-28 ASSESSMENT — PAIN - FUNCTIONAL ASSESSMENT
PAIN_FUNCTIONAL_ASSESSMENT: PREVENTS OR INTERFERES SOME ACTIVE ACTIVITIES AND ADLS
PAIN_FUNCTIONAL_ASSESSMENT: PREVENTS OR INTERFERES SOME ACTIVE ACTIVITIES AND ADLS

## 2019-08-28 ASSESSMENT — PAIN DESCRIPTION - FREQUENCY
FREQUENCY: INTERMITTENT
FREQUENCY: INTERMITTENT

## 2019-08-28 ASSESSMENT — PAIN DESCRIPTION - ONSET
ONSET: ON-GOING
ONSET: ON-GOING

## 2019-08-28 ASSESSMENT — PAIN DESCRIPTION - LOCATION
LOCATION: LEG;KNEE
LOCATION: LEG;KNEE
LOCATION: KNEE;LEG

## 2019-08-28 ASSESSMENT — PAIN SCALES - GENERAL
PAINLEVEL_OUTOF10: 4
PAINLEVEL_OUTOF10: 4
PAINLEVEL_OUTOF10: 3
PAINLEVEL_OUTOF10: 4
PAINLEVEL_OUTOF10: 4
PAINLEVEL_OUTOF10: 2
PAINLEVEL_OUTOF10: 4

## 2019-08-28 ASSESSMENT — PAIN DESCRIPTION - DESCRIPTORS
DESCRIPTORS: ACHING
DESCRIPTORS: ACHING

## 2019-08-28 ASSESSMENT — PAIN DESCRIPTION - PAIN TYPE
TYPE: CHRONIC PAIN

## 2019-08-28 NOTE — PROGRESS NOTES
Nutrition Education    Type and Reason for Visit: Initial, Consult, Patient Education    Nutrition Assessment:   Dx: CHF. Sister does the cooking. Hx: Sleeve Gastrectomy (9/15/15). Per diet hx., tends to consume diet mountain dew, doesn't appear to follow carb controlled diet at home. Unable to obtain diet hx due to pt. kept falling asleep. Labs: (6/5) HDL 20, Triglycerides 183. · Written educational materials provided on heart healthy carb controlled heart failure diet  · Contact name and number provided. · Refer to Patient Education activity for more details.     Electronically signed by Katrin Morris RD, LD on 8/28/19 at 11:26 AM    Contact Number: [phone]

## 2019-08-28 NOTE — PLAN OF CARE
Problem: Pain:  Goal: Pain level will decrease  Description  Pain level will decrease  Outcome: Ongoing  Note:   Ongoing assessment & interventions provided throughout shift. Reminded patient to report any pain, pressure, or shortness of breath to the nurse. Pain medications provided per physician's orders. Patient complains of chronic pain in left leg/knee. Pain goal is 2/10. Goal: Control of acute pain  Description  Control of acute pain  Outcome: Ongoing  Goal: Control of chronic pain  Description  Control of chronic pain  Outcome: Ongoing  Goal: Patient's pain/discomfort is manageable  Description  Patient's pain/discomfort is manageable  Outcome: Ongoing     Problem: DISCHARGE BARRIERS  Goal: Patient's continuum of care needs are met  8/27/2019 2312 by Aleksandr Calderon RN  Outcome: Ongoing  Note:   Patient is from home with sister. 8/27/2019 1505 by JORI House  Outcome: Ongoing     Problem: Safety:  Goal: Free from accidental physical injury  Description  Free from accidental physical injury  Outcome: Ongoing  Note:   Assessment & interventions provided throughout shift. Bed locked & in low position, call light in reach, side-rails up x2, non-slip socks on when ambulating, reminded patient to use call light to call for assistance. Bed alarm on. Goal: Free from intentional harm  Description  Free from intentional harm  Outcome: Ongoing     Problem: Daily Care:  Goal: Daily care needs are met  Description  Daily care needs are met  Outcome: Ongoing  Note:   Patient able to complete ADLs. Assistance provided as needed. Problem: Skin Integrity:  Goal: Skin integrity will stabilize  Description  Skin integrity will stabilize  Outcome: Ongoing  Note:   Ongoing assessment & interventions provided throughout shift. Skin assessments provided. Encouraging/assisting patient to turn as needed. No new skin issues noted this shift.       Problem: Discharge Planning:  Goal: Patients continuum of

## 2019-08-28 NOTE — PROGRESS NOTES
PM   ----------------------------------------------------------------      Findings      Mitral Valve   The mitral valve structure was normal with normal leaflet separation.   DOPPLER: The transmitral velocity was within the normal range with no   evidence for mitral stenosis. There was trace mitral regurgitation.      Aortic Valve   The aortic valve was trileaflet with normal thickness and cuspal   separation. There was no echocardiographic evidence of a vegetation.   DOPPLER: Transaortic velocity was within the normal range with no evidence   of aortic stenosis or regurgitaiton.      Tricuspid Valve   The tricuspid valve structure was normal with normal leaflet separation.   There was no echocardiographic evidence of a vegetation. DOPPLER: There   was mild tricuspid regurgitation.      Pulmonic Valve   The pulmonic valve leaflets exhibited normal thickness, no calcification,   and normal cuspal separation. There was no echocardiographic evidence of   vegetation. DOPPLER: The transpulmonic velocity was within the normal   range with no evidence for regurgitation.      Left Atrium   Left atrial size was severely dilated with no thrombus identified.   APPENDAGE: The left atrial appendage size was normal with no thrombus   identified. DOPPLER: The function was abnormal (reduced emptying   velocity).     Left Ventricle   Normal left ventricular size and severely reduced systolic function.   There was severe regional wall motion abnormality.   Wall thickness was within normal limits.   Ejection fraction was estimated at 25-30%.     Right Atrium   Right atrial size was moderately dilated with no thrombus identified.  ATRIAL SEPTUM: No defect or patent foramen ovale was identified.  There was   no right-to-left shunt, with provocative maneuvers to increase right   atrial pressure.      Right Ventricle   The right ventricular size was normal with normal systolic function and   wall thickness.      Pericardial the findings that were in prior cath in 2017.     RECOMMENDATIONS:  1. Aggressive risk factor modification. 2.  Lipid-lowering therapy. 3.  Optimized medical management. 4.  Follow up in clinic in one to two weeks to evaluate symptoms  further.           Crow Mariscal MD  D: 07/17/2019    Lab Data:    Cardiac Enzymes:  No results for input(s): CKTOTAL, CKMB, CKMBINDEX, TROPONINI in the last 72 hours.     CBC:   Lab Results   Component Value Date    WBC 5.5 08/28/2019    RBC 3.01 08/28/2019    HGB 9.3 08/28/2019    HCT 30.7 08/28/2019     08/28/2019       CMP:  Lab Results   Component Value Date     08/28/2019    K 3.8 08/28/2019    K 4.4 07/04/2019    CL 95 08/28/2019    CO2 26 08/28/2019    BUN 39 08/28/2019    CREATININE 1.8 08/28/2019    LABGLOM 28 08/28/2019    GLUCOSE 110 08/28/2019    GLUCOSE 213 03/27/2012    CALCIUM 9.1 08/28/2019       Hepatic Function Panel:  Lab Results   Component Value Date    ALKPHOS 182 07/12/2019    ALT 21 07/07/2019    AST 52 07/07/2019    PROT 6.1 07/07/2019    PROT 5.3 06/05/2019    BILITOT 0.6 07/07/2019    BILIDIR 0.3 07/07/2019    LABALBU 2.7 07/07/2019    LABALBU 4.3 03/27/2012       Magnesium:    Lab Results   Component Value Date    MG 2.4 07/12/2019       PT/INR:    Lab Results   Component Value Date    INR 1.56 08/28/2019       HgBA1c:    Lab Results   Component Value Date    LABA1C 7.0 06/05/2019       FLP:  Lab Results   Component Value Date    TRIG 183 06/05/2019    HDL 20 06/05/2019    LDLCALC 49 06/05/2019       TSH:    Lab Results   Component Value Date    TSH 0.271 07/03/2019         Assessment:    PAFB RVR - AFB   On IV cardizem   [id number] at least  4  On [address]    Acute on chronic systolic chf - resolving     NICDMP EF 25-30%   Wearing LV      Moderate nonobstructive CAD by cath 7/2019    HLP  HTN  DM II  CKD stage 3  Hx DARWIN - uses CPAP       Plan:  · Change lasix to 40 mg IV BID  · Bmp tomorrow  · No ACE / ARB with CKD - ? entresto at

## 2019-08-28 NOTE — CONSULTS
suspension 30 mL  30 mL Oral Daily PRN Lupe Age, DO        ondansetron [facility]) injection 4 mg  4 mg Intravenous Q6H PRN Lupe Age, DO        acetaminophen (TYLENOL) tablet 650 mg  650 mg Oral Q4H PRN Lupe Age, DO        warfarin (COUMADIN) tablet 5 mg  5 mg Oral Once Lupe Age, DO        diltiazem 125 mg in dextrose 5 % 125 mL infusion  5 mg/hr Intravenous Continuous Vivian Aguilera MD 15 mL/hr at 08/27/19 0849 15 mg/hr at 08/27/19 0849    allopurinol (ZYLOPRIM) tablet 100 mg  100 mg Oral Daily Lupe Age, DO   100 mg at 08/27/19 1044    amitriptyline (ELAVIL) tablet 10 mg  10 mg Oral Nightly Lupe Age, DO   10 mg at 08/27/19 0154    aspirin chewable tablet 81 mg  81 mg Oral Daily Lupe Age, DO   81 mg at 08/27/19 1045    atorvastatin (LIPITOR) tablet 40 mg  40 mg Oral Daily Lupe Age, DO   40 mg at 08/27/19 1043    calcium-vitamin D (OSCAL-500) 500-200 MG-UNIT per tablet   Oral BID  Lupe Age, DO   1 tablet at 08/27/19 1044    insulin lispro protamine & lispro (HUMALOG MIX) (75-25) 100 UNIT per ML injection vial SUSP 32 Units  32 Units Subcutaneous BID  Lupe Age, DO   32 Units at 08/27/19 1048    pantoprazole (PROTONIX) tablet 40 mg  40 mg Oral QAM  Anne Napier, DO   40 mg at 08/27/19 1045    ferrous sulfate tablet 325 mg  325 mg Oral TID  Anne Napier, DO   325 mg at 08/27/19 1106    fenofibrate tablet 160 mg  160 mg Oral Daily Lupe Age, DO   160 mg at 08/27/19 1044    gabapentin (NEURONTIN) capsule 100 mg  100 mg Oral TID Lupe Age, DO   100 mg at 08/27/19 1043    DULoxetine (CYMBALTA) extended release capsule 60 mg  60 mg Oral Daily Lupe Age, DO   60 mg at 08/27/19 1044    oxyCODONE-acetaminophen (PERCOCET) 5-325 MG per tablet 1 tablet  1 tablet Oral Q4H PRN Lupe Age, DO   1 tablet at 08/27/19 1044    hydrALAZINE (APRESOLINE) tablet 25 mg  25 mg Oral TID Lupe Age, DO   25 mg at
reviewed  24HR INTAKE/OUTPUT:      Intake/Output Summary (Last 24 hours) at 8/28/2019 1541  Last data filed at 8/28/2019 1349  Gross per 24 hour   Intake 2921.53 ml   Output 2150 ml   Net 771.53 ml       I/O last 3 completed shifts: In: 2921.5 [P.O.:1575; I.V.:1346.5]  Out: 2150 [Drains:2150]  Patient Vitals for the past 96 hrs (Last 3 readings):   Weight   08/28/19 0337 (!) 422 lb 9.6 oz (191.7 kg)   08/27/19 0844 (!) 420 lb (190.5 kg)   08/26/19 1836 (!) 328 lb (148.8 kg)     0 L/min    Wt Readings from Last 3 Encounters:   08/28/19 (!) 422 lb 9.6 oz (191.7 kg)   08/26/19 (!) 328 lb (148.8 kg)   07/12/19 (!) 325 lb 1.6 oz (147.5 kg)     BP Readings from Last 3 Encounters:   08/28/19 (!) 146/66   08/26/19 (!) 154/99   07/12/19 127/65       REVIEW OF SYSTEMS:     GENERAL: Pleasant, Usual state of health. Denies fever  HEENT: Denies recent vision change, Denies Upper respiratory complaints  CARDIOVASCULAR: Denies chest pain/angina. RESPIRATORY:Denies cough, wheezing  ABDOMEN: Denies nausea, vomiting, diarrhea, or abdominal pain  CNS:Denies headache, dizziness  MUSCULOSKELETAL: Reports joint arthralgia  SKIN: Denies rash, pruritis  HEMATOLOGIC: Denies bruising  ENDOCRINE:  Negative for heat or cold intolerance     EXAM:    VITALS:  BP (!) 146/66   Pulse 77   Temp 97 °F (36.1 °C) (Oral)   Resp 18   Ht 5' 9\" (1.753 m)   Wt (!) 422 lb 9.6 oz (191.7 kg)   LMP 02/20/2001   SpO2 94%   BMI 62.41 kg/m²    Body mass index is 62.41 kg/m². Vitals:  Patient Vitals for the past 6 hrs:   BP Temp Temp src Pulse Resp SpO2   08/28/19 1536 -- 97 °F (36.1 °C) Oral 77 18 94 %   08/28/19 1500 -- -- -- 76 16 --   08/28/19 1149 (!) 146/66 98.1 °F (36.7 °C) Oral 88 16 95 %       Constitutional:  No acute distress.   Skin: No rash on exposed surfaces, No significant bruises  Heent:  Head is normocephalic, Extraocular movement intact, Mucus membranes moist. Voice is clear without hoarseness or slurred speech   Neck: no jugular
contrast 7/3/2019  1. No evidence of pulmonary embolus. 2. Patchy groundglass opacities throughout the lungs with small pleural effusions. Differential considerations include pulmonary edema from fluid overload or cardiogenic etiology or small airway or small vessel disease. Cath 07/09/2019  SUMMARY:  Nonobstructive coronary artery disease. Findings are similar  to the findings that were in prior cath in 2017.     RECOMMENDATIONS:  1. Aggressive risk factor modification. 2.  Lipid-lowering therapy. 3.  Optimized medical management. 4.  Follow up in clinic in one to two weeks to evaluate symptoms  further. Chest X-ray 08/26/2019      Right basilar infiltrate. Possible small infiltrate or atelectasis at the left base. Lab Results   Component Value Date    TROPONINT 0.017 08/27/2019    TROPONINT 0.020 08/27/2019    TROPONINT 0.012 08/26/2019       Recent Labs     08/26/19  1845 08/27/19  0616   WBC 6.2 7.0   HGB 10.3* 10.2*   HCT 33.7* 32.8*   .5* 100.6*    230       Recent Labs     08/26/19  1845 08/27/19  0616    135   K 4.1 4.5   CL 99 97*   CO2 24 26   BUN 38* 33*   CREATININE 1.5* 1.5*       Lab Results   Component Value Date    ALKPHOS 182 07/12/2019    ALT 21 07/07/2019    AST 52 07/07/2019    PROT 6.1 07/07/2019    PROT 5.3 06/05/2019    BILITOT 0.6 07/07/2019    BILIDIR 0.3 07/07/2019    LABALBU 2.7 07/07/2019    LABALBU 4.3 03/27/2012       Lab Results   Component Value Date    LABA1C 7.0 06/05/2019       Lab Results   Component Value Date    TRIG 183 06/05/2019    HDL 20 06/05/2019    LDLCALC 49 06/05/2019       Lab Results   Component Value Date    TSH 0.271 07/03/2019         Xr Chest Portable    Result Date: 8/26/2019  PROCEDURE: XR CHEST PORTABLE CLINICAL INFORMATION: sob. COMPARISON: Chest x-ray dated 7/7/2019 TECHNIQUE: AP Portable chest xray FINDINGS: Lines/tubes/devices: none Lungs/pleura:   There is a right basilar infiltrate and possibly a small

## 2019-08-29 ENCOUNTER — TELEPHONE (OUTPATIENT)
Dept: FAMILY MEDICINE CLINIC | Age: 64
End: 2019-08-29

## 2019-08-29 ENCOUNTER — APPOINTMENT (OUTPATIENT)
Dept: GENERAL RADIOLOGY | Age: 64
DRG: 291 | End: 2019-08-29
Payer: MEDICARE

## 2019-08-29 DIAGNOSIS — I42.9 CARDIOMYOPATHY, UNSPECIFIED TYPE (HCC): Primary | ICD-10-CM

## 2019-08-29 DIAGNOSIS — I48.91 ATRIAL FIBRILLATION, UNSPECIFIED TYPE (HCC): ICD-10-CM

## 2019-08-29 DIAGNOSIS — I10 ESSENTIAL HYPERTENSION: ICD-10-CM

## 2019-08-29 LAB
ANION GAP SERPL CALCULATED.3IONS-SCNC: 13 MEQ/L (ref 8–16)
BUN BLDV-MCNC: 47 MG/DL (ref 7–22)
CALCIUM SERPL-MCNC: 9 MG/DL (ref 8.5–10.5)
CHLORIDE BLD-SCNC: 95 MEQ/L (ref 98–111)
CO2: 25 MEQ/L (ref 23–33)
CREAT SERPL-MCNC: 1.9 MG/DL (ref 0.4–1.2)
ERYTHROCYTE [DISTWIDTH] IN BLOOD BY AUTOMATED COUNT: 15.5 % (ref 11.5–14.5)
ERYTHROCYTE [DISTWIDTH] IN BLOOD BY AUTOMATED COUNT: 56.8 FL (ref 35–45)
GFR SERPL CREATININE-BSD FRML MDRD: 27 ML/MIN/1.73M2
GLUCOSE BLD-MCNC: 115 MG/DL (ref 70–108)
GLUCOSE BLD-MCNC: 153 MG/DL (ref 70–108)
GLUCOSE BLD-MCNC: 168 MG/DL (ref 70–108)
GLUCOSE BLD-MCNC: 171 MG/DL (ref 70–108)
GLUCOSE BLD-MCNC: 91 MG/DL (ref 70–108)
HCT VFR BLD CALC: 30.4 % (ref 37–47)
HEMOGLOBIN: 9.3 GM/DL (ref 12–16)
INR BLD: 1.89 (ref 0.85–1.13)
LEGIONELLA URINARY AG: NEGATIVE
MAGNESIUM: 1.7 MG/DL (ref 1.6–2.4)
MCH RBC QN AUTO: 31.3 PG (ref 26–33)
MCHC RBC AUTO-ENTMCNC: 30.6 GM/DL (ref 32.2–35.5)
MCV RBC AUTO: 102.4 FL (ref 81–99)
PLATELET # BLD: 205 THOU/MM3 (ref 130–400)
PMV BLD AUTO: 10.5 FL (ref 9.4–12.4)
POTASSIUM REFLEX MAGNESIUM: 4.1 MEQ/L (ref 3.5–5.2)
PRO-BNP: 9674 PG/ML (ref 0–900)
RBC # BLD: 2.97 MILL/MM3 (ref 4.2–5.4)
SODIUM BLD-SCNC: 133 MEQ/L (ref 135–145)
STREP PNEUMO AG, UR: NEGATIVE
THROAT/NOSE CULTURE: NORMAL
WBC # BLD: 4.8 THOU/MM3 (ref 4.8–10.8)

## 2019-08-29 PROCEDURE — 99232 SBSQ HOSP IP/OBS MODERATE 35: CPT | Performed by: INTERNAL MEDICINE

## 2019-08-29 PROCEDURE — 83880 ASSAY OF NATRIURETIC PEPTIDE: CPT

## 2019-08-29 PROCEDURE — 85610 PROTHROMBIN TIME: CPT

## 2019-08-29 PROCEDURE — 83735 ASSAY OF MAGNESIUM: CPT

## 2019-08-29 PROCEDURE — 6370000000 HC RX 637 (ALT 250 FOR IP): Performed by: PHARMACIST

## 2019-08-29 PROCEDURE — 82948 REAGENT STRIP/BLOOD GLUCOSE: CPT

## 2019-08-29 PROCEDURE — 2580000003 HC RX 258: Performed by: INTERNAL MEDICINE

## 2019-08-29 PROCEDURE — 99232 SBSQ HOSP IP/OBS MODERATE 35: CPT | Performed by: PHYSICIAN ASSISTANT

## 2019-08-29 PROCEDURE — 2709999900 HC NON-CHARGEABLE SUPPLY

## 2019-08-29 PROCEDURE — 6370000000 HC RX 637 (ALT 250 FOR IP): Performed by: INTERNAL MEDICINE

## 2019-08-29 PROCEDURE — 36415 COLL VENOUS BLD VENIPUNCTURE: CPT

## 2019-08-29 PROCEDURE — 6370000000 HC RX 637 (ALT 250 FOR IP): Performed by: PHYSICIAN ASSISTANT

## 2019-08-29 PROCEDURE — 80048 BASIC METABOLIC PNL TOTAL CA: CPT

## 2019-08-29 PROCEDURE — 2140000000 HC CCU INTERMEDIATE R&B

## 2019-08-29 PROCEDURE — APPSS30 APP SPLIT SHARED TIME 16-30 MINUTES: Performed by: NURSE PRACTITIONER

## 2019-08-29 PROCEDURE — 85027 COMPLETE CBC AUTOMATED: CPT

## 2019-08-29 PROCEDURE — 6370000000 HC RX 637 (ALT 250 FOR IP): Performed by: NURSE PRACTITIONER

## 2019-08-29 PROCEDURE — 71045 X-RAY EXAM CHEST 1 VIEW: CPT

## 2019-08-29 RX ORDER — FUROSEMIDE 40 MG/1
40 TABLET ORAL 2 TIMES DAILY
Status: DISCONTINUED | OUTPATIENT
Start: 2019-08-29 | End: 2019-08-30 | Stop reason: HOSPADM

## 2019-08-29 RX ORDER — LISINOPRIL 2.5 MG/1
2.5 TABLET ORAL DAILY
Status: DISCONTINUED | OUTPATIENT
Start: 2019-08-29 | End: 2019-08-30 | Stop reason: HOSPADM

## 2019-08-29 RX ORDER — WARFARIN SODIUM 3 MG/1
3 TABLET ORAL ONCE
Status: COMPLETED | OUTPATIENT
Start: 2019-08-29 | End: 2019-08-29

## 2019-08-29 RX ORDER — FAMOTIDINE 20 MG/1
20 TABLET, FILM COATED ORAL DAILY
Status: DISCONTINUED | OUTPATIENT
Start: 2019-08-29 | End: 2019-08-30 | Stop reason: HOSPADM

## 2019-08-29 RX ADMIN — FERROUS SULFATE TAB 325 MG (65 MG ELEMENTAL FE) 325 MG: 325 (65 FE) TAB at 11:48

## 2019-08-29 RX ADMIN — LISINOPRIL 2.5 MG: 2.5 TABLET ORAL at 11:48

## 2019-08-29 RX ADMIN — Medication 10 ML: at 21:09

## 2019-08-29 RX ADMIN — FERROUS SULFATE TAB 325 MG (65 MG ELEMENTAL FE) 325 MG: 325 (65 FE) TAB at 16:58

## 2019-08-29 RX ADMIN — INSULIN GLARGINE 20 UNITS: 100 INJECTION, SOLUTION SUBCUTANEOUS at 08:47

## 2019-08-29 RX ADMIN — HYDRALAZINE HYDROCHLORIDE 25 MG: 25 TABLET, FILM COATED ORAL at 21:09

## 2019-08-29 RX ADMIN — ASPIRIN 81 MG 81 MG: 81 TABLET ORAL at 08:46

## 2019-08-29 RX ADMIN — FUROSEMIDE 40 MG: 40 TABLET ORAL at 11:49

## 2019-08-29 RX ADMIN — CALCIUM CARBONATE-VITAMIN D TAB 500 MG-200 UNIT 1 TABLET: 500-200 TAB at 16:58

## 2019-08-29 RX ADMIN — METOPROLOL TARTRATE 12.5 MG: 25 TABLET ORAL at 08:46

## 2019-08-29 RX ADMIN — GABAPENTIN 100 MG: 100 CAPSULE ORAL at 15:41

## 2019-08-29 RX ADMIN — GABAPENTIN 100 MG: 100 CAPSULE ORAL at 21:09

## 2019-08-29 RX ADMIN — HYDRALAZINE HYDROCHLORIDE 25 MG: 25 TABLET, FILM COATED ORAL at 08:46

## 2019-08-29 RX ADMIN — ATORVASTATIN CALCIUM 40 MG: 40 TABLET, FILM COATED ORAL at 08:54

## 2019-08-29 RX ADMIN — CALCIUM CARBONATE-VITAMIN D TAB 500 MG-200 UNIT 1 TABLET: 500-200 TAB at 08:47

## 2019-08-29 RX ADMIN — FUROSEMIDE 40 MG: 40 TABLET ORAL at 16:58

## 2019-08-29 RX ADMIN — ALLOPURINOL 100 MG: 100 TABLET ORAL at 08:46

## 2019-08-29 RX ADMIN — DILTIAZEM HYDROCHLORIDE 240 MG: 240 CAPSULE, EXTENDED RELEASE ORAL at 08:46

## 2019-08-29 RX ADMIN — FAMOTIDINE 20 MG: 20 TABLET ORAL at 11:49

## 2019-08-29 RX ADMIN — DULOXETINE HYDROCHLORIDE 60 MG: 60 CAPSULE, DELAYED RELEASE ORAL at 08:46

## 2019-08-29 RX ADMIN — WARFARIN SODIUM 3 MG: 3 TABLET ORAL at 17:00

## 2019-08-29 RX ADMIN — ISOSORBIDE MONONITRATE 60 MG: 60 TABLET ORAL at 08:46

## 2019-08-29 RX ADMIN — HYDRALAZINE HYDROCHLORIDE 25 MG: 25 TABLET, FILM COATED ORAL at 15:40

## 2019-08-29 RX ADMIN — METOPROLOL TARTRATE 12.5 MG: 25 TABLET ORAL at 21:09

## 2019-08-29 RX ADMIN — FERROUS SULFATE TAB 325 MG (65 MG ELEMENTAL FE) 325 MG: 325 (65 FE) TAB at 08:46

## 2019-08-29 RX ADMIN — INSULIN GLARGINE 20 UNITS: 100 INJECTION, SOLUTION SUBCUTANEOUS at 21:43

## 2019-08-29 RX ADMIN — GABAPENTIN 100 MG: 100 CAPSULE ORAL at 08:47

## 2019-08-29 RX ADMIN — Medication 10 ML: at 08:46

## 2019-08-29 RX ADMIN — INSULIN LISPRO 32 UNITS: 100 INJECTION, SUSPENSION SUBCUTANEOUS at 09:07

## 2019-08-29 RX ADMIN — ACETAMINOPHEN 650 MG: 325 TABLET ORAL at 11:49

## 2019-08-29 RX ADMIN — FENOFIBRATE 160 MG: 160 TABLET ORAL at 08:46

## 2019-08-29 RX ADMIN — AMITRIPTYLINE HYDROCHLORIDE 10 MG: 10 TABLET, FILM COATED ORAL at 21:09

## 2019-08-29 RX ADMIN — PANTOPRAZOLE SODIUM 40 MG: 40 TABLET, DELAYED RELEASE ORAL at 05:32

## 2019-08-29 ASSESSMENT — PAIN SCALES - GENERAL
PAINLEVEL_OUTOF10: 2
PAINLEVEL_OUTOF10: 3
PAINLEVEL_OUTOF10: 4
PAINLEVEL_OUTOF10: 5
PAINLEVEL_OUTOF10: 0
PAINLEVEL_OUTOF10: 2

## 2019-08-29 ASSESSMENT — PAIN DESCRIPTION - PAIN TYPE
TYPE: CHRONIC PAIN
TYPE: CHRONIC PAIN

## 2019-08-29 ASSESSMENT — PAIN DESCRIPTION - FREQUENCY
FREQUENCY: INTERMITTENT
FREQUENCY: CONTINUOUS

## 2019-08-29 ASSESSMENT — PAIN DESCRIPTION - LOCATION
LOCATION: LEG;KNEE
LOCATION: KNEE;LEG

## 2019-08-29 ASSESSMENT — PAIN DESCRIPTION - DESCRIPTORS
DESCRIPTORS: ACHING
DESCRIPTORS: ACHING

## 2019-08-29 ASSESSMENT — PAIN DESCRIPTION - PROGRESSION: CLINICAL_PROGRESSION: NOT CHANGED

## 2019-08-29 ASSESSMENT — PAIN - FUNCTIONAL ASSESSMENT: PAIN_FUNCTIONAL_ASSESSMENT: PREVENTS OR INTERFERES WITH ALL ACTIVE AND SOME PASSIVE ACTIVITIES

## 2019-08-29 ASSESSMENT — PAIN DESCRIPTION - ONSET: ONSET: ON-GOING

## 2019-08-29 ASSESSMENT — PAIN DESCRIPTION - ORIENTATION
ORIENTATION: LEFT
ORIENTATION: LEFT

## 2019-08-29 NOTE — PLAN OF CARE
Problem: Pain:  Goal: Pain level will decrease  Description  Pain level will decrease  8/29/2019 1051 by Marsha Jack RN  Outcome: Ongoing  Note:   Pt pain 5/10 was tolerable per pt. PO Tylenol available. Repositioning left leg was of minor help in controlling pain. Problem: DISCHARGE BARRIERS  Goal: Patient's continuum of care needs are met  8/29/2019 1051 by Marsha Jack RN  Outcome: Ongoing  Note:   Plans to return home to /help from sister. Is open to [facility] or possibly returning to [facility]. If rehab needed. Problem: Safety:  Goal: Free from accidental physical injury  Description  Free from accidental physical injury  8/29/2019 1051 by Marsha Jack RN  Outcome: Ongoing  Note:   No injury of falls this shift. Bed alarm on. Call light within reach. Problem: Skin Integrity:  Goal: Skin integrity will stabilize  Description  Skin integrity will stabilize  8/29/2019 1051 by Marsha Jack RN  Outcome: Ongoing  Note:   No changes in skin integrity this shift. Care plan reviewed with patient. Patient verbalize understanding of the plan of care and contribute to goal setting.

## 2019-08-29 NOTE — PROGRESS NOTES
Contacted [facility] about replacing Magnesium since <2. It was decided to use mag replacement protocol in place. Current mag level= 1.7.

## 2019-08-29 NOTE — PROGRESS NOTES
insulin lispro protamine & lispro  32 Units Subcutaneous BID WC    ferrous sulfate  325 mg Oral TID WC    fenofibrate  160 mg Oral Daily    gabapentin  100 mg Oral TID    DULoxetine  60 mg Oral Daily    hydrALAZINE  25 mg Oral TID    isosorbide mononitrate  60 mg Oral Daily    metoprolol tartrate  12.5 mg Oral BID       Vital Signs:   BP (!) 147/73   Pulse 90   Temp 97.9 °F (36.6 °C) (Oral)   Resp 20   Ht 5' 9\" (1.753 m)   Wt (!) 425 lb 11.2 oz (193.1 kg)   LMP 02/20/2001   SpO2 93%   BMI 62.86 kg/m²      Intake/Output Summary (Last 24 hours) at 8/29/2019 1446  Last data filed at 8/29/2019 1403  Gross per 24 hour   Intake 1018.71 ml   Output 3050 ml   Net -2031.29 ml        General:   Chronically ill appearing white female, obese   HEENT:  normocephalic and atraumatic. No scleral icterus. PERR. Neck: supple. No JVD. No thyromegaly. Lungs: clear to auscultation. No retractions  Cardiac: Irregular rate and rhythm without murmur. Abdomen: soft. Nontender. Bowel sounds positive. Extremities:  No clubbing, cyanosis, or edema x 4. Vasculature: capillary refill < 3 seconds. Palpable LE pulses bilaterally. Skin:  warm and dry. Psych:  Alert and oriented x3. Affect appropriate  Lymph:  No supraclavicular adenopathy. Neurologic:  No focal deficit. No seizures. Data: (All radiographs, tracings, PFTs, and imaging are personally viewed and interpreted unless otherwise noted). · Sodium 133. Potassium 4.1.. CO2 25. BUN 47. Cr 1.9. WBC 4.8. Hgb 9.3. Plts 205.   · CXR (8/26) Pulmonary edema, fluid overload.   · NICKIE Report (7/3): EF 25-30%.        Electronically signed by URSZULA Duque on 8/29/2019 at 2:46 PM

## 2019-08-29 NOTE — PROGRESS NOTES
07/12/2019    ALT 21 07/07/2019    AST 52 07/07/2019    PROT 6.1 07/07/2019    PROT 5.3 06/05/2019    BILITOT 0.6 07/07/2019    BILIDIR 0.3 07/07/2019    LABALBU 2.7 07/07/2019    LABALBU 4.3 03/27/2012       Magnesium:    Lab Results   Component Value Date    MG 2.4 07/12/2019       PT/INR:    Lab Results   Component Value Date    INR 1.89 08/29/2019       HgBA1c:    Lab Results   Component Value Date    LABA1C 7.0 06/05/2019       FLP:  Lab Results   Component Value Date    TRIG 183 06/05/2019    HDL 20 06/05/2019    LDLCALC 49 06/05/2019       TSH:    Lab Results   Component Value Date    TSH 0.271 07/03/2019         Assessment:    Acute hypoxic resp failure  Acute on chronic systolic CHF - improving  NICMP - ef 25-30 per NICKIE 7/5/19   Wearing lifevest  Hx PAF - currently afib cvr - HR 90   On coumadin   Hx NICKIE/CV 7/5/19 to NSR, but went back to afib  Severely dilated LA  Nonobstructive CAD per cath 7/9/19  HTN  HLD  DM  DARWIN - uses cpap  CKD  Morbid obesity  Hx GERD  Noncompliance with fluid restriction and low sodium diet    Plan:  · Daily I/o and weights  · 2 liter fluid restriction and 2 gm sodium diet  · Change iv lasix to po lasix 40 bid  · Add low dose ace  · Cont asa/statin/BB/cardizem/imdur/hydralazine  · Bmp in am  · Keep mag >2 and K >4 - check mag  · Dietary consult  · chf clinic referral         Electronically signed by Leonor Marcelo PA-C on 8/29/2019 at 8:19 AM

## 2019-08-29 NOTE — TELEPHONE ENCOUNTER
Referral was placed at the  for scheduling. Pt's sister was notified & she verbalized understanding.

## 2019-08-29 NOTE — PLAN OF CARE
Problem: Pain:  Goal: Pain level will decrease  Description  Pain level will decrease  8/28/2019 2350 by Laura Hanna RN  Outcome: Ongoing  8/28/2019 1138 by Mj Coelho RN  Outcome: Met This Shift  Note:   Pain level decreases. Goal: Control of acute pain  Description  Control of acute pain  8/28/2019 2350 by Laura Hanna RN  Outcome: Ongoing  8/28/2019 1138 by Mj Coelho RN  Outcome: Met This Shift  Note:   Pain controlled using PRN pain medicine. Goal: Control of chronic pain  Description  Control of chronic pain  8/28/2019 2350 by Laura Hanna RN  Outcome: Ongoing  Note:   Ongoing assessment & interventions provided throughout shift. Reminded patient to report any pain, pressure, or shortness of breath to the nurse. Pain medications provided per physician's orders. Patient has chronic pain in left leg/knee. Controlled with pain meds. Hinders ambulating. Patient offered ice- patient is satisfied with resting.   8/28/2019 1138 by Mj Coelho RN  Outcome: Met This Shift  Note:   Pain controlled. Goal: Patient's pain/discomfort is manageable  Description  Patient's pain/discomfort is manageable  8/28/2019 2350 by Laura Hanna RN  Outcome: Ongoing  8/28/2019 1138 by Mj Coelho RN  Outcome: Met This Shift  Note:   Pain is manageable. Using PRN pain medicine. Problem: DISCHARGE BARRIERS  Goal: Patient's continuum of care needs are met  8/28/2019 2350 by Laura Hanna RN  Outcome: Ongoing  Note:   Patient is from home with sister. 8/28/2019 1138 by Mj Coelho RN  Outcome: Met This Shift     Problem: Safety:  Goal: Free from accidental physical injury  Description  Free from accidental physical injury  8/28/2019 2350 by Laura Hanna RN  Outcome: Ongoing  Note:   Patient free of falls. Call light within reach. Bed in lowest position. Nonskid socks on. Bed alarm on. Hourly rounding continues. 8/28/2019 1138 by Mj Coelho RN  Outcome:  Met

## 2019-08-29 NOTE — PROGRESS NOTES
Nephrology Progress Note    Patient - Eunice Fabian   MRN -  [id number]   Acct # - [de-identified]      - 1955    58 y.o. Admit Date: 2019  Hospital Day: 3  Location: --A  Date of evaluation -  2019    Subjective:   CC: .shortness of breath   Denies sob. 2 lpm, CPAP with sleep  UOP 1450/24h  Cardizem weaned off  BP Range: Systolic (10PLP), EBH:701 , Min:113 , MILAGRO:035      Diastolic (91RJI), KIH:69, Min:66, Max:73    Objective:   VITALS:  BP (!) 148/70   Pulse 77   Temp 97.6 °F (36.4 °C) (Oral)   Resp 20   Ht 5' 9\" (1.753 m)   Wt (!) 425 lb 11.2 oz (193.1 kg)   LMP 2001   SpO2 96%   BMI 62.86 kg/m²    Patient Vitals for the past 24 hrs:   BP Temp Temp src Pulse Resp SpO2 Weight   19 0600 -- -- -- -- -- -- (!) 425 lb 11.2 oz (193.1 kg)   19 0400 (!) 148/70 97.6 °F (36.4 °C) Oral 77 20 96 % --   19 2345 -- -- -- -- -- 95 % --   19 2243 122/72 98.3 °F (36.8 °C) Axillary 73 18 94 % --   19 1927 135/67 97.8 °F (36.6 °C) Axillary 78 18 99 % --   19 1823 (!) 143/73 -- -- 84 18 -- --   19 1536 113/71 97 °F (36.1 °C) Oral 77 18 94 % --   19 1500 -- -- -- 76 16 -- --   19 1200 135/67 -- -- 78 -- -- --   19 1149 (!) 146/66 98.1 °F (36.7 °C) Oral 88 16 95 % --     2 L/min    Intake/Output Summary (Last 24 hours) at 2019 0808  Last data filed at 2019 0400  Gross per 24 hour   Intake 1592.71 ml   Output 2875 ml   Net -1282.29 ml       Admission weight: (!) 328 lb (148.8 kg)  Patient Vitals for the past 96 hrs (Last 3 readings):   Weight   19 0600 (!) 425 lb 11.2 oz (193.1 kg)   19 0337 (!) 422 lb 9.6 oz (191.7 kg)   19 0844 (!) 420 lb (190.5 kg)     Wt Readings from Last 3 Encounters:   19 (!) 425 lb 11.2 oz (193.1 kg)   19 (!) 328 lb (148.8 kg)   19 (!) 325 lb 1.6 oz (147.5 kg)     Body mass index is 62.86 kg/m². EXAM:  CONSTITUTIONAL:  No acute distress.  Pleasant  HEENT:  Head is

## 2019-08-30 VITALS
DIASTOLIC BLOOD PRESSURE: 71 MMHG | BODY MASS INDEX: 43.4 KG/M2 | HEIGHT: 69 IN | HEART RATE: 81 BPM | SYSTOLIC BLOOD PRESSURE: 132 MMHG | RESPIRATION RATE: 16 BRPM | WEIGHT: 293 LBS | TEMPERATURE: 98.3 F | OXYGEN SATURATION: 92 %

## 2019-08-30 LAB
ANION GAP SERPL CALCULATED.3IONS-SCNC: 14 MEQ/L (ref 8–16)
BUN BLDV-MCNC: 50 MG/DL (ref 7–22)
CALCIUM SERPL-MCNC: 9.2 MG/DL (ref 8.5–10.5)
CHLORIDE BLD-SCNC: 99 MEQ/L (ref 98–111)
CO2: 27 MEQ/L (ref 23–33)
CREAT SERPL-MCNC: 1.9 MG/DL (ref 0.4–1.2)
ERYTHROCYTE [DISTWIDTH] IN BLOOD BY AUTOMATED COUNT: 15.9 % (ref 11.5–14.5)
ERYTHROCYTE [DISTWIDTH] IN BLOOD BY AUTOMATED COUNT: 57.8 FL (ref 35–45)
FERRITIN: 200 NG/ML (ref 10–291)
GFR SERPL CREATININE-BSD FRML MDRD: 27 ML/MIN/1.73M2
GLUCOSE BLD-MCNC: 163 MG/DL (ref 70–108)
GLUCOSE BLD-MCNC: 206 MG/DL (ref 70–108)
GLUCOSE BLD-MCNC: 78 MG/DL (ref 70–108)
HCT VFR BLD CALC: 30.3 % (ref 37–47)
HEMOGLOBIN: 9.4 GM/DL (ref 12–16)
INR BLD: 1.96 (ref 0.85–1.13)
IRON SATURATION: 16 % (ref 20–50)
IRON: 36 UG/DL (ref 50–170)
MAGNESIUM: 1.8 MG/DL (ref 1.6–2.4)
MCH RBC QN AUTO: 31.3 PG (ref 26–33)
MCHC RBC AUTO-ENTMCNC: 31 GM/DL (ref 32.2–35.5)
MCV RBC AUTO: 101 FL (ref 81–99)
PLATELET # BLD: 227 THOU/MM3 (ref 130–400)
PMV BLD AUTO: 10.7 FL (ref 9.4–12.4)
POTASSIUM SERPL-SCNC: 3.8 MEQ/L (ref 3.5–5.2)
RBC # BLD: 3 MILL/MM3 (ref 4.2–5.4)
SODIUM BLD-SCNC: 140 MEQ/L (ref 135–145)
TOTAL IRON BINDING CAPACITY: 226 UG/DL (ref 171–450)
WBC # BLD: 4.5 THOU/MM3 (ref 4.8–10.8)

## 2019-08-30 PROCEDURE — 97165 OT EVAL LOW COMPLEX 30 MIN: CPT

## 2019-08-30 PROCEDURE — 6370000000 HC RX 637 (ALT 250 FOR IP): Performed by: NURSE PRACTITIONER

## 2019-08-30 PROCEDURE — 83735 ASSAY OF MAGNESIUM: CPT

## 2019-08-30 PROCEDURE — 6370000000 HC RX 637 (ALT 250 FOR IP): Performed by: INTERNAL MEDICINE

## 2019-08-30 PROCEDURE — 83540 ASSAY OF IRON: CPT

## 2019-08-30 PROCEDURE — 6370000000 HC RX 637 (ALT 250 FOR IP): Performed by: PHYSICIAN ASSISTANT

## 2019-08-30 PROCEDURE — 97530 THERAPEUTIC ACTIVITIES: CPT

## 2019-08-30 PROCEDURE — 97535 SELF CARE MNGMENT TRAINING: CPT

## 2019-08-30 PROCEDURE — 83550 IRON BINDING TEST: CPT

## 2019-08-30 PROCEDURE — 85610 PROTHROMBIN TIME: CPT

## 2019-08-30 PROCEDURE — 2709999900 HC NON-CHARGEABLE SUPPLY

## 2019-08-30 PROCEDURE — 82728 ASSAY OF FERRITIN: CPT

## 2019-08-30 PROCEDURE — 99239 HOSP IP/OBS DSCHRG MGMT >30: CPT | Performed by: PHYSICIAN ASSISTANT

## 2019-08-30 PROCEDURE — 99232 SBSQ HOSP IP/OBS MODERATE 35: CPT | Performed by: INTERNAL MEDICINE

## 2019-08-30 PROCEDURE — 85027 COMPLETE CBC AUTOMATED: CPT

## 2019-08-30 PROCEDURE — 82948 REAGENT STRIP/BLOOD GLUCOSE: CPT

## 2019-08-30 PROCEDURE — 36415 COLL VENOUS BLD VENIPUNCTURE: CPT

## 2019-08-30 PROCEDURE — 97162 PT EVAL MOD COMPLEX 30 MIN: CPT

## 2019-08-30 PROCEDURE — 80048 BASIC METABOLIC PNL TOTAL CA: CPT

## 2019-08-30 PROCEDURE — 2580000003 HC RX 258: Performed by: INTERNAL MEDICINE

## 2019-08-30 RX ORDER — WARFARIN SODIUM 3 MG/1
3 TABLET ORAL
Status: COMPLETED | OUTPATIENT
Start: 2019-08-30 | End: 2019-08-30

## 2019-08-30 RX ORDER — INSULIN GLARGINE 100 [IU]/ML
20 INJECTION, SOLUTION SUBCUTANEOUS 2 TIMES DAILY
Qty: 1 VIAL | Refills: 3 | Status: SHIPPED | OUTPATIENT
Start: 2019-08-30 | End: 2019-08-30 | Stop reason: HOSPADM

## 2019-08-30 RX ORDER — LISINOPRIL 2.5 MG/1
2.5 TABLET ORAL DAILY
Qty: 30 TABLET | Refills: 3 | Status: ON HOLD | OUTPATIENT
Start: 2019-08-31 | End: 2019-09-18

## 2019-08-30 RX ADMIN — FERROUS SULFATE TAB 325 MG (65 MG ELEMENTAL FE) 325 MG: 325 (65 FE) TAB at 09:29

## 2019-08-30 RX ADMIN — LISINOPRIL 2.5 MG: 2.5 TABLET ORAL at 09:30

## 2019-08-30 RX ADMIN — DILTIAZEM HYDROCHLORIDE 240 MG: 240 CAPSULE, EXTENDED RELEASE ORAL at 09:30

## 2019-08-30 RX ADMIN — ATORVASTATIN CALCIUM 40 MG: 40 TABLET, FILM COATED ORAL at 09:31

## 2019-08-30 RX ADMIN — INSULIN LISPRO 32 UNITS: 100 INJECTION, SUSPENSION SUBCUTANEOUS at 07:36

## 2019-08-30 RX ADMIN — DULOXETINE HYDROCHLORIDE 60 MG: 60 CAPSULE, DELAYED RELEASE ORAL at 09:31

## 2019-08-30 RX ADMIN — ISOSORBIDE MONONITRATE 60 MG: 60 TABLET ORAL at 09:30

## 2019-08-30 RX ADMIN — Medication 10 ML: at 12:10

## 2019-08-30 RX ADMIN — INSULIN GLARGINE 20 UNITS: 100 INJECTION, SOLUTION SUBCUTANEOUS at 09:32

## 2019-08-30 RX ADMIN — FAMOTIDINE 20 MG: 20 TABLET ORAL at 09:30

## 2019-08-30 RX ADMIN — GABAPENTIN 100 MG: 100 CAPSULE ORAL at 09:29

## 2019-08-30 RX ADMIN — HYDRALAZINE HYDROCHLORIDE 25 MG: 25 TABLET, FILM COATED ORAL at 13:55

## 2019-08-30 RX ADMIN — CALCIUM CARBONATE-VITAMIN D TAB 500 MG-200 UNIT 1 TABLET: 500-200 TAB at 09:30

## 2019-08-30 RX ADMIN — FENOFIBRATE 160 MG: 160 TABLET ORAL at 09:29

## 2019-08-30 RX ADMIN — WARFARIN SODIUM 3 MG: 3 TABLET ORAL at 15:20

## 2019-08-30 RX ADMIN — FUROSEMIDE 40 MG: 40 TABLET ORAL at 09:30

## 2019-08-30 RX ADMIN — FERROUS SULFATE TAB 325 MG (65 MG ELEMENTAL FE) 325 MG: 325 (65 FE) TAB at 12:10

## 2019-08-30 RX ADMIN — HYDRALAZINE HYDROCHLORIDE 25 MG: 25 TABLET, FILM COATED ORAL at 09:29

## 2019-08-30 RX ADMIN — ALLOPURINOL 100 MG: 100 TABLET ORAL at 09:29

## 2019-08-30 RX ADMIN — ACETAMINOPHEN 650 MG: 325 TABLET ORAL at 02:41

## 2019-08-30 RX ADMIN — ASPIRIN 81 MG 81 MG: 81 TABLET ORAL at 09:30

## 2019-08-30 RX ADMIN — METOPROLOL TARTRATE 12.5 MG: 25 TABLET ORAL at 09:30

## 2019-08-30 RX ADMIN — GABAPENTIN 100 MG: 100 CAPSULE ORAL at 13:55

## 2019-08-30 ASSESSMENT — PAIN - FUNCTIONAL ASSESSMENT: PAIN_FUNCTIONAL_ASSESSMENT: PREVENTS OR INTERFERES SOME ACTIVE ACTIVITIES AND ADLS

## 2019-08-30 ASSESSMENT — PAIN DESCRIPTION - ORIENTATION
ORIENTATION: LEFT
ORIENTATION: LEFT

## 2019-08-30 ASSESSMENT — PAIN DESCRIPTION - LOCATION
LOCATION: LEG
LOCATION: LEG;KNEE

## 2019-08-30 ASSESSMENT — PAIN DESCRIPTION - DESCRIPTORS: DESCRIPTORS: ACHING

## 2019-08-30 ASSESSMENT — PAIN SCALES - GENERAL
PAINLEVEL_OUTOF10: 2
PAINLEVEL_OUTOF10: 0
PAINLEVEL_OUTOF10: 3

## 2019-08-30 ASSESSMENT — PAIN DESCRIPTION - PAIN TYPE
TYPE: CHRONIC PAIN
TYPE: CHRONIC PAIN

## 2019-08-30 ASSESSMENT — PAIN DESCRIPTION - FREQUENCY: FREQUENCY: INTERMITTENT

## 2019-08-30 ASSESSMENT — PAIN DESCRIPTION - PROGRESSION: CLINICAL_PROGRESSION: NOT CHANGED

## 2019-08-30 ASSESSMENT — PAIN DESCRIPTION - ONSET: ONSET: ON-GOING

## 2019-08-30 NOTE — PROGRESS NOTES
Yes  Subjective: RN approved PT session. Pt in bedside chair upon entry and was agreeable to PT interventions. Increased time discussing the need for AFO, how it will assist her and the processing of getting one. Pt reports understanding. Post session, pt in bedside chair with all needs in reach. RN aware. Chair alarm on. General:  Overall Orientation Status: Within Functional Limits  Follows Commands: Within Functional Limits    Vision: Impaired  Vision Exceptions: Wears glasses for reading    Hearing: Within functional limits         Pain:  Yes. Pain Assessment  Pain Assessment: 0-10  Pain Type: Chronic pain  Pain Location: Leg  Pain Orientation: Left  Non-Pharmaceutical Pain Intervention(s): Repositioned  Response to Pain Intervention: Patient Satisfied       Social/Functional History:    Lives With: Other (comment)(with sister)  Type of Home: Apartment  Home Layout: One level  Home Access: Level entry  Home Equipment: Rolling walker, Wheelchair-manual, Reacher     Bathroom Shower/Tub: Walk-in shower  Bathroom Toilet: Handicap height  Bathroom Equipment: Tub transfer bench  Bathroom Accessibility: Accessible    Receives Help From: Family  ADL Assistance: Independent  Homemaking Assistance: Needs assistance  Meal Prep: Minimal  Laundry: Minimal  Vacuuming: Maximal  Cleaning: Maximal  Gardening: Maximal  Yard Work: Maximal  Driving: Maximal  Shopping: Moderate  Homemaking Responsibilities: Yes  Meal Prep Responsibility: Primary  Laundry Responsibility: Secondary  Shopping Responsibility: Secondary  Ambulation Assistance: Independent  Transfer Assistance: Independent    Active : No  Occupation: On disability  Leisure & Hobbies: watching game shows; doing word searches  Additional Comments: Pt walked within her bedroom and from threshhold of bathroom to the toilet using a walker. Pt uses a bedside commode at night and the W/C to get around the apartment.    Pt has AFO for L foot drop but has not used it for

## 2019-08-30 NOTE — DISCHARGE SUMMARY
Hospital Medicine Discharge Summary      Patient Identification:   Nino Sawant   : 1955  MRN: [id number]   Account: [de-identified]      Patient's PCP: ARNOL Alvares CNP    Admit Date: 2019     Discharge Date:   2019    Admitting Physician: Logan [place] DO     Discharge Physician: URSZULA Bautista     Discharge Diagnoses: Active Hospital Problems    Diagnosis Date Noted    Paroxysmal A-fib [facility]) [I48.0]      Priority: High    Atrial fibrillation (HCC) [I48.91]      Priority: High    Obstructive sleep apnea on CPAP [G47.33, Z99.89] 2015     Priority: High    Hypertension [I10]      Priority: Medium    Hyperlipidemia [E78.5]      Priority: Low    CKD (chronic kidney disease), stage III (HCC) [N18.3]     Acute on chronic combined systolic (congestive) and diastolic (congestive) heart failure (HCC) [I50.43] 2019    Anemia [D64.9] 2019    Shortness of breath [R06.02] 2019    Coronary artery disease involving native heart without angina pectoris [I25.10] 2017    Morbid obesity with BMI of 50.0-59.9, adult ([facility] [E66.01, Z68.43] 2015    Pneumonia [J18.9] 2015    CKD (chronic kidney disease) [N18.9] 2014    Diabetes mellitus type 2, insulin dependent ([facility] [E11.9, Z79.4] 2014    Arthritis [M19.90] 2012    DM (diabetes mellitus) ([facility] [E11.9]     Neuropathy [G62.9]     Osteoarthritis [M19.90]        The patient was seen and examined on day of discharge and this discharge summary is in conjunction with any daily progress note from day of discharge. Hospital Course:   Nino Sawant is a 58 y.o. female admitted to [address] [place] [address] on 2019 for SOB. 58 y. o. female with a PMH of DARWIN, PAF, OA, Hypothyroidism, HTN, HLD, GERD, DM, CKD, CAD, HFrEF who presented to [address] [place] [address] with complaints of shortness of breath.  Patient states that she has a LifeVest in place in noted that she

## 2019-08-30 NOTE — PROGRESS NOTES
(CARDIZEM) 125 mg in dextrose 5% 125 mL infusion Stopped (08/29/19 0402)     Meds:    furosemide  40 mg Oral BID    lisinopril  2.5 mg Oral Daily    famotidine  20 mg Oral Daily    magnesium replacement protocol   Other RX Placeholder    diltiazem  240 mg Oral Daily    warfarin (COUMADIN) daily dosing (placeholder)   Other RX Placeholder    sodium chloride flush  10 mL Intravenous 2 times per day    insulin glargine  20 Units Subcutaneous BID    allopurinol  100 mg Oral Daily    amitriptyline  10 mg Oral Nightly    aspirin  81 mg Oral Daily    atorvastatin  40 mg Oral Daily    calcium-vitamin D   Oral BID WC    insulin lispro protamine & lispro  32 Units Subcutaneous BID WC    ferrous sulfate  325 mg Oral TID WC    fenofibrate  160 mg Oral Daily    gabapentin  100 mg Oral TID    DULoxetine  60 mg Oral Daily    hydrALAZINE  25 mg Oral TID    isosorbide mononitrate  60 mg Oral Daily    metoprolol tartrate  12.5 mg Oral BID     Meds prn: sodium chloride flush, magnesium hydroxide, ondansetron, acetaminophen, glucose, dextrose, glucagon (rDNA), dextrose, diphenhydrAMINE, oxyCODONE-acetaminophen, meclizine       Impression and Plan:  1. CKd III: overall creatinine is stable, at baseline for the most part  - may need to accept somewhat high creatinine to optimize volume status/CHF  - continue with lasix  2. Chronic systolic dysfunction with volume overload: continue with lasix and ACEI  - advised low salt diet and fluid restriction, continue with lasix 40 mg po BID  - in neg fluid balance, weights have improved in last 3-4 days. 3. CAD s/p cath July 2019  4. HTN: BP readings reviewed, continue with current medications  5.  IDDM    D/W patient     Matias Bloom MD  [facility]

## 2019-08-30 NOTE — FLOWSHEET NOTE
08/29/19 1900   Encounter Summary   Services provided to: Patient   Referral/Consult From: [facility] Family members   Continue Visiting Yes  (8/29)   Complexity of Encounter Moderate   Length of Encounter 30 minutes   Spiritual Assessment Completed Yes   Advance Care Planning Yes   Grief and Life Adjustment   Type Adjustment to illness;New Diagnosis   Assessment Approachable   Intervention Explored feelings, thoughts, concerns;Nurtured hope;Prayer;Discussed illness/injury and it's impact; Discussed relationship with God;Discussed meaning/purpose   Outcome Expressed gratitude;Engaged in conversation;Receptive;Encouraged     Assessment: The patient is a 58 yr old female who entered care due to \"shortness ob breath. \" She explained that she had been in the ECF for 3 months recovering from a broken femur. She has lived with her sister her whole life and neither of them have . The patient explained about the dre the Critical access hospital provided for her and she felt \"so needed. \" This was not something she indicated feeling throughout most of her life. This staff guided the conversation to assist her in maybe figuring out a way in which she could offer volunteer work at the [facility] she just left. There were several ways in which she explained how she could assist and this brought a dre to her voice and offered her excitement as she processed through the conversation.   - Patient was also present at a death during her stay at the [facility] so this staff provided support as she vented her feelings regarding the death. Offered continued support through our OP  but she didn't feel she needed that just yet. Plan: Spiritual Care support and encouragement for her to look into volunteer services could benefit her during her discharge planning.

## 2019-08-30 NOTE — PROGRESS NOTES
worse than her RLE. Pain:  Pain Assessment  Patient Currently in Pain: Yes  Pain Assessment: 0-10  Pain Level: 3  Pain Type: Chronic pain  Pain Location: Leg;Knee  Pain Orientation: Left  Pain Descriptors: Aching  Pain Frequency: Intermittent  Clinical Progression: Not changed  Patient's Stated Pain Goal: 2  Response to Pain Intervention: Patient Satisfied  Multiple Pain Sites: No    Social/Functional History:  Lives With: Other (comment)(with sister)  Type of Home: Apartment  Home Layout: One level  Home Access: Level entry  Home Equipment: Rolling walker, Wheelchair-manual, Reacher   Bathroom Shower/Tub: Walk-in shower  Bathroom Toilet: Handicap height  Bathroom Equipment: Tub transfer bench  Bathroom Accessibility: Accessible    Receives Help From: Family  ADL Assistance: Independent  Homemaking Assistance: Needs assistance  Homemaking Responsibilities: Yes  Ambulation Assistance: Independent  Transfer Assistance: Independent    Active : No  Patient's  Info: sister does driving  Occupation: On disability  Leisure & Hobbies: watching game shows; doing word searches  Additional Comments: Pt walked within her bedroom and from threshhold of bathroom to the toilet using a walker. Pt uses a bedside commode at night and the W/C to get around the apartment. Pt has AFO for L foot drop but has not used it in over a year. Cognition/Orientation:  Overall Orientation Status: Within Normal Limits  Overall Cognitive Status: WFL    ADL;s:  Grooming: Setup(Pt brushed her teeth while in sitting.  )  UE Bathing: Stand by assistance(sitting in the chair)  LE Bathing: Moderate assistance(help needed for washing her legs below the knees and her bottom)  UE Dressing: Stand by assistance(don/doffing a hospital gown)  LE Dressing: Maximum assistance(don/doffing slipper socks)  Additional Comments: Pt was incontinent of urine when getting up out of bed. She had help with wiping her posterior perineum.   Vision -

## 2019-08-31 ENCOUNTER — CARE COORDINATION (OUTPATIENT)
Dept: CASE MANAGEMENT | Age: 64
End: 2019-08-31

## 2019-08-31 NOTE — CARE COORDINATION
[facility] Transitions Initial Follow Up Call    Call within 2 business days of discharge: Yes    Patient: Jb Gutierres Patient : 1955   MRN: [id number]  Reason for Admission: Atrial fibrillation, unspecified type [facility])  Discharge Date: 19 RARS: Readmission Risk Score: 40      Last Discharge [facility]       Complaint Diagnosis Description Type Department Provider    19 Atrial Fibrillation Atrial fibrillation, unspecified type ([facility] . .. ED to Hosp-Admission (Discharged) (ADMITTED) [facility], DO; Elsa Dimas. .. Spoke with: Kenyon Beard for initial Care Transition BPCI-A follow up. Identified self/role. Explained CTN process, verbalized understanding and agreeable to CTN calls. Facility: [address] [place] [address],[address] & [address] Floors services provided:  Scheduled appointment with PCP-No scheduled appointment. Understands to call Pre-Service on 2019 to schedule appointment. Scheduled appointment with Specialist-Understands to call Pre-Service on 2019 to schedule earlier Cardiology appointment. Scheduled appointment with CHF Clinic on 2019 at 01:00 PM.   Obtained and reviewed discharge summary and/or continuity of care documents  Reviewed and followed up on pending diagnostic tests and treatments-Coumadin Clinic to contact patient for appointment date/time. Education of patient/family/caregiver/guardian to support self-management-Reviewed signs/symptoms and chronic disease management. Assessment and support for treatment adherence and medication management-Declined medication review, states CTN call woke her up. CHF Zone Tool, Glucerna Coupons, and DM/CHF diet information mailed to residence.      Care Transitions 24 Hour Call    Do you have any ongoing symptoms?:  Yes  Patient-reported symptoms:  Fatigue  Do you have a copy of your discharge instructions?:  Yes  Do you have all of your prescriptions and are they filled?:  Yes  Have you been

## 2019-09-01 LAB
BLOOD CULTURE, ROUTINE: NORMAL
BLOOD CULTURE, ROUTINE: NORMAL

## 2019-09-03 ENCOUNTER — TELEPHONE (OUTPATIENT)
Dept: PHARMACY | Age: 64
End: 2019-09-03

## 2019-09-04 ENCOUNTER — TELEPHONE (OUTPATIENT)
Dept: FAMILY MEDICINE CLINIC | Age: 64
End: 2019-09-04

## 2019-09-05 ENCOUNTER — HOSPITAL ENCOUNTER (OUTPATIENT)
Dept: PHARMACY | Age: 64
Setting detail: THERAPIES SERIES
Discharge: HOME | End: 2019-09-05
Payer: MEDICARE

## 2019-09-05 DIAGNOSIS — I48.91 ATRIAL FIBRILLATION, UNSPECIFIED TYPE (HCC): ICD-10-CM

## 2019-09-05 LAB — POC INR: 1.7 (ref 0.8–1.2)

## 2019-09-05 PROCEDURE — 85610 PROTHROMBIN TIME: CPT | Performed by: PHARMACIST

## 2019-09-05 PROCEDURE — 99213 OFFICE O/P EST LOW 20 MIN: CPT | Performed by: PHARMACIST

## 2019-09-05 PROCEDURE — 36416 COLLJ CAPILLARY BLOOD SPEC: CPT | Performed by: PHARMACIST

## 2019-09-09 ENCOUNTER — OFFICE VISIT (OUTPATIENT)
Dept: FAMILY MEDICINE CLINIC | Age: 64
End: 2019-09-09
Payer: MEDICARE

## 2019-09-09 VITALS
RESPIRATION RATE: 22 BRPM | WEIGHT: 293 LBS | DIASTOLIC BLOOD PRESSURE: 76 MMHG | TEMPERATURE: 97.6 F | SYSTOLIC BLOOD PRESSURE: 142 MMHG | BODY MASS INDEX: 44.41 KG/M2 | HEART RATE: 88 BPM | HEIGHT: 68 IN

## 2019-09-09 DIAGNOSIS — R82.998 LEUKOCYTES IN URINE: ICD-10-CM

## 2019-09-09 DIAGNOSIS — I48.91 ATRIAL FIBRILLATION, UNSPECIFIED TYPE (HCC): ICD-10-CM

## 2019-09-09 DIAGNOSIS — R30.0 DYSURIA: ICD-10-CM

## 2019-09-09 DIAGNOSIS — R29.898 WEAKNESS OF BOTH LEGS: ICD-10-CM

## 2019-09-09 DIAGNOSIS — Z79.4 TYPE 2 DIABETES MELLITUS WITH OTHER CIRCULATORY COMPLICATION, WITH LONG-TERM CURRENT USE OF INSULIN (HCC): ICD-10-CM

## 2019-09-09 DIAGNOSIS — E11.59 TYPE 2 DIABETES MELLITUS WITH OTHER CIRCULATORY COMPLICATION, WITH LONG-TERM CURRENT USE OF INSULIN (HCC): ICD-10-CM

## 2019-09-09 DIAGNOSIS — Z09 HOSPITAL DISCHARGE FOLLOW-UP: Primary | ICD-10-CM

## 2019-09-09 LAB
BILIRUBIN, POC: NORMAL
BLOOD URINE, POC: NORMAL
CLARITY, POC: NORMAL
COLOR, POC: YELLOW
GLUCOSE URINE, POC: NORMAL
KETONES, POC: NORMAL
LEUKOCYTE EST, POC: NORMAL
NITRITE, POC: NORMAL
PH, POC: 5.5
PROTEIN, POC: NORMAL
SPECIFIC GRAVITY, POC: 1.02
UROBILINOGEN, POC: 0.2

## 2019-09-09 PROCEDURE — 99496 TRANSJ CARE MGMT HIGH F2F 7D: CPT | Performed by: NURSE PRACTITIONER

## 2019-09-09 PROCEDURE — 1111F DSCHRG MED/CURRENT MED MERGE: CPT | Performed by: NURSE PRACTITIONER

## 2019-09-09 PROCEDURE — 81002 URINALYSIS NONAUTO W/O SCOPE: CPT | Performed by: NURSE PRACTITIONER

## 2019-09-09 RX ORDER — NITROFURANTOIN 25; 75 MG/1; MG/1
100 CAPSULE ORAL 2 TIMES DAILY
Qty: 20 CAPSULE | Refills: 0 | Status: SHIPPED | OUTPATIENT
Start: 2019-09-09 | End: 2019-09-12 | Stop reason: ALTCHOICE

## 2019-09-09 NOTE — PROGRESS NOTES
Post-Discharge Transitional Care Management Services or Hospital Follow Up      Kenneth Ayers   YOB: 1955    Date of Office Visit:  9/9/2019  Date of Hospital Admission: 8/26/19  Date of Hospital Discharge: 8/30/19  Risk of hospital readmission (high >=14%.  Medium >=10%) :Readmission Risk Score: 37      Care management risk score Rising risk (score 2-5) and Complex Care (Scores >=6): 4     Non face to face  following discharge, date last encounter closed (first attempt may have been earlier): 9/4/2019 11:28 AM    Call initiated 2 business days of discharge: No    Patient Active Problem List   Diagnosis    [facility]di[facility]) [facility]    Hypertension    Hyperlipidemia    Neuropathy    Osteoarthritis    Proteinuria    Arthritis    Allergic rhinitis    CKD (chronic kidney disease)    Diabetes mellitus type 2, insulin dependent ([facility]    Obstructive sleep apnea on CPAP    S/P laparoscopic sleeve gastrectomy    Pneumonia    Morbid obesity with BMI of 50.0-59.9, adult ([facility]    Coronary artery disease involving native heart without angina pectoris    Multiple thyroid nodules    Bilateral renal cysts    Knee pain, left    Dyspnea    Atrial fibrillation with rapid ventricular response (HCC)    Atrial fibrillation (HCC)    Acute diastolic CHF (congestive heart failure), NYHA class 2 (HCC)    CKD (chronic kidney disease) stage 3, GFR 30-59 ml/min (HCC)    Acute on chronic systolic heart failure (HCC)    Shortness of breath    Acute on chronic combined systolic (congestive) and diastolic (congestive) heart failure (HCC)    Anemia    Paroxysmal A-fib (HCC)    CKD (chronic kidney disease), stage III (HCC)       No Known Allergies    Medications listed as ordered at the time of discharge from hospital   [facility] Medication Instructions KLEBER:    Printed on:09/09/19 2158   Medication Information                      allopurinol (ZYLOPRIM) 100 MG tablet  Take 100 mg by mouth daily             amitriptyline (ELAVIL) 10 MG tablet  Take 1 tablet by mouth nightly             aspirin (RA ASPIRIN EC) 81 MG EC tablet  Take 1 tablet by mouth daily             atorvastatin (LIPITOR) 40 MG tablet  take 1 tablet by mouth at bedtime             Calcium Carbonate-Vitamin D (CALCIUM-VITAMIN D) 500-200 MG-UNIT per tablet  Take 1 tablet by mouth 2 times daily (with meals)              CPAP Machine MISC  by Does not apply route Please change CPAP to auto pressure to 7-15 cm H20.             diltiazem (CARDIZEM CD) 240 MG extended release capsule  Take 1 capsule by mouth daily             DULoxetine (CYMBALTA) 60 MG extended release capsule  take 1 capsule by mouth once daily             fenofibrate (TRICOR) 145 MG tablet  take 1 tablet by mouth once daily             ferrous sulfate 325 (65 Fe) MG EC tablet  Take 1 tablet by mouth 3 times daily (with meals)             furosemide (LASIX) 40 MG tablet  Take 0.5 tablets by mouth daily             gabapentin (NEURONTIN) 100 MG capsule  Take 100 mg by mouth 3 times daily. hydrALAZINE (APRESOLINE) 25 MG tablet  Take 1 tablet by mouth 3 times daily . hold if SBP less than 100 mm hg             insulin glargine (LANTUS SOLOSTAR) 100 UNIT/ML injection pen  Inject 20 Units into the skin 2 times daily             insulin lispro protamine & lispro (HUMALOG MIX) (75-25) 100 UNIT per ML SUSP injection vial  Inject 32 Units into the skin 2 times daily (with meals)             Insulin Pen Needle (PEN NEEDLES) 31G X 5 MM MISC  1 each by Does not apply route 3 times daily             isosorbide mononitrate (IMDUR) 60 MG extended release tablet  Take 1 tablet by mouth daily             lisinopril (PRINIVIL;ZESTRIL) 2.5 MG tablet  Take 1 tablet by mouth daily             meclizine (ANTIVERT) 25 MG CHEW  Take 1 tablet by mouth 3 times daily as needed (vertigo)             metoprolol tartrate (LOPRESSOR) 25 MG tablet  take 1/2 tablet by mouth twice a day Misc. Devices [facility]) MISC  1 Device by Does not apply route as needed (prn)             pantoprazole (PROTONIX) 40 MG tablet  take 1 tablet (40 mg) by oral route once daily             vitamin B-12 (CYANOCOBALAMIN) 100 MCG tablet  Take 1 tablet by mouth daily             warfarin (COUMADIN) 2.5 MG tablet  As directed by [facility] Coumadin clinic                   Medications marked \"taking\" at this time  Outpatient Medications Marked as Taking for the 9/9/19 encounter (Office Visit) with ARNOL Cárdenas CNP   Medication Sig Dispense Refill    lisinopril (PRINIVIL;ZESTRIL) 2.5 MG tablet Take 1 tablet by mouth daily 30 tablet 3    insulin glargine (LANTUS SOLOSTAR) 100 UNIT/ML injection pen Inject 20 Units into the skin 2 times daily 5 pen 3    warfarin (COUMADIN) 2.5 MG tablet As directed by [facility] Coumadin [facility]      amitriptyline (ELAVIL) 10 MG tablet Take 1 tablet by mouth nightly 30 tablet 3    diltiazem (CARDIZEM CD) 240 MG extended release capsule Take 1 capsule by mouth daily 30 capsule 3    insulin lispro protamine & lispro (HUMALOG MIX) (75-25) 100 UNIT per ML SUSP injection vial Inject 32 Units into the skin 2 times daily (with meals) 1 vial 3    isosorbide mononitrate (IMDUR) 60 MG extended release tablet Take 1 tablet by mouth daily 30 tablet 3    aspirin (RA ASPIRIN EC) 81 MG EC tablet Take 1 tablet by mouth daily 30 tablet 3    furosemide (LASIX) 40 MG tablet Take 0.5 tablets by mouth daily 60 tablet 0    allopurinol (ZYLOPRIM) 100 MG tablet Take 100 mg by mouth daily      gabapentin (NEURONTIN) 100 MG capsule Take 100 mg by mouth 3 times daily.  hydrALAZINE (APRESOLINE) 25 MG tablet Take 1 tablet by mouth 3 times daily . hold if SBP less than 100 mm hg 90 tablet 3    meclizine (ANTIVERT) 25 MG CHEW Take 1 tablet by mouth 3 times daily as needed (vertigo) 90 tablet 0    Misc.  Devices [facility]) MISC 1 Device by Does not apply route as needed (prn) 1 each 0    ferrous sulfate 325 (65 Fe) MG EC tablet Take 1 tablet by mouth 3 times daily (with meals) 90 tablet 3    vitamin B-12 (CYANOCOBALAMIN) 100 MCG tablet Take 1 tablet by mouth daily 30 tablet 6    pantoprazole (PROTONIX) 40 MG tablet take 1 tablet (40 mg) by oral route once daily 90 tablet 1    metoprolol tartrate (LOPRESSOR) 25 MG tablet take 1/2 tablet by mouth twice a day 90 tablet 3    atorvastatin (LIPITOR) 40 MG tablet take 1 tablet by mouth at bedtime 90 tablet 1    DULoxetine (CYMBALTA) 60 MG extended release capsule take 1 capsule by mouth once daily 90 capsule 3    fenofibrate (TRICOR) 145 MG tablet take 1 tablet by mouth once daily 90 tablet 3    CPAP Machine MISC by Does not apply route Please change CPAP to auto pressure to 7-15 cm H20. 1 each 0    Insulin Pen Needle (PEN NEEDLES) 31G X 5 MM MISC 1 each by Does not apply route 3 times daily 100 each 11    Calcium Carbonate-Vitamin D (CALCIUM-VITAMIN D) 500-200 MG-UNIT per tablet Take 1 tablet by mouth 2 times daily (with meals)           Medications patient taking as of now reconciled against medications ordered at time of hospital discharge: Yes    Chief Complaint   Patient presents with    [facility] from [facility] A-fib       History of Present illness - Follow up of Hospital diagnosis(es): atrial fibrillation, type ii diabetes    Inpatient course: Discharge summary reviewed- see chart. Interval history/Current status: Sofía Enriquez is a 58 y.o. female admitted to [address] [place] [address] on 8/26/2019 for SOB.    58 y. o. female with a PMH of DARWIN, PAF, OA, Hypothyroidism, HTN, HLD, GERD, DM, CKD, CAD, HFrEF who presented to [address] [place] [address] with complaints of shortness of breath.  Patient states that she has a LifeVest in place in noted that she heard a beep.  She reports that her heart rate and blood pressure were elevated.  Patient notified EMS when she felt her box vibrate.  Patient denies chest pain.  Patient

## 2019-09-10 ENCOUNTER — TELEPHONE (OUTPATIENT)
Dept: FAMILY MEDICINE CLINIC | Age: 64
End: 2019-09-10

## 2019-09-11 ENCOUNTER — CARE COORDINATION (OUTPATIENT)
Dept: CASE MANAGEMENT | Age: 64
End: 2019-09-11

## 2019-09-11 ENCOUNTER — TELEPHONE (OUTPATIENT)
Dept: FAMILY MEDICINE CLINIC | Age: 64
End: 2019-09-11

## 2019-09-11 LAB
ORGANISM: ABNORMAL
URINE CULTURE, ROUTINE: ABNORMAL

## 2019-09-11 RX ORDER — SULFAMETHOXAZOLE AND TRIMETHOPRIM 800; 160 MG/1; MG/1
1 TABLET ORAL 2 TIMES DAILY
Qty: 14 TABLET | Refills: 0 | Status: SHIPPED | OUTPATIENT
Start: 2019-09-11 | End: 2019-09-17

## 2019-09-12 ENCOUNTER — HOSPITAL ENCOUNTER (OUTPATIENT)
Dept: PHARMACY | Age: 64
Setting detail: THERAPIES SERIES
Discharge: HOME | End: 2019-09-12
Payer: MEDICARE

## 2019-09-12 ENCOUNTER — OFFICE VISIT (OUTPATIENT)
Dept: CARDIOLOGY CLINIC | Age: 64
End: 2019-09-12
Payer: MEDICARE

## 2019-09-12 VITALS
HEART RATE: 103 BPM | SYSTOLIC BLOOD PRESSURE: 102 MMHG | WEIGHT: 293 LBS | OXYGEN SATURATION: 93 % | DIASTOLIC BLOOD PRESSURE: 64 MMHG | HEIGHT: 68 IN | BODY MASS INDEX: 44.41 KG/M2

## 2019-09-12 DIAGNOSIS — I50.22 CHF (CONGESTIVE HEART FAILURE), NYHA CLASS III, CHRONIC, SYSTOLIC (HCC): Primary | ICD-10-CM

## 2019-09-12 DIAGNOSIS — I48.91 ATRIAL FIBRILLATION, UNSPECIFIED TYPE (HCC): ICD-10-CM

## 2019-09-12 DIAGNOSIS — R60.0 BILATERAL LEG EDEMA: ICD-10-CM

## 2019-09-12 LAB — POC INR: 2 (ref 0.8–1.2)

## 2019-09-12 PROCEDURE — G8417 CALC BMI ABV UP PARAM F/U: HCPCS | Performed by: NURSE PRACTITIONER

## 2019-09-12 PROCEDURE — 85610 PROTHROMBIN TIME: CPT

## 2019-09-12 PROCEDURE — G8427 DOCREV CUR MEDS BY ELIG CLIN: HCPCS | Performed by: NURSE PRACTITIONER

## 2019-09-12 PROCEDURE — 99212 OFFICE O/P EST SF 10 MIN: CPT

## 2019-09-12 PROCEDURE — 1036F TOBACCO NON-USER: CPT | Performed by: NURSE PRACTITIONER

## 2019-09-12 PROCEDURE — 1111F DSCHRG MED/CURRENT MED MERGE: CPT | Performed by: NURSE PRACTITIONER

## 2019-09-12 PROCEDURE — 3017F COLORECTAL CA SCREEN DOC REV: CPT | Performed by: NURSE PRACTITIONER

## 2019-09-12 PROCEDURE — 99214 OFFICE O/P EST MOD 30 MIN: CPT | Performed by: NURSE PRACTITIONER

## 2019-09-12 PROCEDURE — 36416 COLLJ CAPILLARY BLOOD SPEC: CPT

## 2019-09-12 PROCEDURE — G8598 ASA/ANTIPLAT THER USED: HCPCS | Performed by: NURSE PRACTITIONER

## 2019-09-12 RX ORDER — WARFARIN SODIUM 2.5 MG/1
TABLET ORAL
Qty: 40 TABLET | Refills: 3 | Status: SHIPPED | OUTPATIENT
Start: 2019-09-12 | End: 2020-02-03 | Stop reason: SDUPTHER

## 2019-09-12 ASSESSMENT — ENCOUNTER SYMPTOMS
ABDOMINAL DISTENTION: 0
COUGH: 0
SHORTNESS OF BREATH: 1

## 2019-09-12 NOTE — PATIENT INSTRUCTIONS
You may receive a survey regarding the care you received during your visit. Your input is valuable to us. We encourage you to complete and return your survey. We hope you will choose us in the future for your healthcare needs. Continue:  · Continue current medications  · Daily weights and record  · Fluid restriction of 2 Liters per day  · Limit sodium in diet to around 3971-2904 mg/day  · Monitor BP  · Activity as tolerated     Call the Heart Failure Clinic for any of the following symptoms: [phone]   Weight gain of 2-3 pounds in 1 day or 5 pounds in 1 week   Increased shortness of breath   Shortness of breath while laying down   Cough   Chest pain   Swelling in feet, ankles or legs   Tenderness or bloating in the abdomen   Fatigue    Decreased appetite or nausea    Confusion       START taking LASIX!! Eat banana every other day.    Get labs

## 2019-09-12 NOTE — PROGRESS NOTES
Medication Management [facility]  Anticoagulation Clinic  [phone] (phone)  [phone] (fax)      Ms. Tushar Can is a 58 y.o.  female with history of Afib who presents today for anticoagulation monitoring and adjustment. Patient verifies current dosing regimen and tablet strength. No missed or extra doses. Patient denies s/s bleeding/bruising/SOB/chest pain - swelling in legs, has CHF, has maybe gotten a little worse (patient is not sure). Is going to [facility] Monday for afib/CHF and will discuss then. Counseled patient about how swelling can affect INR. No blood in urine or stool. No dietary changes. No changes in OTC agents/Herbals - started Bactrim DS 1 tab BID x 7 days yesterday (Wednesday the 11th - last dose will be on Tuesday the 17th). Stressed importance of calling the coumadin clinic if new medications are started or changed. No change in alcohol use or tobacco use. No change in activity level. Patient denies headaches/dizziness/lightheadedness/falls. No vomiting/diarrhea or acute illness. No Procedures scheduled in the future at this time. Assessment:   Lab Results   Component Value Date    INR 2.00 (H) 09/12/2019    INR 1.70 (H) 09/05/2019    INR 1.96 (H) 08/30/2019     INR therapeutic   Recent Labs     09/12/19  1448   INR 2.00*       Plan:  Decrease Coumadin to 2.5 mg daily while on Bactrim. Recheck INR in 1 week once Bactrim therapy is complete. Patient reminded to call the Anticoagulation Clinic with signs or symptoms of bleeding or with any medication changes. Patient and sister given instructions utilizing the teach back method. Discharged via wheelchair in no apparent distress. After visit summary printed and reviewed with patient.       Medications reviewed and updated on home medication list Yes    Influenza vaccine:     [] given    [] declined   [] received previously   [] plans to receive at a later time   [] refused    [x]

## 2019-09-12 NOTE — PROGRESS NOTES
Yes - 12# since discharge  Home weight: Not weighing  Home blood pressure:     Past Medical History:   Diagnosis Date    CAD (coronary artery disease)     CRI (chronic renal insufficiency)     Difficult intubation     excess skin in back of throat     DM (diabetes mellitus) ([address]     GERD (gastroesophageal reflux disease)     H/O gastric bypass     Hyperlipidemia     Hypertension     Hypothyroidism     Neuropathy     Obesity     Osteoarthritis     Paroxysmal atrial fibrillation (HCC)     Prolonged emergence from general anesthesia     Sleep apnea     uses cpap    Visit for screening mammogram      Past Surgical History:   Procedure Laterality Date    BACK SURGERY      xs 2    CATARACT REMOVAL Right 7/24/14    Dr. Juan R Arriola     COLONOSCOPY      EKG 12-LEAD  9/18/2015         FOOT SURGERY      left foot    HYSTERECTOMY, TOTAL ABDOMINAL      MOUTH BIOPSY  9-28-12    left tongue and lingual tonsil    OTHER SURGICAL HISTORY  11/8/2014    LEFT FEMUR RETROGRADE NAILING    OTHER SURGICAL HISTORY  4/23/2015    HARDWARE REMOVAL LEFT FEMUR, INSERTION OF ANTIBIOTIC SPACER    PATELLA SURGERY  7/11/13    fractues rt patella     NM COLON CA SCRN NOT  [address] IND Left 1/24/2018    COLONOSCOPY performed by Hazel Batres MD at [address] [facility]    SKIN TAG REMOVAL  9/25/12    mole removal    SLEEVE GASTRECTOMY  09/15/2015    Robotic    TOENAIL EXCISION      removal of great toe nails bilateral-still has toenails-had ingrown toenails and had trimmed out     TONSILLECTOMY      UPPER GASTROINTESTINAL ENDOSCOPY       Family History   Problem Relation Age of Onset    Asthma Sister     Asthma Brother     Heart Disease Father     High Blood Pressure Other     Cancer Maternal Uncle         stomach    Diabetes Maternal Grandmother     High Blood Pressure Maternal Grandmother     Diabetes Maternal Grandfather     Stroke Neg Hx      Social History     Tobacco Use    Smoking status: Former Smoker Packs/day: 1.50     Years: 20.00     Pack years: 30.00     Last attempt to quit: 2007     Years since quittin.6    Smokeless tobacco: Never Used   Substance Use Topics    Alcohol use: No     Alcohol/week: 0.0 standard drinks     Current Outpatient Medications   Medication Sig Dispense Refill    sulfamethoxazole-trimethoprim (BACTRIM DS;SEPTRA DS) 800-160 MG per tablet Take 1 tablet by mouth 2 times daily for 7 days 14 tablet 0    lisinopril (PRINIVIL;ZESTRIL) 2.5 MG tablet Take 1 tablet by mouth daily 30 tablet 3    insulin glargine (LANTUS SOLOSTAR) 100 UNIT/ML injection pen Inject 20 Units into the skin 2 times daily 5 pen 3    diltiazem (CARDIZEM CD) 240 MG extended release capsule Take 1 capsule by mouth daily 30 capsule 3    insulin lispro protamine & lispro (HUMALOG MIX) (75-25) 100 UNIT per ML SUSP injection vial Inject 32 Units into the skin 2 times daily (with meals) 1 vial 3    isosorbide mononitrate (IMDUR) 60 MG extended release tablet Take 1 tablet by mouth daily 30 tablet 3    aspirin (RA ASPIRIN EC) 81 MG EC tablet Take 1 tablet by mouth daily 30 tablet 3    furosemide (LASIX) 40 MG tablet Take 0.5 tablets by mouth daily 60 tablet 0    allopurinol (ZYLOPRIM) 100 MG tablet Take 100 mg by mouth daily      gabapentin (NEURONTIN) 100 MG capsule Take 100 mg by mouth 3 times daily.  hydrALAZINE (APRESOLINE) 25 MG tablet Take 1 tablet by mouth 3 times daily . hold if SBP less than 100 mm hg 90 tablet 3    meclizine (ANTIVERT) 25 MG CHEW Take 1 tablet by mouth 3 times daily as needed (vertigo) 90 tablet 0    ferrous sulfate 325 (65 Fe) MG EC tablet Take 1 tablet by mouth 3 times daily (with meals) 90 tablet 3    vitamin B-12 (CYANOCOBALAMIN) 100 MCG tablet Take 1 tablet by mouth daily 30 tablet 6    pantoprazole (PROTONIX) 40 MG tablet take 1 tablet (40 mg) by oral route once daily 90 tablet 1    metoprolol tartrate (LOPRESSOR) 25 MG tablet take 1/2 tablet by mouth twice a day normal leaflet separation.   DOPPLER: The transmitral velocity was within the normal range with no   evidence for mitral stenosis. There was trace mitral regurgitation.      Aortic Valve   The aortic valve was trileaflet with normal thickness and cuspal   separation. There was no echocardiographic evidence of a vegetation.   DOPPLER: Transaortic velocity was within the normal range with no evidence   of aortic stenosis or regurgitaiton.      Tricuspid Valve   The tricuspid valve structure was normal with normal leaflet separation.   There was no echocardiographic evidence of a vegetation. DOPPLER: There   was mild tricuspid regurgitation.      Pulmonic Valve   The pulmonic valve leaflets exhibited normal thickness, no calcification,   and normal cuspal separation. There was no echocardiographic evidence of   vegetation. DOPPLER: The transpulmonic velocity was within the normal   range with no evidence for regurgitation.      Left Atrium   Left atrial size was severely dilated with no thrombus identified.   APPENDAGE: The left atrial appendage size was normal with no thrombus   identified. DOPPLER: The function was abnormal (reduced emptying   velocity).     Left Ventricle   Normal left ventricular size and severely reduced systolic function.   There was severe regional wall motion abnormality.   Wall thickness was within normal limits.   Ejection fraction was estimated at 25-30%.     Right Atrium   Right atrial size was moderately dilated with no thrombus identified.  ATRIAL SEPTUM: No defect or patent foramen ovale was identified.  There was   no right-to-left shunt, with provocative maneuvers to increase right   atrial pressure.      Right Ventricle   The right ventricular size was normal with normal systolic function and   wall thickness.      Pericardial Effusion   The pericardium was normal in appearance with no evidence of a pericardial   effusion.      Pleural Effusion   No evidence of pleural effusion.     CATH (7/9/19)  CORONARY ANATOMY:  1. Right coronary artery is the dominant vessel. There is significant  ectasia in the proximal, mid, and distal portions. There is about 50%  to 60% disease, mildly calcified in the proximal portion. There is  another 50% disease in the mid segment with mild haziness. There is  another ulcerated lesion about 50% in the distal RCA. Distal RCA gives  rise to a large size PDA and PL branches both of which have mild luminal  irregularities. These findings are similar to the cath that was  performed on 05/16/2017.  2.  Left main artery is patent in the proximal, mid, and distal portions  and gives rise to LAD and left circumflex artery. 3.  Left circumflex artery is small in caliber. There is mild disease  in the proximal segment and distally there appears to be subtotally  occluded circumflex artery. 4.  Left anterior descending artery is patent in the proximal portion. There are mildly calcified luminal irregularities in the mid portion and  distally there are mild luminal irregularities. 5.  Cath findings are similar to the findings that were done in  angiogram on 05/16/2017. 6.  Catheters were removed and hemostasis was achieved with a Vasc Band  device. She tolerated the procedure well without any significant issues  and she was transferred back to the room in stable condition.     SUMMARY:  Nonobstructive coronary artery disease. Findings are similar  to the findings that were in prior cath in 2017.     RECOMMENDATIONS:  1. Aggressive risk factor modification. 2.  Lipid-lowering therapy. 3.  Optimized medical management.   4.  Follow up in clinic in one to two weeks to evaluate symptoms  further.     Crow Mariscal MD     D: 07/17/2019 16:42:38       T: 07/17/2019 17:40:20     [email or web address]  Job#: [id number]     Doc#: [id number]      Results reviewed:  BNP: No results found for: BNP  CBC:   Lab Results   Component Value Date    WBC 4.5 08/30/2019    RBC 3.00

## 2019-09-13 ENCOUNTER — TELEPHONE (OUTPATIENT)
Dept: CARDIOLOGY CLINIC | Age: 64
End: 2019-09-13

## 2019-09-13 RX ORDER — HYDRALAZINE HYDROCHLORIDE 25 MG/1
12.5 TABLET, FILM COATED ORAL 2 TIMES DAILY
Status: ON HOLD | COMMUNITY
End: 2019-09-18

## 2019-09-13 NOTE — TELEPHONE ENCOUNTER
----- Message from ARNOL Patel CNP sent at 9/13/2019  9:21 AM EDT -----  RESUME taking Lasix!!  Take BID x 3 days w/ bananas  Decrease Hydralazine to half tab BID  Increase Lopressor to full tab BID

## 2019-09-17 ENCOUNTER — HOSPITAL ENCOUNTER (INPATIENT)
Age: 64
LOS: 3 days | Discharge: HOME | DRG: 291 | End: 2019-09-22
Attending: FAMILY MEDICINE | Admitting: HOSPITALIST
Payer: MEDICARE

## 2019-09-17 ENCOUNTER — TELEPHONE (OUTPATIENT)
Dept: CARDIOLOGY CLINIC | Age: 64
End: 2019-09-17

## 2019-09-17 ENCOUNTER — TELEPHONE (OUTPATIENT)
Dept: FAMILY MEDICINE CLINIC | Age: 64
End: 2019-09-17

## 2019-09-17 ENCOUNTER — APPOINTMENT (OUTPATIENT)
Dept: GENERAL RADIOLOGY | Age: 64
DRG: 291 | End: 2019-09-17
Payer: MEDICARE

## 2019-09-17 ENCOUNTER — NURSE ONLY (OUTPATIENT)
Dept: FAMILY MEDICINE CLINIC | Age: 64
End: 2019-09-17

## 2019-09-17 DIAGNOSIS — I48.91 ATRIAL FIBRILLATION WITH RVR (HCC): Primary | ICD-10-CM

## 2019-09-17 DIAGNOSIS — N17.9 AKI (ACUTE KIDNEY INJURY) (HCC): ICD-10-CM

## 2019-09-17 DIAGNOSIS — I50.21 ACUTE SYSTOLIC CONGESTIVE HEART FAILURE (HCC): ICD-10-CM

## 2019-09-17 DIAGNOSIS — I50.43 ACUTE ON CHRONIC COMBINED SYSTOLIC (CONGESTIVE) AND DIASTOLIC (CONGESTIVE) HEART FAILURE (HCC): ICD-10-CM

## 2019-09-17 DIAGNOSIS — Z79.4 TYPE 2 DIABETES MELLITUS WITH OTHER CIRCULATORY COMPLICATION, WITH LONG-TERM CURRENT USE OF INSULIN (HCC): Primary | ICD-10-CM

## 2019-09-17 DIAGNOSIS — E11.59 TYPE 2 DIABETES MELLITUS WITH OTHER CIRCULATORY COMPLICATION, WITH LONG-TERM CURRENT USE OF INSULIN (HCC): Primary | ICD-10-CM

## 2019-09-17 LAB
ALBUMIN SERPL-MCNC: 3.3 G/DL (ref 3.5–5.1)
ALP BLD-CCNC: 113 U/L (ref 38–126)
ALT SERPL-CCNC: 15 U/L (ref 11–66)
ANION GAP SERPL CALCULATED.3IONS-SCNC: 15 MEQ/L (ref 8–16)
APTT: 48.8 SECONDS (ref 22–38)
AST SERPL-CCNC: 29 U/L (ref 5–40)
BASOPHILS # BLD: 0.6 %
BASOPHILS ABSOLUTE: 0 THOU/MM3 (ref 0–0.1)
BILIRUB SERPL-MCNC: 0.4 MG/DL (ref 0.3–1.2)
BILIRUBIN DIRECT: < 0.2 MG/DL (ref 0–0.3)
BUN BLDV-MCNC: 54 MG/DL (ref 7–22)
CALCIUM SERPL-MCNC: 9.1 MG/DL (ref 8.5–10.5)
CHLORIDE BLD-SCNC: 103 MEQ/L (ref 98–111)
CO2: 19 MEQ/L (ref 23–33)
CREAT SERPL-MCNC: 2.4 MG/DL (ref 0.4–1.2)
EKG ATRIAL RATE: 45 BPM
EKG Q-T INTERVAL: 358 MS
EKG QRS DURATION: 110 MS
EKG QTC CALCULATION (BAZETT): 508 MS
EKG R AXIS: 15 DEGREES
EKG T AXIS: -176 DEGREES
EKG VENTRICULAR RATE: 121 BPM
EOSINOPHIL # BLD: 1.1 %
EOSINOPHILS ABSOLUTE: 0.1 THOU/MM3 (ref 0–0.4)
ERYTHROCYTE [DISTWIDTH] IN BLOOD BY AUTOMATED COUNT: 16 % (ref 11.5–14.5)
ERYTHROCYTE [DISTWIDTH] IN BLOOD BY AUTOMATED COUNT: 60.2 FL (ref 35–45)
GFR SERPL CREATININE-BSD FRML MDRD: 20 ML/MIN/1.73M2
GLUCOSE BLD-MCNC: 178 MG/DL (ref 70–108)
HCT VFR BLD CALC: 33 % (ref 37–47)
HEMOGLOBIN: 10.3 GM/DL (ref 12–16)
IMMATURE GRANS (ABS): 0 THOU/MM3 (ref 0–0.07)
IMMATURE GRANULOCYTES: 0 %
INR BLD: 2.13 (ref 0.85–1.13)
LIPASE: 36.3 U/L (ref 5.6–51.3)
LYMPHOCYTES # BLD: 13.8 %
LYMPHOCYTES ABSOLUTE: 0.7 THOU/MM3 (ref 1–4.8)
MCH RBC QN AUTO: 31.7 PG (ref 26–33)
MCHC RBC AUTO-ENTMCNC: 31.2 GM/DL (ref 32.2–35.5)
MCV RBC AUTO: 101.5 FL (ref 81–99)
MONOCYTES # BLD: 5.7 %
MONOCYTES ABSOLUTE: 0.3 THOU/MM3 (ref 0.4–1.3)
NUCLEATED RED BLOOD CELLS: 0 /100 WBC
OSMOLALITY CALCULATION: 293 MOSMOL/KG (ref 275–300)
PLATELET # BLD: 201 THOU/MM3 (ref 130–400)
PMV BLD AUTO: 11.1 FL (ref 9.4–12.4)
POTASSIUM SERPL-SCNC: 5.4 MEQ/L (ref 3.5–5.2)
PRO-BNP: ABNORMAL PG/ML (ref 0–900)
RBC # BLD: 3.25 MILL/MM3 (ref 4.2–5.4)
SEG NEUTROPHILS: 78.8 %
SEGMENTED NEUTROPHILS ABSOLUTE COUNT: 4.2 THOU/MM3 (ref 1.8–7.7)
SODIUM BLD-SCNC: 137 MEQ/L (ref 135–145)
TOTAL PROTEIN: 6.8 G/DL (ref 6.1–8)
TROPONIN T: < 0.01 NG/ML
TSH SERPL DL<=0.05 MIU/L-ACNC: 0.5 UIU/ML (ref 0.4–4.2)
WBC # BLD: 5.3 THOU/MM3 (ref 4.8–10.8)

## 2019-09-17 PROCEDURE — G0378 HOSPITAL OBSERVATION PER HR: HCPCS

## 2019-09-17 PROCEDURE — 96375 TX/PRO/DX INJ NEW DRUG ADDON: CPT

## 2019-09-17 PROCEDURE — 85610 PROTHROMBIN TIME: CPT

## 2019-09-17 PROCEDURE — 51702 INSERT TEMP BLADDER CATH: CPT

## 2019-09-17 PROCEDURE — 84443 ASSAY THYROID STIM HORMONE: CPT

## 2019-09-17 PROCEDURE — 93005 ELECTROCARDIOGRAM TRACING: CPT | Performed by: FAMILY MEDICINE

## 2019-09-17 PROCEDURE — 83690 ASSAY OF LIPASE: CPT

## 2019-09-17 PROCEDURE — 36415 COLL VENOUS BLD VENIPUNCTURE: CPT

## 2019-09-17 PROCEDURE — 71045 X-RAY EXAM CHEST 1 VIEW: CPT

## 2019-09-17 PROCEDURE — 85025 COMPLETE CBC W/AUTO DIFF WBC: CPT

## 2019-09-17 PROCEDURE — 2709999900 HC NON-CHARGEABLE SUPPLY

## 2019-09-17 PROCEDURE — 96374 THER/PROPH/DIAG INJ IV PUSH: CPT

## 2019-09-17 PROCEDURE — 84484 ASSAY OF TROPONIN QUANT: CPT

## 2019-09-17 PROCEDURE — 82248 BILIRUBIN DIRECT: CPT

## 2019-09-17 PROCEDURE — 83880 ASSAY OF NATRIURETIC PEPTIDE: CPT

## 2019-09-17 PROCEDURE — 80053 COMPREHEN METABOLIC PANEL: CPT

## 2019-09-17 PROCEDURE — 6360000002 HC RX W HCPCS: Performed by: FAMILY MEDICINE

## 2019-09-17 PROCEDURE — 85730 THROMBOPLASTIN TIME PARTIAL: CPT

## 2019-09-17 PROCEDURE — 99220 PR INITIAL OBSERVATION CARE/DAY 70 MINUTES: CPT | Performed by: PHYSICIAN ASSISTANT

## 2019-09-17 PROCEDURE — 99285 EMERGENCY DEPT VISIT HI MDM: CPT

## 2019-09-17 RX ORDER — METOPROLOL SUCCINATE 25 MG/1
25 TABLET, EXTENDED RELEASE ORAL DAILY
Status: DISCONTINUED | OUTPATIENT
Start: 2019-09-18 | End: 2019-09-18

## 2019-09-17 RX ORDER — ATORVASTATIN CALCIUM 40 MG/1
40 TABLET, FILM COATED ORAL DAILY
Status: DISCONTINUED | OUTPATIENT
Start: 2019-09-18 | End: 2019-09-22 | Stop reason: HOSPADM

## 2019-09-17 RX ORDER — PANTOPRAZOLE SODIUM 40 MG/1
40 TABLET, DELAYED RELEASE ORAL
Status: DISCONTINUED | OUTPATIENT
Start: 2019-09-18 | End: 2019-09-18

## 2019-09-17 RX ORDER — SODIUM CHLORIDE 0.9 % (FLUSH) 0.9 %
10 SYRINGE (ML) INJECTION EVERY 12 HOURS SCHEDULED
Status: DISCONTINUED | OUTPATIENT
Start: 2019-09-17 | End: 2019-09-22 | Stop reason: HOSPADM

## 2019-09-17 RX ORDER — DIGOXIN 0.25 MG/ML
250 INJECTION INTRAMUSCULAR; INTRAVENOUS ONCE
Status: COMPLETED | OUTPATIENT
Start: 2019-09-17 | End: 2019-09-17

## 2019-09-17 RX ORDER — LISINOPRIL 2.5 MG/1
2.5 TABLET ORAL DAILY
Status: DISCONTINUED | OUTPATIENT
Start: 2019-09-18 | End: 2019-09-17

## 2019-09-17 RX ORDER — HYDRALAZINE HYDROCHLORIDE 25 MG/1
12.5 TABLET, FILM COATED ORAL 2 TIMES DAILY
Status: DISCONTINUED | OUTPATIENT
Start: 2019-09-17 | End: 2019-09-18

## 2019-09-17 RX ORDER — FENOFIBRATE 160 MG/1
160 TABLET ORAL DAILY
Status: DISCONTINUED | OUTPATIENT
Start: 2019-09-18 | End: 2019-09-22 | Stop reason: HOSPADM

## 2019-09-17 RX ORDER — DULOXETIN HYDROCHLORIDE 60 MG/1
60 CAPSULE, DELAYED RELEASE ORAL DAILY
Status: DISCONTINUED | OUTPATIENT
Start: 2019-09-18 | End: 2019-09-22 | Stop reason: HOSPADM

## 2019-09-17 RX ORDER — DIGOXIN 125 MCG
125 TABLET ORAL DAILY
COMMUNITY
End: 2019-10-08

## 2019-09-17 RX ORDER — ONDANSETRON 2 MG/ML
4 INJECTION INTRAMUSCULAR; INTRAVENOUS EVERY 6 HOURS PRN
Status: DISCONTINUED | OUTPATIENT
Start: 2019-09-17 | End: 2019-09-22 | Stop reason: HOSPADM

## 2019-09-17 RX ORDER — FUROSEMIDE 20 MG/1
20 TABLET ORAL DAILY
Status: DISCONTINUED | OUTPATIENT
Start: 2019-09-18 | End: 2019-09-20

## 2019-09-17 RX ORDER — SODIUM CHLORIDE 0.9 % (FLUSH) 0.9 %
10 SYRINGE (ML) INJECTION PRN
Status: DISCONTINUED | OUTPATIENT
Start: 2019-09-17 | End: 2019-09-22 | Stop reason: HOSPADM

## 2019-09-17 RX ORDER — ISOSORBIDE MONONITRATE 60 MG/1
60 TABLET, EXTENDED RELEASE ORAL DAILY
Status: DISCONTINUED | OUTPATIENT
Start: 2019-09-18 | End: 2019-09-22 | Stop reason: HOSPADM

## 2019-09-17 RX ORDER — GABAPENTIN 100 MG/1
100 CAPSULE ORAL 3 TIMES DAILY
Status: DISCONTINUED | OUTPATIENT
Start: 2019-09-17 | End: 2019-09-22 | Stop reason: HOSPADM

## 2019-09-17 RX ORDER — ALLOPURINOL 100 MG/1
100 TABLET ORAL DAILY
Status: DISCONTINUED | OUTPATIENT
Start: 2019-09-18 | End: 2019-09-22 | Stop reason: HOSPADM

## 2019-09-17 RX ORDER — FUROSEMIDE 10 MG/ML
80 INJECTION INTRAMUSCULAR; INTRAVENOUS ONCE
Status: COMPLETED | OUTPATIENT
Start: 2019-09-17 | End: 2019-09-17

## 2019-09-17 RX ORDER — INSULIN GLARGINE 100 [IU]/ML
20 INJECTION, SOLUTION SUBCUTANEOUS 2 TIMES DAILY
Status: DISCONTINUED | OUTPATIENT
Start: 2019-09-17 | End: 2019-09-19

## 2019-09-17 RX ORDER — FOLIC ACID 1 MG/1
1 TABLET ORAL DAILY
COMMUNITY
End: 2020-01-20

## 2019-09-17 RX ORDER — ASPIRIN 81 MG/1
81 TABLET ORAL DAILY
Status: DISCONTINUED | OUTPATIENT
Start: 2019-09-18 | End: 2019-09-22 | Stop reason: HOSPADM

## 2019-09-17 RX ORDER — AMITRIPTYLINE HYDROCHLORIDE 10 MG/1
10 TABLET, FILM COATED ORAL NIGHTLY
Status: DISCONTINUED | OUTPATIENT
Start: 2019-09-17 | End: 2019-09-22 | Stop reason: HOSPADM

## 2019-09-17 RX ADMIN — FUROSEMIDE 80 MG: 10 INJECTION, SOLUTION INTRAMUSCULAR; INTRAVENOUS at 19:19

## 2019-09-17 RX ADMIN — DIGOXIN 250 MCG: 0.25 INJECTION INTRAMUSCULAR; INTRAVENOUS at 18:15

## 2019-09-17 ASSESSMENT — ENCOUNTER SYMPTOMS
ABDOMINAL PAIN: 0
SHORTNESS OF BREATH: 1
ALLERGIC/IMMUNOLOGIC NEGATIVE: 1
GASTROINTESTINAL NEGATIVE: 1
WHEEZING: 0
EYES NEGATIVE: 1

## 2019-09-17 ASSESSMENT — PAIN SCALES - GENERAL: PAINLEVEL_OUTOF10: 0

## 2019-09-17 NOTE — ED NOTES
Bed: 007A  Expected date: 9/17/19  Expected time:   Means of arrival: [facility]  Comments:      Morey Frankel, RN  09/17/19 [id number]

## 2019-09-17 NOTE — H&P
Seg Neutrophils 78.8 %    Lymphocytes 13.8 %    Monocytes 5.7 %    Eosinophils 1.1 %    Basophils 0.6 %    Immature Granulocytes 0 %    Segs Absolute 4.2 1.8 - 7.7 thou/mm3    Lymphocytes Absolute 0.7 (L) 1.0 - 4.8 thou/mm3    Monocytes Absolute 0.3 (L) 0.4 - 1.3 thou/mm3    Eosinophils Absolute 0.1 0.0 - 0.4 thou/mm3    Basophils Absolute 0.0 0.0 - 0.1 thou/mm3    Immature Grans (Abs) 0.00 0.00 - 0.07 thou/mm3    nRBC 0 /100 wbc   Basic Metabolic Panel    Collection Time: 09/17/19  6:12 PM   Result Value Ref Range    Sodium 137 135 - 145 meq/L    Potassium 5.4 (H) 3.5 - 5.2 meq/L    Chloride 103 98 - 111 meq/L    CO2 19 (L) 23 - 33 meq/L    Glucose 178 (H) 70 - 108 mg/dL    BUN 54 (H) 7 - 22 mg/dL    CREATININE 2.4 (H) 0.4 - 1.2 mg/dL    Calcium 9.1 8.5 - 10.5 mg/dL   Hepatic function panel    Collection Time: 09/17/19  6:12 PM   Result Value Ref Range    Alb 3.3 (L) 3.5 - 5.1 g/dL    Total Bilirubin 0.4 0.3 - 1.2 mg/dL    Bilirubin, Direct <0.2 0.0 - 0.3 mg/dL    Alkaline Phosphatase 113 38 - 126 U/L    AST 29 5 - 40 U/L    ALT 15 11 - 66 U/L    Total Protein 6.8 6.1 - 8.0 g/dL   Lipase    Collection Time: 09/17/19  6:12 PM   Result Value Ref Range    Lipase 36.3 5.6 - 51.3 U/L   Troponin    Collection Time: 09/17/19  6:12 PM   Result Value Ref Range    Troponin T < 0.010 ng/ml   Anion Gap    Collection Time: 09/17/19  6:12 PM   Result Value Ref Range    Anion Gap 15.0 8.0 - 16.0 meq/L   Glomerular Filtration Rate, Estimated    Collection Time: 09/17/19  6:12 PM   Result Value Ref Range    Est, Glom Filt Rate 20 (A) ml/min/1.73m2   Osmolality    Collection Time: 09/17/19  6:12 PM   Result Value Ref Range    Osmolality Calc 293.0 275.0 - 300 mOsmol/kg         Vital Signs: T: 98F P: 111 RR: 18 B/P: 123/68: FiO2: RA: O2 Sat:98%: I/O:     Intake/Output Summary (Last 24 hours) at 9/17/2019 2306  Last data filed at 9/17/2019 2305  Gross per 24 hour   Intake --   Output 1450 ml   Net -1450 ml         General: Morbidly obese, no distress, chronically ill appearing, non toxic  HEENT:  normocephalic and atraumatic. No scleral icterus. PEARLA, mucous membranes moist  Neck: supple. Trachea midline. No JVD. Full ROM, no meningismus. Lungs: clear to auscultation. No retractions, no accessory muscle use. Cardiac: irregularly irregular, no murmur, 2+ pulses  Abdomen: soft. Nontender. Bowel sounds active, morbidly obese abdomen  Extremities:  No clubbing, cyanosis x 4, 4+ nonpitting edema    Vasculature: capillary refill < 3 seconds. Skin:  warm and dry. no visible rashes  Psych:  Alert and oriented x3. Affect appropriate  Lymph:  No supraclavicular adenopathy. Neurologic:  CN II-XII grossly intact. No focal deficit. Data: (All radiographs, tracings, PFTs, and imaging are personally viewed and interpreted unless otherwise noted).  Reviewed records from previous [address] [facility] visit summary from [facility] - medication doses changed    Greater than 55 minutes spent in the evaluation and care coordination of this patient. At least 1/2 of that time was spent in face-to-face setting.     Electronically signed by  Shaq Schwartz PA-C

## 2019-09-17 NOTE — ED PROVIDER NOTES
[facility]  eMERGENCY dEPARTMENT eNCOUnter          CHIEF COMPLAINT       Chief Complaint   Patient presents with    Shortness of Breath    Atrial Fibrillation       Nurses Notes reviewed and I agree except as noted in the HPI. HISTORY OF PRESENT ILLNESS    Irma Larsen is a 58 y.o. female who presents with shortness of breath    Location/Symptom: Short of breath  Timing/Onset: Over the last few days  Context/Setting: Chronic CHF  Chronic atrial fibrillation  She was seen yesterday at [facility] clinic by cardiology who recommended she stop Cardizem and Lopressor, and start Lanoxin 0.125 daily  Has not started the Lanoxin yet  Quality: Short of breath with activity  Increasing leg edema  Duration: Worse over the last few days  Modifying Factors: Continues on Lasix 20 mg daily plus other medications  Severity: 6/10    REVIEW OF SYSTEMS     Review of Systems   Constitutional: Positive for fatigue. Negative for chills and fever. HENT: Negative for congestion. Eyes: Negative for visual disturbance. Respiratory: Positive for shortness of breath. Negative for wheezing. Non-smoker   Cardiovascular: Positive for leg swelling. Negative for chest pain. Gastrointestinal: Negative for abdominal pain. Genitourinary: Negative for dysuria. Some chronic renal insufficiency   Musculoskeletal: Negative for joint swelling. Skin: Negative for rash. Neurological: Negative for weakness and numbness. Hematological: Negative for adenopathy. On Coumadin   Psychiatric/Behavioral: Negative for confusion.           PAST MEDICAL HISTORY    has a past medical history of CAD (coronary artery disease), CRI (chronic renal insufficiency), Difficult intubation, DM (diabetes mellitus) ([address] GERD (gastroesophageal reflux disease), H/O gastric bypass, Hyperlipidemia, Hypertension, Hypothyroidism, Neuropathy, Obesity, Osteoarthritis, Paroxysmal atrial fibrillation ([address] Prolonged emergence Inject 40 Units into the skin 2 times daily (with meals)    INSULIN PEN NEEDLE (PEN NEEDLES) 31G X 5 MM MISC    1 each by Does not apply route 3 times daily    ISOSORBIDE MONONITRATE (IMDUR) 60 MG EXTENDED RELEASE TABLET    Take 1 tablet by mouth daily    LISINOPRIL (PRINIVIL;ZESTRIL) 2.5 MG TABLET    Take 1 tablet by mouth daily    MECLIZINE (ANTIVERT) 25 MG CHEW    Take 1 tablet by mouth 3 times daily as needed (vertigo)    METOPROLOL TARTRATE (LOPRESSOR) 25 MG TABLET    Take 25 mg by mouth 2 times daily    MISC. DEVICES (WHEELCHAIR) MISC    1 Device by Does not apply route as needed (prn)    PANTOPRAZOLE (PROTONIX) 40 MG TABLET    take 1 tablet (40 mg) by oral route once daily    SULFAMETHOXAZOLE-TRIMETHOPRIM (BACTRIM DS;SEPTRA DS) 800-160 MG PER TABLET    Take 1 tablet by mouth 2 times daily for 7 days    VITAMIN B-12 (CYANOCOBALAMIN) 100 MCG TABLET    Take 1 tablet by mouth daily    WARFARIN (COUMADIN) 2.5 MG TABLET    As directed by [facility] Coumadin clinic. 30 days = 40 tabs       ALLERGIES     has No Known Allergies. FAMILY HISTORY     She indicated that her mother is . She indicated that her father is . She indicated that her sister is alive. She indicated that her brother is alive. She indicated that the status of her maternal grandmother is unknown. She indicated that the status of her maternal grandfather is unknown. She indicated that the status of her maternal uncle is unknown. She indicated that the status of her other is unknown. She indicated that the status of her neg hx is unknown.   family history includes Asthma in her brother and sister; Cancer in her maternal uncle; Diabetes in her maternal grandfather and maternal grandmother; Heart Disease in her father; High Blood Pressure in her maternal grandmother and another family member. SOCIAL HISTORY      reports that she quit smoking about 12 years ago. She has a 30.00 pack-year smoking history.  She has never used smokeless

## 2019-09-18 ENCOUNTER — APPOINTMENT (OUTPATIENT)
Dept: GENERAL RADIOLOGY | Age: 64
DRG: 291 | End: 2019-09-18
Payer: MEDICARE

## 2019-09-18 ENCOUNTER — APPOINTMENT (OUTPATIENT)
Dept: PHARMACY | Age: 64
End: 2019-09-18
Payer: MEDICARE

## 2019-09-18 ENCOUNTER — TELEPHONE (OUTPATIENT)
Dept: CARDIOLOGY CLINIC | Age: 64
End: 2019-09-18

## 2019-09-18 LAB
ANION GAP SERPL CALCULATED.3IONS-SCNC: 11 MEQ/L (ref 8–16)
ANION GAP SERPL CALCULATED.3IONS-SCNC: 14 MEQ/L (ref 8–16)
BASOPHILS # BLD: 0.4 %
BASOPHILS ABSOLUTE: 0 THOU/MM3 (ref 0–0.1)
BUN BLDV-MCNC: 49 MG/DL (ref 7–22)
BUN BLDV-MCNC: 50 MG/DL (ref 7–22)
CALCIUM SERPL-MCNC: 9.2 MG/DL (ref 8.5–10.5)
CALCIUM SERPL-MCNC: 9.2 MG/DL (ref 8.5–10.5)
CHLORIDE BLD-SCNC: 102 MEQ/L (ref 98–111)
CHLORIDE BLD-SCNC: 99 MEQ/L (ref 98–111)
CO2: 21 MEQ/L (ref 23–33)
CO2: 24 MEQ/L (ref 23–33)
CREAT SERPL-MCNC: 2.4 MG/DL (ref 0.4–1.2)
CREAT SERPL-MCNC: 2.5 MG/DL (ref 0.4–1.2)
EOSINOPHIL # BLD: 2.5 %
EOSINOPHILS ABSOLUTE: 0.1 THOU/MM3 (ref 0–0.4)
ERYTHROCYTE [DISTWIDTH] IN BLOOD BY AUTOMATED COUNT: 16.1 % (ref 11.5–14.5)
ERYTHROCYTE [DISTWIDTH] IN BLOOD BY AUTOMATED COUNT: 59.7 FL (ref 35–45)
GFR SERPL CREATININE-BSD FRML MDRD: 19 ML/MIN/1.73M2
GFR SERPL CREATININE-BSD FRML MDRD: 20 ML/MIN/1.73M2
GLUCOSE BLD-MCNC: 108 MG/DL (ref 70–108)
GLUCOSE BLD-MCNC: 111 MG/DL (ref 70–108)
GLUCOSE BLD-MCNC: 137 MG/DL (ref 70–108)
GLUCOSE BLD-MCNC: 179 MG/DL (ref 70–108)
GLUCOSE BLD-MCNC: 181 MG/DL (ref 70–108)
GLUCOSE BLD-MCNC: 186 MG/DL (ref 70–108)
GLUCOSE BLD-MCNC: 197 MG/DL (ref 70–108)
GLUCOSE BLD-MCNC: 216 MG/DL (ref 70–108)
GLUCOSE BLD-MCNC: 45 MG/DL (ref 70–108)
GLUCOSE BLD-MCNC: 51 MG/DL (ref 70–108)
GLUCOSE BLD-MCNC: 52 MG/DL (ref 70–108)
HCT VFR BLD CALC: 33.3 % (ref 37–47)
HEMOGLOBIN: 10 GM/DL (ref 12–16)
IMMATURE GRANS (ABS): 0.02 THOU/MM3 (ref 0–0.07)
IMMATURE GRANULOCYTES: 0 %
INR BLD: 2 (ref 0.85–1.13)
LYMPHOCYTES # BLD: 33 %
LYMPHOCYTES ABSOLUTE: 1.5 THOU/MM3 (ref 1–4.8)
MCH RBC QN AUTO: 30.6 PG (ref 26–33)
MCHC RBC AUTO-ENTMCNC: 30 GM/DL (ref 32.2–35.5)
MCV RBC AUTO: 101.8 FL (ref 81–99)
MONOCYTES # BLD: 7.8 %
MONOCYTES ABSOLUTE: 0.4 THOU/MM3 (ref 0.4–1.3)
NUCLEATED RED BLOOD CELLS: 0 /100 WBC
PLATELET # BLD: 170 THOU/MM3 (ref 130–400)
PMV BLD AUTO: 11.3 FL (ref 9.4–12.4)
POTASSIUM REFLEX MAGNESIUM: 4.3 MEQ/L (ref 3.5–5.2)
POTASSIUM SERPL-SCNC: 4.6 MEQ/L (ref 3.5–5.2)
RBC # BLD: 3.27 MILL/MM3 (ref 4.2–5.4)
SEG NEUTROPHILS: 55.9 %
SEGMENTED NEUTROPHILS ABSOLUTE COUNT: 2.5 THOU/MM3 (ref 1.8–7.7)
SODIUM BLD-SCNC: 134 MEQ/L (ref 135–145)
SODIUM BLD-SCNC: 137 MEQ/L (ref 135–145)
TROPONIN T: < 0.01 NG/ML
TROPONIN T: < 0.01 NG/ML
WBC # BLD: 4.5 THOU/MM3 (ref 4.8–10.8)

## 2019-09-18 PROCEDURE — 6370000000 HC RX 637 (ALT 250 FOR IP): Performed by: INTERNAL MEDICINE

## 2019-09-18 PROCEDURE — 85610 PROTHROMBIN TIME: CPT

## 2019-09-18 PROCEDURE — 2580000003 HC RX 258: Performed by: FAMILY MEDICINE

## 2019-09-18 PROCEDURE — 96375 TX/PRO/DX INJ NEW DRUG ADDON: CPT

## 2019-09-18 PROCEDURE — 82948 REAGENT STRIP/BLOOD GLUCOSE: CPT

## 2019-09-18 PROCEDURE — G0378 HOSPITAL OBSERVATION PER HR: HCPCS

## 2019-09-18 PROCEDURE — 2709999900 HC NON-CHARGEABLE SUPPLY

## 2019-09-18 PROCEDURE — 36415 COLL VENOUS BLD VENIPUNCTURE: CPT

## 2019-09-18 PROCEDURE — 71045 X-RAY EXAM CHEST 1 VIEW: CPT

## 2019-09-18 PROCEDURE — 99223 1ST HOSP IP/OBS HIGH 75: CPT | Performed by: INTERNAL MEDICINE

## 2019-09-18 PROCEDURE — 85025 COMPLETE CBC W/AUTO DIFF WBC: CPT

## 2019-09-18 PROCEDURE — 6370000000 HC RX 637 (ALT 250 FOR IP): Performed by: HOSPITALIST

## 2019-09-18 PROCEDURE — 80048 BASIC METABOLIC PNL TOTAL CA: CPT

## 2019-09-18 PROCEDURE — 6370000000 HC RX 637 (ALT 250 FOR IP): Performed by: PHYSICIAN ASSISTANT

## 2019-09-18 PROCEDURE — 6370000000 HC RX 637 (ALT 250 FOR IP): Performed by: FAMILY MEDICINE

## 2019-09-18 PROCEDURE — 99213 OFFICE O/P EST LOW 20 MIN: CPT | Performed by: NURSE PRACTITIONER

## 2019-09-18 PROCEDURE — 2580000003 HC RX 258: Performed by: PHYSICIAN ASSISTANT

## 2019-09-18 PROCEDURE — 51702 INSERT TEMP BLADDER CATH: CPT

## 2019-09-18 PROCEDURE — 99226 PR SBSQ OBSERVATION CARE/DAY 35 MINUTES: CPT | Performed by: FAMILY MEDICINE

## 2019-09-18 PROCEDURE — 84484 ASSAY OF TROPONIN QUANT: CPT

## 2019-09-18 RX ORDER — DIGOXIN 125 MCG
125 TABLET ORAL DAILY
Status: DISCONTINUED | OUTPATIENT
Start: 2019-09-18 | End: 2019-09-22 | Stop reason: HOSPADM

## 2019-09-18 RX ORDER — METOPROLOL SUCCINATE 100 MG/1
100 TABLET, EXTENDED RELEASE ORAL DAILY
Status: DISCONTINUED | OUTPATIENT
Start: 2019-09-19 | End: 2019-09-19

## 2019-09-18 RX ORDER — METOPROLOL SUCCINATE 50 MG/1
50 TABLET, EXTENDED RELEASE ORAL DAILY
Status: DISCONTINUED | OUTPATIENT
Start: 2019-09-19 | End: 2019-09-18

## 2019-09-18 RX ORDER — HYDRALAZINE HYDROCHLORIDE 20 MG/ML
10 INJECTION INTRAMUSCULAR; INTRAVENOUS EVERY 4 HOURS PRN
Status: DISCONTINUED | OUTPATIENT
Start: 2019-09-18 | End: 2019-09-22 | Stop reason: HOSPADM

## 2019-09-18 RX ORDER — HYDRALAZINE HYDROCHLORIDE 25 MG/1
25 TABLET, FILM COATED ORAL 3 TIMES DAILY
Status: ON HOLD | COMMUNITY
End: 2019-09-22 | Stop reason: HOSPADM

## 2019-09-18 RX ORDER — NICOTINE POLACRILEX 4 MG
15 LOZENGE BUCCAL PRN
Status: DISCONTINUED | OUTPATIENT
Start: 2019-09-18 | End: 2019-09-22 | Stop reason: HOSPADM

## 2019-09-18 RX ORDER — METOPROLOL SUCCINATE 25 MG/1
25 TABLET, EXTENDED RELEASE ORAL DAILY
Status: ON HOLD | COMMUNITY
End: 2019-09-22 | Stop reason: HOSPADM

## 2019-09-18 RX ORDER — WARFARIN SODIUM 3 MG/1
3 TABLET ORAL ONCE
Status: COMPLETED | OUTPATIENT
Start: 2019-09-18 | End: 2019-09-18

## 2019-09-18 RX ORDER — FAMOTIDINE 20 MG/1
20 TABLET, FILM COATED ORAL DAILY
Status: DISCONTINUED | OUTPATIENT
Start: 2019-09-19 | End: 2019-09-22 | Stop reason: HOSPADM

## 2019-09-18 RX ORDER — METOPROLOL SUCCINATE 25 MG/1
25 TABLET, EXTENDED RELEASE ORAL ONCE
Status: COMPLETED | OUTPATIENT
Start: 2019-09-18 | End: 2019-09-18

## 2019-09-18 RX ORDER — DEXTROSE MONOHYDRATE 25 G/50ML
12.5 INJECTION, SOLUTION INTRAVENOUS PRN
Status: DISCONTINUED | OUTPATIENT
Start: 2019-09-18 | End: 2019-09-22 | Stop reason: HOSPADM

## 2019-09-18 RX ORDER — ACETAMINOPHEN 325 MG/1
650 TABLET ORAL EVERY 4 HOURS PRN
Status: DISCONTINUED | OUTPATIENT
Start: 2019-09-18 | End: 2019-09-22 | Stop reason: HOSPADM

## 2019-09-18 RX ORDER — METOPROLOL SUCCINATE 50 MG/1
50 TABLET, EXTENDED RELEASE ORAL ONCE
Status: COMPLETED | OUTPATIENT
Start: 2019-09-18 | End: 2019-09-18

## 2019-09-18 RX ORDER — DEXTROSE MONOHYDRATE 50 MG/ML
100 INJECTION, SOLUTION INTRAVENOUS PRN
Status: DISCONTINUED | OUTPATIENT
Start: 2019-09-18 | End: 2019-09-22 | Stop reason: HOSPADM

## 2019-09-18 RX ADMIN — INSULIN LISPRO 1 UNITS: 100 INJECTION, SOLUTION INTRAVENOUS; SUBCUTANEOUS at 12:16

## 2019-09-18 RX ADMIN — ASPIRIN 81 MG: 81 TABLET ORAL at 10:19

## 2019-09-18 RX ADMIN — ATORVASTATIN CALCIUM 40 MG: 40 TABLET, FILM COATED ORAL at 22:14

## 2019-09-18 RX ADMIN — DULOXETINE HYDROCHLORIDE 60 MG: 60 CAPSULE, DELAYED RELEASE ORAL at 10:19

## 2019-09-18 RX ADMIN — PANTOPRAZOLE SODIUM 40 MG: 40 TABLET, DELAYED RELEASE ORAL at 10:22

## 2019-09-18 RX ADMIN — METOPROLOL SUCCINATE 50 MG: 50 TABLET, EXTENDED RELEASE ORAL at 14:15

## 2019-09-18 RX ADMIN — DEXTROSE MONOHYDRATE 12.5 G: 25 INJECTION, SOLUTION INTRAVENOUS at 22:33

## 2019-09-18 RX ADMIN — Medication 10 ML: at 00:19

## 2019-09-18 RX ADMIN — AMITRIPTYLINE HYDROCHLORIDE 10 MG: 10 TABLET, FILM COATED ORAL at 22:16

## 2019-09-18 RX ADMIN — FUROSEMIDE 20 MG: 20 TABLET ORAL at 12:15

## 2019-09-18 RX ADMIN — HYDRALAZINE HYDROCHLORIDE 12.5 MG: 25 TABLET, FILM COATED ORAL at 00:30

## 2019-09-18 RX ADMIN — AMITRIPTYLINE HYDROCHLORIDE 10 MG: 10 TABLET, FILM COATED ORAL at 00:16

## 2019-09-18 RX ADMIN — DIGOXIN 125 MCG: 125 TABLET ORAL at 14:15

## 2019-09-18 RX ADMIN — FENOFIBRATE 160 MG: 160 TABLET ORAL at 10:20

## 2019-09-18 RX ADMIN — METOPROLOL SUCCINATE 25 MG: 25 TABLET, EXTENDED RELEASE ORAL at 18:27

## 2019-09-18 RX ADMIN — Medication 10 ML: at 22:17

## 2019-09-18 RX ADMIN — INSULIN LISPRO 1 UNITS: 100 INJECTION, SOLUTION INTRAVENOUS; SUBCUTANEOUS at 17:43

## 2019-09-18 RX ADMIN — Medication 10 ML: at 09:54

## 2019-09-18 RX ADMIN — INSULIN GLARGINE 20 UNITS: 100 INJECTION, SOLUTION SUBCUTANEOUS at 00:32

## 2019-09-18 RX ADMIN — GABAPENTIN 100 MG: 100 CAPSULE ORAL at 10:21

## 2019-09-18 RX ADMIN — INSULIN GLARGINE 20 UNITS: 100 INJECTION, SOLUTION SUBCUTANEOUS at 09:51

## 2019-09-18 RX ADMIN — Medication 15 G: at 22:04

## 2019-09-18 RX ADMIN — GABAPENTIN 100 MG: 100 CAPSULE ORAL at 14:16

## 2019-09-18 RX ADMIN — GABAPENTIN 100 MG: 100 CAPSULE ORAL at 00:16

## 2019-09-18 RX ADMIN — GABAPENTIN 100 MG: 100 CAPSULE ORAL at 22:15

## 2019-09-18 RX ADMIN — METOPROLOL SUCCINATE 25 MG: 25 TABLET, FILM COATED, EXTENDED RELEASE ORAL at 10:22

## 2019-09-18 RX ADMIN — ISOSORBIDE MONONITRATE 60 MG: 60 TABLET ORAL at 12:15

## 2019-09-18 RX ADMIN — INSULIN LISPRO 40 UNITS: 100 INJECTION, SUSPENSION SUBCUTANEOUS at 17:43

## 2019-09-18 RX ADMIN — WARFARIN SODIUM 3 MG: 3 TABLET ORAL at 17:42

## 2019-09-18 ASSESSMENT — PAIN SCALES - GENERAL
PAINLEVEL_OUTOF10: 0

## 2019-09-18 NOTE — PROGRESS NOTES
1.9 last admission 8/2019 -- follows with Dr. Shira Morgan - renal consulted 9/18 per cardio recs -- holding home entresto/lisinopril;  S/p IV lasix 80 mg in ER 9/18 -> continued on home lasix 20 mg daily upon admission -> ?cont vs need further IV dosing  4. Hyperkalemia -- slight on admission 9/17 5.4 --> improving 9/18 4.3 likely down due to lasix in ER 9/17 --> monitor with KEEGAN - hold ACE/entresto  5. Metabolic acidosis -- likely due to renal dysfxn - improving slightly 9/18 -> renal c/s 9/18 (P) per cardio recs --no signs of infection, no lactic acid done on admission  6. Non-obstructive CAD-- per cath 7/2019 = 50-60% prox and 50% mid and 50% distal ulcerated lesion of RCA, mild irreg of PDA and PL branches; LCx small with mild dz prox and subtotally occluded distally;  LAD patent prox, mild irreg in mid and distally --> similar findings to 5/2017 -- cont ASA, statin, tricor, BB, imdur, coumadin -- trop (-) x 1 on admission -- per cardio  7. Non-Ischemic CM -- has life vest -- last echo NICKIE 7/2019 = EF 25-30%, RA mod dilated, LA severely dilated, trace MR, mild TR -- ?due to uncontrolled afib -- to follow with [facility] EP cardiology for further eval -- meds per above  8. Chronic anticoagulation -- for afib - cont coumadin with pharm to dose - INR therapeutic 2.0 on 9/18 -- follows with [facility] coumadin clinic  9. Chronic macrocytic anemia --stable in 10's and baseline 9-10's last year -- ?due to CKD -- iron studies 8/30/19 with low iron (were normal 4/2019) --> ?dose of IV iron--> cont home oral dose -- last B12 and folate were high 4/2019 - ?need the po FA but has hx of gastric sleeve. 10. Essential HTN -- cont imdur -- toprol/hydralazine/ACE/entresto all recently changed at [facility] --> await cardio adjustment of medications for #1 and monitor - hold home hydralazine and cont prn for elevated BP  11.  Type 2 DM -- on home 75/25 -> started on 40 units bid on admission -> takes differently at home 30 in am/47 pm -- ??also on lantus ondansetron      Intake/Output Summary (Last 24 hours) at 9/18/2019 0852  Last data filed at 9/18/2019 0455  Gross per 24 hour   Intake 450 ml   Output 3600 ml   Net -3150 ml       Diet:  DIET CARB CONTROL; Exam:  /86   Pulse 101   Temp 97.9 °F (36.6 °C) (Oral)   Resp 18   Ht 5' 8\" (1.727 m)   Wt (!) 333 lb 8 oz (151.3 kg)   LMP 02/20/2001   SpO2 90%   BMI 50.71 kg/m²     General appearance: No apparent distress, appears stated age and cooperative. HEENT: Pupils equal, round, and reactive to light. Conjunctivae/corneas clear. Neck: Supple, with full range of motion. No jugular venous distention. Trachea midline. Respiratory:  Normal respiratory effort. Diminished in bases but Clear to auscultation, bilaterally without Rales/Wheezes/Rhonchi. No respiratory distress or accessory muscle use. Cardiovascular: irreg, irreg with normal S1/S2 without murmurs, rubs or gallops. Abdomen: Soft, non-tender, non-distended with normal bowel sounds. No rebound or guarding  Musculoskeletal: 1+ BLE edema. Full range of motion without deformity. No calf tenderness palpation  Skin: Skin color, texture, turgor normal.  Venous stasis hyperkeratosis LLE/shin  Neurologic:  Neurovascularly intact without any focal sensory/motor deficits.  Cranial nerves: II-XII intact, grossly non-focal.  Psychiatric: Alert and oriented, thought content appropriate, normal insight  Capillary Refill: Brisk,< 3 seconds   Peripheral Pulses: +palpable, equal bilaterally       Labs:   Recent Labs     09/17/19 1812 09/18/19 0312   WBC 5.3 4.5*   HGB 10.3* 10.0*   HCT 33.0* 33.3*    170     Recent Labs     09/17/19 1812 09/18/19 0312    137   K 5.4* 4.3    102   CO2 19* 21*   BUN 54* 50*   CREATININE 2.4* 2.5*   CALCIUM 9.1 9.2     Recent Labs     09/17/19 1812   AST 29   ALT 15   BILIDIR <0.2   BILITOT 0.4   ALKPHOS 113     Recent Labs     09/17/19 1812 09/18/19 0312   INR 2.13* 2.00*     No results for input(s): Colette Mcgowandai in the last 72 hours. Urinalysis:      Lab Results   Component Value Date    NITRU NEGATIVE 08/27/2019    WBCUA NONE SEEN 08/27/2019    BACTERIA NONE 08/27/2019    RBCUA 0-2 08/27/2019    BLOODU neg 09/09/2019    BLOODU NEGATIVE 08/27/2019    SPECGRAV 1.025 09/09/2019    SPECGRAV 1.016 08/27/2019    GLUCOSEU neg 09/09/2019    GLUCOSEU NEGATIVE 05/17/2019       Radiology:  XR CHEST PORTABLE   Final Result   1. Borderline heart size. Artifacts are present from a life vest.   2. Hazy appearance both mid and lower lung fields, consistent with pneumonia/pulmonary edema. Suspicion of tiny bilateral pleural effusions. 3. No pulmonary vascular congestion is seen. Overall appearance of chest slightly better than seen on prior study. **This report has been created using voice recognition software. It may contain minor errors which are inherent in voice recognition technology. **      Final report electronically signed by Dr. Rosita Tobin on 9/17/2019 6:09 PM          Diet: DIET CARB CONTROL; [place]: yes - 9/17    Microbiology:  none    Tele:  afib - rate to 120's at times    DVT prophylaxis: [] Lovenox                                 [] SCDs                                 [] SQ Heparin                                 [] Encourage ambulation           [x] Already on Anticoagulation -- coumadin     Disposition:    [] Home       [] TCU       [] Rehab       [] Psych       [] SNF       [] [place]       [x] Other- ??  Home vs ECF    Code Status: Full Code    PT/OT Eval Status: monitor for need        Electronically signed by ALBER HAND MD on 9/18/2019 at 8:52 AM

## 2019-09-18 NOTE — PLAN OF CARE
sure the home medication list is updated and correct. Medications will be given per the mar. Problem: Fluid Volume - Imbalance:  Goal: Absence of imbalanced fluid volume signs and symptoms  Description  Absence of imbalanced fluid volume signs and symptoms  Outcome: Ongoing  Note:   Iv lasix given in ER, she is on oral diuretic and monitoring the output as she has a byrd cath. Problem: Pain:  Goal: Recognizes and communicates pain  Description  Recognizes and communicates pain  Outcome: Ongoing  Note:   Patient denies any pain. Pain meds per mar if they are needed. Problem: Serum Glucose Level - Abnormal:  Goal: Ability to maintain appropriate glucose levels will improve to within specified parameters  Description  Ability to maintain appropriate glucose levels will improve to within specified parameters  Outcome: Ongoing  Note:   Monitoring patients blood sugar before meals and at bedtime. Insulin per the mar as ordered. Problem: Tissue Perfusion - Cardiopulmonary, Altered:  Goal: Hemodynamic stability will improve  Description  Hemodynamic stability will improve  Outcome: Ongoing    Care plan reviewed with patient. Patient verbalize understanding of the plan of care and contribute to goal setting.

## 2019-09-18 NOTE — CARE COORDINATION
DISCHARGE BARRIERS  9/18/19, 2:02 PM    Reason for Referral: ECF placement  Mental Status: Alert and oriented to person, place and time. Decision Making: Independent with decision making. Family/Social/Home Environment: Lives at home with her sister. Her sister does all of the cooking, cleaning and driving. Tj Norris would like to go back to [facility], but for her sister to remain at home she needs to share expenses with Tj Norris. Current Services: None  Current Equipment:rolling walker, wheelchair, commode, shower chair,lift chair. Payment Source:Medicare and Medicaid. Concerns or Barriers to Discharge: None  Collabrative List of ECF/HH were provided:NA    Teach Back Method used with Tj Norris regarding care plan and discharge planning. Patient verbalized understanding of the plan of care and contribute to goal setting. Anticipated Needs/Discharge Plan: Tj Norris plans to return home with sister. Refusing ECF placement. Does not wan any home lynne. Denies needs. Electronically signed by JORI Kan on 9/18/2019 at 2:02 PM

## 2019-09-18 NOTE — CONSULTS
control for  the AFib. I will increase her Toprol-XL from 25 to 75 daily and further  adjustment to 100 probably required down the line depending on the rate. Resume her home dose digoxin 125 mg p.o. daily and continue Entresto,  but it should be twice a day. 2.  Control the blood pressure and control the heart rate that is vital  and gentle diuresis. 3.  In view of cardiorenal involvement, Nephrology input will be  required and will be appreciated. 4.  She is on LifeVest.  Continue the LifeVest.  5.  Also, we will optimize the medical therapy for underlying  cardiomyopathy, Entresto with Toprol-XL. 6.  Down the line, consideration for ICD if there is no improvement in  the LV function. 7.  The patient is planning to go back to [facility] for  evaluation by EP doctor for a possible radiofrequency ablation. Discussed with the patient the plan of care. Based on the course, we  will gauge further care. Not a good candidate for Aldactone in view of  the higher potassium and also stage 4 kidney disease. Thank you for allowing me to participate in the care of this patient. We will follow up with you. Eliza Ames.  Lilly Payton M.D.    D: 09/18/2019 12:55:01       T: 09/18/2019 16:19:36     [email or web address]  Job#: [id number]     Doc#: [id number]    CC:
(153.8 kg)   09/17/19 1810 (!) 332 lb (150.6 kg)     Wt Readings from Last 3 Encounters:   09/18/19 (!) 333 lb 8 oz (151.3 kg)   09/12/19 (!) 331 lb (150.1 kg)   09/09/19 (!) 328 lb (148.8 kg)     BP Readings from Last 3 Encounters:   09/18/19 (!) 183/93   09/12/19 102/64   09/09/19 (!) 142/76       REVIEW OF SYSTEMS:   GENERAL: Pleasant, Denies fever  HEENT: Denies recent vision change, Denies Upper respiratory complaints  CARDIOVASCULAR: Denies chest pain/angina. RESPIRATORY:Denies cough, wheezing  ABDOMEN: Denies nausea, vomiting, diarrhea, or abdominal pain  CNS:Denies headache, dizziness  MUSCULOSKELETAL: Reports joint arthralgia  SKIN: Denies rash, pruritis  HEMATOLOGIC: Denies bruising  ENDOCRINE:  Negative for heat or cold intolerance     EXAM:    VITALS:  BP (!) 183/93   Pulse 106   Temp 97.5 °F (36.4 °C) (Oral)   Resp 20   Ht 5' 8\" (1.727 m)   Wt (!) 333 lb 8 oz (151.3 kg)   LMP 02/20/2001   SpO2 96%   BMI 50.71 kg/m²    Body mass index is 50.71 kg/m². Vitals:  Patient Vitals for the past 6 hrs:   BP Temp Temp src Pulse Resp SpO2   09/18/19 1137 (!) 183/93 97.5 °F (36.4 °C) Oral 106 20 96 %   09/18/19 1015 (!) 144/105 97.9 °F (36.6 °C) Oral 124 20 93 %       Constitutional:  No acute distress. Skin: No rash on exposed surfaces, No significant bruises  Heent:  Head is normocephalic, Extraocular movement intact, Mucus membranes moist. Voice is clear without hoarseness or slurred speech   Neck: no jugular venous distention noted, Neck is supple  Cardiovascular:  S1, S2  regular rate and rhythm. Respiratory: Clear to ausculation bilaterally. Equal breath sounds, No crackles, No wheezes. No shortness of breath. Abdomen: Soft, non tender  Ext: Distal lower extremity temp is warm, Trace lower extremity edema. Musculoskeletal: Movement is coordinated. Moves all extremities. Psychiatric: mood and affect appropriate  Neurological: Patient is alert and oriented to person, place, and time.  Recent and

## 2019-09-18 NOTE — PROGRESS NOTES
Pharmacy Medication History Note      List of current medications patient is taking is complete. Source of information: Patient's home medications    Changes made to medication list:  Medications removed (include reason, ex. therapy complete or physician discontinued):  Hydralazine 25 mg - 12.5 mg bid  Humalog 75-25 - inject 40 units into the skin two times daily with meals  Lisinopril 2.5 mg - 1 daily  Vitamin B-12 100 mcg - 1 daily    Medications added/doses adjusted:  Metoprolol succ ER 25 mg - 1 daily  Hydralazine 25 mg - 1 tid  Humalog 75-25 Mix - inject 30 units into the skin every morning and inject 47 units into the skin every evening    Other notes (ex. Recent course of antibiotics, Coumadin dosing):  Patient stated she hasn't been taking the Humalog 75-25 mix because she does not have it. She said she has been taking the Humalog Kwikpen with a sliding scale but does not know the sliding scale (asked her to have a family member bring this in). Patient was not sure if she was taking allopurinol and it was not in her bag of medications. However, she just picked it up 8/25/19. Also, patient also did not have furosemide 40 mg in her bag. However, it was noted in the note from [address] [place] [address] on Monday (9/16/19) that she should continue taking a half tablet daily. Patient wasn't sure if she was taking isosorbide mononitrate ER 30 or 60 mg. Dr. Eufemia Castillo most recent note states to take isosorbide mononitrate 60 ER. In the same note, she noted to take the hydralazine 25 mg - 12.5 mg BID. The [address] [place] [address], however, noted that the patient take hydralazine 25 mg TID. Denies use of other OTC or herbal medications.       Allergies reviewed      Electronically signed by Johnson Jain on 9/18/2019 at 9:30 AM

## 2019-09-19 ENCOUNTER — APPOINTMENT (OUTPATIENT)
Dept: GENERAL RADIOLOGY | Age: 64
DRG: 291 | End: 2019-09-19
Payer: MEDICARE

## 2019-09-19 LAB
ANION GAP SERPL CALCULATED.3IONS-SCNC: 10 MEQ/L (ref 8–16)
BUN BLDV-MCNC: 49 MG/DL (ref 7–22)
CALCIUM SERPL-MCNC: 8.9 MG/DL (ref 8.5–10.5)
CHLORIDE BLD-SCNC: 103 MEQ/L (ref 98–111)
CO2: 25 MEQ/L (ref 23–33)
CREAT SERPL-MCNC: 2.4 MG/DL (ref 0.4–1.2)
DIGOXIN LEVEL: 0.6 NG/ML (ref 0.5–2)
GFR SERPL CREATININE-BSD FRML MDRD: 20 ML/MIN/1.73M2
GLUCOSE BLD-MCNC: 104 MG/DL (ref 70–108)
GLUCOSE BLD-MCNC: 108 MG/DL (ref 70–108)
GLUCOSE BLD-MCNC: 119 MG/DL (ref 70–108)
GLUCOSE BLD-MCNC: 128 MG/DL (ref 70–108)
GLUCOSE BLD-MCNC: 135 MG/DL (ref 70–108)
GLUCOSE BLD-MCNC: 198 MG/DL (ref 70–108)
GLUCOSE BLD-MCNC: 48 MG/DL (ref 70–108)
GLUCOSE BLD-MCNC: 50 MG/DL (ref 70–108)
GLUCOSE BLD-MCNC: 92 MG/DL (ref 70–108)
INR BLD: 1.84 (ref 0.85–1.13)
POTASSIUM SERPL-SCNC: 5.1 MEQ/L (ref 3.5–5.2)
SODIUM BLD-SCNC: 138 MEQ/L (ref 135–145)

## 2019-09-19 PROCEDURE — 2580000003 HC RX 258: Performed by: FAMILY MEDICINE

## 2019-09-19 PROCEDURE — 2580000003 HC RX 258: Performed by: NURSE PRACTITIONER

## 2019-09-19 PROCEDURE — 99232 SBSQ HOSP IP/OBS MODERATE 35: CPT | Performed by: PHYSICIAN ASSISTANT

## 2019-09-19 PROCEDURE — 96365 THER/PROPH/DIAG IV INF INIT: CPT

## 2019-09-19 PROCEDURE — 99232 SBSQ HOSP IP/OBS MODERATE 35: CPT | Performed by: FAMILY MEDICINE

## 2019-09-19 PROCEDURE — 6370000000 HC RX 637 (ALT 250 FOR IP): Performed by: PHYSICIAN ASSISTANT

## 2019-09-19 PROCEDURE — 2580000003 HC RX 258: Performed by: PHYSICIAN ASSISTANT

## 2019-09-19 PROCEDURE — 6370000000 HC RX 637 (ALT 250 FOR IP): Performed by: NURSE PRACTITIONER

## 2019-09-19 PROCEDURE — 6370000000 HC RX 637 (ALT 250 FOR IP): Performed by: INTERNAL MEDICINE

## 2019-09-19 PROCEDURE — 82948 REAGENT STRIP/BLOOD GLUCOSE: CPT

## 2019-09-19 PROCEDURE — 2140000000 HC CCU INTERMEDIATE R&B

## 2019-09-19 PROCEDURE — 99214 OFFICE O/P EST MOD 30 MIN: CPT | Performed by: INTERNAL MEDICINE

## 2019-09-19 PROCEDURE — 6370000000 HC RX 637 (ALT 250 FOR IP): Performed by: PHARMACIST

## 2019-09-19 PROCEDURE — 96376 TX/PRO/DX INJ SAME DRUG ADON: CPT

## 2019-09-19 PROCEDURE — 6360000002 HC RX W HCPCS: Performed by: NURSE PRACTITIONER

## 2019-09-19 PROCEDURE — 36415 COLL VENOUS BLD VENIPUNCTURE: CPT

## 2019-09-19 PROCEDURE — 85610 PROTHROMBIN TIME: CPT

## 2019-09-19 PROCEDURE — 2709999900 HC NON-CHARGEABLE SUPPLY

## 2019-09-19 PROCEDURE — 71046 X-RAY EXAM CHEST 2 VIEWS: CPT

## 2019-09-19 PROCEDURE — 96366 THER/PROPH/DIAG IV INF ADDON: CPT

## 2019-09-19 PROCEDURE — 80162 ASSAY OF DIGOXIN TOTAL: CPT

## 2019-09-19 PROCEDURE — 80048 BASIC METABOLIC PNL TOTAL CA: CPT

## 2019-09-19 PROCEDURE — APPSS30 APP SPLIT SHARED TIME 16-30 MINUTES: Performed by: NURSE PRACTITIONER

## 2019-09-19 PROCEDURE — 6370000000 HC RX 637 (ALT 250 FOR IP): Performed by: FAMILY MEDICINE

## 2019-09-19 RX ORDER — FUROSEMIDE 20 MG/1
20 TABLET ORAL ONCE
Status: COMPLETED | OUTPATIENT
Start: 2019-09-19 | End: 2019-09-19

## 2019-09-19 RX ORDER — WARFARIN SODIUM 4 MG/1
4 TABLET ORAL ONCE
Status: COMPLETED | OUTPATIENT
Start: 2019-09-19 | End: 2019-09-19

## 2019-09-19 RX ORDER — METOPROLOL SUCCINATE 100 MG/1
100 TABLET, EXTENDED RELEASE ORAL 2 TIMES DAILY
Status: DISCONTINUED | OUTPATIENT
Start: 2019-09-19 | End: 2019-09-22 | Stop reason: HOSPADM

## 2019-09-19 RX ADMIN — Medication 15 G: at 22:54

## 2019-09-19 RX ADMIN — METOPROLOL SUCCINATE 100 MG: 100 TABLET, FILM COATED, EXTENDED RELEASE ORAL at 08:56

## 2019-09-19 RX ADMIN — INSULIN LISPRO 40 UNITS: 100 INJECTION, SUSPENSION SUBCUTANEOUS at 17:58

## 2019-09-19 RX ADMIN — INSULIN LISPRO 1 UNITS: 100 INJECTION, SOLUTION INTRAVENOUS; SUBCUTANEOUS at 11:54

## 2019-09-19 RX ADMIN — Medication 10 ML: at 22:02

## 2019-09-19 RX ADMIN — DULOXETINE HYDROCHLORIDE 60 MG: 60 CAPSULE, DELAYED RELEASE ORAL at 08:58

## 2019-09-19 RX ADMIN — GABAPENTIN 100 MG: 100 CAPSULE ORAL at 22:01

## 2019-09-19 RX ADMIN — METOPROLOL SUCCINATE 100 MG: 100 TABLET, FILM COATED, EXTENDED RELEASE ORAL at 21:59

## 2019-09-19 RX ADMIN — DEXTROSE MONOHYDRATE 12.5 G: 25 INJECTION, SOLUTION INTRAVENOUS at 23:11

## 2019-09-19 RX ADMIN — AMITRIPTYLINE HYDROCHLORIDE 10 MG: 10 TABLET, FILM COATED ORAL at 21:58

## 2019-09-19 RX ADMIN — IRON SUCROSE 300 MG: 20 INJECTION, SOLUTION INTRAVENOUS at 11:51

## 2019-09-19 RX ADMIN — Medication 10 ML: at 08:57

## 2019-09-19 RX ADMIN — DIGOXIN 125 MCG: 125 TABLET ORAL at 08:57

## 2019-09-19 RX ADMIN — WARFARIN SODIUM 4 MG: 4 TABLET ORAL at 17:57

## 2019-09-19 RX ADMIN — ATORVASTATIN CALCIUM 40 MG: 40 TABLET, FILM COATED ORAL at 22:01

## 2019-09-19 RX ADMIN — INSULIN LISPRO 40 UNITS: 100 INJECTION, SUSPENSION SUBCUTANEOUS at 09:00

## 2019-09-19 RX ADMIN — FAMOTIDINE 20 MG: 20 TABLET ORAL at 08:58

## 2019-09-19 RX ADMIN — FUROSEMIDE 20 MG: 20 TABLET ORAL at 11:51

## 2019-09-19 RX ADMIN — GABAPENTIN 100 MG: 100 CAPSULE ORAL at 08:58

## 2019-09-19 RX ADMIN — GABAPENTIN 100 MG: 100 CAPSULE ORAL at 13:41

## 2019-09-19 RX ADMIN — ISOSORBIDE MONONITRATE 60 MG: 60 TABLET ORAL at 08:58

## 2019-09-19 RX ADMIN — ASPIRIN 81 MG: 81 TABLET ORAL at 08:57

## 2019-09-19 RX ADMIN — FENOFIBRATE 160 MG: 160 TABLET ORAL at 08:58

## 2019-09-19 RX ADMIN — ALLOPURINOL 100 MG: 100 TABLET ORAL at 08:56

## 2019-09-19 RX ADMIN — FUROSEMIDE 20 MG: 20 TABLET ORAL at 08:57

## 2019-09-19 ASSESSMENT — PAIN SCALES - GENERAL
PAINLEVEL_OUTOF10: 0
PAINLEVEL_OUTOF10: 0

## 2019-09-19 NOTE — PROGRESS NOTES
Cardiology Progress Note      Patient:  Jesus Gaitan  YOB: 1955  MRN: [id number]   Acct: [de-identified]  [facility] Date:  9/17/2019  Primary Cardiologist: Dallas Palafox MD  Seen by dr Browning Newer    Note per dr Neeraj Muller FOR CONSULTATION:  CHF exacerbation and AFib with rapid  ventricular response.     PRIMARY CARDIOLOGIST:  Dallas Palafox MD     HISTORY OF PRESENT ILLNESS:  This is actually a 62-year-old pleasant   female patient who was known to have cardiomyopathy, atrial  fibrillation, and congestive heart failure, has been on diuretics for  several months now, was in her usual state of health until two days ago. Two days ago, she has been to [facility], has been seen and  evaluated. Her medication had been adjusted. Cardizem  has been  stopped and Lopressor 25 has been stopped and started on Toprol-XL and  digoxin. Lisinopril was stopped and started on Entresto and was sent  home. The patient in the afternoon of Monday, exactly two days ago, was  feeling short of breath and short of breath progressively got worse  yesterday. She did not take her any of the medication and did not take  particularly the beta blocker, Cardizem, Entresto, or lisinopril. She  did not take any of these as she did not  from the pharmacy. So,  short of breath progressively got worse in the afternoon. She was  markedly short of breath and so she decided to come in. She has  orthopnea of two to three pillows and she has leg swelling worsened and  so in the emergency room found to be in AFib, RVR, and congestive heart  failure. So, admitted and now she is on diuresis. Cardiology  evaluation was sought for management of congestive heart failure and  AFib with rapid ventricular response in the range of 130s. No chest  Pain. \"    Subjective (Events in last 24 hours): pt awake and alert. NAD. Still with some sob and legs feel tight.   Overall mild improvement    I/o - 5.8 L  Weight down

## 2019-09-19 NOTE — PROGRESS NOTES
?dose of IV iron--> cont home oral dose -- last B12 and folate were high 4/2019 - ?need the po FA but has hx of gastric sleeve. 10. Essential HTN -- cont imdur -- toprol/hydralazine/ACE/entresto all recently changed at [facility] --> await cardio adjustment of medications for #1 and monitor - hold home hydralazine and cont prn for elevated BP  11. Type 2 DM -- on home 75/25 -> started on 40 units bid on admission -> takes differently at home 30 in am/47 pm -- ??also on lantus 20 units bid??? Which was also continued on admission and BS dropped 9/18 pm --> Per pt was NOT taking lantus at home thus stopped 9/19 --> cont chems and SSI 9/18 -- last A1C 6/5/19 = 7.0  12. HLD -- cont statin, tricor  13. Hypothyroidism/thyroid nodules/multinodular goiter -- TSH 9/17/19 WNL -- hx bx's in past - follows with Dr. Johann Harmon endocrinology -- no current medications  14. Trace MR, mild TR -- per NICKIE 7/2019  15. DARWIN -- cont home cpap  16. Hx gastric sleeve gastrectomy 2015  17. Neuropathy, diabetic -- cont home neurontin, elavil  18. Hx lymphedema -- has been to lymphedema clinic in past.  19. Morbid obesity Body mass index is 49.84 kg/m². 20. Chronic debility -- wheelchair bound and pivots to chair  21. Hx recent left distal femur fracture 6/2019 -- was at [facility] - SS c/s - pt WC bound  22. ?non-compliance -- ??of pt's adherence to meds -- monitor - SS consulted - ? HH vs return to SNF but pt REFUSING both      Dispo  -- 9/19 --> apprec renal and cardio assist -- HR not quite controlled yet -- cont monitor HR with dose change of toprol 100 mg this am and dig started yesterday - monitor BP as borderline also;  ??need further diuresis with CXR with pulm edema - doubt PNA as no sx other than sob --> await renal recs, add IS and monitor temps/WBC. Cont monitor lytes, fluid status, resp status, renal fxn, BP, HR, CBC. Has byrd to measure I/O as she is non-ambulatory.     -- 9/18 --> records from [facility] reviewed in [place] -- c/s cardiology this am to assist with medications for afib -> pt recently had multiple meds changed by [facility] cardiologist on Monday. Pt apparently didn't get the dig and was a question if she was taking both lisinopril and entresto -> likely changes of meds and ?of her compliance as she is a poor historian contributing to this admission. Cardio has already consulted renal for KEEGAN and CHF -> given IV lasix in ER with good diuresis;  HR still uncontrolled - offered [facility] EP cardio but pt declined and wants to f/u with [facility] EP as scheduled. Cont monitor fluid status, lytes, BP, BS, HR, renal fxn closely. SS c/s for d/c planning - this is the 3rd admission since 7/2019 for uncontrolled afib and CHF. Chief Complaint: shortness of breath     Hospital Course: Taras Jiménez is a 58 y.o. female with persistent afib, CAD, ischemic cardiomyopathy, CHF, DM2, GERD, hx gastric bypass, HTN, HLD, hypothyroidism, DARWIN, neuropathy presenting with worsening sob. Found to be in afib with RVR rate 120's. CXR in ER also showed Hazy appearance both mid and lower lung fields, consistent with pneumonia/pulmonary edema. Suspicion of tiny bilateral pleural effusions. Thus was also concern for acute on chronic systolic CHF -> she was given IV digoxin and lasix in ER and placed in observation for further eval and tx.  -- per H&P by URSZULA Pineda 9/17/19 states \"The patient states she [facility] and he changed her medications. The patient was told to stop Cardizem and Metoprolol tartrate and change to metoprolol succinate. The patient was supposed to start digoxin yesterday but was unable to get it from the pharmacy. The patient returned to the pharmacy today and it was unavailable. Patient was also prescribed Entresto 24/26. She was feeling short of breath and her life Vest was alarming a rapid heart rate so she came to the ED. \"  -- cardiology consulted 9/18 and meds adjusted   -- renal consulted 9/18 per [] Encourage ambulation           [x] Already on Anticoagulation -- coumadin     Disposition:    [x] Home       [] TCU       [] Rehab       [] Psych       [] SNF       [] [place]       [] Other-    Code Status: Full Code    PT/OT Eval Status: monitor for need        Electronically signed by Juan Pablo Cee MD on 9/19/2019 at 8:44 AM

## 2019-09-20 PROBLEM — E16.2 HYPOGLYCEMIA: Status: ACTIVE | Noted: 2019-09-20

## 2019-09-20 LAB
ANION GAP SERPL CALCULATED.3IONS-SCNC: 12 MEQ/L (ref 8–16)
BASOPHILS # BLD: 1 %
BASOPHILS ABSOLUTE: 0.1 THOU/MM3 (ref 0–0.1)
BUN BLDV-MCNC: 51 MG/DL (ref 7–22)
CALCIUM SERPL-MCNC: 8.9 MG/DL (ref 8.5–10.5)
CHLORIDE BLD-SCNC: 104 MEQ/L (ref 98–111)
CO2: 22 MEQ/L (ref 23–33)
CREAT SERPL-MCNC: 2.3 MG/DL (ref 0.4–1.2)
EOSINOPHIL # BLD: 3.4 %
EOSINOPHILS ABSOLUTE: 0.2 THOU/MM3 (ref 0–0.4)
ERYTHROCYTE [DISTWIDTH] IN BLOOD BY AUTOMATED COUNT: 16.2 % (ref 11.5–14.5)
ERYTHROCYTE [DISTWIDTH] IN BLOOD BY AUTOMATED COUNT: 60.2 FL (ref 35–45)
GFR SERPL CREATININE-BSD FRML MDRD: 21 ML/MIN/1.73M2
GLUCOSE BLD-MCNC: 129 MG/DL (ref 70–108)
GLUCOSE BLD-MCNC: 192 MG/DL (ref 70–108)
GLUCOSE BLD-MCNC: 244 MG/DL (ref 70–108)
GLUCOSE BLD-MCNC: 62 MG/DL (ref 70–108)
GLUCOSE BLD-MCNC: 93 MG/DL (ref 70–108)
GLUCOSE BLD-MCNC: 99 MG/DL (ref 70–108)
HCT VFR BLD CALC: 34.1 % (ref 37–47)
HEMOGLOBIN: 10.4 GM/DL (ref 12–16)
IMMATURE GRANS (ABS): 0.01 THOU/MM3 (ref 0–0.07)
IMMATURE GRANULOCYTES: 0 %
INR BLD: 2.02 (ref 0.85–1.13)
LYMPHOCYTES # BLD: 27.5 %
LYMPHOCYTES ABSOLUTE: 1.4 THOU/MM3 (ref 1–4.8)
MAGNESIUM: 2.2 MG/DL (ref 1.6–2.4)
MCH RBC QN AUTO: 31 PG (ref 26–33)
MCHC RBC AUTO-ENTMCNC: 30.5 GM/DL (ref 32.2–35.5)
MCV RBC AUTO: 101.5 FL (ref 81–99)
MONOCYTES # BLD: 9.9 %
MONOCYTES ABSOLUTE: 0.5 THOU/MM3 (ref 0.4–1.3)
NUCLEATED RED BLOOD CELLS: 0 /100 WBC
PLATELET # BLD: 211 THOU/MM3 (ref 130–400)
PMV BLD AUTO: 10.9 FL (ref 9.4–12.4)
POTASSIUM SERPL-SCNC: 4.7 MEQ/L (ref 3.5–5.2)
RBC # BLD: 3.36 MILL/MM3 (ref 4.2–5.4)
SEG NEUTROPHILS: 58 %
SEGMENTED NEUTROPHILS ABSOLUTE COUNT: 2.9 THOU/MM3 (ref 1.8–7.7)
SODIUM BLD-SCNC: 138 MEQ/L (ref 135–145)
WBC # BLD: 5 THOU/MM3 (ref 4.8–10.8)

## 2019-09-20 PROCEDURE — 36415 COLL VENOUS BLD VENIPUNCTURE: CPT

## 2019-09-20 PROCEDURE — 2500000003 HC RX 250 WO HCPCS: Performed by: FAMILY MEDICINE

## 2019-09-20 PROCEDURE — 6370000000 HC RX 637 (ALT 250 FOR IP): Performed by: INTERNAL MEDICINE

## 2019-09-20 PROCEDURE — 2709999900 HC NON-CHARGEABLE SUPPLY

## 2019-09-20 PROCEDURE — 83735 ASSAY OF MAGNESIUM: CPT

## 2019-09-20 PROCEDURE — 99233 SBSQ HOSP IP/OBS HIGH 50: CPT | Performed by: INTERNAL MEDICINE

## 2019-09-20 PROCEDURE — 6370000000 HC RX 637 (ALT 250 FOR IP): Performed by: PHYSICIAN ASSISTANT

## 2019-09-20 PROCEDURE — 2580000003 HC RX 258: Performed by: PHYSICIAN ASSISTANT

## 2019-09-20 PROCEDURE — 6370000000 HC RX 637 (ALT 250 FOR IP): Performed by: NURSE PRACTITIONER

## 2019-09-20 PROCEDURE — 85610 PROTHROMBIN TIME: CPT

## 2019-09-20 PROCEDURE — 99232 SBSQ HOSP IP/OBS MODERATE 35: CPT | Performed by: PHYSICIAN ASSISTANT

## 2019-09-20 PROCEDURE — 82948 REAGENT STRIP/BLOOD GLUCOSE: CPT

## 2019-09-20 PROCEDURE — 99232 SBSQ HOSP IP/OBS MODERATE 35: CPT | Performed by: NURSE PRACTITIONER

## 2019-09-20 PROCEDURE — 80048 BASIC METABOLIC PNL TOTAL CA: CPT

## 2019-09-20 PROCEDURE — 2580000003 HC RX 258: Performed by: FAMILY MEDICINE

## 2019-09-20 PROCEDURE — 85025 COMPLETE CBC W/AUTO DIFF WBC: CPT

## 2019-09-20 PROCEDURE — 6370000000 HC RX 637 (ALT 250 FOR IP): Performed by: FAMILY MEDICINE

## 2019-09-20 PROCEDURE — 2140000000 HC CCU INTERMEDIATE R&B

## 2019-09-20 PROCEDURE — 6370000000 HC RX 637 (ALT 250 FOR IP): Performed by: HOSPITALIST

## 2019-09-20 RX ORDER — FUROSEMIDE 20 MG/1
20 TABLET ORAL ONCE
Status: COMPLETED | OUTPATIENT
Start: 2019-09-20 | End: 2019-09-20

## 2019-09-20 RX ORDER — FUROSEMIDE 40 MG/1
40 TABLET ORAL DAILY
Status: DISCONTINUED | OUTPATIENT
Start: 2019-09-21 | End: 2019-09-22 | Stop reason: HOSPADM

## 2019-09-20 RX ORDER — WARFARIN SODIUM 4 MG/1
4 TABLET ORAL ONCE
Status: COMPLETED | OUTPATIENT
Start: 2019-09-20 | End: 2019-09-20

## 2019-09-20 RX ADMIN — ATORVASTATIN CALCIUM 40 MG: 40 TABLET, FILM COATED ORAL at 20:02

## 2019-09-20 RX ADMIN — FAMOTIDINE 20 MG: 20 TABLET ORAL at 09:14

## 2019-09-20 RX ADMIN — FUROSEMIDE 20 MG: 20 TABLET ORAL at 09:11

## 2019-09-20 RX ADMIN — ALLOPURINOL 100 MG: 100 TABLET ORAL at 09:12

## 2019-09-20 RX ADMIN — GABAPENTIN 100 MG: 100 CAPSULE ORAL at 20:02

## 2019-09-20 RX ADMIN — Medication 10 ML: at 20:02

## 2019-09-20 RX ADMIN — FUROSEMIDE 20 MG: 20 TABLET ORAL at 13:37

## 2019-09-20 RX ADMIN — GLUCAGON HYDROCHLORIDE 1 MG: KIT at 07:14

## 2019-09-20 RX ADMIN — WARFARIN SODIUM 4 MG: 4 TABLET ORAL at 17:31

## 2019-09-20 RX ADMIN — METOPROLOL SUCCINATE 100 MG: 100 TABLET, FILM COATED, EXTENDED RELEASE ORAL at 09:12

## 2019-09-20 RX ADMIN — FENOFIBRATE 160 MG: 160 TABLET ORAL at 09:12

## 2019-09-20 RX ADMIN — INSULIN LISPRO 2 UNITS: 100 INJECTION, SOLUTION INTRAVENOUS; SUBCUTANEOUS at 17:26

## 2019-09-20 RX ADMIN — DULOXETINE HYDROCHLORIDE 60 MG: 60 CAPSULE, DELAYED RELEASE ORAL at 09:12

## 2019-09-20 RX ADMIN — ISOSORBIDE MONONITRATE 60 MG: 60 TABLET ORAL at 09:12

## 2019-09-20 RX ADMIN — INSULIN LISPRO 20 UNITS: 100 INJECTION, SUSPENSION SUBCUTANEOUS at 17:25

## 2019-09-20 RX ADMIN — INSULIN LISPRO 1 UNITS: 100 INJECTION, SOLUTION INTRAVENOUS; SUBCUTANEOUS at 21:05

## 2019-09-20 RX ADMIN — GABAPENTIN 100 MG: 100 CAPSULE ORAL at 13:38

## 2019-09-20 RX ADMIN — METOPROLOL SUCCINATE 100 MG: 100 TABLET, FILM COATED, EXTENDED RELEASE ORAL at 20:02

## 2019-09-20 RX ADMIN — GABAPENTIN 100 MG: 100 CAPSULE ORAL at 09:11

## 2019-09-20 RX ADMIN — AMITRIPTYLINE HYDROCHLORIDE 10 MG: 10 TABLET, FILM COATED ORAL at 20:02

## 2019-09-20 RX ADMIN — ASPIRIN 81 MG: 81 TABLET ORAL at 13:39

## 2019-09-20 RX ADMIN — DEXTROSE MONOHYDRATE 12.5 G: 25 INJECTION, SOLUTION INTRAVENOUS at 06:49

## 2019-09-20 RX ADMIN — DIGOXIN 125 MCG: 125 TABLET ORAL at 09:12

## 2019-09-20 ASSESSMENT — PAIN SCALES - GENERAL
PAINLEVEL_OUTOF10: 0
PAINLEVEL_OUTOF10: 0

## 2019-09-20 NOTE — SIGNIFICANT EVENT
Provider alerted of this event (almost the exact same thing happened the day before around the same time with nearly the same numbers and interventions--see 9/18 note): Marii Benton felt symptomatic but A&O, glucose checked and found to be 50. 37g glucose gel given, no improvement. 1/2 amp D50 IVP given, glucose came up to >100. Pt resting comfortably ever since.

## 2019-09-20 NOTE — PROGRESS NOTES
Clinical Pharmacy Note    Warfarin consult follow-up    Recent Labs     09/20/19  0330   INR 2.02*     Recent Labs     09/17/19  1812 09/18/19  0312 09/20/19  0330   HGB 10.3* 10.0* 10.4*   HCT 33.0* 33.3* 34.1*    170 211       Significant Drug-Drug Interactions:  New warfarin drug-drug interactions: allopurinol (HM), amitriptyline (HM), duloxetine (HM)  Discontinued drug-drug interactions: none  Current warfarin drug-drug interactions: ASA (HM), allopurinol (HM), amitriptyline (HM), duloxetine (HM)     Date INR Warfarin Dose   9/18/2019 2.00 3 mg    9/19/2019  1.84 4 mg     9/20/2019  2.02  4 mg                                      Notes:                   Daily PT/INR until stable within therapeutic range. Ewelina HART  [facility], PharmD  PGY-1 Pharmacy Resident  9/20/2019  9:54 AM

## 2019-09-20 NOTE — PROGRESS NOTES
Cardiology Progress Note      Patient:  Alicia Escamilla  YOB: 1955  MRN: [id number]   Acct: [de-identified]  [facility] Date:  9/17/2019  Primary Cardiologist: Jennifer Ray MD  Seen by dr Lucas Baron    Note per dr Sal Matter FOR CONSULTATION:  CHF exacerbation and AFib with rapid  ventricular response.     PRIMARY CARDIOLOGIST:  Jennifer Ray MD     HISTORY OF PRESENT ILLNESS:  This is actually a 50-year-old pleasant   female patient who was known to have cardiomyopathy, atrial  fibrillation, and congestive heart failure, has been on diuretics for  several months now, was in her usual state of health until two days ago. Two days ago, she has been to [facility] clinic, has been seen and  evaluated. Her medication had been adjusted. Cardizem  has been  stopped and Lopressor 25 has been stopped and started on Toprol-XL and  digoxin. Lisinopril was stopped and started on Entresto and was sent  home. The patient in the afternoon of Monday, exactly two days ago, was  feeling short of breath and short of breath progressively got worse  yesterday. She did not take her any of the medication and did not take  particularly the beta blocker, Cardizem, Entresto, or lisinopril. She  did not take any of these as she did not  from the pharmacy. So,  short of breath progressively got worse in the afternoon. She was  markedly short of breath and so she decided to come in. She has  orthopnea of two to three pillows and she has leg swelling worsened and  so in the emergency room found to be in AFib, RVR, and congestive heart  failure. So, admitted and now she is on diuresis. Cardiology  evaluation was sought for management of congestive heart failure and  AFib with rapid ventricular response in the range of 130s. No chest  Pain. \"    Subjective (Events in last 24 hours): pt awake and alert. NAD. No cp.   Pt still feels mild sob    I/o - 7.7 L  Weight down 14 lb    Objective:   /84 Pulse 102   Temp 98 °F (36.7 °C) (Oral)   Resp 18   Ht 5' 8\" (1.727 m)   Wt (!) 325 lb 8 oz (147.6 kg)   LMP 02/20/2001   SpO2 91%   BMI 49.49 kg/m²        TELEMETRY: afib HR low 100s    Physical Exam:  General Appearance: alert and oriented to person, place and time, in no acute distress  Cardiovascular: irregularly irregular  Pulmonary/Chest: clear to auscultation bilaterally- no wheezes, rales or rhonchi, normal air movement, no respiratory distress  Abdomen: soft, non-tender, non-distended, normal bowel sounds, no masses Extremities:+ble edema, pulse   Skin: warm and dry, no rash or erythema  Head: normocephalic and atraumatic  Eyes: pupils equal, round, and reactive to light  Neck: supple and non-tender without mass, no thyromegaly   Musculoskeletal: normal range of motion, no joint swelling, deformity or tenderness  Neurological: alert, oriented, normal speech, no focal findings or movement disorder noted    Medications:    warfarin  4 mg Oral Once    [START ON 9/21/2019] furosemide  40 mg Oral Daily    furosemide  20 mg Oral Once    insulin lispro prot & lispro  20 Units Subcutaneous BID WC    iron sucrose  300 mg Intravenous Q48H    metoprolol succinate  100 mg Oral BID    warfarin (COUMADIN) daily dosing (placeholder)   Other RX Placeholder    insulin lispro  0-6 Units Subcutaneous TID WC    insulin lispro  0-3 Units Subcutaneous Nightly    digoxin  125 mcg Oral Daily    famotidine  20 mg Oral Daily    allopurinol  100 mg Oral Daily    amitriptyline  10 mg Oral Nightly    aspirin  81 mg Oral Daily    atorvastatin  40 mg Oral Daily    DULoxetine  60 mg Oral Daily    fenofibrate  160 mg Oral Daily    gabapentin  100 mg Oral TID    isosorbide mononitrate  60 mg Oral Daily    sodium chloride flush  10 mL Intravenous 2 times per day    [Held by provider] sacubitril-valsartan  1 tablet Oral BID      dextrose         glucose 15 g PRN   dextrose 12.5 g PRN   glucagon (rDNA) 1 mg PRN

## 2019-09-20 NOTE — PROGRESS NOTES
breath noted at rest.  ABDOMEN: soft, non tender  NEUROLOGICAL: Patient is alert and oriented to person, place, and time. Recent and remote memory intact. Thought is coherant. SKIN: no rash, No significant bruises on exposed surfaces  MUSCULOSKELETAL: Movement is coordinated. Moves all extremities   EXTREMITIES: Distal lower extremity temp is warm, 1+ lower extremity edema. PSYCHIATRIC: mood and affect appropriate. Medications:   Med reviewed  Scheduled Meds:   warfarin  4 mg Oral Once    iron sucrose  300 mg Intravenous Q48H    metoprolol succinate  100 mg Oral BID    warfarin (COUMADIN) daily dosing (placeholder)   Other RX Placeholder    insulin lispro  0-6 Units Subcutaneous TID WC    insulin lispro  0-3 Units Subcutaneous Nightly    digoxin  125 mcg Oral Daily    famotidine  20 mg Oral Daily    allopurinol  100 mg Oral Daily    amitriptyline  10 mg Oral Nightly    aspirin  81 mg Oral Daily    atorvastatin  40 mg Oral Daily    DULoxetine  60 mg Oral Daily    fenofibrate  160 mg Oral Daily    furosemide  20 mg Oral Daily    gabapentin  100 mg Oral TID    insulin lispro protamine & lispro  40 Units Subcutaneous BID WC    isosorbide mononitrate  60 mg Oral Daily    sodium chloride flush  10 mL Intravenous 2 times per day    [Held by provider] sacubitril-valsartan  1 tablet Oral BID     Labs:   Labs reviewed  Recent Labs     09/18/19  1432 09/19/19  0349 09/20/19  0330   * 138 138   K 4.6 5.1 4.7   CL 99 103 104   CO2 24 25 22*   BUN 49* 49* 51*   CREATININE 2.4* 2.4* 2.3*   LABGLOM 20* 20* 21*   GLUCOSE 216* 128* 99   MG  --   --  2.2   CALCIUM 9.2 8.9 8.9     Recent Labs     09/17/19  1812 09/18/19  0312 09/20/19  0330   WBC 5.3 4.5* 5.0   RBC 3.25* 3.27* 3.36*   HGB 10.3* 10.0* 10.4*   HCT 33.0* 33.3* 34.1*   .5* 101.8* 101.5*   MCH 31.7 30.6 31.0    170 211       CXR 9/19/19  Possible mild interstitial pulmonary edema.   New retrocardiac left lower lobe atelectasis or

## 2019-09-21 LAB
ANION GAP SERPL CALCULATED.3IONS-SCNC: 12 MEQ/L (ref 8–16)
BUN BLDV-MCNC: 46 MG/DL (ref 7–22)
CALCIUM SERPL-MCNC: 9.1 MG/DL (ref 8.5–10.5)
CHLORIDE BLD-SCNC: 102 MEQ/L (ref 98–111)
CO2: 24 MEQ/L (ref 23–33)
CREAT SERPL-MCNC: 1.9 MG/DL (ref 0.4–1.2)
GFR SERPL CREATININE-BSD FRML MDRD: 27 ML/MIN/1.73M2
GLUCOSE BLD-MCNC: 132 MG/DL (ref 70–108)
GLUCOSE BLD-MCNC: 136 MG/DL (ref 70–108)
GLUCOSE BLD-MCNC: 244 MG/DL (ref 70–108)
GLUCOSE BLD-MCNC: 323 MG/DL (ref 70–108)
GLUCOSE BLD-MCNC: 328 MG/DL (ref 70–108)
INR BLD: 2.02 (ref 0.85–1.13)
POTASSIUM SERPL-SCNC: 4.4 MEQ/L (ref 3.5–5.2)
SODIUM BLD-SCNC: 138 MEQ/L (ref 135–145)

## 2019-09-21 PROCEDURE — 6370000000 HC RX 637 (ALT 250 FOR IP): Performed by: PHYSICIAN ASSISTANT

## 2019-09-21 PROCEDURE — 80048 BASIC METABOLIC PNL TOTAL CA: CPT

## 2019-09-21 PROCEDURE — 85610 PROTHROMBIN TIME: CPT

## 2019-09-21 PROCEDURE — 82948 REAGENT STRIP/BLOOD GLUCOSE: CPT

## 2019-09-21 PROCEDURE — 36415 COLL VENOUS BLD VENIPUNCTURE: CPT

## 2019-09-21 PROCEDURE — 6370000000 HC RX 637 (ALT 250 FOR IP): Performed by: FAMILY MEDICINE

## 2019-09-21 PROCEDURE — 6370000000 HC RX 637 (ALT 250 FOR IP): Performed by: NURSE PRACTITIONER

## 2019-09-21 PROCEDURE — 99232 SBSQ HOSP IP/OBS MODERATE 35: CPT | Performed by: HOSPITALIST

## 2019-09-21 PROCEDURE — 2580000003 HC RX 258: Performed by: NURSE PRACTITIONER

## 2019-09-21 PROCEDURE — 2580000003 HC RX 258: Performed by: PHYSICIAN ASSISTANT

## 2019-09-21 PROCEDURE — 6360000002 HC RX W HCPCS: Performed by: NURSE PRACTITIONER

## 2019-09-21 PROCEDURE — 99232 SBSQ HOSP IP/OBS MODERATE 35: CPT | Performed by: INTERNAL MEDICINE

## 2019-09-21 PROCEDURE — 6370000000 HC RX 637 (ALT 250 FOR IP): Performed by: INTERNAL MEDICINE

## 2019-09-21 PROCEDURE — 2140000000 HC CCU INTERMEDIATE R&B

## 2019-09-21 PROCEDURE — 6370000000 HC RX 637 (ALT 250 FOR IP): Performed by: PHARMACIST

## 2019-09-21 RX ORDER — WARFARIN SODIUM 4 MG/1
4 TABLET ORAL ONCE
Status: COMPLETED | OUTPATIENT
Start: 2019-09-21 | End: 2019-09-21

## 2019-09-21 RX ADMIN — FUROSEMIDE 40 MG: 40 TABLET ORAL at 11:33

## 2019-09-21 RX ADMIN — Medication 10 ML: at 09:52

## 2019-09-21 RX ADMIN — INSULIN LISPRO 4 UNITS: 100 INJECTION, SOLUTION INTRAVENOUS; SUBCUTANEOUS at 17:29

## 2019-09-21 RX ADMIN — METOPROLOL SUCCINATE 100 MG: 100 TABLET, FILM COATED, EXTENDED RELEASE ORAL at 20:46

## 2019-09-21 RX ADMIN — GABAPENTIN 100 MG: 100 CAPSULE ORAL at 09:48

## 2019-09-21 RX ADMIN — GABAPENTIN 100 MG: 100 CAPSULE ORAL at 14:37

## 2019-09-21 RX ADMIN — ALLOPURINOL 100 MG: 100 TABLET ORAL at 09:45

## 2019-09-21 RX ADMIN — INSULIN LISPRO 2 UNITS: 100 INJECTION, SOLUTION INTRAVENOUS; SUBCUTANEOUS at 20:47

## 2019-09-21 RX ADMIN — ASPIRIN 81 MG: 81 TABLET ORAL at 09:46

## 2019-09-21 RX ADMIN — DULOXETINE HYDROCHLORIDE 60 MG: 60 CAPSULE, DELAYED RELEASE ORAL at 09:46

## 2019-09-21 RX ADMIN — GABAPENTIN 100 MG: 100 CAPSULE ORAL at 20:46

## 2019-09-21 RX ADMIN — IRON SUCROSE 300 MG: 20 INJECTION, SOLUTION INTRAVENOUS at 11:37

## 2019-09-21 RX ADMIN — ISOSORBIDE MONONITRATE 60 MG: 60 TABLET ORAL at 09:49

## 2019-09-21 RX ADMIN — FENOFIBRATE 160 MG: 160 TABLET ORAL at 09:47

## 2019-09-21 RX ADMIN — DIGOXIN 125 MCG: 125 TABLET ORAL at 09:46

## 2019-09-21 RX ADMIN — Medication 10 ML: at 20:51

## 2019-09-21 RX ADMIN — INSULIN LISPRO 20 UNITS: 100 INJECTION, SUSPENSION SUBCUTANEOUS at 17:31

## 2019-09-21 RX ADMIN — METOPROLOL SUCCINATE 100 MG: 100 TABLET, FILM COATED, EXTENDED RELEASE ORAL at 09:49

## 2019-09-21 RX ADMIN — WARFARIN SODIUM 4 MG: 4 TABLET ORAL at 17:35

## 2019-09-21 RX ADMIN — ATORVASTATIN CALCIUM 40 MG: 40 TABLET, FILM COATED ORAL at 20:46

## 2019-09-21 RX ADMIN — AMITRIPTYLINE HYDROCHLORIDE 10 MG: 10 TABLET, FILM COATED ORAL at 20:46

## 2019-09-21 RX ADMIN — FAMOTIDINE 20 MG: 20 TABLET ORAL at 09:46

## 2019-09-21 RX ADMIN — INSULIN LISPRO 2 UNITS: 100 INJECTION, SOLUTION INTRAVENOUS; SUBCUTANEOUS at 11:38

## 2019-09-21 ASSESSMENT — PAIN SCALES - GENERAL
PAINLEVEL_OUTOF10: 0

## 2019-09-22 VITALS
BODY MASS INDEX: 44.41 KG/M2 | RESPIRATION RATE: 16 BRPM | TEMPERATURE: 98.2 F | SYSTOLIC BLOOD PRESSURE: 118 MMHG | DIASTOLIC BLOOD PRESSURE: 65 MMHG | HEIGHT: 68 IN | OXYGEN SATURATION: 95 % | HEART RATE: 86 BPM | WEIGHT: 293 LBS

## 2019-09-22 PROBLEM — I48.91 ATRIAL FIBRILLATION WITH RVR (HCC): Status: RESOLVED | Noted: 2019-09-17 | Resolved: 2019-09-22

## 2019-09-22 PROBLEM — E16.2 HYPOGLYCEMIA: Status: RESOLVED | Noted: 2019-09-20 | Resolved: 2019-09-22

## 2019-09-22 LAB
ANION GAP SERPL CALCULATED.3IONS-SCNC: 11 MEQ/L (ref 8–16)
BASOPHILS # BLD: 0.6 %
BASOPHILS ABSOLUTE: 0 THOU/MM3 (ref 0–0.1)
BUN BLDV-MCNC: 46 MG/DL (ref 7–22)
CALCIUM SERPL-MCNC: 9.2 MG/DL (ref 8.5–10.5)
CHLORIDE BLD-SCNC: 99 MEQ/L (ref 98–111)
CO2: 26 MEQ/L (ref 23–33)
CREAT SERPL-MCNC: 1.9 MG/DL (ref 0.4–1.2)
EOSINOPHIL # BLD: 3.7 %
EOSINOPHILS ABSOLUTE: 0.2 THOU/MM3 (ref 0–0.4)
ERYTHROCYTE [DISTWIDTH] IN BLOOD BY AUTOMATED COUNT: 15.9 % (ref 11.5–14.5)
ERYTHROCYTE [DISTWIDTH] IN BLOOD BY AUTOMATED COUNT: 60 FL (ref 35–45)
GFR SERPL CREATININE-BSD FRML MDRD: 27 ML/MIN/1.73M2
GLUCOSE BLD-MCNC: 227 MG/DL (ref 70–108)
GLUCOSE BLD-MCNC: 261 MG/DL (ref 70–108)
GLUCOSE BLD-MCNC: 281 MG/DL (ref 70–108)
HCT VFR BLD CALC: 37.3 % (ref 37–47)
HEMOGLOBIN: 11.5 GM/DL (ref 12–16)
IMMATURE GRANS (ABS): 0.01 THOU/MM3 (ref 0–0.07)
IMMATURE GRANULOCYTES: 0 %
INR BLD: 2.5 (ref 0.85–1.13)
LYMPHOCYTES # BLD: 24.4 %
LYMPHOCYTES ABSOLUTE: 1.1 THOU/MM3 (ref 1–4.8)
MCH RBC QN AUTO: 31.6 PG (ref 26–33)
MCHC RBC AUTO-ENTMCNC: 30.8 GM/DL (ref 32.2–35.5)
MCV RBC AUTO: 102.5 FL (ref 81–99)
MONOCYTES # BLD: 11 %
MONOCYTES ABSOLUTE: 0.5 THOU/MM3 (ref 0.4–1.3)
NUCLEATED RED BLOOD CELLS: 0 /100 WBC
PLATELET # BLD: 192 THOU/MM3 (ref 130–400)
PMV BLD AUTO: 10.5 FL (ref 9.4–12.4)
POTASSIUM SERPL-SCNC: 4.7 MEQ/L (ref 3.5–5.2)
RBC # BLD: 3.64 MILL/MM3 (ref 4.2–5.4)
SEG NEUTROPHILS: 60.1 %
SEGMENTED NEUTROPHILS ABSOLUTE COUNT: 2.8 THOU/MM3 (ref 1.8–7.7)
SODIUM BLD-SCNC: 136 MEQ/L (ref 135–145)
WBC # BLD: 4.6 THOU/MM3 (ref 4.8–10.8)

## 2019-09-22 PROCEDURE — 6370000000 HC RX 637 (ALT 250 FOR IP): Performed by: NURSE PRACTITIONER

## 2019-09-22 PROCEDURE — 6370000000 HC RX 637 (ALT 250 FOR IP): Performed by: INTERNAL MEDICINE

## 2019-09-22 PROCEDURE — 85025 COMPLETE CBC W/AUTO DIFF WBC: CPT

## 2019-09-22 PROCEDURE — 6370000000 HC RX 637 (ALT 250 FOR IP): Performed by: FAMILY MEDICINE

## 2019-09-22 PROCEDURE — 6370000000 HC RX 637 (ALT 250 FOR IP): Performed by: PHYSICIAN ASSISTANT

## 2019-09-22 PROCEDURE — 82948 REAGENT STRIP/BLOOD GLUCOSE: CPT

## 2019-09-22 PROCEDURE — 85610 PROTHROMBIN TIME: CPT

## 2019-09-22 PROCEDURE — 99239 HOSP IP/OBS DSCHRG MGMT >30: CPT | Performed by: HOSPITALIST

## 2019-09-22 PROCEDURE — 36415 COLL VENOUS BLD VENIPUNCTURE: CPT

## 2019-09-22 PROCEDURE — 80048 BASIC METABOLIC PNL TOTAL CA: CPT

## 2019-09-22 PROCEDURE — 2580000003 HC RX 258: Performed by: PHYSICIAN ASSISTANT

## 2019-09-22 PROCEDURE — 2709999900 HC NON-CHARGEABLE SUPPLY

## 2019-09-22 PROCEDURE — 99232 SBSQ HOSP IP/OBS MODERATE 35: CPT | Performed by: INTERNAL MEDICINE

## 2019-09-22 RX ORDER — WARFARIN SODIUM 3 MG/1
3 TABLET ORAL ONCE
Status: DISCONTINUED | OUTPATIENT
Start: 2019-09-22 | End: 2019-09-22 | Stop reason: HOSPADM

## 2019-09-22 RX ORDER — METOPROLOL SUCCINATE 100 MG/1
100 TABLET, EXTENDED RELEASE ORAL 2 TIMES DAILY
Qty: 30 TABLET | Refills: 3 | Status: SHIPPED | OUTPATIENT
Start: 2019-09-22 | End: 2019-09-24

## 2019-09-22 RX ADMIN — ISOSORBIDE MONONITRATE 60 MG: 60 TABLET ORAL at 08:32

## 2019-09-22 RX ADMIN — FUROSEMIDE 40 MG: 40 TABLET ORAL at 08:32

## 2019-09-22 RX ADMIN — GABAPENTIN 100 MG: 100 CAPSULE ORAL at 08:31

## 2019-09-22 RX ADMIN — ASPIRIN 81 MG: 81 TABLET ORAL at 08:31

## 2019-09-22 RX ADMIN — Medication 10 ML: at 08:37

## 2019-09-22 RX ADMIN — INSULIN LISPRO 2 UNITS: 100 INJECTION, SOLUTION INTRAVENOUS; SUBCUTANEOUS at 08:33

## 2019-09-22 RX ADMIN — INSULIN LISPRO 20 UNITS: 100 INJECTION, SUSPENSION SUBCUTANEOUS at 08:35

## 2019-09-22 RX ADMIN — INSULIN LISPRO 3 UNITS: 100 INJECTION, SOLUTION INTRAVENOUS; SUBCUTANEOUS at 11:53

## 2019-09-22 RX ADMIN — METOPROLOL SUCCINATE 100 MG: 100 TABLET, FILM COATED, EXTENDED RELEASE ORAL at 08:32

## 2019-09-22 RX ADMIN — DULOXETINE HYDROCHLORIDE 60 MG: 60 CAPSULE, DELAYED RELEASE ORAL at 08:30

## 2019-09-22 RX ADMIN — FAMOTIDINE 20 MG: 20 TABLET ORAL at 08:31

## 2019-09-22 RX ADMIN — ALLOPURINOL 100 MG: 100 TABLET ORAL at 08:29

## 2019-09-22 RX ADMIN — FENOFIBRATE 160 MG: 160 TABLET ORAL at 08:32

## 2019-09-22 RX ADMIN — DIGOXIN 125 MCG: 125 TABLET ORAL at 08:30

## 2019-09-22 ASSESSMENT — PAIN SCALES - GENERAL: PAINLEVEL_OUTOF10: 0

## 2019-09-22 NOTE — PROGRESS NOTES
[facility]   Nephrology progress note  9/22/2019, 10:59 AM      Pt Name:    Torsten Howell  MRN:     [id number]     [place]:    1955  Admit Date:    9/17/2019  5:22 PM  Primary Care Physician:  ARNOL Almanza CNP   Room number  3B-35/035-A    Chief Complaint: Nephrology following for KEEGAN/CKD    Subjective:  Patient seen and examined  No chest pain or shortness of breath  No complaints  comfortable    Objective:  24HR INTAKE/OUTPUT:      Intake/Output Summary (Last 24 hours) at 9/22/2019 1059  Last data filed at 9/22/2019 0835  Gross per 24 hour   Intake 1360 ml   Output 5300 ml   Net -3940 ml     I/O last 3 completed shifts: In: 2660 [P.O.:1468]  Out: 9082 [Urine:4725]  I/O this shift:  In: 10 [I.V.:10]  Out: 575 [Urine:575]  Admission weight: (!) 332 lb (150.6 kg)  Wt Readings from Last 3 Encounters:   09/22/19 (!) 314 lb 14.4 oz (142.8 kg)   09/12/19 (!) 331 lb (150.1 kg)   09/09/19 (!) 328 lb (148.8 kg)     Body mass index is 47.88 kg/m².     Physical examination  VITALS:     Vitals:    09/22/19 0806   BP: 133/73   Pulse: 76   Resp: 18   Temp: 98.2 °F (36.8 °C)   SpO2: 94%     General Appearance: alert and cooperative with exam, appears comfortable, no distress  Mouth/Throat: Oral mucosa moist  Neck: No JVD  Lungs: Air entry B/L, no rales, no use of accessory muscles  Heart:  S1, S2 heard  GI: soft, non-tender, no guarding  Extremities: chronic venous stasis changes with mild peripheral stable edema      Lab Data  CBC:   Recent Labs     09/20/19  0330 09/22/19  0327   WBC 5.0 4.6*   HGB 10.4* 11.5*   HCT 34.1* 37.3    192     BMP:  Recent Labs     09/20/19  0330 09/21/19  0427 09/22/19  0327    138 136   K 4.7 4.4 4.7    102 99   CO2 22* 24 26   BUN 51* 46* 46*   CREATININE 2.3* 1.9* 1.9*   GLUCOSE 99 136* 281*   CALCIUM 8.9 9.1 9.2   MG 2.2  --   --      Hepatic: No results for input(s): LABALBU, AST, ALT, ALB, BILITOT, ALKPHOS in the last 72 hours.      Meds:  Infusion:    dextrose       Meds:    warfarin  3 mg Oral Once    furosemide  40 mg Oral Daily    insulin lispro prot & lispro  20 Units Subcutaneous BID WC    iron sucrose  300 mg Intravenous Q48H    metoprolol succinate  100 mg Oral BID    warfarin (COUMADIN) daily dosing (placeholder)   Other RX Placeholder    insulin lispro  0-6 Units Subcutaneous TID WC    insulin lispro  0-3 Units Subcutaneous Nightly    digoxin  125 mcg Oral Daily    famotidine  20 mg Oral Daily    allopurinol  100 mg Oral Daily    amitriptyline  10 mg Oral Nightly    aspirin  81 mg Oral Daily    atorvastatin  40 mg Oral Daily    DULoxetine  60 mg Oral Daily    fenofibrate  160 mg Oral Daily    gabapentin  100 mg Oral TID    isosorbide mononitrate  60 mg Oral Daily    sodium chloride flush  10 mL Intravenous 2 times per day    [Held by provider] sacubitril-valsartan  1 tablet Oral BID     Meds prn: glucose, dextrose, glucagon (rDNA), dextrose, hydrALAZINE, acetaminophen, sodium chloride flush, magnesium hydroxide, ondansetron       Impression and Plan:  1. Bailee on CKD III: improving, creat is down to 1.9 now  - lytes are stable. BAILEE is multifactorial from altered hemodynamics and diuresis. Overall stable renal function. Lytes and volume status are stable. Continue with lasix. 2. CKd III: baseline around 1.6 to 1.9. Overall stable close to baseline  3. Acute on chronic systolic dysfunction: on lasix and beta blockers, lasix increased on Friday,  clinically improved and stable. Advised salt restriction. 4. Hx CAD  5. P. Atrial fibrillation  6. HTN: stable. D/W patient and RN  See Dr. Sara Toledo in 2 weeks with BMP if discharged.      Vashti Cervantes MD  [facility]

## 2019-09-22 NOTE — DISCHARGE SUMMARY
- 108 mg/dL    BUN 49 (H) 7 - 22 mg/dL    CREATININE 2.4 (H) 0.4 - 1.2 mg/dL    Calcium 8.9 8.5 - 10.5 mg/dL   Digoxin Level   Result Value Ref Range    Digoxin Lvl 0.6 0.5 - 2.0 ng/mL   Anion Gap   Result Value Ref Range    Anion Gap 10.0 8.0 - 16.0 meq/L   Glomerular Filtration Rate, Estimated   Result Value Ref Range    Est, Glom Filt Rate 20 (A) ml/min/1.73m2   Protime-INR   Result Value Ref Range    INR 2.02 (H) 0.85 - 4.60   Basic Metabolic Panel   Result Value Ref Range    Sodium 138 135 - 145 meq/L    Potassium 4.7 3.5 - 5.2 meq/L    Chloride 104 98 - 111 meq/L    CO2 22 (L) 23 - 33 meq/L    Glucose 99 70 - 108 mg/dL    BUN 51 (H) 7 - 22 mg/dL    CREATININE 2.3 (H) 0.4 - 1.2 mg/dL    Calcium 8.9 8.5 - 10.5 mg/dL   CBC Auto Differential   Result Value Ref Range    WBC 5.0 4.8 - 10.8 thou/mm3    RBC 3.36 (L) 4.20 - 5.40 mill/mm3    Hemoglobin 10.4 (L) 12.0 - 16.0 gm/dl    Hematocrit 34.1 (L) 37.0 - 47.0 %    .5 (H) 81.0 - 99.0 fL    MCH 31.0 26.0 - 33.0 pg    MCHC 30.5 (L) 32.2 - 35.5 gm/dl    RDW-CV 16.2 (H) 11.5 - 14.5 %    RDW-SD 60.2 (H) 35.0 - 45.0 fL    Platelets 373 640 - 341 thou/mm3    MPV 10.9 9.4 - 12.4 fL    Seg Neutrophils 58.0 %    Lymphocytes 27.5 %    Monocytes 9.9 %    Eosinophils 3.4 %    Basophils 1.0 %    Immature Granulocytes 0 %    Segs Absolute 2.9 1.8 - 7.7 thou/mm3    Lymphocytes Absolute 1.4 1.0 - 4.8 thou/mm3    Monocytes Absolute 0.5 0.4 - 1.3 thou/mm3    Eosinophils Absolute 0.2 0.0 - 0.4 thou/mm3    Basophils Absolute 0.1 0.0 - 0.1 thou/mm3    Immature Grans (Abs) 0.01 0.00 - 0.07 thou/mm3    nRBC 0 /100 wbc   Anion Gap   Result Value Ref Range    Anion Gap 12.0 8.0 - 16.0 meq/L   Glomerular Filtration Rate, Estimated   Result Value Ref Range    Est, Glom Filt Rate 21 (A) ml/min/1.73m2   Magnesium   Result Value Ref Range    Magnesium 2.2 1.6 - 2.4 mg/dL   Protime-INR   Result Value Ref Range    INR 2.02 (H) 0.85 - 7.41   Basic Metabolic Panel   Result Value Ref Range Glucose 137 (H) 70 - 108 mg/dl   POCT Glucose   Result Value Ref Range    POC Glucose 197 (H) 70 - 108 mg/dl   POCT Glucose   Result Value Ref Range    POC Glucose 179 (H) 70 - 108 mg/dl   POCT Glucose   Result Value Ref Range    POC Glucose 111 (H) 70 - 108 mg/dl   POCT Glucose   Result Value Ref Range    POC Glucose 45 (L) 70 - 108 mg/dl   POCT Glucose   Result Value Ref Range    POC Glucose 51 (L) 70 - 108 mg/dl   POCT Glucose   Result Value Ref Range    POC Glucose 52 (L) 70 - 108 mg/dl   POCT Glucose   Result Value Ref Range    POC Glucose 108 70 - 108 mg/dl   POCT Glucose   Result Value Ref Range    POC Glucose 108 70 - 108 mg/dl   POCT Glucose   Result Value Ref Range    POC Glucose 198 (H) 70 - 108 mg/dl   POCT Glucose   Result Value Ref Range    POC Glucose 135 (H) 70 - 108 mg/dl   POCT Glucose   Result Value Ref Range    POC Glucose 92 70 - 108 mg/dl   POCT Glucose   Result Value Ref Range    POC Glucose 48 (L) 70 - 108 mg/dl   POCT Glucose   Result Value Ref Range    POC Glucose 104 70 - 108 mg/dl   POCT Glucose   Result Value Ref Range    POC Glucose 119 (H) 70 - 108 mg/dl   POCT Glucose   Result Value Ref Range    POC Glucose 62 (L) 70 - 108 mg/dl   POCT Glucose   Result Value Ref Range    POC Glucose 50 (L) 70 - 108 mg/dl   POCT Glucose   Result Value Ref Range    POC Glucose 93 70 - 108 mg/dl   POCT Glucose   Result Value Ref Range    POC Glucose 129 (H) 70 - 108 mg/dl   POCT Glucose   Result Value Ref Range    POC Glucose 244 (H) 70 - 108 mg/dl   POCT Glucose   Result Value Ref Range    POC Glucose 192 (H) 70 - 108 mg/dl   POCT Glucose   Result Value Ref Range    POC Glucose 132 (H) 70 - 108 mg/dl   POCT Glucose   Result Value Ref Range    POC Glucose 244 (H) 70 - 108 mg/dl   POCT Glucose   Result Value Ref Range    POC Glucose 323 (H) 70 - 108 mg/dl   POCT Glucose   Result Value Ref Range    POC Glucose 328 (H) 70 - 108 mg/dl   POCT Glucose   Result Value Ref Range    POC Glucose 227 (H) 70 - 108

## 2019-09-23 ENCOUNTER — TELEPHONE (OUTPATIENT)
Dept: PHARMACY | Age: 64
End: 2019-09-23

## 2019-09-23 ENCOUNTER — TELEPHONE (OUTPATIENT)
Dept: FAMILY MEDICINE CLINIC | Age: 64
End: 2019-09-23

## 2019-09-23 ENCOUNTER — CARE COORDINATION (OUTPATIENT)
Dept: CASE MANAGEMENT | Age: 64
End: 2019-09-23

## 2019-09-24 ENCOUNTER — OFFICE VISIT (OUTPATIENT)
Dept: CARDIOLOGY CLINIC | Age: 64
End: 2019-09-24
Payer: MEDICARE

## 2019-09-24 ENCOUNTER — CARE COORDINATION (OUTPATIENT)
Dept: CASE MANAGEMENT | Age: 64
End: 2019-09-24

## 2019-09-24 VITALS
HEIGHT: 68 IN | HEART RATE: 83 BPM | SYSTOLIC BLOOD PRESSURE: 114 MMHG | BODY MASS INDEX: 44.41 KG/M2 | WEIGHT: 293 LBS | DIASTOLIC BLOOD PRESSURE: 72 MMHG | OXYGEN SATURATION: 95 %

## 2019-09-24 DIAGNOSIS — I50.22 CHF (CONGESTIVE HEART FAILURE), NYHA CLASS III, CHRONIC, SYSTOLIC (HCC): Primary | ICD-10-CM

## 2019-09-24 PROBLEM — I51.9 SEVERE LEFT VENTRICULAR SYSTOLIC DYSFUNCTION: Status: ACTIVE | Noted: 2019-09-16

## 2019-09-24 PROBLEM — Z79.01 LONG TERM CURRENT USE OF ANTICOAGULANT: Status: ACTIVE | Noted: 2019-09-16

## 2019-09-24 PROBLEM — Z91.119 DIETARY NONCOMPLIANCE: Status: ACTIVE | Noted: 2019-09-16

## 2019-09-24 PROCEDURE — 1036F TOBACCO NON-USER: CPT | Performed by: NURSE PRACTITIONER

## 2019-09-24 PROCEDURE — G8427 DOCREV CUR MEDS BY ELIG CLIN: HCPCS | Performed by: NURSE PRACTITIONER

## 2019-09-24 PROCEDURE — 3017F COLORECTAL CA SCREEN DOC REV: CPT | Performed by: NURSE PRACTITIONER

## 2019-09-24 PROCEDURE — 1111F DSCHRG MED/CURRENT MED MERGE: CPT | Performed by: NURSE PRACTITIONER

## 2019-09-24 PROCEDURE — G8417 CALC BMI ABV UP PARAM F/U: HCPCS | Performed by: NURSE PRACTITIONER

## 2019-09-24 PROCEDURE — G8598 ASA/ANTIPLAT THER USED: HCPCS | Performed by: NURSE PRACTITIONER

## 2019-09-24 PROCEDURE — 99213 OFFICE O/P EST LOW 20 MIN: CPT | Performed by: NURSE PRACTITIONER

## 2019-09-24 RX ORDER — METOPROLOL SUCCINATE 25 MG/1
50 TABLET, EXTENDED RELEASE ORAL DAILY
Qty: 90 TABLET | Refills: 2 | Status: SHIPPED | OUTPATIENT
Start: 2019-09-24 | End: 2019-10-07 | Stop reason: SDUPTHER

## 2019-09-24 RX ORDER — FUROSEMIDE 40 MG/1
40 TABLET ORAL DAILY
Qty: 30 TABLET | Refills: 2 | Status: SHIPPED | OUTPATIENT
Start: 2019-09-24 | End: 2020-01-07

## 2019-09-24 RX ORDER — METOPROLOL SUCCINATE 25 MG/1
25 TABLET, EXTENDED RELEASE ORAL DAILY
COMMUNITY
End: 2019-09-24

## 2019-09-24 ASSESSMENT — ENCOUNTER SYMPTOMS
COUGH: 0
ABDOMINAL DISTENTION: 0
SHORTNESS OF BREATH: 1

## 2019-09-24 NOTE — PROGRESS NOTES
 High Blood Pressure Other     Cancer Maternal Uncle         stomach    Diabetes Maternal Grandmother     High Blood Pressure Maternal Grandmother     Diabetes Maternal Grandfather     Stroke Neg Hx      Social History     Tobacco Use    Smoking status: Former Smoker     Packs/day: 1.50     Years: 20.00     Pack years: 30.00     Last attempt to quit: 2007     Years since quittin.6    Smokeless tobacco: Never Used   Substance Use Topics    Alcohol use: No     Alcohol/week: 0.0 standard drinks     Current Outpatient Medications   Medication Sig Dispense Refill    metoprolol succinate (TOPROL XL) 25 MG extended release tablet Take 2 tablets by mouth daily 90 tablet 2    furosemide (LASIX) 40 MG tablet Take 1 tablet by mouth daily 30 tablet 2    insulin lispro protamine & lispro (HUMALOG MIX) (75-25) 100 UNIT per ML SUSP injection vial Inject 20 Units into the skin 2 times daily (with meals) 1 vial 3    insulin lispro (HUMALOG KWIKPEN) 100 UNIT/ML pen Inject into the skin 2 times daily Sliding scale      digoxin (LANOXIN) 125 MCG tablet Take 125 mcg by mouth daily      folic acid (FOLVITE) 1 MG tablet Take 1 mg by mouth daily      warfarin (COUMADIN) 2.5 MG tablet As directed by [facility] Coumadin clinic. 30 days = 40 tabs 40 tablet 3    amitriptyline (ELAVIL) 10 MG tablet Take 1 tablet by mouth nightly 30 tablet 3    isosorbide mononitrate (IMDUR) 60 MG extended release tablet Take 1 tablet by mouth daily 30 tablet 3    aspirin (RA ASPIRIN EC) 81 MG EC tablet Take 1 tablet by mouth daily 30 tablet 3    allopurinol (ZYLOPRIM) 100 MG tablet Take 100 mg by mouth daily      gabapentin (NEURONTIN) 100 MG capsule Take 100 mg by mouth 3 times daily.       meclizine (ANTIVERT) 25 MG CHEW Take 1 tablet by mouth 3 times daily as needed (vertigo) 90 tablet 0    ferrous sulfate 325 (65 Fe) MG EC tablet Take 1 tablet by mouth 3 times daily (with meals) 90 tablet 3    pantoprazole (PROTONIX) 40 MG tablet take 1 tablet (40 mg) by oral route once daily 90 tablet 1    atorvastatin (LIPITOR) 40 MG tablet take 1 tablet by mouth at bedtime 90 tablet 1    DULoxetine (CYMBALTA) 60 MG extended release capsule take 1 capsule by mouth once daily 90 capsule 3    fenofibrate (TRICOR) 145 MG tablet take 1 tablet by mouth once daily 90 tablet 3    CPAP Machine MISC by Does not apply route Please change CPAP to auto pressure to 7-15 cm H20. 1 each 0    Insulin Pen Needle (PEN NEEDLES) 31G X 5 MM MISC 1 each by Does not apply route 3 times daily 100 each 11    Calcium Carbonate-Vitamin D (CALCIUM-VITAMIN D) 500-200 MG-UNIT per tablet Take 1 tablet by mouth 2 times daily (with meals)       Misc. Devices [facility]) MISC 1 Device by Does not apply route as needed (prn) 1 each 0     No current facility-administered medications for this visit. No Known Allergies    SUBJECTIVE:   Review of Systems   Constitutional: Positive for fatigue. Negative for activity change and appetite change. Respiratory: Positive for shortness of breath (improved). Negative for cough. Cardiovascular: Negative for chest pain, palpitations and leg swelling. Gastrointestinal: Negative for abdominal distention. Musculoskeletal: Positive for gait problem. Neurological: Negative for weakness, light-headedness and headaches. Hematological: Negative for adenopathy. Psychiatric/Behavioral: Negative for sleep disturbance. OBJECTIVE:   Today's Vitals:  /72 (Site: Left Lower Arm)   Pulse 83   Ht 5' 8\" (1.727 m)   Wt (!) 312 lb (141.5 kg)   LMP 02/20/2001   SpO2 95%   BMI 47.44 kg/m²     Physical Exam   Constitutional: She is oriented to person, place, and time. She appears well-developed and well-nourished. No distress. HENT:   Head: Normocephalic and atraumatic. Eyes: Conjunctivae are normal.   Neck: Normal range of motion. Neck supple. No JVD   Cardiovascular: Normal rate and normal heart sounds.  An irregular complications. CATH (7/17/19)  SUMMARY:  Nonobstructive coronary artery disease. Findings are similar  to the findings that were in prior cath in 2017.     RECOMMENDATIONS:  1. Aggressive risk factor modification. 2.  Lipid-lowering therapy. 3.  Optimized medical management. 4.  Follow up in clinic in one to two weeks to evaluate symptoms  further.     Kathi Loyd MD     D: 07/17/2019 16:42:38      Results reviewed:  BNP: No results found for: BNP  CBC:   Lab Results   Component Value Date    WBC 4.6 09/22/2019    RBC 3.64 09/22/2019    HGB 11.5 09/22/2019    HCT 37.3 09/22/2019     09/22/2019     CMP:    Lab Results   Component Value Date     09/22/2019    K 4.7 09/22/2019    K 4.3 09/18/2019    CL 99 09/22/2019    CO2 26 09/22/2019    BUN 46 09/22/2019    CREATININE 1.9 09/22/2019    LABGLOM 27 09/22/2019    GLUCOSE 281 09/22/2019    GLUCOSE 213 03/27/2012    CALCIUM 9.2 09/22/2019     Hepatic Function Panel:    Lab Results   Component Value Date    ALKPHOS 113 09/17/2019    ALT 15 09/17/2019    AST 29 09/17/2019    PROT 6.8 09/17/2019    PROT 5.3 06/05/2019    BILITOT 0.4 09/17/2019    BILIDIR <0.2 09/17/2019    LABALBU 3.3 09/17/2019    LABALBU 4.3 03/27/2012     Magnesium:    Lab Results   Component Value Date    MG 2.2 09/20/2019     PT/INR:    Lab Results   Component Value Date    INR 2.50 09/22/2019     Lipids:    Lab Results   Component Value Date    TRIG 183 06/05/2019    HDL 20 06/05/2019    LDLCALC 49 06/05/2019       ASSESSMENT AND PLAN:   The patient's condition/symptoms are Stable: No clinical evidence of fluid overload today. Continue current medical regimen without changes at present time. Diagnosis Orders   1.  CHF (congestive heart failure), NYHA class III, chronic, systolic (HCC)  Basic Metabolic Panel    Digoxin Level       Continue:  GDMT:   ACE/ARB/ARNI - NONE - was on Lisinopril 2.5 in past   BB - Toprol 100 BID at discharge - note says 100/day - shes only been

## 2019-09-25 ENCOUNTER — OFFICE VISIT (OUTPATIENT)
Dept: FAMILY MEDICINE CLINIC | Age: 64
End: 2019-09-25
Payer: MEDICARE

## 2019-09-25 ENCOUNTER — CARE COORDINATION (OUTPATIENT)
Dept: CASE MANAGEMENT | Age: 64
End: 2019-09-25

## 2019-09-25 VITALS
SYSTOLIC BLOOD PRESSURE: 138 MMHG | RESPIRATION RATE: 18 BRPM | BODY MASS INDEX: 47.74 KG/M2 | HEART RATE: 76 BPM | DIASTOLIC BLOOD PRESSURE: 88 MMHG | TEMPERATURE: 97.4 F | WEIGHT: 293 LBS

## 2019-09-25 DIAGNOSIS — E78.5 HYPERLIPIDEMIA, UNSPECIFIED HYPERLIPIDEMIA TYPE: ICD-10-CM

## 2019-09-25 DIAGNOSIS — Z79.4 TYPE 2 DIABETES MELLITUS WITH DIABETIC POLYNEUROPATHY, WITH LONG-TERM CURRENT USE OF INSULIN (HCC): ICD-10-CM

## 2019-09-25 DIAGNOSIS — R82.998 LEUKOCYTES IN URINE: ICD-10-CM

## 2019-09-25 DIAGNOSIS — E11.42 TYPE 2 DIABETES MELLITUS WITH DIABETIC POLYNEUROPATHY, WITH LONG-TERM CURRENT USE OF INSULIN (HCC): ICD-10-CM

## 2019-09-25 DIAGNOSIS — Z79.4 TYPE 2 DIABETES MELLITUS WITH OTHER CIRCULATORY COMPLICATION, WITH LONG-TERM CURRENT USE OF INSULIN (HCC): ICD-10-CM

## 2019-09-25 DIAGNOSIS — R30.0 DYSURIA: ICD-10-CM

## 2019-09-25 DIAGNOSIS — I48.91 ATRIAL FIBRILLATION, UNSPECIFIED TYPE (HCC): ICD-10-CM

## 2019-09-25 DIAGNOSIS — E11.59 TYPE 2 DIABETES MELLITUS WITH OTHER CIRCULATORY COMPLICATION, WITH LONG-TERM CURRENT USE OF INSULIN (HCC): ICD-10-CM

## 2019-09-25 DIAGNOSIS — Z09 HOSPITAL DISCHARGE FOLLOW-UP: Primary | ICD-10-CM

## 2019-09-25 LAB
BILIRUBIN, POC: NORMAL
BLOOD URINE, POC: NORMAL
CLARITY, POC: CLEAR
COLOR, POC: YELLOW
GLUCOSE URINE, POC: NORMAL
HBA1C MFR BLD: 7.1 %
KETONES, POC: NORMAL
LEUKOCYTE EST, POC: NORMAL
NITRITE, POC: NORMAL
PH, POC: 5.5
PROTEIN, POC: 100
SPECIFIC GRAVITY, POC: 1.01
UROBILINOGEN, POC: 0.2

## 2019-09-25 PROCEDURE — 81002 URINALYSIS NONAUTO W/O SCOPE: CPT | Performed by: NURSE PRACTITIONER

## 2019-09-25 PROCEDURE — 99215 OFFICE O/P EST HI 40 MIN: CPT | Performed by: NURSE PRACTITIONER

## 2019-09-25 PROCEDURE — 1111F DSCHRG MED/CURRENT MED MERGE: CPT | Performed by: NURSE PRACTITIONER

## 2019-09-25 PROCEDURE — 83036 HEMOGLOBIN GLYCOSYLATED A1C: CPT | Performed by: NURSE PRACTITIONER

## 2019-09-25 RX ORDER — FENOFIBRATE 145 MG/1
TABLET, COATED ORAL
Qty: 90 TABLET | Refills: 3 | Status: SHIPPED | OUTPATIENT
Start: 2019-09-25 | End: 2020-09-08

## 2019-09-25 RX ORDER — ATORVASTATIN CALCIUM 40 MG/1
TABLET, FILM COATED ORAL
Qty: 90 TABLET | Refills: 3 | Status: SHIPPED | OUTPATIENT
Start: 2019-09-25 | End: 2020-09-08

## 2019-09-25 RX ORDER — FENOFIBRATE 145 MG/1
TABLET, COATED ORAL
Qty: 90 TABLET | Refills: 3 | Status: CANCELLED | OUTPATIENT
Start: 2019-09-25

## 2019-09-25 NOTE — PROGRESS NOTES
extended release tablet  Take 2 tablets by mouth daily             Misc. Devices [facility]) MISC  1 Device by Does not apply route as needed (prn)             pantoprazole (PROTONIX) 40 MG tablet  take 1 tablet (40 mg) by oral route once daily             warfarin (COUMADIN) 2.5 MG tablet  As directed by [facility] Coumadin clinic. 30 days = 40 tabs                   Medications marked \"taking\" at this time  Outpatient Medications Marked as Taking for the 9/25/19 encounter (Office Visit) with ARNOL Davis - CNP   Medication Sig Dispense Refill    fenofibrate (TRICOR) 145 MG tablet take 1 tablet by mouth once daily 90 tablet 3    atorvastatin (LIPITOR) 40 MG tablet take 1 tablet by mouth at bedtime 90 tablet 3    metoprolol succinate (TOPROL XL) 25 MG extended release tablet Take 2 tablets by mouth daily 90 tablet 2    furosemide (LASIX) 40 MG tablet Take 1 tablet by mouth daily 30 tablet 2    insulin lispro protamine & lispro (HUMALOG MIX) (75-25) 100 UNIT per ML SUSP injection vial Inject 20 Units into the skin 2 times daily (with meals) 1 vial 3    insulin lispro (HUMALOG KWIKPEN) 100 UNIT/ML pen Inject into the skin 2 times daily Sliding scale      digoxin (LANOXIN) 125 MCG tablet Take 125 mcg by mouth daily      folic acid (FOLVITE) 1 MG tablet Take 1 mg by mouth daily      warfarin (COUMADIN) 2.5 MG tablet As directed by [facility] Coumadin clinic. 30 days = 40 tabs 40 tablet 3    amitriptyline (ELAVIL) 10 MG tablet Take 1 tablet by mouth nightly 30 tablet 3    isosorbide mononitrate (IMDUR) 60 MG extended release tablet Take 1 tablet by mouth daily 30 tablet 3    aspirin (RA ASPIRIN EC) 81 MG EC tablet Take 1 tablet by mouth daily 30 tablet 3    allopurinol (ZYLOPRIM) 100 MG tablet Take 100 mg by mouth daily      gabapentin (NEURONTIN) 100 MG capsule Take 100 mg by mouth 3 times daily.       meclizine (ANTIVERT) 25 MG CHEW Take 1 tablet by mouth 3 times daily as needed (vertigo) 90 patient's underlying kidney disease. 6. Nonobstructive CAD-patient had a cardiac cath done on 7/2019 which showed 50 to 60% proximal and 50% mid and 50% distal ulcerated lesion of the RCA.  Patient had similar findings seen on 5/2017.  Patient to be continued on her aspirin, statin, beta-blocker, Imdur and Coumadin. 7. Nonischemic cardiomyopathy- patient with ejection fraction of 25 to 30%.  Patient currently has a LifeVest in place.  Patient to follow-up with  cardiology as outpatient. 8. Essential hypertension-patient with history of hypertension.  Patient's blood pressure appears to be mostly controlled.  Patient will be continued on her home cardiac medications. 9. Hyperlipidemia-patient with history of hyperlipidemia.  Patient to be continued on her statin and TriCor. 10. Hypothyroidism-patient with history of multinodular goiter.  Patient's TSH is within normal limits.  Patient currently not on any medications. 11. Morbid obesity-patient with BMI of 48.7. 12. Chronic debility-patient with history of chronic debility.  Patient uses a wheelchair in order to get around. [employer] is able to pivot to chair.     Consultants: Nephrology, cardiology    A comprehensive review of systems was negative except for: Genitourinary: positive for dysuria     Does not routinely check her sugar, is on novolog mix insulin. Stats there are some morning her am fasting sugar is 70 I did advise her to decrease her night time tan to 45 units at night as she states she has been taking 47 units at nighty. Will repeat A1C today. Vitals:    09/25/19 1353   BP: (!) 142/90   Pulse: 76   Resp: 18   Temp: 97.4 °F (36.3 °C)   TempSrc: Oral   Weight: (!) 314 lb (142.4 kg)     Body mass index is 47.74 kg/m².    Wt Readings from Last 3 Encounters:   09/25/19 (!) 314 lb (142.4 kg)   09/24/19 (!) 312 lb (141.5 kg)   09/22/19 (!) 314 lb 14.4 oz (142.8 kg)     BP Readings from Last 3 Encounters:   09/25/19 (!) 142/90   09/24/19 114/72

## 2019-09-25 NOTE — LETTER
suppliers that help patients recover after discharge from the hospital, including skilled nursing facilities, home health agencies, inpatient rehabilitation facilities, and long term care hospitals. [facility] is working closely with the doctors and other health care providers that care for you during and following your hospital stay and for a period of time after you leave the hospital. By working together, the health care providers are trying to more efficiently provide well-managed, high quality, patient-centered care as you undergo treatment. Hospitals, doctors, and other health care providers that care for you following a hospital stay may receive an additional payment for providing better, more coordinated health care. Medicare will monitor your care to make sure you and others get high quality care. Your feedback is important     Medicare may also ask you to answer a survey about the services and care you received from [facility]. The survey will be mailed to you. Your feedback will improve care for all people with Medicare who receive care from [facility]. Completion of this survey is optional.     Get more information     For more information about the Bundled Payments for [address] [place] [address], you can:    · Visit the CMS BPCI Advanced Website at http://cervantes-anna.net/ initiatives/bpci-advanced   · Call the [facility] BPCI-A team at (788) 736-9711. · Call 1-800-MEDICARE (4-361.232.5103). TTY users can call 5-739.240.5224     If you have concerns or complaints about your care, talk to your health care provider, or contact your Beneficiary and Family Centered Quality Improvement Organization [facility]). To get your [facility]'s phone number, visit Medicare.gov/contacts or call 1-800-MEDICARE. · To find a different hospital, visit www. [email or web address].[email or web address].gov or call

## 2019-09-26 ENCOUNTER — TELEPHONE (OUTPATIENT)
Dept: PULMONOLOGY | Age: 64
End: 2019-09-26

## 2019-09-26 DIAGNOSIS — Z99.89 OBSTRUCTIVE SLEEP APNEA ON CPAP: Primary | ICD-10-CM

## 2019-09-26 DIAGNOSIS — G47.33 OBSTRUCTIVE SLEEP APNEA ON CPAP: Primary | ICD-10-CM

## 2019-09-26 NOTE — TELEPHONE ENCOUNTER
Patient is calling in asking for a new order for a cpap machine to go to [employer]. She said the one she had  before she was able to use it.

## 2019-09-27 LAB
ORGANISM: ABNORMAL
URINE CULTURE, ROUTINE: ABNORMAL

## 2019-09-30 ENCOUNTER — HOSPITAL ENCOUNTER (OUTPATIENT)
Dept: NON INVASIVE DIAGNOSTICS | Age: 64
Discharge: HOME | End: 2019-09-30
Payer: MEDICARE

## 2019-09-30 ENCOUNTER — HOSPITAL ENCOUNTER (OUTPATIENT)
Age: 64
Discharge: HOME | End: 2019-09-30
Payer: MEDICARE

## 2019-09-30 ENCOUNTER — HOSPITAL ENCOUNTER (OUTPATIENT)
Dept: PHARMACY | Age: 64
Setting detail: THERAPIES SERIES
Discharge: HOME | End: 2019-09-30
Payer: MEDICARE

## 2019-09-30 ENCOUNTER — TELEPHONE (OUTPATIENT)
Dept: FAMILY MEDICINE CLINIC | Age: 64
End: 2019-09-30

## 2019-09-30 DIAGNOSIS — I48.91 ATRIAL FIBRILLATION, UNSPECIFIED TYPE (HCC): ICD-10-CM

## 2019-09-30 LAB
DIGOXIN LEVEL: 1.9 NG/ML (ref 0.5–2)
POC INR: 2.7 (ref 0.8–1.2)

## 2019-09-30 PROCEDURE — 36416 COLLJ CAPILLARY BLOOD SPEC: CPT

## 2019-09-30 PROCEDURE — 85610 PROTHROMBIN TIME: CPT

## 2019-09-30 PROCEDURE — 80162 ASSAY OF DIGOXIN TOTAL: CPT

## 2019-09-30 PROCEDURE — 93226 XTRNL ECG REC<48 HR SCAN A/R: CPT

## 2019-09-30 PROCEDURE — 99211 OFF/OP EST MAY X REQ PHY/QHP: CPT

## 2019-09-30 PROCEDURE — 93225 XTRNL ECG REC<48 HRS REC: CPT

## 2019-09-30 PROCEDURE — 36415 COLL VENOUS BLD VENIPUNCTURE: CPT

## 2019-09-30 RX ORDER — NITROFURANTOIN 25; 75 MG/1; MG/1
100 CAPSULE ORAL 2 TIMES DAILY
Qty: 20 CAPSULE | Refills: 0 | Status: SHIPPED | OUTPATIENT
Start: 2019-09-30 | End: 2019-10-10

## 2019-09-30 NOTE — TELEPHONE ENCOUNTER
----- Message from ARNOL Davis CNP sent at 9/30/2019 12:24 PM EDT -----  Please let pt know her urine did not grow any identifiable bacteria ask if her symptoms have cleared.

## 2019-10-01 ENCOUNTER — CARE COORDINATION (OUTPATIENT)
Dept: CASE MANAGEMENT | Age: 64
End: 2019-10-01

## 2019-10-03 LAB
ACQUISITION DURATION: NORMAL S
AVERAGE HEART RATE: 89 BPM
HOOKUP DATE: NORMAL
HOOKUP TIME: NORMAL
MAX HEART RATE TIME/DATE: NORMAL
MAX HEART RATE: 169 BPM
MIN HEART RATE TIME/DATE: NORMAL
MIN HEART RATE: 55 BPM
NUMBER OF QRS COMPLEXES: NORMAL
NUMBER OF SUPRAVENTRICULAR BEATS IN RUNS: 0
NUMBER OF SUPRAVENTRICULAR COUPLETS: 0
NUMBER OF SUPRAVENTRICULAR ECTOPICS: 0
NUMBER OF SUPRAVENTRICULAR ISOLATED BEATS: 0
NUMBER OF SUPRAVENTRICULAR RUNS: 0
NUMBER OF VENTRICULAR BEATS IN RUNS: 0
NUMBER OF VENTRICULAR BIGEMINAL CYCLES: 0
NUMBER OF VENTRICULAR COUPLETS: 1
NUMBER OF VENTRICULAR ECTOPICS: 37
NUMBER OF VENTRICULAR ISOLATED BEATS: 35
NUMBER OF VENTRICULAR RUNS: 0

## 2019-10-07 ENCOUNTER — OFFICE VISIT (OUTPATIENT)
Dept: CARDIOLOGY CLINIC | Age: 64
End: 2019-10-07
Payer: MEDICARE

## 2019-10-07 VITALS
HEIGHT: 68 IN | BODY MASS INDEX: 44.41 KG/M2 | WEIGHT: 293 LBS | SYSTOLIC BLOOD PRESSURE: 140 MMHG | DIASTOLIC BLOOD PRESSURE: 82 MMHG | HEART RATE: 86 BPM

## 2019-10-07 DIAGNOSIS — I25.10 CORONARY ARTERY DISEASE INVOLVING NATIVE CORONARY ARTERY OF NATIVE HEART WITHOUT ANGINA PECTORIS: ICD-10-CM

## 2019-10-07 DIAGNOSIS — I48.21 PERMANENT ATRIAL FIBRILLATION (HCC): Primary | ICD-10-CM

## 2019-10-07 DIAGNOSIS — I10 ESSENTIAL HYPERTENSION: ICD-10-CM

## 2019-10-07 DIAGNOSIS — I25.5 ISCHEMIC CARDIOMYOPATHY: ICD-10-CM

## 2019-10-07 PROCEDURE — G8427 DOCREV CUR MEDS BY ELIG CLIN: HCPCS | Performed by: NUCLEAR MEDICINE

## 2019-10-07 PROCEDURE — 1111F DSCHRG MED/CURRENT MED MERGE: CPT | Performed by: NUCLEAR MEDICINE

## 2019-10-07 PROCEDURE — 1036F TOBACCO NON-USER: CPT | Performed by: NUCLEAR MEDICINE

## 2019-10-07 PROCEDURE — G8598 ASA/ANTIPLAT THER USED: HCPCS | Performed by: NUCLEAR MEDICINE

## 2019-10-07 PROCEDURE — 99214 OFFICE O/P EST MOD 30 MIN: CPT | Performed by: NUCLEAR MEDICINE

## 2019-10-07 PROCEDURE — G8484 FLU IMMUNIZE NO ADMIN: HCPCS | Performed by: NUCLEAR MEDICINE

## 2019-10-07 PROCEDURE — G8417 CALC BMI ABV UP PARAM F/U: HCPCS | Performed by: NUCLEAR MEDICINE

## 2019-10-07 PROCEDURE — 3017F COLORECTAL CA SCREEN DOC REV: CPT | Performed by: NUCLEAR MEDICINE

## 2019-10-07 RX ORDER — METOPROLOL SUCCINATE 25 MG/1
75 TABLET, EXTENDED RELEASE ORAL DAILY
Qty: 270 TABLET | Refills: 3 | Status: SHIPPED | OUTPATIENT
Start: 2019-10-07 | End: 2019-10-22

## 2019-10-08 ENCOUNTER — CARE COORDINATION (OUTPATIENT)
Dept: CASE MANAGEMENT | Age: 64
End: 2019-10-08

## 2019-10-08 ENCOUNTER — TELEPHONE (OUTPATIENT)
Dept: CARDIOLOGY CLINIC | Age: 64
End: 2019-10-08

## 2019-10-08 ENCOUNTER — HOSPITAL ENCOUNTER (OUTPATIENT)
Age: 64
Discharge: HOME | End: 2019-10-08
Payer: MEDICARE

## 2019-10-08 ENCOUNTER — HOSPITAL ENCOUNTER (OUTPATIENT)
Dept: NON INVASIVE DIAGNOSTICS | Age: 64
Discharge: HOME | End: 2019-10-08
Payer: MEDICARE

## 2019-10-08 DIAGNOSIS — I25.10 CORONARY ARTERY DISEASE INVOLVING NATIVE CORONARY ARTERY OF NATIVE HEART WITHOUT ANGINA PECTORIS: ICD-10-CM

## 2019-10-08 DIAGNOSIS — I50.22 CHF (CONGESTIVE HEART FAILURE), NYHA CLASS III, CHRONIC, SYSTOLIC (HCC): ICD-10-CM

## 2019-10-08 DIAGNOSIS — I10 ESSENTIAL HYPERTENSION: ICD-10-CM

## 2019-10-08 DIAGNOSIS — I25.5 ISCHEMIC CARDIOMYOPATHY: ICD-10-CM

## 2019-10-08 DIAGNOSIS — I48.21 PERMANENT ATRIAL FIBRILLATION (HCC): ICD-10-CM

## 2019-10-08 LAB
ANION GAP SERPL CALCULATED.3IONS-SCNC: 13 MEQ/L (ref 8–16)
BUN BLDV-MCNC: 48 MG/DL (ref 7–22)
CALCIUM SERPL-MCNC: 9.7 MG/DL (ref 8.5–10.5)
CHLORIDE BLD-SCNC: 96 MEQ/L (ref 98–111)
CO2: 27 MEQ/L (ref 23–33)
CREAT SERPL-MCNC: 1.8 MG/DL (ref 0.4–1.2)
GFR SERPL CREATININE-BSD FRML MDRD: 28 ML/MIN/1.73M2
GLUCOSE BLD-MCNC: 314 MG/DL (ref 70–108)
POTASSIUM SERPL-SCNC: 4.4 MEQ/L (ref 3.5–5.2)
SODIUM BLD-SCNC: 136 MEQ/L (ref 135–145)

## 2019-10-08 PROCEDURE — 93307 TTE W/O DOPPLER COMPLETE: CPT

## 2019-10-08 PROCEDURE — 36415 COLL VENOUS BLD VENIPUNCTURE: CPT

## 2019-10-08 PROCEDURE — 80048 BASIC METABOLIC PNL TOTAL CA: CPT

## 2019-10-08 RX ORDER — DIGOXIN 125 MCG
62.5 TABLET ORAL DAILY
Qty: 30 TABLET | Refills: 0
Start: 2019-10-08

## 2019-10-09 ENCOUNTER — TELEPHONE (OUTPATIENT)
Dept: CARDIOLOGY CLINIC | Age: 64
End: 2019-10-09

## 2019-10-09 ENCOUNTER — TELEPHONE (OUTPATIENT)
Dept: NEPHROLOGY | Age: 64
End: 2019-10-09

## 2019-10-09 ENCOUNTER — TELEPHONE (OUTPATIENT)
Dept: FAMILY MEDICINE CLINIC | Age: 64
End: 2019-10-09

## 2019-10-10 ENCOUNTER — HOSPITAL ENCOUNTER (OUTPATIENT)
Dept: PHARMACY | Age: 64
Setting detail: THERAPIES SERIES
Discharge: HOME | End: 2019-10-10
Payer: MEDICARE

## 2019-10-10 DIAGNOSIS — I48.21 PERMANENT ATRIAL FIBRILLATION (HCC): ICD-10-CM

## 2019-10-10 LAB — POC INR: 3.9 (ref 0.8–1.2)

## 2019-10-10 PROCEDURE — 85610 PROTHROMBIN TIME: CPT | Performed by: PHARMACIST

## 2019-10-10 PROCEDURE — 99211 OFF/OP EST MAY X REQ PHY/QHP: CPT | Performed by: PHARMACIST

## 2019-10-10 PROCEDURE — 36416 COLLJ CAPILLARY BLOOD SPEC: CPT | Performed by: PHARMACIST

## 2019-10-11 ENCOUNTER — TELEPHONE (OUTPATIENT)
Dept: PULMONOLOGY | Age: 64
End: 2019-10-11

## 2019-10-16 ENCOUNTER — OFFICE VISIT (OUTPATIENT)
Dept: PULMONOLOGY | Age: 64
End: 2019-10-16
Payer: MEDICARE

## 2019-10-16 VITALS
DIASTOLIC BLOOD PRESSURE: 82 MMHG | TEMPERATURE: 97.7 F | SYSTOLIC BLOOD PRESSURE: 138 MMHG | HEIGHT: 68 IN | HEART RATE: 69 BPM | OXYGEN SATURATION: 95 % | WEIGHT: 293 LBS | BODY MASS INDEX: 44.41 KG/M2

## 2019-10-16 DIAGNOSIS — E66.01 MORBID OBESITY WITH BMI OF 45.0-49.9, ADULT (HCC): ICD-10-CM

## 2019-10-16 DIAGNOSIS — Z99.89 OSA ON CPAP: Primary | ICD-10-CM

## 2019-10-16 DIAGNOSIS — G47.33 OSA ON CPAP: Primary | ICD-10-CM

## 2019-10-16 DIAGNOSIS — I50.1 LVF (LEFT VENTRICULAR FAILURE) (HCC): ICD-10-CM

## 2019-10-16 PROCEDURE — G8427 DOCREV CUR MEDS BY ELIG CLIN: HCPCS | Performed by: PHYSICIAN ASSISTANT

## 2019-10-16 PROCEDURE — G8484 FLU IMMUNIZE NO ADMIN: HCPCS | Performed by: PHYSICIAN ASSISTANT

## 2019-10-16 PROCEDURE — G8598 ASA/ANTIPLAT THER USED: HCPCS | Performed by: PHYSICIAN ASSISTANT

## 2019-10-16 PROCEDURE — 1111F DSCHRG MED/CURRENT MED MERGE: CPT | Performed by: PHYSICIAN ASSISTANT

## 2019-10-16 PROCEDURE — G8417 CALC BMI ABV UP PARAM F/U: HCPCS | Performed by: PHYSICIAN ASSISTANT

## 2019-10-16 PROCEDURE — 99214 OFFICE O/P EST MOD 30 MIN: CPT | Performed by: PHYSICIAN ASSISTANT

## 2019-10-16 PROCEDURE — 1036F TOBACCO NON-USER: CPT | Performed by: PHYSICIAN ASSISTANT

## 2019-10-16 PROCEDURE — 3017F COLORECTAL CA SCREEN DOC REV: CPT | Performed by: PHYSICIAN ASSISTANT

## 2019-10-16 ASSESSMENT — ENCOUNTER SYMPTOMS
SHORTNESS OF BREATH: 0
NAUSEA: 0
COUGH: 0
DIARRHEA: 0
EYES NEGATIVE: 1
WHEEZING: 0
BACK PAIN: 0
ALLERGIC/IMMUNOLOGIC NEGATIVE: 1
CHEST TIGHTNESS: 0
STRIDOR: 0

## 2019-10-21 ENCOUNTER — HOSPITAL ENCOUNTER (OUTPATIENT)
Dept: PHARMACY | Age: 64
Setting detail: THERAPIES SERIES
Discharge: HOME | End: 2019-10-21
Payer: MEDICARE

## 2019-10-21 DIAGNOSIS — I48.91 ATRIAL FIBRILLATION, UNSPECIFIED TYPE (HCC): ICD-10-CM

## 2019-10-21 LAB — POC INR: 4.6 (ref 0.8–1.2)

## 2019-10-21 PROCEDURE — 36416 COLLJ CAPILLARY BLOOD SPEC: CPT

## 2019-10-21 PROCEDURE — 85610 PROTHROMBIN TIME: CPT

## 2019-10-21 PROCEDURE — 99211 OFF/OP EST MAY X REQ PHY/QHP: CPT

## 2019-10-21 RX ORDER — METOPROLOL SUCCINATE 100 MG/1
100 TABLET, EXTENDED RELEASE ORAL DAILY
COMMUNITY
End: 2021-01-01

## 2019-10-22 ENCOUNTER — CARE COORDINATION (OUTPATIENT)
Dept: CASE MANAGEMENT | Age: 64
End: 2019-10-22

## 2019-10-22 ENCOUNTER — OFFICE VISIT (OUTPATIENT)
Dept: CARDIOLOGY CLINIC | Age: 64
End: 2019-10-22
Payer: MEDICARE

## 2019-10-22 VITALS
WEIGHT: 293 LBS | HEIGHT: 68 IN | OXYGEN SATURATION: 98 % | DIASTOLIC BLOOD PRESSURE: 76 MMHG | BODY MASS INDEX: 44.41 KG/M2 | HEART RATE: 80 BPM | SYSTOLIC BLOOD PRESSURE: 142 MMHG

## 2019-10-22 DIAGNOSIS — I50.22 CHF (CONGESTIVE HEART FAILURE), NYHA CLASS III, CHRONIC, SYSTOLIC (HCC): Primary | ICD-10-CM

## 2019-10-22 LAB
ANION GAP SERPL CALCULATED.3IONS-SCNC: 10 MEQ/L (ref 8–16)
BUN BLDV-MCNC: 32 MG/DL (ref 7–22)
CALCIUM SERPL-MCNC: 9.3 MG/DL (ref 8.5–10.5)
CHLORIDE BLD-SCNC: 103 MEQ/L (ref 98–111)
CO2: 27 MEQ/L (ref 23–33)
CREAT SERPL-MCNC: 1.7 MG/DL (ref 0.4–1.2)
DIGOXIN LEVEL: 1.8 NG/ML (ref 0.5–2)
GFR SERPL CREATININE-BSD FRML MDRD: 30 ML/MIN/1.73M2
GLUCOSE BLD-MCNC: 176 MG/DL (ref 70–108)
POTASSIUM SERPL-SCNC: 4.9 MEQ/L (ref 3.5–5.2)
SODIUM BLD-SCNC: 140 MEQ/L (ref 135–145)

## 2019-10-22 PROCEDURE — 1123F ACP DISCUSS/DSCN MKR DOCD: CPT | Performed by: NURSE PRACTITIONER

## 2019-10-22 PROCEDURE — G8400 PT W/DXA NO RESULTS DOC: HCPCS | Performed by: NURSE PRACTITIONER

## 2019-10-22 PROCEDURE — G8427 DOCREV CUR MEDS BY ELIG CLIN: HCPCS | Performed by: NURSE PRACTITIONER

## 2019-10-22 PROCEDURE — 99213 OFFICE O/P EST LOW 20 MIN: CPT | Performed by: NURSE PRACTITIONER

## 2019-10-22 PROCEDURE — 4040F PNEUMOC VAC/ADMIN/RCVD: CPT | Performed by: NURSE PRACTITIONER

## 2019-10-22 PROCEDURE — G8417 CALC BMI ABV UP PARAM F/U: HCPCS | Performed by: NURSE PRACTITIONER

## 2019-10-22 PROCEDURE — 3017F COLORECTAL CA SCREEN DOC REV: CPT | Performed by: NURSE PRACTITIONER

## 2019-10-22 PROCEDURE — 1036F TOBACCO NON-USER: CPT | Performed by: NURSE PRACTITIONER

## 2019-10-22 PROCEDURE — G8598 ASA/ANTIPLAT THER USED: HCPCS | Performed by: NURSE PRACTITIONER

## 2019-10-22 PROCEDURE — 1111F DSCHRG MED/CURRENT MED MERGE: CPT | Performed by: NURSE PRACTITIONER

## 2019-10-22 PROCEDURE — 1090F PRES/ABSN URINE INCON ASSESS: CPT | Performed by: NURSE PRACTITIONER

## 2019-10-22 PROCEDURE — G8484 FLU IMMUNIZE NO ADMIN: HCPCS | Performed by: NURSE PRACTITIONER

## 2019-10-22 ASSESSMENT — ENCOUNTER SYMPTOMS
ABDOMINAL DISTENTION: 0
SHORTNESS OF BREATH: 0
COUGH: 0

## 2019-10-23 ENCOUNTER — TELEPHONE (OUTPATIENT)
Dept: PULMONOLOGY | Age: 64
End: 2019-10-23

## 2019-10-23 ENCOUNTER — HOSPITAL ENCOUNTER (OUTPATIENT)
Dept: NON INVASIVE DIAGNOSTICS | Age: 64
Discharge: HOME | End: 2019-10-23
Payer: MEDICARE

## 2019-10-23 PROCEDURE — 93225 XTRNL ECG REC<48 HRS REC: CPT

## 2019-10-23 PROCEDURE — 93226 XTRNL ECG REC<48 HR SCAN A/R: CPT

## 2019-10-29 ENCOUNTER — CARE COORDINATION (OUTPATIENT)
Dept: CASE MANAGEMENT | Age: 64
End: 2019-10-29

## 2019-10-29 LAB
ACQUISITION DURATION: NORMAL S
AVERAGE HEART RATE: 73 BPM
HOOKUP DATE: NORMAL
HOOKUP TIME: NORMAL
MAX HEART RATE TIME/DATE: NORMAL
MAX HEART RATE: 110 BPM
MIN HEART RATE TIME/DATE: NORMAL
MIN HEART RATE: 55 BPM
NUMBER OF QRS COMPLEXES: NORMAL
NUMBER OF SUPRAVENTRICULAR BEATS IN RUNS: 0
NUMBER OF SUPRAVENTRICULAR COUPLETS: 0
NUMBER OF SUPRAVENTRICULAR ECTOPICS: 0
NUMBER OF SUPRAVENTRICULAR ISOLATED BEATS: 0
NUMBER OF SUPRAVENTRICULAR RUNS: 0
NUMBER OF VENTRICULAR BEATS IN RUNS: 0
NUMBER OF VENTRICULAR BIGEMINAL CYCLES: 0
NUMBER OF VENTRICULAR COUPLETS: 0
NUMBER OF VENTRICULAR ECTOPICS: 170
NUMBER OF VENTRICULAR ISOLATED BEATS: 170
NUMBER OF VENTRICULAR RUNS: 0

## 2019-10-30 ENCOUNTER — HOSPITAL ENCOUNTER (OUTPATIENT)
Dept: PHARMACY | Age: 64
Setting detail: THERAPIES SERIES
Discharge: HOME | End: 2019-10-30
Payer: MEDICARE

## 2019-10-30 ENCOUNTER — HOSPITAL ENCOUNTER (OUTPATIENT)
Dept: PHYSICAL THERAPY | Age: 64
Setting detail: THERAPIES SERIES
Discharge: HOME | End: 2019-10-30
Payer: MEDICARE

## 2019-10-30 DIAGNOSIS — I48.91 ATRIAL FIBRILLATION, UNSPECIFIED TYPE (HCC): ICD-10-CM

## 2019-10-30 LAB — POC INR: 2.3 (ref 0.8–1.2)

## 2019-10-30 PROCEDURE — 97162 PT EVAL MOD COMPLEX 30 MIN: CPT

## 2019-10-30 PROCEDURE — 85610 PROTHROMBIN TIME: CPT

## 2019-10-30 PROCEDURE — 99211 OFF/OP EST MAY X REQ PHY/QHP: CPT

## 2019-10-30 PROCEDURE — 36416 COLLJ CAPILLARY BLOOD SPEC: CPT

## 2019-10-30 PROCEDURE — 97140 MANUAL THERAPY 1/> REGIONS: CPT

## 2019-10-31 RX ORDER — PANTOPRAZOLE SODIUM 40 MG/1
TABLET, DELAYED RELEASE ORAL
Qty: 90 TABLET | Refills: 3 | Status: SHIPPED | OUTPATIENT
Start: 2019-10-31 | End: 2020-03-03 | Stop reason: SINTOL

## 2019-10-31 ASSESSMENT — PAIN DESCRIPTION - PAIN TYPE: TYPE: CHRONIC PAIN

## 2019-10-31 ASSESSMENT — PAIN DESCRIPTION - ORIENTATION: ORIENTATION: LEFT;RIGHT

## 2019-10-31 ASSESSMENT — PAIN DESCRIPTION - DESCRIPTORS: DESCRIPTORS: NUMBNESS;ACHING

## 2019-10-31 ASSESSMENT — PAIN SCALES - GENERAL: PAINLEVEL_OUTOF10: 4

## 2019-10-31 ASSESSMENT — PAIN DESCRIPTION - LOCATION: LOCATION: LEG

## 2019-11-05 ENCOUNTER — OFFICE VISIT (OUTPATIENT)
Dept: NEPHROLOGY | Age: 64
End: 2019-11-05
Payer: MEDICARE

## 2019-11-05 VITALS — OXYGEN SATURATION: 93 % | HEART RATE: 79 BPM | SYSTOLIC BLOOD PRESSURE: 128 MMHG | DIASTOLIC BLOOD PRESSURE: 75 MMHG

## 2019-11-05 DIAGNOSIS — N18.30 CHRONIC KIDNEY DISEASE, STAGE III (MODERATE) (HCC): Primary | ICD-10-CM

## 2019-11-05 DIAGNOSIS — I89.0 LYMPHEDEMA OF LEFT LOWER EXTREMITY: ICD-10-CM

## 2019-11-05 DIAGNOSIS — R80.1 PERSISTENT PROTEINURIA: ICD-10-CM

## 2019-11-05 DIAGNOSIS — N18.3 DIABETES MELLITUS DUE TO UNDERLYING CONDITION WITH STAGE 3 CHRONIC KIDNEY DISEASE, UNSPECIFIED WHETHER LONG TERM INSULIN USE: ICD-10-CM

## 2019-11-05 DIAGNOSIS — E08.22 DIABETES MELLITUS DUE TO UNDERLYING CONDITION WITH STAGE 3 CHRONIC KIDNEY DISEASE, UNSPECIFIED WHETHER LONG TERM INSULIN USE: ICD-10-CM

## 2019-11-05 DIAGNOSIS — I10 ESSENTIAL HYPERTENSION: ICD-10-CM

## 2019-11-05 PROCEDURE — G8484 FLU IMMUNIZE NO ADMIN: HCPCS | Performed by: INTERNAL MEDICINE

## 2019-11-05 PROCEDURE — 2022F DILAT RTA XM EVC RTNOPTHY: CPT | Performed by: INTERNAL MEDICINE

## 2019-11-05 PROCEDURE — 4040F PNEUMOC VAC/ADMIN/RCVD: CPT | Performed by: INTERNAL MEDICINE

## 2019-11-05 PROCEDURE — 1090F PRES/ABSN URINE INCON ASSESS: CPT | Performed by: INTERNAL MEDICINE

## 2019-11-05 PROCEDURE — G8400 PT W/DXA NO RESULTS DOC: HCPCS | Performed by: INTERNAL MEDICINE

## 2019-11-05 PROCEDURE — 1123F ACP DISCUSS/DSCN MKR DOCD: CPT | Performed by: INTERNAL MEDICINE

## 2019-11-05 PROCEDURE — 99213 OFFICE O/P EST LOW 20 MIN: CPT | Performed by: INTERNAL MEDICINE

## 2019-11-05 PROCEDURE — 1036F TOBACCO NON-USER: CPT | Performed by: INTERNAL MEDICINE

## 2019-11-05 PROCEDURE — G8417 CALC BMI ABV UP PARAM F/U: HCPCS | Performed by: INTERNAL MEDICINE

## 2019-11-05 PROCEDURE — 3017F COLORECTAL CA SCREEN DOC REV: CPT | Performed by: INTERNAL MEDICINE

## 2019-11-05 PROCEDURE — G8427 DOCREV CUR MEDS BY ELIG CLIN: HCPCS | Performed by: INTERNAL MEDICINE

## 2019-11-05 PROCEDURE — G8598 ASA/ANTIPLAT THER USED: HCPCS | Performed by: INTERNAL MEDICINE

## 2019-11-06 ENCOUNTER — APPOINTMENT (OUTPATIENT)
Dept: PHYSICAL THERAPY | Age: 64
End: 2019-11-06
Payer: MEDICARE

## 2019-11-07 ENCOUNTER — HOSPITAL ENCOUNTER (OUTPATIENT)
Dept: PHARMACY | Age: 64
Setting detail: THERAPIES SERIES
Discharge: HOME | End: 2019-11-07
Payer: MEDICARE

## 2019-11-07 ENCOUNTER — HOSPITAL ENCOUNTER (OUTPATIENT)
Dept: PHYSICAL THERAPY | Age: 64
Setting detail: THERAPIES SERIES
Discharge: HOME | End: 2019-11-07
Payer: MEDICARE

## 2019-11-07 DIAGNOSIS — I48.91 ATRIAL FIBRILLATION, UNSPECIFIED TYPE (HCC): ICD-10-CM

## 2019-11-07 LAB — POC INR: 2.9 (ref 0.8–1.2)

## 2019-11-07 PROCEDURE — 85610 PROTHROMBIN TIME: CPT

## 2019-11-07 PROCEDURE — 99211 OFF/OP EST MAY X REQ PHY/QHP: CPT

## 2019-11-07 PROCEDURE — 36416 COLLJ CAPILLARY BLOOD SPEC: CPT

## 2019-11-07 PROCEDURE — 97140 MANUAL THERAPY 1/> REGIONS: CPT

## 2019-11-07 ASSESSMENT — PAIN DESCRIPTION - LOCATION: LOCATION: LEG

## 2019-11-07 ASSESSMENT — PAIN DESCRIPTION - ORIENTATION: ORIENTATION: RIGHT;LEFT

## 2019-11-07 ASSESSMENT — PAIN DESCRIPTION - DESCRIPTORS: DESCRIPTORS: NUMBNESS;ACHING

## 2019-11-07 ASSESSMENT — PAIN DESCRIPTION - PAIN TYPE: TYPE: CHRONIC PAIN

## 2019-11-07 ASSESSMENT — PAIN SCALES - GENERAL: PAINLEVEL_OUTOF10: 4

## 2019-11-13 ENCOUNTER — APPOINTMENT (OUTPATIENT)
Dept: PHYSICAL THERAPY | Age: 64
End: 2019-11-13
Payer: MEDICARE

## 2019-11-19 ENCOUNTER — HOSPITAL ENCOUNTER (OUTPATIENT)
Dept: PHYSICAL THERAPY | Age: 64
Setting detail: THERAPIES SERIES
Discharge: HOME | End: 2019-11-19
Payer: MEDICARE

## 2019-11-19 PROCEDURE — 97140 MANUAL THERAPY 1/> REGIONS: CPT

## 2019-11-19 ASSESSMENT — PAIN DESCRIPTION - ORIENTATION: ORIENTATION: LEFT;RIGHT

## 2019-11-19 ASSESSMENT — PAIN DESCRIPTION - LOCATION: LOCATION: LEG

## 2019-11-19 ASSESSMENT — PAIN SCALES - GENERAL: PAINLEVEL_OUTOF10: 3

## 2019-11-19 ASSESSMENT — PAIN DESCRIPTION - DESCRIPTORS: DESCRIPTORS: NUMBNESS;ACHING

## 2019-11-19 ASSESSMENT — PAIN DESCRIPTION - PAIN TYPE: TYPE: CHRONIC PAIN

## 2019-11-21 ENCOUNTER — HOSPITAL ENCOUNTER (OUTPATIENT)
Dept: PHARMACY | Age: 64
Setting detail: THERAPIES SERIES
Discharge: HOME | End: 2019-11-21
Payer: MEDICARE

## 2019-11-21 DIAGNOSIS — I48.91 ATRIAL FIBRILLATION, UNSPECIFIED TYPE (HCC): ICD-10-CM

## 2019-11-21 LAB — POC INR: 2 (ref 0.8–1.2)

## 2019-11-21 PROCEDURE — 85610 PROTHROMBIN TIME: CPT

## 2019-11-21 PROCEDURE — 99211 OFF/OP EST MAY X REQ PHY/QHP: CPT

## 2019-11-21 PROCEDURE — 36416 COLLJ CAPILLARY BLOOD SPEC: CPT

## 2019-11-21 RX ORDER — FERROUS SULFATE 325(65) MG
TABLET ORAL
Refills: 0 | COMMUNITY
Start: 2019-11-18 | End: 2019-11-21

## 2019-11-26 ENCOUNTER — HOSPITAL ENCOUNTER (OUTPATIENT)
Dept: PHYSICAL THERAPY | Age: 64
Setting detail: THERAPIES SERIES
Discharge: HOME | End: 2019-11-26
Payer: MEDICARE

## 2019-11-26 PROCEDURE — 97140 MANUAL THERAPY 1/> REGIONS: CPT

## 2019-11-26 ASSESSMENT — PAIN DESCRIPTION - ORIENTATION: ORIENTATION: LEFT;RIGHT

## 2019-11-26 ASSESSMENT — PAIN DESCRIPTION - PAIN TYPE: TYPE: CHRONIC PAIN

## 2019-11-26 ASSESSMENT — PAIN DESCRIPTION - LOCATION: LOCATION: LEG

## 2019-11-26 ASSESSMENT — PAIN SCALES - GENERAL: PAINLEVEL_OUTOF10: 2

## 2019-11-26 ASSESSMENT — PAIN DESCRIPTION - DESCRIPTORS: DESCRIPTORS: NUMBNESS;ACHING

## 2019-12-03 ENCOUNTER — OFFICE VISIT (OUTPATIENT)
Dept: FAMILY MEDICINE CLINIC | Age: 64
End: 2019-12-03
Payer: MEDICARE

## 2019-12-03 VITALS
HEIGHT: 68 IN | BODY MASS INDEX: 44.41 KG/M2 | RESPIRATION RATE: 16 BRPM | WEIGHT: 293 LBS | DIASTOLIC BLOOD PRESSURE: 80 MMHG | TEMPERATURE: 97.8 F | SYSTOLIC BLOOD PRESSURE: 130 MMHG | HEART RATE: 60 BPM

## 2019-12-03 DIAGNOSIS — M21.611 BUNION OF GREAT TOE OF RIGHT FOOT: ICD-10-CM

## 2019-12-03 DIAGNOSIS — M19.041 ARTHRITIS OF FINGER OF BOTH HANDS: ICD-10-CM

## 2019-12-03 DIAGNOSIS — Z91.81 AT HIGH RISK FOR FALLS: ICD-10-CM

## 2019-12-03 DIAGNOSIS — Z79.4 TYPE 2 DIABETES MELLITUS WITH OTHER CIRCULATORY COMPLICATION, WITH LONG-TERM CURRENT USE OF INSULIN (HCC): Primary | ICD-10-CM

## 2019-12-03 DIAGNOSIS — M19.042 ARTHRITIS OF FINGER OF BOTH HANDS: ICD-10-CM

## 2019-12-03 DIAGNOSIS — E11.59 TYPE 2 DIABETES MELLITUS WITH OTHER CIRCULATORY COMPLICATION, WITH LONG-TERM CURRENT USE OF INSULIN (HCC): Primary | ICD-10-CM

## 2019-12-03 DIAGNOSIS — G57.92 NEUROPATHY OF LEFT LOWER EXTREMITY: ICD-10-CM

## 2019-12-03 DIAGNOSIS — M21.372 FOOT DROP, LEFT FOOT: ICD-10-CM

## 2019-12-03 LAB — HBA1C MFR BLD: 8.6 %

## 2019-12-03 PROCEDURE — 99214 OFFICE O/P EST MOD 30 MIN: CPT | Performed by: NURSE PRACTITIONER

## 2019-12-03 PROCEDURE — 1123F ACP DISCUSS/DSCN MKR DOCD: CPT | Performed by: NURSE PRACTITIONER

## 2019-12-03 PROCEDURE — 1090F PRES/ABSN URINE INCON ASSESS: CPT | Performed by: NURSE PRACTITIONER

## 2019-12-03 PROCEDURE — G8598 ASA/ANTIPLAT THER USED: HCPCS | Performed by: NURSE PRACTITIONER

## 2019-12-03 PROCEDURE — G8417 CALC BMI ABV UP PARAM F/U: HCPCS | Performed by: NURSE PRACTITIONER

## 2019-12-03 PROCEDURE — 2022F DILAT RTA XM EVC RTNOPTHY: CPT | Performed by: NURSE PRACTITIONER

## 2019-12-03 PROCEDURE — 83036 HEMOGLOBIN GLYCOSYLATED A1C: CPT | Performed by: NURSE PRACTITIONER

## 2019-12-03 PROCEDURE — G8484 FLU IMMUNIZE NO ADMIN: HCPCS | Performed by: NURSE PRACTITIONER

## 2019-12-03 PROCEDURE — G8427 DOCREV CUR MEDS BY ELIG CLIN: HCPCS | Performed by: NURSE PRACTITIONER

## 2019-12-03 PROCEDURE — 3045F PR MOST RECENT HEMOGLOBIN A1C LEVEL 7.0-9.0%: CPT | Performed by: NURSE PRACTITIONER

## 2019-12-03 PROCEDURE — 1036F TOBACCO NON-USER: CPT | Performed by: NURSE PRACTITIONER

## 2019-12-03 PROCEDURE — 3017F COLORECTAL CA SCREEN DOC REV: CPT | Performed by: NURSE PRACTITIONER

## 2019-12-03 PROCEDURE — 4040F PNEUMOC VAC/ADMIN/RCVD: CPT | Performed by: NURSE PRACTITIONER

## 2019-12-03 PROCEDURE — G8400 PT W/DXA NO RESULTS DOC: HCPCS | Performed by: NURSE PRACTITIONER

## 2019-12-10 ENCOUNTER — HOSPITAL ENCOUNTER (OUTPATIENT)
Dept: PHYSICAL THERAPY | Age: 64
Setting detail: THERAPIES SERIES
Discharge: HOME | End: 2019-12-10
Payer: MEDICARE

## 2019-12-10 PROCEDURE — 97140 MANUAL THERAPY 1/> REGIONS: CPT

## 2019-12-10 ASSESSMENT — PAIN SCALES - GENERAL: PAINLEVEL_OUTOF10: 2

## 2019-12-10 ASSESSMENT — PAIN DESCRIPTION - LOCATION: LOCATION: LEG

## 2019-12-10 ASSESSMENT — PAIN DESCRIPTION - DESCRIPTORS: DESCRIPTORS: NUMBNESS;ACHING

## 2019-12-10 ASSESSMENT — PAIN DESCRIPTION - ORIENTATION: ORIENTATION: LEFT;RIGHT

## 2019-12-10 ASSESSMENT — PAIN DESCRIPTION - PAIN TYPE: TYPE: CHRONIC PAIN

## 2019-12-12 ENCOUNTER — HOSPITAL ENCOUNTER (OUTPATIENT)
Dept: PHARMACY | Age: 64
Setting detail: THERAPIES SERIES
Discharge: HOME | End: 2019-12-12
Payer: MEDICARE

## 2019-12-12 DIAGNOSIS — G62.9 NEUROPATHY: ICD-10-CM

## 2019-12-12 DIAGNOSIS — E11.59 TYPE 2 DIABETES MELLITUS WITH OTHER CIRCULATORY COMPLICATION, WITH LONG-TERM CURRENT USE OF INSULIN (HCC): Primary | ICD-10-CM

## 2019-12-12 DIAGNOSIS — Z79.4 TYPE 2 DIABETES MELLITUS WITH OTHER CIRCULATORY COMPLICATION, WITH LONG-TERM CURRENT USE OF INSULIN (HCC): Primary | ICD-10-CM

## 2019-12-12 DIAGNOSIS — I48.91 ATRIAL FIBRILLATION, UNSPECIFIED TYPE (HCC): ICD-10-CM

## 2019-12-12 LAB — POC INR: 1.8 (ref 0.8–1.2)

## 2019-12-12 PROCEDURE — 85610 PROTHROMBIN TIME: CPT

## 2019-12-12 PROCEDURE — 36416 COLLJ CAPILLARY BLOOD SPEC: CPT

## 2019-12-12 PROCEDURE — 99211 OFF/OP EST MAY X REQ PHY/QHP: CPT

## 2019-12-12 RX ORDER — PEN NEEDLE, DIABETIC 31 GX5/16"
NEEDLE, DISPOSABLE MISCELLANEOUS
Qty: 100 EACH | Refills: 0 | Status: ON HOLD | OUTPATIENT
Start: 2019-12-12 | End: 2021-01-01 | Stop reason: HOSPADM

## 2019-12-12 RX ORDER — DULOXETIN HYDROCHLORIDE 60 MG/1
CAPSULE, DELAYED RELEASE ORAL
Qty: 90 CAPSULE | Refills: 3 | Status: SHIPPED | OUTPATIENT
Start: 2019-12-12 | End: 2020-01-01

## 2019-12-17 ENCOUNTER — HOSPITAL ENCOUNTER (OUTPATIENT)
Dept: PHYSICAL THERAPY | Age: 64
Setting detail: THERAPIES SERIES
End: 2019-12-17
Payer: MEDICARE

## 2020-01-01 ENCOUNTER — TELEPHONE (OUTPATIENT)
Dept: PHARMACY | Age: 65
End: 2020-01-01

## 2020-01-01 ENCOUNTER — HOSPITAL ENCOUNTER (OUTPATIENT)
Dept: PHARMACY | Age: 65
Setting detail: THERAPIES SERIES
Discharge: HOME | End: 2020-11-05
Payer: MEDICARE

## 2020-01-01 ENCOUNTER — HOSPITAL ENCOUNTER (EMERGENCY)
Age: 65
Discharge: HOME | End: 2020-12-30
Attending: EMERGENCY MEDICINE
Payer: MEDICARE

## 2020-01-01 ENCOUNTER — OFFICE VISIT (OUTPATIENT)
Dept: NEPHROLOGY | Age: 65
End: 2020-01-01
Payer: MEDICARE

## 2020-01-01 ENCOUNTER — NURSE ONLY (OUTPATIENT)
Dept: LAB | Age: 65
End: 2020-01-01

## 2020-01-01 ENCOUNTER — HOSPITAL ENCOUNTER (OUTPATIENT)
Dept: PHARMACY | Age: 65
Setting detail: THERAPIES SERIES
Discharge: HOME | End: 2020-12-23
Payer: MEDICARE

## 2020-01-01 ENCOUNTER — APPOINTMENT (OUTPATIENT)
Dept: PHARMACY | Age: 65
End: 2020-01-01
Payer: MEDICARE

## 2020-01-01 ENCOUNTER — TELEPHONE (OUTPATIENT)
Dept: FAMILY MEDICINE CLINIC | Age: 65
End: 2020-01-01

## 2020-01-01 ENCOUNTER — APPOINTMENT (OUTPATIENT)
Dept: GENERAL RADIOLOGY | Age: 65
DRG: 689 | End: 2020-01-01
Payer: MEDICARE

## 2020-01-01 ENCOUNTER — APPOINTMENT (OUTPATIENT)
Dept: CT IMAGING | Age: 65
DRG: 689 | End: 2020-01-01
Payer: MEDICARE

## 2020-01-01 ENCOUNTER — HOSPITAL ENCOUNTER (OUTPATIENT)
Dept: PHARMACY | Age: 65
Setting detail: THERAPIES SERIES
Discharge: HOME | End: 2020-12-03
Payer: MEDICARE

## 2020-01-01 ENCOUNTER — HOSPITAL ENCOUNTER (INPATIENT)
Age: 65
LOS: 3 days | Discharge: HOME | DRG: 689 | End: 2020-12-18
Attending: EMERGENCY MEDICINE | Admitting: INTERNAL MEDICINE
Payer: MEDICARE

## 2020-01-01 ENCOUNTER — APPOINTMENT (OUTPATIENT)
Dept: CT IMAGING | Age: 65
End: 2020-01-01
Payer: MEDICARE

## 2020-01-01 VITALS
TEMPERATURE: 97 F | SYSTOLIC BLOOD PRESSURE: 147 MMHG | BODY MASS INDEX: 37.8 KG/M2 | HEART RATE: 87 BPM | DIASTOLIC BLOOD PRESSURE: 87 MMHG | OXYGEN SATURATION: 97 % | WEIGHT: 256 LBS

## 2020-01-01 VITALS
WEIGHT: 252 LBS | TEMPERATURE: 98.8 F | HEART RATE: 98 BPM | SYSTOLIC BLOOD PRESSURE: 151 MMHG | RESPIRATION RATE: 18 BRPM | BODY MASS INDEX: 37.33 KG/M2 | OXYGEN SATURATION: 98 % | HEIGHT: 69 IN | DIASTOLIC BLOOD PRESSURE: 82 MMHG

## 2020-01-01 VITALS
SYSTOLIC BLOOD PRESSURE: 152 MMHG | HEIGHT: 69 IN | RESPIRATION RATE: 16 BRPM | OXYGEN SATURATION: 98 % | TEMPERATURE: 97.7 F | WEIGHT: 253 LBS | BODY MASS INDEX: 37.47 KG/M2 | DIASTOLIC BLOOD PRESSURE: 98 MMHG | HEART RATE: 83 BPM

## 2020-01-01 VITALS — TEMPERATURE: 97.7 F

## 2020-01-01 VITALS — TEMPERATURE: 97.9 F

## 2020-01-01 LAB
AMMONIA: 26 UMOL/L (ref 11–60)
ANION GAP SERPL CALCULATED.3IONS-SCNC: 10 MEQ/L (ref 8–16)
ANION GAP SERPL CALCULATED.3IONS-SCNC: 12 MEQ/L (ref 8–16)
ANION GAP SERPL CALCULATED.3IONS-SCNC: 12 MEQ/L (ref 8–16)
ANION GAP SERPL CALCULATED.3IONS-SCNC: 9 MEQ/L (ref 8–16)
APTT: 37.4 SECONDS (ref 22–38)
AVERAGE GLUCOSE: 144 MG/DL (ref 70–126)
BACTERIA: ABNORMAL /HPF
BASOPHILS # BLD: 0.3 %
BASOPHILS ABSOLUTE: 0 THOU/MM3 (ref 0–0.1)
BILIRUBIN URINE: NEGATIVE
BLOOD CULTURE, ROUTINE: NORMAL
BLOOD CULTURE, ROUTINE: NORMAL
BLOOD, URINE: ABNORMAL
BUN BLDV-MCNC: 29 MG/DL (ref 7–22)
BUN BLDV-MCNC: 30 MG/DL (ref 7–22)
BUN BLDV-MCNC: 31 MG/DL (ref 7–22)
BUN BLDV-MCNC: 34 MG/DL (ref 7–22)
CALCIUM SERPL-MCNC: 8.9 MG/DL (ref 8.5–10.5)
CALCIUM SERPL-MCNC: 9.2 MG/DL (ref 8.5–10.5)
CALCIUM SERPL-MCNC: 9.5 MG/DL (ref 8.5–10.5)
CALCIUM SERPL-MCNC: 9.5 MG/DL (ref 8.5–10.5)
CASTS 2: ABNORMAL /LPF
CASTS UA: ABNORMAL /LPF
CHARACTER, URINE: ABNORMAL
CHLORIDE BLD-SCNC: 100 MEQ/L (ref 98–111)
CHLORIDE BLD-SCNC: 101 MEQ/L (ref 98–111)
CHLORIDE BLD-SCNC: 96 MEQ/L (ref 98–111)
CHLORIDE BLD-SCNC: 96 MEQ/L (ref 98–111)
CHOLESTEROL, TOTAL: 109 MG/DL (ref 100–199)
CO2: 23 MEQ/L (ref 23–33)
CO2: 24 MEQ/L (ref 23–33)
CO2: 26 MEQ/L (ref 23–33)
CO2: 29 MEQ/L (ref 23–33)
COLOR: ABNORMAL
CREAT SERPL-MCNC: 1.5 MG/DL (ref 0.4–1.2)
CREAT SERPL-MCNC: 1.6 MG/DL (ref 0.4–1.2)
CREAT SERPL-MCNC: 1.6 MG/DL (ref 0.4–1.2)
CREAT SERPL-MCNC: 1.7 MG/DL (ref 0.4–1.2)
CREATININE, URINE: 93.2 MG/DL
CRYSTALS, UA: ABNORMAL
EKG ATRIAL RATE: 57 BPM
EKG Q-T INTERVAL: 364 MS
EKG QRS DURATION: 118 MS
EKG QTC CALCULATION (BAZETT): 471 MS
EKG R AXIS: 62 DEGREES
EKG T AXIS: -103 DEGREES
EKG VENTRICULAR RATE: 101 BPM
EOSINOPHIL # BLD: 0.3 %
EOSINOPHILS ABSOLUTE: 0 THOU/MM3 (ref 0–0.4)
EPITHELIAL CELLS, UA: ABNORMAL /HPF
ERYTHROCYTE [DISTWIDTH] IN BLOOD BY AUTOMATED COUNT: 15.5 % (ref 11.5–14.5)
ERYTHROCYTE [DISTWIDTH] IN BLOOD BY AUTOMATED COUNT: 15.6 % (ref 11.5–14.5)
ERYTHROCYTE [DISTWIDTH] IN BLOOD BY AUTOMATED COUNT: 15.7 % (ref 11.5–14.5)
ERYTHROCYTE [DISTWIDTH] IN BLOOD BY AUTOMATED COUNT: 15.8 % (ref 11.5–14.5)
ERYTHROCYTE [DISTWIDTH] IN BLOOD BY AUTOMATED COUNT: 56.1 FL (ref 35–45)
ERYTHROCYTE [DISTWIDTH] IN BLOOD BY AUTOMATED COUNT: 56.9 FL (ref 35–45)
ERYTHROCYTE [DISTWIDTH] IN BLOOD BY AUTOMATED COUNT: 57.2 FL (ref 35–45)
ERYTHROCYTE [DISTWIDTH] IN BLOOD BY AUTOMATED COUNT: 60.3 FL (ref 35–45)
FERRITIN: 275 NG/ML (ref 10–291)
FOLATE: > 20 NG/ML (ref 4.8–24.2)
GFR SERPL CREATININE-BSD FRML MDRD: 30 ML/MIN/1.73M2
GFR SERPL CREATININE-BSD FRML MDRD: 32 ML/MIN/1.73M2
GFR SERPL CREATININE-BSD FRML MDRD: 32 ML/MIN/1.73M2
GFR SERPL CREATININE-BSD FRML MDRD: 35 ML/MIN/1.73M2
GLUCOSE BLD-MCNC: 154 MG/DL (ref 70–108)
GLUCOSE BLD-MCNC: 169 MG/DL (ref 70–108)
GLUCOSE BLD-MCNC: 176 MG/DL (ref 70–108)
GLUCOSE BLD-MCNC: 179 MG/DL (ref 70–108)
GLUCOSE BLD-MCNC: 182 MG/DL (ref 70–108)
GLUCOSE BLD-MCNC: 186 MG/DL (ref 70–108)
GLUCOSE BLD-MCNC: 186 MG/DL (ref 70–108)
GLUCOSE BLD-MCNC: 197 MG/DL (ref 70–108)
GLUCOSE BLD-MCNC: 202 MG/DL (ref 70–108)
GLUCOSE BLD-MCNC: 212 MG/DL (ref 70–108)
GLUCOSE BLD-MCNC: 224 MG/DL (ref 70–108)
GLUCOSE BLD-MCNC: 245 MG/DL (ref 70–108)
GLUCOSE BLD-MCNC: 255 MG/DL (ref 70–108)
GLUCOSE BLD-MCNC: 297 MG/DL (ref 70–108)
GLUCOSE BLD-MCNC: 297 MG/DL (ref 70–108)
GLUCOSE BLD-MCNC: 306 MG/DL (ref 70–108)
GLUCOSE URINE: NEGATIVE MG/DL
HBA1C MFR BLD: 6.8 % (ref 4.4–6.4)
HCT VFR BLD CALC: 31.6 % (ref 37–47)
HCT VFR BLD CALC: 33.6 % (ref 37–47)
HCT VFR BLD CALC: 33.7 % (ref 37–47)
HCT VFR BLD CALC: 33.9 % (ref 37–47)
HDLC SERPL-MCNC: 17 MG/DL
HEMOGLOBIN: 10.5 GM/DL (ref 12–16)
HEMOGLOBIN: 10.7 GM/DL (ref 12–16)
HEMOGLOBIN: 9.9 GM/DL (ref 12–16)
HEMOGLOBIN: 9.9 GM/DL (ref 12–16)
IMMATURE GRANS (ABS): 0.02 THOU/MM3 (ref 0–0.07)
IMMATURE GRANULOCYTES: 0.3 %
INR BLD: 2.25 (ref 0.85–1.13)
INR BLD: 2.39 (ref 0.85–1.13)
INR BLD: 2.46 (ref 0.85–1.13)
INR BLD: 2.69 (ref 0.85–1.13)
IRON SATURATION: 13 % (ref 20–50)
IRON: 37 UG/DL (ref 50–170)
KETONES, URINE: ABNORMAL
LACTIC ACID, SEPSIS: 1.2 MMOL/L (ref 0.5–1.9)
LACTIC ACID, SEPSIS: 2 MMOL/L (ref 0.5–1.9)
LDL CHOLESTEROL CALCULATED: 54 MG/DL
LEUKOCYTE ESTERASE, URINE: ABNORMAL
LYMPHOCYTES # BLD: 7.2 %
LYMPHOCYTES ABSOLUTE: 0.5 THOU/MM3 (ref 1–4.8)
MCH RBC QN AUTO: 30.6 PG (ref 26–33)
MCH RBC QN AUTO: 30.7 PG (ref 26–33)
MCH RBC QN AUTO: 31 PG (ref 26–33)
MCH RBC QN AUTO: 31.7 PG (ref 26–33)
MCHC RBC AUTO-ENTMCNC: 29.4 GM/DL (ref 32.2–35.5)
MCHC RBC AUTO-ENTMCNC: 31 GM/DL (ref 32.2–35.5)
MCHC RBC AUTO-ENTMCNC: 31.3 GM/DL (ref 32.2–35.5)
MCHC RBC AUTO-ENTMCNC: 31.8 GM/DL (ref 32.2–35.5)
MCV RBC AUTO: 104 FL (ref 81–99)
MCV RBC AUTO: 99.1 FL (ref 81–99)
MCV RBC AUTO: 99.1 FL (ref 81–99)
MCV RBC AUTO: 99.4 FL (ref 81–99)
MICROALBUMIN UR-MCNC: 278.24 MG/DL
MICROALBUMIN/CREAT UR-RTO: 2985 MG/G (ref 0–30)
MISCELLANEOUS 2: ABNORMAL
MONOCYTES # BLD: 7.2 %
MONOCYTES ABSOLUTE: 0.5 THOU/MM3 (ref 0.4–1.3)
NITRITE, URINE: NEGATIVE
NUCLEATED RED BLOOD CELLS: 0 /100 WBC
ORGANISM: ABNORMAL
OSMOLALITY CALCULATION: 273.5 MOSMOL/KG (ref 275–300)
OSMOLALITY CALCULATION: 279.3 MOSMOL/KG (ref 275–300)
PH UA: 5.5 (ref 5–9)
PLATELET # BLD: 145 THOU/MM3 (ref 130–400)
PLATELET # BLD: 172 THOU/MM3 (ref 130–400)
PLATELET # BLD: 173 THOU/MM3 (ref 130–400)
PLATELET # BLD: 193 THOU/MM3 (ref 130–400)
PMV BLD AUTO: 11.3 FL (ref 9.4–12.4)
PMV BLD AUTO: 11.4 FL (ref 9.4–12.4)
PMV BLD AUTO: 11.6 FL (ref 9.4–12.4)
PMV BLD AUTO: 12 FL (ref 9.4–12.4)
POC INR: 2 (ref 0.8–1.2)
POC INR: 2.4 (ref 0.8–1.2)
POTASSIUM REFLEX MAGNESIUM: 4.3 MEQ/L (ref 3.5–5.2)
POTASSIUM SERPL-SCNC: 3.6 MEQ/L (ref 3.5–5.2)
POTASSIUM SERPL-SCNC: 4.3 MEQ/L (ref 3.5–5.2)
POTASSIUM SERPL-SCNC: 4.8 MEQ/L (ref 3.5–5.2)
PRO-BNP: ABNORMAL PG/ML (ref 0–900)
PROTEIN UA: 300
PTH INTACT: 86.5 PG/ML (ref 15–65)
RBC # BLD: 3.19 MILL/MM3 (ref 4.2–5.4)
RBC # BLD: 3.24 MILL/MM3 (ref 4.2–5.4)
RBC # BLD: 3.38 MILL/MM3 (ref 4.2–5.4)
RBC # BLD: 3.42 MILL/MM3 (ref 4.2–5.4)
RBC URINE: ABNORMAL /HPF
RENAL EPITHELIAL, UA: ABNORMAL
SARS-COV-2, NAAT: NOT DETECTED
SEG NEUTROPHILS: 84.7 %
SEGMENTED NEUTROPHILS ABSOLUTE COUNT: 6.3 THOU/MM3 (ref 1.8–7.7)
SODIUM BLD-SCNC: 131 MEQ/L (ref 135–145)
SODIUM BLD-SCNC: 134 MEQ/L (ref 135–145)
SODIUM BLD-SCNC: 134 MEQ/L (ref 135–145)
SODIUM BLD-SCNC: 139 MEQ/L (ref 135–145)
SPECIFIC GRAVITY, URINE: 1.02 (ref 1–1.03)
T4 FREE: 1.57 NG/DL (ref 0.93–1.76)
TOTAL IRON BINDING CAPACITY: 282 UG/DL (ref 171–450)
TRIGL SERPL-MCNC: 190 MG/DL (ref 0–199)
TROPONIN T: < 0.01 NG/ML
TSH SERPL DL<=0.05 MIU/L-ACNC: 0.12 UIU/ML (ref 0.4–4.2)
URINE CULTURE REFLEX: ABNORMAL
UROBILINOGEN, URINE: 1 EU/DL (ref 0–1)
VITAMIN B-12: 422 PG/ML (ref 211–911)
VITAMIN D 25-HYDROXY: 36 NG/ML (ref 30–100)
WBC # BLD: 4 THOU/MM3 (ref 4.8–10.8)
WBC # BLD: 5.9 THOU/MM3 (ref 4.8–10.8)
WBC # BLD: 6.8 THOU/MM3 (ref 4.8–10.8)
WBC # BLD: 7.4 THOU/MM3 (ref 4.8–10.8)
WBC UA: > 200 /HPF
YEAST: ABNORMAL

## 2020-01-01 PROCEDURE — 97535 SELF CARE MNGMENT TRAINING: CPT

## 2020-01-01 PROCEDURE — 36415 COLL VENOUS BLD VENIPUNCTURE: CPT

## 2020-01-01 PROCEDURE — 82746 ASSAY OF FOLIC ACID SERUM: CPT

## 2020-01-01 PROCEDURE — 87086 URINE CULTURE/COLONY COUNT: CPT

## 2020-01-01 PROCEDURE — 99211 OFF/OP EST MAY X REQ PHY/QHP: CPT | Performed by: PHARMACIST

## 2020-01-01 PROCEDURE — 85610 PROTHROMBIN TIME: CPT

## 2020-01-01 PROCEDURE — 85610 PROTHROMBIN TIME: CPT | Performed by: PHARMACIST

## 2020-01-01 PROCEDURE — 84439 ASSAY OF FREE THYROXINE: CPT

## 2020-01-01 PROCEDURE — 1200000003 HC TELEMETRY R&B

## 2020-01-01 PROCEDURE — 6360000002 HC RX W HCPCS: Performed by: EMERGENCY MEDICINE

## 2020-01-01 PROCEDURE — 80048 BASIC METABOLIC PNL TOTAL CA: CPT

## 2020-01-01 PROCEDURE — 72125 CT NECK SPINE W/O DYE: CPT

## 2020-01-01 PROCEDURE — 83605 ASSAY OF LACTIC ACID: CPT

## 2020-01-01 PROCEDURE — 6370000000 HC RX 637 (ALT 250 FOR IP): Performed by: INTERNAL MEDICINE

## 2020-01-01 PROCEDURE — 2500000003 HC RX 250 WO HCPCS: Performed by: INTERNAL MEDICINE

## 2020-01-01 PROCEDURE — 85025 COMPLETE CBC W/AUTO DIFF WBC: CPT

## 2020-01-01 PROCEDURE — 85730 THROMBOPLASTIN TIME PARTIAL: CPT

## 2020-01-01 PROCEDURE — 97110 THERAPEUTIC EXERCISES: CPT

## 2020-01-01 PROCEDURE — 99211 OFF/OP EST MAY X REQ PHY/QHP: CPT

## 2020-01-01 PROCEDURE — 71045 X-RAY EXAM CHEST 1 VIEW: CPT

## 2020-01-01 PROCEDURE — 87077 CULTURE AEROBIC IDENTIFY: CPT

## 2020-01-01 PROCEDURE — 93010 ELECTROCARDIOGRAM REPORT: CPT | Performed by: INTERNAL MEDICINE

## 2020-01-01 PROCEDURE — 87186 SC STD MICRODIL/AGAR DIL: CPT

## 2020-01-01 PROCEDURE — 93005 ELECTROCARDIOGRAM TRACING: CPT | Performed by: EMERGENCY MEDICINE

## 2020-01-01 PROCEDURE — 82607 VITAMIN B-12: CPT

## 2020-01-01 PROCEDURE — 84443 ASSAY THYROID STIM HORMONE: CPT

## 2020-01-01 PROCEDURE — 99285 EMERGENCY DEPT VISIT HI MDM: CPT

## 2020-01-01 PROCEDURE — U0002 COVID-19 LAB TEST NON-CDC: HCPCS

## 2020-01-01 PROCEDURE — 6360000002 HC RX W HCPCS: Performed by: INTERNAL MEDICINE

## 2020-01-01 PROCEDURE — 1090F PRES/ABSN URINE INCON ASSESS: CPT | Performed by: INTERNAL MEDICINE

## 2020-01-01 PROCEDURE — 1123F ACP DISCUSS/DSCN MKR DOCD: CPT | Performed by: INTERNAL MEDICINE

## 2020-01-01 PROCEDURE — 85027 COMPLETE CBC AUTOMATED: CPT

## 2020-01-01 PROCEDURE — 83880 ASSAY OF NATRIURETIC PEPTIDE: CPT

## 2020-01-01 PROCEDURE — 81001 URINALYSIS AUTO W/SCOPE: CPT

## 2020-01-01 PROCEDURE — 6370000000 HC RX 637 (ALT 250 FOR IP): Performed by: EMERGENCY MEDICINE

## 2020-01-01 PROCEDURE — 83540 ASSAY OF IRON: CPT

## 2020-01-01 PROCEDURE — 2580000003 HC RX 258: Performed by: INTERNAL MEDICINE

## 2020-01-01 PROCEDURE — 99283 EMERGENCY DEPT VISIT LOW MDM: CPT

## 2020-01-01 PROCEDURE — 80061 LIPID PANEL: CPT

## 2020-01-01 PROCEDURE — 96365 THER/PROPH/DIAG IV INF INIT: CPT

## 2020-01-01 PROCEDURE — 73502 X-RAY EXAM HIP UNI 2-3 VIEWS: CPT

## 2020-01-01 PROCEDURE — 83036 HEMOGLOBIN GLYCOSYLATED A1C: CPT

## 2020-01-01 PROCEDURE — 83550 IRON BINDING TEST: CPT

## 2020-01-01 PROCEDURE — G8399 PT W/DXA RESULTS DOCUMENT: HCPCS | Performed by: INTERNAL MEDICINE

## 2020-01-01 PROCEDURE — 97116 GAIT TRAINING THERAPY: CPT

## 2020-01-01 PROCEDURE — 82948 REAGENT STRIP/BLOOD GLUCOSE: CPT

## 2020-01-01 PROCEDURE — 96372 THER/PROPH/DIAG INJ SC/IM: CPT

## 2020-01-01 PROCEDURE — 1036F TOBACCO NON-USER: CPT | Performed by: INTERNAL MEDICINE

## 2020-01-01 PROCEDURE — 36416 COLLJ CAPILLARY BLOOD SPEC: CPT

## 2020-01-01 PROCEDURE — 3017F COLORECTAL CA SCREEN DOC REV: CPT | Performed by: INTERNAL MEDICINE

## 2020-01-01 PROCEDURE — 97530 THERAPEUTIC ACTIVITIES: CPT

## 2020-01-01 PROCEDURE — 4040F PNEUMOC VAC/ADMIN/RCVD: CPT | Performed by: INTERNAL MEDICINE

## 2020-01-01 PROCEDURE — 87040 BLOOD CULTURE FOR BACTERIA: CPT

## 2020-01-01 PROCEDURE — 2580000003 HC RX 258: Performed by: EMERGENCY MEDICINE

## 2020-01-01 PROCEDURE — 99213 OFFICE O/P EST LOW 20 MIN: CPT | Performed by: INTERNAL MEDICINE

## 2020-01-01 PROCEDURE — G8417 CALC BMI ABV UP PARAM F/U: HCPCS | Performed by: INTERNAL MEDICINE

## 2020-01-01 PROCEDURE — G8484 FLU IMMUNIZE NO ADMIN: HCPCS | Performed by: INTERNAL MEDICINE

## 2020-01-01 PROCEDURE — 72131 CT LUMBAR SPINE W/O DYE: CPT

## 2020-01-01 PROCEDURE — 97162 PT EVAL MOD COMPLEX 30 MIN: CPT

## 2020-01-01 PROCEDURE — 82728 ASSAY OF FERRITIN: CPT

## 2020-01-01 PROCEDURE — 82140 ASSAY OF AMMONIA: CPT

## 2020-01-01 PROCEDURE — 84484 ASSAY OF TROPONIN QUANT: CPT

## 2020-01-01 PROCEDURE — 36416 COLLJ CAPILLARY BLOOD SPEC: CPT | Performed by: PHARMACIST

## 2020-01-01 PROCEDURE — 97166 OT EVAL MOD COMPLEX 45 MIN: CPT

## 2020-01-01 PROCEDURE — G8427 DOCREV CUR MEDS BY ELIG CLIN: HCPCS | Performed by: INTERNAL MEDICINE

## 2020-01-01 PROCEDURE — 70450 CT HEAD/BRAIN W/O DYE: CPT

## 2020-01-01 RX ORDER — LIDOCAINE 50 MG/G
1 PATCH TOPICAL DAILY
Qty: 10 PATCH | Refills: 0 | Status: SHIPPED | OUTPATIENT
Start: 2020-01-01 | End: 2021-01-01

## 2020-01-01 RX ORDER — LEVOTHYROXINE SODIUM 0.07 MG/1
75 TABLET ORAL DAILY
Status: DISCONTINUED | OUTPATIENT
Start: 2020-01-01 | End: 2020-01-01

## 2020-01-01 RX ORDER — LOSARTAN POTASSIUM 100 MG/1
100 TABLET ORAL DAILY
Qty: 90 TABLET | Refills: 3 | Status: ON HOLD | OUTPATIENT
Start: 2020-01-01 | End: 2021-01-01 | Stop reason: SDUPTHER

## 2020-01-01 RX ORDER — OYSTER SHELL CALCIUM WITH VITAMIN D 500; 200 MG/1; [IU]/1
1 TABLET, FILM COATED ORAL 2 TIMES DAILY WITH MEALS
Status: DISCONTINUED | OUTPATIENT
Start: 2020-01-01 | End: 2020-01-01 | Stop reason: HOSPADM

## 2020-01-01 RX ORDER — FUROSEMIDE 20 MG/1
20 TABLET ORAL DAILY
Status: DISCONTINUED | OUTPATIENT
Start: 2020-01-01 | End: 2020-01-01 | Stop reason: HOSPADM

## 2020-01-01 RX ORDER — GABAPENTIN 100 MG/1
CAPSULE ORAL
Qty: 270 CAPSULE | Refills: 0 | Status: ON HOLD | OUTPATIENT
Start: 2020-01-01 | End: 2021-01-01

## 2020-01-01 RX ORDER — ALLOPURINOL 100 MG/1
100 TABLET ORAL DAILY
Status: DISCONTINUED | OUTPATIENT
Start: 2020-01-01 | End: 2020-01-01 | Stop reason: HOSPADM

## 2020-01-01 RX ORDER — LIDOCAINE 4 G/G
1 PATCH TOPICAL DAILY
Status: DISCONTINUED | OUTPATIENT
Start: 2020-01-01 | End: 2020-01-01 | Stop reason: HOSPADM

## 2020-01-01 RX ORDER — ATORVASTATIN CALCIUM 40 MG/1
40 TABLET, FILM COATED ORAL NIGHTLY
Status: DISCONTINUED | OUTPATIENT
Start: 2020-01-01 | End: 2020-01-01 | Stop reason: HOSPADM

## 2020-01-01 RX ORDER — DIGOXIN 125 MCG
62.5 TABLET ORAL DAILY
Status: DISCONTINUED | OUTPATIENT
Start: 2020-01-01 | End: 2020-01-01 | Stop reason: HOSPADM

## 2020-01-01 RX ORDER — CYCLOBENZAPRINE HCL 5 MG
5 TABLET ORAL 2 TIMES DAILY PRN
Qty: 10 TABLET | Refills: 0 | Status: ON HOLD | OUTPATIENT
Start: 2020-01-01 | End: 2021-01-01 | Stop reason: HOSPADM

## 2020-01-01 RX ORDER — DEXTROSE MONOHYDRATE 25 G/50ML
12.5 INJECTION, SOLUTION INTRAVENOUS PRN
Status: DISCONTINUED | OUTPATIENT
Start: 2020-01-01 | End: 2020-01-01 | Stop reason: HOSPADM

## 2020-01-01 RX ORDER — ASPIRIN 81 MG/1
81 TABLET ORAL DAILY
Status: DISCONTINUED | OUTPATIENT
Start: 2020-01-01 | End: 2020-01-01 | Stop reason: HOSPADM

## 2020-01-01 RX ORDER — ACETAMINOPHEN 325 MG/1
650 TABLET ORAL EVERY 4 HOURS PRN
Status: DISCONTINUED | OUTPATIENT
Start: 2020-01-01 | End: 2020-01-01 | Stop reason: HOSPADM

## 2020-01-01 RX ORDER — FENOFIBRATE 160 MG/1
160 TABLET ORAL DAILY
Status: DISCONTINUED | OUTPATIENT
Start: 2020-01-01 | End: 2020-01-01 | Stop reason: HOSPADM

## 2020-01-01 RX ORDER — DOCUSATE SODIUM 100 MG/1
100 CAPSULE, LIQUID FILLED ORAL DAILY
Status: DISCONTINUED | OUTPATIENT
Start: 2020-01-01 | End: 2020-01-01 | Stop reason: HOSPADM

## 2020-01-01 RX ORDER — PANTOPRAZOLE SODIUM 40 MG/1
40 TABLET, DELAYED RELEASE ORAL
Status: DISCONTINUED | OUTPATIENT
Start: 2020-01-01 | End: 2020-01-01 | Stop reason: HOSPADM

## 2020-01-01 RX ORDER — ISOSORBIDE MONONITRATE 60 MG/1
60 TABLET, EXTENDED RELEASE ORAL DAILY
Status: DISCONTINUED | OUTPATIENT
Start: 2020-01-01 | End: 2020-01-01 | Stop reason: HOSPADM

## 2020-01-01 RX ORDER — SODIUM CHLORIDE/ALOE VERA
GEL (GRAM) NASAL PRN
Status: DISCONTINUED | OUTPATIENT
Start: 2020-01-01 | End: 2020-01-01 | Stop reason: CLARIF

## 2020-01-01 RX ORDER — FENOFIBRATE 54 MG/1
145 TABLET ORAL DAILY
Status: DISCONTINUED | OUTPATIENT
Start: 2020-01-01 | End: 2020-01-01 | Stop reason: CLARIF

## 2020-01-01 RX ORDER — SODIUM CHLORIDE, SODIUM LACTATE, POTASSIUM CHLORIDE, CALCIUM CHLORIDE 600; 310; 30; 20 MG/100ML; MG/100ML; MG/100ML; MG/100ML
1000 INJECTION, SOLUTION INTRAVENOUS ONCE
Status: DISCONTINUED | OUTPATIENT
Start: 2020-01-01 | End: 2020-01-01

## 2020-01-01 RX ORDER — LOSARTAN POTASSIUM 100 MG/1
100 TABLET ORAL DAILY
Status: DISCONTINUED | OUTPATIENT
Start: 2020-01-01 | End: 2020-01-01 | Stop reason: HOSPADM

## 2020-01-01 RX ORDER — KETOROLAC TROMETHAMINE 30 MG/ML
30 INJECTION, SOLUTION INTRAMUSCULAR; INTRAVENOUS ONCE
Status: COMPLETED | OUTPATIENT
Start: 2020-01-01 | End: 2020-01-01

## 2020-01-01 RX ORDER — ONDANSETRON 2 MG/ML
4 INJECTION INTRAMUSCULAR; INTRAVENOUS EVERY 4 HOURS PRN
Status: DISCONTINUED | OUTPATIENT
Start: 2020-01-01 | End: 2020-01-01 | Stop reason: HOSPADM

## 2020-01-01 RX ORDER — WARFARIN SODIUM 2.5 MG/1
2.5 TABLET ORAL
Status: COMPLETED | OUTPATIENT
Start: 2020-01-01 | End: 2020-01-01

## 2020-01-01 RX ORDER — ORPHENADRINE CITRATE 30 MG/ML
60 INJECTION INTRAMUSCULAR; INTRAVENOUS ONCE
Status: COMPLETED | OUTPATIENT
Start: 2020-01-01 | End: 2020-01-01

## 2020-01-01 RX ORDER — FOLIC ACID 1 MG/1
1 TABLET ORAL DAILY
Status: DISCONTINUED | OUTPATIENT
Start: 2020-01-01 | End: 2020-01-01 | Stop reason: HOSPADM

## 2020-01-01 RX ORDER — ACETAMINOPHEN 500 MG
500 TABLET ORAL 4 TIMES DAILY PRN
Qty: 120 TABLET | Refills: 0 | Status: SHIPPED | OUTPATIENT
Start: 2020-01-01

## 2020-01-01 RX ORDER — WARFARIN SODIUM 7.5 MG/1
3.75 TABLET ORAL
Status: DISCONTINUED | OUTPATIENT
Start: 2020-01-01 | End: 2020-01-01 | Stop reason: HOSPADM

## 2020-01-01 RX ORDER — ACETAMINOPHEN 325 MG/1
650 TABLET ORAL EVERY 4 HOURS PRN
Status: DISCONTINUED | OUTPATIENT
Start: 2020-01-01 | End: 2020-01-01 | Stop reason: SDUPTHER

## 2020-01-01 RX ORDER — DULOXETIN HYDROCHLORIDE 60 MG/1
60 CAPSULE, DELAYED RELEASE ORAL DAILY
Status: DISCONTINUED | OUTPATIENT
Start: 2020-01-01 | End: 2020-01-01 | Stop reason: HOSPADM

## 2020-01-01 RX ORDER — GABAPENTIN 100 MG/1
100 CAPSULE ORAL 3 TIMES DAILY
Status: DISCONTINUED | OUTPATIENT
Start: 2020-01-01 | End: 2020-01-01 | Stop reason: HOSPADM

## 2020-01-01 RX ORDER — INSULIN GLARGINE 100 [IU]/ML
30 INJECTION, SOLUTION SUBCUTANEOUS NIGHTLY
Status: DISCONTINUED | OUTPATIENT
Start: 2020-01-01 | End: 2020-01-01 | Stop reason: HOSPADM

## 2020-01-01 RX ORDER — ACETAMINOPHEN 500 MG
1000 TABLET ORAL ONCE
Status: COMPLETED | OUTPATIENT
Start: 2020-01-01 | End: 2020-01-01

## 2020-01-01 RX ORDER — LEVOTHYROXINE SODIUM 0.05 MG/1
50 TABLET ORAL DAILY
Qty: 30 TABLET | Refills: 3 | Status: SHIPPED | OUTPATIENT
Start: 2020-01-01 | End: 2021-01-01

## 2020-01-01 RX ORDER — DULOXETIN HYDROCHLORIDE 60 MG/1
CAPSULE, DELAYED RELEASE ORAL
Qty: 90 CAPSULE | Refills: 3 | Status: SHIPPED | OUTPATIENT
Start: 2020-01-01 | End: 2021-01-01 | Stop reason: SDUPTHER

## 2020-01-01 RX ORDER — MECLIZINE HYDROCHLORIDE CHEWABLE TABLETS 25 MG/1
25 TABLET, CHEWABLE ORAL 3 TIMES DAILY PRN
Status: DISCONTINUED | OUTPATIENT
Start: 2020-01-01 | End: 2020-01-01 | Stop reason: HOSPADM

## 2020-01-01 RX ORDER — LEVOTHYROXINE SODIUM 0.05 MG/1
50 TABLET ORAL DAILY
Status: DISCONTINUED | OUTPATIENT
Start: 2020-01-01 | End: 2020-01-01 | Stop reason: HOSPADM

## 2020-01-01 RX ORDER — METHYLPREDNISOLONE 4 MG/1
TABLET ORAL
Qty: 1 KIT | Refills: 0 | Status: ON HOLD | OUTPATIENT
Start: 2020-01-01 | End: 2021-01-01 | Stop reason: HOSPADM

## 2020-01-01 RX ORDER — GUAIFENESIN 600 MG/1
600 TABLET, EXTENDED RELEASE ORAL 2 TIMES DAILY
Status: DISCONTINUED | OUTPATIENT
Start: 2020-01-01 | End: 2020-01-01 | Stop reason: HOSPADM

## 2020-01-01 RX ORDER — NICOTINE POLACRILEX 4 MG
15 LOZENGE BUCCAL PRN
Status: DISCONTINUED | OUTPATIENT
Start: 2020-01-01 | End: 2020-01-01 | Stop reason: HOSPADM

## 2020-01-01 RX ORDER — AMPICILLIN 500 MG/1
500 CAPSULE ORAL 4 TIMES DAILY
Qty: 20 CAPSULE | Refills: 0 | Status: SHIPPED | OUTPATIENT
Start: 2020-01-01 | End: 2020-01-01

## 2020-01-01 RX ORDER — DEXLANSOPRAZOLE 60 MG/1
60 CAPSULE, DELAYED RELEASE ORAL DAILY
Status: DISCONTINUED | OUTPATIENT
Start: 2020-01-01 | End: 2020-01-01 | Stop reason: CLARIF

## 2020-01-01 RX ORDER — ALENDRONATE SODIUM 70 MG/1
70 TABLET ORAL
Status: DISCONTINUED | OUTPATIENT
Start: 2020-01-01 | End: 2020-01-01 | Stop reason: CLARIF

## 2020-01-01 RX ORDER — DEXTROSE MONOHYDRATE 50 MG/ML
100 INJECTION, SOLUTION INTRAVENOUS PRN
Status: DISCONTINUED | OUTPATIENT
Start: 2020-01-01 | End: 2020-01-01 | Stop reason: HOSPADM

## 2020-01-01 RX ORDER — BUMETANIDE 0.25 MG/ML
1 INJECTION, SOLUTION INTRAMUSCULAR; INTRAVENOUS ONCE
Status: COMPLETED | OUTPATIENT
Start: 2020-01-01 | End: 2020-01-01

## 2020-01-01 RX ADMIN — GABAPENTIN 100 MG: 100 CAPSULE ORAL at 20:49

## 2020-01-01 RX ADMIN — GABAPENTIN 100 MG: 100 CAPSULE ORAL at 20:05

## 2020-01-01 RX ADMIN — DULOXETINE HYDROCHLORIDE 60 MG: 60 CAPSULE, DELAYED RELEASE ORAL at 11:01

## 2020-01-01 RX ADMIN — BUMETANIDE 1 MG: 0.25 INJECTION, SOLUTION INTRAMUSCULAR; INTRAVENOUS at 01:08

## 2020-01-01 RX ADMIN — FENOFIBRATE 160 MG: 160 TABLET ORAL at 11:01

## 2020-01-01 RX ADMIN — GABAPENTIN 100 MG: 100 CAPSULE ORAL at 11:01

## 2020-01-01 RX ADMIN — MICONAZOLE NITRATE: 20 POWDER TOPICAL at 20:50

## 2020-01-01 RX ADMIN — AMPICILLIN SODIUM AND SULBACTAM SODIUM 1.5 G: 1; .5 INJECTION, POWDER, FOR SOLUTION INTRAMUSCULAR; INTRAVENOUS at 00:28

## 2020-01-01 RX ADMIN — MICONAZOLE NITRATE: 20 POWDER TOPICAL at 08:52

## 2020-01-01 RX ADMIN — GUAIFENESIN 600 MG: 600 TABLET, EXTENDED RELEASE ORAL at 05:45

## 2020-01-01 RX ADMIN — ISOSORBIDE MONONITRATE 60 MG: 60 TABLET ORAL at 09:41

## 2020-01-01 RX ADMIN — AMPICILLIN SODIUM AND SULBACTAM SODIUM 1.5 G: 1; .5 INJECTION, POWDER, FOR SOLUTION INTRAMUSCULAR; INTRAVENOUS at 09:51

## 2020-01-01 RX ADMIN — MECLIZINE HCL 25 MG: 25 TABLET, CHEWABLE ORAL at 09:41

## 2020-01-01 RX ADMIN — INSULIN GLARGINE 30 UNITS: 100 INJECTION, SOLUTION SUBCUTANEOUS at 20:50

## 2020-01-01 RX ADMIN — WARFARIN SODIUM 2.5 MG: 2.5 TABLET ORAL at 17:46

## 2020-01-01 RX ADMIN — PANTOPRAZOLE SODIUM 40 MG: 40 TABLET, DELAYED RELEASE ORAL at 06:03

## 2020-01-01 RX ADMIN — ASPIRIN 81 MG: 81 TABLET, COATED ORAL at 09:42

## 2020-01-01 RX ADMIN — METOPROLOL TARTRATE 25 MG: 25 TABLET ORAL at 20:49

## 2020-01-01 RX ADMIN — CEFTRIAXONE SODIUM 1 G: 1 INJECTION, POWDER, FOR SOLUTION INTRAMUSCULAR; INTRAVENOUS at 21:19

## 2020-01-01 RX ADMIN — INSULIN LISPRO 2 UNITS: 100 INJECTION, SOLUTION INTRAVENOUS; SUBCUTANEOUS at 20:06

## 2020-01-01 RX ADMIN — CALCIUM CARBONATE-VITAMIN D TAB 500 MG-200 UNIT 1 TABLET: 500-200 TAB at 08:47

## 2020-01-01 RX ADMIN — METOPROLOL TARTRATE 25 MG: 25 TABLET ORAL at 09:41

## 2020-01-01 RX ADMIN — GABAPENTIN 100 MG: 100 CAPSULE ORAL at 09:41

## 2020-01-01 RX ADMIN — METOPROLOL TARTRATE 25 MG: 25 TABLET ORAL at 11:00

## 2020-01-01 RX ADMIN — GUAIFENESIN 600 MG: 600 TABLET, EXTENDED RELEASE ORAL at 09:41

## 2020-01-01 RX ADMIN — GUAIFENESIN 600 MG: 600 TABLET, EXTENDED RELEASE ORAL at 20:05

## 2020-01-01 RX ADMIN — DIGOXIN 62.5 MCG: 125 TABLET ORAL at 11:00

## 2020-01-01 RX ADMIN — KETOROLAC TROMETHAMINE 30 MG: 30 INJECTION, SOLUTION INTRAMUSCULAR at 13:15

## 2020-01-01 RX ADMIN — DULOXETINE HYDROCHLORIDE 60 MG: 60 CAPSULE, DELAYED RELEASE ORAL at 08:47

## 2020-01-01 RX ADMIN — ASPIRIN 81 MG: 81 TABLET, COATED ORAL at 11:01

## 2020-01-01 RX ADMIN — AMPICILLIN SODIUM AND SULBACTAM SODIUM 1.5 G: 1; .5 INJECTION, POWDER, FOR SOLUTION INTRAMUSCULAR; INTRAVENOUS at 04:41

## 2020-01-01 RX ADMIN — LOSARTAN POTASSIUM 100 MG: 100 TABLET, FILM COATED ORAL at 08:47

## 2020-01-01 RX ADMIN — CALCIUM CARBONATE-VITAMIN D TAB 500 MG-200 UNIT 1 TABLET: 500-200 TAB at 09:41

## 2020-01-01 RX ADMIN — LEVOTHYROXINE SODIUM 50 MCG: 50 TABLET ORAL at 06:03

## 2020-01-01 RX ADMIN — ORPHENADRINE CITRATE 60 MG: 30 INJECTION INTRAMUSCULAR; INTRAVENOUS at 13:15

## 2020-01-01 RX ADMIN — FUROSEMIDE 20 MG: 20 TABLET ORAL at 11:01

## 2020-01-01 RX ADMIN — ATORVASTATIN CALCIUM 40 MG: 40 TABLET, FILM COATED ORAL at 20:05

## 2020-01-01 RX ADMIN — CALCIUM CARBONATE-VITAMIN D TAB 500 MG-200 UNIT 1 TABLET: 500-200 TAB at 11:00

## 2020-01-01 RX ADMIN — GABAPENTIN 100 MG: 100 CAPSULE ORAL at 08:47

## 2020-01-01 RX ADMIN — ALLOPURINOL 100 MG: 100 TABLET ORAL at 11:01

## 2020-01-01 RX ADMIN — ATORVASTATIN CALCIUM 40 MG: 40 TABLET, FILM COATED ORAL at 20:49

## 2020-01-01 RX ADMIN — LEVOTHYROXINE SODIUM 50 MCG: 50 TABLET ORAL at 05:36

## 2020-01-01 RX ADMIN — GUAIFENESIN 600 MG: 600 TABLET, EXTENDED RELEASE ORAL at 11:00

## 2020-01-01 RX ADMIN — AMPICILLIN SODIUM AND SULBACTAM SODIUM 1.5 G: 1; .5 INJECTION, POWDER, FOR SOLUTION INTRAMUSCULAR; INTRAVENOUS at 11:01

## 2020-01-01 RX ADMIN — METOPROLOL TARTRATE 25 MG: 25 TABLET ORAL at 20:05

## 2020-01-01 RX ADMIN — GUAIFENESIN 600 MG: 600 TABLET, EXTENDED RELEASE ORAL at 20:49

## 2020-01-01 RX ADMIN — LEVOTHYROXINE SODIUM 75 MCG: 75 TABLET ORAL at 06:21

## 2020-01-01 RX ADMIN — SODIUM CHLORIDE, POTASSIUM CHLORIDE, SODIUM LACTATE AND CALCIUM CHLORIDE 1000 ML: 600; 310; 30; 20 INJECTION, SOLUTION INTRAVENOUS at 19:39

## 2020-01-01 RX ADMIN — ALLOPURINOL 100 MG: 100 TABLET ORAL at 08:47

## 2020-01-01 RX ADMIN — ACETAMINOPHEN 650 MG: 325 TABLET ORAL at 14:11

## 2020-01-01 RX ADMIN — GABAPENTIN 100 MG: 100 CAPSULE ORAL at 13:08

## 2020-01-01 RX ADMIN — WARFARIN SODIUM 2.5 MG: 2.5 TABLET ORAL at 16:31

## 2020-01-01 RX ADMIN — FOLIC ACID 1 MG: 1 TABLET ORAL at 08:47

## 2020-01-01 RX ADMIN — DOCUSATE SODIUM 100 MG: 100 CAPSULE, LIQUID FILLED ORAL at 09:41

## 2020-01-01 RX ADMIN — CALCIUM CARBONATE-VITAMIN D TAB 500 MG-200 UNIT 1 TABLET: 500-200 TAB at 16:31

## 2020-01-01 RX ADMIN — DIGOXIN 62.5 MCG: 125 TABLET ORAL at 08:47

## 2020-01-01 RX ADMIN — MICONAZOLE NITRATE: 20 POWDER TOPICAL at 09:42

## 2020-01-01 RX ADMIN — FOLIC ACID 1 MG: 1 TABLET ORAL at 11:01

## 2020-01-01 RX ADMIN — GABAPENTIN 100 MG: 100 CAPSULE ORAL at 14:11

## 2020-01-01 RX ADMIN — PANTOPRAZOLE SODIUM 40 MG: 40 TABLET, DELAYED RELEASE ORAL at 05:45

## 2020-01-01 RX ADMIN — FUROSEMIDE 20 MG: 20 TABLET ORAL at 08:47

## 2020-01-01 RX ADMIN — FOLIC ACID 1 MG: 1 TABLET ORAL at 09:41

## 2020-01-01 RX ADMIN — AMPICILLIN SODIUM AND SULBACTAM SODIUM 1.5 G: 1; .5 INJECTION, POWDER, FOR SOLUTION INTRAMUSCULAR; INTRAVENOUS at 16:28

## 2020-01-01 RX ADMIN — MICONAZOLE NITRATE: 20 POWDER TOPICAL at 20:06

## 2020-01-01 RX ADMIN — FENOFIBRATE 160 MG: 160 TABLET ORAL at 09:41

## 2020-01-01 RX ADMIN — GUAIFENESIN 600 MG: 600 TABLET, EXTENDED RELEASE ORAL at 08:47

## 2020-01-01 RX ADMIN — CALCIUM CARBONATE-VITAMIN D TAB 500 MG-200 UNIT 1 TABLET: 500-200 TAB at 16:28

## 2020-01-01 RX ADMIN — LOSARTAN POTASSIUM 100 MG: 100 TABLET, FILM COATED ORAL at 11:01

## 2020-01-01 RX ADMIN — ALLOPURINOL 100 MG: 100 TABLET ORAL at 09:42

## 2020-01-01 RX ADMIN — DULOXETINE HYDROCHLORIDE 60 MG: 60 CAPSULE, DELAYED RELEASE ORAL at 09:41

## 2020-01-01 RX ADMIN — PANTOPRAZOLE SODIUM 40 MG: 40 TABLET, DELAYED RELEASE ORAL at 05:35

## 2020-01-01 RX ADMIN — METOPROLOL TARTRATE 25 MG: 25 TABLET ORAL at 08:47

## 2020-01-01 RX ADMIN — FENOFIBRATE 160 MG: 160 TABLET ORAL at 08:47

## 2020-01-01 RX ADMIN — ISOSORBIDE MONONITRATE 60 MG: 60 TABLET ORAL at 11:01

## 2020-01-01 RX ADMIN — LOSARTAN POTASSIUM 100 MG: 100 TABLET, FILM COATED ORAL at 09:41

## 2020-01-01 RX ADMIN — ASPIRIN 81 MG: 81 TABLET, COATED ORAL at 08:46

## 2020-01-01 RX ADMIN — FUROSEMIDE 20 MG: 20 TABLET ORAL at 09:41

## 2020-01-01 RX ADMIN — ACETAMINOPHEN 1000 MG: 500 TABLET ORAL at 21:25

## 2020-01-01 RX ADMIN — ISOSORBIDE MONONITRATE 60 MG: 60 TABLET ORAL at 08:47

## 2020-01-01 RX ADMIN — MICONAZOLE NITRATE: 20 POWDER TOPICAL at 11:00

## 2020-01-01 RX ADMIN — DOCUSATE SODIUM 100 MG: 100 CAPSULE, LIQUID FILLED ORAL at 11:01

## 2020-01-01 RX ADMIN — CEFTRIAXONE SODIUM 1 G: 1 INJECTION, POWDER, FOR SOLUTION INTRAMUSCULAR; INTRAVENOUS at 23:29

## 2020-01-01 RX ADMIN — METOPROLOL TARTRATE 25 MG: 25 TABLET ORAL at 01:08

## 2020-01-01 RX ADMIN — DIGOXIN 62.5 MCG: 125 TABLET ORAL at 09:41

## 2020-01-01 ASSESSMENT — PAIN DESCRIPTION - PAIN TYPE
TYPE: ACUTE PAIN
TYPE: CHRONIC PAIN
TYPE: ACUTE PAIN
TYPE: CHRONIC PAIN

## 2020-01-01 ASSESSMENT — PAIN DESCRIPTION - DIRECTION: RADIATING_TOWARDS: NO

## 2020-01-01 ASSESSMENT — ENCOUNTER SYMPTOMS
COUGH: 0
SORE THROAT: 0
NAUSEA: 0
TROUBLE SWALLOWING: 0
RHINORRHEA: 0
ABDOMINAL DISTENTION: 0
WHEEZING: 0
NAUSEA: 0
SINUS PRESSURE: 0
SHORTNESS OF BREATH: 0
EYE REDNESS: 0
CONSTIPATION: 0
ABDOMINAL PAIN: 0
CHEST TIGHTNESS: 0
BACK PAIN: 0
EYE REDNESS: 0
VOMITING: 0
ABDOMINAL PAIN: 0
BACK PAIN: 1
DIARRHEA: 0
SHORTNESS OF BREATH: 0
VOMITING: 0

## 2020-01-01 ASSESSMENT — PAIN SCALES - GENERAL
PAINLEVEL_OUTOF10: 0
PAINLEVEL_OUTOF10: 3
PAINLEVEL_OUTOF10: 3
PAINLEVEL_OUTOF10: 4
PAINLEVEL_OUTOF10: 6
PAINLEVEL_OUTOF10: 0
PAINLEVEL_OUTOF10: 3
PAINLEVEL_OUTOF10: 0
PAINLEVEL_OUTOF10: 6
PAINLEVEL_OUTOF10: 6
PAINLEVEL_OUTOF10: 3
PAINLEVEL_OUTOF10: 10
PAINLEVEL_OUTOF10: 5
PAINLEVEL_OUTOF10: 0

## 2020-01-01 ASSESSMENT — PAIN DESCRIPTION - ORIENTATION
ORIENTATION: POSTERIOR
ORIENTATION: LEFT

## 2020-01-01 ASSESSMENT — PAIN DESCRIPTION - FREQUENCY
FREQUENCY: CONTINUOUS

## 2020-01-01 ASSESSMENT — PAIN DESCRIPTION - LOCATION
LOCATION: HIP
LOCATION: HEAD
LOCATION: LEG
LOCATION: HIP
LOCATION: LEG

## 2020-01-01 ASSESSMENT — PAIN DESCRIPTION - DESCRIPTORS
DESCRIPTORS: THROBBING
DESCRIPTORS: ACHING
DESCRIPTORS: HEADACHE

## 2020-01-01 ASSESSMENT — PAIN DESCRIPTION - PROGRESSION: CLINICAL_PROGRESSION: NOT CHANGED

## 2020-01-01 ASSESSMENT — PAIN DESCRIPTION - ONSET
ONSET: ON-GOING

## 2020-01-01 ASSESSMENT — PAIN - FUNCTIONAL ASSESSMENT: PAIN_FUNCTIONAL_ASSESSMENT: PREVENTS OR INTERFERES SOME ACTIVE ACTIVITIES AND ADLS

## 2020-01-02 ENCOUNTER — HOSPITAL ENCOUNTER (OUTPATIENT)
Dept: PHARMACY | Age: 65
Setting detail: THERAPIES SERIES
Discharge: HOME | End: 2020-01-02
Payer: MEDICARE

## 2020-01-02 PROBLEM — Z79.01 ANTICOAGULATED ON COUMADIN: Status: ACTIVE | Noted: 2020-01-02

## 2020-01-02 LAB — POC INR: 1.7 (ref 0.8–1.2)

## 2020-01-02 PROCEDURE — 99211 OFF/OP EST MAY X REQ PHY/QHP: CPT

## 2020-01-02 PROCEDURE — 85610 PROTHROMBIN TIME: CPT

## 2020-01-02 PROCEDURE — 36416 COLLJ CAPILLARY BLOOD SPEC: CPT

## 2020-01-02 RX ORDER — MENTHOL 5.8 MG/1
100 LOZENGE ORAL DAILY
COMMUNITY
Start: 2019-12-27 | End: 2020-04-15

## 2020-01-07 ENCOUNTER — OFFICE VISIT (OUTPATIENT)
Dept: CARDIOLOGY CLINIC | Age: 65
End: 2020-01-07
Payer: MEDICARE

## 2020-01-07 VITALS
BODY MASS INDEX: 43.4 KG/M2 | HEIGHT: 69 IN | HEART RATE: 81 BPM | OXYGEN SATURATION: 95 % | WEIGHT: 293 LBS | SYSTOLIC BLOOD PRESSURE: 138 MMHG | DIASTOLIC BLOOD PRESSURE: 82 MMHG

## 2020-01-07 PROCEDURE — 1090F PRES/ABSN URINE INCON ASSESS: CPT | Performed by: NURSE PRACTITIONER

## 2020-01-07 PROCEDURE — G8417 CALC BMI ABV UP PARAM F/U: HCPCS | Performed by: NURSE PRACTITIONER

## 2020-01-07 PROCEDURE — G8400 PT W/DXA NO RESULTS DOC: HCPCS | Performed by: NURSE PRACTITIONER

## 2020-01-07 PROCEDURE — 1123F ACP DISCUSS/DSCN MKR DOCD: CPT | Performed by: NURSE PRACTITIONER

## 2020-01-07 PROCEDURE — 3017F COLORECTAL CA SCREEN DOC REV: CPT | Performed by: NURSE PRACTITIONER

## 2020-01-07 PROCEDURE — 4040F PNEUMOC VAC/ADMIN/RCVD: CPT | Performed by: NURSE PRACTITIONER

## 2020-01-07 PROCEDURE — 1036F TOBACCO NON-USER: CPT | Performed by: NURSE PRACTITIONER

## 2020-01-07 PROCEDURE — G8427 DOCREV CUR MEDS BY ELIG CLIN: HCPCS | Performed by: NURSE PRACTITIONER

## 2020-01-07 PROCEDURE — 99213 OFFICE O/P EST LOW 20 MIN: CPT | Performed by: NURSE PRACTITIONER

## 2020-01-07 PROCEDURE — G8484 FLU IMMUNIZE NO ADMIN: HCPCS | Performed by: NURSE PRACTITIONER

## 2020-01-07 RX ORDER — FUROSEMIDE 40 MG/1
20 TABLET ORAL DAILY
Qty: 30 TABLET | Refills: 0 | Status: ON HOLD
Start: 2020-01-07 | End: 2021-01-01 | Stop reason: HOSPADM

## 2020-01-07 RX ORDER — ASPIRIN 81 MG/1
81 TABLET ORAL DAILY
Qty: 30 TABLET | Refills: 3
Start: 2020-01-07

## 2020-01-07 ASSESSMENT — ENCOUNTER SYMPTOMS
ABDOMINAL DISTENTION: 0
SHORTNESS OF BREATH: 0
COUGH: 0

## 2020-01-07 NOTE — PATIENT INSTRUCTIONS
You may receive a survey regarding the care you received during your visit. Your input is valuable to us. We encourage you to complete and return your survey. We hope you will choose us in the future for your healthcare needs. Continue:  · Continue current medications  · Daily weights and record  · Fluid restriction of 2 Liters per day  · Limit sodium in diet to around 6158-5593 mg/day  · Monitor BP  · Activity as tolerated     Call the Heart Failure Clinic for any of the following symptoms: [phone]   Weight gain of 2-3 pounds in 1 day or 5 pounds in 1 week   Increased shortness of breath   Shortness of breath while laying down   Cough   Chest pain   Swelling in feet, ankles or legs   Tenderness or bloating in the abdomen   Fatigue    Decreased appetite or nausea    Confusion     Take Lasix 20/day!!!     Eat banana few times/week.

## 2020-01-07 NOTE — PROGRESS NOTES
Heart Failure Clinic       Visit Date: 1/7/2020  Cardiologist:  Dr. Lisa Tucker  Primary Care Physician: Dr. Lilly Scherer, APRN - CNP    Arlet Walters is a 71 y.o. female who presents today for:  Chief Complaint   Patient presents with    Congestive Heart Failure       HPI:   Arlet Walters is a 71 y.o. female who presents to the office for a follow up patient visit in the heart failure clinic. Accompanied by sister  HX: HFrEF (EF 25-30% - CC ), Afib (since 2015 - Coumadin new), CAD (nonobstructive), HTN, HLD, DM, thyroid nodules (Dr Shekhar Grande), DARWIN (CPAP), CKD ([place]-baseline 1.5-1.8), Gastric sleeve (2015), R patellar fx (2013), L Femur fx (2014) - has been fairly immobile since    Hospitalization r/t Heart Failure:    July 2019 - went in w/ SOB - Afib s/p successful DCCV (went back in fib few days later). Increased Cardizem.  BNP 96409+.  LHC - nonobstructive.  EF 25-30%- discharged w/ Lifevest.  Aug 27-30  = went in w/ SOB. Afib RVR - BNP 73955  9/12 - OV here - checking Entresto coverage - CKD worsened - NEVER started  Sept 2019 = Afib RVR - issues w/ meds from [facility]. BNP 52665. IV Lasix 80. KEEGAN/CKD creat up 2.5. Issue w/ dose of Toprol at discharge  OV Oct 2019 [facility] - saw EP  and Cardiologist (Dr Margi Lees) - EF is around 35% - told ok remove Lifevest. Think low EF was tachy-induced/afib. ONLY change was increase in Toprol - ordered Holter  Was suppose to see CC in Nov - cancelled - need to reschedule  Holter showed Afib - rate controlled    Concerns today: Feeling good. Swelling better, wt down another 10#  Did 6 visits w/ Lymphedema clinic - trying to get more visits through insurance   Not taking Lasix!! -states prob not had in over month  Activity: Sedentary, wheelchair bound.   Can transfer self  Diet: Poor compliance w/ fluid and diet = likes chips, eats a lot ice    Patient has:  Chest Pain: No  SOB: No  Difficulty sleeping: DARWIN: compliant w/ CPAP  Orthopnea/PND: No  Edema: Edema present. Skin:     General: Skin is warm and dry. Capillary Refill: Capillary refill takes less than 2 seconds. Neurological:      Mental Status: She is alert and oriented to person, place, and time. Coordination: Coordination normal.   Psychiatric:         Behavior: Behavior normal.         Wt Readings from Last 3 Encounters:   01/07/20 (!) 303 lb (137.4 kg)   12/03/19 (!) 314 lb 3.2 oz (142.5 kg)   10/22/19 (!) 319 lb (144.7 kg)     BP Readings from Last 3 Encounters:   01/07/20 138/82   12/03/19 130/80   11/05/19 128/75     Pulse Readings from Last 3 Encounters:   01/07/20 81   12/03/19 60   11/05/19 79     Body mass index is 44.75 kg/m². ECHO:   Ejection fraction was estimated at 25-30%.  Right atrial size was moderately dilated with no thrombus identified   Left atrial size was severely dilated with no thrombus identified.   APPENDAGE: The left atrial appendage size was normal with no thrombus   identified. DOPPLER: The function was abnormal (reduced emptying   velocity).   There was trace mitral regurgitation.   There was mild tricuspid regurgitation.      Signature      ----------------------------------------------------------------   Electronically signed by Aisha Romero MD (Interpreting   physician) on 07/05/2019 at 04:03 PM   ----------------------------------------------------------------     limited echo   Ejection fraction is visually estimated at 30-35%.   There was moderate global hypokinesis of the left ventricle.   Signature   ----------------------------------------------------------------   Electronically signed by Paul Ibarra MD (Interpreting   physician) on 10/09/2019 at 07:08 AM    Echocardiogram. October 15, 2019 at [facility]  - Technically difficult exam due to body habitus and suboptimal positioning.   - Exam indication: Sustained atrial fibrillation   - The left ventricle is severely dilated. There is mild concentric left ventricular hypertrophy.  Left

## 2020-01-15 ENCOUNTER — HOSPITAL ENCOUNTER (OUTPATIENT)
Dept: PHARMACY | Age: 65
Setting detail: THERAPIES SERIES
Discharge: HOME | End: 2020-01-15
Payer: MEDICARE

## 2020-01-15 ENCOUNTER — HOSPITAL ENCOUNTER (OUTPATIENT)
Dept: PHYSICAL THERAPY | Age: 65
Setting detail: THERAPIES SERIES
Discharge: HOME | End: 2020-01-15
Payer: MEDICARE

## 2020-01-15 LAB — POC INR: 1.8 (ref 0.8–1.2)

## 2020-01-15 PROCEDURE — 97140 MANUAL THERAPY 1/> REGIONS: CPT

## 2020-01-15 PROCEDURE — 85610 PROTHROMBIN TIME: CPT

## 2020-01-15 PROCEDURE — 99211 OFF/OP EST MAY X REQ PHY/QHP: CPT

## 2020-01-15 PROCEDURE — 36416 COLLJ CAPILLARY BLOOD SPEC: CPT

## 2020-01-15 ASSESSMENT — PAIN DESCRIPTION - ORIENTATION: ORIENTATION: RIGHT;LEFT

## 2020-01-15 ASSESSMENT — PAIN SCALES - GENERAL: PAINLEVEL_OUTOF10: 3

## 2020-01-15 ASSESSMENT — PAIN DESCRIPTION - DESCRIPTORS: DESCRIPTORS: ACHING

## 2020-01-15 ASSESSMENT — PAIN DESCRIPTION - LOCATION: LOCATION: LEG

## 2020-01-15 ASSESSMENT — PAIN DESCRIPTION - PAIN TYPE: TYPE: CHRONIC PAIN

## 2020-01-15 NOTE — PROGRESS NOTES
Medication Management [facility]  Anticoagulation Clinic  [phone] (phone)  [phone] (fax)      Ms. Jamil Alarcon is a 71 y.o.  female with history of Afib who presents today for anticoagulation monitoring and adjustment. Patient verifies current dosing regimen and tablet strength. No or extra doses. Took coumadin 2.5 mg instead of 3.75 on 1-. Patient denies s/s bleeding/bruising/SOB/chest pain. Usual leg swelling. No blood in urine or stool. No dietary changes. No changes in medication/OTC agents/Herbals. No change in alcohol use or tobacco use. No change in activity level. Patient denies dizziness/lightheadedness/falls. Has had a headache and nausea since starting new pill from Dr. Meaghan Brito. Does not know the name of it. States she will bring to next appt to enter into medication record. No vomiting/diarrhea or acute illness. No procedures scheduled in the future at this time. Assessment:   Lab Results   Component Value Date    INR 1.80 (H) 01/15/2020    INR 1.70 (H) 01/02/2020    INR 1.80 (H) 12/12/2019     INR subtherapeutic   Recent Labs     01/15/20  1400   INR 1.80*     Plan: POCT INR ordered and result reviewed with Navneet, Pharm. D. Order received and verified to take coumadin 5 mg po today, then increase Coumadin to 2.5 mg po MF, 3.75 mg po TWTHSS. Recheck INR in 2 weeks. Patient reminded to call the Anticoagulation Clinic with signs or symptoms of bleeding or with any medication changes. Patient given instructions utilizing the teach back method. Discharged via wheelchair in no apparent distress. After visit summary printed and reviewed with patient.       Medications reviewed and updated on home medication list Yes    Influenza vaccine:     [] given    [x] declined   [] received previously   [] plans to receive at a later time   [x] refused    [x] documented in EPIC

## 2020-01-15 NOTE — PROGRESS NOTES
[facility] and [address] [place] [address]         [id number]  1/16/2020   Chief Complaint   Patient presents with    Follow-up     DARWIN 3 month sleep follow up from pressure change. with [facility] download         Pt of Dr. Kendell JANSEN Download:   Original or initial AHI: 93     Date of initial study: 6/4/08      Compliant  100%     Noncompliant 0 %     PAP Type airsense 10 Level  14 cmh2o    Avg Hrs/Day 10  AHI: 1.5   Recorded compliance dates , 12/15/19-1/13/20  Machine/Mfg: Trenda Tyree 10 Interface: nasal    Provider:    _x_SR-[facility] Brian        __ [employer]    __ Nori Hero            __[facility] __[employer]   __[place]:       __Other    Neck Size: 16  Mallampati Mallampati 4  ESS:  2  SAQLI:     Here is a scan of the most recent download:          Presentation:   Julian Kincaid presents for sleep medicine follow up for obstructive sleep apnea  Since the last visit, Julian Kincaid is doing well with PAP. She loves her new PAP. Her AHI is now controlled. She feels rested. Equipment issues: The pressure is  acceptable, the mask is acceptable     Sleep issues:  Do you feel better? Yes  More rested? Yes   Better concentration? yes    Progress History:   Since last visit any new medical issues? No  New ER or hospitlal visits? No  Any new or changes in medicines? No  Any new sleep medicines?  No        Past Medical History:   Diagnosis Date    CAD (coronary artery disease)     CRI (chronic renal insufficiency)     Difficult intubation     excess skin in back of throat     DM (diabetes mellitus) ([facility] [address]     GERD (gastroesophageal reflux disease)     H/O gastric bypass     Hyperlipidemia     Hypertension     Hypothyroidism     Neuropathy     Obesity     Osteoarthritis     Paroxysmal atrial fibrillation (HCC)     Prolonged emergence from general anesthesia     Sleep apnea     uses cpap    Visit for screening mammogram        Past Surgical History:   Procedure Laterality Date

## 2020-01-15 NOTE — PROGRESS NOTES
[facility]  LYMPHEDEMA SERVICES  DAILY NOTE    Date: 1/15/2020   Patient Name: Natali Gray        CSN: [id number]   : 1955  (71 y.o.)  Gender: female   Referring Practitioner: Kenzie Hendrix. YUKI ROSALES  Diagnosis: CHF (CONGESTIVE HEART FAILURE); CHRONIC, SYSTOLIC; BILATERAL LEG EDEMA (R60.0)  Referral Date : 19        PT Visit Information  PT Insurance Information: MEDICARE (BASED ON MEDICAL NECESSITY)  Total # of Visits to Date: 7  Plan of Care/Certification Expiration Date: 20  No Show: 0  Canceled Appointment: 0  Progress Note Counter: 7/10    Restrictions/Precautions  Fall risk, Universal precautions,Limited functional mobility, mobility primarily via wheelchair. Pain  Patient Currently in Pain: Yes  Pain Assessment  Pain Assessment: 0-10  Pain Level: 3  Pain Type: Chronic pain  Pain Location: Leg  Pain Orientation: Right, Left  Pain Descriptors: Aching    SUBJECTIVE     -1/15/2020 The patient presented to the Lymphedema clinic on this date with no reports of changes in medical status. SEQUENTIAL COMPRESSION PUMP TRIAL  -BRAND: Entre  -LOCATION: Right lower extremity  -DURATION: 45 out of 60 minutes (Total)  -PRESSURE (mm Hg): 20-30 mm Hg    LOCATION:  PRE-TRIAL (cm) POST TRIAL (cm)   Thigh 61.0 62.8   Knee 54.8 53.4   Calf 46.0 46.0   Ankle 27.6 27.8     -COMMENT: Noted an increase in swelling at the right ankle and proximal thigh region following use of the basic compression pump.     TREATMENT SESSION  Manual Lymph Drainage  Bilateral Lower Extremity Manual Lymph Drainage (MLD):     Trunk:   Effleurage, Profundus, Terminus, Axillary Lnn and (IA) Anterior Inguinal to Axillary (3 steps)       Lower Extremity:  Thigh (Anterior, Posterior, Lateral, Medial to Lateral), Knee (Anterior, Posterior, Lateral, Medial, Lower Leg (Anterior,Posterior), Ankle (Anterior, Posterior, Lateral, Medial and Foot/Toes (Anterior, Posterior)       Rework  Lower Extremity (Toes to Groin), Axillary Lnn, (IA) Anterior Inguinal to Axillary, Terminus, Profundus and Effleurage  Skin Care Measures  Washed involved extremity/body part and applied Original Eucerin lotion to moisturize skin  -Gentle debridement of dry skin from the anterior aspect of bilateral lower legs (distally) using sterile tweezers. Compression Bandaging  Location: Metatarsal heads to knee joint line (Bilaterally). Compression Gradient: Moderate to Minimal  Supplies (per involved extremity):   Stockinette: Size: 4 inches, Thickness: Thick, soft   Transelast none   10 cm Artiflex Cotton 1 roll   15 cm Artiflex Cotton none   4 cm Rosidal K none   6 cm Rosidal K 1 roll   8 cm Rosidal K none   10 cm Rosidal K 1 roll   12 cm Rosidal K none   Rosidal Soft 1 roll     Decongestive/Therapeutic Exercise  The patient was able to complete Decongestive Therapy exercises (x 10 reps) including ankle pumps. The exercises were completed with compression bandages on, without complaints of pain from the patient. The Decongestive Therapy exercises assist with decreasing the swelling by increasing the muscle pumping action of the lymph collectors and by increasing the fluid uptake in the lymphatic system. Patient Education  Education Provided:   -01/15/2020: Reviewed recommendations for sequential compression pump.  -12/20/2019: Reviewed goals and recommendations. -12/10/2019: Reviewed plan of care and goals. -11/26/2019: Reviewed goals and recommendation for sequential compression pump. -11/19/2019: Reviewed plan of care. -11/07/2019: Reviewed plan of care/goals.   Learner:patient  Method: demonstration and explanation       Outcome: acknowledged understanding of goals/plan of care, demonstrated understanding and asked questions     GOALS   SHORT-TERM GOALS:  to be met in 6 weeks   X  NOT MET  (ONGOING)  Patient will demonstrate a decrease in circumferential measurements of the affected extremity by 10 cm working towards the lymphedema swelling stabilizing and patient being able to get measured and fitted for a new compression garment to be worn daily to keep swelling down. -DISCONTINUED    Patient will tolerate wearing the compression bandages from one treatment session until the next treatment session working towards meeting short-term goal #1. X  MET Patient/family/caregiver will demonstrate and verbalize 3-5 skin care precautionary measures to decrease dry skin and risk of infection. -DISCONTINUED Patient/family/caregiver will demonstrate applying compression bandages with no greater than moderate assist and verbal cues from the therapist working towards being modified independent with applying the compression bandages for night time swelling. LONG-TERM GOALS:  to be met in 12 weeks   X  MET 1. Lymphedema swelling will stabilize as noted by total circumferential changes being no greater than 3.0-5.0 cm in preparation for being measured for new compression garment(s) to be worn daily to keep swelling down. X  NOT MET  (ONGOING) 2. Patient/family/caregiver will demonstrate applying compression bandages with modified independence to apply at night to keep swelling down overnight to be able to abdias compression stockings in the morning. X  NOT MET   (ONGOING) 3. Patient/family/caregiver will demonstrate donning/doffing compression garments with modified independence to wear compression garments daily to keep swelling down. X  NOT MET  (ONGOING) 4. Patient/family/caregiver will verbalize a good understanding regarding proper wearing and replacement schedule, precautions and care of garment(s). ASSESSMENT:  Assessment:  Patient progressing toward goal achievement. Activity Tolerance:  Patient tolerance of  treatment: Fair. Plan: Recommend Week of January 27 (1x). Manual Lymph Drainage (MLD), Skin care, Compression Garments, Exercises, Education/training.    THE PATIENT DEMONSTRATES GOOD POTENTIAL TO MAKE GOOD PROGRESS AND MEET ALL GOALS WITH TREATMENTS COMPLETED BY A SKILLED PHYSICAL THERAPIST/PHYSICAL THERAPIST ASSISTANT.   Time In: 1430   Time Out: 1535   Timed Code Minutes: 65   Untimed Code Minutes: 0   Total Treatment Time: 49 Juan Stewart P.T. #[id number], DAMIÁN, DARY       1/15/2020

## 2020-01-16 ENCOUNTER — OFFICE VISIT (OUTPATIENT)
Dept: PULMONOLOGY | Age: 65
End: 2020-01-16
Payer: MEDICARE

## 2020-01-16 VITALS
HEART RATE: 83 BPM | SYSTOLIC BLOOD PRESSURE: 136 MMHG | WEIGHT: 293 LBS | BODY MASS INDEX: 43.4 KG/M2 | DIASTOLIC BLOOD PRESSURE: 84 MMHG | HEIGHT: 69 IN | OXYGEN SATURATION: 97 %

## 2020-01-16 PROCEDURE — G8400 PT W/DXA NO RESULTS DOC: HCPCS | Performed by: PHYSICIAN ASSISTANT

## 2020-01-16 PROCEDURE — G8427 DOCREV CUR MEDS BY ELIG CLIN: HCPCS | Performed by: PHYSICIAN ASSISTANT

## 2020-01-16 PROCEDURE — G8484 FLU IMMUNIZE NO ADMIN: HCPCS | Performed by: PHYSICIAN ASSISTANT

## 2020-01-16 PROCEDURE — 1090F PRES/ABSN URINE INCON ASSESS: CPT | Performed by: PHYSICIAN ASSISTANT

## 2020-01-16 PROCEDURE — 4040F PNEUMOC VAC/ADMIN/RCVD: CPT | Performed by: PHYSICIAN ASSISTANT

## 2020-01-16 PROCEDURE — 1036F TOBACCO NON-USER: CPT | Performed by: PHYSICIAN ASSISTANT

## 2020-01-16 PROCEDURE — 99213 OFFICE O/P EST LOW 20 MIN: CPT | Performed by: PHYSICIAN ASSISTANT

## 2020-01-16 PROCEDURE — 3017F COLORECTAL CA SCREEN DOC REV: CPT | Performed by: PHYSICIAN ASSISTANT

## 2020-01-16 PROCEDURE — G8417 CALC BMI ABV UP PARAM F/U: HCPCS | Performed by: PHYSICIAN ASSISTANT

## 2020-01-16 PROCEDURE — 1123F ACP DISCUSS/DSCN MKR DOCD: CPT | Performed by: PHYSICIAN ASSISTANT

## 2020-01-16 ASSESSMENT — ENCOUNTER SYMPTOMS
DIARRHEA: 0
SHORTNESS OF BREATH: 0
ALLERGIC/IMMUNOLOGIC NEGATIVE: 1
CHEST TIGHTNESS: 0
BACK PAIN: 0
WHEEZING: 0
NAUSEA: 0
EYES NEGATIVE: 1
STRIDOR: 0
COUGH: 0

## 2020-01-26 ENCOUNTER — HOSPITAL ENCOUNTER (EMERGENCY)
Age: 65
Discharge: HOME | End: 2020-01-26
Payer: MEDICARE

## 2020-01-26 VITALS
BODY MASS INDEX: 43.4 KG/M2 | OXYGEN SATURATION: 96 % | HEART RATE: 92 BPM | HEIGHT: 69 IN | RESPIRATION RATE: 20 BRPM | WEIGHT: 293 LBS | SYSTOLIC BLOOD PRESSURE: 158 MMHG | TEMPERATURE: 97.4 F | DIASTOLIC BLOOD PRESSURE: 70 MMHG

## 2020-01-26 LAB
BILIRUBIN URINE: NEGATIVE
BLOOD, URINE: NEGATIVE
CHARACTER, URINE: CLEAR
COLOR: YELLOW
GLUCOSE, URINE: NEGATIVE MG/DL
KETONES, URINE: NEGATIVE
LEUKOCYTES, UA: ABNORMAL
NITRATE, UA: NEGATIVE
PH UA: 5.5 (ref 5–9)
PROTEIN UA: >= 300 MG/DL
REFLEX TO URINE C & S: ABNORMAL
SPECIFIC GRAVITY UA: 1.02 (ref 1–1.03)
UROBILINOGEN, URINE: 0.2 EU/DL (ref 0–1)

## 2020-01-26 PROCEDURE — 99213 OFFICE O/P EST LOW 20 MIN: CPT | Performed by: NURSE PRACTITIONER

## 2020-01-26 PROCEDURE — 87086 URINE CULTURE/COLONY COUNT: CPT

## 2020-01-26 PROCEDURE — 99213 OFFICE O/P EST LOW 20 MIN: CPT

## 2020-01-26 PROCEDURE — 81003 URINALYSIS AUTO W/O SCOPE: CPT

## 2020-01-26 RX ORDER — NITROFURANTOIN 25; 75 MG/1; MG/1
100 CAPSULE ORAL 2 TIMES DAILY
Qty: 10 CAPSULE | Refills: 0 | Status: SHIPPED | OUTPATIENT
Start: 2020-01-26 | End: 2020-01-31

## 2020-01-26 ASSESSMENT — PAIN DESCRIPTION - FREQUENCY: FREQUENCY: CONTINUOUS

## 2020-01-26 ASSESSMENT — ENCOUNTER SYMPTOMS
SHORTNESS OF BREATH: 0
CHEST TIGHTNESS: 0
NAUSEA: 0
VOMITING: 0
DIARRHEA: 0
ABDOMINAL PAIN: 0

## 2020-01-26 ASSESSMENT — PAIN DESCRIPTION - ONSET: ONSET: ON-GOING

## 2020-01-26 ASSESSMENT — PAIN DESCRIPTION - PAIN TYPE: TYPE: ACUTE PAIN

## 2020-01-26 ASSESSMENT — PAIN DESCRIPTION - LOCATION: LOCATION: BACK

## 2020-01-26 ASSESSMENT — PAIN DESCRIPTION - ORIENTATION: ORIENTATION: LOWER

## 2020-01-26 ASSESSMENT — PAIN DESCRIPTION - DESCRIPTORS: DESCRIPTORS: ACHING;CONSTANT

## 2020-01-26 ASSESSMENT — PAIN SCALES - GENERAL: PAINLEVEL_OUTOF10: 6

## 2020-01-26 ASSESSMENT — PAIN DESCRIPTION - PROGRESSION: CLINICAL_PROGRESSION: NOT CHANGED

## 2020-01-26 NOTE — ED PROVIDER NOTES
[facility] Encounter      CHIEFCOMPLAINT       Chief Complaint   Patient presents with    Urinary Tract Infection       Nurses Notes reviewed and I agree except as noted in the HPI. HISTORY OF PRESENT ILLNESS   Jessica Michaud is a 71 y.o. female who presents for evaluation. The history is provided by the patient. Dysuria    The current episode started more than 1 week ago. The quality of the pain is described as burning. There has been no fever. Associated symptoms include frequency, urgency and flank pain. Pertinent negatives include no chills, no nausea, no vomiting and no hematuria. She has tried increased fluids for the symptoms. Her past medical history does not include kidney stones or recurrent UTIs. REVIEW OF SYSTEMS     Review of Systems   Constitutional: Negative for chills and fever. Respiratory: Negative for chest tightness and shortness of breath. Cardiovascular: Negative for chest pain. Gastrointestinal: Negative for abdominal pain, diarrhea, nausea and vomiting. Genitourinary: Positive for dysuria, flank pain, frequency and urgency. Negative for hematuria. Skin: Negative for rash. Allergic/Immunologic: Negative for environmental allergies and food allergies. Neurological: Negative for headaches.        PAST MEDICAL HISTORY         Diagnosis Date    CAD (coronary artery disease)     CRI (chronic renal insufficiency)     Difficult intubation     excess skin in back of throat     DM (diabetes mellitus) (HCC)     GERD (gastroesophageal reflux disease)     H/O gastric bypass     Hyperlipidemia     Hypertension     Hypothyroidism     Neuropathy     Obesity     Osteoarthritis     Paroxysmal atrial fibrillation (HCC)     Prolonged emergence from general anesthesia     Sleep apnea     uses cpap    Visit for screening mammogram        SURGICAL HISTORY     Patient  has a past surgical history that includes back surgery; Foot surgery; Colonoscopy; Upper gastrointestinal endoscopy; Mouth Biopsy (9-28-12); Skin tag removal (9/25/12); Tonsillectomy; Patella surgery (7/11/13); Cataract removal (Right, 7/24/14); toenail excision; other surgical history (11/8/2014); other surgical history (4/23/2015); Sleeve Gastrectomy (09/15/2015); EKG 12 Lead (9/18/2015); Hysterectomy, total abdominal; and pr colon ca scrn not hi rsk ind (Left, 1/24/2018).     CURRENT MEDICATIONS       Previous Medications    ALLOPURINOL (ZYLOPRIM) 100 MG TABLET    Take 100 mg by mouth daily    ASPIRIN (RA ASPIRIN EC) 81 MG EC TABLET    Take 1 tablet by mouth daily    ATORVASTATIN (LIPITOR) 40 MG TABLET    take 1 tablet by mouth at bedtime    B-D UF III MINI PEN NEEDLES 31G X 5 MM MISC    use three times a day    CALCIUM CARBONATE-VITAMIN D (CALCIUM-VITAMIN D) 500-200 MG-UNIT PER TABLET    Take 1 tablet by mouth 2 times daily (with meals)     DIGOXIN (LANOXIN) 125 MCG TABLET    Take 0.5 tablets by mouth daily    DULOXETINE (CYMBALTA) 60 MG EXTENDED RELEASE CAPSULE    take 1 capsule by mouth once daily    FENOFIBRATE (TRICOR) 145 MG TABLET    take 1 tablet by mouth once daily    FOLIC ACID (FOLVITE) 1 MG TABLET    take 1 tablet by mouth once daily    FUROSEMIDE (LASIX) 40 MG TABLET    Take 0.5 tablets by mouth daily    GABAPENTIN (NEURONTIN) 100 MG CAPSULE    take 1 capsule by mouth three times a day    INSULIN LISPRO (HUMALOG KWIKPEN) 100 UNIT/ML PEN    Inject into the skin 2 times daily Sliding scale    INSULIN LISPRO PROTAMINE & LISPRO (HUMALOG MIX) (75-25) 100 UNIT PER ML SUSP INJECTION VIAL    30 in am and 50 units at night    ISOSORBIDE MONONITRATE (IMDUR) 60 MG EXTENDED RELEASE TABLET    Take 1 tablet by mouth daily    MECLIZINE (ANTIVERT) 25 MG CHEW    Take 1 tablet by mouth 3 times daily as needed (vertigo)    METOPROLOL SUCCINATE (TOPROL XL) 100 MG EXTENDED RELEASE TABLET    Take 100 mg by mouth daily    PANTOPRAZOLE (PROTONIX) 40 MG TABLET    take 1 tablet by mouth every morning    RA VITAMIN B-12 100 MCG TABLET    Take 100 mcg by mouth daily     WARFARIN (COUMADIN) 2.5 MG TABLET    As directed by [facility]. 30 days = 40 tabs       ALLERGIES     Patient is has No Known Allergies. FAMILY HISTORY     Patient'sfamily history includes Asthma in her brother and sister; Cancer in her maternal uncle; Diabetes in her maternal grandfather and maternal grandmother; Heart Disease in her father; High Blood Pressure in her maternal grandmother and another family member. SOCIAL HISTORY     Patient  reports that she quit smoking about 13 years ago. She has a 30.00 pack-year smoking history. She has never used smokeless tobacco. She reports that she does not drink alcohol or use drugs. PHYSICAL EXAM     ED TRIAGE VITALS  BP: (!) 158/70, Temp: 97.4 °F (36.3 °C), Pulse: 92, Resp: 20, SpO2: 96 %  Physical Exam  Vitals signs and nursing note reviewed. Constitutional:       General: She is not in acute distress. Appearance: Normal appearance. She is well-developed and well-groomed. HENT:      Head: Normocephalic and atraumatic. Right Ear: External ear normal.      Left Ear: External ear normal.      Mouth/Throat:      Lips: Pink. Eyes:      Conjunctiva/sclera: Conjunctivae normal.      Right eye: Right conjunctiva is not injected. Left eye: Left conjunctiva is not injected. Pupils: Pupils are equal.   Neck:      Musculoskeletal: Normal range of motion. Cardiovascular:      Rate and Rhythm: Normal rate. Heart sounds: Normal heart sounds. Pulmonary:      Effort: Pulmonary effort is normal.      Breath sounds: Normal breath sounds. Abdominal:      General: Abdomen is flat. Bowel sounds are normal.      Palpations: Abdomen is soft. Tenderness: There is no abdominal tenderness. There is no right CVA tenderness or left CVA tenderness. Skin:     General: Skin is warm and dry. Findings: No rash (on exposed surfaces). Neurological:      Mental Status: She is alert and oriented to person, place, and time. Psychiatric:         Speech: Speech normal.         Behavior: Behavior is cooperative. DIAGNOSTIC RESULTS   Labs:  Results for orders placed or performed during the hospital encounter of 01/26/20   UA without Microscopic Reflex C&S   Result Value Ref Range    Glucose, Urine Negative NEGATIVE mg/dl    Bilirubin Urine Negative NEGATIVE    Ketones, Urine Negative NEGATIVE    Specific Gravity, UA 1.020 1.002 - 1.03    Blood, Urine Negative NEGATIVE    pH, UA 5.50 5.0 - 9.0    Protein, UA >= 300 (A) NEGATIVE mg/dl    Urobilinogen, Urine 0.20 0.0 - 1.0 eu/dl    Nitrate, UA Negative NEGATIVE    LEUKOCYTES, UA Moderate (A) NEGATIVE    Color, UA Yellow STRAW-YELL    Character, Urine Clear CLEAR-SL C    REFLEX TO URINE C & S INDICATED        IMAGING:  No orders to display     URGENT CARE COURSE:     Vitals:    01/26/20 1147   BP: (!) 158/70   Pulse: 92   Resp: 20   Temp: 97.4 °F (36.3 °C)   TempSrc: Temporal   SpO2: 96%   Weight: 299 lb 12.8 oz (136 kg)   Height: 5' 9\" (1.753 m)       Medications - No data to display  PROCEDURES:  FINALIMPRESSION      1. Dysuria        DISPOSITION/PLAN   DISPOSITION    Discharge    ED Course as of Jan 26 1204   Sun Jan 26, 2020   1204 Protein, UA(!): >= 300 [HA]   1204 LEUKOCYTES, UA(!): Moderate [HA]   1204 REFLEX TO URINE C & S: INDICATED [HA]      ED Course User Index  ARNOL Ramirez - CNP     Physical assessment findings, diagnostic testing(s) if applicable, and vital signs reviewed with patient/patient representative. Questions answered. If applicable, patient/patient representative will be contacted upon receipt of final culture and sensitivity or other testing results when available. Any additions or changes to medications or changes the plan of care will be made at that time. Medications as directed, including OTC medications for supportive care.    Education provided on medications. Differential diagnosis(s) discussed with patient/patient representative. Home care/self care instructions reviewed with patient/patient representative. Patient is to follow-up with family care provider in 2-3 days if no improvement. Patient is to go to the emergency department if symptoms worsen. Patient/patient representative is aware of care plan, questions answered, verbalizes understanding and is in agreement. Teach back method used for patient/patient representative teaching(s) and printed instructions attached to after visit summary.       Problem List Items Addressed This Visit     None      Visit Diagnoses     Dysuria    -  Primary    Relevant Medications    nitrofurantoin, macrocrystal-monohydrate, (MACROBID) 100 MG capsule          PATIENT REFERRED TO:  Rossy Bourgeois, APRN - CNP  [place] [address]  [phone]    Schedule an appointment as soon as possible for a visit in 3 days  for further evalation, If symptoms worsen, GO DIRECTLY TO [place] [address] [facility]  [address] [address] [phone]    As needed, If symptoms worsen, GO DIRECTLY TO [address] [place] [address], [facility] Brenda Ward, APRN - CNP  01/26/20 1970

## 2020-01-28 ENCOUNTER — TELEPHONE (OUTPATIENT)
Dept: CARDIOLOGY CLINIC | Age: 65
End: 2020-01-28

## 2020-01-28 LAB
ORGANISM: ABNORMAL
URINE CULTURE REFLEX: ABNORMAL

## 2020-01-28 NOTE — TELEPHONE ENCOUNTER
Spoke with patient   She has been taking the lasix  Wt down 4 lbs  Denies SOB and RADHA  She will get labs done tomorrow

## 2020-01-29 ENCOUNTER — HOSPITAL ENCOUNTER (OUTPATIENT)
Dept: PHYSICAL THERAPY | Age: 65
Setting detail: THERAPIES SERIES
Discharge: HOME | End: 2020-01-29
Payer: MEDICARE

## 2020-01-29 ENCOUNTER — HOSPITAL ENCOUNTER (OUTPATIENT)
Dept: PHARMACY | Age: 65
Setting detail: THERAPIES SERIES
Discharge: HOME | End: 2020-01-29
Payer: MEDICARE

## 2020-01-29 ENCOUNTER — HOSPITAL ENCOUNTER (OUTPATIENT)
Age: 65
Discharge: HOME | End: 2020-01-29
Payer: MEDICARE

## 2020-01-29 LAB
ANION GAP SERPL CALCULATED.3IONS-SCNC: 10 MEQ/L (ref 8–16)
BUN BLDV-MCNC: 29 MG/DL (ref 7–22)
CALCIUM SERPL-MCNC: 9.7 MG/DL (ref 8.5–10.5)
CHLORIDE BLD-SCNC: 100 MEQ/L (ref 98–111)
CO2: 26 MEQ/L (ref 23–33)
CREAT SERPL-MCNC: 1.5 MG/DL (ref 0.4–1.2)
GFR SERPL CREATININE-BSD FRML MDRD: 35 ML/MIN/1.73M2
GLUCOSE BLD-MCNC: 185 MG/DL (ref 70–108)
POC INR: 2.2 (ref 0.8–1.2)
POTASSIUM SERPL-SCNC: 5 MEQ/L (ref 3.5–5.2)
SODIUM BLD-SCNC: 136 MEQ/L (ref 135–145)

## 2020-01-29 PROCEDURE — 97140 MANUAL THERAPY 1/> REGIONS: CPT

## 2020-01-29 PROCEDURE — 36416 COLLJ CAPILLARY BLOOD SPEC: CPT

## 2020-01-29 PROCEDURE — 99211 OFF/OP EST MAY X REQ PHY/QHP: CPT

## 2020-01-29 PROCEDURE — 85610 PROTHROMBIN TIME: CPT

## 2020-01-29 PROCEDURE — 36415 COLL VENOUS BLD VENIPUNCTURE: CPT

## 2020-01-29 PROCEDURE — 80048 BASIC METABOLIC PNL TOTAL CA: CPT

## 2020-01-29 NOTE — PROGRESS NOTES
Medication Management [facility]  Anticoagulation Clinic  [phone] (phone)  [phone] (fax)      Ms. Flory Sauer is a 71 y.o.  female with history of Afib who presents today for anticoagulation monitoring and adjustment. Patient verifies current dosing regimen and tablet strength. No missed or extra doses. Patient denies s/s bleeding/bruising/SOB/chest pain. Usual leg swelling. No blood in urine or stool. No dietary changes. No changes in medication/OTC agents/Herbals. Currently taking macrobid. Will finish 1-. No change in alcohol use or tobacco use. No change in activity level. Patient denies headaches/dizziness/lightheadedness/falls. No vomiting/diarrhea or acute illness. No procedures scheduled in the future at this time. Assessment:   Lab Results   Component Value Date    INR 2.20 (H) 01/29/2020    INR 1.80 (H) 01/15/2020    INR 1.70 (H) 01/02/2020     INR therapeutic   Recent Labs     01/29/20  1325   INR 2.20*       Plan: POCT INR ordered and result reviewed. Continue Coumadin 2.5 mg po MF, 3.75 mg po TWTHSS. Recheck INR in 3 weeks. Patient reminded to call the Anticoagulation Clinic with any signs or symptoms of bleeding or with any medication changes. Patient given instructions utilizing the teach back method. Discharged via wheelchair in no apparent distress. After visit summary printed and reviewed with patient.       Medications reviewed and updated on home medication list Yes    Influenza vaccine:     [] given    [x] declined   [] received previously   [] plans to receive at a later time   [x] refused    [] documented in EPIC

## 2020-01-29 NOTE — PROGRESS NOTES
extremity/body part and applied Original Eucerin lotion to moisturize skin  -Gentle debridement of dry skin from the anterior aspect of bilateral lower legs (distally) using sterile tweezers. Compression Bandaging  Location: Metatarsal heads to knee joint line (Bilaterally). Compression Gradient: Moderate to Minimal  Supplies (per involved extremity):   Stockinette: Size: 4 inches, Thickness: Thick, soft   Transelast none   10 cm Artiflex Cotton 1 roll   15 cm Artiflex Cotton none   4 cm Rosidal K none   6 cm Rosidal K 1 roll   8 cm Rosidal K none   10 cm Rosidal K 1 roll   12 cm Rosidal K none   Rosidal Soft 1 roll     Decongestive/Therapeutic Exercise  The patient was able to complete Decongestive Therapy exercises (x 10 reps) including ankle pumps. The exercises were completed with compression bandages on, without complaints of pain from the patient. The Decongestive Therapy exercises assist with decreasing the swelling by increasing the muscle pumping action of the lymph collectors and by increasing the fluid uptake in the lymphatic system. Patient Education  Education Provided:   -01/15/2020: Reviewed recommendations for sequential compression pump.  -12/20/2019: Reviewed goals and recommendations. -12/10/2019: Reviewed plan of care and goals. -11/26/2019: Reviewed goals and recommendation for sequential compression pump. -11/19/2019: Reviewed plan of care. -11/07/2019: Reviewed plan of care/goals.   Learner:patient  Method: demonstration and explanation       Outcome: acknowledged understanding of goals/plan of care, demonstrated understanding and asked questions     GOALS   SHORT-TERM GOALS:  to be met in 6 weeks   X  NOT MET  (ONGOING)  Patient will demonstrate a decrease in circumferential measurements of the affected extremity by 10 cm working towards the lymphedema swelling stabilizing and patient being able to get measured and fitted for a new compression garment to be worn daily to keep swelling down.   -DISCONTINUED    Patient will tolerate wearing the compression bandages from one treatment session until the next treatment session working towards meeting short-term goal #1. X  MET Patient/family/caregiver will demonstrate and verbalize 3-5 skin care precautionary measures to decrease dry skin and risk of infection. -DISCONTINUED Patient/family/caregiver will demonstrate applying compression bandages with no greater than moderate assist and verbal cues from the therapist working towards being modified independent with applying the compression bandages for night time swelling. LONG-TERM GOALS:  to be met in 12 weeks   X  MET 1. Lymphedema swelling will stabilize as noted by total circumferential changes being no greater than 3.0-5.0 cm in preparation for being measured for new compression garment(s) to be worn daily to keep swelling down. X  NOT MET  (ONGOING) 2. Patient/family/caregiver will demonstrate applying compression bandages with modified independence to apply at night to keep swelling down overnight to be able to abdias compression stockings in the morning. X  NOT MET   (ONGOING) 3. Patient/family/caregiver will demonstrate donning/doffing compression garments with modified independence to wear compression garments daily to keep swelling down. X  NOT MET  (ONGOING) 4. Patient/family/caregiver will verbalize a good understanding regarding proper wearing and replacement schedule, precautions and care of garment(s). ASSESSMENT:  Assessment:  Patient progressing toward goal achievement. Activity Tolerance:  Patient tolerance of  treatment: Fair. Plan: Recommend Week of January 27 (1x). Manual Lymph Drainage (MLD), Skin care, Compression Garments, Exercises, Education/training. THE PATIENT DEMONSTRATES GOOD POTENTIAL TO MAKE GOOD PROGRESS AND MEET ALL GOALS WITH TREATMENTS COMPLETED BY A SKILLED PHYSICAL THERAPIST/PHYSICAL THERAPIST ASSISTANT.   Time In: 9554   Time Out: 0045 Timed Code Minutes: 65   Untimed Code Minutes: 0   Total Treatment Time: 49 Juan Stewart, P.T. #[id number], RAFAELA, Haile Guerrier [place] 0299       1/29/2020

## 2020-02-03 RX ORDER — WARFARIN SODIUM 2.5 MG/1
TABLET ORAL
Qty: 45 TABLET | Refills: 5 | Status: SHIPPED | OUTPATIENT
Start: 2020-02-03 | End: 2020-05-04 | Stop reason: SDUPTHER

## 2020-02-05 ENCOUNTER — OFFICE VISIT (OUTPATIENT)
Dept: INTERNAL MEDICINE CLINIC | Age: 65
End: 2020-02-05
Payer: MEDICARE

## 2020-02-05 VITALS — HEIGHT: 69 IN | BODY MASS INDEX: 43.4 KG/M2 | WEIGHT: 293 LBS

## 2020-02-05 PROCEDURE — 97802 MEDICAL NUTRITION INDIV IN: CPT | Performed by: DIETITIAN, REGISTERED

## 2020-02-05 NOTE — PROGRESS NOTES
[address] [place] [address]. [address] [place] [address] John., [facility], [address]  [phone] (phone)  [phone] (fax)    Patient Name: Kevon Mata. Date of Birth: [id number]. MRN: [id number]      Assessment: Patient is a 71 y.o. female seen for Initial MNT visit for Type 2 DB.     -Nutritionally relevant labs:   Lab Results   Component Value Date/Time    LABA1C 8.6 12/03/2019 12:44 PM    LABA1C 7.1 09/25/2019 02:49 PM    LABA1C 7.0 06/05/2019    GLUCOSE 185 (H) 01/29/2020 12:34 PM    GLUCOSE 176 (H) 10/22/2019 03:05 PM    GLUCOSE 213 (H) 03/27/2012 01:25 PM    CHOL 106 06/05/2019    HDL 20 (A) 06/05/2019    LDLCALC 49 06/05/2019    TRIG 183 06/05/2019     Results for Miriam Cabral (MRN [id number]) as of 2/5/2020 15:40   Ref. Range 1/29/2020 12:34   Sodium Latest Ref Range: 135 - 145 meq/L 136   Potassium Latest Ref Range: 3.5 - 5.2 meq/L 5.0   BUN Latest Ref Range: 7 - 22 mg/dL 29 (H)   Creatinine Latest Ref Range: 0.4 - 1.2 mg/dL 1.5 (H)   Est, Glom Filt Rate Latest Units: ml/min/1.73m2 35 (A)   Glucose Latest Ref Range: 70 - 108 mg/dL 185 (H)     Pt here with her sister Mary Locke. They live together. Mary Locke delivers newspapers. Pt arrives in wheelchair. Pt does not work and does not like to [employer]. Mary Locke does the cooking but has difficulty standing so relies on convenient foods. Pt states medical hx of CHF, AFIB. On May 31st 2019 pt Broke her femur bone. Then went to Nursing home and returned home about 4 months later in Sept of last year. Pt states her BS's were better in the nursing home because they followed insulin to scale. Pt sees Dr Avis Payton and he started pt on Janumet and Trulicity in December (~ 2 mo ago). Pt has been diabetic most all her life ~ 38 years. Pt went through gastric sleeve weight loss surgery at [facility] ~ 5 years ago. And seen Maru Salguero RD/CANDIE. -Blood sugar trends: Freestyle Valerie started last year.   Did not bring reader. Pt does not have a smart phone. BS this morning 121, yesterday 90. Use to be 250 - 280. Bedtime BS if Takes 50 units humalog mix if lower may only take 1/2 the amount of insulin. Pt and sister stated she had a low enough BS that an emergency squad was called. Dr Nataliia Shine wants pt to follow intermittent fasting. Has to stop eating by 6 pm and then fast for 16 hours.    -Food recall: Up ~ 1030 am  Breakfast: 11 - 1130 - Sausage (Chava Duran sausage) egg, Kraft pepper jhoana cheese (processed cheese) on English muffin. Made this at home. Lunch: Skips. Dinner: 430 - 5 pm. Last night - Chicken breast, canned potatoes, green beans. Snacks: last night - bowl of potato chips or popcorn - 900 - 930 pm  To bed 1230 am.  Eat out - weekends sat/sun. Saturday - brings home take out.      -Main Beverages: [place], going down to the 8 oz/day. Pt c/o Very dry mouth. Drinks water and chomps on ice throughout the day. Pt. Does not walk very far and does not do chair exercises. She games all day long.    -Impression of Dietary Intake: on average, 2 meals per day, high salt. Current Outpatient Medications on File Prior to Visit   Medication Sig Dispense Refill    SITagliptin-metFORMIN HCl (JANUMET PO) Take 1,000 mg by mouth 2 times daily      Dulaglutide (TRULICITY SC) Inject into the skin once a week      warfarin (COUMADIN) 2.5 MG tablet As directed by [facility].  30 days = 45 tabs 45 tablet 5    folic acid (FOLVITE) 1 MG tablet take 1 tablet by mouth once daily 90 tablet 4    aspirin (RA ASPIRIN EC) 81 MG EC tablet Take 1 tablet by mouth daily 30 tablet 3    furosemide (LASIX) 40 MG tablet Take 0.5 tablets by mouth daily 30 tablet 0    RA VITAMIN B-12 100 MCG tablet Take 100 mcg by mouth daily       DULoxetine (CYMBALTA) 60 MG extended release capsule take 1 capsule by mouth once daily 90 capsule 3    B-D UF III MINI PEN NEEDLES 31G X 5 MM MISC use three times a day 100 each 0    insulin lispro protamine & lispro (HUMALOG MIX) (75-25) 100 UNIT per ML SUSP injection vial 30 in am and 50 units at night 1 vial 3    pantoprazole (PROTONIX) 40 MG tablet take 1 tablet by mouth every morning 90 tablet 3    gabapentin (NEURONTIN) 100 MG capsule take 1 capsule by mouth three times a day 90 capsule 2    metoprolol succinate (TOPROL XL) 100 MG extended release tablet Take 100 mg by mouth daily      digoxin (LANOXIN) 125 MCG tablet Take 0.5 tablets by mouth daily 30 tablet 0    fenofibrate (TRICOR) 145 MG tablet take 1 tablet by mouth once daily 90 tablet 3    atorvastatin (LIPITOR) 40 MG tablet take 1 tablet by mouth at bedtime 90 tablet 3    insulin lispro (HUMALOG KWIKPEN) 100 UNIT/ML pen Inject into the skin 2 times daily Sliding scale      isosorbide mononitrate (IMDUR) 60 MG extended release tablet Take 1 tablet by mouth daily 30 tablet 3    allopurinol (ZYLOPRIM) 100 MG tablet Take 100 mg by mouth daily      meclizine (ANTIVERT) 25 MG CHEW Take 1 tablet by mouth 3 times daily as needed (vertigo) 90 tablet 0    Calcium Carbonate-Vitamin D (CALCIUM-VITAMIN D) 500-200 MG-UNIT per tablet Take 1 tablet by mouth 2 times daily (with meals)        No current facility-administered medications on file prior to visit. Vitals from current and previous visits:  Before weight loss surgery, pt states that she use to weight 400#  Ht 5' 9\" (1.753 m)   Wt 297 lb (134.7 kg)   LMP 02/20/2001   BMI 43.86 kg/m²     -Body mass index is 43.86 kg/m². Greater than 40 - Morbid Obesity / Extreme Obesity / Grade III. -Weight goal: lose weight. Nutrition Diagnosis:   Food and nutrition-related knowledge deficit related to Lack of previous MNT/currently undergoing MNT as evidenced by Conditions associated with a diagnosis or treatment: Type 2 DB.          Intervention:  -Impression: Pt and sister verbose about a lot of things related to pt's health - they need re-directing a lot during the session.    -Instructed the patient on: Carbohydrate Counting, Consistent Carbohydrate Intake, Food Label Reading, Meal Planning for Regular, Balanced Meals & Snacks and The Importance of Regular Physical Activity.  -Handouts given for: carbohydrate counting, food logging (basic), healthy snacks, 1500 calorie 5 day sample menus and fancy plate pictures. Patient Instructions   1.)  Eat 3 meals/day - refer to your meal plan on the inside cover of your carb counting booklet. 2.)  Measure foods for accuracy in carb counting.  - Pair Protein with carbs at each meal.    3.)  Read the nutrition facts label for serving size and total carbohydrates  - Without a label refer to your carb counting booklet. 4.)  Bring a 1-2 week food log to next dietitian appt. Along with your reader for your Jessica Trinidad.    -Nutrition prescription: 1400 - 1500 calories/day, 135 - 150 g carbs/day. Adj wt (183#) 83 kg. 1st meal:  11 am - 45 gms carbs + 1 protein + veggies  2nd meal:  2-230 pm - 30 gms carbs + 2 oz protein + veggies  3rd meal:  5 pm - 45 gms carbs + 2 oz protein + veggies  Snack:  8-830 - 15-20 gms carbs + 1 protein + veggies    Comprehension verified using teachback method. Monitoring/Evaluation:   -Followup visit: 3 mo with dietitian.   -Receptiveness to education/goals: Agreeable.  -Evaluation of education: Indicates understanding.  -Readiness to change: precontemplative- eating 3 meals/day, start doing chair exercises, limit screen time to 2 hours/day. -Expected compliance: fair. Thank you for your referral of this patient. Total time involved in direct patient education: 75 minutes for initial MNT visit.

## 2020-02-12 ENCOUNTER — TELEPHONE (OUTPATIENT)
Dept: INTERNAL MEDICINE CLINIC | Age: 65
End: 2020-02-12

## 2020-02-12 NOTE — TELEPHONE ENCOUNTER
Left msge for pt to callback and r/s RD appt in May - wanting to move up into April. Callback # provided.

## 2020-02-19 ENCOUNTER — HOSPITAL ENCOUNTER (OUTPATIENT)
Dept: PHARMACY | Age: 65
Setting detail: THERAPIES SERIES
Discharge: HOME | End: 2020-02-19
Payer: MEDICARE

## 2020-02-19 ENCOUNTER — HOSPITAL ENCOUNTER (OUTPATIENT)
Dept: PHYSICAL THERAPY | Age: 65
Setting detail: THERAPIES SERIES
Discharge: HOME | End: 2020-02-19
Payer: MEDICARE

## 2020-02-19 LAB — POC INR: 2.8 (ref 0.8–1.2)

## 2020-02-19 PROCEDURE — 99211 OFF/OP EST MAY X REQ PHY/QHP: CPT

## 2020-02-19 PROCEDURE — 85610 PROTHROMBIN TIME: CPT

## 2020-02-19 PROCEDURE — 36416 COLLJ CAPILLARY BLOOD SPEC: CPT

## 2020-02-19 PROCEDURE — 97140 MANUAL THERAPY 1/> REGIONS: CPT

## 2020-02-19 ASSESSMENT — PAIN SCALES - GENERAL: PAINLEVEL_OUTOF10: 4

## 2020-02-19 ASSESSMENT — PAIN DESCRIPTION - DESCRIPTORS: DESCRIPTORS: SORE

## 2020-02-19 ASSESSMENT — PAIN DESCRIPTION - PAIN TYPE: TYPE: CHRONIC PAIN

## 2020-02-19 ASSESSMENT — PAIN DESCRIPTION - ORIENTATION: ORIENTATION: LEFT;RIGHT

## 2020-02-19 ASSESSMENT — PAIN DESCRIPTION - LOCATION: LOCATION: LEG

## 2020-02-19 NOTE — PROGRESS NOTES
Medication Management [address]  [phone] (phone)  [phone] (fax)      Ms. Rhonda Gerard is a 71 y.o.  female with history of Afib who presents today for anticoagulation monitoring and adjustment. Patient verifies current dosing regimen and tablet strength. No missed or extra doses. Patient denies s/s bleeding/bruising/SOB/chest pain. Usual leg swelling. Short nosebleeds. No blood in urine or stool. No dietary changes. No changes in medication/OTC agents/Herbals. No change in alcohol use or tobacco use. No change in activity level. Patient denies dizziness/lightheadedness/falls. Has sinus headaches, sinus pressure. No vomiting/diarrhea or acute illness. No procedures scheduled in the future at this time. Assessment:   Lab Results   Component Value Date    INR 2.80 (H) 02/19/2020    INR 2.20 (H) 01/29/2020    INR 1.80 (H) 01/15/2020     INR therapeutic   Recent Labs     02/19/20  1350   INR 2.80*       Plan: POCT INR ordered and result reviewed with QUINTEN PAGE Order received and verified to continue Coumadin 2.5 mg po MF, 3.75 mg po TWTHSS. Recheck INR in 3 weeks. Patient reminded to call the Anticoagulation Clinic with any signs or symptoms of bleeding or with any medication changes. Patient given instructions utilizing the teach back method. Discharged via wheelchair in no apparent distress. After visit summary printed and reviewed with patient.       Medications reviewed and updated on home medication list Yes    Influenza vaccine:     [] given    [x] declined   [] received previously   [] plans to receive at a later time   [x] refused    [x] documented in EPIC

## 2020-02-19 NOTE — PROGRESS NOTES
** PLEASE SIGN, DATE AND TIME CERTIFICATION BELOW AND RETURN TO [facility] OUTPATIENT REHABILITATION (FAX #: [phone]). ATTEST/CO-SIGN IF ACCESSING VIA [employer] THANK YOU.**    I certify that I have examined the patient below and determined that Physical Medicine and Rehabilitation service is necessary and that I approve the established plan of care for up to 90 days or as specifically noted. Attestation, signature or co-signature of physician indicates approval of certification requirements.    ________________________ ____________ __________  Physician Signature   Date   Time    [address]  LYMPHEDEMA SERVICES  DAILY AND PROGRESS NOTE    Date: 2020   Patient Name: Gavi Schofield        CSN: [id number]   : 1955  (71 y.o.)  Gender: female   Referring Practitioner: YUKI Gupta  Diagnosis: CHF (CONGESTIVE HEART FAILURE); CHRONIC, SYSTOLIC; BILATERAL LEG EDEMA (R60.0); Referral Date : 19        PT Visit Information  PT Insurance Information: MEDICARE (BASED ON MEDICAL NECESSITY)  Total # of Visits to Date: 5  Plan of Care/Certification Expiration Date: 20  No Show: 0  Canceled Appointment: 0  Progress Note Counter: 9/10    Restrictions/Precautions  Fall risk, Universal precautions,Limited functional mobility, mobility primarily via wheelchair. Pain  Patient Currently in Pain: Yes  Pain Assessment  Pain Assessment: 0-10  Pain Level: 4  Pain Type: Chronic pain  Pain Location: Leg  Pain Orientation: Left, Right  Pain Descriptors: Sore    SUBJECTIVE     -2020 The patient is a pleasant and cooperative 71year old female who has undergone Complete Decongestive Therapy/Manual Lymph Drainage (CDT/MLD) treatment sessions due to having moderate Lymphedema swelling in bilateral lower extremities and lower truncal region.  The patient has limited functional mobility and is in need of assistance to manage the swelling at home, her sister has medical complications. DIAGNOSIS ASSESSMENT:     Lymphedema is caused by:  -Lymphedema, not elsewhere classified [I89.0] (includes CVI-related lymphedema) [x] Yes [] No   -Hereditary lymphedema [Q82.0] [x] Yes [] No   -Postmastectomy lymphedema [I97.2]  [] Yes [] No   -Chronic Venous Stasis ulcers [I87.2] (non-healing despite 6 months of ongoing treatment)  [] Yes [] No     SWELLING SEVERITY:   Patient exhibits the following swelling severity (International Society of Lymphology clinical classification):  [] Stage 0: Latent or subclinical condition where swelling is not yet evident despite impaired lymph transport, subtle alterations in tissue fluid/composition and changes in subjective symptoms. It may exist months or years before overt edema occurs (Stages I - III). [] Stage I: Early accumulation of fluid relatively high in protein content (e.g., in comparison with venous edema) which subsides with limb elevation. Pitting may occur. An increase in various types of proliferating cells may also be seen. [x] Stage II: Limb elevation alone rarely reduces the tissue swelling and pitting is manifest.  Later in Stage II, the limb may not pit as excess subcutaneous fat and fibrosis develop. [] Stage III: Lymphostatic elephantiasis where pitting can be absent and trophic skin changes such as acanthosis, alterations in skin character and thickness, further deposition of fat and fibrosis, and warty overgrowths have developed. Patient exhibits the following symptoms despite conservative therapy:  [x] Hyperplasia (Bilateral lower extremities)  [x] Hyperpigmentation (Bilateral lower extremities; Dark discoloration on distal> proximal lower extremities.)  [x] Fibrosis (Moderate: Bilateral lower extremities;  Left > Right)   [x] Progressive edema (Severity and location: Distal to proximal)  [x] Truncal/abdominal swelling   [x] Unable to control swelling   [x] Impaired ROM (Bilateral lower extremities)  [x] Impaired mobility (Wheelchair mobility)  [x] Pain (Bilateral lower extremities)    CONSERVATIVE TREATMENT:  Patient has tried conservative treatments (compression/skin care/exercise/elevation):  [x] Yes [] No     If \"YES\", type of conservative treatment and duration of use: Instructed on self-manual Lymphatic Drainage Techniques (self-MLD) X YES  NO   Instructed on appropriate skin/nail care practices X YES  NO     -Compression Garments  < 4 weeks  1-5 months  6-12 months X >1 year   -Compression Bandaging  < 4 weeks X 1-5 months  6-12 months  >1 year   -Elevation  < 4 weeks  1-5 months  6-12 months X >1 year   -Exercises  < 4 weeks  1-5 months  6-12 months X >1 year     PNEUMATIC COMPRESSION DEVICE EVALUATION: (BASIC/ADVANCED COMPRESSION PUMPS)    (A) BASIC PNEUMATIC/SEQUENTIAL COMPRESSION DEVICE/PUMP:  Has the patient tried an  basic pneumatic compression device? [x] Yes [] No   -If \"YES\", how long has the  basic pneumatic compression device been used for:  [] <4 weeks   [x] 1-5 months   [] 6-12 months  [] >1 year  [] discontinued use prior to 4 weeks due to negative symptoms (In clinic trials completed)    -If \"YES\" was the  basic pneumatic compression device effective in managing the patients lymphedema? [x] Yes [] No  Initially the patient reported pain which limited the treatment tolerance of the basic compression pump, and a slight increase in swelling. However, the patient made slight adjustments during today's basic pump trial and she tolerated the treatment without reports of pain. Unfortunately there was a slight increase in swelling in proximal aspect of the thigh by approximately 1.5 cm. (B) ADVANCED PNEUMATIC/SEQUENTIAL COMPRESSION DEVICE/PUMP:  Has the patient tried an  advanced pneumatic compression device? [x] Yes [] No    -If \"YES\" was the  advanced pneumatic compression device effective in managing the patients lymphedema?   [x] Yes [] No   TREATMENT PLAN:  Patient to complete the following treatment(s) (In-clinic):  -Professional Lymphedema Treatment: Frequency 1x/ every other week (Prior to discharge from Lymphedema program). Patient to complete the following treatment(s) (post discharge from in-clinic program):    -Compression Bandaging/Garments -Frequency: Daily (Compression garments with velcro)   - (BASIC) Pneumatic/Sequential Compression Device -Frequency: Daily   - (ADVANCED) Pneumatic/Sequential Compression Device -Frequency: Daily   -Self-Manual Lymph Drainage (MLD) -Frequency: Daily   -Exercises -Frequency: Daily   -Elevation -Frequency: Daily     MEASUREMENTS: (NOTE: ALL MEASUREMENTS ARE COMPLETED IN CM)  Lower extremity measurements (measure the largest circumferential point of the listed area):   [x]  Right Leg  Inseam: 75.0 Measurements  Date: 02/19/2020  [x]  Left Leg  Inseam: 75.3 Measurements  Date: 02/19/2020   Thigh 63.5  Thigh 65.0   Knee  55.2  Knee 53.0   Calf 48.0  Calf 57.3   Ankle 27.8  Ankle 27.9   Hips -  Hips -     SEQUENTIAL COMPRESSION PUMP TRIAL(02/19/2020)  -BRAND: Celery (lark)  -TYPE: Basic (Lower extremity)  -LOCATION: Right lower extremity  -DURATION: 45 out of 60 minutes (Total)  -PRESSURE (mm Hg): 20-30 mm Hg (Low)  -PATIENT TOLERANCE OF PUMP TRIAL: This has been the 3rd basic pump trial with the patient and she denied pain during the treatments (therapist made adjustments to the compression sleeve, especially at the ankle region). Although noted a slight increase of 1.5 cm at the proximal lower leg (thigh region). LOCATION: (Right LE) PRE-TRIAL (cm) POST TRIAL (cm)   Thigh 62.0 63.5   Knee 56.3 55.2   Calf 49.4 48.0   Ankle 29.5 27.8   Inseam 75.0 cm --     COMMENTS:   Arlet Walters is a pleasant 71y.o. year old female who has undergone Complete Decongestive Therapy/Manual Lymph Drainage (CDT/MLD) treatments sessions which included: Manual Lymph Drainage (MLD), Skin Care, Compression, Exercises, Elevation. Although the patient has made some progress with the treatments, She has unfortunately demonstrated some failure in progress of at least 4 weeks of conservative treatment of Lymphedema therapy as noted by increase in swelling in bilateral lower extremities. Although the patient has been very compliant with the conservative Lymphedema treatments, she continues to have symptoms that have not improved or been resolved. In addition to the conservative Lymphedema treatments that patient has undergone a trial of a basic sequential compression pump. Although the patient would benefit from an advanced sequential compression pump that would include a proximal attachment for truncal and proximal lower extremity swelling, the patient would be appropriate for obtaining a basic compression pump to use at home. The patient's sister has had numerous low back procedures and is not physically able to assist her completing hands on manual MLD, which the patient needs to decrease and keep the swelling down in bilateral lower extremities. TREATMENT SESSION  Manual Lymph Drainage  Bilateral Lower Extremity Manual Lymph Drainage (MLD):     Trunk:   Effleurage, Profundus, Terminus, Axillary Lnn and (IA) Anterior Inguinal to Axillary (3 steps)       Lower Extremity:  Thigh (Anterior, Posterior, Lateral, Medial to Lateral), Knee (Anterior, Posterior, Lateral, Medial, Lower Leg (Anterior,Posterior), Ankle (Anterior, Posterior, Lateral, Medial and Foot/Toes (Anterior, Posterior)       Rework  Lower Extremity (Toes to Groin), Axillary Lnn, (IA) Anterior Inguinal to Axillary, Terminus, Profundus and Effleurage  Skin Care Measures  Washed involved extremity/body part and applied Original Eucerin lotion to moisturize skin  -Gentle debridement of dry skin from the anterior aspect of bilateral lower legs (distally) using sterile tweezers. Compression Bandaging  Location: Metatarsal heads to knee joint line (Bilaterally).   Compression Gradient: Moderate to Minimal  Supplies (per involved extremity):   Stockinette: Size: 4 inches, Thickness: Thick, soft   Transelast none   10 cm Artiflex Cotton 1 roll   15 cm Artiflex Cotton none   4 cm Rosidal K none   6 cm Rosidal K 1 roll   8 cm Rosidal K none   10 cm Rosidal K 1 roll   12 cm Rosidal K none   Rosidal Soft 1 roll     Decongestive/Therapeutic Exercise  The patient was able to complete Decongestive Therapy exercises (x 10 reps) including ankle pumps. The exercises were completed with compression bandages on, without complaints of pain from the patient. The Decongestive Therapy exercises assist with decreasing the swelling by increasing the muscle pumping action of the lymph collectors and by increasing the fluid uptake in the lymphatic system. Patient Education  Education Provided:   -02/19/2020: Reviewed recommendations and purpose of compression pump (brother present to observe treatment), and reviewed goals and plan of care.   -01/15/2020: Reviewed recommendations for sequential compression pump.  -12/20/2019: Reviewed goals and recommendations. -12/10/2019: Reviewed plan of care and goals. -11/26/2019: Reviewed goals and recommendation for sequential compression pump. -11/19/2019: Reviewed plan of care. -11/07/2019: Reviewed plan of care/goals. Learner:patient  Method: demonstration and explanation       Outcome: acknowledged understanding of goals/plan of care, demonstrated understanding and asked questions     GOALS   SHORT-TERM GOALS:  to be met in 6 weeks   X  NOT MET  (ONGOING)  Patient will demonstrate a decrease in circumferential measurements of the affected extremity by 10 cm working towards the lymphedema swelling stabilizing and patient being able to get measured and fitted for a new compression garment to be worn daily to keep swelling down.    -DISCONTINUED    Patient will tolerate wearing the compression bandages from one treatment session until the next treatment session

## 2020-02-24 RX ORDER — GABAPENTIN 100 MG/1
CAPSULE ORAL
Qty: 270 CAPSULE | Refills: 0 | Status: SHIPPED | OUTPATIENT
Start: 2020-02-24 | End: 2020-05-20

## 2020-03-02 ENCOUNTER — OFFICE VISIT (OUTPATIENT)
Dept: INTERNAL MEDICINE CLINIC | Age: 65
End: 2020-03-02
Payer: MEDICARE

## 2020-03-02 VITALS
WEIGHT: 293 LBS | HEART RATE: 88 BPM | SYSTOLIC BLOOD PRESSURE: 132 MMHG | BODY MASS INDEX: 43.4 KG/M2 | DIASTOLIC BLOOD PRESSURE: 86 MMHG | HEIGHT: 69 IN

## 2020-03-02 PROCEDURE — G0108 DIAB MANAGE TRN  PER INDIV: HCPCS | Performed by: INTERNAL MEDICINE

## 2020-03-02 NOTE — PROGRESS NOTES
Diabetes Mellitus Type II, Initial Visit:   Scar Llamas CNP  Patient here for an initial evaluation of Type 2 diabetes mellitus. Current symptoms/problems include none. The patient was initially diagnosed with Type 2 diabetes mellitus 1994. Known diabetic complications: nephropathy, peripheral neuropathy and cardiovascular disease  Cardiovascular risk factors: advanced age (older than 54 for men, 72 for women), diabetes mellitus, hypertension, obesity (BMI >= 30 kg/m2) and sedentary lifestyle  Current diabetic medications include Trulicity, Janumet, Humalog 75/25, Humalog scale. Eye exam current (within one year): yes 1/2019 Dr. Kenn Franklin   Weight trend: decreasing steadily down 80# in the past  Prior visit with dietician: yes - 2020  Current diet:   Current exercise: ADL's    Current monitoring regimen: home blood tests - 4+ times daily/ Valerie CGM  Home blood sugar records: fasting range:  -236  Any episodes of hypoglycemia? yes - 2-3 per week; highest risk during the night/ fasting    Is She on ACE inhibitor or angiotensin II receptor blocker? No     Focus:  Initial visit for Diabetes education. Samuel Norton reports having diabetes for approx 25 years. She has been on insulin for approximately 10 years. She has several co-morbidities and is in a wheelchair. She lives with her sister. They report a fairly low carb meal plan, but with processed calorie dense foods. Activity is limited and Samuel Norton states she has no interest in starting any. Discussed maintaining the mobility that she has at this time. She is taking her mixed insulin at bedtime-she would benefit from dinner dosing. She is also adjusting her 75/25 insulin doses r/t lows in the morning. She is high at bedtime. Much encouragement given. Discussed benefits of making changes. She follows with RD for meal planning. Follow up 6 weeks    Plan:  Instructed on insulin action; BG goals; carbs; exercise; weight loss; prevent complications  1.

## 2020-03-02 NOTE — PATIENT INSTRUCTIONS
1. Set a plan to do 10 minutes of chair exercise every day       Cameron Kate chair aerobics on [employer]               --set a time. See if you and Vane Resendiz can do this together. Pick a time that works best for you   2. Start shifting your insulin           Blood sugar        Morning dose (before bkfst) -- Humalog 75/25  10 units       Evening dose (before dinner)-- Humalog 75/25  40 units  3. Always have treatment available for low blood sugars  --1/2 cup pepsi or fruit juice  4.  Keep checking your blood sugars 4 or more times per day with your [employer]  Any questions, call [employer]  [phone]

## 2020-03-03 ENCOUNTER — TELEPHONE (OUTPATIENT)
Dept: FAMILY MEDICINE CLINIC | Age: 65
End: 2020-03-03

## 2020-03-03 ENCOUNTER — OFFICE VISIT (OUTPATIENT)
Dept: FAMILY MEDICINE CLINIC | Age: 65
End: 2020-03-03
Payer: MEDICARE

## 2020-03-03 ENCOUNTER — NURSE ONLY (OUTPATIENT)
Dept: LAB | Age: 65
End: 2020-03-03

## 2020-03-03 VITALS
SYSTOLIC BLOOD PRESSURE: 138 MMHG | WEIGHT: 293 LBS | HEART RATE: 96 BPM | DIASTOLIC BLOOD PRESSURE: 78 MMHG | TEMPERATURE: 97.7 F | BODY MASS INDEX: 43.4 KG/M2 | HEIGHT: 69 IN | RESPIRATION RATE: 12 BRPM

## 2020-03-03 LAB
BILIRUBIN URINE: NEGATIVE
BLOOD URINE, POC: ABNORMAL
CHARACTER, URINE: CLEAR
COLOR, URINE: YELLOW
GLUCOSE URINE: NEGATIVE MG/DL
HBA1C MFR BLD: 7.7 % (ref 4.3–5.7)
KETONES, URINE: NEGATIVE
LEUKOCYTE CLUMPS, URINE: ABNORMAL
NITRITE, URINE: NEGATIVE
PH, URINE: 5.5 (ref 5–9)
PROTEIN, URINE: >= 300 MG/DL
SPECIFIC GRAVITY, URINE: 1.02 (ref 1–1.03)
UROBILINOGEN, URINE: 0.2 EU/DL (ref 0–1)

## 2020-03-03 PROCEDURE — 1123F ACP DISCUSS/DSCN MKR DOCD: CPT | Performed by: NURSE PRACTITIONER

## 2020-03-03 PROCEDURE — 3051F HG A1C>EQUAL 7.0%<8.0%: CPT | Performed by: NURSE PRACTITIONER

## 2020-03-03 PROCEDURE — 1036F TOBACCO NON-USER: CPT | Performed by: NURSE PRACTITIONER

## 2020-03-03 PROCEDURE — 83036 HEMOGLOBIN GLYCOSYLATED A1C: CPT | Performed by: NURSE PRACTITIONER

## 2020-03-03 PROCEDURE — 3017F COLORECTAL CA SCREEN DOC REV: CPT | Performed by: NURSE PRACTITIONER

## 2020-03-03 PROCEDURE — 2022F DILAT RTA XM EVC RTNOPTHY: CPT | Performed by: NURSE PRACTITIONER

## 2020-03-03 PROCEDURE — 99214 OFFICE O/P EST MOD 30 MIN: CPT | Performed by: NURSE PRACTITIONER

## 2020-03-03 PROCEDURE — 81002 URINALYSIS NONAUTO W/O SCOPE: CPT | Performed by: NURSE PRACTITIONER

## 2020-03-03 PROCEDURE — G8427 DOCREV CUR MEDS BY ELIG CLIN: HCPCS | Performed by: NURSE PRACTITIONER

## 2020-03-03 PROCEDURE — G8484 FLU IMMUNIZE NO ADMIN: HCPCS | Performed by: NURSE PRACTITIONER

## 2020-03-03 PROCEDURE — G8417 CALC BMI ABV UP PARAM F/U: HCPCS | Performed by: NURSE PRACTITIONER

## 2020-03-03 PROCEDURE — G8400 PT W/DXA NO RESULTS DOC: HCPCS | Performed by: NURSE PRACTITIONER

## 2020-03-03 PROCEDURE — 1090F PRES/ABSN URINE INCON ASSESS: CPT | Performed by: NURSE PRACTITIONER

## 2020-03-03 PROCEDURE — 4040F PNEUMOC VAC/ADMIN/RCVD: CPT | Performed by: NURSE PRACTITIONER

## 2020-03-03 RX ORDER — SULFAMETHOXAZOLE AND TRIMETHOPRIM 800; 160 MG/1; MG/1
1 TABLET ORAL 2 TIMES DAILY
Qty: 10 TABLET | Refills: 0 | Status: SHIPPED | OUTPATIENT
Start: 2020-03-03 | End: 2020-03-08

## 2020-03-03 RX ORDER — DEXLANSOPRAZOLE 60 MG/1
60 CAPSULE, DELAYED RELEASE ORAL DAILY
Qty: 90 CAPSULE | Refills: 1 | Status: SHIPPED | OUTPATIENT
Start: 2020-03-03 | End: 2020-09-15 | Stop reason: SDUPTHER

## 2020-03-03 ASSESSMENT — ENCOUNTER SYMPTOMS
ABDOMINAL DISTENTION: 0
RESPIRATORY NEGATIVE: 1
ABDOMINAL PAIN: 0

## 2020-03-03 ASSESSMENT — PATIENT HEALTH QUESTIONNAIRE - PHQ9
1. LITTLE INTEREST OR PLEASURE IN DOING THINGS: 0
SUM OF ALL RESPONSES TO PHQ QUESTIONS 1-9: 0
2. FEELING DOWN, DEPRESSED OR HOPELESS: 0
SUM OF ALL RESPONSES TO PHQ QUESTIONS 1-9: 0
SUM OF ALL RESPONSES TO PHQ9 QUESTIONS 1 & 2: 0

## 2020-03-03 NOTE — TELEPHONE ENCOUNTER
Gina Lutz In  [facility] Clinical Support             The pharmacy accepted the electronic cancel request and canceled the prescription. Additional follow up tasks may be necessary based on the pharmacy response noted below. Pharmacy Note: Written Qty:90.0,Owed Qty:180.0    Pharmacy     [facility] [address], [address]   [address] [facility] [place] [address]   Phone: [phone] Fax: [phone]   Not a 24 hour pharmacy; exact hours not known.    Outpatient Medication Detail      Disp Refills Start End    pantoprazole (PROTONIX) 40 MG tablet (Discontinued) 90 tablet 3 10/31/2019 3/3/2020    Sig: take 1 tablet by mouth every morning    Sent to pharmacy as: Pantoprazole Sodium 40 MG Oral Tablet Delayed Release    Reason for Discontinue: Side effects    E-Prescribing Status: Receipt confirmed by pharmacy (10/31/2019  5:11 PM EDT)    Order Providers     Prescribing Provider Encounter Provider   ARNOL Stewart CNP, APRN - CNP   Encounter     View Encounter

## 2020-03-03 NOTE — PROGRESS NOTES
A1C has improved to below 8. Has changed her insulins, is on metformin I did advise her to let Dr. Earnest Duran know Dr. Meaghan Brito has place her on metformin with her renal function. She also takes lasix due to CHF, she does see CHF clinic she denies pedal edema today. Pt states she is on protonix and has been burping a lot, will change to dexilant. Pt states she has been having left flank pain and this is an indication of UTI. Recently treated with atb for uti. Will recheck urine. Current Outpatient Medications   Medication Sig Dispense Refill    sulfamethoxazole-trimethoprim (BACTRIM DS;SEPTRA DS) 800-160 MG per tablet Take 1 tablet by mouth 2 times daily for 5 days 10 tablet 0    dexlansoprazole (DEXILANT) 60 MG CPDR delayed release capsule Take 1 capsule by mouth daily 90 capsule 1    gabapentin (NEURONTIN) 100 MG capsule take 1 capsule by mouth three times a day 270 capsule 0    SITagliptin-metFORMIN HCl (JANUMET PO) Take 1,000 mg by mouth 2 times daily      Dulaglutide (TRULICITY SC) Inject 2.23 mg into the skin once a week       warfarin (COUMADIN) 2.5 MG tablet As directed by  [facility] Coumadin clinic.  30 days = 45 tabs 45 tablet 5    folic acid (FOLVITE) 1 MG tablet take 1 tablet by mouth once daily 90 tablet 4    aspirin (RA ASPIRIN EC) 81 MG EC tablet Take 1 tablet by mouth daily 30 tablet 3    furosemide (LASIX) 40 MG tablet Take 0.5 tablets by mouth daily 30 tablet 0    RA VITAMIN B-12 100 MCG tablet Take 100 mcg by mouth daily       DULoxetine (CYMBALTA) 60 MG extended release capsule take 1 capsule by mouth once daily 90 capsule 3    B-D UF III MINI PEN NEEDLES 31G X 5 MM MISC use three times a day 100 each 0    insulin lispro protamine & lispro (HUMALOG MIX) (75-25) 100 UNIT per ML SUSP injection vial 30 in am and 50 units at night 1 vial 3    metoprolol succinate (TOPROL XL) 100 MG extended release tablet Take 100 mg by mouth daily      digoxin (LANOXIN) 125 MCG Constitutional: Negative for chills, fatigue and fever. HENT: Negative for congestion. Respiratory: Negative. Cardiovascular: Negative. Gastrointestinal: Negative for abdominal distention and abdominal pain. Genitourinary: Positive for flank pain. Negative for decreased urine volume, difficulty urinating and dysuria. Musculoskeletal: Positive for arthralgias and myalgias. Skin: Negative. Psychiatric/Behavioral: Negative for self-injury, sleep disturbance and suicidal ideas. Objective:      /78   Pulse 96   Temp 97.7 °F (36.5 °C) (Oral)   Resp 12   Ht 5' 9\" (1.753 m)   Wt 296 lb 6.4 oz (134.4 kg)   LMP 02/20/2001   BMI 43.77 kg/m²      Physical Exam  Vitals signs and nursing note reviewed. Cardiovascular:      Rate and Rhythm: Normal rate and regular rhythm. Pulses: Normal pulses. Heart sounds: Normal heart sounds. No murmur. Pulmonary:      Effort: Pulmonary effort is normal. No respiratory distress. Breath sounds: Normal breath sounds. Psychiatric:         Mood and Affect: Mood normal.         Behavior: Behavior normal.         Thought Content: Thought content normal.         Judgment: Judgment normal.          Assessment/Plan:           1. Type 2 diabetes mellitus with other circulatory complication, with long-term current use of insulin ([facility])    - POCT glycosylated hemoglobin (Hb A1C)-7.7  - DEXA BONE DENSITY 2 SITES; Future    2. Chronic kidney disease, stage 4 (severe) ([facility])    Stable  Continue to follow with nephrology  3. Neuropathy  Gabapentin makes it better    4. Dysuria  Ordered atb bactrim ds  - POCT Urinalysis no Micro-leukocytes  - Urine Culture    5. Leukocytes in urine    - Urine Culture    6. Burping  Stop protonix and try changing to dexilant    7. Postmenopause    - DEXA BONE DENSITY 2 SITES; Future    Pt in agreement with plan  Return in about 6 months (around 9/3/2020) for A1C and check up.     Reccommended tobaccocessation options including pharmacologic methods, counseled great than 3 minutesduring this visit:  Yes[]  No  []       Patient given educational materials -see patient instructions. Discussed use, benefit, and side effects of prescribedmedications. All patient questions answered. Pt voiced understanding. Reviewedhealth maintenance. Instructed to continue current medications, diet and exercise. Patient agreed with treatment plan. Follow up as directed.        Electronicallysigned by ARNOL Salcido CNP on 3/3/2020 at 3:59 PM

## 2020-03-04 ENCOUNTER — TELEPHONE (OUTPATIENT)
Dept: FAMILY MEDICINE CLINIC | Age: 65
End: 2020-03-04

## 2020-03-04 LAB
ORGANISM: ABNORMAL
URINE CULTURE, ROUTINE: ABNORMAL

## 2020-03-09 ENCOUNTER — HOSPITAL ENCOUNTER (OUTPATIENT)
Dept: WOMENS IMAGING | Age: 65
Discharge: HOME | End: 2020-03-09
Payer: MEDICARE

## 2020-03-09 PROCEDURE — 77080 DXA BONE DENSITY AXIAL: CPT

## 2020-03-10 ENCOUNTER — TELEPHONE (OUTPATIENT)
Dept: FAMILY MEDICINE CLINIC | Age: 65
End: 2020-03-10

## 2020-03-10 RX ORDER — NITROFURANTOIN 25; 75 MG/1; MG/1
100 CAPSULE ORAL 2 TIMES DAILY
Qty: 20 CAPSULE | Refills: 0 | Status: SHIPPED | OUTPATIENT
Start: 2020-03-10 | End: 2020-03-20

## 2020-03-10 RX ORDER — ALENDRONATE SODIUM 70 MG/1
70 TABLET ORAL
Qty: 12 TABLET | Refills: 3 | Status: SHIPPED | OUTPATIENT
Start: 2020-03-10 | End: 2021-01-01

## 2020-03-10 NOTE — TELEPHONE ENCOUNTER
----- Message from Genna Staff, APRN - CNP sent at 3/10/2020 10:05 AM EDT -----  Let pt know her bone density test identifies osteopenia, bone thinning, she is at a medium risk for fracture. I would recommend she take calcium with vitamin d and then add another medication fosamax, she would take this once a week to help preserve bone density. Let me know if she is agreeable.   thanks

## 2020-03-11 ENCOUNTER — HOSPITAL ENCOUNTER (OUTPATIENT)
Dept: PHARMACY | Age: 65
Setting detail: THERAPIES SERIES
Discharge: HOME | End: 2020-03-11
Payer: MEDICARE

## 2020-03-11 ENCOUNTER — HOSPITAL ENCOUNTER (OUTPATIENT)
Dept: PHYSICAL THERAPY | Age: 65
Setting detail: THERAPIES SERIES
Discharge: HOME | End: 2020-03-11
Payer: MEDICARE

## 2020-03-11 VITALS — DIASTOLIC BLOOD PRESSURE: 80 MMHG | SYSTOLIC BLOOD PRESSURE: 140 MMHG | HEART RATE: 83 BPM

## 2020-03-11 LAB
HCT VFR BLD CALC: 36.7 % (ref 37–47)
HEMOGLOBIN: 11.3 GM/DL (ref 12–16)
INR BLD: 5.36 (ref 0.85–1.13)

## 2020-03-11 PROCEDURE — 85610 PROTHROMBIN TIME: CPT

## 2020-03-11 PROCEDURE — 97140 MANUAL THERAPY 1/> REGIONS: CPT

## 2020-03-11 PROCEDURE — 85018 HEMOGLOBIN: CPT

## 2020-03-11 PROCEDURE — 36416 COLLJ CAPILLARY BLOOD SPEC: CPT

## 2020-03-11 PROCEDURE — 99211 OFF/OP EST MAY X REQ PHY/QHP: CPT

## 2020-03-11 PROCEDURE — 85014 HEMATOCRIT: CPT

## 2020-03-11 ASSESSMENT — PAIN DESCRIPTION - DESCRIPTORS: DESCRIPTORS: SORE

## 2020-03-11 ASSESSMENT — PAIN SCALES - GENERAL: PAINLEVEL_OUTOF10: 3

## 2020-03-11 ASSESSMENT — PAIN DESCRIPTION - LOCATION: LOCATION: LEG

## 2020-03-11 ASSESSMENT — PAIN DESCRIPTION - PAIN TYPE: TYPE: CHRONIC PAIN

## 2020-03-11 ASSESSMENT — PAIN DESCRIPTION - ORIENTATION: ORIENTATION: LEFT;RIGHT

## 2020-03-11 NOTE — PROGRESS NOTES
Medication Management [address]  [phone] (phone)  [phone] (fax)      Ms. Asael Mcmillan is a 71 y.o.  female with history of Afib who presents today for anticoagulation monitoring and adjustment. Patient verifies current dosing regimen and tablet strength. No missed or extra doses. Patient denies bruising/swelling/SOB/chest pain. States had a nosebleed off and on last Sunday, 3-8-2020, that lasted all day. No blood in urine or stool. No dietary changes. No changes in medication/OTC agents/Herbals. No change in alcohol use or tobacco use. No change in activity level. Patient denies headaches/lightheadedness/falls. Sometimes has dizziness when rolls over in bed. No vomiting/diarrhea or acute illness. No procedures scheduled in the future at this time. Assessment:   Lab Results   Component Value Date    INR 5.36 (HH) 03/11/2020    INR 2.80 (H) 02/19/2020    INR 2.20 (H) 01/29/2020     INR supratherapeutic   Recent Labs     03/11/20  1344   INR 5.36*     Plan: INR result of 5.2 reviewed with Butch, Pharm. D. Order received and verified to draw venous STAT INR and H&H. Drawn by  and sent to lab. Order also received and verified to hold coumadin today and tomorrow, [facility]), then continue Coumadin 2.5 mg po MF, 3.75 mg po TWTHSS. Recheck INR in 6 days. Patient reminded to call the Anticoagulation Clinic with any signs or symptoms of bleeding or with any medication changes. Patient given instructions utilizing the teach back method. Discharged via wheelchair in no apparent distress. After visit summary printed and reviewed with patient.       Medications reviewed and updated on home medication list Yes    Influenza vaccine:     [] given    [x] declined   [] received previously   [] plans to receive at a later time   [x] refused    [x] documented in EPIC

## 2020-03-11 NOTE — PROGRESS NOTES
[facility]  LYMPHEDEMA SERVICES  DAILY NOTE    Date: 3/11/2020   Patient Name: Parvin Gunter        CSN: [id number]   : 1955  (71 y.o.)  Gender: female   Referring Practitioner: YUKI Mott  Diagnosis: CHF (CONGESTIVE HEART FAILURE); CHRONIC, SYSTOLIC; BILATERAL LEG EDEMA (R60.0); Referral Date : 19        PT Visit Information  PT Insurance Information: MEDICARE (BASED ON MEDICAL NECESSITY)  Total # of Visits to Date: 10  Plan of Care/Certification Expiration Date: 20  No Show: 0  Canceled Appointment: 0  Progress Note Counter: 10    Restrictions/Precautions  Fall risk, Universal precautions,Limited functional mobility, mobility primarily via wheelchair. Pain  Patient Currently in Pain: Yes  Pain Assessment  Pain Assessment: 0-10  Pain Level: 3  Pain Type: Chronic pain  Pain Location: Leg  Pain Orientation: Left, Right  Pain Descriptors: Sore    SUBJECTIVE     -3/11/2020 The patient presented to the Lymphedema clinic on this date with compression stockinettes off due to running late. She denied changes in medical status. TREATMENT SESSION  Manual Lymph Drainage  Bilateral Lower Extremity Manual Lymph Drainage (MLD):     Trunk:   Effleurage, Profundus, Terminus, Axillary Lnn and (IA) Anterior Inguinal to Axillary (3 steps)       Lower Extremity:  Thigh (Anterior, Posterior, Lateral, Medial to Lateral), Knee (Anterior, Posterior, Lateral, Medial, Lower Leg (Anterior,Posterior), Ankle (Anterior, Posterior, Lateral, Medial and Foot/Toes (Anterior, Posterior)       Rework  Lower Extremity (Toes to Groin), Axillary Lnn, (IA) Anterior Inguinal to Axillary, Terminus, Profundus and Effleurage  Skin Care Measures  Washed involved extremity/body part and applied Original Eucerin lotion to moisturize skin  -Gentle debridement of dry skin from the anterior aspect of bilateral lower legs (distally) using sterile tweezers.    Compression Bandaging  Location: Metatarsal heads Patient will tolerate wearing the compression bandages from one treatment session until the next treatment session working towards meeting short-term goal #1. X  MET Patient/family/caregiver will demonstrate and verbalize 3-5 skin care precautionary measures to decrease dry skin and risk of infection. -DISCONTINUED Patient/family/caregiver will demonstrate applying compression bandages with no greater than moderate assist and verbal cues from the therapist working towards being modified independent with applying the compression bandages for night time swelling. LONG-TERM GOALS:  to be met in 12 weeks   X  MET 1. Lymphedema swelling will stabilize as noted by total circumferential changes being no greater than 3.0-5.0 cm in preparation for being measured for new compression garment(s) to be worn daily to keep swelling down. X  NOT MET  (ONGOING) 2. Patient/family/caregiver will demonstrate applying compression bandages with modified independence to apply at night to keep swelling down overnight to be able to abdias compression stockings in the morning. X  NOT MET   (ONGOING) 3. Patient/family/caregiver will demonstrate donning/doffing compression garments with modified independence to wear compression garments daily to keep swelling down. X  NOT MET  (ONGOING) 4. Patient/family/caregiver will verbalize a good understanding regarding proper wearing and replacement schedule, precautions and care of garment(s). ASSESSMENT:  Assessment:  Patient progressing toward goal achievement. Activity Tolerance:  Patient tolerance of  treatment: Fair.(+)   Plan: Week of April 6 (1x). Manual Lymph Drainage (MLD), Skin care, Compression Garments, Exercises, Education/training. THE PATIENT DEMONSTRATES GOOD POTENTIAL TO MAKE GOOD PROGRESS AND MEET ALL GOALS WITH TREATMENTS COMPLETED BY A SKILLED PHYSICAL THERAPIST/PHYSICAL THERAPIST ASSISTANT.   Time In: 1410   Time Out: 1505   Timed Code Minutes: 55   Untimed Code Minutes: 0   Total Treatment Time: [address] [id number], [facility], [place] [address]       3/11/2020

## 2020-03-17 ENCOUNTER — HOSPITAL ENCOUNTER (OUTPATIENT)
Dept: PHARMACY | Age: 65
Setting detail: THERAPIES SERIES
Discharge: HOME | End: 2020-03-17
Payer: MEDICARE

## 2020-03-17 LAB — POC INR: 3 (ref 0.8–1.2)

## 2020-03-17 PROCEDURE — 85610 PROTHROMBIN TIME: CPT

## 2020-03-17 PROCEDURE — 36416 COLLJ CAPILLARY BLOOD SPEC: CPT

## 2020-03-17 PROCEDURE — 99211 OFF/OP EST MAY X REQ PHY/QHP: CPT

## 2020-03-25 ENCOUNTER — TELEPHONE (OUTPATIENT)
Dept: PHARMACY | Age: 65
End: 2020-03-25

## 2020-03-25 NOTE — TELEPHONE ENCOUNTER
Left message on mobile voicemail explaining coumadin visits will be done by telephone until COVID 19 situation resolves. Instructed to have [facility] drawn [facility] [address] [facility] on 3- before 1000 and result and instructions will be called to her.

## 2020-03-30 ENCOUNTER — NURSE ONLY (OUTPATIENT)
Dept: LAB | Age: 65
End: 2020-03-30

## 2020-03-30 ENCOUNTER — HOSPITAL ENCOUNTER (OUTPATIENT)
Dept: PHARMACY | Age: 65
Discharge: HOME | End: 2020-03-30

## 2020-03-30 LAB — INR BLD: 2.93 (ref 0.85–1.13)

## 2020-03-30 NOTE — PROGRESS NOTES
Medication Management [address] [place] [address]  [phone] (phone)  [phone] (fax)      Anticoagulation encounter completed by telephone. Patient had lab result completed at outside lab. Ms. Germaine Peralta is a 71 y.o.  female with history of Afib. Patient verifies current tablet strength and color. Follows coumadin dosing calendar. No extra doses. Missed coumadin on 3-. States missed all pills that evening. Patient denies SOB/chest pain. Usual leg swelling and small bruises. No blood in urine or stool. Had a nosebleed last week, did not last long. No dietary changes. No changes in medication/OTC agents/Herbals. No change in alcohol use or tobacco use. No change in activity level. Patient denies headaches/dizziness/lightheadedness/falls. No vomiting/diarrhea or acute illness. No procedures scheduled in the future at this time. Assessment:   Lab Results   Component Value Date    INR 2.93 (H) 03/30/2020    INR 3.00 (H) 03/17/2020    INR 5.36 (HH) 03/11/2020     INR therapeutic   Recent Labs     03/30/20  1149   INR 2.93*     Plan: POCT INR ordered and result reviewed with Livia, Pharm. D. Order received and verified to continue Coumadin 2.5 mg po MF, 3.75 mg po TWTHSS. Recheck INR in 2 weeks. Patient reminded to call the Anticoagulation Clinic with any signs or symptoms of bleeding or with any medication changes. Patient given instructions utilizing the teach back method.

## 2020-03-31 ENCOUNTER — APPOINTMENT (OUTPATIENT)
Dept: PHARMACY | Age: 65
End: 2020-03-31
Payer: MEDICARE

## 2020-04-15 ENCOUNTER — HOSPITAL ENCOUNTER (OUTPATIENT)
Dept: PHARMACY | Age: 65
Discharge: HOME | End: 2020-04-15

## 2020-04-15 ENCOUNTER — NURSE ONLY (OUTPATIENT)
Dept: LAB | Age: 65
End: 2020-04-15

## 2020-04-15 LAB — INR BLD: 3.67 (ref 0.85–1.13)

## 2020-04-15 PROCEDURE — 99211 OFF/OP EST MAY X REQ PHY/QHP: CPT

## 2020-04-15 NOTE — PROGRESS NOTES
Medication Management [address]   [phone] (phone)  [phone] (fax)      Anticoagulation encounter completed by telephone. Ernesto Islas had lab result completed at outside lab. Ms. Tata William is a 71 y.o.  female with history of Afib     Patient verifies current dosing regimen and tablet strength. No missed or extra doses. Patient denies s/s bleeding/bruising/swelling/SOB/chest pain -nosebleeds 30-45 minutes. Patient using vaseline and water to keep nose moisturized. She states that their is a scab that keeps breaking lose. She plans to talk to PCP  No blood in urine or stool. No dietary changes. No changes in medication/OTC agents/Herbals. No change in alcohol use or tobacco use. No change in activity level. Patient denies headaches/dizziness/lightheadedness/falls. No vomiting/diarrhea or acute illness. No Procedures scheduled in the future at this time. Assessment:   Lab Results   Component Value Date    INR 3.67 (H) 04/15/2020    INR 2.93 (H) 03/30/2020    INR 3.00 (H) 03/17/2020     INR supratherapeutic   Recent Labs     04/15/20  1202   INR 3.67*         Plan:  Hold x 1 then continued Coumadin 2.5 mg MF, 3.75 mg TWThSS. Recheck INR in 2 weeks. Patient reminded to call the Anticoagulation Clinic with signs or symptoms of bleeding or with any medication changes. Patient given instructions utilizing the teach back method.     Medications reviewed and updated on home medication list Yes    Influenza vaccine:     [] given    [x] declined   [x] received previously   [] plans to receive at a later time   [] refused    [x] documented in [facility]: MED RECONCILIATION/REVIEW [address]: No  Total # of Interventions Recommended: 1  - Decreased Dose #: 1  - Maintenance Safety Lab Monitoring #: 1  Total Interventions Accepted: 1  Time Spent (min): Felipe Tello, DelgadoD, BCPS

## 2020-04-29 ENCOUNTER — NURSE ONLY (OUTPATIENT)
Dept: LAB | Age: 65
End: 2020-04-29

## 2020-04-29 ENCOUNTER — HOSPITAL ENCOUNTER (OUTPATIENT)
Dept: PHARMACY | Age: 65
Setting detail: THERAPIES SERIES
Discharge: HOME | End: 2020-04-29
Payer: MEDICARE

## 2020-04-29 LAB
ALBUMIN SERPL-MCNC: 3.5 G/DL (ref 3.5–5.1)
ANION GAP SERPL CALCULATED.3IONS-SCNC: 8 MEQ/L (ref 8–16)
AVERAGE GLUCOSE: 108 MG/DL (ref 70–126)
BUN BLDV-MCNC: 19 MG/DL (ref 7–22)
CALCIUM SERPL-MCNC: 8.9 MG/DL (ref 8.5–10.5)
CHLORIDE BLD-SCNC: 104 MEQ/L (ref 98–111)
CO2: 27 MEQ/L (ref 23–33)
CREAT SERPL-MCNC: 1.4 MG/DL (ref 0.4–1.2)
CREATININE, URINE: 76.1 MG/DL
GFR SERPL CREATININE-BSD FRML MDRD: 38 ML/MIN/1.73M2
GLUCOSE BLD-MCNC: 69 MG/DL (ref 70–108)
HBA1C MFR BLD: 5.6 % (ref 4.4–6.4)
INR BLD: 5.16 (ref 0.85–1.13)
MICROALBUMIN UR-MCNC: 146.92 MG/DL
MICROALBUMIN/CREAT UR-RTO: 1931 MG/G (ref 0–30)
PHOSPHORUS: 2.8 MG/DL (ref 2.4–4.7)
POTASSIUM SERPL-SCNC: 4.3 MEQ/L (ref 3.5–5.2)
SODIUM BLD-SCNC: 139 MEQ/L (ref 135–145)
T4 FREE: 1.1 NG/DL (ref 0.93–1.76)
TSH SERPL DL<=0.05 MIU/L-ACNC: 0.31 UIU/ML (ref 0.4–4.2)

## 2020-04-29 PROCEDURE — 99211 OFF/OP EST MAY X REQ PHY/QHP: CPT

## 2020-05-04 ENCOUNTER — HOSPITAL ENCOUNTER (OUTPATIENT)
Dept: PHARMACY | Age: 65
Setting detail: THERAPIES SERIES
Discharge: HOME | End: 2020-05-04
Payer: MEDICARE

## 2020-05-04 ENCOUNTER — NURSE ONLY (OUTPATIENT)
Dept: LAB | Age: 65
End: 2020-05-04

## 2020-05-04 LAB — INR BLD: 1.42 (ref 0.85–1.13)

## 2020-05-04 RX ORDER — WARFARIN SODIUM 2.5 MG/1
TABLET ORAL
Qty: 45 TABLET | Refills: 5 | Status: ON HOLD | OUTPATIENT
Start: 2020-05-04 | End: 2021-01-01 | Stop reason: SDUPTHER

## 2020-05-04 NOTE — PROGRESS NOTES
Medication Management [address]  [phone] (phone)  [phone] (fax)      Anticoagulation encounter completed by telephone. Patient had lab result completed at outside lab. Ms. Bonnie Epstein is a 71 y.o.  female with history of Afib. Patient verifies current dosing regimen and tablet strength. No missed or extra doses. Patient denies s/s bruising/SOB/chest pain - just one nose bleed a few hours ago (took 5 min stop) the past week, normal swelling in legs  No blood in urine or stool. No dietary changes. No changes in medication/OTC agents/Herbals. No change in alcohol use or tobacco use. No change in activity level. Patient denies dizziness/lightheadedness/falls. - occasional headaches (thinks due to sinuses)  No vomiting or acute illness. - some diarrhea every couple days   No Procedures scheduled in the future at this time. Assessment:   Lab Results   Component Value Date    INR 1.42 (H) 05/04/2020    INR 5.16 (HH) 04/29/2020    INR 3.67 (H) 04/15/2020     INR subtherapeutic   Recent Labs     05/04/20  1248   INR 1.42*     Patient's INR subtherapeutic after recent hold last week. Patient  previously had two supratherapeutic INR's    Plan:  Coumadin 5 mg today and then decrease Coumadin to 3.75 mg MWF and 2.5 mg TThSSu (10.5% decrease). Recheck INR in 9 days. Refill for warfarin 2.5 mg tablets sent to [facility] on [address] per patient request. Patient reminded to call the Anticoagulation Clinic with signs or symptoms of bleeding or with any medication changes. Patient given instructions utilizing the teach back method.     Talia Ash PharmD, BCPS  5/4/2020 4:10 PM    CLINICAL PHARMACY CONSULT: MED RECONCILIATION/REVIEW ADDENDUM    For Pharmacy Admin Tracking Only    PHSO: No  Total # of Interventions Recommended: 2  - Increased Dose #: 1  - Refills Provided #: 1  - Maintenance Safety Lab Monitoring #: 1  Total Interventions Accepted: 2  Time

## 2020-05-13 ENCOUNTER — NURSE ONLY (OUTPATIENT)
Dept: LAB | Age: 65
End: 2020-05-13

## 2020-05-13 ENCOUNTER — HOSPITAL ENCOUNTER (OUTPATIENT)
Dept: PHARMACY | Age: 65
Setting detail: THERAPIES SERIES
Discharge: HOME | End: 2020-05-13
Payer: MEDICARE

## 2020-05-13 LAB — INR BLD: 3.42 (ref 0.85–1.13)

## 2020-05-13 PROCEDURE — 99211 OFF/OP EST MAY X REQ PHY/QHP: CPT

## 2020-05-13 NOTE — PROGRESS NOTES
not be able to schedule a virtual visit with the provider. Do you accept? Yes    Verbal Consent for Consult Agreement participation during COVID-19 Pandemic  Verbiage for CPA Consent:  Discussed with patient the Pharmacist Collaborative Practice Agreement. Patient provided verbal and/or electronic (exRupert Minium) consent to be managed by a pharmacist per collaborative practice agreement between the pharmacist and referring physician. This is in lieu of paper consent due to COVID-19 precautions and the use of remote/virtual visits.      Delgado CarolinaD, BCPS  5/13/2020 2:02 PM    CLINICAL PHARMACY CONSULT: MED RECONCILIATION/REVIEW ADDENDUM    For Pharmacy Admin Tracking Only    PHSO: No  Total # of Interventions Recommended: 1  - Decreased Dose #: 1  - Maintenance Safety Lab Monitoring #: 1  Total Interventions Accepted: 1  Time Spent (min): 15

## 2020-05-20 ENCOUNTER — TELEPHONE (OUTPATIENT)
Dept: FAMILY MEDICINE CLINIC | Age: 65
End: 2020-05-20

## 2020-05-20 RX ORDER — GABAPENTIN 100 MG/1
CAPSULE ORAL
Qty: 270 CAPSULE | Refills: 0 | Status: SHIPPED | OUTPATIENT
Start: 2020-05-20 | End: 2020-08-17

## 2020-05-20 NOTE — TELEPHONE ENCOUNTER
Patient is calling in for advice. She had been working with the coumadin clinic, but now she is having an issue of not being able to get somewhere to have her INR checked since the coumadin clinic is closed. She said she has been speaking with a [employer] at [facility] and she told her to contact [facility] about help. She said if [facility] would be willing to give her an order or rx she could get a machine for home that would check her INR and then she would call it into [facility] for the dosing. Please advise if this is an option? And patient said [facility] or the nurses can contact [employer] at [facility] at [phone] to verify and get more specific info.

## 2020-05-21 ENCOUNTER — NURSE ONLY (OUTPATIENT)
Dept: LAB | Age: 65
End: 2020-05-21

## 2020-05-21 LAB
ANION GAP SERPL CALCULATED.3IONS-SCNC: 9 MEQ/L (ref 8–16)
BUN BLDV-MCNC: 22 MG/DL (ref 7–22)
CALCIUM SERPL-MCNC: 9 MG/DL (ref 8.5–10.5)
CHLORIDE BLD-SCNC: 106 MEQ/L (ref 98–111)
CO2: 26 MEQ/L (ref 23–33)
CREAT SERPL-MCNC: 1.5 MG/DL (ref 0.4–1.2)
CREATININE, URINE: 78.5 MG/DL
GFR SERPL CREATININE-BSD FRML MDRD: 35 ML/MIN/1.73M2
GLUCOSE BLD-MCNC: 118 MG/DL (ref 70–108)
MICROALBUMIN UR-MCNC: 108.17 MG/DL
MICROALBUMIN/CREAT UR-RTO: 1378 MG/G (ref 0–30)
POTASSIUM SERPL-SCNC: 4.8 MEQ/L (ref 3.5–5.2)
PTH INTACT: 97.4 PG/ML (ref 15–65)
SODIUM BLD-SCNC: 141 MEQ/L (ref 135–145)
VITAMIN D 25-HYDROXY: 42 NG/ML (ref 30–100)

## 2020-05-22 ENCOUNTER — TELEPHONE (OUTPATIENT)
Dept: PHARMACY | Age: 65
End: 2020-05-22

## 2020-05-22 NOTE — TELEPHONE ENCOUNTER
Received call from PCP, Hector Orourke. Pt has contacted PCP, and is not willing to go to lab for INR checks. Discussed with [facility], options include home health or switching pt to a DOAC. [facility]et asks that we contact Dr. Bridgett Morris office to explore DOAC option. Called Violet Barker will check on Jose's and call [facility] back. After obtained, will contact Dr. Bridgett Morris office.

## 2020-05-26 ENCOUNTER — NURSE ONLY (OUTPATIENT)
Dept: LAB | Age: 65
End: 2020-05-26

## 2020-05-26 ENCOUNTER — OFFICE VISIT (OUTPATIENT)
Dept: NEPHROLOGY | Age: 65
End: 2020-05-26
Payer: MEDICARE

## 2020-05-26 ENCOUNTER — HOSPITAL ENCOUNTER (OUTPATIENT)
Dept: PHARMACY | Age: 65
Setting detail: THERAPIES SERIES
Discharge: HOME | End: 2020-05-26
Payer: MEDICARE

## 2020-05-26 ENCOUNTER — TELEPHONE (OUTPATIENT)
Dept: PHARMACY | Age: 65
End: 2020-05-26

## 2020-05-26 VITALS
SYSTOLIC BLOOD PRESSURE: 137 MMHG | BODY MASS INDEX: 42.65 KG/M2 | DIASTOLIC BLOOD PRESSURE: 80 MMHG | HEART RATE: 86 BPM | TEMPERATURE: 97.1 F | OXYGEN SATURATION: 95 % | WEIGHT: 288.8 LBS

## 2020-05-26 LAB — INR BLD: 2.26 (ref 0.85–1.13)

## 2020-05-26 PROCEDURE — G8427 DOCREV CUR MEDS BY ELIG CLIN: HCPCS | Performed by: INTERNAL MEDICINE

## 2020-05-26 PROCEDURE — G8399 PT W/DXA RESULTS DOCUMENT: HCPCS | Performed by: INTERNAL MEDICINE

## 2020-05-26 PROCEDURE — 1090F PRES/ABSN URINE INCON ASSESS: CPT | Performed by: INTERNAL MEDICINE

## 2020-05-26 PROCEDURE — 3017F COLORECTAL CA SCREEN DOC REV: CPT | Performed by: INTERNAL MEDICINE

## 2020-05-26 PROCEDURE — 99213 OFFICE O/P EST LOW 20 MIN: CPT | Performed by: INTERNAL MEDICINE

## 2020-05-26 PROCEDURE — 1036F TOBACCO NON-USER: CPT | Performed by: INTERNAL MEDICINE

## 2020-05-26 PROCEDURE — 99211 OFF/OP EST MAY X REQ PHY/QHP: CPT

## 2020-05-26 PROCEDURE — 2022F DILAT RTA XM EVC RTNOPTHY: CPT | Performed by: INTERNAL MEDICINE

## 2020-05-26 PROCEDURE — 4040F PNEUMOC VAC/ADMIN/RCVD: CPT | Performed by: INTERNAL MEDICINE

## 2020-05-26 PROCEDURE — 1123F ACP DISCUSS/DSCN MKR DOCD: CPT | Performed by: INTERNAL MEDICINE

## 2020-05-26 PROCEDURE — G8417 CALC BMI ABV UP PARAM F/U: HCPCS | Performed by: INTERNAL MEDICINE

## 2020-05-26 NOTE — PROGRESS NOTES
disease (CKD), stage III (moderate) (HCC)    Nonischemic cardiomyopathy (HCC)    Mild tricuspid regurgitation    Debility    Dietary noncompliance    Long term current use of anticoagulant    Severe left ventricular systolic dysfunction    Anticoagulated on Coumadin       Subjective:   Chief complaint:  Chief Complaint   Patient presents with    Follow-up     CKD STAGE III      HPI:This is a follow up visit for Ms. Juan block who is here today for return appointment. I see her for microalbuminuria and chronic kidney disease. She was last seen about 6 months ago. Serum creatinine was 1.7 mg/dL. Today it is 1.5 mg/dL. Random urine microalbumin creatinine ratio was 1931. Today it is 1378. No new medications since last seen. Doing well with no complaint. No chest pain or shortness of breath. No headaches. No difficulties with urination. No nausea vomiting. No anxiety. She has chronic lower extremity lymphedema. She also has a chronic back pain, joint aches and joint pain exacerbated by activities and relieved by rest.    ROS:Constitutional: negative  Eyes: negative  Ears, nose, mouth, throat, and face: negative  Respiratory: negative  Cardiovascular: positive for lower extremity edema  Gastrointestinal: negative  Genitourinary:negative  Integument/breast: negative  Hematologic/lymphatic: negative  Musculoskeletal:positive for arthralgias and stiff joints  Neurological: positive for coordination problems and gait problems  Behavioral/Psych: negative  Endocrine: negative  Allergic/Immunologic: negative  Medications:     Current Outpatient Medications   Medication Sig Dispense Refill    gabapentin (NEURONTIN) 100 MG capsule take 1 capsule by mouth three times a day 270 capsule 0    RA VITAMIN B-12 100 MCG tablet take 1 tablet by mouth once daily 90 tablet 3    warfarin (COUMADIN) 2.5 MG tablet As directed by [facility].  30 days = 45 tabs 45 tablet 5    alendronate (FOSAMAX) 70 MG tablet Take 1 tablet by mouth every 7 days 12 tablet 3    dexlansoprazole (DEXILANT) 60 MG CPDR delayed release capsule Take 1 capsule by mouth daily 90 capsule 1    SITagliptin-metFORMIN HCl (JANUMET PO) Take 1,000 mg by mouth 2 times daily      Dulaglutide (TRULICITY SC) Inject 6.08 mg into the skin once a week       folic acid (FOLVITE) 1 MG tablet take 1 tablet by mouth once daily 90 tablet 4    aspirin (RA ASPIRIN EC) 81 MG EC tablet Take 1 tablet by mouth daily 30 tablet 3    furosemide (LASIX) 40 MG tablet Take 0.5 tablets by mouth daily 30 tablet 0    DULoxetine (CYMBALTA) 60 MG extended release capsule take 1 capsule by mouth once daily 90 capsule 3    B-D UF III MINI PEN NEEDLES 31G X 5 MM MISC use three times a day 100 each 0    insulin lispro protamine & lispro (HUMALOG MIX) (75-25) 100 UNIT per ML SUSP injection vial 30 in am and 50 units at night 1 vial 3    metoprolol succinate (TOPROL XL) 100 MG extended release tablet Take 100 mg by mouth daily      digoxin (LANOXIN) 125 MCG tablet Take 0.5 tablets by mouth daily 30 tablet 0    fenofibrate (TRICOR) 145 MG tablet take 1 tablet by mouth once daily 90 tablet 3    atorvastatin (LIPITOR) 40 MG tablet take 1 tablet by mouth at bedtime 90 tablet 3    isosorbide mononitrate (IMDUR) 60 MG extended release tablet Take 1 tablet by mouth daily 30 tablet 3    allopurinol (ZYLOPRIM) 100 MG tablet Take 100 mg by mouth daily      meclizine (ANTIVERT) 25 MG CHEW Take 1 tablet by mouth 3 times daily as needed (vertigo) 90 tablet 0    Calcium Carbonate-Vitamin D (CALCIUM-VITAMIN D) 500-200 MG-UNIT per tablet Take 1 tablet by mouth 2 times daily (with meals)        No current facility-administered medications for this visit.         Lab Results:    CBC:   Lab Results   Component Value Date    WBC 4.6 (L) 09/22/2019    HGB 11.3 (L) 03/11/2020    HCT 36.7 (L) 03/11/2020    .5 (H) 09/22/2019     09/22/2019     BMP:    Lab Results

## 2020-05-26 NOTE — PROGRESS NOTES
Patient didn't bring medications with her, she is thinking she should be on BP medication but isn't sure

## 2020-05-28 ENCOUNTER — TELEPHONE (OUTPATIENT)
Dept: PHARMACY | Age: 65
End: 2020-05-28

## 2020-05-28 NOTE — TELEPHONE ENCOUNTER
Dr. [facility] office called again and stated that patient would like to move forward with a HH order. Dr Joseph Malagon will order and let us know if it is approved by insurance.      Chica Mckeon, PharmD, BCPS  5/28/2020  1:06 PM

## 2020-05-29 ENCOUNTER — TELEPHONE (OUTPATIENT)
Dept: FAMILY MEDICINE CLINIC | Age: 65
End: 2020-05-29

## 2020-05-31 NOTE — TELEPHONE ENCOUNTER
Please let pt know this and if she wants to self pay for the home lab draws she can follow through this way. Or she can contact Cardiology to see if she is  Eligible to be managed with a home machine.  Thanks

## 2020-06-03 ENCOUNTER — TELEPHONE (OUTPATIENT)
Dept: PHARMACY | Age: 65
End: 2020-06-03

## 2020-06-03 NOTE — TELEPHONE ENCOUNTER
Called and spoke with patient - [facility] as she think they would be able to offer a drive through service which would be easier for her. She is calling them to check that they offer it and if so will call back coumadin clinic so that we can mail her an order for her INR. The other option she was looking at was using a home testing machine - she knows that [facility] does not dose patients this way so she was going to check with her PCP and Dr. Cherise Marquez to see if they could follow and if the machine would be covered.

## 2020-06-09 ENCOUNTER — HOSPITAL ENCOUNTER (OUTPATIENT)
Dept: PHARMACY | Age: 65
Setting detail: THERAPIES SERIES
Discharge: HOME | End: 2020-06-09
Payer: MEDICARE

## 2020-06-09 ENCOUNTER — NURSE ONLY (OUTPATIENT)
Dept: LAB | Age: 65
End: 2020-06-09

## 2020-06-09 LAB — INR BLD: 2.8 (ref 0.85–1.13)

## 2020-06-09 PROCEDURE — 99211 OFF/OP EST MAY X REQ PHY/QHP: CPT | Performed by: PHARMACIST

## 2020-06-09 NOTE — PROGRESS NOTES
1  - Maintenance Safety Lab Monitoring #: 1  Total Interventions Accepted: 1  Time Spent (min): 130 [facility], PharmD  [address]

## 2020-06-11 ENCOUNTER — TELEPHONE (OUTPATIENT)
Dept: CARDIOLOGY CLINIC | Age: 65
End: 2020-06-11

## 2020-06-11 NOTE — TELEPHONE ENCOUNTER
Spoke with patient regarding appointment that was canceled in CHF clinic   She stated she does not want to reschedule in the CHF clinic     Reviewed the HF zones   Instructed when to call and Stressed importance of taking daily weights. Patient verbalized understanding.

## 2020-06-30 ENCOUNTER — HOSPITAL ENCOUNTER (OUTPATIENT)
Dept: PHARMACY | Age: 65
Setting detail: THERAPIES SERIES
Discharge: HOME | End: 2020-06-30
Payer: MEDICARE

## 2020-06-30 ENCOUNTER — NURSE ONLY (OUTPATIENT)
Dept: LAB | Age: 65
End: 2020-06-30

## 2020-06-30 LAB — INR BLD: 1.89 (ref 0.85–1.13)

## 2020-06-30 PROCEDURE — 99211 OFF/OP EST MAY X REQ PHY/QHP: CPT

## 2020-06-30 NOTE — PROGRESS NOTES
Medication Management [address]  [phone] (phone)  [phone] (fax)      Anticoagulation encounter completed via telephone. Patient had lab result completed at outside lab. Ms. Janel Weir is a 71 y.o.  female with history of Afib. Patient verifies current dosing regimen and tablet strength. No extra doses. Patient states she has been taking 2.5 mg every Wednesday since the last INR check (was instructed to take 3.75 mg on these days). Patient denies s/s bleeding/bruising/swelling/SOB/chest pain  No blood in urine or stool. No dietary changes. No changes in medication/OTC agents/Herbals. No change in alcohol use or tobacco use. No change in activity level. Patient denies headaches/lightheadedness/falls. Patient reports more dizziness the past few weeks, but \"has not been bad\" per patient. She will follow-up with PCP. No vomiting/diarrhea or acute illness. No Procedures scheduled in the future at this time. Assessment:   Lab Results   Component Value Date    INR 1.89 (H) 06/30/2020    INR 2.80 (H) 06/09/2020    INR 2.26 (H) 05/26/2020     INR subtherapeutic   Recent Labs     06/30/20  0607   INR 1.89*     Interview completed by Lily Rosen PharmD candidate    Patient is slightly subtherapeutic today likely due to deviating from instructed regimen. Previously, patient had two consecutive therapeutic INRs on current dose. Plan:  Coumadin 3.75 mg x 1 dose today 6/30/20 then continue Coumadin 3.75 mg W and 2.5 mg MTuThFSaSu. Recheck INR in 2 weeks. Patient reminded to call the Anticoagulation Clinic with any signs or symptoms of bleeding or with any medication changes. Patient given instructions utilizing the teach back method.     Néstor Caldwell PharmD, BCPS  6/30/2020  12:02 PM    The following statement was review with patient regarding this virtual visit:  We want to confirm that, for purposes of billing, this is a virtual visit with

## 2020-07-13 ENCOUNTER — HOSPITAL ENCOUNTER (OUTPATIENT)
Dept: ULTRASOUND IMAGING | Age: 65
Discharge: HOME | End: 2020-07-13
Payer: MEDICARE

## 2020-07-13 PROCEDURE — 76536 US EXAM OF HEAD AND NECK: CPT

## 2020-07-14 ENCOUNTER — HOSPITAL ENCOUNTER (OUTPATIENT)
Dept: PHARMACY | Age: 65
Setting detail: THERAPIES SERIES
Discharge: HOME | End: 2020-07-14
Payer: MEDICARE

## 2020-07-14 ENCOUNTER — NURSE ONLY (OUTPATIENT)
Dept: LAB | Age: 65
End: 2020-07-14

## 2020-07-14 LAB — INR BLD: 1.85 (ref 0.85–1.13)

## 2020-07-14 PROCEDURE — 99211 OFF/OP EST MAY X REQ PHY/QHP: CPT

## 2020-07-14 NOTE — PROGRESS NOTES
Medication Management [address] [place] [address]  [phone] (phone)  [phone] (fax)      Anticoagulation encounter completed via telephone. Patient had lab result completed at outside lab. Ms. Aminah Orona is a 71 y.o.  female with history of atrial fib. , per Dr. Kevin Villafana referral (2 week INR). Patient verifies current dosing regimen and tablet strength. (Had been taking incorrectly at time of last encounter.)  No missed or extra doses, except as ordered last visit. Patient denies bleeding/bruising/swelling/SOB/chest pain. No blood in urine or stool. No dietary changes. No changes in medication/OTC agents/herbals. No change in alcohol use or tobacco use. Change in activity level: slightly increased. Patient denies headaches/dizziness/lightheadedness/falls. No vomiting/diarrhea or acute illness. No procedures scheduled in the future at this time. Assessment:   Lab Results   Component Value Date    INR 1.85 (H) 07/14/2020    INR 1.89 (H) 06/30/2020    INR 2.80 (H) 06/09/2020     INR subtherapeutic - goal 2-3. Recent Labs     07/14/20  1216   INR 1.85*     Plan:  3.75 mg today, T, then increase PO Coumadin to 3.75 mg MF, 2.5 mg TWThSS (from 3.75 mg W, 2.5 mg MTThFSS=6.7% increase). Recheck INR in 2 weeks - agreed to come to clinic. (Chart reviewed/orders entered by Troy Torres [facility] PharmD.)  Patient reminded to call the Anticoagulation Clinic with signs or symptoms of bleeding or with any medication changes. Patient given instructions utilizing the teach back method. The following statement was review with patient regarding this virtual visit:  We want to confirm that, for purposes of billing, this is a virtual visit with your provider for which we will submit a claim for reimbursement with your insurance company. You may be responsible for any copays, coinsurance amounts or other amounts not covered by your insurance company.  If you do not accept this, unfortunately we will not be able to schedule a virtual visit with the provider. Do you accept?   Yes.

## 2020-07-24 ENCOUNTER — TELEPHONE (OUTPATIENT)
Dept: FAMILY MEDICINE CLINIC | Age: 65
End: 2020-07-24

## 2020-07-24 NOTE — TELEPHONE ENCOUNTER
2nd attempt to contact the pt re:overdue Xray JS ordered on 12/3/19. HIPAA form is up to date, order mailed.

## 2020-07-28 ENCOUNTER — HOSPITAL ENCOUNTER (OUTPATIENT)
Age: 65
Discharge: HOME | End: 2020-07-28
Payer: MEDICARE

## 2020-07-28 ENCOUNTER — HOSPITAL ENCOUNTER (OUTPATIENT)
Dept: PHARMACY | Age: 65
Setting detail: THERAPIES SERIES
Discharge: HOME | End: 2020-07-28
Payer: MEDICARE

## 2020-07-28 VITALS — TEMPERATURE: 98.4 F

## 2020-07-28 LAB
AVERAGE GLUCOSE: 159 MG/DL (ref 70–126)
HBA1C MFR BLD: 7.3 % (ref 4.4–6.4)
POC INR: 2.1 (ref 0.8–1.2)
T4 FREE: 0.9 NG/DL (ref 0.93–1.76)
TSH SERPL DL<=0.05 MIU/L-ACNC: 0.3 UIU/ML (ref 0.4–4.2)

## 2020-07-28 PROCEDURE — 83036 HEMOGLOBIN GLYCOSYLATED A1C: CPT

## 2020-07-28 PROCEDURE — 85610 PROTHROMBIN TIME: CPT

## 2020-07-28 PROCEDURE — 84439 ASSAY OF FREE THYROXINE: CPT

## 2020-07-28 PROCEDURE — 36415 COLL VENOUS BLD VENIPUNCTURE: CPT

## 2020-07-28 PROCEDURE — 84443 ASSAY THYROID STIM HORMONE: CPT

## 2020-07-28 PROCEDURE — 99211 OFF/OP EST MAY X REQ PHY/QHP: CPT

## 2020-07-28 PROCEDURE — 36416 COLLJ CAPILLARY BLOOD SPEC: CPT

## 2020-07-28 NOTE — PROGRESS NOTES
Medication Management [address]  [phone] (phone)  [phone] (fax)      Ms. Fariha Combs is a 71 y.o.  female with history of Afib who presents today for anticoagulation monitoring and adjustment. Patient verifies current dosing regimen and tablet strength. No missed or extra doses. Patient denies s/s bleeding/bruising/swelling/SOB/chest pain - some swelling in legs, but nothing out of the normal. Some bruising on elbow, appears to be from wheelchair. No blood in urine or stool. No dietary changes. No changes in medication/OTC agents/Herbals. No change in alcohol use or tobacco use. No change in activity level. Patient denies headaches/dizziness/lightheadedness/falls. No vomiting/diarrhea or acute illness. No Procedures scheduled in the future at this time. Assessment:   Lab Results   Component Value Date    INR 2.10 (H) 07/28/2020    INR 1.85 (H) 07/14/2020    INR 1.89 (H) 06/30/2020     INR therapeutic   Recent Labs     07/28/20  1433   INR 2.10*     1st INR in range since 6/9/2020    Plan:  Continue Coumadin 3.75 mg on MF, 2.5 mg TWThSS. Recheck INR in 2 weeks. Patient reminded to call the Anticoagulation Clinic with any signs or symptoms of bleeding or with any medication changes. Patient given instructions utilizing the teach back method. Discharged ambulatory in no apparent distress. After visit summary printed and reviewed with patient.       Medications reviewed and updated on home medication list Yes    CLINICAL PHARMACY CONSULT: MED RECONCILIATION/REVIEW Janina  22. Tracking Only    PHSO: No  Total # of Interventions Recommended: 0  - Maintenance Safety Lab Monitoring #: 1  Total Interventions Accepted: 0  Time Spent (min): [address], PharmD, BCPS  7/28/2020  2:52 PM

## 2020-08-10 ENCOUNTER — HOSPITAL ENCOUNTER (OUTPATIENT)
Dept: PHARMACY | Age: 65
Setting detail: THERAPIES SERIES
Discharge: HOME | End: 2020-08-10
Payer: MEDICARE

## 2020-08-10 VITALS — TEMPERATURE: 97.8 F

## 2020-08-10 LAB — POC INR: 1.8 (ref 0.8–1.2)

## 2020-08-10 PROCEDURE — 99211 OFF/OP EST MAY X REQ PHY/QHP: CPT

## 2020-08-10 PROCEDURE — 36416 COLLJ CAPILLARY BLOOD SPEC: CPT

## 2020-08-10 PROCEDURE — 85610 PROTHROMBIN TIME: CPT

## 2020-08-10 NOTE — PROGRESS NOTES
refused    [] documented in [address]:  [place] Tracking Only    PHSO: No  Total # of Interventions Recommended: 1  - Increased Dose #: 1  - Maintenance Safety Lab Monitoring #: 1  Total Interventions Accepted: 1  Time Spent (min): 0775  [facility], PharmD, BCPS  8/10/2020  2:36 PM

## 2020-08-17 RX ORDER — GABAPENTIN 100 MG/1
CAPSULE ORAL
Qty: 270 CAPSULE | Refills: 0 | Status: SHIPPED | OUTPATIENT
Start: 2020-08-17 | End: 2020-01-01

## 2020-08-24 ENCOUNTER — HOSPITAL ENCOUNTER (OUTPATIENT)
Dept: PHARMACY | Age: 65
Setting detail: THERAPIES SERIES
Discharge: HOME | End: 2020-08-24
Payer: MEDICARE

## 2020-08-24 VITALS — TEMPERATURE: 96.4 F

## 2020-08-24 LAB — POC INR: 1.9 (ref 0.8–1.2)

## 2020-08-24 PROCEDURE — 36416 COLLJ CAPILLARY BLOOD SPEC: CPT | Performed by: PHARMACIST

## 2020-08-24 PROCEDURE — 99211 OFF/OP EST MAY X REQ PHY/QHP: CPT | Performed by: PHARMACIST

## 2020-08-24 PROCEDURE — 85610 PROTHROMBIN TIME: CPT | Performed by: PHARMACIST

## 2020-08-24 RX ORDER — LEVOTHYROXINE SODIUM 0.07 MG/1
75 TABLET ORAL DAILY
Status: ON HOLD | COMMUNITY
End: 2020-01-01 | Stop reason: HOSPADM

## 2020-08-26 ENCOUNTER — TELEPHONE (OUTPATIENT)
Dept: FAMILY MEDICINE CLINIC | Age: 65
End: 2020-08-26

## 2020-08-26 NOTE — TELEPHONE ENCOUNTER
Future Appointments   Date Time Provider Adonis Dolan   9/1/2020  1:20 PM Rebekah Dill, [address] [facility] [place]   9/8/2020  3:40 PM Jesús Combs, APRN - [id number] [place] [address] [address] [facility] [place]   11/5/2020  1:00 PM Hair Hutton MD [facility] [place]   1/19/2021  1:15 PM ACE Valdez     9/3/2020 time slot blocked

## 2020-09-01 ENCOUNTER — HOSPITAL ENCOUNTER (OUTPATIENT)
Dept: PHARMACY | Age: 65
Setting detail: THERAPIES SERIES
Discharge: HOME | End: 2020-09-01
Payer: MEDICARE

## 2020-09-01 VITALS — TEMPERATURE: 98.1 F

## 2020-09-01 LAB — POC INR: 2.2 (ref 0.8–1.2)

## 2020-09-01 PROCEDURE — 36416 COLLJ CAPILLARY BLOOD SPEC: CPT

## 2020-09-01 PROCEDURE — 99211 OFF/OP EST MAY X REQ PHY/QHP: CPT

## 2020-09-01 PROCEDURE — 85610 PROTHROMBIN TIME: CPT

## 2020-09-01 NOTE — PROGRESS NOTES
Medication Management [address]  [phone] (phone)  [phone] (fax)      Ms. Navya Rudolph is a 71 y.o.  female with history of Afib who presents today for anticoagulation monitoring and adjustment. Patient verifies current dosing regimen and tablet strength. No missed or extra doses. Patient denies s/s bleeding/bruising/swelling/SOB/chest pain  No blood in urine or stool. No dietary changes. No changes in medication/OTC agents/Herbals. No change in alcohol use or tobacco use. No change in activity level. Patient denies headaches/dizziness/lightheadedness/falls. No vomiting/diarrhea or acute illness. No Procedures scheduled in the future at this time. Assessment:   Lab Results   Component Value Date    INR 2.20 (H) 09/01/2020    INR 1.90 (H) 08/24/2020    INR 1.80 (H) 08/10/2020     INR therapeutic   Recent Labs     09/01/20  1351   INR 2.20*     Patient presents with first therapeutic INR following a recent dose adjustment. Plan:  Continue Coumadin 3.75 mg MWF and 2.5 mg TuThSaSu. Recheck INR in 2 week(s). Patient reminded to call the Anticoagulation Clinic with any signs or symptoms of bleeding or with any medication changes. Patient given instructions utilizing the teach back method. Discharged via wheelchair in no apparent distress. After visit summary printed and reviewed with patient.       Medications reviewed and updated on home medication list Yes    Influenza vaccine:     [] given    [x] declined   [] received previously   [] plans to receive at a later time   [x] refused    [] documented in [place]: [address]: No  Total # of Interventions Recommended: 0  - Maintenance Safety Lab Monitoring #: 1  Total Interventions Accepted: 0  Time Spent (min): 1300 [facility] Terrie Champion, DelgadoD, BCPS  9/1/2020  2:21 PM

## 2020-09-08 RX ORDER — FENOFIBRATE 145 MG/1
TABLET, COATED ORAL
Qty: 90 TABLET | Refills: 3 | Status: SHIPPED | OUTPATIENT
Start: 2020-09-08 | End: 2021-01-01 | Stop reason: SDUPTHER

## 2020-09-08 RX ORDER — ATORVASTATIN CALCIUM 40 MG/1
TABLET, FILM COATED ORAL
Qty: 90 TABLET | Refills: 3 | Status: SHIPPED | OUTPATIENT
Start: 2020-09-08 | End: 2021-01-01 | Stop reason: SDUPTHER

## 2020-09-14 ENCOUNTER — APPOINTMENT (OUTPATIENT)
Dept: PHARMACY | Age: 65
End: 2020-09-14
Payer: MEDICARE

## 2020-09-15 ENCOUNTER — OFFICE VISIT (OUTPATIENT)
Dept: FAMILY MEDICINE CLINIC | Age: 65
End: 2020-09-15
Payer: MEDICARE

## 2020-09-15 ENCOUNTER — HOSPITAL ENCOUNTER (OUTPATIENT)
Dept: PHARMACY | Age: 65
Setting detail: THERAPIES SERIES
Discharge: HOME | End: 2020-09-15
Payer: MEDICARE

## 2020-09-15 VITALS
DIASTOLIC BLOOD PRESSURE: 86 MMHG | SYSTOLIC BLOOD PRESSURE: 140 MMHG | RESPIRATION RATE: 16 BRPM | TEMPERATURE: 97.1 F | BODY MASS INDEX: 38.1 KG/M2 | WEIGHT: 258 LBS | HEART RATE: 80 BPM

## 2020-09-15 VITALS — TEMPERATURE: 97.6 F

## 2020-09-15 LAB — POC INR: 1.7 (ref 0.8–1.2)

## 2020-09-15 PROCEDURE — G0447 BEHAVIOR COUNSEL OBESITY 15M: HCPCS | Performed by: NURSE PRACTITIONER

## 2020-09-15 PROCEDURE — 36416 COLLJ CAPILLARY BLOOD SPEC: CPT | Performed by: PHARMACIST

## 2020-09-15 PROCEDURE — 99211 OFF/OP EST MAY X REQ PHY/QHP: CPT | Performed by: PHARMACIST

## 2020-09-15 PROCEDURE — 85610 PROTHROMBIN TIME: CPT | Performed by: PHARMACIST

## 2020-09-15 PROCEDURE — 4040F PNEUMOC VAC/ADMIN/RCVD: CPT | Performed by: NURSE PRACTITIONER

## 2020-09-15 PROCEDURE — 3017F COLORECTAL CA SCREEN DOC REV: CPT | Performed by: NURSE PRACTITIONER

## 2020-09-15 PROCEDURE — G0438 PPPS, INITIAL VISIT: HCPCS | Performed by: NURSE PRACTITIONER

## 2020-09-15 PROCEDURE — 1123F ACP DISCUSS/DSCN MKR DOCD: CPT | Performed by: NURSE PRACTITIONER

## 2020-09-15 RX ORDER — NYSTATIN 100000 [USP'U]/G
POWDER TOPICAL
Qty: 60 G | Refills: 2 | Status: SHIPPED | OUTPATIENT
Start: 2020-09-15

## 2020-09-15 RX ORDER — DEXLANSOPRAZOLE 60 MG/1
60 CAPSULE, DELAYED RELEASE ORAL DAILY
Qty: 90 CAPSULE | Refills: 3 | Status: SHIPPED | OUTPATIENT
Start: 2020-09-15 | End: 2021-01-01 | Stop reason: SDUPTHER

## 2020-09-15 ASSESSMENT — PATIENT HEALTH QUESTIONNAIRE - PHQ9
SUM OF ALL RESPONSES TO PHQ QUESTIONS 1-9: 0
1. LITTLE INTEREST OR PLEASURE IN DOING THINGS: 0
SUM OF ALL RESPONSES TO PHQ9 QUESTIONS 1 & 2: 0
SUM OF ALL RESPONSES TO PHQ QUESTIONS 1-9: 0
2. FEELING DOWN, DEPRESSED OR HOPELESS: 0

## 2020-09-15 ASSESSMENT — LIFESTYLE VARIABLES: HOW OFTEN DO YOU HAVE A DRINK CONTAINING ALCOHOL: 0

## 2020-09-15 NOTE — PATIENT INSTRUCTIONS
Body Mass Index: Care Instructions  Your Care Instructions     Body mass index (BMI) can help you see if your weight is raising your risk for health problems. It uses a formula to compare how much you weigh with how tall you are. · A BMI lower than 18.5 is considered underweight. · A BMI between 18.5 and 24.9 is considered healthy. · A BMI between 25 and 29.9 is considered overweight. A BMI of 30 or higher is considered obese. If your BMI is in the normal range, it means that you have a lower risk for weight-related health problems. If your BMI is in the overweight or obese range, you may be at increased risk for weight-related health problems, such as high blood pressure, heart disease, stroke, arthritis or joint pain, and diabetes. If your BMI is in the underweight range, you may be at increased risk for health problems such as fatigue, lower protection (immunity) against illness, muscle loss, bone loss, hair loss, and hormone problems. BMI is just one measure of your risk for weight-related health problems. You may be at higher risk for health problems if you are not active, you eat an unhealthy diet, or you drink too much alcohol or use tobacco products. Follow-up care is a key part of your treatment and safety. Be sure to make and go to all appointments, and call your doctor if you are having problems. It's also a good idea to know your test results and keep a list of the medicines you take. How can you care for yourself at home? · Practice healthy eating habits. This includes eating plenty of fruits, vegetables, whole grains, lean protein, and low-fat dairy. · If your doctor recommends it, get more exercise. Walking is a good choice. Bit by bit, increase the amount you walk every day. Try for at least 30 minutes on most days of the week. · Do not smoke. Smoking can increase your risk for health problems. If you need help quitting, talk to your doctor about stop-smoking programs and medicines. These can increase your chances of quitting for good. · Limit alcohol to 2 drinks a day for men and 1 drink a day for women. Too much alcohol can cause health problems. If you have a BMI higher than 25  · Your doctor may do other tests to check your risk for weight-related health problems. This may include measuring the distance around your waist. A waist measurement of more than 40 inches in men or 35 inches in women can increase the risk of weight-related health problems. · Talk with your doctor about steps you can take to stay healthy or improve your health. You may need to make lifestyle changes to lose weight and stay healthy, such as changing your diet and getting regular exercise. If you have a BMI lower than 18.5  · Your doctor may do other tests to check your risk for health problems. · Talk with your doctor about steps you can take to stay healthy or improve your health. You may need to make lifestyle changes to gain or maintain weight and stay healthy, such as getting more healthy foods in your diet and doing exercises to build muscle. Where can you learn more? Go to https://[employer]Momail. org and sign in to your [employer] account. Enter S176 in the Tradem[employer] box to learn more about \"Body Mass Index: Care Instructions. \"     If you do not have an account, please click on the \"Sign Up Now\" link. Current as of: December 11, 2019               Content Version: 12.5  © 0848-8880 Healthwise, Incorporated. Care instructions adapted under license by [facility] ([facility]). If you have questions about a medical condition or this instruction, always ask your healthcare professional. [place] [address] any warranty or liability for your use of this information. What is lung cancer screening? Lung cancer screening is a way in which doctors check the lungs for early signs of cancer in people who have no symptoms of lung cancer.   A low-dose CT scan uses much less

## 2020-09-15 NOTE — PROGRESS NOTES
Rome Guadarrama is a 71 y.o. female for 6 Month Follow-Up (back pain-fears uti); Medicare AWV; and Other (itching \"fat rolls\"- needs powder)      Diabetes Type 2    Glucose control:   Does patient check blood glucoses at home? Yes - has camelia meter, using janumet and trulicity states sugars have been better will use humalog mix if over 200 happens a few times a week  Report of hypoglycemia: no  Lab Results   Component Value Date    LABA1C 7.3 (H) 07/28/2020     No results found for: EAG    Symptoms  Polyuria, Polydipsia or Polyphagia? No  Chest Pain, SOB, or Palpitations? -  No  New Vision complaints? No  Paresthesias of the extremities? No    Medications  Current medication were reviewed. Compliant with medications? yes  Medication side effects? No  On ACE-I or ARB? Yes  On antiplatelet therapy? Yes  On Statin? Yes    Last Diabetic Eye Exam: over a year ago, advised to reschedule due to covid    Exercise  Exercise? no she is not motivated to exercise, does have neuropathy of her legs gapaentin does help, she also takes cymbalta for this. Her legs are weak and she has had therapy in the past , declined any more today. Wt Readings from Last 3 Encounters:   09/15/20 258 lb (117 kg)   05/26/20 288 lb 12.8 oz (131 kg)   03/03/20 296 lb 6.4 oz (134.4 kg)       Diet discipline?:  Low salt, fat, sugar diet? yes    GERD    HPI:     Symptoms:  heartburn  Length of symptoms: 1 years  Current therapy and response:  Dexilant, works well  Recent change in symptoms? No  Symptoms associated with certain foods? Yes  Alcohol use? No  Tobacco use? No    Abdominal Pain? No  Mid Back Pain? No  Melena?    No  Lab Results   Component Value Date    CHOL 106 06/05/2019    CHOL 157 10/26/2017    CHOL 184 05/16/2017     Lab Results   Component Value Date    TRIG 183 06/05/2019    TRIG 171 10/26/2017    TRIG 149 05/16/2017     Lab Results   Component Value Date    HDL 20 (A) 06/05/2019    HDL 36 10/26/2017    HDL 44 05/16/2017     Lab Results   Component Value Date    LDLCALC 49 06/05/2019    LDLCALC 87 10/26/2017    LDLCALC 110 05/16/2017     Lab Results   Component Value Date    VLDL 37 06/05/2019    VLDL 26 11/05/2013    VLDL 33 02/22/2013     Lab Results   Component Value Date    CHOLHDLRATIO 3.3 11/05/2013    CHOLHDLRATIO 3.7 02/22/2013     Has red itchy skin in groin area under panniculus, has used mycostatin in the past and it has cleared up  Blood pressure control:  BP Readings from Last 3 Encounters:   09/15/20 (!) 140/86   05/26/20 137/80   03/11/20 (!) 140/80       Lab Results   Component Value Date    LABMICR 108.17 05/21/2020       Lab Results   Component Value Date    LDLCALC 49 06/05/2019       Physical Exam    BP (!) 140/86   Pulse 80   Temp 97.1 °F (36.2 °C)   Resp 16   Wt 258 lb (117 kg)   LMP 02/20/2001   BMI 38.10 kg/m²   Appearance: alert, well appearing, and in no distress, normal appearing weight and well hydrated. Neck exam - supple, no significant adenopathy. CVS exam: normal rate, regular rhythm, normal S1, S2, no murmurs, rubs, clicks or gallops. Lungs: clear to auscultation, no wheezes, rales or rhonchi, symmetric air entry. Abdomen:  BS normal, soft, non tender, non distended, no rebound or guarding  Mental Status: normal mood, affect behavior, speech, dress,  and thought processes. Neuro:  Alert, muscle tone grossly normal  Skin exam: normal coloration and turgor, no rashes, no suspicious skin lesions noted. Foot exam:  No pedal edema, no erythematous lesions noted b/l, sensation wnl b/l, PT and TP pulses wnl b/l    ASSESSMENT & PLAN  Danette Frankel was seen today for 6 month follow-up, medicare awv and other. Diagnoses and all orders for this visit:    Routine general medical examination at a health care facility    BMI 38.0-38.9,adult  -     MT Behavior  obesity 15m []  Encouraged to increase physical activity to promote weight loss  Bree Shorten been eating less and has lost weight. ACP (advance care planning)  -     [place] [facility] PLAN FACE TO [address], 1ST 30MIN [id number]    Personal history of tobacco use  -     CT Lung Screening; Future    Screening for hyperlipidemia  -     Lipid Panel; Future  Continue current meds  Gastroesophageal reflux disease, esophagitis presence not specified  -     dexlansoprazole (DEXILANT) 60 MG CPDR delayed release capsule; Take 1 capsule by mouth daily  GERD diet  Continue current meds  Candida infection of flexural skin  -     nystatin (MYCOSTATIN) 796558 UNIT/GM powder; Apply 3 times daily. Return in 6 months (on 3/15/2021) for Medicare Annual Wellness Visit in 1 year, and me in 6 months A1C.

## 2020-09-15 NOTE — PROGRESS NOTES
Medication Management [address]   [phone] (phone)  [phone] (fax)      Ms. Barney Colindres is a 71 y.o.  female with history of Afib who presents today for anticoagulation monitoring and adjustment. Patient verifies current dosing regimen and tablet strength. No missed or extra doses. Patient denies s/s bleeding/bruising/swelling/SOB/chest pain  No blood in urine or stool. No dietary changes. No changes in medication/OTC agents/Herbals. No change in alcohol use or tobacco use. No change in activity level. Patient denies headaches/dizziness/lightheadedness/falls. No vomiting/diarrhea or acute illness. No Procedures scheduled in the future at this time. Assessment:   Lab Results   Component Value Date    INR 1.70 (H) 09/15/2020    INR 2.20 (H) 09/01/2020    INR 1.90 (H) 08/24/2020     INR subtherapeutic   Recent Labs     09/15/20  1352   INR 1.70*     Patient interview completed and discussed with pharmacist by DELMER Rodriguez PharmD Candidate    INR is subtherapeutic despite recent dose changes. Plan:  Increase Coumadin 2.5mg MWF and 3.75mg TThSaS. Recheck INR in 2 week(s). Patient reminded to call the Anticoagulation Clinic with signs or symptoms of bleeding or with any medication changes. Patient given instructions utilizing the teach back method. Discharged ambulatory in no apparent distress. After visit summary printed and reviewed with patient.       Medications reviewed and updated on home medication list Yes    Influenza vaccine:     [] given    [] declined   [] received previously   [] plans to receive at a later time   [] refused    [x] documented in [address]: MED RECONCILIATION/REVIEW [address]: No  Total # of Interventions Recommended: 1  - Increased Dose #: 1  - Maintenance Safety Lab Monitoring #: 1  Total Interventions Accepted: 1  Time Spent (min): 15

## 2020-09-15 NOTE — PROGRESS NOTES
Medicare Annual Wellness Visit  Name: Carlos Pacheco Date: 9/15/2020   MRN: [id number] Sex: Female   Age: 71 y.o. Ethnicity: Non-/Non    : 1955 Race: Awa Juárez is here for 6 Month Follow-Up (back pain-fears uti); Medicare AWV; and Other (itching \"fat rolls\"- needs powder)    Screenings for behavioral, psychosocial and functional/safety risks, and cognitive dysfunction are all negative except as indicated below. These results, as well as other patient data from the [address] form, are documented in Flowsheets linked to this Encounter. No Known Allergies    Prior to Visit Medications    Medication Sig Taking? Authorizing Provider   fenofibrate (TRICOR) 145 MG tablet take 1 tablet by mouth once daily  ARNOL Fernandez CNP   atorvastatin (LIPITOR) 40 MG tablet take 1 tablet by mouth at bedtime  ARNOL Fernandez CNP   levothyroxine (SYNTHROID) 75 MCG tablet Take 75 mcg by mouth Daily  Historical Provider, MD   gabapentin (NEURONTIN) 100 MG capsule take 1 capsule by mouth three times a day  ARNOL Prasad CNP   RA VITAMIN B-12 100 MCG tablet take 1 tablet by mouth once daily  ARNOL Villatoro CNP   warfarin (COUMADIN) 2.5 MG tablet As directed by [facility] Coumadin clinic.  30 days = 45 tabs  Yoseph Nelson MD   alendronate (FOSAMAX) 70 MG tablet Take 1 tablet by mouth every 7 days  Patient taking differently: Take 70 mg by mouth every 7 days Indications: usually on    ARNOL Prasad CNP   dexlansoprazole (DEXILANT) 60 MG CPDR delayed release capsule Take 1 capsule by mouth daily  ARNOL Prasad CNP   SITagliptin-metFORMIN HCl (JANUMET PO) Take 1,000 mg by mouth 2 times daily  Historical Provider, MD   Dulaglutide (TRULICITY SC) Inject 2.13 mg into the skin once a week Usually on   Historical Provider, MD   folic acid (FOLVITE) 1 MG tablet take 1 tablet by mouth once daily  ARNOL Villatoro CNP Dr. Santino Arauz EKG 12-LEAD  9/18/2015         FOOT SURGERY      left foot    HYSTERECTOMY, TOTAL ABDOMINAL      MOUTH BIOPSY  9-28-12    left tongue and lingual tonsil    OTHER SURGICAL HISTORY  11/8/2014    LEFT FEMUR RETROGRADE NAILING    OTHER SURGICAL HISTORY  4/23/2015    HARDWARE REMOVAL LEFT FEMUR, INSERTION OF ANTIBIOTIC SPACER    PATELLA SURGERY  7/11/13    fractues rt patella     MD COLON CA SCRN NOT HI [facility] IND Left 1/24/2018    COLONOSCOPY performed by Nataly Betancur MD at [facility]    SKIN TAG REMOVAL  9/25/12    mole removal    SLEEVE GASTRECTOMY  09/15/2015    Robotic    TOENAIL EXCISION      removal of great toe nails bilateral-still has toenails-had ingrown toenails and had trimmed out     TONSILLECTOMY      UPPER GASTROINTESTINAL ENDOSCOPY         Family History   Problem Relation Age of Onset    Asthma Sister     Asthma Brother     Heart Disease Father     High Blood Pressure Other     Cancer Maternal Uncle         stomach    Diabetes Maternal Grandmother     High Blood Pressure Maternal Grandmother     Diabetes Maternal Grandfather     Stroke Neg Hx        CareTeam (Including outside providers/suppliers regularly involved in providing care):   Patient Care Team:  ARNOL Anton CNP as PCP - General (Family Nurse Practitioner)  ARNOL Anton CNP as PCP - [facility] Empaneled Provider    Wt Readings from Last 3 Encounters:   09/15/20 258 lb (117 kg)   05/26/20 288 lb 12.8 oz (131 kg)   03/03/20 296 lb 6.4 oz (134.4 kg)     Vitals:    09/15/20 1500   BP: (!) 140/86   Pulse: 80   Resp: 16   Temp: 97.1 °F (36.2 °C)   Weight: 258 lb (117 kg)     Body mass index is 38.1 kg/m². Based upon direct observation of the patient, evaluation of cognition reveals recent and remote memory intact.     Eyes: pupils equal, round, and reactive to light, extraocular eye movements intact, conjunctivae normal  ENT: tympanic membrane, external ear and ear canal normal bilaterally, oropharynx clear and moist with normal mucous membranes  Neck: neck supple and non tender without mass, no thyromegaly or thyroid nodules, no cervical lymphadenopathy   Pulmonary/Chest: clear to auscultation bilaterally- no wheezes, rales or rhonchi, normal air movement, no respiratory distress  Cardiovascular: normal rate, normal S1 and S2, no gallops, intact distal pulses and no carotid bruits  Abdomen: soft, non-tender, non-distended, normal bowel sounds, no masses or organomegaly  Extremities: no cyanosis, no clubbing and mild pedal edema on left non pitting    Patient's complete Health Risk Assessment and screening values have been reviewed and are found in Flowsheets. The following problems were reviewed today and where indicated follow up appointments were made and/or referrals ordered. Positive Risk Factor Screenings with Interventions:     General Health:  General  In general, how would you say your health is?: Fair  In the past 7 days, have you experienced any of the following? New or Increased Pain, New or Increased Fatigue, Loneliness, Social Isolation, Stress or Anger?: None of These  Do you get the social and emotional support that you need?: Yes  Do you have a Living Will?: (!) No  General Health Risk Interventions:  · No Living Will: patient declined    Health Habits/Nutrition:  Health Habits/Nutrition  Do you exercise for at least 20 minutes 2-3 times per week?: (!) No  Have you lost any weight without trying in the past 3 months?: (!) Yes(started eating smaller breakfast, snack for lunch, early supper)  Do you eat fewer than 2 meals per day?: No  Have you seen a dentist within the past year?: (!) No  Body mass index is 38.1 kg/m².   Health Habits/Nutrition Interventions:  · Inadequate physical activity:  patient is not ready to increase his/her physical activity level at this time  · Dental exam overdue:  patient encouraged to make appointment with his/her dentist    Hearing/Vision:  No exam data present  Hearing/Vision  Do you or your family notice any trouble with your hearing?: (!) Yes  Do you have difficulty driving, watching TV, or doing any of your daily activities because of your eyesight?: No  Have you had an eye exam within the past year?: (!) No  Hearing/Vision Interventions:  · Hearing concerns:  patient declines any further evaluation/treatment for hearing issues    ADL:  ADLs  In the past 7 days, did you need help from others to perform any of the following everyday activities? Eating, dressing, grooming, bathing, toileting, or walking/balance?: (!) Walking/Balance  In the past 7 days, did you need help from others to take care of any of the following?  Laundry, housekeeping, banking/finances, shopping, telephone use, food preparation, transportation, or taking medications?: [employer], Laundry  ADL Interventions:  · patient has a sister who she lives with and another sister who helps her out with these things    Personalized Preventive Plan   Current Health Maintenance Status  Immunization History   Administered Date(s) Administered    PPD Test 07/01/2013, 07/12/2013    Pneumococcal Conjugate 13-valent ([id number]) 04/10/2019    Pneumococcal Polysaccharide ([id number]) 09/21/2015    Tdap (Boostrix, Adacel) 11/07/2017        Health Maintenance   Topic Date Due    Annual Wellness Visit (AWV)  05/29/2019    Diabetic retinal exam  08/30/2019    Lipid screen  06/05/2020    Low dose CT lung screening  07/03/2020    Flu vaccine (1) 09/01/2020    Breast cancer screen  08/28/2020    Shingles Vaccine (1 of 2) 03/03/2021 (Originally 10/21/2004)    Hepatitis C screen  03/03/2021 (Originally 1955)    HIV screen  03/03/2021 (Originally 10/21/1969)    Pneumococcal 65+ yrs at Risk Vaccine (2 of 2 - PPSV23) 09/21/2020    Diabetic foot exam  12/03/2020    Potassium monitoring  05/21/2021    Creatinine monitoring  05/21/2021    A1C test (Diabetic or Prediabetic)  07/28/2021    DTaP/Tdap/Td vaccine (2 - Td) 11/07/2027    Colon cancer screen colonoscopy  01/24/2028    DEXA (modify frequency per FRAX score)  Completed    Hepatitis A vaccine  Aged Out    Hib vaccine  Aged Out    Meningococcal (ACWY) vaccine  Aged Out     Recommendations for dentaZOOM Due: see orders and patient instructions/AVS.  . Recommended screening schedule for the next 5-10 years is provided to the patient in written form: see Patient Instructions/AVS.    There are no diagnoses linked to this encounter. Advance Care Planning   Advanced Care Planning: Discussed the patients choices for care and treatment in case of a health event that adversely affects decision-making abilities. Also discussed the patients long-term treatment options. Reviewed with the patient the [address] [place] [address] Declaration forms  Reviewed the process of designating a competent adult as an Agent (or -in-fact) that could take make health care decisions for the patient if incompetent. Patient was asked to complete the declaration forms, either acknowledge the forms by a public notary or an eligible witness and provide a signed copy to the practice office. Time spent (minutes): 10    Obesity Counseling: Assessed behavioral health risks and factors affecting choice of behavior. Suggested weight control approaches, including dietary changes behavioral modification and follow up plan. Provided educational and support documentation. Time spent (minutes): 10  Cardiovascular Disease Risk Counseling: Assessed the patient's risk to develop cardiovascular disease and reviewed main risk factors.    Reviewed steps to reduce disease risk including:   · Quitting tobacco use, reducing amount smoked, or not starting the habit  · Making healthy food choices  · Being physically active and gradualy increasing activity levels   · Reduce weight and determine a healthy BMI goal  · Monitor blood pressure and treat if higher than 140/90 mmHg  · Maintain blood total cholesterol levels under 5 mmol/l or 190 mg/dl  · Maintain LDL cholesterol levels under 3.0 mmol/l or 115 mg/dl   · Control blood glucose levels  · Consider taking aspirin (75 mg daily), once blood pressure is controlled   Provided a follow up plan. Time spent (minutes): 10  LDCT Screening: Discussed with patient the benefits and harms of screening, follow-up diagnostic testing, over-diagnosis, false positive rate, and total radiation exposure. Counseled on the importance of adherence to annual lung cancer LDCT screening, impact of comorbidities, ability and willingness to undergo diagnosis and treatment. Counseled on the importance of maintaining cigarette smoking abstinence and cessation. Patient has a history of heavy tobacco use of over 30 pack years. Patient does not present signs or symptoms of lung cancer.

## 2020-09-16 ENCOUNTER — TELEPHONE (OUTPATIENT)
Dept: FAMILY MEDICINE CLINIC | Age: 65
End: 2020-09-16

## 2020-09-16 NOTE — TELEPHONE ENCOUNTER
Pt scheduled for CT lung screen at [facility] on 9/24/20, pt to arrive at [facility] at 3:30. Pt's sister Auther Brit informed.   (on signed HIPAA)

## 2020-09-24 ENCOUNTER — HOSPITAL ENCOUNTER (OUTPATIENT)
Age: 65
Discharge: HOME | End: 2020-09-24
Payer: MEDICARE

## 2020-09-24 ENCOUNTER — HOSPITAL ENCOUNTER (OUTPATIENT)
Dept: CT IMAGING | Age: 65
Discharge: HOME | End: 2020-09-24
Payer: MEDICARE

## 2020-09-24 LAB
ANION GAP SERPL CALCULATED.3IONS-SCNC: 10 MEQ/L (ref 8–16)
BASOPHILS # BLD: 0.5 %
BASOPHILS ABSOLUTE: 0 THOU/MM3 (ref 0–0.1)
BUN BLDV-MCNC: 27 MG/DL (ref 7–22)
CALCIUM SERPL-MCNC: 9.2 MG/DL (ref 8.5–10.5)
CHLORIDE BLD-SCNC: 102 MEQ/L (ref 98–111)
CHOLESTEROL, TOTAL: 150 MG/DL (ref 100–199)
CO2: 22 MEQ/L (ref 23–33)
CREAT SERPL-MCNC: 1.4 MG/DL (ref 0.4–1.2)
DIGOXIN LEVEL: 0.8 NG/ML (ref 0.5–2)
EOSINOPHIL # BLD: 1.4 %
EOSINOPHILS ABSOLUTE: 0.1 THOU/MM3 (ref 0–0.4)
ERYTHROCYTE [DISTWIDTH] IN BLOOD BY AUTOMATED COUNT: 13.5 % (ref 11.5–14.5)
ERYTHROCYTE [DISTWIDTH] IN BLOOD BY AUTOMATED COUNT: 51.6 FL (ref 35–45)
GFR SERPL CREATININE-BSD FRML MDRD: 38 ML/MIN/1.73M2
GLUCOSE BLD-MCNC: 198 MG/DL (ref 70–108)
HCT VFR BLD CALC: 32.7 % (ref 37–47)
HDLC SERPL-MCNC: 19 MG/DL
HEMOGLOBIN: 10 GM/DL (ref 12–16)
IMMATURE GRANS (ABS): 0.01 THOU/MM3 (ref 0–0.07)
IMMATURE GRANULOCYTES: 0.2 %
LDL CHOLESTEROL CALCULATED: 66 MG/DL
LYMPHOCYTES # BLD: 18.9 %
LYMPHOCYTES ABSOLUTE: 1.1 THOU/MM3 (ref 1–4.8)
MAGNESIUM: 1.6 MG/DL (ref 1.6–2.4)
MCH RBC QN AUTO: 31.3 PG (ref 26–33)
MCHC RBC AUTO-ENTMCNC: 30.6 GM/DL (ref 32.2–35.5)
MCV RBC AUTO: 102.5 FL (ref 81–99)
MONOCYTES # BLD: 8.3 %
MONOCYTES ABSOLUTE: 0.5 THOU/MM3 (ref 0.4–1.3)
NUCLEATED RED BLOOD CELLS: 0 /100 WBC
PLATELET # BLD: 222 THOU/MM3 (ref 130–400)
PMV BLD AUTO: 11.1 FL (ref 9.4–12.4)
POTASSIUM SERPL-SCNC: 4.9 MEQ/L (ref 3.5–5.2)
RBC # BLD: 3.19 MILL/MM3 (ref 4.2–5.4)
SEG NEUTROPHILS: 70.7 %
SEGMENTED NEUTROPHILS ABSOLUTE COUNT: 4 THOU/MM3 (ref 1.8–7.7)
SODIUM BLD-SCNC: 134 MEQ/L (ref 135–145)
TRIGL SERPL-MCNC: 324 MG/DL (ref 0–199)
WBC # BLD: 5.7 THOU/MM3 (ref 4.8–10.8)

## 2020-09-24 PROCEDURE — G0297 LDCT FOR LUNG CA SCREEN: HCPCS

## 2020-09-24 PROCEDURE — 80162 ASSAY OF DIGOXIN TOTAL: CPT

## 2020-09-24 PROCEDURE — 36415 COLL VENOUS BLD VENIPUNCTURE: CPT

## 2020-09-24 PROCEDURE — 83735 ASSAY OF MAGNESIUM: CPT

## 2020-09-24 PROCEDURE — 80048 BASIC METABOLIC PNL TOTAL CA: CPT

## 2020-09-24 PROCEDURE — 85025 COMPLETE CBC W/AUTO DIFF WBC: CPT

## 2020-09-24 PROCEDURE — 80061 LIPID PANEL: CPT

## 2020-09-25 ENCOUNTER — TELEPHONE (OUTPATIENT)
Dept: FAMILY MEDICINE CLINIC | Age: 65
End: 2020-09-25

## 2020-09-25 NOTE — TELEPHONE ENCOUNTER
----- Message from Dav Galvez, DO sent at 9/24/2020  7:34 PM EDT -----  Please let pt know that LDCT of chest is negative for cancer. Repeat 1 year per current guidelines. Let me know if questions, thanks! Also, lipids back, they look ok other than her triglycerides are higher than previous. Is she taking her fenofibrate daily? Let me know, thanks!

## 2020-09-28 ENCOUNTER — HOSPITAL ENCOUNTER (OUTPATIENT)
Dept: PHARMACY | Age: 65
Setting detail: THERAPIES SERIES
Discharge: HOME | End: 2020-09-28
Payer: MEDICARE

## 2020-09-28 VITALS — TEMPERATURE: 97.2 F

## 2020-09-28 LAB — POC INR: 2.5 (ref 0.8–1.2)

## 2020-09-28 PROCEDURE — 85610 PROTHROMBIN TIME: CPT | Performed by: PHARMACIST

## 2020-09-28 PROCEDURE — 36416 COLLJ CAPILLARY BLOOD SPEC: CPT | Performed by: PHARMACIST

## 2020-09-28 PROCEDURE — 99211 OFF/OP EST MAY X REQ PHY/QHP: CPT | Performed by: PHARMACIST

## 2020-09-28 NOTE — PROGRESS NOTES
Medication Management [address]  [phone] (phone)  [phone] (fax)      Ms. Michael Tabares is a 71 y.o.  female with history of Afib who presents today for anticoagulation monitoring and adjustment. Patient verifies current dosing regimen and tablet strength. Follows dosing calendar. No missed or extra doses. Reports only taking 1 tablet instead of 1.5 tabs sometime last week, thinks it was last Tues 9/22  Patient denies s/s bleeding/bruising/swelling/SOB/chest pain. Pt reports nose bleeds recently. Pt uses CPAP machine every night, causes mucus to accumulate and makes her nose \"feel full\" in the morning. Upon blowing her nose, she sees blood in the tissue, sometimes light and other times more heavy. Other times, specks of blood/scabs or brownish color fluid. Experienced this ~20 days this month, started when warfarin started. Episodes stop on their own within 3 minutes. Wants to find out what that cause is, thinks it's more than the CPAP machine. No blood in urine or stool. No dietary changes. No changes in medication/OTC agents/Herbals. Reports no med changes. No change in alcohol use or tobacco use. No change in activity level. Patient denies headaches/dizziness/lightheadedness/falls. No vomiting/diarrhea or acute illness. No Procedures scheduled in the future at this time. Assessment:   Lab Results   Component Value Date    INR 2.50 (H) 09/28/2020    INR 1.70 (H) 09/15/2020    INR 2.20 (H) 09/01/2020     INR therapeutic   Recent Labs     09/28/20  1340   INR 2.50*        Ref. Range 9/24/2020 15:51   Hemoglobin Quant Latest Ref Range: 12.0 - 16.0 gm/dl 10.0 (L)   Hematocrit Latest Ref Range: 37.0 - 47.0 % 32.7 (L)           Plan:   Continue Coumadin 2.5mg MWF, 3.75mg TThSS. Recheck INR in 3 week(s). Patient reminded to call the Anticoagulation Clinic with any signs or symptoms of bleeding or with any medication changes.   Patient given instructions utilizing the teach back method. Discharged ambulatory in no apparent distress. After visit summary printed and reviewed with patient. Medications reviewed and updated on home medication list Yes        CLINICAL PHARMACY CONSULT: MED RECONCILIATION/REVIEW ADDENDUM    For Pharmacy Admin Tracking Only    PHSO: No  Total # of Interventions Recommended: 0  - Updated Order #: 0 Updated Order Reason(s): Other  - Maintenance Safety Lab Monitoring #: 1  Total Interventions Accepted: 0  Time Spent (min): [address], [place]. D., BCPS, BCCCP 9/28/2020 2:06 PM

## 2020-10-05 ENCOUNTER — HOSPITAL ENCOUNTER (EMERGENCY)
Age: 65
Discharge: HOME | End: 2020-10-05
Attending: EMERGENCY MEDICINE
Payer: MEDICARE

## 2020-10-05 VITALS
WEIGHT: 280 LBS | TEMPERATURE: 97.5 F | BODY MASS INDEX: 41.35 KG/M2 | SYSTOLIC BLOOD PRESSURE: 138 MMHG | OXYGEN SATURATION: 98 % | RESPIRATION RATE: 20 BRPM | DIASTOLIC BLOOD PRESSURE: 72 MMHG | HEART RATE: 97 BPM

## 2020-10-05 LAB
BASOPHILS # BLD: 0.7 %
BASOPHILS ABSOLUTE: 0 THOU/MM3 (ref 0–0.1)
EOSINOPHIL # BLD: 3.2 %
EOSINOPHILS ABSOLUTE: 0.1 THOU/MM3 (ref 0–0.4)
ERYTHROCYTE [DISTWIDTH] IN BLOOD BY AUTOMATED COUNT: 13.5 % (ref 11.5–14.5)
ERYTHROCYTE [DISTWIDTH] IN BLOOD BY AUTOMATED COUNT: 51.5 FL (ref 35–45)
HCT VFR BLD CALC: 29.7 % (ref 37–47)
HEMOGLOBIN: 9.1 GM/DL (ref 12–16)
IMMATURE GRANS (ABS): 0.01 THOU/MM3 (ref 0–0.07)
IMMATURE GRANULOCYTES: 0.2 %
LYMPHOCYTES # BLD: 23.5 %
LYMPHOCYTES ABSOLUTE: 1 THOU/MM3 (ref 1–4.8)
MCH RBC QN AUTO: 31.6 PG (ref 26–33)
MCHC RBC AUTO-ENTMCNC: 30.6 GM/DL (ref 32.2–35.5)
MCV RBC AUTO: 103.1 FL (ref 81–99)
MONOCYTES # BLD: 9 %
MONOCYTES ABSOLUTE: 0.4 THOU/MM3 (ref 0.4–1.3)
NUCLEATED RED BLOOD CELLS: 0 /100 WBC
PLATELET # BLD: 181 THOU/MM3 (ref 130–400)
PLATELET ESTIMATE: ADEQUATE
PMV BLD AUTO: 11 FL (ref 9.4–12.4)
RBC # BLD: 2.88 MILL/MM3 (ref 4.2–5.4)
SCAN OF BLOOD SMEAR: NORMAL
SEG NEUTROPHILS: 63.4 %
SEGMENTED NEUTROPHILS ABSOLUTE COUNT: 2.8 THOU/MM3 (ref 1.8–7.7)
STOMATOCYTES: ABNORMAL
WBC # BLD: 4.4 THOU/MM3 (ref 4.8–10.8)

## 2020-10-05 PROCEDURE — 85025 COMPLETE CBC W/AUTO DIFF WBC: CPT

## 2020-10-05 PROCEDURE — 99283 EMERGENCY DEPT VISIT LOW MDM: CPT

## 2020-10-05 PROCEDURE — 36415 COLL VENOUS BLD VENIPUNCTURE: CPT

## 2020-10-05 PROCEDURE — 99284 EMERGENCY DEPT VISIT MOD MDM: CPT

## 2020-10-05 PROCEDURE — 2500000003 HC RX 250 WO HCPCS: Performed by: STUDENT IN AN ORGANIZED HEALTH CARE EDUCATION/TRAINING PROGRAM

## 2020-10-05 RX ORDER — SODIUM CHLORIDE/ALOE VERA
GEL (GRAM) NASAL PRN
Qty: 1 TUBE | Refills: 0 | Status: ON HOLD | OUTPATIENT
Start: 2020-10-05 | End: 2021-01-01 | Stop reason: HOSPADM

## 2020-10-05 RX ADMIN — PHENYLEPHRINE HYDROCHLORIDE 1 SPRAY: 0.5 SPRAY NASAL at 03:07

## 2020-10-05 ASSESSMENT — ENCOUNTER SYMPTOMS
SINUS PRESSURE: 0
TROUBLE SWALLOWING: 0
SHORTNESS OF BREATH: 0
DIARRHEA: 0
FACIAL SWELLING: 0
SINUS PAIN: 0
ABDOMINAL DISTENTION: 0
STRIDOR: 0
COUGH: 0
VOICE CHANGE: 0
WHEEZING: 0
BLOOD IN STOOL: 0
CHEST TIGHTNESS: 0
SORE THROAT: 0
CONSTIPATION: 0
NAUSEA: 0
VOMITING: 0
ANAL BLEEDING: 0
ABDOMINAL PAIN: 0
CHOKING: 0
PHOTOPHOBIA: 0

## 2020-10-05 NOTE — ED PROVIDER NOTES
[place] ENCOUNTER          Pt Name: Philippe Aponte  MRN: [id number]  [place] 1955  Date of evaluation: 10/5/2020  Treating Resident Physician: Debra Tillman MD  Supervising Physician: Dr. Mauro Tang       Chief Complaint   Patient presents with    Epistaxis     History obtained from the patient. HISTORY OF PRESENT ILLNESS        Philippe Aponte is a 71 y.o. female on Coumadin who presents to the emergency department for evaluation of epistaxis. Patient states that she began to have a slow drip of a nosebleed that began a couple hours prior to presentation. She states that it never was gushing, rather a slow drip. Patient attempted to stop this by applying pressure and checking frequently, but did not maintain pressure for 15 minutes. Patient not having any symptoms of anemia. Patient has had multiple previous nosebleeds in the past, but never has needed cautery or medical evaluation for that. Patient does use CPAP and admits to picking her nose, which is what had happened prior to this nosebleed starting. The patient has no other acute complaints at this time. REVIEW OF SYSTEMS   Review of Systems   Constitutional: Negative for appetite change, chills, diaphoresis, fatigue and fever. HENT: Positive for nosebleeds and postnasal drip. Negative for congestion, dental problem, drooling, ear discharge, ear pain, facial swelling, mouth sores, sinus pressure, sinus pain, sneezing, sore throat, trouble swallowing and voice change. Eyes: Negative for photophobia and visual disturbance. Respiratory: Negative for cough, choking, chest tightness, shortness of breath, wheezing and stridor. Cardiovascular: Negative for chest pain, palpitations and leg swelling. Gastrointestinal: Negative for abdominal distention, abdominal pain, anal bleeding, blood in stool, constipation, diarrhea, nausea and vomiting. Genitourinary: Negative for dysuria, flank pain, frequency, hematuria and urgency. Neurological: Negative for dizziness, tremors, seizures, syncope, weakness, numbness and headaches. Hematological: Bruises/bleeds easily. PAST MEDICAL AND SURGICAL HISTORY     Past Medical History:   Diagnosis Date    CAD (coronary artery disease)     CRI (chronic renal insufficiency)     Difficult intubation     excess skin in back of throat     DM (diabetes mellitus) ([facility] [address] 1994    GERD (gastroesophageal reflux disease)     H/O gastric bypass     Hyperlipidemia     Hypertension     Hypothyroidism     Neuropathy     Obesity     Osteoarthritis     Paroxysmal atrial fibrillation (HCC)     Prolonged emergence from general anesthesia     Sleep apnea     uses cpap    Visit for screening mammogram      Past Surgical History:   Procedure Laterality Date    BACK SURGERY      xs 2    CATARACT REMOVAL Right 7/24/14    Dr. Zully Muir EKG 12-LEAD  9/18/2015         FOOT SURGERY      left foot    HYSTERECTOMY, TOTAL ABDOMINAL      MOUTH BIOPSY  9-28-12    left tongue and lingual tonsil    OTHER SURGICAL HISTORY  11/8/2014    LEFT FEMUR RETROGRADE NAILING    OTHER SURGICAL HISTORY  4/23/2015    HARDWARE REMOVAL LEFT FEMUR, INSERTION OF ANTIBIOTIC SPACER    PATELLA SURGERY  7/11/13    fractues rt patella     OR COLON CA SCRN NOT  [address] IND Left 1/24/2018    COLONOSCOPY performed by Vikash Sharma MD at [address] [facility] TAG REMOVAL  9/25/12    mole removal    SLEEVE GASTRECTOMY  09/15/2015    Robotic    TOENAIL EXCISION      removal of great toe nails bilateral-still has toenails-had ingrown toenails and had trimmed out     TONSILLECTOMY      UPPER GASTROINTESTINAL ENDOSCOPY           MEDICATIONS   No current facility-administered medications for this encounter.      Current Outpatient Medications:     saline nasal gel (AYR) GEL, by Nasal route as needed for Congestion, 3    metoprolol succinate (TOPROL XL) 100 MG extended release tablet, Take 100 mg by mouth daily, Disp: , Rfl:     digoxin (LANOXIN) 125 MCG tablet, Take 0.5 tablets by mouth daily, Disp: 30 tablet, Rfl: 0    isosorbide mononitrate (IMDUR) 60 MG extended release tablet, Take 1 tablet by mouth daily, Disp: 30 tablet, Rfl: 3    allopurinol (ZYLOPRIM) 100 MG tablet, Take 100 mg by mouth daily, Disp: , Rfl:     meclizine (ANTIVERT) 25 MG CHEW, Take 1 tablet by mouth 3 times daily as needed (vertigo), Disp: 90 tablet, Rfl: 0    Calcium Carbonate-Vitamin D (CALCIUM-VITAMIN D) 500-200 MG-UNIT per tablet, Take 1 tablet by mouth 2 times daily (with meals) , Disp: , Rfl:       SOCIAL HISTORY     Social History     Social History Narrative    Not on file     Social History     Tobacco Use    Smoking status: Former Smoker     Packs/day: 1.50     Years: 20.00     Pack years: 30.00     Last attempt to quit: 2007     Years since quittin.7    Smokeless tobacco: Never Used   Substance Use Topics    Alcohol use: No     Alcohol/week: 0.0 standard drinks    Drug use: No         ALLERGIES   No Known Allergies      FAMILY HISTORY     Family History   Problem Relation Age of Onset    Asthma Sister     Asthma Brother     Heart Disease Father     High Blood Pressure Other     Cancer Maternal Uncle         stomach    Diabetes Maternal Grandmother     High Blood Pressure Maternal Grandmother     Diabetes Maternal Grandfather     Stroke Neg Hx          PREVIOUS RECORDS   Previous records reviewed: Medical, past surgical, allergies, medications. PHYSICAL EXAM     ED Triage Vitals [10/05/20 0157]   BP Temp Temp Source Pulse Resp SpO2 Height Weight   138/72 97.5 °F (36.4 °C) Oral 97 20 98 % -- 280 lb (127 kg)     Initial vital signs and nursing assessment reviewed and normal. Pulsoximetry is normal per my interpretation.     Additional Vital Signs:  Vitals:    10/05/20 0157   BP: 138/72   Pulse: 97   Resp: 20 Temp: 97.5 °F (36.4 °C)   SpO2: 98%         Physical Exam  Constitutional:       Appearance: Normal appearance. HENT:      Head: Normocephalic and atraumatic. Right Ear: External ear normal.      Left Ear: External ear normal.      Nose:      Right Nostril: Epistaxis present. No septal hematoma. Left Nostril: No epistaxis or septal hematoma. Right Sinus: No maxillary sinus tenderness or frontal sinus tenderness. Left Sinus: No maxillary sinus tenderness or frontal sinus tenderness. Eyes:      Conjunctiva/sclera: Conjunctivae normal.   Neck:      Musculoskeletal: Normal range of motion and neck supple. Cardiovascular:      Rate and Rhythm: Normal rate and regular rhythm. Pulses: Normal pulses. Heart sounds: Normal heart sounds. No murmur. No gallop. Pulmonary:      Effort: Pulmonary effort is normal. No respiratory distress. Breath sounds: Normal breath sounds. No stridor. No wheezing, rhonchi or rales. Chest:      Chest wall: No tenderness. Abdominal:      General: Abdomen is flat. There is no distension. Palpations: Abdomen is soft. Tenderness: There is no abdominal tenderness. There is no right CVA tenderness, left CVA tenderness or guarding. Musculoskeletal: Normal range of motion. General: No swelling or tenderness. Right lower leg: No edema. Left lower leg: No edema. Skin:     General: Skin is warm and dry. Capillary Refill: Capillary refill takes less than 2 seconds. Coloration: Skin is not jaundiced or pale. Findings: No bruising, erythema, lesion or rash. Neurological:      General: No focal deficit present. Mental Status: She is alert and oriented to person, place, and time. Mental status is at baseline. Cranial Nerves: No cranial nerve deficit. Sensory: No sensory deficit. Motor: No weakness.       Gait: Gait normal.       Epistaxis Mgmt    Date/Time: 10/5/2020 8:16 AM  Performed by: FINAL DISPOSITION     Final diagnoses:   Epistaxis     Condition: condition: good  Dispo: Discharge to home      This transcription was electronically signed. Parts of this transcriptions may have been dictated by use of voice recognition software and electronically transcribed, and parts may have been transcribed with the assistance of an ED scribe. The transcription may contain errors not detected in proofreading. Please refer to my supervising physician's documentation if my documentation differs.     Electronically Signed: Shai Ha, 10/05/20, 3:21 AM         Shai Ha MD  Resident  10/05/20 7537

## 2020-10-05 NOTE — ED NOTES
Pt presents to ED via wheelchair for c/o epistaxis since [id number] last evening. Pt states taking Coumadin at home. Pt states nothing provoked the nose bleed, \"it just started\". Upon arrival, pt is A&Ox4, resps easy and unlabored. Pt has tissue in R nostril to control bleeding. Pt denies any pain out of the ordinary, pt states having chronic pain. Pt admits to \"spitting up\" a little blood PTA. Pt has no other complaints at this time. Dr Alicia Rodriguez and medical students at bedside for assessment. ENT cart at bedside. Will monitor.      Olinda Richardson RN  10/05/20 6819

## 2020-10-05 NOTE — ED PROVIDER NOTES
Physician Note:    I have personally performed and participated in the history, exam and medical decision making and agree with all pertinent clinical information. I have also reviewed and agree with the past medical, family and social history unless otherwise noted. I personally saw and examined the patient. I have reviewed and agree with the resident findings, including all diagnostic interpretations and treatment plans as written. I was present for the key portions of any proceduresperformed and the inclusive time noted in any critical care statement. Evaluation:      6:16 AM EDT: The patient was evaluated. Cyn Scherer is a 71 y.o. female who presents to the Emergency Department for the evaluation of nosebleed. Apparently it started 2230 this evening and has continued since. Patient states she has for 5 of these week. She says usually resolve on their own but today it did not. Patient denies any pain or trauma. Currently the patient is resting comfortably on the cot no apparent distress. Labs Reviewed   CBC WITH AUTO DIFFERENTIAL - Abnormal; Notable for the following components:       Result Value    WBC 4.4 (*)     RBC 2.88 (*)     Hemoglobin 9.1 (*)     Hematocrit 29.7 (*)     .1 (*)     MCHC 30.6 (*)     RDW-SD 51.5 (*)     All other components within normal limits   SCAN OF BLOOD SMEAR         FINAL IMPRESSION      1. Epistaxis        I saw this patient with Dr. Yasmine Pimentel; I agree with her assessment and plan.           Deja Hernadez, DO  10/05/20 [facility], DO  10/06/20 [address] [place] [address], DO  12/18/20 [facility]

## 2020-10-19 ENCOUNTER — HOSPITAL ENCOUNTER (OUTPATIENT)
Dept: PHARMACY | Age: 65
Setting detail: THERAPIES SERIES
Discharge: HOME | End: 2020-10-19
Payer: MEDICARE

## 2020-10-19 VITALS — TEMPERATURE: 96.9 F

## 2020-10-19 LAB — POC INR: 2.9 (ref 0.8–1.2)

## 2020-10-19 PROCEDURE — 36416 COLLJ CAPILLARY BLOOD SPEC: CPT

## 2020-10-19 PROCEDURE — 85610 PROTHROMBIN TIME: CPT

## 2020-10-19 PROCEDURE — 99211 OFF/OP EST MAY X REQ PHY/QHP: CPT

## 2020-10-19 NOTE — PROGRESS NOTES
Medication Management [address]  [phone] (phone)  [phone] (fax)      Ms. Luiz Mathews is a 71 y.o.  female with history of Afib who presents today for anticoagulation monitoring and adjustment. Patient verifies current dosing regimen and tablet strength. No extra doses. Patient reports missing a dose 10/15/20 (3.75 mg). Patient denies s/s bruising/swelling/SOB/chest pain. Patient had a nosebleed 10/5/20 and went to ED. Patient has not had any further issues since. No blood in urine or stool. No dietary changes. No changes in medication/OTC agents/Herbals. No change in alcohol use or tobacco use. No change in activity level. Patient denies headaches/dizziness/lightheadedness/falls. No vomiting/diarrhea or acute illness. No Procedures scheduled in the future at this time. Assessment:   Lab Results   Component Value Date    INR 2.90 (H) 10/19/2020    INR 2.50 (H) 09/28/2020    INR 1.70 (H) 09/15/2020     INR therapeutic   Recent Labs     10/19/20  1357   INR 2.90*     Patient interview completed and discussed with pharmacist by Summer Duncan PharmD Candidate    Patient has been therapeutic at three of the past four INR checks while on current regimen. Plan:  Continue Coumadin 2.5 mg MWF and 3.75 mg TuThSaSu. Recheck INR in 2 week(s). Patient reminded to call the Anticoagulation Clinic with any signs or symptoms of bleeding or with any medication changes. Patient given instructions utilizing the teach back method. Discharged via wheelchair in no apparent distress. After visit summary printed and reviewed with patient.       Medications reviewed and updated on home medication list Yes    Influenza vaccine:     [] given    [x] declined   [] received previously   [] plans to receive at a later time   [x] refused    [] documented in [address]: MED RECONCILIATION/REVIEW [address]

## 2020-11-05 NOTE — PROGRESS NOTES
Renal Progress Note    Assessment and Plan:      Diagnosis Orders   1. Stage 3b chronic kidney disease  Basic Metabolic Panel   2. Persistent proteinuria     3. Microalbuminuria  Microalbumin / Creatinine Urine Ratio   4. Essential hypertension     5. Hyperparathyroidism, secondary renal ([facility])  Vitamin D 25 Hydroxy    PTH, Intact   6. Lymphedema of left lower extremity     7. Hyponatremia     8. Anemia of chronic disease     PLAN:   Lab was all discussed with the patient. She appeared to have understood. Serum creatinine is stable at 1.4 mg/L. Urine microalbumin creatinine ratio is slightly increased significantly to 2985 from 1378 mg/g  Medications reviewed  Add losartan 100 mg 1 tablet a day for microalbuminuria and hypertension as well. This was discussed with the patient. Side effects also discussed with her. Low protein diet information again provided to her. Return visit in 3 months not 6 months with labs. The rationale for 3 months instead of 6-month discussed with her which is worsening microalbuminuria.       Patient Active Problem List   Diagnosis    Diabetes mellitus ([address]    Hypertension    Hyperlipidemia    Neuropathy    Osteoarthritis    Proteinuria    Arthritis    Morbid obesity ([address]    Allergic rhinitis    Chronic kidney disease, stage 4 (severe) ([facility])    Diabetes mellitus type 2, insulin dependent ([address]    DARWIN on CPAP    History of sleeve gastrectomy    Pneumonia    Morbid obesity with BMI of 50.0-59.9, adult ([address]    Coronary artery disease involving native heart without angina pectoris    Multinodular goiter    Bilateral renal cysts    Knee pain, left    Dyspnea    Atrial fibrillation (HCC)    Acute diastolic CHF (congestive heart failure), NYHA class 2 ([facility])    CKD (chronic kidney disease) stage 3, GFR 30-59 ml/min    Shortness of breath    Acute on chronic combined systolic (congestive) and diastolic (congestive) heart failure (HCC)    Chronic anemia    Paroxysmal A-fib (HCC)    Chronic kidney disease (CKD), stage III (moderate)    Nonischemic cardiomyopathy (HCC)    Mild tricuspid regurgitation    Debility    Dietary noncompliance    Long term current use of anticoagulant    Severe left ventricular systolic dysfunction    Anticoagulated on Coumadin       Subjective:   Chief complaint:  Chief Complaint   Patient presents with    Chronic Kidney Disease     stage III      HPI:This is a follow up visit for Mrs. Fredrick Amaro who is here today for return appointment. I see her for chronic kidney disease. She was last  about 6 months ago. Serum creatinine was 1.5 g/dL. Today it is still 1.5 mg/dL. Urine microalbumin creatinine ratio was 1378 mg/g. Today it is 2985 mg/g. Doing okay otherwise with no specific issues of concern. No chest pain or shortness of breath. No nausea vomiting. No fever or chills. No headaches. ROS:Constitutional: negative  Eyes: negative  Ears, nose, mouth, throat, and face: negative  Respiratory: negative  Cardiovascular: negative  Gastrointestinal: negative  Genitourinary:negative  Integument/breast: negative  Hematologic/lymphatic: negative  Musculoskeletal:negative  Neurological: negative  Behavioral/Psych: negative  Endocrine: negative  Allergic/Immunologic: negative     Medications:     Current Outpatient Medications   Medication Sig Dispense Refill    saline nasal gel (AYR) GEL by Nasal route as needed for Congestion 1 Tube 0    dexlansoprazole (DEXILANT) 60 MG CPDR delayed release capsule Take 1 capsule by mouth daily 90 capsule 3    nystatin (MYCOSTATIN) 767456 UNIT/GM powder Apply 3 times daily.  60 g 2    fenofibrate (TRICOR) 145 MG tablet take 1 tablet by mouth once daily 90 tablet 3    atorvastatin (LIPITOR) 40 MG tablet take 1 tablet by mouth at bedtime 90 tablet 3    levothyroxine (SYNTHROID) 75 MCG tablet Take 75 mcg by mouth Daily      gabapentin (NEURONTIN) 100 MG capsule take 1 capsule by mouth three times a day 270 capsule 0    RA VITAMIN B-12 100 MCG tablet take 1 tablet by mouth once daily 90 tablet 3    warfarin (COUMADIN) 2.5 MG tablet As directed by [facility]. 30 days = 45 tabs 45 tablet 5    alendronate (FOSAMAX) 70 MG tablet Take 1 tablet by mouth every 7 days (Patient taking differently: Take 70 mg by mouth every 7 days Indications: usually on mondays ) 12 tablet 3    SITagliptin-metFORMIN HCl (JANUMET PO) Take 1,000 mg by mouth 2 times daily      Dulaglutide (TRULICITY SC) Inject 0.01 mg into the skin once a week Usually on mondays      folic acid (FOLVITE) 1 MG tablet take 1 tablet by mouth once daily 90 tablet 4    aspirin (RA ASPIRIN EC) 81 MG EC tablet Take 1 tablet by mouth daily 30 tablet 3    furosemide (LASIX) 40 MG tablet Take 0.5 tablets by mouth daily 30 tablet 0    DULoxetine (CYMBALTA) 60 MG extended release capsule take 1 capsule by mouth once daily 90 capsule 3    B-D UF III MINI PEN NEEDLES 31G X 5 MM MISC use three times a day 100 each 0    insulin lispro protamine & lispro (HUMALOG MIX) (75-25) 100 UNIT per ML SUSP injection vial 30 in am and 50 units at night (Patient taking differently: None in the AM unless sugar is above 100 and 50 units at night) 1 vial 3    metoprolol succinate (TOPROL XL) 100 MG extended release tablet Take 100 mg by mouth daily      digoxin (LANOXIN) 125 MCG tablet Take 0.5 tablets by mouth daily 30 tablet 0    isosorbide mononitrate (IMDUR) 60 MG extended release tablet Take 1 tablet by mouth daily 30 tablet 3    allopurinol (ZYLOPRIM) 100 MG tablet Take 100 mg by mouth daily      meclizine (ANTIVERT) 25 MG CHEW Take 1 tablet by mouth 3 times daily as needed (vertigo) 90 tablet 0    Calcium Carbonate-Vitamin D (CALCIUM-VITAMIN D) 500-200 MG-UNIT per tablet Take 1 tablet by mouth 2 times daily (with meals)        No current facility-administered medications for this visit.         Lab Results:    CBC:   Lab Results   Component Value Date    WBC 4.4 (L) 10/05/2020    HGB 9.1 (L) 10/05/2020    HCT 29.7 (L) 10/05/2020    .1 (H) 10/05/2020     10/05/2020     BMP:    Lab Results   Component Value Date     (L) 11/03/2020     10/02/2020     (L) 09/24/2020    K 4.8 11/03/2020    K 4.9 10/02/2020    K 4.9 09/24/2020     11/03/2020     10/02/2020     09/24/2020    CO2 24 11/03/2020    CO2 25 10/02/2020    CO2 22 (L) 09/24/2020    BUN 29 (H) 11/03/2020    BUN 26 (H) 10/02/2020    BUN 27 (H) 09/24/2020    CREATININE 1.5 (H) 11/03/2020    CREATININE 1.5 (H) 10/02/2020    CREATININE 1.4 (H) 09/24/2020    GLUCOSE 297 (H) 11/03/2020    GLUCOSE 83 10/02/2020    GLUCOSE 198 (H) 09/24/2020      Hepatic:   Lab Results   Component Value Date    AST 29 09/17/2019    AST 52 (H) 07/07/2019    AST 36 07/03/2019    ALT 15 09/17/2019    ALT 21 07/07/2019    ALT 16 07/03/2019    BILITOT 0.4 09/17/2019    BILITOT 0.6 07/07/2019    BILITOT 0.5 07/03/2019    ALKPHOS 113 09/17/2019    ALKPHOS 182 (H) 07/12/2019    ALKPHOS 206 (H) 07/07/2019     BNP: No results found for: BNP  Lipids:   Lab Results   Component Value Date    CHOL 136 10/02/2020    HDL 19 10/02/2020     INR:   Lab Results   Component Value Date    INR 2.90 (H) 10/19/2020    INR 2.50 (H) 09/28/2020    INR 1.70 (H) 09/15/2020     URINE:   Lab Results   Component Value Date    PROTUR >= 300 03/03/2020     Lab Results   Component Value Date    NITRU Negative 03/03/2020    COLORU Yellow 03/03/2020    COLORU Yellow 01/26/2020    PHUR 5.50 01/26/2020    LABCAST NONE SEEN 08/27/2019    LABCAST NONE SEEN 08/27/2019    WBCUA NONE SEEN 08/27/2019    RBCUA 0-2 08/27/2019    YEAST NONE SEEN 08/27/2019    BACTERIA NONE 08/27/2019    CLARITYU clear 09/25/2019    SPECGRAV 1.020 01/26/2020    LEUKOCYTESUR Moderate 01/26/2020    LEUKOCYTESUR trace 09/25/2019    LEUKOCYTESUR NEGATIVE 05/17/2019    UROBILINOGEN 0.20 03/03/2020    BILIRUBINUR Negative 03/03/2020    BILIRUBINUR neg 09/25/2019    BLOODU Trace-lysed 03/03/2020    BLOODU Negative 01/26/2020    GLUCOSEU Negative 03/03/2020    KETUA Negative 03/03/2020    AMORPHOUS URATES 04/06/2019      Microalbumen/Creatinine ratio:  No components found for: RUCREAT    Objective:   Vitals: BP (!) 147/87 (Site: Left Upper Arm, Position: Sitting, Cuff Size: Medium Adult)   Pulse 87   Temp 97 °F (36.1 °C) (Temporal)   Wt 256 lb (116.1 kg)   LMP 02/20/2001   SpO2 97%   BMI 37.80 kg/m²      Constitutional:  Alert, awake, no apparent distress  Skin:normal with no rash or any lesions  HEENT:Pupils are reactive . Throat is clear. Oral mucosa is moist.  Neck:supple with no thyromegaly or bruit   Cardiovascular:  S1, S2 without murmur   Respiratory:  Clear to auscultation with no wheezes or rales  Abdomen: +bowel sound, soft, non tender and no bruit  Ext: No LE edema  Musculoskeletal:Intact  Neuro:Alert, awake and oriented with no obvious focal deficit.   Speech is normal.    Electronically signed by Charles Duarte MD on 11/5/2020 at 1:15 PM

## 2020-11-05 NOTE — PROGRESS NOTES
Medication Management [address]  [phone] (phone)  [phone] (fax)      Ms. Genaro Lang is a 76 y.o.  female with history of Afib who presents today for anticoagulation monitoring and adjustment. Patient verifies current dosing regimen and tablet strength. No missed or extra doses. Patient denies s/s bleeding/bruising/swelling/SOB/chest pain  No blood in urine or stool. No dietary changes. No changes in medication/OTC agents/Herbals. No change in alcohol use or tobacco use. No change in activity level. Patient denies headaches/dizziness/lightheadedness/falls. No vomiting/diarrhea or acute illness. No Procedures scheduled in the future at this time. Assessment:   Lab Results   Component Value Date    INR 2.40 (H) 11/05/2020    INR 2.90 (H) 10/19/2020    INR 2.50 (H) 09/28/2020     INR therapeutic   Recent Labs     11/05/20  1417   INR 2.40*       Plan:  Continue Coumadin 2.5mg MWF and 3.75mg TThSaS. Recheck INR in 4 week(s). Patient reminded to call the Anticoagulation Clinic with any signs or symptoms of bleeding or with any medication changes. Patient given instructions utilizing the teach back method. Discharged in [facility] in no apparent distress. After visit summary printed and reviewed with patient.       Medications reviewed and updated on home medication list Yes    Influenza vaccine:     [] given    [x] declined   [] received previously   [] plans to receive at a later time   [] refused    [] documented in Epic      Patient interview completed and discussed with pharmacist by Jyoti Hubbard PharmD Candidate      CLINICAL PHARMACY CONSULT: MED RECONCILIATION/REVIEW [address]    PHSO: No  Total # of Interventions Recommended: 0  - Maintenance Safety Lab Monitoring #: 1  Total Interventions Accepted: 0  Time Spent (min): 20    Jose Roberto Pagan PharmD

## 2020-11-05 NOTE — PATIENT INSTRUCTIONS
LOW PROTEIN FOOD LIST    FRUITS AND VEGETABLES               Serving size                    Protein (grams)    Apples, fresh, diced                                        1 cup                                0.2 grams  Grapes                                                         1/2 cup                                0.6  Raisins                                                         1/4 cup                                1.2  Carrots, raw, chopped or shredded              1/2 cup                               0.6  Celery, raw, chopped                                    1/2 cup                               0.4  Chiles, green, chopped                                 2 TB                                   0.1  Corn, cooked, canned                                   1/4 cup                              1.4   Mushrooms, sliced                                        1/2 cup                               0.7  Onions, green, chopped                                1 TB                                   0.1  Onions, red, white, or yellow, chopped         1/4 cup                               0.5  Parsley, fresh, chopped                                 1 TB                                   0.1  Peppers, Bell, chopped, raw                         1/4 cup                               0.3  Potato, raw, chopped                                    1/2 cup                               1.8              Potato, raw, medium                                      1                                        4.7        BEVERAGES  Orange Juice                                                 1/2 cup                                0.9  Tomato Juice                                                 1/2 cup                                0.9    SOUPS  Bouillon  (1 cube = 1 cup, prepared)              1 cube                                0.6  Chicken Broth                                                 1 cup 2.2  Garcia Vegetable Broth                              1 cup                                  0    BREAKFAST FOODS  [place], Aunt Emely                           1 slice                                4.1  Pancake                                                         4 inch                                 1.9  Waffle, Eggo Homestyle                                 1                                        2.3       GRAIN PRODUCTS  Noodles, Macaroni, and Spaghetti                    Macaroni, cooked                                           1 cup                                  6.8  Spaghetti, cooked                                          1 cup                                  6.8     Rice and Grains  White Rice, Instant (cooked)                         1/2 cup                                1.6  White Rice, Long Grain (cooked)                  1/2 cup                                2.2   Brown Rice (cooked)                                     1/2 cup                                2.2          BREAD  Bread Crumbs                                               1/4 cup                                0.9  Bread, Regular, White                                    1 slice                                2.8  Tortilla, Flour, regular size                               1                                       3.8    NON DAIRY CREAMERS  Cool Whip                                                      1/4 cup                               0.2  Mocha Mix (1/2 cup + 1/2 cup water)             1 cup                                 0.4      Rich's Coffee Rich (1/2 cup + 1/2 cup  Water)                                                             1 cup                                 0.4    SAUCES, FATS, AND CONDIMENTS  Soy Sauce, Kikkoman                                     1 TB                                  1.3  Butter                                                               1 TB 0.1  Butter, (1 stick = 1/2 cup)                                1 stick                                0.8  Margarine                                                        1 TB                                   0.1  Margarine (1 stick = 1/2 cup)                           1 stick                                0.8  Mayonnaise                                                     1 TB                                   0.2  Mayonnaise                                                   1/2 cup                                 1.6  Miracle Whip                                                   1 TB                                    0.1  Miracle Whip                                                  1/2 cup                                 0.8  Vegetable Oil                                                   1 TB                                   0  Mustard                                                            1 TB                                   0.5  Pickle Relish, Sweet                                        1 TB                                   0.1    SWEETS  Jello                                                                1 cup                                   3.6  Vanilla Ice Cream                                           1/2 cup                                4.2      Sherbet, Meneses Isabel                                 1/2 cup                                 0.8  Orange Ice                                                      1/2 cup                                 0.1  Sorbet, Strawberry                                          1/2 cup                                 0. 2    BAKING INGREDIENTS  Butterscotch Chips (Nestle)                            1/4 cup                                 1.0  Chocolate Chips                                             1/4 cup                                  2.1  Cornstarch                                                       1 TB 0  Flour, All Purpose                                            1 cup                                  12.8  Flour (for thickener)                                         1 TB                                     0.8     Flour, Whole Wheat                                         1 cup                                  18.4        VERY HIGH PROTEIN  Dairy and Eggs  Cheese, cheddar, grated                                 1/8 cup                                 7.1  Egg, whole, medium                                         1                                          5.6  Milk 2 %                                                            1 cup                                   8.0  Whipped Cream (Musa Whip)                          1 TB                                    0. 1    MEAT AND POULTRY  Martinez                                                               1 slice                                  3.0  Pork or Ham, cooked                                        2 TB                                    4.0  Shrimp, cooked                                                 1 ounce                               5.9    NUTS, NUT BUTTERS, AND SEEDS  Pecans, chopped                                               1 TB                                   0.7  Walnuts, chopped                                              1 TB                                   1.1

## 2020-11-16 NOTE — TELEPHONE ENCOUNTER
Future Appointments   Date Time Provider Adonis Dolan   12/3/2020  2:40 PM Rose Ash RN [facility] [place]   1/19/2021  1:15 PM Mattlorajerry Tejeda, Yara Nichole Cruz   2/2/2021  2:00 PM Hair Palafox MD [facility] [place]   3/15/2021  1:00 PM Johnathan Cruz APRN - [id number] [facility] [address] [facility] [place]       Recent Visits  Date Type Provider Dept   09/15/20 Office Visit ARNOL Trimble CNP [facility]   03/03/20 Office Visit ARNOL Trimble CNP [facility]   12/03/19 Office Visit ARNOL Trimble CNP [facility]   09/25/19 Office Visit ARNOL Trimble CNP [facility]   09/09/19 Office Visit ARNOL Trimble CNP [facility]   05/29/19 Office Visit ARNOL Trimble CNP [facility]   Showing recent visits within past 540 days with a meds authorizing provider and meeting all other requirements     Future Appointments  Date Type Provider Dept   03/15/21 Appointment ARNOL Trimble CNP [facility]   Showing future appointments within next 150 days with a meds authorizing provider and meeting all other requirements

## 2020-11-18 NOTE — TELEPHONE ENCOUNTER
Pt is requesting a referral to DR Isidro @ [address] [place] [address]  She saw dr Abigail Henning and he recommend a referral to him b/c her thyroid nodules are growing  Fax # [phone]

## 2020-11-18 NOTE — TELEPHONE ENCOUNTER
Phoned Dr Nasim Wan with [facility] she saw nothing about a referral,or nodules getting bigger in his notes,

## 2020-11-18 NOTE — TELEPHONE ENCOUNTER
I phoned Dr. Amari Manjarrez office to f/u on this and they stated they did leave Dr. Gloris Siemens a message regarding a referral to [facility] for this pt.

## 2020-12-03 NOTE — PROGRESS NOTES
Medication Management [address]  [phone] (phone)  [phone] (fax)      Ms. Dee eDe Murillo is a 76 y.o.  female with history of atrial fib./RVR, per Dr. Katy Monsalve referral, who presents today for Warfarin monitoring and adjustment (4 week visit - late for today's visit). Patient verifies tablet strength. States she messed up dose past 10 days, may have looked at old instructions (?). Took 2.5 mg TTh, 3.75 mg all other days; dose prior to 9/15 was 3.75 mg MWF, 2.5 mg TThSS. Current dose is 2.5 mg MWF, 3.75 mg TThSS. Given pill box. No missed or extra doses. Patient denies bleeding/bruising/SOB/chest pain. Has usual swelling of legs - wears knee-high ARAM hose on left (worse than right). No blood in urine or stool. No dietary changes. No changes in medication/OTC agents/herbals. No change in alcohol use or tobacco use. No change in activity level. Patient denies headaches/dizziness/lightheadedness/falls. No vomiting or acute illness. Had four hours of diarrhea 11.29 - took Pepto-Bismol. Explained why that should be avoided - recommended Imodium. States stool next day was deep green-black. No procedures scheduled in the future at this time. Goes to [id number] 12/22 regarding thyroid nodules. Assessment:   Lab Results   Component Value Date    INR 2.00 (H) 12/03/2020    INR 2.40 (H) 11/05/2020    INR 2.90 (H) 10/19/2020     INR therapeutic - goal 2-3. Recent Labs     12/03/20  1508   INR 2.00*     Plan:  POCT INR ordered/performed/result reviewed. Take PO Coumadin as ordered 2.5 mg MWF, 3.75 mg TThSS. Recheck INR in 2 week(s). (Report given - orders entered by HAILEY Smith., PharmD.)  Patient reminded to call the Anticoagulation Clinic with any signs or symptoms of bleeding or with any medication changes. Patient given instructions utilizing the teach back method. Discharged via wheelchair in no apparent distress, wearing mask.   Wheels

## 2020-12-15 PROBLEM — G93.41 METABOLIC ENCEPHALOPATHY: Status: ACTIVE | Noted: 2020-01-01

## 2020-12-15 NOTE — ED TRIAGE NOTES
Pt presents to the ED with complaints of increased fatigue. Pt lives at home with sister. Pt is alert and oriented times 4. Pt was going to sit in chair and missed chair and hit the floor. EMS states they had a difficult time getting patient to stand to get on cot. Pt denies hitting head. Pt is on warfarin for afib. Pt is complaining of bilateral leg pain that is chronic. Pt  Uses a wheelchair and doesn't walk. Pt brother at bedside and states that patient has been more confused lately.

## 2020-12-16 NOTE — PROGRESS NOTES
Clinical Pharmacy Note    Jasmine Buck is a 76 y.o. female for whom pharmacy has been asked to manage warfarin therapy. Reason for Admission: fatigue    Consulting Physician: Dr. Travis Duran  Warfarin dose prior to admission: 2.5mg MWF and 3.75mg TThSaS  Warfarin indication: AF  Target INR range: 2-3   Outpatient warfarin provider: [facility]    Past Medical History:   Diagnosis Date    CAD (coronary artery disease)     CRI (chronic renal insufficiency)     Difficult intubation     excess skin in back of throat     DM (diabetes mellitus) ([facility] [address] 1994    GERD (gastroesophageal reflux disease)     H/O gastric bypass     Hyperlipidemia     Hypertension     Hypothyroidism     Neuropathy     Obesity     Osteoarthritis     Paroxysmal atrial fibrillation ([facility] [address]     Prolonged emergence from general anesthesia     Sleep apnea     uses cpap    Visit for screening mammogram                 Recent Labs     12/16/20  0404   INR 2.25*     Recent Labs     12/15/20  1809 12/16/20  0404   HGB 9.9* 9.9*   HCT 31.6* 33.7*    145       Current warfarin drug-drug interactions: Allopurinol (HM), Levothyroxine (HM), Fenofibrate (HM), ASA (HM)      Date INR Warfarin Dose   12/16/2020 2.25 2.5 mg                                   Daily PT/INR until stable within therapeutic range. Thank you for the consult.

## 2020-12-16 NOTE — ED NOTES
Pt reassessed at this time. Pt resting on cot in stable condition. Pt states pain is controlled at this time. Urine specimen collected and sent to the lab. COVID swab completed and sent to the lab. Vitals stable. Call light in reach.  Will continue to monitor      Jax Walton RN  12/15/20 Anita Hinton RN  12/15/20 2005

## 2020-12-16 NOTE — FLOWSHEET NOTE
12/16/20 1130   Encounter Summary   Services provided to: Patient   Referral/Consult From: Rounding   Continue Visiting Yes  (12/16)   Complexity of Encounter Low   Length of Encounter 15 minutes   Routine   Type Initial   Assessment Approachable   Intervention Nurtured hope   Outcome Coping   Assessment: In my encounter with the 76 yr old patient, while rounding  the unit I provided spiritual care to patient through conversation, I also came to assess the patients spiritual needs present. The pt was very tired. Interventions:  I provided prayer, emotional support and words of comfort. Outcomes: The patient was encouraged and didnt share any further spiritual needs at this time. Plan:  1. Chaplains will follow-up at a later time for assessment of any spiritual care needs present.         2.   The Chaplains will be available to provide further emotional support per request.

## 2020-12-16 NOTE — PROGRESS NOTES
Pt admitted to  [facility] [id number]:[id number]} {transports:304104}. Complaints: {[facility] NURSING PT COMPLAINTS:[id number]}. IV {iv fluids:175123} infusing into the {iv locations:905270} at a rate of {NUMBERS;  BY 25:99315} mls/ hour with about {NUMBERS; 100-1000 :94815} mls in the bag still. IV site free of s/s of infection or infiltration. Vital signs obtained. Assessment and data collection initiated. Two nurse skin assessment performed by *** RN and *** RN. Oriented to room. Explained patients right to have family, representative or physician notified of their admission. Patient has {requested/declined:3769821629} for physician to be notified. Patient has {requested/declined:6389317226} for family/representative to be notified. The patient is interested in [facility]. [facility]s meds to beds program?:  {KBI/W}    Policies and procedures for [facility] explained. All questions answered with no further questions at this time. Fall prevention and safety brochure discussed with patient. Bed alarm on. Call light in reach.

## 2020-12-16 NOTE — PROGRESS NOTES
[facility]  INPATIENT PHYSICAL THERAPY  EVALUATION  [facility] 4A - 4A-04/004-A    Time In: 6809  Time Out: 0820  Timed Code Treatment Minutes: 14 Minutes  Minutes: 24          Date: 2020  Patient Name: Nadja Cardoza,  Gender:  female        MRN: [id number]  : 1955  (76 y.o.)      Referring Practitioner: Dr Yasmin Caraballo  Diagnosis: Metabolic Emcephalapathy  Additional Pertinent Hx: Pt admitted 12-15 with fatigue and confusion. Pt also had been trying to sit down on chair and missed the seat and fell. Pt reports before the fall she was hainf left hip pain. Pt had questionable facial droop and  acute cystitis. Restrictions/Precautions:  Restrictions/Precautions: Fall Risk    Subjective:  Chart Reviewed: Yes  Patient assessed for rehabilitation services?: Yes  Family / Caregiver Present: No  Subjective: Pt in bed, agreeable to PT . PT declined OOB stating she was too tired. Pt reports she only does stand pivot transfers  at home and uses leonardo W/C    General:  Overall Orientation Status: Within Functional Limits  Follows Commands: Within Functional Limits    Vision: Within Functional Limits    Hearing: Within functional limits         Pain: unable to rate left hip pain/10: mainly with palpation and ROM    Social/Functional History:    Lives With: (sister)  Type of Home: House  Home Layout: One level  Home Access: Level entry  Home Equipment: Xinrong     Bathroom Shower/Tub: Walk-in shower  Bathroom Equipment: 3-in-1 commode, Shower chair, Hand-held shower       ADL Assistance: Independent     Ambulation Assistance: (does not ambulate)  Transfer Assistance: Independent    Active : No     Additional Comments: Pt reports using W/C to manuever in the home. She does not ambulate .  Pt reports taking her own showers using shower chair    OBJECTIVE:  Range of Motion:  Right Lower Extremity: WFL  Left Lower Extremity: Impaired - guarded due to hip pain    Strength:  Right Lower Recommendations:  Discharge Recommendations: Patient would benefit from continued therapy after discharge, Continue to assess pending progress    Patient Education:  PT Education: Goals, General Safety, Gait Training, PT Role, Plan of Care, Home Exercise Program, Functional Mobility Training    Equipment Recommendations:  Equipment Needed: No    Plan:  Times per week: 5 X GM  Specific instructions for Next Treatment: PT assess transfers  and W/C mobility  Current Treatment Recommendations: Strengthening, Home Exercise Program, Balance Training, Endurance Training, Functional Mobility Training    Goals:  Patient goals : Go home or [facility] if needs rehab  Short term goals  Time Frame for Short term goals: By discharge  Short term goal 1: supine to sit and return with SBA to get in and out of bed  Short term goal 2: sit to stand  and stand pivot transfers bed to chair to be assessed  Short term goal 3: W/C mobility to be assessed  Long term goals  Time Frame for Long term goals : NA due to short ELOS    Following session, patient left in safe position with all fall risk precautions in place.

## 2020-12-16 NOTE — ED NOTES
ED to inpatient nurses report    Chief Complaint   Patient presents with    Fatigue      Present to ED from home  LOC: alert and orientated to name, place, date  Vital signs   Vitals:    12/15/20 1743 12/15/20 1939 12/15/20 2119 12/15/20 2250   BP: 133/76 135/85 130/78 91/61   Pulse: 77 90 96 93   Resp: 18 18 22 20   Temp: 98.7 °F (37.1 °C)      TempSrc: Oral      SpO2: 97% 97% 96% 95%   Weight: 250 lb (113.4 kg)      Height: 5' 9\" (1.753 m)         Oxygen Baseline room air    Current needs required none   LDAs:   Peripheral IV 12/15/20 Left Antecubital (Active)   Site Assessment Clean;Dry; Intact 12/15/20 2121   Line Status Infusing 12/15/20 2121   Dressing Status Clean; Intact;Dry 12/15/20 2121     Mobility: Fully dependent  Pending ED orders: none  Present condition: stable      Electronically signed by Star Whitman RN on 12/15/2020 at 10:50 PM     Star Whitman RN  12/15/20 2251

## 2020-12-16 NOTE — TELEPHONE ENCOUNTER
Noted patient was admitted to [address] [place] [address] yesterday. Currently on clinic schedule for tomorrow.   Message forwarded to clinic pharmacist.

## 2020-12-16 NOTE — H&P
Internal Medicine  History and Physical    Patient:  Sena Paz  MRN: [id number]      History Obtained From:  patient  PCP: Johnathan Cruz APRN - CNP    CHIEF COMPLAINT:  Weakness, confusion    HISTORY OF PRESENT ILLNESS:   The patient is a 76 y.o. female who presents with increasing weakness and confusion in the last week. She has also had more falls at home, thus family brought her to ED. She denies fever, no chills, no HA, no N/V/D, no abd pain. No seizure. He has long standing difficulty with mobility following prior back surgeries and legs fractures. She uses a wheel chair to get around at home.   Seen in ED, found to have abnormal UA, elevated Cr and low Na, thus admitted.     Past Medical History:        Diagnosis Date    CAD (coronary artery disease)     CRI (chronic renal insufficiency)     Difficult intubation     excess skin in back of throat     DM (diabetes mellitus) ([facility] [address] 1994    GERD (gastroesophageal reflux disease)     H/O gastric bypass     Hyperlipidemia     Hypertension     Hypothyroidism     Neuropathy     Obesity     Osteoarthritis     Paroxysmal atrial fibrillation (HCC)     Prolonged emergence from general anesthesia     Sleep apnea     uses cpap    Visit for screening mammogram        Past Surgical History:        Procedure Laterality Date    BACK SURGERY      xs 2    CATARACT REMOVAL Right 7/24/14    Dr. Constantine Phan     COLONOSCOPY      EKG 12-LEAD  9/18/2015         FOOT SURGERY      left foot    HYSTERECTOMY, TOTAL ABDOMINAL      MOUTH BIOPSY  9-28-12    left tongue and lingual tonsil    OTHER SURGICAL HISTORY  11/8/2014    LEFT FEMUR RETROGRADE NAILING    OTHER SURGICAL HISTORY  4/23/2015    HARDWARE REMOVAL LEFT FEMUR, INSERTION OF ANTIBIOTIC SPACER    PATELLA SURGERY  7/11/13    fractues rt patella     ND COLON CA SCRN NOT  [address] IND Left 1/24/2018    COLONOSCOPY performed by Min Cole MD at [address] [place] [address] TAG REMOVAL  9/25/12    mole removal    SLEEVE GASTRECTOMY  09/15/2015    Robotic    TOENAIL EXCISION      removal of great toe nails bilateral-still has toenails-had ingrown toenails and had trimmed out     TONSILLECTOMY      UPPER GASTROINTESTINAL ENDOSCOPY         Medications Prior to Admission:    Prior to Admission medications    Medication Sig Start Date End Date Taking?  Authorizing Provider   DULoxetine (CYMBALTA) 60 MG extended release capsule take 1 capsule by mouth once daily 11/25/20  Yes ARNOL Diaz CNP   gabapentin (NEURONTIN) 100 MG capsule take 1 capsule by mouth three times a day 11/16/20 2/14/21 Yes ARNOL Diaz CNP   losartan (COZAAR) 100 MG tablet Take 1 tablet by mouth daily 11/5/20  Yes Peng Fraser MD   dexlansoprazole ([address] [place] [address]) 60 MG CPDR delayed release capsule Take 1 capsule by mouth daily 9/15/20  Yes ARNOL Diaz CNP   fenofibrate (TRICOR) 145 MG tablet take 1 tablet by mouth once daily 9/8/20  Yes ARNOL Goodrich CNP   levothyroxine (SYNTHROID) 75 MCG tablet Take 75 mcg by mouth Daily   Yes Historical Provider, MD MENDEZ VITAMIN B-12 100 MCG tablet take 1 tablet by mouth once daily 5/11/20  Yes ARNOL Diaz CNP   SITagliptin-metFORMIN HCl (JANUMET PO) Take 1,000 mg by mouth 2 times daily   Yes Historical Provider, MD   folic acid (FOLVITE) 1 MG tablet take 1 tablet by mouth once daily 1/20/20  Yes ARNOL Diaz CNP   aspirin (ANDREA ASPIRIN EC) 81 MG EC tablet Take 1 tablet by mouth daily 1/7/20  Yes ARNOL Gambino CNP   furosemide (LASIX) 40 MG tablet Take 0.5 tablets by mouth daily 1/7/20  Yes ARNOL Gambino CNP   insulin lispro protamine & lispro (HUMALOG MIX) (75-25) 100 UNIT per ML SUSP injection vial 30 in am and 50 units at night  Patient taking differently: None in the AM unless sugar is above 100 and 50 units at night 12/3/19  Yes ARNOL Diaz CNP   metoprolol succinate (TOPROL XL) 100 MG extended release tablet Take 100 mg by mouth daily   Yes Historical Provider, MD   digoxin (LANOXIN) 125 MCG tablet Take 0.5 tablets by mouth daily 10/8/19  Yes Elsie MethARNOL CNP   isosorbide mononitrate (IMDUR) 60 MG extended release tablet Take 1 tablet by mouth daily 7/13/19  Yes Beba Carroll MD   allopurinol (ZYLOPRIM) 100 MG tablet Take 100 mg by mouth daily 7/3/19  Yes Historical Provider, MD   Calcium Carbonate-Vitamin D (CALCIUM-VITAMIN D) 500-200 MG-UNIT per tablet Take 1 tablet by mouth 2 times daily (with meals)    Yes Historical Provider, MD   saline nasal gel (AYR) GEL by Nasal route as needed for Congestion 10/5/20   Luis Enrique Damian MD   nystatin (MYCOSTATIN) 192108 UNIT/GM powder Apply 3 times daily. 9/15/20   ARNOL Hand CNP   atorvastatin (LIPITOR) 40 MG tablet take 1 tablet by mouth at bedtime 9/8/20   ARNOL Quijano CNP   warfarin (COUMADIN) 2.5 MG tablet As directed by [facility]. [facility]. 30 days = 45 tabs 5/4/20   Guadalupe Wagner MD   alendronate (FOSAMAX) 70 MG tablet Take 1 tablet by mouth every 7 days  Patient taking differently: Take 70 mg by mouth every 7 days Indications: usually on mondays  3/10/20   ARNOL Hand CNP   Dulaglutide (TRULICITY SC) Inject 3.73 mg into the skin once a week Usually on mondays    Historical Provider, MD BUSTOS UF III MINI PEN NEEDLES 31G X 5 MM MISC use three times a day 12/12/19   Lady Claus DO   meclizine (ANTIVERT) 25 MG CHEW Take 1 tablet by mouth 3 times daily as needed (vertigo)  Patient not taking: Reported on 12/3/2020 6/3/19   Surjit Mcghee MD       Allergies:  Patient has no known allergies. Social History:   TOBACCO:   reports that she quit smoking about 13 years ago. She has a 30.00 pack-year smoking history. She has never used smokeless tobacco.  ETOH:   reports no history of alcohol use.       Family History:       Problem Relation Age of Onset    Asthma Sister     Asthma Brother     Heart Disease Father     High the joints  NEUROLOGIC:  Mental Status Exam:  Level of Alertness:   awake  Orientation:   person, place, time  Memory:   normal  Cranial Nerves:  cranial nerves II-XII are grossly intact  Motor Exam:  Motor exam is 5 out of 5 all extremities with the exception of both legs 4/5  SKIN:  no redness, warmth, or swelling      CBC:   Recent Labs     12/15/20  1809 12/16/20  0404   WBC 7.4 6.8   HGB 9.9* 9.9*    145     BMP:    Recent Labs     12/15/20  1809 12/16/20  0404   * 131*   K 4.3 4.3   CL 96* 96*   CO2 26 23   BUN 30* 31*   CREATININE 1.7* 1.6*   GLUCOSE 186* 176*     Lipids:   Recent Labs     12/16/20 0404   CHOL 109   HDL 17     INR:   Recent Labs     12/15/20  1809 12/16/20  0404   INR 2.46* 2.25*     TSH 0.121Low         Ref. Range 12/15/2020 22:59   Folate Latest Ref Range: 4.8 - 24.2 ng/mL > 20.0   Vitamin B-12 Latest Ref Range: 211 - 911 pg/mL 422     Urine     Organism gram positive cocciAbnormal     Urine Culture Reflex Johnson count: >100,000 CFU/mL      -----------------------------------------------------------------  PA/lat CXR:   Few reticular markings in the lung bases. . Calcified granuloma left upper lobe   Costophrenic angles are preserved. Mild cardiomegaly. Ectatic thoracic aorta.    Atherosclerotic changes aortic arch. No acute osseous findings. Impression:     Few nonspecific reticular markings in the lung bases. Correlation with symptoms is advised. CT HEAD WO CONTRAST   No acute intracranial findings. Small vessel ischemic changes. . No acute hemorrhage or midline shift. Intracranial vascular calcification. Ventricles are within normal limits for age. Paranasal sinuses are clear. Mastoid air cells are patent. Skull: Unremarkable. Soft tissues: Unremarkable. Impression:     No acute intracranial findings. CT CERVICAL SPINE WO CONTRAST   No acute fracture. Degenerative changes as reported above.      XR HIP 2-3 VW W PELVIS LEFT   FINDINGS: Osteopenia. Degenerative changes. Narrowing of the SI joints and hip joints. No acute fracture or dislocation. Cement within the mid femur. Soft tissues are unremarkable. Impression:     1. No acute fracture or dislocation. EK/15/20 1931     Ventricular Rate 101 BPM    Atrial Rate 57 BPM    QRS Duration 118 ms    Q-T Interval 364 ms    QTc Calculation (Bazett) 471 ms    R Axis 62 degrees    T Axis -103 degrees   Narrative:     Atrial fibrillation with rapid ventricular response   Incomplete left bundle branch block   Marked ST abnormality, possible inferior        Assessment and Plan   1. Delirium / encephalopathy  2. UTI  3. Chronic a fib  4. HTN  5. CKD stage III  6.  Anemia, prob of chribic dx  7. hypothyroidism    Cont IV rocephin and f/up urine sensitivities  Resume home meds, OA  Reduce synthroid  PT/OT eval  SS eval.    Patient Active Problem List   Diagnosis Code    Diabetes mellitus ([facility] [address] E11.9    Hypertension I10    Hyperlipidemia E78.5    Neuropathy G62.9    Osteoarthritis M19.90    Proteinuria R80.9    Arthritis M19.90    Morbid obesity ([facility]) E66.01    Allergic rhinitis J30.9    Chronic kidney disease, stage 4 (severe) ([facility]) N18.4    Diabetes mellitus type 2, insulin dependent ([facility]) E11.9, Z79.4    DARWIN on CPAP G47.33, Z99.89    History of sleeve gastrectomy Z90.3    Pneumonia J18.9    Morbid obesity with BMI of 50.0-59.9, adult ([facility]) E66.01, Z68.43    Coronary artery disease involving native heart without angina pectoris I25.10    Multinodular goiter E04.2    Bilateral renal cysts N28.1    Knee pain, left M25.562    Dyspnea R06.00    Atrial fibrillation ([facility]) I48.91    Acute diastolic CHF (congestive heart failure), NYHA class 2 ([facility]) I50.31    CKD (chronic kidney disease) stage 3, GFR 30-59 ml/min N18.30    Shortness of breath R06.02    Acute on chronic combined systolic (congestive) and diastolic (congestive) heart failure ([facility]) I50.43    Chronic anemia D64.9

## 2020-12-16 NOTE — CARE COORDINATION
DISASTER CHARTING    12/16/20, 7:44 AM EST    DISCHARGE ONGOING EVALUATION:     Sharan Alanis day: 1  Location: -04/004-A Reason for admit: Metabolic encephalopathy [U07.49]   Barriers to Discharge: COVID (-), urine (+), lactic acid 2.0, creatine 1.6, telemetry, PT/OT, Dietitian consult, diabetes management, Pharmacy dosing Coumadin, INR 2.25. PCP: ARNOL Costa - CNP  Patient Goals/Plan/Treatment Preferences: Met with Dionicio Batista. She currently resides at home with her sister. She is basically wheelchair bound, Plan is to return home at discharge. She denies need for DME. Will consider HH. Will follow PT/OT recommendations for discharge needs.

## 2020-12-16 NOTE — ED NOTES
Pt resting on cot in stable condition. Pt updated on POC. Pt verbalized understanding. Vitals stable. Call light in reach.  Will continue to monitor      Nanda George RN  12/15/20 2121

## 2020-12-16 NOTE — ED PROVIDER NOTES
[facility] ENCOUNTER          Pt Name: Dee Dee Murillo  MRN: [id number]  [place] 1955  Date of evaluation: 12/15/2020  Emergency Physician: Lobo Allan, [address] [place] [address]       Chief Complaint   Patient presents with    Fatigue     History obtained from the patient. HISTORY OF PRESENT ILLNESS    HPI  Dee Dee Murillo is a 76 y.o. female who presents to the emergency department for evaluation of fatigue. Patient is brought in by her brother. He reports she has had more frequent falls and feeling fatigued not getting out of the chair over the last 2 weeks. He states him and his other family members have came and talked to her and she has been seemingly getting more confused over the last week. He states he noticed her left side of her mouth appears to droop. He states he began to notice it this week as well. Patient states she does not remember when this occurred and states it appears normal. She is alert and oriented. She states she has mixups and she reports decreased appetite and not having eaten or had anything to drink over the last day. She states she did fall today and her family had helped her up. She denies injury. She reports chronic left hip pain which is unchanged from prior. The patient has no other acute complaints at this time. REVIEW OF SYSTEMS   Review of Systems   Constitutional: Positive for fatigue. Negative for activity change, chills and fever. HENT: Negative for congestion, rhinorrhea, sinus pressure and sore throat. Eyes: Negative for redness and visual disturbance. Respiratory: Negative for chest tightness, shortness of breath and wheezing. Cardiovascular: Negative for chest pain, palpitations and leg swelling. Gastrointestinal: Negative for abdominal distention, abdominal pain, constipation, diarrhea, nausea and vomiting. Endocrine: Negative for polydipsia and polyuria.    Genitourinary: Positive for frequency and urgency. Negative for decreased urine volume and dysuria. Musculoskeletal: Negative for back pain and myalgias. Skin: Negative for pallor and rash. Neurological: Positive for facial asymmetry and weakness. Negative for speech difficulty, light-headedness, numbness and headaches. Hematological: Negative for adenopathy. Does not bruise/bleed easily. Psychiatric/Behavioral: Positive for confusion.          PAST MEDICAL AND SURGICAL HISTORY     Past Medical History:   Diagnosis Date    CAD (coronary artery disease)     CRI (chronic renal insufficiency)     Difficult intubation     excess skin in back of throat     DM (diabetes mellitus) ([facility] [address] 1994    GERD (gastroesophageal reflux disease)     H/O gastric bypass     Hyperlipidemia     Hypertension     Hypothyroidism     Neuropathy     Obesity     Osteoarthritis     Paroxysmal atrial fibrillation (HCC)     Prolonged emergence from general anesthesia     Sleep apnea     uses cpap    Visit for screening mammogram      Past Surgical History:   Procedure Laterality Date    BACK SURGERY      xs 2    CATARACT REMOVAL Right 7/24/14    Dr. Jose Landers EKG 12-LEAD  9/18/2015         FOOT SURGERY      left foot    HYSTERECTOMY, TOTAL ABDOMINAL      MOUTH BIOPSY  9-28-12    left tongue and lingual tonsil    OTHER SURGICAL HISTORY  11/8/2014    LEFT FEMUR RETROGRADE NAILING    OTHER SURGICAL HISTORY  4/23/2015    HARDWARE REMOVAL LEFT FEMUR, INSERTION OF ANTIBIOTIC SPACER    PATELLA SURGERY  7/11/13    fractues rt patella     MT COLON CA SCRN NOT  [address] IND Left 1/24/2018    COLONOSCOPY [place] at [address] [facility] TAG REMOVAL  9/25/12    mole removal    SLEEVE GASTRECTOMY  09/15/2015    Robotic    TOENAIL EXCISION      removal of great toe nails bilateral-still has toenails-had ingrown toenails and had trimmed out     TONSILLECTOMY      UPPER GASTROINTESTINAL furosemide (LASIX) 40 MG tablet, Take 0.5 tablets by mouth daily, Disp: 30 tablet, Rfl: 0    B-D UF III MINI PEN NEEDLES 31G X 5 MM MISC, use three times a day, Disp: 100 each, Rfl: 0    insulin lispro protamine & lispro (HUMALOG MIX) (75-25) 100 UNIT per ML SUSP injection vial, 30 in am and 50 units at night (Patient taking differently: None in the AM unless sugar is above 100 and 50 units at night), Disp: 1 vial, Rfl: 3    metoprolol succinate (TOPROL XL) 100 MG extended release tablet, Take 100 mg by mouth daily, Disp: , Rfl:     digoxin (LANOXIN) 125 MCG tablet, Take 0.5 tablets by mouth daily, Disp: 30 tablet, Rfl: 0    isosorbide mononitrate (IMDUR) 60 MG extended release tablet, Take 1 tablet by mouth daily, Disp: 30 tablet, Rfl: 3    allopurinol (ZYLOPRIM) 100 MG tablet, Take 100 mg by mouth daily, Disp: , Rfl:     meclizine (ANTIVERT) 25 MG CHEW, Take 1 tablet by mouth 3 times daily as needed (vertigo) (Patient not taking: Reported on 12/3/2020), Disp: 90 tablet, Rfl: 0    Calcium Carbonate-Vitamin D (CALCIUM-VITAMIN D) 500-200 MG-UNIT per tablet, Take 1 tablet by mouth 2 times daily (with meals) , Disp: , Rfl:       SOCIAL HISTORY     Social History     Social History Narrative    Not on file     Social History     Tobacco Use    Smoking status: Former Smoker     Packs/day: 1.50     Years: 20.00     Pack years: 30.00     Quit date: 2007     Years since quittin.8    Smokeless tobacco: Never Used   Substance Use Topics    Alcohol use: No     Alcohol/week: 0.0 standard drinks    Drug use: No         ALLERGIES   No Known Allergies      FAMILY HISTORY     Family History   Problem Relation Age of Onset    Asthma Sister     Asthma Brother     Heart Disease Father     High Blood Pressure Other     Cancer Maternal Uncle         stomach    Diabetes Maternal Grandmother     High Blood Pressure Maternal Grandmother     Diabetes Maternal Grandfather     Stroke Neg Hx          PHYSICAL EXAM     ED Triage Vitals [12/15/20 1743]   BP Temp Temp Source Pulse Resp SpO2 Height Weight   133/76 98.7 °F (37.1 °C) Oral 77 18 97 % 5' 9\" (1.753 m) 250 lb (113.4 kg)         Additional Vital Signs:  Vitals:    12/15/20 1939   BP: 135/85   Pulse: 90   Resp: 18   Temp:    SpO2: 97%       Physical Exam  Constitutional:       General: She is not in acute distress. Appearance: She is well-developed. She is not diaphoretic. HENT:      Head: Normocephalic and atraumatic. Mouth/Throat:      Mouth: Mucous membranes are dry. Eyes:      General:         Right eye: No discharge. Left eye: No discharge. Conjunctiva/sclera: Conjunctivae normal.      Pupils: Pupils are equal, round, and reactive to light. Neck:      Musculoskeletal: Normal range of motion and neck supple. Thyroid: No thyromegaly. Vascular: No JVD. Cardiovascular:      Rate and Rhythm: Normal rate and regular rhythm. Heart sounds: Normal heart sounds. Pulmonary:      Effort: Pulmonary effort is normal. No respiratory distress. Breath sounds: Normal breath sounds. Abdominal:      General: Bowel sounds are normal. There is no distension. Palpations: Abdomen is soft. Tenderness: There is no abdominal tenderness. Musculoskeletal: Normal range of motion. General: No tenderness. Lymphadenopathy:      Cervical: No cervical adenopathy. Skin:     General: Skin is warm and dry. Capillary Refill: Capillary refill takes less than 2 seconds. Findings: No rash. Neurological:      Mental Status: She is alert. She is disoriented. GCS: GCS eye subscore is 4. GCS verbal subscore is 4. GCS motor subscore is 6. Cranial Nerves: No cranial nerve deficit. Sensory: Sensation is intact. No sensory deficit. Motor: Weakness present. No abnormal muscle tone, seizure activity or pronator drift.       Coordination: Coordination normal.      Comments: Minor/mild left-sided mouth droop.  Muscle movement equal bilateral to face. Normal sensation upper and lower extremities. Normal sensation bilateral face. Chronic weakness to the left lower extremity due to pain-patient states for years. Alert oriented place and time. Confused about person. Psychiatric:         Speech: Speech normal.         Initial vital signs and nursing assessment reviewed and normal.   Pulsoximetry is normal per my interpretation. MEDICAL DECISION MAKING   Initial Assessment: Given the patient's above chief complaint and findings on history and physical examination, I thought it was appropriate to consider the following emergency medical conditions: Metabolic encephalopathy, UTI, dehydration, electrolyte abnormality, TBI, CVA, TIA, pneumonia, Covid although some of these diagnoses are unlikely they were considered in my medical decision making.     Plan: CBC, BMP, ECG, ECG, chest x-ray, Covid swab, head CT, neck CT, left hip x-ray, UA, TSH symptomatic treatment with Gentle hydration, abx and reassess         ED RESULTS   Laboratory results:  Labs Reviewed   BASIC METABOLIC PANEL W/ REFLEX TO MG FOR LOW K - Abnormal; Notable for the following components:       Result Value    Sodium 134 (*)     Chloride 96 (*)     Glucose 186 (*)     BUN 30 (*)     CREATININE 1.7 (*)     All other components within normal limits   CBC WITH AUTO DIFFERENTIAL - Abnormal; Notable for the following components:    RBC 3.19 (*)     Hemoglobin 9.9 (*)     Hematocrit 31.6 (*)     MCV 99.1 (*)     MCHC 31.3 (*)     RDW-CV 15.6 (*)     RDW-SD 56.1 (*)     Lymphocytes Absolute 0.5 (*)     All other components within normal limits   PROTIME-INR - Abnormal; Notable for the following components:    INR 2.46 (*)     All other components within normal limits   TSH WITH REFLEX - Abnormal; Notable for the following components:    TSH 0.121 (*)     All other components within normal limits   BRAIN NATRIURETIC PEPTIDE - Abnormal; Notable for the following components:    Pro-BNP 29000.0 (*)     All other components within normal limits   GLOMERULAR FILTRATION RATE, ESTIMATED - Abnormal; Notable for the following components:    Est, Glom Filt Rate 30 (*)     All other components within normal limits   URINE WITH REFLEXED MICRO - Abnormal; Notable for the following components:    Ketones, Urine TRACE (*)     Blood, Urine TRACE (*)     Protein,  (*)     Leukocyte Esterase, Urine MODERATE (*)     Color, UA DK YELLOW (*)     Character, Urine CLOUDY (*)     All other components within normal limits   POCT GLUCOSE - Abnormal; Notable for the following components:    POC Glucose 186 (*)     All other components within normal limits   CULTURE, REFLEXED, URINE    Narrative:     Source: Specimen not received       Site:           Current Antibiotics:   TROPONIN   APTT   COVID-19   ANION GAP   OSMOLALITY   T4, FREE       Radiologic studies results:  XR HIP 2-3 VW W PELVIS LEFT   Final Result   1. No acute fracture or dislocation. **This report has been created using voice recognition software. It may contain minor errors which are inherent in voice recognition technology. **      Final report electronically signed by Dr. Shahla Gonzalez on 12/15/2020 7:21 PM      CT Head WO Contrast   Final Result   No acute intracranial findings. **This report has been created using voice recognition software. It may contain minor errors which are inherent in voice recognition technology. **      Final report electronically signed by Dr. Shahla Gonzalez on 12/15/2020 7:15 PM      CT Cervical Spine WO Contrast   Final Result   No acute fracture. Degenerative changes as reported above. **This report has been created using voice recognition software. It may contain minor errors which are inherent in voice recognition technology. **      Final report electronically signed by Dr. Shahla Gonzalez on 12/15/2020 7:24 PM      XR CHEST PORTABLE   Final Result   Few nonspecific reticular markings in the lung bases. Correlation with symptoms is advised. **This report has been created using voice recognition software. It may contain minor errors which are inherent in voice recognition technology. **      Final report electronically signed by Dr. Cheryle Hunt on 12/15/2020 6:17 PM          ED Medications administered this visit:   Medications   cefTRIAXone (ROCEPHIN) 1 g IVPB in 50 mL D5W minibag (has no administration in time range)         ED COURSE     ED Course as of Dec 15 2348   Tue Dec 15, 2020   2342 WBC, UA: > 200 [DD]   2342 SARS-CoV-2, NAAT: NOT DETECTED [DD]   9982 Creatinine(!): 1.7 [DD]   3997 Given a small IV fluid bolus as patient does not appear fluid overloaded appears dry clinically. Brain Natriuretic Peptide(!):    Pro-BNP [id number]. 0(!) [DD]   2346 Negative for acute fracture. XR HIP 2-3 VW W PELVIS LEFT [DD]   2346 Making CVA less likely as patient with longstanding deficit which is mild clinically. CT Head WO Contrast [DD]   2347 ECG atrial fibrillation with nonspecific ST-T wave changes and baseline wander. EKG 12 Lead [DD]      ED Course User Index  [DD] Ania Mcghee DO         MEDICATION CHANGES     DISCHARGE MEDICATIONS:  New Prescriptions    No medications on file            FINAL DISPOSITION     Final diagnoses:   Acute cystitis without hematuria   Facial droop   Metabolic encephalopathy     Condition: condition: good  Dispo: Admit to telemetry    PATIENT REFERRED TO:  No follow-up provider specified. Critical Care Time   None      This transcription was electronically signed. Parts of this transcriptions may have been dictated by use of voice recognition software and electronically transcribed, and parts may have been transcribed with the assistance of an ED scribe. The transcription may contain errors not detected in proofreading.     Electronically Signed: Ania Mcghee, 12/15/20, 9:11 PM     Ania Mcghee DO  12/15/20 2735

## 2020-12-16 NOTE — PROGRESS NOTES
Marlen Antonio 59  INPATIENT OCCUPATIONAL THERAPY  [facility] NEUROSCIENCES 4A  EVALUATION    Time:    Time In: 9875  Time Out: 1508  Timed Code Treatment Minutes: 12 Minutes  Minutes: 27          Date: 2020  Patient Name: Chance Elizabeth,   Gender: female      MRN: [id number]  : 1955  (76 y.o.)  Referring Practitioner: Dr. Nancy Bateman  Diagnosis: Metabolic encephalopathy  Additional Pertinent Hx: Per ER note on 12/15/2020: 76 y.o. female who presents to the emergency department for evaluation of fatigue. Patient is brought in by her brother. He reports she has had more frequent falls and feeling fatigued not getting out of the chair over the last 2 weeks. He states him and his other family members have came and talked to her and she has been seemingly getting more confused over the last week. He states he noticed her left side of her mouth appears to droop. He states he began to notice it this week as well. Patient states she does not remember when this occurred and states it appears normal. She is alert and oriented. She states she has mixups and she reports decreased appetite and not having eaten or had anything to drink over the last day. She states she did fall today and her family had helped her up. She denies injury.     Restrictions/Precautions:  Restrictions/Precautions: Fall Risk    Subjective  Chart Reviewed: Yes, Orders, Progress Notes, History and Physical  Patient assessed for rehabilitation services?: Yes  Family / Caregiver Present: No    Subjective: required encouragement    Pain:  Pain Assessment  Patient Currently in Pain: Yes(back, hip-no number given)    Social/Functional History:  Lives With: Other (comment)(sister)  Type of Home: House  Home Layout: One level  Home Access: Level entry  Home Equipment: [employer], Lift chair   Bathroom Shower/Tub: Walk-in shower  Bathroom Toilet: Handicap height  Bathroom Equipment: 3-in-1 commode, Shower chair, Hand-held shower ADL Assistance: Independent  Homemaking Assistance: Needs assistance  Ambulation Assistance: Independent(Pt reports parking w/c at doorway & having to walk into bedroom (reports holding onto furniture) 5ft)  Transfer Assistance: Independent    Active : No     Additional Comments: Pt reports using W/C to manuever in the home. She does not ambulate . Pt reports taking her own showers using shower chair    Cognition/Orientation:     Overall Cognitive Status: Exceptions(decreased problem solving, incontiencies in reports noted)    ADL's:  LE Dressing: Dependent/Total(for adjusting slipper socks)       Functional Mobility:  Bed mobility  Supine to Sit: Stand by assistance  Sit to Supine: Minimal assistance  Comment: Lateral scoot toward HOB with SBA          Balance:  Balance  Sitting Balance: Stand by assistance    Transfers:      Pt declined t/f today, d/t not feeling up to it    Upper Extremity Assessment:   LUE AROM : (shoulder flexion limited to 80 degrees, distal WFL)  RUE AROM : (shoulder flexion limited to 80 degrees, distal WFL)    LUE Strength  Gross LUE Strength: (shoulder 4-/5 distal 4/5)  RUE Strength  Gross RUE Strength: (shoulder 4-/5 distal 4/5)    Sensation  Overall Sensation Status: Impaired(reports tingling in B finger tips)       Activity Tolerance: Patient limited by fatigue       Assessment:  Assessment: Pt demo decreased ADL & functional mobility status over PLOF. Continued OT recommended to educate Pt on safety & adaptative strategies for increased safety & indep upon returning home. Performance deficits / Impairments: Decreased functional mobility , Decreased endurance, Decreased ADL status, Decreased balance, Decreased safe awareness, Decreased cognition  Prognosis: Fair  REQUIRES OT FOLLOW UP: Yes  Decision Making: Medium Complexity  Safety Devices in place: Yes  Type of devices:  All fall risk precautions in place, Call light within reach, Bed alarm in place    Treatment Initiated:

## 2020-12-17 NOTE — PROGRESS NOTES
[address]  Occupational Therapy  Daily Note  Time:    Time In: 7780  Time Out: 1435  Timed Code Treatment Minutes: 25 Minutes  Minutes: 25          Date: 2020  Patient Name: Philippe Aponte,   Gender: female      Room: [facility]-A  MRN: [id number]  : 1955  (76 y.o.)  Referring Practitioner: Dr. Eva Avalos  Diagnosis: Metabolic encephalopathy  Additional Pertinent Hx: Per ER note on 12/15/2020: 76 y.o. female who presents to the emergency department for evaluation of fatigue. Patient is brought in by her brother. He reports she has had more frequent falls and feeling fatigued not getting out of the chair over the last 2 weeks. He states him and his other family members have came and talked to her and she has been seemingly getting more confused over the last week. He states he noticed her left side of her mouth appears to droop. He states he began to notice it this week as well. Patient states she does not remember when this occurred and states it appears normal. She is alert and oriented. She states she has mixups and she reports decreased appetite and not having eaten or had anything to drink over the last day. She states she did fall today and her family had helped her up. She denies injury. Restrictions/Precautions:  Restrictions/Precautions: Fall Risk      SUBJECTIVE: Pt sitting in bedside chair wanting to use bathroom. Pt concerned in regards to not being able to find her cell phone. THerapist looked throughotu room and notified RN. PAIN: 0/10:      COGNITION: WNL    ADL:   Toileting: Stand By Assistance. for hygiene in seated position   Toilet Transfer: [employer]. from [facility]. BALANCE:  Sitting Balance:  Stand By Assistance. at [facility]  Standing Balance: [employer]. with 1 UE support    BED MOBILITY:  Sit to Supine: Stand By Assistance      TRANSFERS:  Sit to Stand:  Contact Guard Assistance.  from bedside chair  Stand to Sit: Contact Guard Assistance. onto EOB    FUNCTIONAL MOBILITY:  Assistive Device: None  Assist Level:  Minimal Assistance. Distance: x 3 feet to [facility] then to bed  Pt unsteady throughout with educaiton on safety to use walker as recommended by OT this date. Pt declining to use at this times         ASSESSMENT:     Activity Tolerance:  Patient tolerance of  treatment: good. Discharge Recommendations: Continue to assess pending progress    Equipment Recommendations: Other: Monitor pending progress  Plan: Times per week: 5x  Current Treatment Recommendations: Balance Training, Functional Mobility Training, Endurance Training, Safety Education & Training, Self-Care / ADL    Patient Education  Patient Education: safety with transfer    Goals  Short term goals  Time Frame for Short term goals: until discharge  Short term goal 1: Complete 8-10 min dynamic sitting EOB ADL with S  Short term goal 2: Tolerate further assessment of functional t/fs by OTR  Long term goals  Time Frame for Long term goals : No LTG set d/t short ELOS    Revised Short-Term Goals  Short term goals  Time Frame for Short term goals: until discharge  Short term goal 1: Complete 8-10 min dynamic sitting EOB ADL with S  Short term goal 2: Pt to navigate to./from bSC/bathroom using aD with CGA to complete toileting tasks  Short term goal 3: Pt to complete LB ADL tasks with min A  Long term goals  Time Frame for Long term goals : No LTG set d/t short ELOS    Following session, patient left in safe position with all fall risk precautions in place.

## 2020-12-17 NOTE — PROGRESS NOTES
INTERNAL MEDICINE Progress Note  12/17/2020 4:07 PM  Subjective:   Admit Date: 12/15/2020  PCP: ARNOL Lan - CNP  Interval History:  Alert, O x3  No fever    Objective:   Vitals: BP (!) 162/77   Pulse 75   Temp 97.6 °F (36.4 °C) (Oral)   Resp 18   Ht 5' 9\" (1.753 m)   Wt 250 lb 3.2 oz (113.5 kg)   LMP 02/20/2001   SpO2 99%   BMI 36.95 kg/m²   General appearance: alert and cooperative with exam  HEENT: Head: atraumatic  Neck: no adenopathy, no carotid bruit and no JVD  Lungs: clear to auscultation bilaterally  Heart: S1, S2 normal, no murmur, click, rub or gallop  Abdomen: normal findings: bowel sounds normal  Extremities: extremities normal, atraumatic, no cyanosis or edema  Neurologic: Mental status: Alert, oriented, thought content appropriate      Medications:   Scheduled Meds:   warfarin  2.5 mg Oral Once    ampicillin-sulbactam  1.5 g Intravenous Q6H    guaiFENesin  600 mg Oral BID    allopurinol  100 mg Oral Daily    aspirin  81 mg Oral Daily    atorvastatin  40 mg Oral Nightly    calcium-vitamin D  1 tablet Oral BID WC    digoxin  62.5 mcg Oral Daily    DULoxetine  60 mg Oral Daily    folic acid  1 mg Oral Daily    furosemide  20 mg Oral Daily    gabapentin  100 mg Oral TID    isosorbide mononitrate  60 mg Oral Daily    losartan  100 mg Oral Daily    miconazole   Topical BID    pantoprazole  40 mg Oral QAM AC    fenofibrate  160 mg Oral Daily    warfarin (COUMADIN) daily dosing (placeholder)   Other RX Placeholder    levothyroxine  50 mcg Oral Daily    docusate sodium  100 mg Oral Daily    metoprolol tartrate  25 mg Oral BID    insulin lispro  0-6 Units Subcutaneous TID WC    insulin lispro  0-3 Units Subcutaneous Nightly     Continuous Infusions:   dextrose         Lab Results:   CBC:   Recent Labs     12/15/20  1809 12/16/20  0404 12/17/20  0655   WBC 7.4 6.8 5.9   HGB 9.9* 9.9* 10.7*    145 173     BMP:    Recent Labs     12/15/20  1809 12/16/20  0404   NA 134* 131*   K 4.3 4.3   CL 96* 96*   CO2 26 23   BUN 30* 31*   CREATININE 1.7* 1.6*   GLUCOSE 186* 176*     Lipids:   Recent Labs     12/16/20  0404   CHOL 109   HDL 17     INR:   Recent Labs     12/15/20  1809 12/16/20  0404 12/17/20  0655   INR 2.46* 2.25* 2.69*     Magnesium:    Lab Results   Component Value Date    MG 1.7 10/02/2020     HgBA1c:    Lab Results   Component Value Date    LABA1C 7.3 07/28/2020     TSH:    Lab Results   Component Value Date    TSH 0.121 12/15/2020     FOLATE:    Lab Results   Component Value Date    FOLATE > 20.0 12/15/2020     IRON:    Lab Results   Component Value Date    IRON 37 12/16/2020     FERRITIN:    Lab Results   Component Value Date    FERRITIN 275 12/16/2020     Organism Abnormal  12/15/2020  7:50  [facility]   Enterococcus faecalis - (Group D)    Urine Culture Reflex 12/15/2020  7:50  [facility]   [place] count: >100,000 CFU/mL       Source: urine       Site:           Current Antibiotics: not stated   Enterococcus  faecalis (1)  Antibiotic Interpretation BRANDAN Status    ampicillin Sensitive <=2 mcg/mL Final    Penicillin G Resistant 16 mcg/mL Final    ciprofloxacin Resistant >=8 mcg/mL Final    nitrofurantoin Sensitive <=16 mcg/mL Final    levofloxacin Resistant >=8 mcg/mL Final            Assessment and Plan:   1. Delirium / encephalopathy  2. Enterococcus UTI  3. Chronic a fib  4. HTN  5. CKD stage III  6. Anemia, prob of chribic dx  7. hypothyroidism     Cont IV Unasyn per urine sensitivities  coumadin  Cont synthroid  PT/OT eval  Am labs.     Sanaz Ruiz MD

## 2020-12-17 NOTE — PROGRESS NOTES
Nutrition Education    Reason for Consult: diabetic diet education. Pt history of non-compliance and very poor eating habits. Hx: gastric sleeve 2005, DB, obesity, HLD, HTN, CKD. She resides with her sister-Christine whom does most of meal preparation because pt \"hates to cook\". Pt and sister have had multiple diet education sessions in the past.  Has even went to see outpt dietitian at [facility] clinic 2/2020. Pt reports today continued poor eating habits, sporatic carb and protein intake, consumes convenient high sodium foods, etc.  Stressed importance of making good eating habits a priority. 7/28/20: Hgba1c 7.3%.  12/16/20: Cholesterol 109, HDL 17, LDL 54, . Chemstick today 245. Ate all of breakfast: pancakes, sausage. Rx: lasix, humalog, lipitor, oscal with D, folvite. · Verbally reviewed information with Patient  · Educated on carb controlled, cardiac, s/p gastric sleeve diet reinforced. · Written educational materials provided. · Contact name and number provided. · Refer to Patient Education activity for more details.     Electronically signed by Columba Bowles RD, LD on 12/17/20 at 12:26 PM EST    Contact: [phone]

## 2020-12-17 NOTE — PROGRESS NOTES
arms, to/from chair without arms  To and from bed and chair X 2 trials   Ambulation:  Stand By Assistance  Distance: 3 feet  Bed to chair  X 2  Surface: Level Tile  Device:Rolling WalkerGait Deviations:  Slow Mary, Decreased Step Length Bilaterally and steady     Second ambulation:Close CGA without walker bed to chair, I advised pt tht she is safer with the walker for transfers in and out of W/C etc  Third ambulation 15 feet with RW with SBA to CGA. Steady. Foot drop on the left is chronic and pt compensates with increased hip and knee flexion    Balance:  Static Sitting Balance:  Modified Independent  Static Standing Balance: Supervision, with walker  Dynamic Standing Balance: Stand By Assistance, [employer], with walker    Exercise:  Patient was guided in 1 set(s) 0 reps of exercise to both lower extremities. Ankle pumps, Glut sets, Quad sets and Heelslides. Exercises were completed for increased independence with functional mobility. Functional Outcome Measures: Completed  AM-PAC Inpatient Mobility Raw Score : 18  AM-PAC Inpatient T-Scale Score : 43.63    ASSESSMENT:  Assessment: Patient progressing toward established goals. Activity Tolerance:  Patient tolerance of  treatment: good.       Equipment Recommendations:Equipment Needed: No  Discharge Recommendations:  Patient would benefit from continued therapy after discharge, Continue to assess pending progress    Plan: Times per week: 5 X GM  Specific instructions for Next Treatment: PT assess transfers  and W/C mobility  Current Treatment Recommendations: Strengthening, Home Exercise Program, Balance Training, Endurance Training, Functional Mobility Training    Patient Education  Patient Education: Plan of Care, Home Exercise Program, Transfers, Gait    Goals:  Patient goals : Go home or [address] [place] [address] if needs rehab  Short term goals  Time Frame for Short term goals: By discharge  Short term goal 1: supine to sit and return with SBA to get in and out of bed  Short term goal 2: sit to stand  and stand pivot transfers bed to chair to be assessed  Short term goal 3: W/C mobility to be assessed  Long term goals  Time Frame for Long term goals : NA due to short ELOS    Following session, patient left in safe position with all fall risk precautions in place.

## 2020-12-17 NOTE — CARE COORDINATION
DISCHARGE/PLANNING EVALUATION  12/17/20, 11:11 AM EST    Reason for Referral: Discharge planning. Mental Status: Alert and oriented to person, place and time. Decision Making: Independent with decision making. Family/Social/Home Environment: Lives at home with her sister Rafiq Mccormick. Her sister drives some, but had a hip replacement during the summer and is still having issues. They have a brother Mary Gifford) who is very supportive. She also has two nephews who help as needed. Current Services including food security, transportation and housekeeping: No current services. DANI asked Jennifer Juarez if she has ever been on [employer] and she said she has not, but her parents were and she does not want it at this time as they had a bad experience. She and her sister order from the grocery store and have it delivered. Her family helps get her to appointments. Current Equipment:rolling walker (she and her sister share it), shower chair, lift chair, commode. Payment Source:Medicare and Medicaid. Concerns or Barriers to Discharge: Patient is unsure if she is willing to have home health come in. She reports a bad experience in the past.    Post acute provider list with quality measures, geographic area and applicable managed care information provided. Questions regarding selection process answered:Home health list given. Teach Back Method used with Jennifer Juarez regarding care plan and discharge planning. Patient verbalize understanding of the plan of care and contribute to goal setting. Patient goals, treatment preferences and discharge plan: Jennifer Juarez plans to return home with sister and current support system. She agreed to think about home health coming in for RN, PT and OT. DANI gave her a list and will stop back later to discuss her decision. Electronically signed by JORI Whipple on 12/17/2020 at 11:11 AM

## 2020-12-17 NOTE — PROGRESS NOTES
Date: 2020  Patient Name: Philippe Aponte        MRN: [id number]    Account Number: [de-identified]  : 1955  (76 y.o.)  Gender: female   Referring Practitioner: Dr. Eva Avalos  Diagnosis: Metabolic encephalopathy    Philippe Aponte requires a wheeled walker to complete bathing, toileting, dressing and grooming tasks. A wheeled walker is also needed for use during transfer to commode, wheelchair, or bed. These tasks cannot be completed by utilizing a cane secondary to upper body weakness and the need to normalize gait patterns and decrease the risk of falling.

## 2020-12-18 PROBLEM — N30.00 ACUTE CYSTITIS WITHOUT HEMATURIA: Status: ACTIVE | Noted: 2020-01-01

## 2020-12-18 NOTE — DISCHARGE INSTR - PHARMACY
Warfarin   Recommendations for discharge:   Date Warfarin Dose   12/18/20 3.75 mg   12/19/20 2.5 mg   12/20/20 3.75 mg   12/21/20 2.5 mg   12/22/20 Call [facility]   to schedule next INR check   & resume home dosing of   2.5mg MWF and 3.75mg TThSaS     Provider dosing warfarin: [facility]     Recheck INR:  Call [address] on 12/21/20 to schedule appointment

## 2020-12-18 NOTE — DISCHARGE SUMMARY
Discharge Summary    Odell Vega  :  1955  MRN:  [id number]    Admit date:  12/15/2020  Discharge date:      Admitting Physician:  Liat Duenas MD    Discharge Diagnoses:    1. Delirium / encephalopathy  2. Enterococcus UTI  3. Chronic a fib  4. HTN  5. CKD stage III  6. Anemia, prob of chribic dx  7. hypothyroidism      Admission Condition:  serious  Discharged Condition:  good    Hospital Course:  Patient was admitted for confusion / delirium associated with UTI. She was treated with antibiotics per the urine culture sensitivities. She improved. She stabilized on her home meds.     Discharge Medications:       Marcus Caceres   Home Medication Instructions [facility]:[id number]    Printed on:20 1842   Medication Information                      alendronate (FOSAMAX) 70 MG tablet  Take 1 tablet by mouth every 7 days             allopurinol (ZYLOPRIM) 100 MG tablet  Take 100 mg by mouth daily             ampicillin (PRINCIPEN) 500 MG capsule  Take 1 capsule by mouth 4 times daily for 5 days             aspirin (RA ASPIRIN EC) 81 MG EC tablet  Take 1 tablet by mouth daily             atorvastatin (LIPITOR) 40 MG tablet  take 1 tablet by mouth at bedtime             B-D UF III MINI PEN NEEDLES 31G X 5 MM MISC  use three times a day             Calcium Carbonate-Vitamin D (CALCIUM-VITAMIN D) 500-200 MG-UNIT per tablet  Take 1 tablet by mouth 2 times daily (with meals)              dexlansoprazole (DEXILANT) 60 MG CPDR delayed release capsule  Take 1 capsule by mouth daily             digoxin (LANOXIN) 125 MCG tablet  Take 0.5 tablets by mouth daily             Dulaglutide (TRULICITY SC)  Inject 3.25 mg into the skin once a week Usually on              DULoxetine (CYMBALTA) 60 MG extended release capsule  take 1 capsule by mouth once daily             fenofibrate (TRICOR) 145 MG tablet  take 1 tablet by mouth once daily             folic acid (FOLVITE) 1 MG tablet  take 1 tablet by mouth once daily             furosemide (LASIX) 40 MG tablet  Take 0.5 tablets by mouth daily             gabapentin (NEURONTIN) 100 MG capsule  take 1 capsule by mouth three times a day             insulin lispro protamine & lispro (HUMALOG MIX) (75-25) 100 UNIT per ML SUSP injection vial  30 in am and 50 units at night             isosorbide mononitrate (IMDUR) 60 MG extended release tablet  Take 1 tablet by mouth daily             levothyroxine (SYNTHROID) 50 MCG tablet  Take 1 tablet by mouth Daily             losartan (COZAAR) 100 MG tablet  Take 1 tablet by mouth daily             meclizine (ANTIVERT) 25 MG CHEW  Take 1 tablet by mouth 3 times daily as needed (vertigo)             metoprolol succinate (TOPROL XL) 100 MG extended release tablet  Take 100 mg by mouth daily             nystatin (MYCOSTATIN) 720131 UNIT/GM powder  Apply 3 times daily. RA VITAMIN B-12 100 MCG tablet  take 1 tablet by mouth once daily             saline nasal gel (AYR) GEL  by Nasal route as needed for Congestion             SITagliptin-metFORMIN HCl (JANUMET PO)  Take 1,000 mg by mouth 2 times daily             warfarin (COUMADIN) 2.5 MG tablet  As directed by [facility].  30 days = 45 tabs                 Consults:  none    Significant Diagnostic Studies: labs: CBC:         Recent Labs     12/16/20  0404 12/17/20  0655 12/18/20  0708   WBC 6.8 5.9 4.0*   HGB 9.9* 10.7* 10.5*    173 193      BMP:          Recent Labs     12/15/20  1809 12/16/20  0404 12/18/20  0708   * 131* 139   K 4.3 4.3 3.6   CL 96* 96* 100   CO2 26 23 29   BUN 30* 31* 34*   CREATININE 1.7* 1.6* 1.6*   GLUCOSE 186* 176* 182*      Lipids:       Recent Labs     12/16/20  0404   CHOL 109   HDL 17      INR:         Recent Labs     12/16/20  0404 12/17/20  0655 12/18/20  0708   INR 2.25* 2.69* 2.39*      Magnesium:          Lab Results   Component Value Date     MG 1.7 10/02/2020      HgBA1c:          Lab Results   Component Value Date   LABA1C 6.8 12/18/2020      TSH:          Lab Results   Component Value Date     TSH 0.121 12/15/2020      FOLATE:          Lab Results   Component Value Date     FOLATE > 20.0 12/15/2020      IRON:          Lab Results   Component Value Date     IRON 37 12/16/2020      FERRITIN:          Lab Results   Component Value Date     FERRITIN 275 12/16/2020      Organism Abnormal  12/15/2020  7:50  [facility] [address]   Enterococcus faecalis - (Group D)    Urine Culture Reflex 12/15/2020  7:50 PM [facility]   [place] count: >100,000 CFU/mL        Source: urine       Site:           Current Antibiotics: not stated   Enterococcus  faecalis (1)  Antibiotic Interpretation BRANDAN Status     ampicillin Sensitive <=2 mcg/mL Final     Penicillin G Resistant 16 mcg/mL Final     ciprofloxacin Resistant >=8 mcg/mL Final     nitrofurantoin Sensitive <=16 mcg/mL Final     levofloxacin Resistant >=8 mcg/mL Final           Assessment and Plan:   8. Delirium / encephalopathy  9. Enterococcus UTI  10. Chronic a fib  11. HTN  12. CKD stage III  13. Anemia, prob of chribic dx  14. hypothyroidism     switch to po antibiotic ampicillin  coumadin  Cont synthroid  Stable for dc home.     Treatments:   IV hydration, antibiotics    Disposition:   home    Signed:  Julius Min  12/18/2020, 11:05 AM

## 2020-12-18 NOTE — PROGRESS NOTES
Clinical Pharmacy Note                                               Warfarin Discharge Recommendations    Pt discharged from [address] [place] [address] today after admission for encephalopathy    INR today:  Recent Labs     12/18/20  0708   INR 2.39*     Coumadin 2.5 mg tabs    Interacting medications at discharge: allopurinol, aspirin, levothyroxine, fenofibrate    INR goal during admission: 2-3    Recommendations for discharge:   Date Warfarin Dose   12/18/20 3.75 mg   12/19/20 2.5 mg   12/20/20 3.75 mg   12/21/20 2.5 mg   12/22/20 Call [facility] to schedule next INR check & resume home dosing of   2.5mg MWF and 3.75mg TThSaS     Provider dosing warfarin: [facility]     Recheck INR:  Call [address] on 12/21/20 to schedule appointment     Nadir Lucero PharmD, BCPS, [facility] 12/18/2020 1:11 PM

## 2020-12-18 NOTE — PROGRESS NOTES
INTERNAL MEDICINE Progress Note  12/18/2020 10:57 AM  Subjective:   Admit Date: 12/15/2020  PCP: ARNOL Butler CNP  Interval History:    No confusion  No fever    Objective:   Vitals: BP (!) 149/71   Pulse 87   Temp 97.9 °F (36.6 °C) (Oral)   Resp 20   Ht 5' 9\" (1.753 m)   Wt 252 lb (114.3 kg)   LMP 02/20/2001   SpO2 97%   BMI 37.21 kg/m²   General appearance: alert and cooperative with exam  HEENT:  atraumatic  Neck: no JVD  Lungs: clear to auscultation bilaterally  Heart: S1, S2 normal, no murmur, click, rub or gallop  Abdomen: normal findings: bowel sounds normal  Extremities: no cyanosis or edema  Neurologic: Alert, oriented, thought content appropriate      Medications:   Scheduled Meds:   warfarin  3.75 mg Oral Once    ampicillin-sulbactam  1.5 g Intravenous Q6H    insulin glargine  30 Units Subcutaneous Nightly    insulin lispro  0-18 Units Subcutaneous TID WC    insulin lispro  0-9 Units Subcutaneous Nightly    guaiFENesin  600 mg Oral BID    allopurinol  100 mg Oral Daily    aspirin  81 mg Oral Daily    atorvastatin  40 mg Oral Nightly    calcium-vitamin D  1 tablet Oral BID WC    digoxin  62.5 mcg Oral Daily    DULoxetine  60 mg Oral Daily    folic acid  1 mg Oral Daily    furosemide  20 mg Oral Daily    gabapentin  100 mg Oral TID    isosorbide mononitrate  60 mg Oral Daily    losartan  100 mg Oral Daily    miconazole   Topical BID    pantoprazole  40 mg Oral QAM AC    fenofibrate  160 mg Oral Daily    warfarin (COUMADIN) daily dosing (placeholder)   Other RX Placeholder    levothyroxine  50 mcg Oral Daily    docusate sodium  100 mg Oral Daily    metoprolol tartrate  25 mg Oral BID     Continuous Infusions:   dextrose         Lab Results:   CBC:   Recent Labs     12/16/20  0404 12/17/20  0655 12/18/20  0708   WBC 6.8 5.9 4.0*   HGB 9.9* 10.7* 10.5*    173 193     BMP:    Recent Labs     12/15/20  1809 12/16/20  0404 12/18/20  0708   * 131* 139   K 4.3 4.3 3.6   CL 96* 96* 100   CO2 26 23 29   BUN 30* 31* 34*   CREATININE 1.7* 1.6* 1.6*   GLUCOSE 186* 176* 182*     Lipids:   Recent Labs     12/16/20  0404   CHOL 109   HDL 17     INR:   Recent Labs     12/16/20  0404 12/17/20  0655 12/18/20  0708   INR 2.25* 2.69* 2.39*     Magnesium:    Lab Results   Component Value Date    MG 1.7 10/02/2020     HgBA1c:    Lab Results   Component Value Date    LABA1C 6.8 12/18/2020     TSH:    Lab Results   Component Value Date    TSH 0.121 12/15/2020     FOLATE:    Lab Results   Component Value Date    FOLATE > 20.0 12/15/2020     IRON:    Lab Results   Component Value Date    IRON 37 12/16/2020     FERRITIN:    Lab Results   Component Value Date    FERRITIN 275 12/16/2020     Organism Abnormal  12/15/2020  7:50  [facility]   Enterococcus faecalis - (Group D)    Urine Culture Reflex 12/15/2020  7:50  [facility] [address]   [place] count: >100,000 CFU/mL       Source: urine       Site:           Current Antibiotics: not stated   Enterococcus  faecalis (1)  Antibiotic Interpretation BRANDAN Status    ampicillin Sensitive <=2 mcg/mL Final    Penicillin G Resistant 16 mcg/mL Final    ciprofloxacin Resistant >=8 mcg/mL Final    nitrofurantoin Sensitive <=16 mcg/mL Final    levofloxacin Resistant >=8 mcg/mL Final        Assessment and Plan:   1. Delirium / encephalopathy  2. Enterococcus UTI  3. Chronic a fib  4. HTN  5. CKD stage III  6. Anemia, prob of chribic dx  7. hypothyroidism     switch to po antibiotic ampicillin  coumadin  Cont synthroid  Stable for [place] home.     Clif Gilbert MD

## 2020-12-18 NOTE — CARE COORDINATION
12/18/20, 1:11 PM EST    Patient goals/plan/ treatment preferences discussed by  and . Patient goals/plan/ treatment preferences reviewed with patient/ family. Patient/ family verbalize understanding of discharge plan and are in agreement with goal/plan/treatment preferences. Understanding was demonstrated using the teach back method. AVS provided by RN at time of discharge, which includes all necessary medical information pertaining to the patients current course of illness, treatment, post-discharge goals of care, and treatment preferences. Planning home sister, declined HH. New RW delivered to room. PCP apt arranged. Patient instructed to call coumadin clinic on Monday for apt as they are closed and unable to leave message.         IMM Letter  IMM Letter given to Patient/Family/Significant other/Guardian/POA/by[de-identified] admitting  IMM Letter date given[de-identified] 12/16/20  IMM Letter time given[de-identified] 2629

## 2020-12-18 NOTE — PROGRESS NOTES
[address] [address]  INPATIENT PHYSICAL THERAPY  DAILY NOTE  [facility] 4A - 4A-04/004-A    Time In: 9403  Time Out: 1033  Timed Code Treatment Minutes: 40 Minutes  Minutes: 40          Date: 2020  Patient Name: Rupesh José,  Gender:  female        MRN: [id number]  : 1955  (76 y.o.)     Referring Practitioner: Dr Sherif Nathan  Diagnosis: Metabolic Emcephalapathy  Additional Pertinent Hx: Pt admitted 12-15 with fatigue and confusion. Pt also had been trying to sit down on chair and missed the seat and fell. Pt reports before the fall she was hainf left hip pain. Pt had questionable facial droop and  acute cystitis. Prior Level of Function:  Lives With: Other (comment)(sister)  Type of Home: House  Home Layout: One level  Home Access: Level entry  Home Equipment: Wheelchair-manual, Lift chair   Bathroom Shower/Tub: Walk-in shower  Bathroom Toilet: Handicap height  Bathroom Equipment: 3-in-1 commode, Shower chair, Hand-held shower    ADL Assistance: Independent  Homemaking Assistance: Needs assistance  Ambulation Assistance: Independent(Pt reports parking w/c at doorway & having to walk into bedroom (reports holding onto furniture) 5ft)  Transfer Assistance: Independent  Active : No  Additional Comments: Pt reports using W/C to manuever in the home. She does not ambulate . Pt reports taking her own showers using shower chair    Restrictions/Precautions:  Restrictions/Precautions: Fall Risk     SUBJECTIVE: Rn approved PT treatment. Upon entry, pt supine in bed, pleasant and agreeable to participate in therapy. Pt very talkative throughout session and upset about missing phone. This pt looked around pt room to find phone, however was not able to locate. Requested new gown. Post session, pt supine in bed, with bed alarm on and all needs within reach.     PAIN: 3/10: B legs with L>R    OBJECTIVE:  Bed Mobility:  Supine to Sit: Supervision, with increased time for completion  Sit to Supine: Supervision, with increased time for completion   Scooting: Supervision, with increased time for completion    Transfers:  Sit to Stand: Stand By Assistance, with increased time for completion, to/from chair with arms  Stand to Sit:Stand By Assistance, cues for hand placement    Ambulation:  Stand By Assistance, [employer], with increased time for completion  Distance: 25ft x2  Surface: Level Tile  Device:Rolling Walker  Gait Deviations: Forward Flexed Posture, Decreased Step Length Bilaterally, Decreased Gait Speed, Decreased Heel Strike Bilaterally and B genu valgus and ER of B feet. Slow gait speed    Balance:  Static Standing Balance: Stand By Assistance  Dynamic Standing Balance: Contact Guard Assistance    Exercise:  Patient was guided in 1 set(s) 10 reps of exercise to both lower extremities. Ankle pumps, Glut sets, Quad sets, Seated marches, Seated hamstring curls, Seated heel/toe raises, Long arc quads and Seated isometric hip adduction. Exercises were completed for increased independence with functional mobility. Functional Outcome Measures: Completed  AM-PAC Inpatient Mobility Raw Score : 18  AM-PAC Inpatient T-Scale Score : 43.63    ASSESSMENT:  Assessment: Patient progressing toward established goals. Activity Tolerance:  Patient tolerance of  treatment: good.       Equipment Recommendations:Equipment Needed: No  Discharge Recommendations:  Patient would benefit from continued therapy after discharge, Continue to assess pending progress    Plan: Times per week: 3-5 X GM  Specific instructions for Next Treatment: PT assess transfers  and W/C mobility  Current Treatment Recommendations: Strengthening, Home Exercise Program, Balance Training, Endurance Training, Functional Mobility Training    Patient Education  Patient Education: Plan of Care, Home Exercise Program, Bed Mobility, Transfers, Gait, Verbal Exercise Instruction    Goals:  Patient goals : Go home or [facility] if needs

## 2020-12-18 NOTE — PROGRESS NOTES
Clinical Pharmacy Note    Warfarin consult follow-up    Recent Labs     12/18/20  0708   INR 2.39*     Recent Labs     12/16/20  0404 12/17/20  0655 12/18/20  0708   HGB 9.9* 10.7* 10.5*   HCT 33.7* 33.6* 33.9*    173 193     Significant Drug-Drug Interactions:  New warfarin drug-drug interactions: None  Discontinued drug-drug interactions: None  Current warfarin drug-drug interactions: allopurinol (HM), aspirin (HM), levothyroxine (HM), fenofibrate (HM)   HM=home med     Date INR Warfarin Dose   12/16/2020 2.25 2.5 mg   12/172020   2.69 2.5 mg   12/18/2020   2.39  3.75 mg                                        Notes:                   Daily PT/INR until stable within therapeutic range.      Theadora Crigler, PharmD, BCPS, [facility] 12/18/2020 8:34 AM

## 2020-12-18 NOTE — CARE COORDINATION
12/18/20, 12:17 PM EST    DISCHARGE PLANNING EVALUATION  Spoke with Caryl Lang and she does not want any home health at this time. She denies needs.

## 2020-12-21 NOTE — TELEPHONE ENCOUNTER
[facility] Transitions Initial Follow Up Call    Outreach made within 2 business days of discharge: Yes    Patient: Sharath Monroy Patient : 1955   MRN: [id number]  Reason for Admission: There are no discharge diagnoses documented for the most recent discharge.   Discharge Date: 20       Spoke with:   No answer, states \"phone call can not be completed\"    Discharge department/facility: [facility]    Scheduled appointment with PCP within 7-14 days    Follow Up  Future Appointments   Date Time Provider Adonis Dolan   2020  8:20 AM ARNOL Beal - [address] [facility]   2021  1:15 PM Hanny Licea   2021  2:00 PM [facility]   3/15/2021  1:00 PM Skyla Jeanie, APRN - [facility] , [facility] ([address] [place] [address])

## 2020-12-21 NOTE — TELEPHONE ENCOUNTER
Called patient to reschedule her appointment   No answer, had to leave a message for her to call the office

## 2020-12-22 NOTE — TELEPHONE ENCOUNTER
[facility] Trans[facility] Initial Follow Up Call    Outreach made within 2 business days of discharge: Yes    Patient: Enzo oMlina Patient : 1955   MRN: [id number]  Reason for Admission: There are no discharge diagnoses documented for the most recent discharge. Discharge Date: 20       Spoke with: Suzan Collado Pt's sister  (on signed HIPAA)   Discharge department/facility: [facility]    TCM Interactive Patient Contact:  Was patient able to fill all prescriptions: Yes  Was patient instructed to bring all medications to the follow-up visit: Yes  Is patient taking all medications as directed in the discharge summary? Yes  Does patient understand their discharge instructions: Yes  Does patient have questions or concerns that need addressed prior to 7-14 day follow up office visit: none at this time. Scheduled appointment with PCP within 7-14 days.  Pt is aware of appt with Fernando Ramirez    Follow Up  Future Appointments   Date Time Provider Adonis Dolan   2020  8:20 AM Fernando Ramirez APRN - [id number] [address] [place]   2021  1:15 PM Hanny Tamayo   2021  2:00 PM MD [facility]TON[facility] [place] [facility]   3/15/2021  1:00 PM Svetlana Torres [facility] Rebel Teague LPN

## 2020-12-23 NOTE — PROGRESS NOTES
Medication Management [address]   [phone] (phone)  [phone] (fax)      Anticoagulation encounter completed via telephone. Patient had lab result completed at outside lab. Had to call pt's sister (Mehdi) phone number. Pt's cell # is temporarily out of service. Spoke directly to patient. Ms. Julianne Membreno is a 76 y.o.  female with history of Afib. Was discharged within the last week after a 3 days hospital stay for acute cystitis. Patient verifies current dosing regimen and tablet strength. No missed or extra doses. Patient denies s/s bleeding/bruising/swelling/SOB/chest pain  No blood in urine or stool. No dietary changes. No changes in medication/OTC agents/Herbals, except taking ampicillin for a total of 5 days. No change in alcohol use or tobacco use. No change in activity level. Patient denies headaches/dizziness/lightheadedness/falls. No vomiting/diarrhea or acute illness. No Procedures scheduled in the future at this time. Assessment:   Lab Results   Component Value Date    INR 3.31 (H) 12/22/2020    INR 2.39 (H) 12/18/2020    INR 2.69 (H) 12/17/2020     INR supratherapeutic   Recent Labs     12/22/20  1352   INR 3.31*       Plan:  1.25 mg x1, then continue Coumadin 2.5 mg MWF, 3.75 mg TThSaS. Recheck INR in 2 week(s). Patient reminded to call the Anticoagulation Clinic with any signs or symptoms of bleeding or with any medication changes. Patient given instructions utilizing the teach back method. The following statement was review with patient regarding this virtual visit:  We want to confirm that, for purposes of billing, this is a virtual visit with your provider for which we will submit a claim for reimbursement with your insurance company. You may be responsible for any copays, coinsurance amounts or other amounts not covered by your insurance company.  If you do not accept this, unfortunately we will not be able to schedule a virtual visit with the provider. Do you accept?   No    CLINICAL PHARMACY CONSULT: MED RECONCILIATION/REVIEW ADDENDUM    For Pharmacy Admin Tracking Only    PHSO: No  Total # of Interventions Recommended: 2  - Decreased Dose #: 1  - Maintenance Safety Lab Monitoring #: 1  Total Interventions Accepted: 2  Time Spent (min): Elvis Edwards, PharmD  [address]

## 2020-12-23 NOTE — PROGRESS NOTES
CLINICAL PHARMACY NOTE: MEDS TO [facility] [address] Select Patient?: No  Total # of Prescriptions Filled: 2   The following medications were delivered to the patient:  Levothyroxine 50 mcg  Ampicillin 500 mg  Total # of Interventions Completed: 2  Time Spent (min): 30    Additional Documentation:

## 2020-12-30 NOTE — ED NOTES
Bed: 018A  Expected date: 12/30/20  Expected time: 11:52 AM  Means of arrival: [facility]  Comments:     Leah Ash RN  12/30/20 7240

## 2020-12-30 NOTE — ED PROVIDER NOTES
[facility] [address] [address] EMERGENCY DEPT    EMERGENCY MEDICINE     Pt Name: Cielo Sotelo  MRN: [id number]  Crow[place]ermias 1955  Date of evaluation: 12/30/2020  Provider: Rina Orozco MD,     [address] [place] [address]       Chief Complaint   Patient presents with    Leg Pain     LLE- states she fell a few weeks ago       [address] [place] [address]    Cielo Sotelo is a pleasant 76 y.o. female who presents to the emergency department from home for left leg pain. She states that a few weeks ago she had a fall and was evaluated in the emergency department. She was admitted for urinary tract infection. However since the fall she has had increasing pain in her left buttock which radiates down her leg. She denies any worsening numbness or tingling as she always has this secondary to her neuropathy. She has been able to ambulate though it is very painful. She denies any loss of bladder or bowel movements. She is unsure if they evaluated her back during that admission. At the time she states that she was \"incoherent \"from her UTI. Triage notes and Nursing notes were reviewed by myself. Any discrepancies are addressed above.     PAST MEDICAL HISTORY     Past Medical History:   Diagnosis Date    CAD (coronary artery disease)     CRI (chronic renal insufficiency)     Difficult intubation     excess skin in back of throat     DM (diabetes mellitus) ([facility] [address] 1994    GERD (gastroesophageal reflux disease)     H/O gastric bypass     Hyperlipidemia     Hypertension     Hypothyroidism     Neuropathy     Obesity     Osteoarthritis     Paroxysmal atrial fibrillation (HCC)     Prolonged emergence from general anesthesia     Sleep apnea     uses cpap    Visit for screening mammogram        SURGICAL HISTORY       Past Surgical History:   Procedure Laterality Date    BACK SURGERY      xs 2    CATARACT REMOVAL Right 7/24/14    Dr. Adrian Linn EKG 12-LEAD  9/18/2015         FOOT SURGERY      left foot  HYSTERECTOMY, TOTAL ABDOMINAL      MOUTH BIOPSY  9-28-12    left tongue and lingual tonsil    OTHER SURGICAL HISTORY  11/8/2014    LEFT FEMUR RETROGRADE NAILING    OTHER SURGICAL HISTORY  4/23/2015    HARDWARE REMOVAL LEFT FEMUR, INSERTION OF ANTIBIOTIC SPACER    PATELLA SURGERY  7/11/13    fractues rt patella     KY COLON CA SCRN NOT HI RSK IND Left 1/24/2018    COLONOSCOPY performed by Osorio Kelley MD at [address] [place] [address] SKIN TAG REMOVAL  9/25/12    mole removal    SLEEVE GASTRECTOMY  09/15/2015    Robotic    TOENAIL EXCISION      removal of great toe nails bilateral-still has toenails-had ingrown toenails and had trimmed out    [facility]       Discharge Medication List as of 12/30/2020  2:10 PM      CONTINUE these medications which have NOT CHANGED    Details   levothyroxine (SYNTHROID) 50 MCG tablet Take 1 tablet by mouth Daily, Disp-30 tablet, R-3Normal      DULoxetine (CYMBALTA) 60 MG extended release capsule take 1 capsule by mouth once daily, Disp-90 capsule,R-3Normal      gabapentin (NEURONTIN) 100 MG capsule take 1 capsule by mouth three times a day, Disp-270 capsule,R-0Normal      losartan (COZAAR) 100 MG tablet Take 1 tablet by mouth daily, Disp-90 tablet,R-3Normal      saline nasal gel (AYR) GEL by Nasal route as needed for Congestion, Nasal, PRN Starting Mon 10/5/2020, Disp-1 Tube,R-0, Print      dexlansoprazole (DEXILANT) 60 MG CPDR delayed release capsule Take 1 capsule by mouth daily, Disp-90 capsule,R-3Normal      nystatin (MYCOSTATIN) 707137 UNIT/GM powder Apply 3 times daily. , Disp-60 g,R-2, Normal      fenofibrate (TRICOR) 145 MG tablet take 1 tablet by mouth once daily, Disp-90 tablet,R-3Normal      atorvastatin (LIPITOR) 40 MG tablet take 1 tablet by mouth at bedtime, Disp-90 tablet,R-3Normal      RA VITAMIN B-12 100 MCG tablet take 1 tablet by mouth once daily, Disp-90 tablet, R-3Normal      warfarin (COUMADIN) 2.5 MG tablet As directed by [facility] Coumadin clinic. 30 days = 45 tabs, Disp-45 tablet, R-5Normal      alendronate (FOSAMAX) 70 MG tablet Take 1 tablet by mouth every 7 days, Disp-12 tablet, R-3Normal      SITagliptin-metFORMIN HCl (JANUMET PO) Take 1,000 mg by mouth 2 times dailyHistorical Med      Dulaglutide (TRULICITY SC) Inject 8.92 mg into the skin once a week Usually on mondaysHistorical Med      folic acid (FOLVITE) 1 MG tablet take 1 tablet by mouth once daily, Disp-90 tablet, R-4Normal      aspirin (RA ASPIRIN EC) 81 MG EC tablet Take 1 tablet by mouth daily, Disp-30 tablet, R-3NO PRINT      furosemide (LASIX) 40 MG tablet Take 0.5 tablets by mouth daily, Disp-30 tablet, R-0NO PRINT      B-D UF III MINI PEN NEEDLES 31G X 5 MM MISC Disp-100 each, R-0, Normal      insulin lispro protamine & lispro (HUMALOG MIX) (75-25) 100 UNIT per ML SUSP injection vial 30 in am and 50 units at night, Disp-1 vial, R-3Normal      metoprolol succinate (TOPROL XL) 100 MG extended release tablet Take 100 mg by mouth dailyHistorical Med      digoxin (LANOXIN) 125 MCG tablet Take 0.5 tablets by mouth daily, Disp-30 tablet, R-0NO PRINT      isosorbide mononitrate (IMDUR) 60 MG extended release tablet Take 1 tablet by mouth daily, Disp-30 tablet, R-3DC to SNF      allopurinol (ZYLOPRIM) 100 MG tablet Take 100 mg by mouth dailyHistorical Med      meclizine (ANTIVERT) 25 MG CHEW Take 1 tablet by mouth 3 times daily as needed (vertigo), Disp-90 tablet, R-0DC to SNF      Calcium Carbonate-Vitamin D (CALCIUM-VITAMIN D) 500-200 MG-UNIT per tablet Take 1 tablet by mouth 2 times daily (with meals) Historical Med             ALLERGIES     Patient has no known allergies.     FAMILY HISTORY       Family History   Problem Relation Age of Onset    Asthma Sister     Asthma Brother     Heart Disease Father     High Blood Pressure Other     Cancer Maternal Uncle         stomach    Diabetes Maternal Grandmother     High Blood Pressure Maternal Grandmother     Diabetes Maternal Grandfather     Stroke Neg Hx         SOCIAL HISTORY       Social History     Socioeconomic History    Marital status: Single     Spouse name: Not on file    Number of children: Not on file    Years of education: Not on file    Highest education level: Not on file   Occupational History    Occupation: retired   Social Needs    Financial resource strain: Not on file    Food insecurity     Worry: Not on file     Inability: Not on file   [employer] needs     Medical: Not on file     Non-medical: Not on file   Tobacco Use    Smoking status: Former Smoker     Packs/day: 1.50     Years: 20.00     Pack years: 30.00     Quit date: 2007     Years since quittin.9    Smokeless tobacco: Never Used   Substance and Sexual Activity    Alcohol use: No     Alcohol/week: 0.0 standard drinks    Drug use: No    Sexual activity: Never   Lifestyle    Physical activity     Days per week: Not on file     Minutes per session: Not on file    Stress: Not on file   Relationships    Social connections     Talks on phone: Not on file     Gets together: Not on file     Attends [employer] service: Not on file     Active member of club or organization: Not on file     Attends meetings of clubs or organizations: Not on file     Relationship status: Not on file    Intimate partner violence     Fear of current or ex partner: Not on file     Emotionally abused: Not on file     Physically abused: Not on file     Forced sexual activity: Not on file   Other Topics Concern    Not on file   Social History Narrative    Not on file       REVIEW OF SYSTEMS     Review of Systems   Constitutional: Negative for fatigue and fever. HENT: Negative for congestion and trouble swallowing. Eyes: Negative for redness. Respiratory: Negative for cough and shortness of breath. Cardiovascular: Negative for chest pain.    Gastrointestinal: Negative for abdominal pain, nausea and vomiting. Genitourinary: Negative for dysuria. Musculoskeletal: Positive for arthralgias and back pain. Skin: Negative for rash. Allergic/Immunologic: Negative for immunocompromised state. Neurological: Positive for numbness. Negative for light-headedness. Hematological: Does not bruise/bleed easily. Except as noted above the remainder of the review of systems was reviewed and is. PHYSICAL EXAM    (up to 7 for level 4, 8 or more for level 5)     ED Triage Vitals [12/30/20 1159]   BP Temp Temp Source Pulse Resp SpO2 Height Weight   (!) 152/98 97.7 °F (36.5 °C) Oral 83 16 98 % 5' 9\" (1.753 m) 253 lb (114.8 kg)       Physical Exam  Vitals signs and nursing note reviewed. Exam conducted with a chaperone present. Constitutional:       General: She is not in acute distress. Appearance: She is normal weight. She is not ill-appearing. HENT:      Head: Normocephalic and atraumatic. Nose: Nose normal. No congestion. Mouth/Throat:      Mouth: Mucous membranes are moist.      Pharynx: Oropharynx is clear. No oropharyngeal exudate or posterior oropharyngeal erythema. Eyes:      Extraocular Movements: Extraocular movements intact. Pupils: Pupils are equal, round, and reactive to light. Cardiovascular:      Rate and Rhythm: Normal rate and regular rhythm. Pulses: Normal pulses. Heart sounds: No murmur. No friction rub. Pulmonary:      Effort: Pulmonary effort is normal. No respiratory distress. Breath sounds: Normal breath sounds. No wheezing. Abdominal:      General: Abdomen is flat. There is no distension. Palpations: Abdomen is soft. Tenderness: There is no abdominal tenderness. There is no guarding or rebound. Musculoskeletal: Normal range of motion. Lumbar back: She exhibits tenderness and pain. She exhibits no bony tenderness, no deformity and no spasm.       Comments: Pain in the left buttock with a positive straight leg test.  Full range of motion at the hip joint, knee joint, and ankle joint. No evidence of bony deformity. No redness, swelling, or bruising. Skin:     General: Skin is warm and dry. Capillary Refill: Capillary refill takes less than 2 seconds. Neurological:      General: No focal deficit present. Mental Status: She is alert and oriented to person, place, and time. DIAGNOSTIC RESULTS     EKG:(none if blank)  All EKG's are [facility] Phys[facility] who either signs or Co-signs this chart in the absence of a cardiologist.        RADIOLOGY: (none if blank)   Interpretation per the Radiologistbelow, if available at the time of this note:    CT Lumbar Spine WO Contrast   Final Result      No acute fracture. Advanced degenerative changes as reported above. **This report has been created using voice recognition software. It may contain minor errors which are inherent in voice recognition technology. **      Final report electronically signed by Dr. Jimbo Hamlin on 12/30/2020 1:43 PM          LABS:  Labs Reviewed - No data to display    All other labs were within normal range or not returned as of this dictation. Please note, any cultures that may have been sent were not resulted at the time of this patient visit. EMERGENCY DEPARTMENT COURSE andMedical Decision Making:     MDM  Number of Diagnoses or Management Options  Sciatica of left side  Diagnosis management comments: 59-year-old female presents emergency room for left leg pain. She is describing symptoms that are consistent with sciatica. She did have a recent fall which could have exacerbated this pain. Will obtain a CT of her lumbar spine to ensure that she did not develop a compression fracture or any injury that may be causing the significant pain. We will give her Toradol and Norflex along with a lidocaine patch here in the emergency department for relief.   /  ED Course as of Dec 30 1849   Wed Dec 30, 2020   1355 No hours off., Disp-10 patch, R-0Normal                    (Please note that portions of this note were completed with a voice recognition program.  Efforts were made to edit the dictations but occasionallywords are mis-transcribed.)      Electronically signed by Panda Shirley MD on 12/30/20 at 2:12 PM EST    Attending Physician, Emergency Department       Julissa Mayer MD  12/30/20 0053

## 2020-12-30 NOTE — ED TRIAGE NOTES
Pt to rm 18 per EMS- states she had a fall a couple of weeks ago but is now having increased LLE pain from her buttocks to her toes for the past 1-2 weeks. Reports no other falls or injuries, has been ambulatory since her fall, taking tylenol and heat/icy hot. On arrival pt appears in no acute distress, VSS, was able to transfer herself from EMS cart to ER cart. States BLE swelling is WNL for her.

## 2020-12-30 NOTE — ED NOTES
Pt medicated per orders. Continues restful on cart watching TV.       Tony Figueroa RN  12/30/20 0158

## 2021-01-01 ENCOUNTER — OFFICE VISIT (OUTPATIENT)
Dept: NEPHROLOGY | Age: 66
End: 2021-01-01
Payer: MEDICARE

## 2021-01-01 ENCOUNTER — HOSPITAL ENCOUNTER (OUTPATIENT)
Age: 66
Discharge: HOME | End: 2021-07-21
Payer: MEDICARE

## 2021-01-01 ENCOUNTER — TELEPHONE (OUTPATIENT)
Dept: FAMILY MEDICINE CLINIC | Age: 66
End: 2021-01-01

## 2021-01-01 ENCOUNTER — TELEPHONE (OUTPATIENT)
Dept: CARDIOLOGY CLINIC | Age: 66
End: 2021-01-01

## 2021-01-01 ENCOUNTER — HOSPITAL ENCOUNTER (OUTPATIENT)
Dept: ULTRASOUND IMAGING | Age: 66
Discharge: HOME | End: 2021-05-12
Payer: MEDICARE

## 2021-01-01 ENCOUNTER — APPOINTMENT (OUTPATIENT)
Dept: GENERAL RADIOLOGY | Age: 66
DRG: 871 | End: 2021-01-01
Payer: MEDICARE

## 2021-01-01 ENCOUNTER — HOSPITAL ENCOUNTER (INPATIENT)
Age: 66
LOS: 1 days | DRG: 871 | End: 2021-11-02
Attending: EMERGENCY MEDICINE
Payer: MEDICARE

## 2021-01-01 ENCOUNTER — OFFICE VISIT (OUTPATIENT)
Dept: INTERNAL MEDICINE CLINIC | Age: 66
End: 2021-01-01
Payer: MEDICARE

## 2021-01-01 ENCOUNTER — HOSPITAL ENCOUNTER (INPATIENT)
Age: 66
LOS: 5 days | Discharge: HOME HEALTH | DRG: 177 | End: 2021-10-28
Attending: EMERGENCY MEDICINE | Admitting: FAMILY MEDICINE
Payer: MEDICARE

## 2021-01-01 ENCOUNTER — TELEPHONE (OUTPATIENT)
Dept: INTERNAL MEDICINE CLINIC | Age: 66
End: 2021-01-01

## 2021-01-01 ENCOUNTER — HOSPITAL ENCOUNTER (OUTPATIENT)
Dept: PHARMACY | Age: 66
Setting detail: THERAPIES SERIES
Discharge: HOME | End: 2021-08-18
Payer: MEDICARE

## 2021-01-01 ENCOUNTER — APPOINTMENT (OUTPATIENT)
Dept: CT IMAGING | Age: 66
DRG: 091 | End: 2021-01-01
Payer: MEDICARE

## 2021-01-01 ENCOUNTER — HOSPITAL ENCOUNTER (INPATIENT)
Age: 66
LOS: 4 days | Discharge: NURSING, REHAB OR LONG-TERM CARE | DRG: 533 | End: 2021-04-10
Attending: EMERGENCY MEDICINE | Admitting: ORTHOPAEDIC SURGERY
Payer: MEDICARE

## 2021-01-01 ENCOUNTER — HOSPITAL ENCOUNTER (OUTPATIENT)
Dept: PHARMACY | Age: 66
Setting detail: THERAPIES SERIES
Discharge: HOME | End: 2021-06-10
Payer: MEDICARE

## 2021-01-01 ENCOUNTER — APPOINTMENT (OUTPATIENT)
Dept: ULTRASOUND IMAGING | Age: 66
DRG: 640 | End: 2021-01-01
Payer: MEDICARE

## 2021-01-01 ENCOUNTER — HOSPITAL ENCOUNTER (OUTPATIENT)
Dept: PHARMACY | Age: 66
Setting detail: THERAPIES SERIES
Discharge: HOME | End: 2021-05-04
Payer: MEDICARE

## 2021-01-01 ENCOUNTER — APPOINTMENT (OUTPATIENT)
Dept: PHARMACY | Age: 66
End: 2021-01-01
Payer: MEDICARE

## 2021-01-01 ENCOUNTER — HOSPITAL ENCOUNTER (OUTPATIENT)
Dept: ULTRASOUND IMAGING | Age: 66
Discharge: HOME | End: 2021-05-21
Payer: MEDICARE

## 2021-01-01 ENCOUNTER — OFFICE VISIT (OUTPATIENT)
Dept: CARDIOLOGY CLINIC | Age: 66
End: 2021-01-01
Payer: MEDICARE

## 2021-01-01 ENCOUNTER — APPOINTMENT (OUTPATIENT)
Dept: ULTRASOUND IMAGING | Age: 66
DRG: 533 | End: 2021-01-01
Payer: MEDICARE

## 2021-01-01 ENCOUNTER — TELEPHONE (OUTPATIENT)
Dept: PHARMACY | Age: 66
End: 2021-01-01

## 2021-01-01 ENCOUNTER — HOSPITAL ENCOUNTER (OUTPATIENT)
Dept: PHARMACY | Age: 66
Setting detail: THERAPIES SERIES
Discharge: HOME | End: 2021-08-25
Payer: MEDICARE

## 2021-01-01 ENCOUNTER — HOSPITAL ENCOUNTER (OUTPATIENT)
Dept: PHARMACY | Age: 66
Setting detail: THERAPIES SERIES
Discharge: HOME | End: 2021-09-01
Payer: MEDICARE

## 2021-01-01 ENCOUNTER — HOSPITAL ENCOUNTER (OUTPATIENT)
Dept: PHARMACY | Age: 66
Setting detail: THERAPIES SERIES
Discharge: HOME | End: 2021-07-01
Payer: MEDICARE

## 2021-01-01 ENCOUNTER — HOSPITAL ENCOUNTER (OUTPATIENT)
Dept: PHARMACY | Age: 66
Setting detail: THERAPIES SERIES
Discharge: HOME | End: 2021-09-23
Payer: MEDICARE

## 2021-01-01 ENCOUNTER — HOSPITAL ENCOUNTER (OUTPATIENT)
Dept: PHARMACY | Age: 66
Setting detail: THERAPIES SERIES
Discharge: HOME | End: 2021-09-16
Payer: MEDICARE

## 2021-01-01 ENCOUNTER — CARE COORDINATION (OUTPATIENT)
Dept: CASE MANAGEMENT | Age: 66
End: 2021-01-01

## 2021-01-01 ENCOUNTER — APPOINTMENT (OUTPATIENT)
Dept: CT IMAGING | Age: 66
DRG: 533 | End: 2021-01-01
Payer: MEDICARE

## 2021-01-01 ENCOUNTER — HOSPITAL ENCOUNTER (OUTPATIENT)
Dept: PHARMACY | Age: 66
Setting detail: THERAPIES SERIES
Discharge: HOME | End: 2021-07-21
Payer: MEDICARE

## 2021-01-01 ENCOUNTER — APPOINTMENT (OUTPATIENT)
Dept: CT IMAGING | Age: 66
DRG: 640 | End: 2021-01-01
Payer: MEDICARE

## 2021-01-01 ENCOUNTER — APPOINTMENT (OUTPATIENT)
Dept: MRI IMAGING | Age: 66
DRG: 091 | End: 2021-01-01
Payer: MEDICARE

## 2021-01-01 ENCOUNTER — OFFICE VISIT (OUTPATIENT)
Dept: FAMILY MEDICINE CLINIC | Age: 66
End: 2021-01-01
Payer: MEDICARE

## 2021-01-01 ENCOUNTER — APPOINTMENT (OUTPATIENT)
Dept: GENERAL RADIOLOGY | Age: 66
DRG: 640 | End: 2021-01-01
Payer: MEDICARE

## 2021-01-01 ENCOUNTER — TELEPHONE (OUTPATIENT)
Dept: ADMINISTRATIVE | Age: 66
End: 2021-01-01

## 2021-01-01 ENCOUNTER — HOSPITAL ENCOUNTER (OUTPATIENT)
Dept: PHARMACY | Age: 66
Setting detail: THERAPIES SERIES
Discharge: HOME | End: 2021-10-14
Payer: MEDICARE

## 2021-01-01 ENCOUNTER — HOSPITAL ENCOUNTER (OUTPATIENT)
Dept: PHARMACY | Age: 66
Setting detail: THERAPIES SERIES
Discharge: HOME | End: 2021-03-23
Payer: MEDICARE

## 2021-01-01 ENCOUNTER — HOSPITAL ENCOUNTER (OUTPATIENT)
Dept: PHARMACY | Age: 66
Setting detail: THERAPIES SERIES
Discharge: HOME | End: 2021-09-07
Payer: MEDICARE

## 2021-01-01 ENCOUNTER — HOSPITAL ENCOUNTER (INPATIENT)
Age: 66
LOS: 7 days | Discharge: HOME HEALTH | DRG: 871 | End: 2021-08-09
Attending: EMERGENCY MEDICINE | Admitting: HOSPITALIST
Payer: MEDICARE

## 2021-01-01 ENCOUNTER — APPOINTMENT (OUTPATIENT)
Dept: CT IMAGING | Age: 66
DRG: 871 | End: 2021-01-01
Payer: MEDICARE

## 2021-01-01 ENCOUNTER — APPOINTMENT (OUTPATIENT)
Dept: GENERAL RADIOLOGY | Age: 66
DRG: 091 | End: 2021-01-01
Payer: MEDICARE

## 2021-01-01 ENCOUNTER — TELEPHONE (OUTPATIENT)
Dept: NEPHROLOGY | Age: 66
End: 2021-01-01

## 2021-01-01 ENCOUNTER — APPOINTMENT (OUTPATIENT)
Dept: GENERAL RADIOLOGY | Age: 66
DRG: 533 | End: 2021-01-01
Payer: MEDICARE

## 2021-01-01 ENCOUNTER — HOSPITAL ENCOUNTER (OUTPATIENT)
Dept: PHARMACY | Age: 66
Setting detail: THERAPIES SERIES
Discharge: HOME | End: 2021-05-13
Payer: MEDICARE

## 2021-01-01 ENCOUNTER — HOSPITAL ENCOUNTER (OUTPATIENT)
Dept: INTERVENTIONAL RADIOLOGY/VASCULAR | Age: 66
Discharge: HOME | End: 2021-03-18
Payer: MEDICARE

## 2021-01-01 ENCOUNTER — APPOINTMENT (OUTPATIENT)
Dept: ULTRASOUND IMAGING | Age: 66
DRG: 871 | End: 2021-01-01
Payer: MEDICARE

## 2021-01-01 ENCOUNTER — HOSPITAL ENCOUNTER (OUTPATIENT)
Dept: NON INVASIVE DIAGNOSTICS | Age: 66
Discharge: HOME | DRG: 533 | End: 2021-04-06
Payer: MEDICARE

## 2021-01-01 ENCOUNTER — APPOINTMENT (OUTPATIENT)
Dept: GENERAL RADIOLOGY | Age: 66
DRG: 177 | End: 2021-01-01
Payer: MEDICARE

## 2021-01-01 ENCOUNTER — HOSPITAL ENCOUNTER (INPATIENT)
Age: 66
LOS: 5 days | Discharge: NURSING, REHAB OR LONG-TERM CARE | DRG: 640 | End: 2021-01-08
Attending: INTERNAL MEDICINE | Admitting: INTERNAL MEDICINE
Payer: MEDICARE

## 2021-01-01 ENCOUNTER — APPOINTMENT (OUTPATIENT)
Dept: INTERVENTIONAL RADIOLOGY/VASCULAR | Age: 66
DRG: 871 | End: 2021-01-01
Payer: MEDICARE

## 2021-01-01 ENCOUNTER — APPOINTMENT (OUTPATIENT)
Dept: INTERVENTIONAL RADIOLOGY/VASCULAR | Age: 66
DRG: 533 | End: 2021-01-01
Payer: MEDICARE

## 2021-01-01 ENCOUNTER — HOSPITAL ENCOUNTER (OUTPATIENT)
Dept: PHARMACY | Age: 66
Setting detail: THERAPIES SERIES
Discharge: HOME | End: 2021-05-27
Payer: MEDICARE

## 2021-01-01 ENCOUNTER — HOSPITAL ENCOUNTER (INPATIENT)
Age: 66
LOS: 2 days | Discharge: NURSING, REHAB OR LONG-TERM CARE | DRG: 091 | End: 2021-01-29
Attending: INTERNAL MEDICINE | Admitting: INTERNAL MEDICINE
Payer: MEDICARE

## 2021-01-01 ENCOUNTER — OFFICE VISIT (OUTPATIENT)
Dept: PULMONOLOGY | Age: 66
End: 2021-01-01
Payer: MEDICARE

## 2021-01-01 ENCOUNTER — HOSPITAL ENCOUNTER (OUTPATIENT)
Dept: PHARMACY | Age: 66
Setting detail: THERAPIES SERIES
Discharge: HOME | End: 2021-03-18
Payer: MEDICARE

## 2021-01-01 ENCOUNTER — APPOINTMENT (OUTPATIENT)
Dept: CT IMAGING | Age: 66
DRG: 177 | End: 2021-01-01
Payer: MEDICARE

## 2021-01-01 ENCOUNTER — HOSPITAL ENCOUNTER (OUTPATIENT)
Dept: PHARMACY | Age: 66
Setting detail: THERAPIES SERIES
Discharge: HOME | End: 2021-04-28
Payer: MEDICARE

## 2021-01-01 ENCOUNTER — HOSPITAL ENCOUNTER (OUTPATIENT)
Dept: NON INVASIVE DIAGNOSTICS | Age: 66
Discharge: HOME | End: 2021-10-15
Payer: MEDICARE

## 2021-01-01 ENCOUNTER — HOSPITAL ENCOUNTER (OUTPATIENT)
Dept: PHARMACY | Age: 66
Setting detail: THERAPIES SERIES
Discharge: HOME | End: 2021-03-29
Payer: MEDICARE

## 2021-01-01 VITALS
DIASTOLIC BLOOD PRESSURE: 70 MMHG | HEIGHT: 69 IN | HEART RATE: 72 BPM | TEMPERATURE: 98.1 F | SYSTOLIC BLOOD PRESSURE: 124 MMHG | BODY MASS INDEX: 31.81 KG/M2 | WEIGHT: 214.8 LBS

## 2021-01-01 VITALS
HEIGHT: 66 IN | BODY MASS INDEX: 32.25 KG/M2 | RESPIRATION RATE: 16 BRPM | WEIGHT: 229 LBS | SYSTOLIC BLOOD PRESSURE: 138 MMHG | BODY MASS INDEX: 33.33 KG/M2 | DIASTOLIC BLOOD PRESSURE: 60 MMHG | WEIGHT: 225 LBS | DIASTOLIC BLOOD PRESSURE: 96 MMHG | TEMPERATURE: 98.4 F | OXYGEN SATURATION: 99 % | HEIGHT: 69 IN | OXYGEN SATURATION: 94 % | SYSTOLIC BLOOD PRESSURE: 168 MMHG | HEART RATE: 83 BPM | OXYGEN SATURATION: 97 % | HEART RATE: 105 BPM | TEMPERATURE: 99 F | WEIGHT: 205.44 LBS | SYSTOLIC BLOOD PRESSURE: 129 MMHG | RESPIRATION RATE: 14 BRPM | HEIGHT: 67 IN | HEART RATE: 70 BPM | BODY MASS INDEX: 36.8 KG/M2 | DIASTOLIC BLOOD PRESSURE: 90 MMHG | RESPIRATION RATE: 16 BRPM | TEMPERATURE: 98.4 F

## 2021-01-01 VITALS
RESPIRATION RATE: 14 BRPM | HEART RATE: 88 BPM | WEIGHT: 234.2 LBS | BODY MASS INDEX: 36.76 KG/M2 | HEART RATE: 68 BPM | HEIGHT: 67 IN | TEMPERATURE: 97.4 F | BODY MASS INDEX: 33.21 KG/M2 | OXYGEN SATURATION: 100 % | HEIGHT: 69 IN | SYSTOLIC BLOOD PRESSURE: 130 MMHG | WEIGHT: 224.2 LBS | DIASTOLIC BLOOD PRESSURE: 72 MMHG | DIASTOLIC BLOOD PRESSURE: 84 MMHG | SYSTOLIC BLOOD PRESSURE: 134 MMHG | TEMPERATURE: 98.3 F

## 2021-01-01 VITALS
HEIGHT: 69 IN | TEMPERATURE: 96.5 F | BODY MASS INDEX: 32.14 KG/M2 | WEIGHT: 217 LBS | HEART RATE: 88 BPM | DIASTOLIC BLOOD PRESSURE: 80 MMHG | OXYGEN SATURATION: 97 % | SYSTOLIC BLOOD PRESSURE: 141 MMHG | RESPIRATION RATE: 18 BRPM

## 2021-01-01 VITALS
SYSTOLIC BLOOD PRESSURE: 167 MMHG | RESPIRATION RATE: 18 BRPM | HEART RATE: 93 BPM | DIASTOLIC BLOOD PRESSURE: 98 MMHG | HEIGHT: 69 IN | WEIGHT: 270.6 LBS | OXYGEN SATURATION: 98 % | BODY MASS INDEX: 40.08 KG/M2 | TEMPERATURE: 98.4 F

## 2021-01-01 VITALS
HEART RATE: 80 BPM | OXYGEN SATURATION: 95 % | SYSTOLIC BLOOD PRESSURE: 138 MMHG | RESPIRATION RATE: 14 BRPM | HEIGHT: 69 IN | WEIGHT: 227.8 LBS | BODY MASS INDEX: 33.74 KG/M2 | TEMPERATURE: 97.2 F | DIASTOLIC BLOOD PRESSURE: 86 MMHG

## 2021-01-01 VITALS
HEART RATE: 76 BPM | SYSTOLIC BLOOD PRESSURE: 117 MMHG | RESPIRATION RATE: 14 BRPM | TEMPERATURE: 97.5 F | BODY MASS INDEX: 31.96 KG/M2 | HEIGHT: 69 IN | DIASTOLIC BLOOD PRESSURE: 64 MMHG | WEIGHT: 215.8 LBS | OXYGEN SATURATION: 99 %

## 2021-01-01 VITALS — HEART RATE: 97 BPM | DIASTOLIC BLOOD PRESSURE: 90 MMHG | SYSTOLIC BLOOD PRESSURE: 147 MMHG

## 2021-01-01 VITALS
SYSTOLIC BLOOD PRESSURE: 136 MMHG | HEART RATE: 63 BPM | DIASTOLIC BLOOD PRESSURE: 70 MMHG | HEIGHT: 69 IN | BODY MASS INDEX: 32.14 KG/M2 | WEIGHT: 217 LBS

## 2021-01-01 VITALS — TEMPERATURE: 97.9 F

## 2021-01-01 VITALS
DIASTOLIC BLOOD PRESSURE: 51 MMHG | RESPIRATION RATE: 25 BRPM | HEIGHT: 69 IN | WEIGHT: 212.9 LBS | TEMPERATURE: 100 F | HEART RATE: 130 BPM | SYSTOLIC BLOOD PRESSURE: 88 MMHG | OXYGEN SATURATION: 99 % | BODY MASS INDEX: 31.53 KG/M2

## 2021-01-01 VITALS
DIASTOLIC BLOOD PRESSURE: 74 MMHG | HEART RATE: 76 BPM | HEIGHT: 69 IN | OXYGEN SATURATION: 100 % | WEIGHT: 215 LBS | SYSTOLIC BLOOD PRESSURE: 128 MMHG | BODY MASS INDEX: 31.84 KG/M2

## 2021-01-01 VITALS
OXYGEN SATURATION: 100 % | BODY MASS INDEX: 36.12 KG/M2 | DIASTOLIC BLOOD PRESSURE: 73 MMHG | WEIGHT: 223.8 LBS | TEMPERATURE: 97.2 F | SYSTOLIC BLOOD PRESSURE: 138 MMHG | HEART RATE: 76 BPM

## 2021-01-01 VITALS
SYSTOLIC BLOOD PRESSURE: 149 MMHG | DIASTOLIC BLOOD PRESSURE: 70 MMHG | OXYGEN SATURATION: 100 % | BODY MASS INDEX: 31.37 KG/M2 | WEIGHT: 211.8 LBS | HEART RATE: 55 BPM | HEIGHT: 69 IN | TEMPERATURE: 97.5 F | RESPIRATION RATE: 16 BRPM

## 2021-01-01 VITALS
HEIGHT: 69 IN | WEIGHT: 225.6 LBS | HEART RATE: 80 BPM | TEMPERATURE: 97.7 F | SYSTOLIC BLOOD PRESSURE: 130 MMHG | DIASTOLIC BLOOD PRESSURE: 72 MMHG | BODY MASS INDEX: 33.41 KG/M2

## 2021-01-01 VITALS
RESPIRATION RATE: 20 BRPM | TEMPERATURE: 97.3 F | HEIGHT: 69 IN | HEART RATE: 112 BPM | OXYGEN SATURATION: 98 % | WEIGHT: 288.8 LBS | SYSTOLIC BLOOD PRESSURE: 144 MMHG | DIASTOLIC BLOOD PRESSURE: 83 MMHG | BODY MASS INDEX: 42.78 KG/M2

## 2021-01-01 VITALS — TEMPERATURE: 97.3 F

## 2021-01-01 VITALS
TEMPERATURE: 97.1 F | HEART RATE: 76 BPM | OXYGEN SATURATION: 93 % | SYSTOLIC BLOOD PRESSURE: 112 MMHG | WEIGHT: 269.4 LBS | HEIGHT: 69 IN | DIASTOLIC BLOOD PRESSURE: 74 MMHG | BODY MASS INDEX: 39.9 KG/M2

## 2021-01-01 VITALS
BODY MASS INDEX: 32.05 KG/M2 | HEART RATE: 82 BPM | HEIGHT: 69 IN | SYSTOLIC BLOOD PRESSURE: 132 MMHG | DIASTOLIC BLOOD PRESSURE: 64 MMHG

## 2021-01-01 VITALS — DIASTOLIC BLOOD PRESSURE: 64 MMHG | OXYGEN SATURATION: 97 % | HEART RATE: 87 BPM | SYSTOLIC BLOOD PRESSURE: 132 MMHG

## 2021-01-01 DIAGNOSIS — Z79.01 ANTICOAGULATED ON COUMADIN: ICD-10-CM

## 2021-01-01 DIAGNOSIS — E66.01 MORBID OBESITY WITH BMI OF 50.0-59.9, ADULT (HCC): ICD-10-CM

## 2021-01-01 DIAGNOSIS — E87.1 HYPONATREMIA: ICD-10-CM

## 2021-01-01 DIAGNOSIS — E03.9 HYPOTHYROIDISM, UNSPECIFIED TYPE: Primary | ICD-10-CM

## 2021-01-01 DIAGNOSIS — E11.649 HYPOGLYCEMIC EPISODE IN PATIENT WITH DIABETES MELLITUS (HCC): Primary | ICD-10-CM

## 2021-01-01 DIAGNOSIS — Z79.4 TYPE 2 DIABETES MELLITUS WITH HYPOGLYCEMIA WITHOUT COMA, WITH LONG-TERM CURRENT USE OF INSULIN (HCC): ICD-10-CM

## 2021-01-01 DIAGNOSIS — Z79.01 ANTICOAGULATED ON COUMADIN: Primary | ICD-10-CM

## 2021-01-01 DIAGNOSIS — E66.9 OBESITY (BMI 30-39.9): ICD-10-CM

## 2021-01-01 DIAGNOSIS — E11.42 TYPE 2 DIABETES MELLITUS WITH DIABETIC POLYNEUROPATHY, WITH LONG-TERM CURRENT USE OF INSULIN (HCC): ICD-10-CM

## 2021-01-01 DIAGNOSIS — I48.91 ATRIAL FIBRILLATION, UNSPECIFIED TYPE (HCC): ICD-10-CM

## 2021-01-01 DIAGNOSIS — Z79.01 CURRENT USE OF LONG TERM ANTICOAGULATION: ICD-10-CM

## 2021-01-01 DIAGNOSIS — M79.605 LEFT LEG PAIN: ICD-10-CM

## 2021-01-01 DIAGNOSIS — M21.611 BUNION, RIGHT FOOT: ICD-10-CM

## 2021-01-01 DIAGNOSIS — M85.859 OSTEOPENIA OF HIP, UNSPECIFIED LATERALITY: ICD-10-CM

## 2021-01-01 DIAGNOSIS — Z79.4 TYPE 2 DIABETES MELLITUS WITH OTHER CIRCULATORY COMPLICATION, WITH LONG-TERM CURRENT USE OF INSULIN (HCC): ICD-10-CM

## 2021-01-01 DIAGNOSIS — I10 ESSENTIAL HYPERTENSION: ICD-10-CM

## 2021-01-01 DIAGNOSIS — I25.5 ISCHEMIC CARDIOMYOPATHY: ICD-10-CM

## 2021-01-01 DIAGNOSIS — E78.5 HYPERLIPIDEMIA, UNSPECIFIED HYPERLIPIDEMIA TYPE: ICD-10-CM

## 2021-01-01 DIAGNOSIS — I25.10 CORONARY ARTERY DISEASE INVOLVING NATIVE CORONARY ARTERY OF NATIVE HEART WITHOUT ANGINA PECTORIS: ICD-10-CM

## 2021-01-01 DIAGNOSIS — R60.0 LEG EDEMA, LEFT: ICD-10-CM

## 2021-01-01 DIAGNOSIS — K21.9 GASTROESOPHAGEAL REFLUX DISEASE, UNSPECIFIED WHETHER ESOPHAGITIS PRESENT: ICD-10-CM

## 2021-01-01 DIAGNOSIS — R79.9 ELEVATED BUN: ICD-10-CM

## 2021-01-01 DIAGNOSIS — R93.1 ABNORMAL ECHOCARDIOGRAM: ICD-10-CM

## 2021-01-01 DIAGNOSIS — I48.0 PAROXYSMAL A-FIB (HCC): Primary | ICD-10-CM

## 2021-01-01 DIAGNOSIS — K11.20 PAROTIDITIS: ICD-10-CM

## 2021-01-01 DIAGNOSIS — Z87.891 PERSONAL HISTORY OF TOBACCO USE: ICD-10-CM

## 2021-01-01 DIAGNOSIS — Z51.81 ENCOUNTER FOR THERAPEUTIC DRUG MONITORING: ICD-10-CM

## 2021-01-01 DIAGNOSIS — R80.9 MICROALBUMINURIA: ICD-10-CM

## 2021-01-01 DIAGNOSIS — E87.5 HYPERKALEMIA: ICD-10-CM

## 2021-01-01 DIAGNOSIS — R80.1 PERSISTENT PROTEINURIA: ICD-10-CM

## 2021-01-01 DIAGNOSIS — D50.9 IRON DEFICIENCY ANEMIA, UNSPECIFIED IRON DEFICIENCY ANEMIA TYPE: ICD-10-CM

## 2021-01-01 DIAGNOSIS — L84 PRE-ULCERATIVE CALLUSES: ICD-10-CM

## 2021-01-01 DIAGNOSIS — Z12.31 ENCOUNTER FOR SCREENING MAMMOGRAM FOR MALIGNANT NEOPLASM OF BREAST: ICD-10-CM

## 2021-01-01 DIAGNOSIS — Z91.14 DRUG NONCOMPLIANCE: ICD-10-CM

## 2021-01-01 DIAGNOSIS — I89.0 LYMPHEDEMA OF LEFT LOWER EXTREMITY: ICD-10-CM

## 2021-01-01 DIAGNOSIS — R09.89 HOMANS SIGN PRESENT: ICD-10-CM

## 2021-01-01 DIAGNOSIS — Z87.891 HISTORY OF TOBACCO ABUSE: Primary | ICD-10-CM

## 2021-01-01 DIAGNOSIS — E11.59 TYPE 2 DIABETES MELLITUS WITH OTHER CIRCULATORY COMPLICATION, WITH LONG-TERM CURRENT USE OF INSULIN (HCC): ICD-10-CM

## 2021-01-01 DIAGNOSIS — I25.5 ISCHEMIC CARDIOMYOPATHY: Primary | ICD-10-CM

## 2021-01-01 DIAGNOSIS — E11.59 TYPE 2 DIABETES MELLITUS WITH OTHER CIRCULATORY COMPLICATION, WITH LONG-TERM CURRENT USE OF INSULIN (HCC): Primary | ICD-10-CM

## 2021-01-01 DIAGNOSIS — Z79.4 TYPE 2 DIABETES MELLITUS WITH DIABETIC POLYNEUROPATHY, WITH LONG-TERM CURRENT USE OF INSULIN (HCC): ICD-10-CM

## 2021-01-01 DIAGNOSIS — E87.20 LACTIC ACIDOSIS: ICD-10-CM

## 2021-01-01 DIAGNOSIS — Z79.4 TYPE 2 DIABETES MELLITUS WITH HYPERGLYCEMIA, WITH LONG-TERM CURRENT USE OF INSULIN (HCC): Primary | ICD-10-CM

## 2021-01-01 DIAGNOSIS — M70.62 TROCHANTERIC BURSITIS OF LEFT HIP: ICD-10-CM

## 2021-01-01 DIAGNOSIS — S82.092S CLOSED SLEEVE FRACTURE OF LEFT PATELLA, SEQUELA: ICD-10-CM

## 2021-01-01 DIAGNOSIS — E11.9 DIABETES MELLITUS TYPE 2, INSULIN DEPENDENT (HCC): Primary | ICD-10-CM

## 2021-01-01 DIAGNOSIS — D63.8 ANEMIA OF CHRONIC DISEASE: ICD-10-CM

## 2021-01-01 DIAGNOSIS — N18.30 STAGE 3 CHRONIC KIDNEY DISEASE, UNSPECIFIED WHETHER STAGE 3A OR 3B CKD (HCC): ICD-10-CM
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
## 2021-01-01 DIAGNOSIS — E16.2 HYPOGLYCEMIA: ICD-10-CM

## 2021-01-01 DIAGNOSIS — G47.419 PRIMARY NARCOLEPSY WITHOUT CATAPLEXY: ICD-10-CM

## 2021-01-01 DIAGNOSIS — I42.0 DILATED CARDIOMYOPATHY (HCC): ICD-10-CM

## 2021-01-01 DIAGNOSIS — G62.9 NEUROPATHY: ICD-10-CM

## 2021-01-01 DIAGNOSIS — N25.81 HYPERPARATHYROIDISM, SECONDARY RENAL (HCC): ICD-10-CM

## 2021-01-01 DIAGNOSIS — Z99.89 OSA ON CPAP: Primary | ICD-10-CM

## 2021-01-01 DIAGNOSIS — E03.9 HYPOTHYROIDISM, UNSPECIFIED TYPE: ICD-10-CM

## 2021-01-01 DIAGNOSIS — N18.4 CHRONIC KIDNEY DISEASE, STAGE 4 (SEVERE) (HCC): ICD-10-CM

## 2021-01-01 DIAGNOSIS — Z79.4 TYPE 2 DIABETES MELLITUS WITH HYPOGLYCEMIA WITHOUT COMA, WITH LONG-TERM CURRENT USE OF INSULIN (HCC): Primary | ICD-10-CM

## 2021-01-01 DIAGNOSIS — E86.0 DEHYDRATION: ICD-10-CM

## 2021-01-01 DIAGNOSIS — E11.9 DIABETES MELLITUS TYPE 2, INSULIN DEPENDENT (HCC): ICD-10-CM

## 2021-01-01 DIAGNOSIS — N39.41 URGE INCONTINENCE OF URINE: Primary | ICD-10-CM

## 2021-01-01 DIAGNOSIS — E04.2 MULTINODULAR GOITER: ICD-10-CM

## 2021-01-01 DIAGNOSIS — Z09 HOSPITAL DISCHARGE FOLLOW-UP: Primary | ICD-10-CM

## 2021-01-01 DIAGNOSIS — E10.59 TYPE 1 DIABETES MELLITUS WITH OTHER CIRCULATORY COMPLICATION (HCC): ICD-10-CM

## 2021-01-01 DIAGNOSIS — D63.1 ANEMIA DUE TO CHRONIC KIDNEY DISEASE, UNSPECIFIED CKD STAGE: ICD-10-CM

## 2021-01-01 DIAGNOSIS — N18.32 STAGE 3B CHRONIC KIDNEY DISEASE (HCC): Primary | ICD-10-CM

## 2021-01-01 DIAGNOSIS — N18.9 ANEMIA DUE TO CHRONIC KIDNEY DISEASE, UNSPECIFIED CKD STAGE: ICD-10-CM

## 2021-01-01 DIAGNOSIS — U07.1 ACUTE RESPIRATORY FAILURE DUE TO COVID-19 (HCC): Primary | ICD-10-CM

## 2021-01-01 DIAGNOSIS — E11.649 TYPE 2 DIABETES MELLITUS WITH HYPOGLYCEMIA WITHOUT COMA, WITH LONG-TERM CURRENT USE OF INSULIN (HCC): Primary | ICD-10-CM

## 2021-01-01 DIAGNOSIS — Z79.01 CHRONIC ANTICOAGULATION: ICD-10-CM

## 2021-01-01 DIAGNOSIS — I48.21 PERMANENT ATRIAL FIBRILLATION (HCC): Primary | ICD-10-CM

## 2021-01-01 DIAGNOSIS — N18.9 ACUTE RENAL FAILURE SUPERIMPOSED ON CHRONIC KIDNEY DISEASE, UNSPECIFIED CKD STAGE, UNSPECIFIED ACUTE RENAL FAILURE TYPE (HCC): ICD-10-CM

## 2021-01-01 DIAGNOSIS — I50.9 ACUTE ON CHRONIC CONGESTIVE HEART FAILURE, UNSPECIFIED HEART FAILURE TYPE (HCC): ICD-10-CM

## 2021-01-01 DIAGNOSIS — N18.9 ACUTE KIDNEY INJURY SUPERIMPOSED ON CKD (HCC): ICD-10-CM

## 2021-01-01 DIAGNOSIS — N17.9 ACUTE RENAL FAILURE SUPERIMPOSED ON CHRONIC KIDNEY DISEASE, UNSPECIFIED CKD STAGE, UNSPECIFIED ACUTE RENAL FAILURE TYPE (HCC): ICD-10-CM

## 2021-01-01 DIAGNOSIS — G93.41 ACUTE METABOLIC ENCEPHALOPATHY: Primary | ICD-10-CM

## 2021-01-01 DIAGNOSIS — I25.10 CORONARY ARTERY DISEASE INVOLVING NATIVE CORONARY ARTERY OF NATIVE HEART WITHOUT ANGINA PECTORIS: Primary | ICD-10-CM

## 2021-01-01 DIAGNOSIS — E21.3 HYPERPARATHYROIDISM (HCC): ICD-10-CM

## 2021-01-01 DIAGNOSIS — R41.0 ACUTE DELIRIUM: Primary | ICD-10-CM

## 2021-01-01 DIAGNOSIS — Z79.4 DIABETES MELLITUS TYPE 2, INSULIN DEPENDENT (HCC): ICD-10-CM

## 2021-01-01 DIAGNOSIS — E78.01 FAMILIAL HYPERCHOLESTEROLEMIA: ICD-10-CM

## 2021-01-01 DIAGNOSIS — J96.00 ACUTE RESPIRATORY FAILURE DUE TO COVID-19 (HCC): Primary | ICD-10-CM

## 2021-01-01 DIAGNOSIS — E11.65 TYPE 2 DIABETES MELLITUS WITH HYPERGLYCEMIA, WITH LONG-TERM CURRENT USE OF INSULIN (HCC): Primary | ICD-10-CM

## 2021-01-01 DIAGNOSIS — E04.9 GOITER, NODULAR: Primary | ICD-10-CM

## 2021-01-01 DIAGNOSIS — E04.9 GOITER, NODULAR: ICD-10-CM

## 2021-01-01 DIAGNOSIS — R93.89 ABNORMAL THYROID ULTRASOUND: ICD-10-CM

## 2021-01-01 DIAGNOSIS — N17.9 ACUTE KIDNEY INJURY SUPERIMPOSED ON CKD (HCC): ICD-10-CM

## 2021-01-01 DIAGNOSIS — E11.649 TYPE 2 DIABETES MELLITUS WITH HYPOGLYCEMIA WITHOUT COMA, WITH LONG-TERM CURRENT USE OF INSULIN (HCC): ICD-10-CM

## 2021-01-01 DIAGNOSIS — Z79.4 TYPE 2 DIABETES MELLITUS WITH OTHER CIRCULATORY COMPLICATION, WITH LONG-TERM CURRENT USE OF INSULIN (HCC): Primary | ICD-10-CM

## 2021-01-01 DIAGNOSIS — R60.0 PEDAL EDEMA: ICD-10-CM

## 2021-01-01 DIAGNOSIS — I50.31 ACUTE DIASTOLIC CHF (CONGESTIVE HEART FAILURE), NYHA CLASS 2 (HCC): Primary | ICD-10-CM

## 2021-01-01 DIAGNOSIS — S72.402A CLOSED FRACTURE OF DISTAL END OF LEFT FEMUR, UNSPECIFIED FRACTURE MORPHOLOGY, INITIAL ENCOUNTER (HCC): Primary | ICD-10-CM

## 2021-01-01 DIAGNOSIS — I42.8 NICM (NONISCHEMIC CARDIOMYOPATHY) (HCC): ICD-10-CM

## 2021-01-01 DIAGNOSIS — R41.82 ALTERED MENTAL STATUS, UNSPECIFIED ALTERED MENTAL STATUS TYPE: Primary | ICD-10-CM

## 2021-01-01 DIAGNOSIS — Z79.4 DIABETES MELLITUS TYPE 2, INSULIN DEPENDENT (HCC): Primary | ICD-10-CM

## 2021-01-01 DIAGNOSIS — R53.1 WEAK: Primary | ICD-10-CM

## 2021-01-01 DIAGNOSIS — L03.221 CELLULITIS, NECK: ICD-10-CM

## 2021-01-01 DIAGNOSIS — G47.33 OSA ON CPAP: Primary | ICD-10-CM

## 2021-01-01 DIAGNOSIS — E10.59 TYPE 1 DIABETES MELLITUS WITH OTHER CIRCULATORY COMPLICATION (HCC): Primary | ICD-10-CM

## 2021-01-01 DIAGNOSIS — K11.20 PAROTITIS: Primary | ICD-10-CM

## 2021-01-01 LAB
ABO CHECK: NORMAL
ABO: NORMAL
ACETYLCHOLINE BLOCKING AB: 0 % (ref 0–26)
ACINETOBACTER BAUMANNII FILM ARRAY: NOT DETECTED
ALBUMIN SERPL-MCNC: 1.3 G/DL (ref 3.5–5.1)
ALBUMIN SERPL-MCNC: 2.5 G/DL
ALBUMIN SERPL-MCNC: 2.6 G/DL (ref 3.5–5.1)
ALBUMIN SERPL-MCNC: 2.7 G/DL (ref 3.5–5.1)
ALBUMIN SERPL-MCNC: 2.8 G/DL (ref 3.5–5.1)
ALBUMIN SERPL-MCNC: 2.8 G/DL (ref 3.5–5.1)
ALBUMIN SERPL-MCNC: 3 G/DL
ALBUMIN SERPL-MCNC: 3.1 G/DL (ref 3.5–5.1)
ALBUMIN SERPL-MCNC: 3.4 G/DL (ref 3.5–5.1)
ALBUMIN SERPL-MCNC: 4.1 G/DL (ref 3.5–5.1)
ALLEN TEST: ABNORMAL
ALLEN TEST: POSITIVE
ALP BLD-CCNC: 37 U/L (ref 38–126)
ALP BLD-CCNC: 44 U/L (ref 38–126)
ALP BLD-CCNC: 51 U/L (ref 38–126)
ALP BLD-CCNC: 51 U/L (ref 38–126)
ALP BLD-CCNC: 54 U/L (ref 38–126)
ALP BLD-CCNC: 59 U/L
ALP BLD-CCNC: 62 U/L (ref 38–126)
ALP BLD-CCNC: 69 U/L (ref 38–126)
ALP BLD-CCNC: 79 U/L (ref 38–126)
ALP BLD-CCNC: 90 U/L
ALT SERPL-CCNC: 14 U/L (ref 11–66)
ALT SERPL-CCNC: 14 U/L (ref 11–66)
ALT SERPL-CCNC: 15 U/L (ref 11–66)
ALT SERPL-CCNC: 15 U/L (ref 11–66)
ALT SERPL-CCNC: 16 U/L (ref 11–66)
ALT SERPL-CCNC: 17 U/L (ref 11–66)
ALT SERPL-CCNC: 7 U/L
ALT SERPL-CCNC: 9 U/L
ALT SERPL-CCNC: 9 U/L (ref 11–66)
ALT SERPL-CCNC: < 5 U/L (ref 11–66)
AMMONIA: 13 UMOL/L (ref 11–60)
AMMONIA: 132 UMOL/L (ref 11–60)
AMMONIA: 16 UMOL/L (ref 11–60)
AMMONIA: 21 UMOL/L (ref 11–60)
AMMONIA: 22 UMOL/L (ref 11–60)
AMORPHOUS: ABNORMAL
AMPHETAMINE+METHAMPHETAMINE URINE SCREEN: NEGATIVE
AMYLASE: 240 U/L (ref 20–104)
ANION GAP SERPL CALCULATED.3IONS-SCNC: 10 MEQ/L (ref 8–16)
ANION GAP SERPL CALCULATED.3IONS-SCNC: 11 MEQ/L (ref 8–16)
ANION GAP SERPL CALCULATED.3IONS-SCNC: 12 MEQ/L (ref 8–16)
ANION GAP SERPL CALCULATED.3IONS-SCNC: 13 MEQ/L (ref 8–16)
ANION GAP SERPL CALCULATED.3IONS-SCNC: 14 MEQ/L (ref 8–16)
ANION GAP SERPL CALCULATED.3IONS-SCNC: 14 MEQ/L (ref 8–16)
ANION GAP SERPL CALCULATED.3IONS-SCNC: 15 MEQ/L (ref 8–16)
ANION GAP SERPL CALCULATED.3IONS-SCNC: 17 MEQ/L (ref 8–16)
ANION GAP SERPL CALCULATED.3IONS-SCNC: 17 MEQ/L (ref 8–16)
ANION GAP SERPL CALCULATED.3IONS-SCNC: 27 MEQ/L (ref 8–16)
ANION GAP SERPL CALCULATED.3IONS-SCNC: 7 MEQ/L (ref 8–16)
ANION GAP SERPL CALCULATED.3IONS-SCNC: 7 MEQ/L (ref 8–16)
ANION GAP SERPL CALCULATED.3IONS-SCNC: 8 MEQ/L (ref 8–16)
ANION GAP SERPL CALCULATED.3IONS-SCNC: 9 MEQ/L (ref 8–16)
ANION GAP SERPL CALCULATED.3IONS-SCNC: ABNORMAL MMOL/L
ANION GAP SERPL CALCULATED.3IONS-SCNC: NORMAL MMOL/L
ANISOCYTOSIS: PRESENT
ANTIBODY SCREEN: NORMAL
APTT: 40.6 SECONDS (ref 22–38)
APTT: 45.8 SECONDS (ref 22–38)
APTT: 46.7 SECONDS (ref 22–38)
APTT: 47.9 SECONDS (ref 22–38)
AST SERPL-CCNC: 15 U/L (ref 5–40)
AST SERPL-CCNC: 16 U/L
AST SERPL-CCNC: 21 U/L (ref 5–40)
AST SERPL-CCNC: 24 U/L
AST SERPL-CCNC: 24 U/L (ref 5–40)
AST SERPL-CCNC: 27 U/L (ref 5–40)
AST SERPL-CCNC: 30 U/L (ref 5–40)
AST SERPL-CCNC: 40 U/L (ref 5–40)
AST SERPL-CCNC: 42 U/L (ref 5–40)
AST SERPL-CCNC: 52 U/L (ref 5–40)
AVERAGE GLUCOSE: 105 MG/DL (ref 70–126)
B-TYPE NATRIURETIC PEPTIDE: 3061.9 PG/ML
BACTERIA: ABNORMAL /HPF
BARBITURATE QUANTITATIVE URINE: NEGATIVE
BASE EXCESS (CALCULATED): -18.4 MMOL/L (ref -2.5–2.5)
BASE EXCESS (CALCULATED): -3.4 MMOL/L (ref -2.5–2.5)
BASE EXCESS (CALCULATED): -5.6 MMOL/L (ref -2.5–2.5)
BASE EXCESS (CALCULATED): -6.9 MMOL/L (ref -2.5–2.5)
BASE EXCESS MIXED: -5.7 MMOL/L (ref -2–3)
BASE EXCESS MIXED: 1.5 MMOL/L (ref -2–3)
BASOPHILIA: ABNORMAL
BASOPHILIA: ABNORMAL
BASOPHILS # BLD: 0 %
BASOPHILS # BLD: 0 %
BASOPHILS # BLD: 0.2 %
BASOPHILS # BLD: 0.4 %
BASOPHILS # BLD: 0.6 %
BASOPHILS # BLD: 0.7 %
BASOPHILS # BLD: 0.9 %
BASOPHILS # BLD: 1.1 %
BASOPHILS ABSOLUTE: 0 THOU/MM3 (ref 0–0.1)
BASOPHILS ABSOLUTE: 0.1 THOU/MM3 (ref 0–0.1)
BASOPHILS ABSOLUTE: 0.1 THOU/MM3 (ref 0–0.1)
BASOPHILS ABSOLUTE: ABNORMAL
BASOPHILS RELATIVE PERCENT: ABNORMAL
BENZODIAZEPINE QUANTITATIVE URINE: NEGATIVE
BILIRUB SERPL-MCNC: 0.5 MG/DL (ref 0.3–1.2)
BILIRUB SERPL-MCNC: 0.8 MG/DL (ref 0.1–1.4)
BILIRUB SERPL-MCNC: 0.9 MG/DL (ref 0.3–1.2)
BILIRUB SERPL-MCNC: 1 MG/DL (ref 0.3–1.2)
BILIRUB SERPL-MCNC: 1 MG/DL (ref 0.3–1.2)
BILIRUB SERPL-MCNC: 1.3 MG/DL (ref 0.3–1.2)
BILIRUB SERPL-MCNC: 1.4 MG/DL (ref 0.3–1.2)
BILIRUB SERPL-MCNC: 1.5 MG/DL (ref 0.1–1.4)
BILIRUB SERPL-MCNC: 1.5 MG/DL (ref 0.3–1.2)
BILIRUB SERPL-MCNC: 1.6 MG/DL (ref 0.3–1.2)
BILIRUBIN DIRECT: 0.3 MG/DL (ref 0–0.3)
BILIRUBIN DIRECT: 0.5 MG/DL (ref 0–0.3)
BILIRUBIN DIRECT: 1 MG/DL (ref 0–0.3)
BILIRUBIN URINE: ABNORMAL
BILIRUBIN URINE: NEGATIVE
BLOOD CULTURE, ROUTINE: ABNORMAL
BLOOD CULTURE, ROUTINE: NORMAL
BLOOD URINE, POC: NEGATIVE
BLOOD, URINE: NEGATIVE
BOTTLE TYPE: ABNORMAL
BUN BLDV-MCNC: 28 MG/DL (ref 7–22)
BUN BLDV-MCNC: 29 MG/DL
BUN BLDV-MCNC: 29 MG/DL (ref 7–22)
BUN BLDV-MCNC: 35 MG/DL (ref 7–22)
BUN BLDV-MCNC: 38 MG/DL (ref 7–22)
BUN BLDV-MCNC: 40 MG/DL (ref 7–22)
BUN BLDV-MCNC: 41 MG/DL (ref 7–22)
BUN BLDV-MCNC: 42 MG/DL (ref 7–22)
BUN BLDV-MCNC: 44 MG/DL (ref 7–22)
BUN BLDV-MCNC: 44 MG/DL (ref 7–22)
BUN BLDV-MCNC: 45 MG/DL (ref 7–22)
BUN BLDV-MCNC: 47 MG/DL (ref 7–22)
BUN BLDV-MCNC: 49 MG/DL (ref 7–22)
BUN BLDV-MCNC: 49 MG/DL (ref 7–22)
BUN BLDV-MCNC: 51 MG/DL (ref 7–22)
BUN BLDV-MCNC: 52 MG/DL (ref 7–22)
BUN BLDV-MCNC: 54 MG/DL (ref 7–22)
BUN BLDV-MCNC: 55 MG/DL (ref 7–22)
BUN BLDV-MCNC: 56 MG/DL (ref 7–22)
BUN BLDV-MCNC: 58 MG/DL (ref 7–22)
BUN BLDV-MCNC: 59 MG/DL (ref 7–22)
BUN BLDV-MCNC: 59 MG/DL (ref 7–22)
BUN BLDV-MCNC: 60 MG/DL (ref 7–22)
BUN BLDV-MCNC: 61 MG/DL (ref 7–22)
BUN BLDV-MCNC: 62 MG/DL
BUN BLDV-MCNC: 64 MG/DL (ref 7–22)
BUN BLDV-MCNC: 65 MG/DL (ref 7–22)
C-REACTIVE PROTEIN: 12.59 MG/DL (ref 0–1)
C-REACTIVE PROTEIN: 15.44 MG/DL (ref 0–1)
C-REACTIVE PROTEIN: 5.7 MG/DL (ref 0–1)
C-REACTIVE PROTEIN: 7.27 MG/DL (ref 0–1)
C-REACTIVE PROTEIN: 7.63 MG/DL (ref 0–1)
CALCIUM IONIZED SERUM: 1.27 MMOL/L (ref 1.12–1.32)
CALCIUM IONIZED: 0.85 MMOL/L (ref 1.12–1.32)
CALCIUM SERPL-MCNC: 6.6 MG/DL (ref 8.5–10.5)
CALCIUM SERPL-MCNC: 7.7 MG/DL (ref 8.5–10.5)
CALCIUM SERPL-MCNC: 8 MG/DL (ref 8.5–10.5)
CALCIUM SERPL-MCNC: 8.3 MG/DL (ref 8.5–10.5)
CALCIUM SERPL-MCNC: 8.3 MG/DL (ref 8.5–10.5)
CALCIUM SERPL-MCNC: 8.4 MG/DL (ref 8.5–10.5)
CALCIUM SERPL-MCNC: 8.4 MG/DL (ref 8.5–10.5)
CALCIUM SERPL-MCNC: 8.5 MG/DL (ref 8.5–10.5)
CALCIUM SERPL-MCNC: 8.6 MG/DL
CALCIUM SERPL-MCNC: 8.6 MG/DL (ref 8.5–10.5)
CALCIUM SERPL-MCNC: 8.7 MG/DL (ref 8.5–10.5)
CALCIUM SERPL-MCNC: 8.7 MG/DL (ref 8.5–10.5)
CALCIUM SERPL-MCNC: 8.8 MG/DL (ref 8.5–10.5)
CALCIUM SERPL-MCNC: 8.9 MG/DL (ref 8.5–10.5)
CALCIUM SERPL-MCNC: 9 MG/DL (ref 8.5–10.5)
CALCIUM SERPL-MCNC: 9.1 MG/DL (ref 8.5–10.5)
CALCIUM SERPL-MCNC: 9.2 MG/DL
CALCIUM SERPL-MCNC: 9.3 MG/DL (ref 8.5–10.5)
CALCIUM SERPL-MCNC: 9.5 MG/DL (ref 8.5–10.5)
CALCIUM SERPL-MCNC: 9.8 MG/DL (ref 8.5–10.5)
CALCIUM SERPL-MCNC: 9.8 MG/DL (ref 8.5–10.5)
CANDIDA ALBICANS FILM ARRAY: NOT DETECTED
CANDIDA GLABRATA FILM ARRAY: NOT DETECTED
CANDIDA KRUSEI FILM ARRAY: NOT DETECTED
CANDIDA PARAPSILOSIS FILM ARRAY: NOT DETECTED
CANDIDA TROPICALIS FILM ARRAY: NOT DETECTED
CANNABINOID QUANTITATIVE URINE: NEGATIVE
CARBAPENEM RESITANT FILM ARRAY: ABNORMAL
CASTS 2: ABNORMAL /LPF
CASTS UA: ABNORMAL /LPF
CHARACTER, URINE: ABNORMAL
CHARACTER, URINE: ABNORMAL
CHARACTER, URINE: CLEAR
CHLORIDE BLD-SCNC: 100 MEQ/L (ref 98–111)
CHLORIDE BLD-SCNC: 100 MEQ/L (ref 98–111)
CHLORIDE BLD-SCNC: 101 MEQ/L (ref 98–111)
CHLORIDE BLD-SCNC: 102 MEQ/L (ref 98–111)
CHLORIDE BLD-SCNC: 102 MEQ/L (ref 98–111)
CHLORIDE BLD-SCNC: 103 MEQ/L (ref 98–111)
CHLORIDE BLD-SCNC: 104 MEQ/L (ref 98–111)
CHLORIDE BLD-SCNC: 105 MEQ/L (ref 98–111)
CHLORIDE BLD-SCNC: 105 MEQ/L (ref 98–111)
CHLORIDE BLD-SCNC: 106 MEQ/L (ref 98–111)
CHLORIDE BLD-SCNC: 107 MEQ/L (ref 98–111)
CHLORIDE BLD-SCNC: 107 MEQ/L (ref 98–111)
CHLORIDE BLD-SCNC: 108 MEQ/L (ref 98–111)
CHLORIDE BLD-SCNC: 94 MEQ/L (ref 98–111)
CHLORIDE BLD-SCNC: 96 MEQ/L (ref 98–111)
CHLORIDE BLD-SCNC: 97 MEQ/L (ref 98–111)
CHLORIDE BLD-SCNC: 97 MEQ/L (ref 98–111)
CHLORIDE BLD-SCNC: 97 MMOL/L
CHLORIDE BLD-SCNC: 98 MEQ/L (ref 98–111)
CHLORIDE BLD-SCNC: 98 MEQ/L (ref 98–111)
CHLORIDE BLD-SCNC: 99 MEQ/L (ref 98–111)
CHLORIDE BLD-SCNC: 99 MMOL/L
CHLORIDE, WHOLE BLOOD: 102 MEQ/L (ref 98–109)
CHOLESTEROL, TOTAL: 122 MG/DL (ref 100–199)
CHOLESTEROL, TOTAL: 125 MG/DL
CHOLESTEROL/HDL RATIO: ABNORMAL
CHP ED QC CHECK: ABNORMAL
CHP ED QC CHECK: NORMAL
CO2: 16 MEQ/L (ref 23–33)
CO2: 17 MEQ/L (ref 23–33)
CO2: 17 MEQ/L (ref 23–33)
CO2: 18 MEQ/L (ref 23–33)
CO2: 19 MEQ/L (ref 23–33)
CO2: 20 MEQ/L (ref 23–33)
CO2: 21 MEQ/L (ref 23–33)
CO2: 22 MEQ/L (ref 23–33)
CO2: 23 MEQ/L (ref 23–33)
CO2: 24 MEQ/L (ref 23–33)
CO2: 25 MEQ/L (ref 23–33)
CO2: 26 MEQ/L (ref 23–33)
CO2: 26 MMOL/L
CO2: 27 MEQ/L (ref 23–33)
CO2: 27 MEQ/L (ref 23–33)
CO2: 28 MEQ/L (ref 23–33)
CO2: 28 MEQ/L (ref 23–33)
CO2: 29 MEQ/L (ref 23–33)
CO2: 38 MMOL/L
COCAINE METABOLITE QUANTITATIVE URINE: NEGATIVE
COLLECTED BY:: ABNORMAL
COLOR, URINE: ABNORMAL
COLOR: ABNORMAL
COLOR: YELLOW
CREAT SERPL-MCNC: 1.4 MG/DL (ref 0.4–1.2)
CREAT SERPL-MCNC: 1.5 MG/DL
CREAT SERPL-MCNC: 1.5 MG/DL (ref 0.4–1.2)
CREAT SERPL-MCNC: 1.6 MG/DL (ref 0.4–1.2)
CREAT SERPL-MCNC: 1.7 MG/DL (ref 0.4–1.2)
CREAT SERPL-MCNC: 1.8 MG/DL (ref 0.4–1.2)
CREAT SERPL-MCNC: 1.9 MG/DL (ref 0.4–1.2)
CREAT SERPL-MCNC: 2 MG/DL
CREAT SERPL-MCNC: 2 MG/DL (ref 0.4–1.2)
CREAT SERPL-MCNC: 2 MG/DL (ref 0.4–1.2)
CREAT SERPL-MCNC: 2.1 MG/DL (ref 0.4–1.2)
CREAT SERPL-MCNC: 2.2 MG/DL (ref 0.4–1.2)
CREAT SERPL-MCNC: 2.2 MG/DL (ref 0.4–1.2)
CREAT SERPL-MCNC: 2.3 MG/DL (ref 0.4–1.2)
CREAT SERPL-MCNC: 2.3 MG/DL (ref 0.4–1.2)
CREAT SERPL-MCNC: 2.4 MG/DL (ref 0.4–1.2)
CREATININE URINE: 131.4 MG/DL
CREATININE URINE: 67.1 MG/DL
CREATININE, URINE: 56.4 MG/DL
CRYSTALS, UA: ABNORMAL
DEVICE: ABNORMAL
DIGOXIN LEVEL: 0.5 NG/ML (ref 0.5–2)
DIGOXIN LEVEL: 0.5 NG/ML (ref 0.5–2)
DIGOXIN LEVEL: 0.8 NG/ML (ref 0.5–2)
DIGOXIN LEVEL: 1 NG/ML (ref 0.5–2)
DIGOXIN LEVEL: < 0.3 NG/ML (ref 0.5–2)
EKG ATRIAL RATE: 43 BPM
EKG ATRIAL RATE: 44 BPM
EKG ATRIAL RATE: 74 BPM
EKG Q-T INTERVAL: 318 MS
EKG Q-T INTERVAL: 322 MS
EKG Q-T INTERVAL: 332 MS
EKG Q-T INTERVAL: 348 MS
EKG Q-T INTERVAL: 360 MS
EKG Q-T INTERVAL: 366 MS
EKG Q-T INTERVAL: 366 MS
EKG Q-T INTERVAL: 378 MS
EKG Q-T INTERVAL: 408 MS
EKG QRS DURATION: 106 MS
EKG QRS DURATION: 106 MS
EKG QRS DURATION: 114 MS
EKG QRS DURATION: 114 MS
EKG QRS DURATION: 116 MS
EKG QRS DURATION: 120 MS
EKG QRS DURATION: 122 MS
EKG QRS DURATION: 122 MS
EKG QRS DURATION: 126 MS
EKG QTC CALCULATION (BAZETT): 406 MS
EKG QTC CALCULATION (BAZETT): 410 MS
EKG QTC CALCULATION (BAZETT): 432 MS
EKG QTC CALCULATION (BAZETT): 435 MS
EKG QTC CALCULATION (BAZETT): 447 MS
EKG QTC CALCULATION (BAZETT): 457 MS
EKG QTC CALCULATION (BAZETT): 481 MS
EKG QTC CALCULATION (BAZETT): 496 MS
EKG QTC CALCULATION (BAZETT): 510 MS
EKG R AXIS: -16 DEGREES
EKG R AXIS: -23 DEGREES
EKG R AXIS: -24 DEGREES
EKG R AXIS: -27 DEGREES
EKG R AXIS: -3 DEGREES
EKG R AXIS: -36 DEGREES
EKG R AXIS: 113 DEGREES
EKG R AXIS: 5 DEGREES
EKG R AXIS: 7 DEGREES
EKG T AXIS: -109 DEGREES
EKG T AXIS: -137 DEGREES
EKG T AXIS: -167 DEGREES
EKG T AXIS: -175 DEGREES
EKG T AXIS: -78 DEGREES
EKG T AXIS: 152 DEGREES
EKG T AXIS: 152 DEGREES
EKG T AXIS: 158 DEGREES
EKG T AXIS: 170 DEGREES
EKG VENTRICULAR RATE: 102 BPM
EKG VENTRICULAR RATE: 115 BPM
EKG VENTRICULAR RATE: 143 BPM
EKG VENTRICULAR RATE: 155 BPM
EKG VENTRICULAR RATE: 61 BPM
EKG VENTRICULAR RATE: 74 BPM
EKG VENTRICULAR RATE: 85 BPM
EKG VENTRICULAR RATE: 88 BPM
EKG VENTRICULAR RATE: 93 BPM
ELLIPTOCYTES: ABNORMAL
ENTERBACTER CLOACAE FILM ARRAY: NOT DETECTED
ENTERBACTERIACEAE FILM ARRAY: NOT DETECTED
ENTEROCOCCUS FILM ARRAY: NOT DETECTED
EOSINOPHIL # BLD: 0 %
EOSINOPHIL # BLD: 0.1 %
EOSINOPHIL # BLD: 0.3 %
EOSINOPHIL # BLD: 0.5 %
EOSINOPHIL # BLD: 2.2 %
EOSINOPHIL # BLD: 2.4 %
EOSINOPHIL # BLD: 3.1 %
EOSINOPHIL # BLD: 3.7 %
EOSINOPHIL # BLD: 4.2 %
EOSINOPHIL # BLD: 5.1 %
EOSINOPHIL # BLD: 5.6 %
EOSINOPHIL SMEAR: NORMAL
EOSINOPHIL SMEAR: NORMAL
EOSINOPHILS ABSOLUTE: 0 THOU/MM3 (ref 0–0.4)
EOSINOPHILS ABSOLUTE: 0.1 THOU/MM3 (ref 0–0.4)
EOSINOPHILS ABSOLUTE: 0.2 THOU/MM3 (ref 0–0.4)
EOSINOPHILS ABSOLUTE: 0.3 THOU/MM3 (ref 0–0.4)
EOSINOPHILS ABSOLUTE: ABNORMAL
EOSINOPHILS RELATIVE PERCENT: ABNORMAL
EPITHELIAL CELLS, UA: ABNORMAL /HPF
ERYTHROCYTE [DISTWIDTH] IN BLOOD BY AUTOMATED COUNT: 13.3 % (ref 11.5–14.5)
ERYTHROCYTE [DISTWIDTH] IN BLOOD BY AUTOMATED COUNT: 13.3 % (ref 11.5–14.5)
ERYTHROCYTE [DISTWIDTH] IN BLOOD BY AUTOMATED COUNT: 13.4 % (ref 11.5–14.5)
ERYTHROCYTE [DISTWIDTH] IN BLOOD BY AUTOMATED COUNT: 13.5 % (ref 11.5–14.5)
ERYTHROCYTE [DISTWIDTH] IN BLOOD BY AUTOMATED COUNT: 13.6 % (ref 11.5–14.5)
ERYTHROCYTE [DISTWIDTH] IN BLOOD BY AUTOMATED COUNT: 13.6 % (ref 11.5–14.5)
ERYTHROCYTE [DISTWIDTH] IN BLOOD BY AUTOMATED COUNT: 13.8 % (ref 11.5–14.5)
ERYTHROCYTE [DISTWIDTH] IN BLOOD BY AUTOMATED COUNT: 13.9 % (ref 11.5–14.5)
ERYTHROCYTE [DISTWIDTH] IN BLOOD BY AUTOMATED COUNT: 14 % (ref 11.5–14.5)
ERYTHROCYTE [DISTWIDTH] IN BLOOD BY AUTOMATED COUNT: 14.7 % (ref 11.5–14.5)
ERYTHROCYTE [DISTWIDTH] IN BLOOD BY AUTOMATED COUNT: 15.9 % (ref 11.5–14.5)
ERYTHROCYTE [DISTWIDTH] IN BLOOD BY AUTOMATED COUNT: 16 % (ref 11.5–14.5)
ERYTHROCYTE [DISTWIDTH] IN BLOOD BY AUTOMATED COUNT: 16.1 % (ref 11.5–14.5)
ERYTHROCYTE [DISTWIDTH] IN BLOOD BY AUTOMATED COUNT: 16.2 % (ref 11.5–14.5)
ERYTHROCYTE [DISTWIDTH] IN BLOOD BY AUTOMATED COUNT: 16.3 % (ref 11.5–14.5)
ERYTHROCYTE [DISTWIDTH] IN BLOOD BY AUTOMATED COUNT: 16.4 % (ref 11.5–14.5)
ERYTHROCYTE [DISTWIDTH] IN BLOOD BY AUTOMATED COUNT: 16.6 % (ref 11.5–14.5)
ERYTHROCYTE [DISTWIDTH] IN BLOOD BY AUTOMATED COUNT: 16.9 % (ref 11.5–14.5)
ERYTHROCYTE [DISTWIDTH] IN BLOOD BY AUTOMATED COUNT: 17.2 % (ref 11.5–14.5)
ERYTHROCYTE [DISTWIDTH] IN BLOOD BY AUTOMATED COUNT: 17.7 % (ref 11.5–14.5)
ERYTHROCYTE [DISTWIDTH] IN BLOOD BY AUTOMATED COUNT: 20 % (ref 11.5–14.5)
ERYTHROCYTE [DISTWIDTH] IN BLOOD BY AUTOMATED COUNT: 20 % (ref 11.5–14.5)
ERYTHROCYTE [DISTWIDTH] IN BLOOD BY AUTOMATED COUNT: 50.5 FL (ref 35–45)
ERYTHROCYTE [DISTWIDTH] IN BLOOD BY AUTOMATED COUNT: 50.8 FL (ref 35–45)
ERYTHROCYTE [DISTWIDTH] IN BLOOD BY AUTOMATED COUNT: 50.8 FL (ref 35–45)
ERYTHROCYTE [DISTWIDTH] IN BLOOD BY AUTOMATED COUNT: 50.9 FL (ref 35–45)
ERYTHROCYTE [DISTWIDTH] IN BLOOD BY AUTOMATED COUNT: 51.4 FL (ref 35–45)
ERYTHROCYTE [DISTWIDTH] IN BLOOD BY AUTOMATED COUNT: 51.5 FL (ref 35–45)
ERYTHROCYTE [DISTWIDTH] IN BLOOD BY AUTOMATED COUNT: 51.8 FL (ref 35–45)
ERYTHROCYTE [DISTWIDTH] IN BLOOD BY AUTOMATED COUNT: 51.8 FL (ref 35–45)
ERYTHROCYTE [DISTWIDTH] IN BLOOD BY AUTOMATED COUNT: 52.2 FL (ref 35–45)
ERYTHROCYTE [DISTWIDTH] IN BLOOD BY AUTOMATED COUNT: 52.4 FL (ref 35–45)
ERYTHROCYTE [DISTWIDTH] IN BLOOD BY AUTOMATED COUNT: 52.7 FL (ref 35–45)
ERYTHROCYTE [DISTWIDTH] IN BLOOD BY AUTOMATED COUNT: 53.1 FL (ref 35–45)
ERYTHROCYTE [DISTWIDTH] IN BLOOD BY AUTOMATED COUNT: 53.2 FL (ref 35–45)
ERYTHROCYTE [DISTWIDTH] IN BLOOD BY AUTOMATED COUNT: 59 FL (ref 35–45)
ERYTHROCYTE [DISTWIDTH] IN BLOOD BY AUTOMATED COUNT: 61.1 FL (ref 35–45)
ERYTHROCYTE [DISTWIDTH] IN BLOOD BY AUTOMATED COUNT: 62 FL (ref 35–45)
ERYTHROCYTE [DISTWIDTH] IN BLOOD BY AUTOMATED COUNT: 62.1 FL (ref 35–45)
ERYTHROCYTE [DISTWIDTH] IN BLOOD BY AUTOMATED COUNT: 62.3 FL (ref 35–45)
ERYTHROCYTE [DISTWIDTH] IN BLOOD BY AUTOMATED COUNT: 62.3 FL (ref 35–45)
ERYTHROCYTE [DISTWIDTH] IN BLOOD BY AUTOMATED COUNT: 62.9 FL (ref 35–45)
ERYTHROCYTE [DISTWIDTH] IN BLOOD BY AUTOMATED COUNT: 63 FL (ref 35–45)
ERYTHROCYTE [DISTWIDTH] IN BLOOD BY AUTOMATED COUNT: 63.2 FL (ref 35–45)
ERYTHROCYTE [DISTWIDTH] IN BLOOD BY AUTOMATED COUNT: 63.5 FL (ref 35–45)
ERYTHROCYTE [DISTWIDTH] IN BLOOD BY AUTOMATED COUNT: 63.5 FL (ref 35–45)
ERYTHROCYTE [DISTWIDTH] IN BLOOD BY AUTOMATED COUNT: 65.3 FL (ref 35–45)
ERYTHROCYTE [DISTWIDTH] IN BLOOD BY AUTOMATED COUNT: 65.5 FL (ref 35–45)
ERYTHROCYTE [DISTWIDTH] IN BLOOD BY AUTOMATED COUNT: 73.2 FL (ref 35–45)
ERYTHROCYTE [DISTWIDTH] IN BLOOD BY AUTOMATED COUNT: 73.6 FL (ref 35–45)
ESCHERICHIA COLI FILM ARRAY: NOT DETECTED
ETHYL ALCOHOL, SERUM: < 0.01 %
FOLATE: > 20 NG/ML (ref 4.8–24.2)
FOLATE: > 20 NG/ML (ref 4.8–24.2)
FOLATE: >24.1
GFR CALCULATED: 25
GFR CALCULATED: 35
GFR SERPL CREATININE-BSD FRML MDRD: 20 ML/MIN/1.73M2
GFR SERPL CREATININE-BSD FRML MDRD: 21 ML/MIN/1.73M2
GFR SERPL CREATININE-BSD FRML MDRD: 21 ML/MIN/1.73M2
GFR SERPL CREATININE-BSD FRML MDRD: 22 ML/MIN/1.73M2
GFR SERPL CREATININE-BSD FRML MDRD: 22 ML/MIN/1.73M2
GFR SERPL CREATININE-BSD FRML MDRD: 24 ML/MIN/1.73M2
GFR SERPL CREATININE-BSD FRML MDRD: 25 ML/MIN/1.73M2
GFR SERPL CREATININE-BSD FRML MDRD: 25 ML/MIN/1.73M2
GFR SERPL CREATININE-BSD FRML MDRD: 26 ML/MIN/1.73M2
GFR SERPL CREATININE-BSD FRML MDRD: 28 ML/MIN/1.73M2
GFR SERPL CREATININE-BSD FRML MDRD: 30 ML/MIN/1.73M2
GFR SERPL CREATININE-BSD FRML MDRD: 32 ML/MIN/1.73M2
GFR SERPL CREATININE-BSD FRML MDRD: 35 ML/MIN/1.73M2
GFR SERPL CREATININE-BSD FRML MDRD: 38 ML/MIN/1.73M2
GLUCOSE BLD-MCNC: 100 MG/DL (ref 70–108)
GLUCOSE BLD-MCNC: 101 MG/DL (ref 70–108)
GLUCOSE BLD-MCNC: 101 MG/DL (ref 70–108)
GLUCOSE BLD-MCNC: 102 MG/DL (ref 70–108)
GLUCOSE BLD-MCNC: 102 MG/DL (ref 70–108)
GLUCOSE BLD-MCNC: 103 MG/DL (ref 70–108)
GLUCOSE BLD-MCNC: 104 MG/DL
GLUCOSE BLD-MCNC: 106 MG/DL (ref 70–108)
GLUCOSE BLD-MCNC: 106 MG/DL (ref 70–108)
GLUCOSE BLD-MCNC: 108 MG/DL (ref 70–108)
GLUCOSE BLD-MCNC: 109 MG/DL (ref 70–108)
GLUCOSE BLD-MCNC: 110 MG/DL (ref 70–108)
GLUCOSE BLD-MCNC: 111 MG/DL (ref 70–108)
GLUCOSE BLD-MCNC: 111 MG/DL (ref 70–108)
GLUCOSE BLD-MCNC: 113 MG/DL (ref 70–108)
GLUCOSE BLD-MCNC: 113 MG/DL (ref 70–108)
GLUCOSE BLD-MCNC: 116 MG/DL (ref 70–108)
GLUCOSE BLD-MCNC: 118 MG/DL (ref 70–108)
GLUCOSE BLD-MCNC: 122 MG/DL (ref 70–108)
GLUCOSE BLD-MCNC: 124 MG/DL (ref 70–108)
GLUCOSE BLD-MCNC: 124 MG/DL (ref 70–108)
GLUCOSE BLD-MCNC: 130 MG/DL (ref 70–108)
GLUCOSE BLD-MCNC: 131 MG/DL
GLUCOSE BLD-MCNC: 132 MG/DL (ref 70–108)
GLUCOSE BLD-MCNC: 134 MG/DL
GLUCOSE BLD-MCNC: 134 MG/DL (ref 70–108)
GLUCOSE BLD-MCNC: 135 MG/DL (ref 70–108)
GLUCOSE BLD-MCNC: 136 MG/DL (ref 70–108)
GLUCOSE BLD-MCNC: 137 MG/DL (ref 70–108)
GLUCOSE BLD-MCNC: 137 MG/DL (ref 70–108)
GLUCOSE BLD-MCNC: 139 MG/DL (ref 70–108)
GLUCOSE BLD-MCNC: 139 MG/DL (ref 70–108)
GLUCOSE BLD-MCNC: 140 MG/DL (ref 70–108)
GLUCOSE BLD-MCNC: 142 MG/DL (ref 70–108)
GLUCOSE BLD-MCNC: 143 MG/DL (ref 70–108)
GLUCOSE BLD-MCNC: 143 MG/DL (ref 70–108)
GLUCOSE BLD-MCNC: 144 MG/DL (ref 70–108)
GLUCOSE BLD-MCNC: 147 MG/DL (ref 70–108)
GLUCOSE BLD-MCNC: 147 MG/DL (ref 70–108)
GLUCOSE BLD-MCNC: 149 MG/DL (ref 70–108)
GLUCOSE BLD-MCNC: 150 MG/DL (ref 70–108)
GLUCOSE BLD-MCNC: 151 MG/DL (ref 70–108)
GLUCOSE BLD-MCNC: 152 MG/DL (ref 70–108)
GLUCOSE BLD-MCNC: 153 MG/DL (ref 70–108)
GLUCOSE BLD-MCNC: 154 MG/DL (ref 70–108)
GLUCOSE BLD-MCNC: 159 MG/DL (ref 70–108)
GLUCOSE BLD-MCNC: 159 MG/DL (ref 70–108)
GLUCOSE BLD-MCNC: 162 MG/DL (ref 70–108)
GLUCOSE BLD-MCNC: 162 MG/DL (ref 70–108)
GLUCOSE BLD-MCNC: 163 MG/DL (ref 70–108)
GLUCOSE BLD-MCNC: 165 MG/DL (ref 70–108)
GLUCOSE BLD-MCNC: 166 MG/DL (ref 70–108)
GLUCOSE BLD-MCNC: 166 MG/DL (ref 70–108)
GLUCOSE BLD-MCNC: 167 MG/DL (ref 70–108)
GLUCOSE BLD-MCNC: 168 MG/DL (ref 70–108)
GLUCOSE BLD-MCNC: 170 MG/DL (ref 70–108)
GLUCOSE BLD-MCNC: 170 MG/DL (ref 70–108)
GLUCOSE BLD-MCNC: 171 MG/DL (ref 70–108)
GLUCOSE BLD-MCNC: 173 MG/DL (ref 70–108)
GLUCOSE BLD-MCNC: 174 MG/DL (ref 70–108)
GLUCOSE BLD-MCNC: 175 MG/DL (ref 70–108)
GLUCOSE BLD-MCNC: 175 MG/DL (ref 70–108)
GLUCOSE BLD-MCNC: 177 MG/DL (ref 70–108)
GLUCOSE BLD-MCNC: 182 MG/DL (ref 70–108)
GLUCOSE BLD-MCNC: 182 MG/DL (ref 70–108)
GLUCOSE BLD-MCNC: 185 MG/DL (ref 70–108)
GLUCOSE BLD-MCNC: 185 MG/DL (ref 70–108)
GLUCOSE BLD-MCNC: 186 MG/DL (ref 70–108)
GLUCOSE BLD-MCNC: 187 MG/DL (ref 70–108)
GLUCOSE BLD-MCNC: 188 MG/DL (ref 70–108)
GLUCOSE BLD-MCNC: 189 MG/DL (ref 70–108)
GLUCOSE BLD-MCNC: 191 MG/DL (ref 70–108)
GLUCOSE BLD-MCNC: 192 MG/DL (ref 70–108)
GLUCOSE BLD-MCNC: 193 MG/DL (ref 70–108)
GLUCOSE BLD-MCNC: 197 MG/DL (ref 70–108)
GLUCOSE BLD-MCNC: 198 MG/DL (ref 70–108)
GLUCOSE BLD-MCNC: 198 MG/DL (ref 70–108)
GLUCOSE BLD-MCNC: 199 MG/DL (ref 70–108)
GLUCOSE BLD-MCNC: 199 MG/DL (ref 70–108)
GLUCOSE BLD-MCNC: 201 MG/DL (ref 70–108)
GLUCOSE BLD-MCNC: 201 MG/DL (ref 70–108)
GLUCOSE BLD-MCNC: 202 MG/DL (ref 70–108)
GLUCOSE BLD-MCNC: 203 MG/DL (ref 70–108)
GLUCOSE BLD-MCNC: 206 MG/DL (ref 70–108)
GLUCOSE BLD-MCNC: 207 MG/DL (ref 70–108)
GLUCOSE BLD-MCNC: 211 MG/DL (ref 70–108)
GLUCOSE BLD-MCNC: 213 MG/DL (ref 70–108)
GLUCOSE BLD-MCNC: 215 MG/DL (ref 70–108)
GLUCOSE BLD-MCNC: 216 MG/DL (ref 70–108)
GLUCOSE BLD-MCNC: 216 MG/DL (ref 70–108)
GLUCOSE BLD-MCNC: 218 MG/DL (ref 70–108)
GLUCOSE BLD-MCNC: 221 MG/DL (ref 70–108)
GLUCOSE BLD-MCNC: 222 MG/DL (ref 70–108)
GLUCOSE BLD-MCNC: 223 MG/DL (ref 70–108)
GLUCOSE BLD-MCNC: 224 MG/DL (ref 70–108)
GLUCOSE BLD-MCNC: 227 MG/DL (ref 70–108)
GLUCOSE BLD-MCNC: 228 MG/DL (ref 70–108)
GLUCOSE BLD-MCNC: 231 MG/DL (ref 70–108)
GLUCOSE BLD-MCNC: 235 MG/DL (ref 70–108)
GLUCOSE BLD-MCNC: 236 MG/DL (ref 70–108)
GLUCOSE BLD-MCNC: 241 MG/DL (ref 70–108)
GLUCOSE BLD-MCNC: 242 MG/DL (ref 70–108)
GLUCOSE BLD-MCNC: 243 MG/DL (ref 70–108)
GLUCOSE BLD-MCNC: 243 MG/DL (ref 70–108)
GLUCOSE BLD-MCNC: 247 MG/DL (ref 70–108)
GLUCOSE BLD-MCNC: 247 MG/DL (ref 70–108)
GLUCOSE BLD-MCNC: 248 MG/DL (ref 70–108)
GLUCOSE BLD-MCNC: 248 MG/DL (ref 70–108)
GLUCOSE BLD-MCNC: 249 MG/DL (ref 70–108)
GLUCOSE BLD-MCNC: 250 MG/DL (ref 70–108)
GLUCOSE BLD-MCNC: 252 MG/DL (ref 70–108)
GLUCOSE BLD-MCNC: 258 MG/DL (ref 70–108)
GLUCOSE BLD-MCNC: 262 MG/DL (ref 70–108)
GLUCOSE BLD-MCNC: 262 MG/DL (ref 70–108)
GLUCOSE BLD-MCNC: 268 MG/DL (ref 70–108)
GLUCOSE BLD-MCNC: 269 MG/DL (ref 70–108)
GLUCOSE BLD-MCNC: 270 MG/DL (ref 70–108)
GLUCOSE BLD-MCNC: 282 MG/DL (ref 70–108)
GLUCOSE BLD-MCNC: 284 MG/DL (ref 70–108)
GLUCOSE BLD-MCNC: 285 MG/DL (ref 70–108)
GLUCOSE BLD-MCNC: 287 MG/DL (ref 70–108)
GLUCOSE BLD-MCNC: 288 MG/DL (ref 70–108)
GLUCOSE BLD-MCNC: 290 MG/DL (ref 70–108)
GLUCOSE BLD-MCNC: 292 MG/DL
GLUCOSE BLD-MCNC: 294 MG/DL (ref 70–108)
GLUCOSE BLD-MCNC: 295 MG/DL (ref 70–108)
GLUCOSE BLD-MCNC: 304 MG/DL (ref 70–108)
GLUCOSE BLD-MCNC: 318 MG/DL (ref 70–108)
GLUCOSE BLD-MCNC: 341 MG/DL (ref 70–108)
GLUCOSE BLD-MCNC: 375 MG/DL (ref 70–108)
GLUCOSE BLD-MCNC: 41 MG/DL (ref 70–108)
GLUCOSE BLD-MCNC: 419 MG/DL (ref 70–108)
GLUCOSE BLD-MCNC: 49 MG/DL (ref 70–108)
GLUCOSE BLD-MCNC: 54 MG/DL
GLUCOSE BLD-MCNC: 58 MG/DL (ref 70–108)
GLUCOSE BLD-MCNC: 60 MG/DL (ref 70–108)
GLUCOSE BLD-MCNC: 64 MG/DL (ref 70–108)
GLUCOSE BLD-MCNC: 68 MG/DL (ref 70–108)
GLUCOSE BLD-MCNC: 68 MG/DL (ref 70–108)
GLUCOSE BLD-MCNC: 69 MG/DL (ref 70–108)
GLUCOSE BLD-MCNC: 696 MG/DL (ref 70–108)
GLUCOSE BLD-MCNC: 72 MG/DL (ref 70–108)
GLUCOSE BLD-MCNC: 74 MG/DL (ref 70–108)
GLUCOSE BLD-MCNC: 83 MG/DL (ref 70–108)
GLUCOSE BLD-MCNC: 84 MG/DL (ref 70–108)
GLUCOSE BLD-MCNC: 85 MG/DL (ref 70–108)
GLUCOSE BLD-MCNC: 87 MG/DL (ref 70–108)
GLUCOSE BLD-MCNC: 90 MG/DL (ref 70–108)
GLUCOSE BLD-MCNC: 90 MG/DL (ref 70–108)
GLUCOSE BLD-MCNC: 91 MG/DL (ref 70–108)
GLUCOSE BLD-MCNC: 93 MG/DL (ref 70–108)
GLUCOSE BLD-MCNC: 95 MG/DL (ref 70–108)
GLUCOSE BLD-MCNC: 95 MG/DL (ref 70–108)
GLUCOSE BLD-MCNC: 97 MG/DL
GLUCOSE BLD-MCNC: 97 MG/DL (ref 70–108)
GLUCOSE URINE: 100 MG/DL
GLUCOSE URINE: 100 MG/DL
GLUCOSE URINE: 250 MG/DL
GLUCOSE URINE: NEGATIVE MG/DL
GLUCOSE, WHOLE BLOOD: 167 MG/DL (ref 70–108)
GLUCOSE, WHOLE BLOOD: 54 MG/DL (ref 70–108)
HAEMOPHILUS INFLUENZA FILM ARRAY: NOT DETECTED
HBA1C MFR BLD: 5.2 % (ref 4.3–5.7)
HBA1C MFR BLD: 5.5 % (ref 4.4–6.4)
HBA1C MFR BLD: 6 % (ref 4.3–5.7)
HBA1C MFR BLD: 6.9 % (ref 4.3–5.7)
HCO3, MIXED: 19 MMOL/L (ref 23–28)
HCO3, MIXED: 25 MMOL/L (ref 23–28)
HCO3: 13 MMOL/L (ref 23–28)
HCO3: 19 MMOL/L (ref 23–28)
HCO3: 20 MMOL/L (ref 23–28)
HCO3: 22 MMOL/L (ref 23–28)
HCT VFR BLD CALC: 25.1 % (ref 37–47)
HCT VFR BLD CALC: 25.7 % (ref 37–47)
HCT VFR BLD CALC: 26.2 % (ref 37–47)
HCT VFR BLD CALC: 26.8 % (ref 37–47)
HCT VFR BLD CALC: 27.2 % (ref 37–47)
HCT VFR BLD CALC: 27.3 % (ref 37–47)
HCT VFR BLD CALC: 27.4 % (ref 37–47)
HCT VFR BLD CALC: 27.6 % (ref 37–47)
HCT VFR BLD CALC: 27.8 % (ref 37–47)
HCT VFR BLD CALC: 28.4 % (ref 37–47)
HCT VFR BLD CALC: 28.5 % (ref 37–47)
HCT VFR BLD CALC: 29.1 % (ref 37–47)
HCT VFR BLD CALC: 29.2 % (ref 37–47)
HCT VFR BLD CALC: 30.2 % (ref 37–47)
HCT VFR BLD CALC: 30.2 % (ref 37–47)
HCT VFR BLD CALC: 30.5 % (ref 36–46)
HCT VFR BLD CALC: 30.9 % (ref 37–47)
HCT VFR BLD CALC: 31.3 % (ref 37–47)
HCT VFR BLD CALC: 31.3 % (ref 37–47)
HCT VFR BLD CALC: 31.4 % (ref 37–47)
HCT VFR BLD CALC: 32.7 % (ref 37–47)
HCT VFR BLD CALC: 33.2 % (ref 37–47)
HCT VFR BLD CALC: 33.5 % (ref 37–47)
HCT VFR BLD CALC: 34 % (ref 37–47)
HCT VFR BLD CALC: 34 % (ref 37–47)
HCT VFR BLD CALC: 34.2 % (ref 37–47)
HCT VFR BLD CALC: 34.3 % (ref 37–47)
HCT VFR BLD CALC: 34.3 % (ref 37–47)
HCT VFR BLD CALC: 35.4 % (ref 37–47)
HCT VFR BLD CALC: 36.8 % (ref 37–47)
HDLC SERPL-MCNC: 18 MG/DL (ref 35–70)
HDLC SERPL-MCNC: 19 MG/DL
HEMOGLOBIN: 10.2 GM/DL (ref 12–16)
HEMOGLOBIN: 10.3 GM/DL (ref 12–16)
HEMOGLOBIN: 10.4 GM/DL (ref 12–16)
HEMOGLOBIN: 10.5 GM/DL (ref 12–16)
HEMOGLOBIN: 10.6 GM/DL (ref 12–16)
HEMOGLOBIN: 10.6 GM/DL (ref 12–16)
HEMOGLOBIN: 10.7 GM/DL (ref 12–16)
HEMOGLOBIN: 11.1 GM/DL (ref 12–16)
HEMOGLOBIN: 7.9 GM/DL (ref 12–16)
HEMOGLOBIN: 7.9 GM/DL (ref 12–16)
HEMOGLOBIN: 8 GM/DL (ref 12–16)
HEMOGLOBIN: 8.1 GM/DL (ref 12–16)
HEMOGLOBIN: 8.1 GM/DL (ref 12–16)
HEMOGLOBIN: 8.2 GM/DL (ref 12–16)
HEMOGLOBIN: 8.3 GM/DL (ref 12–16)
HEMOGLOBIN: 8.5 GM/DL (ref 12–16)
HEMOGLOBIN: 8.6 GM/DL (ref 12–16)
HEMOGLOBIN: 8.7 GM/DL (ref 12–16)
HEMOGLOBIN: 8.7 GM/DL (ref 12–16)
HEMOGLOBIN: 9.1 GM/DL (ref 12–16)
HEMOGLOBIN: 9.1 GM/DL (ref 12–16)
HEMOGLOBIN: 9.2 GM/DL (ref 12–16)
HEMOGLOBIN: 9.4 GM/DL (ref 12–16)
HEMOGLOBIN: 9.5 GM/DL (ref 12–16)
HEMOGLOBIN: 9.5 GM/DL (ref 12–16)
HEMOGLOBIN: 9.7 GM/DL (ref 12–16)
HEMOGLOBIN: 9.8 GM/DL (ref 12–16)
HEMOGLOBIN: 9.9 G/DL (ref 12–16)
ICTOTEST: NEGATIVE
IFIO2: 100
IFIO2: 100
IMMATURE GRANS (ABS): 0 THOU/MM3 (ref 0–0.07)
IMMATURE GRANS (ABS): 0.01 THOU/MM3 (ref 0–0.07)
IMMATURE GRANS (ABS): 0.02 THOU/MM3 (ref 0–0.07)
IMMATURE GRANS (ABS): 0.02 THOU/MM3 (ref 0–0.07)
IMMATURE GRANS (ABS): 0.07 THOU/MM3 (ref 0–0.07)
IMMATURE GRANS (ABS): 0.14 THOU/MM3 (ref 0–0.07)
IMMATURE GRANULOCYTES: 0 %
IMMATURE GRANULOCYTES: 0.2 %
IMMATURE GRANULOCYTES: 0.3 %
IMMATURE GRANULOCYTES: 0.3 %
IMMATURE GRANULOCYTES: 0.4 %
IMMATURE GRANULOCYTES: 0.4 %
IMMATURE GRANULOCYTES: 0.5 %
IMMATURE GRANULOCYTES: 0.7 %
IMMATURE GRANULOCYTES: 0.9 %
IMMATURE GRANULOCYTES: 1.4 %
INDIRECT COOMBS: NORMAL
INDIRECT COOMBS: NORMAL
INR BLD: 1.64 (ref 0.85–1.13)
INR BLD: 1.71 (ref 0.85–1.13)
INR BLD: 1.89 (ref 0.85–1.13)
INR BLD: 1.92 (ref 0.85–1.13)
INR BLD: 1.94 (ref 0.85–1.13)
INR BLD: 14.62 (ref 0.85–1.13)
INR BLD: 2.04 (ref 0.85–1.13)
INR BLD: 2.07 (ref 0.85–1.13)
INR BLD: 2.13 (ref 0.85–1.13)
INR BLD: 2.17 (ref 0.85–1.13)
INR BLD: 2.25 (ref 0.85–1.13)
INR BLD: 2.36 (ref 0.85–1.13)
INR BLD: 2.41 (ref 0.85–1.13)
INR BLD: 2.43 (ref 0.85–1.13)
INR BLD: 2.44 (ref 0.85–1.13)
INR BLD: 2.51 (ref 0.85–1.13)
INR BLD: 2.55 (ref 0.85–1.13)
INR BLD: 2.56 (ref 0.85–1.13)
INR BLD: 2.67 (ref 0.85–1.13)
INR BLD: 2.68 (ref 0.85–1.13)
INR BLD: 2.94 (ref 0.85–1.13)
INR BLD: 3.13 (ref 0.85–1.13)
INR BLD: 3.31 (ref 0.85–1.13)
INR BLD: 4.3 (ref 0.85–1.13)
INR BLD: 4.51 (ref 0.85–1.13)
INR BLD: 4.66 (ref 0.85–1.13)
INR BLD: 4.67 (ref 0.85–1.13)
INR BLD: 4.68 (ref 0.85–1.13)
INR BLD: 8.15 (ref 0.85–1.13)
KETONES, URINE: ABNORMAL
KETONES, URINE: ABNORMAL
KETONES, URINE: NEGATIVE
KLEBSIELLA OXYTOCA FILM ARRAY: NOT DETECTED
KLEBSIELLA PNEUMONIAE FILM ARRAY: NOT DETECTED
LACTIC ACID, SEPSIS: 0.8 MMOL/L (ref 0.5–1.9)
LACTIC ACID, SEPSIS: 1.8 MMOL/L (ref 0.5–1.9)
LACTIC ACID: 1.8 MMOL/L (ref 0.5–2.2)
LACTIC ACID: 18.4 MMOL/L (ref 0.5–2)
LACTIC ACID: 3.7 MMOL/L (ref 0.5–2.2)
LACTIC ACID: 3.8 MMOL/L (ref 0.5–2.2)
LACTIC ACID: 4.3 MMOL/L (ref 0.5–2.2)
LACTIC ACID: 4.8 MMOL/L (ref 0.5–2.2)
LACTIC ACID: 5.7 MMOL/L (ref 0.5–2.2)
LACTIC ACID: 9.8 MMOL/L (ref 0.5–2)
LDL CHOLESTEROL CALCULATED: 52 MG/DL (ref 0–160)
LDL CHOLESTEROL CALCULATED: ABNORMAL MG/DL
LEUKOCYTE CLUMPS, URINE: NEGATIVE
LEUKOCYTE ESTERASE, URINE: ABNORMAL
LEUKOCYTE ESTERASE, URINE: ABNORMAL
LEUKOCYTE ESTERASE, URINE: NEGATIVE
LISTERIA MONOCYTOGENES FILM ARRAY: NOT DETECTED
LV EF: 30 %
LVEF MODALITY: NORMAL
LYMPHOCYTES # BLD: 13.2 %
LYMPHOCYTES # BLD: 17.1 %
LYMPHOCYTES # BLD: 17.2 %
LYMPHOCYTES # BLD: 20.3 %
LYMPHOCYTES # BLD: 21.8 %
LYMPHOCYTES # BLD: 22.1 %
LYMPHOCYTES # BLD: 23.3 %
LYMPHOCYTES # BLD: 25.9 %
LYMPHOCYTES # BLD: 26.2 %
LYMPHOCYTES # BLD: 32.8 %
LYMPHOCYTES # BLD: 42.3 %
LYMPHOCYTES # BLD: 5.1 %
LYMPHOCYTES # BLD: 6.1 %
LYMPHOCYTES # BLD: 7.9 %
LYMPHOCYTES ABSOLUTE: 0.2 THOU/MM3 (ref 1–4.8)
LYMPHOCYTES ABSOLUTE: 0.3 THOU/MM3 (ref 1–4.8)
LYMPHOCYTES ABSOLUTE: 0.4 THOU/MM3 (ref 1–4.8)
LYMPHOCYTES ABSOLUTE: 0.5 THOU/MM3 (ref 1–4.8)
LYMPHOCYTES ABSOLUTE: 0.7 THOU/MM3 (ref 1–4.8)
LYMPHOCYTES ABSOLUTE: 0.8 THOU/MM3 (ref 1–4.8)
LYMPHOCYTES ABSOLUTE: 0.9 THOU/MM3 (ref 1–4.8)
LYMPHOCYTES ABSOLUTE: 0.9 THOU/MM3 (ref 1–4.8)
LYMPHOCYTES ABSOLUTE: 1 THOU/MM3 (ref 1–4.8)
LYMPHOCYTES ABSOLUTE: 1.1 THOU/MM3 (ref 1–4.8)
LYMPHOCYTES ABSOLUTE: ABNORMAL
LYMPHOCYTES RELATIVE PERCENT: ABNORMAL
MAGNESIUM: 1.9 MG/DL (ref 1.6–2.4)
MAGNESIUM: 2.2 MG/DL (ref 1.6–2.4)
MAGNESIUM: 2.2 MG/DL (ref 1.6–2.4)
MAGNESIUM: 2.3 MG/DL (ref 1.6–2.4)
MCH RBC QN AUTO: 30.6 PG (ref 26–33)
MCH RBC QN AUTO: 30.7 PG (ref 26–33)
MCH RBC QN AUTO: 31 PG (ref 26–33)
MCH RBC QN AUTO: 31.1 PG (ref 26–33)
MCH RBC QN AUTO: 31.1 PG (ref 26–33)
MCH RBC QN AUTO: 31.2 PG (ref 26–33)
MCH RBC QN AUTO: 31.4 PG (ref 26–33)
MCH RBC QN AUTO: 31.6 PG (ref 26–33)
MCH RBC QN AUTO: 31.6 PG (ref 26–33)
MCH RBC QN AUTO: 31.7 PG (ref 26–33)
MCH RBC QN AUTO: 31.8 PG (ref 26–33)
MCH RBC QN AUTO: 31.9 PG (ref 26–33)
MCH RBC QN AUTO: 32 PG (ref 26–33)
MCH RBC QN AUTO: 32 PG (ref 26–33)
MCH RBC QN AUTO: 32.3 PG (ref 26–33)
MCH RBC QN AUTO: 32.4 PG (ref 26–33)
MCH RBC QN AUTO: 32.4 PG (ref 26–33)
MCH RBC QN AUTO: 32.5 PG (ref 26–33)
MCH RBC QN AUTO: 32.5 PG (ref 26–33)
MCH RBC QN AUTO: 32.6 PG (ref 26–33)
MCH RBC QN AUTO: 32.6 PG (ref 26–33)
MCH RBC QN AUTO: 32.7 PG (ref 26–33)
MCH RBC QN AUTO: 33 PG (ref 26–33)
MCH RBC QN AUTO: 34.1 PG (ref 26–33)
MCH RBC QN AUTO: ABNORMAL PG
MCHC RBC AUTO-ENTMCNC: 29.2 GM/DL (ref 32.2–35.5)
MCHC RBC AUTO-ENTMCNC: 29.8 GM/DL (ref 32.2–35.5)
MCHC RBC AUTO-ENTMCNC: 29.8 GM/DL (ref 32.2–35.5)
MCHC RBC AUTO-ENTMCNC: 29.9 GM/DL (ref 32.2–35.5)
MCHC RBC AUTO-ENTMCNC: 30.1 GM/DL (ref 32.2–35.5)
MCHC RBC AUTO-ENTMCNC: 30.2 GM/DL (ref 32.2–35.5)
MCHC RBC AUTO-ENTMCNC: 30.3 GM/DL (ref 32.2–35.5)
MCHC RBC AUTO-ENTMCNC: 30.3 GM/DL (ref 32.2–35.5)
MCHC RBC AUTO-ENTMCNC: 30.4 GM/DL (ref 32.2–35.5)
MCHC RBC AUTO-ENTMCNC: 30.4 GM/DL (ref 32.2–35.5)
MCHC RBC AUTO-ENTMCNC: 30.5 GM/DL (ref 32.2–35.5)
MCHC RBC AUTO-ENTMCNC: 30.6 GM/DL (ref 32.2–35.5)
MCHC RBC AUTO-ENTMCNC: 30.7 GM/DL (ref 32.2–35.5)
MCHC RBC AUTO-ENTMCNC: 30.7 GM/DL (ref 32.2–35.5)
MCHC RBC AUTO-ENTMCNC: 30.9 GM/DL (ref 32.2–35.5)
MCHC RBC AUTO-ENTMCNC: 31 GM/DL (ref 32.2–35.5)
MCHC RBC AUTO-ENTMCNC: 31.1 GM/DL (ref 32.2–35.5)
MCHC RBC AUTO-ENTMCNC: 31.2 GM/DL (ref 32.2–35.5)
MCHC RBC AUTO-ENTMCNC: 31.3 GM/DL (ref 32.2–35.5)
MCHC RBC AUTO-ENTMCNC: 31.3 GM/DL (ref 32.2–35.5)
MCHC RBC AUTO-ENTMCNC: 31.5 GM/DL (ref 32.2–35.5)
MCHC RBC AUTO-ENTMCNC: 31.6 GM/DL (ref 32.2–35.5)
MCHC RBC AUTO-ENTMCNC: 31.6 GM/DL (ref 32.2–35.5)
MCHC RBC AUTO-ENTMCNC: ABNORMAL G/DL
MCV RBC AUTO: 100.6 FL (ref 81–99)
MCV RBC AUTO: 100.6 FL (ref 81–99)
MCV RBC AUTO: 101 FL (ref 81–99)
MCV RBC AUTO: 101.2 FL (ref 81–99)
MCV RBC AUTO: 101.2 FL (ref 81–99)
MCV RBC AUTO: 101.5 FL (ref 81–99)
MCV RBC AUTO: 102.7 FL (ref 81–99)
MCV RBC AUTO: 102.9 FL (ref 81–99)
MCV RBC AUTO: 103.1 FL (ref 81–99)
MCV RBC AUTO: 103.6 FL (ref 81–99)
MCV RBC AUTO: 103.8 FL (ref 81–99)
MCV RBC AUTO: 103.9 FL (ref 81–99)
MCV RBC AUTO: 104 FL (ref 81–99)
MCV RBC AUTO: 104.8 FL (ref 81–99)
MCV RBC AUTO: 105.3 FL (ref 81–99)
MCV RBC AUTO: 106.2 FL (ref 81–99)
MCV RBC AUTO: 106.2 FL (ref 81–99)
MCV RBC AUTO: 106.3 FL (ref 81–99)
MCV RBC AUTO: 106.6 FL (ref 81–99)
MCV RBC AUTO: 106.8 FL (ref 81–99)
MCV RBC AUTO: 107.1 FL (ref 81–99)
MCV RBC AUTO: 108.4 FL (ref 81–99)
MCV RBC AUTO: 108.7 FL (ref 81–99)
MCV RBC AUTO: 108.8 FL (ref 81–99)
MCV RBC AUTO: 109.4 FL (ref 81–99)
MCV RBC AUTO: 99.4 FL (ref 81–99)
MCV RBC AUTO: 99.4 FL (ref 81–99)
MCV RBC AUTO: 99.7 FL (ref 81–99)
MCV RBC AUTO: ABNORMAL FL
METHICILLIN RESISTANT FILM ARRAY: NOT DETECTED
MICROALB/CREAT RATIO POC: ABNORMAL MG/G
MICROALBUMIN UR-MCNC: 70.37 MG/DL
MICROALBUMIN/CREAT UR-RTO: 1248 MG/G (ref 0–30)
MICROALBUMIN/CREAT UR-RTO: 150 MG/L
MISCELLANEOUS 2: ABNORMAL
MODE: ABNORMAL
MONOCYTES # BLD: 10.9 %
MONOCYTES # BLD: 11.3 %
MONOCYTES # BLD: 11.6 %
MONOCYTES # BLD: 11.9 %
MONOCYTES # BLD: 12.3 %
MONOCYTES # BLD: 13 %
MONOCYTES # BLD: 13.9 %
MONOCYTES # BLD: 14.5 %
MONOCYTES # BLD: 17.7 %
MONOCYTES # BLD: 3.3 %
MONOCYTES # BLD: 7.4 %
MONOCYTES # BLD: 7.6 %
MONOCYTES # BLD: 8.9 %
MONOCYTES # BLD: 9.3 %
MONOCYTES ABSOLUTE: 0.1 THOU/MM3 (ref 0.4–1.3)
MONOCYTES ABSOLUTE: 0.1 THOU/MM3 (ref 0.4–1.3)
MONOCYTES ABSOLUTE: 0.3 THOU/MM3 (ref 0.4–1.3)
MONOCYTES ABSOLUTE: 0.4 THOU/MM3 (ref 0.4–1.3)
MONOCYTES ABSOLUTE: 0.5 THOU/MM3 (ref 0.4–1.3)
MONOCYTES ABSOLUTE: 0.6 THOU/MM3 (ref 0.4–1.3)
MONOCYTES ABSOLUTE: 1 THOU/MM3 (ref 0.4–1.3)
MONOCYTES ABSOLUTE: 1.2 THOU/MM3 (ref 0.4–1.3)
MONOCYTES ABSOLUTE: ABNORMAL
MONOCYTES RELATIVE PERCENT: ABNORMAL
MRSA SCREEN RT-PCR: NEGATIVE
MRSA SCREEN: NORMAL
NEISSERIA MENIGITIDIS FILM ARRAY: NOT DETECTED
NEUTROPHILS ABSOLUTE: ABNORMAL
NEUTROPHILS RELATIVE PERCENT: ABNORMAL
NITRITE, URINE: NEGATIVE
NONHDLC SERPL-MCNC: ABNORMAL MG/DL
NUCLEATED RED BLOOD CELLS: 0 /100 WBC
NUCLEATED RED BLOOD CELLS: 1 /100 WBC
O2 SAT, MIXED: 22 %
O2 SAT, MIXED: 88 %
O2 SATURATION: 65 %
O2 SATURATION: 94 %
O2 SATURATION: 95 %
O2 SATURATION: 95 %
OPIATES, URINE: POSITIVE
ORGANISM: ABNORMAL
ORGANISM: ABNORMAL
OSMOLALITY CALCULATION: 281.5 MOSMOL/KG (ref 275–300)
OSMOLALITY CALCULATION: 283.2 MOSMOL/KG (ref 275–300)
OSMOLALITY CALCULATION: 284.2 MOSMOL/KG (ref 275–300)
OSMOLALITY CALCULATION: 288.2 MOSMOL/KG (ref 275–300)
OSMOLALITY CALCULATION: 289.3 MOSMOL/KG (ref 275–300)
OSMOLALITY CALCULATION: 289.4 MOSMOL/KG (ref 275–300)
OXYCODONE: NEGATIVE
PCO2, MIXED VENOUS: 35 MMHG (ref 41–51)
PCO2, MIXED VENOUS: 36 MMHG (ref 41–51)
PCO2: 30 MMHG (ref 35–45)
PCO2: 33 MMHG (ref 35–45)
PCO2: 55 MMHG (ref 35–45)
PCO2: 61 MMHG (ref 35–45)
PH BLOOD GAS: 6.98 (ref 7.35–7.45)
PH BLOOD GAS: 7.16 (ref 7.35–7.45)
PH BLOOD GAS: 7.36 (ref 7.35–7.45)
PH BLOOD GAS: 7.44 (ref 7.35–7.45)
PH UA: 5 (ref 5–9)
PH UA: 5.5 (ref 5–9)
PH UA: 6.5 (ref 5–9)
PH UA: 7 (ref 5–9)
PH, MIXED: 7.35 (ref 7.31–7.41)
PH, MIXED: 7.45 (ref 7.31–7.41)
PH, URINE: 6 (ref 5–9)
PHENCYCLIDINE QUANTITATIVE URINE: NEGATIVE
PHOSPHORUS: 8 MG/DL (ref 2.4–4.7)
PLATELET # BLD: 104 THOU/MM3 (ref 130–400)
PLATELET # BLD: 112 THOU/MM3 (ref 130–400)
PLATELET # BLD: 117 THOU/MM3 (ref 130–400)
PLATELET # BLD: 117 THOU/MM3 (ref 130–400)
PLATELET # BLD: 119 THOU/MM3 (ref 130–400)
PLATELET # BLD: 121 THOU/MM3 (ref 130–400)
PLATELET # BLD: 141 THOU/MM3 (ref 130–400)
PLATELET # BLD: 150 THOU/MM3 (ref 130–400)
PLATELET # BLD: 154 THOU/MM3 (ref 130–400)
PLATELET # BLD: 157 THOU/MM3 (ref 130–400)
PLATELET # BLD: 166 THOU/MM3 (ref 130–400)
PLATELET # BLD: 168 THOU/MM3 (ref 130–400)
PLATELET # BLD: 176 THOU/MM3 (ref 130–400)
PLATELET # BLD: 187 K/ΜL
PLATELET # BLD: 195 THOU/MM3 (ref 130–400)
PLATELET # BLD: 197 THOU/MM3 (ref 130–400)
PLATELET # BLD: 203 THOU/MM3 (ref 130–400)
PLATELET # BLD: 204 THOU/MM3 (ref 130–400)
PLATELET # BLD: 214 THOU/MM3 (ref 130–400)
PLATELET # BLD: 215 THOU/MM3 (ref 130–400)
PLATELET # BLD: 220 THOU/MM3 (ref 130–400)
PLATELET # BLD: 220 THOU/MM3 (ref 130–400)
PLATELET # BLD: 223 THOU/MM3 (ref 130–400)
PLATELET # BLD: 226 THOU/MM3 (ref 130–400)
PLATELET # BLD: 235 THOU/MM3 (ref 130–400)
PLATELET # BLD: 238 THOU/MM3 (ref 130–400)
PLATELET # BLD: 261 THOU/MM3 (ref 130–400)
PLATELET # BLD: 283 THOU/MM3 (ref 130–400)
PLATELET # BLD: 91 THOU/MM3 (ref 130–400)
PLATELET ESTIMATE: ADEQUATE
PLATELET ESTIMATE: ADEQUATE
PMV BLD AUTO: 10.1 FL (ref 9.4–12.4)
PMV BLD AUTO: 10.1 FL (ref 9.4–12.4)
PMV BLD AUTO: 10.3 FL (ref 9.4–12.4)
PMV BLD AUTO: 10.4 FL (ref 9.4–12.4)
PMV BLD AUTO: 10.5 FL (ref 9.4–12.4)
PMV BLD AUTO: 10.7 FL (ref 9.4–12.4)
PMV BLD AUTO: 10.8 FL (ref 9.4–12.4)
PMV BLD AUTO: 10.8 FL (ref 9.4–12.4)
PMV BLD AUTO: 10.9 FL (ref 9.4–12.4)
PMV BLD AUTO: 11 FL (ref 9.4–12.4)
PMV BLD AUTO: 11 FL (ref 9.4–12.4)
PMV BLD AUTO: 11.1 FL (ref 9.4–12.4)
PMV BLD AUTO: 11.1 FL (ref 9.4–12.4)
PMV BLD AUTO: 11.2 FL (ref 9.4–12.4)
PMV BLD AUTO: 11.3 FL (ref 9.4–12.4)
PMV BLD AUTO: 11.4 FL (ref 9.4–12.4)
PMV BLD AUTO: 11.5 FL (ref 9.4–12.4)
PMV BLD AUTO: 11.6 FL (ref 9.4–12.4)
PMV BLD AUTO: 11.8 FL (ref 9.4–12.4)
PMV BLD AUTO: 12.3 FL (ref 9.4–12.4)
PMV BLD AUTO: ABNORMAL FL
PO2 MIXED: 17 MMHG (ref 25–40)
PO2 MIXED: 52 MMHG (ref 25–40)
PO2: 52 MMHG (ref 71–104)
PO2: 68 MMHG (ref 71–104)
PO2: 79 MMHG (ref 71–104)
PO2: 99 MMHG (ref 71–104)
POC CREATININE WHOLE BLOOD: 2.8 MG/DL (ref 0.5–1.2)
POC CREATININE: 200 MG/DL
POC INR: 1.3 (ref 0.8–1.2)
POC INR: 1.5 (ref 0.8–1.2)
POC INR: 1.6 (ref 0.8–1.2)
POC INR: 1.6 (ref 0.8–1.2)
POC INR: 2 (ref 0.8–1.2)
POC INR: 2.1 (ref 0.8–1.2)
POC INR: 2.1 (ref 0.8–1.2)
POC INR: 2.2 (ref 0.8–1.2)
POC INR: 2.4 (ref 0.8–1.2)
POC INR: 3 (ref 0.8–1.2)
POC INR: 3 (ref 0.8–1.2)
POC INR: 3.2 (ref 0.8–1.2)
POC INR: 3.8 (ref 0.8–1.2)
POC INR: 4.2 (ref 0.8–1.2)
POC INR: 4.2 (ref 0.8–1.2)
POC LACTIC ACID: 11.2 MMOL/L (ref 0.5–1.9)
POIKILOCYTES: ABNORMAL
POIKILOCYTES: ABNORMAL
POTASSIUM REFLEX MAGNESIUM: 4 MEQ/L (ref 3.5–5.2)
POTASSIUM REFLEX MAGNESIUM: 4.3 MEQ/L (ref 3.5–5.2)
POTASSIUM REFLEX MAGNESIUM: 4.5 MEQ/L (ref 3.5–5.2)
POTASSIUM REFLEX MAGNESIUM: 4.6 MEQ/L (ref 3.5–5.2)
POTASSIUM REFLEX MAGNESIUM: 4.6 MEQ/L (ref 3.5–5.2)
POTASSIUM REFLEX MAGNESIUM: 4.7 MEQ/L (ref 3.5–5.2)
POTASSIUM REFLEX MAGNESIUM: 4.9 MEQ/L (ref 3.5–5.2)
POTASSIUM REFLEX MAGNESIUM: 4.9 MEQ/L (ref 3.5–5.2)
POTASSIUM REFLEX MAGNESIUM: 5 MEQ/L (ref 3.5–5.2)
POTASSIUM REFLEX MAGNESIUM: 5.3 MEQ/L (ref 3.5–5.2)
POTASSIUM REFLEX MAGNESIUM: 5.4 MEQ/L (ref 3.5–5.2)
POTASSIUM REFLEX MAGNESIUM: 6.2 MEQ/L (ref 3.5–5.2)
POTASSIUM REFLEX MAGNESIUM: 6.4 MEQ/L (ref 3.5–5.2)
POTASSIUM REFLEX MAGNESIUM: 6.6 MEQ/L (ref 3.5–5.2)
POTASSIUM SERPL-SCNC: 3.6 MMOL/L
POTASSIUM SERPL-SCNC: 3.7 MEQ/L (ref 3.5–5.2)
POTASSIUM SERPL-SCNC: 4 MEQ/L (ref 3.5–5.2)
POTASSIUM SERPL-SCNC: 4 MEQ/L (ref 3.5–5.2)
POTASSIUM SERPL-SCNC: 4.1 MEQ/L (ref 3.5–5.2)
POTASSIUM SERPL-SCNC: 4.2 MEQ/L (ref 3.5–5.2)
POTASSIUM SERPL-SCNC: 4.3 MEQ/L (ref 3.5–5.2)
POTASSIUM SERPL-SCNC: 4.4 MEQ/L (ref 3.5–5.2)
POTASSIUM SERPL-SCNC: 4.4 MEQ/L (ref 3.5–5.2)
POTASSIUM SERPL-SCNC: 4.5 MEQ/L (ref 3.5–5.2)
POTASSIUM SERPL-SCNC: 4.7 MEQ/L (ref 3.5–5.2)
POTASSIUM SERPL-SCNC: 4.8 MEQ/L (ref 3.5–5.2)
POTASSIUM SERPL-SCNC: 4.8 MMOL/L
POTASSIUM SERPL-SCNC: 4.9 MEQ/L (ref 3.5–5.2)
POTASSIUM SERPL-SCNC: 4.9 MEQ/L (ref 3.5–5.2)
POTASSIUM SERPL-SCNC: 5 MEQ/L (ref 3.5–5.2)
POTASSIUM SERPL-SCNC: 5 MEQ/L (ref 3.5–5.2)
POTASSIUM SERPL-SCNC: 5.1 MEQ/L (ref 3.5–5.2)
POTASSIUM SERPL-SCNC: 5.7 MEQ/L (ref 3.5–5.2)
POTASSIUM SERPL-SCNC: 5.9 MEQ/L (ref 3.5–5.2)
POTASSIUM, WHOLE BLOOD: 5.1 MEQ/L (ref 3.5–4.9)
PRO-BNP: ABNORMAL PG/ML (ref 0–900)
PROCALCITONIN: 0.14 NG/ML (ref 0.01–0.09)
PROCALCITONIN: 0.19 NG/ML (ref 0.01–0.09)
PROCALCITONIN: 0.38 NG/ML (ref 0.01–0.09)
PROCALCITONIN: 0.63 NG/ML (ref 0.01–0.09)
PROCALCITONIN: 14.72 NG/ML (ref 0.01–0.09)
PROCALCITONIN: 4.19 NG/ML (ref 0.01–0.09)
PROTEIN UA: 100
PROTEIN UA: 300
PROTEIN UA: 300
PROTEIN, URINE: 100 MG/DL
PROTEUS FILM ARRAY: NOT DETECTED
PSEUDOMONAS AERUGINOSA FILM ARRAY: NOT DETECTED
PTH INTACT: 76.6 PG/ML (ref 15–65)
RBC # BLD: 2.44 MILL/MM3 (ref 4.2–5.4)
RBC # BLD: 2.47 MILL/MM3 (ref 4.2–5.4)
RBC # BLD: 2.5 MILL/MM3 (ref 4.2–5.4)
RBC # BLD: 2.52 MILL/MM3 (ref 4.2–5.4)
RBC # BLD: 2.52 MILL/MM3 (ref 4.2–5.4)
RBC # BLD: 2.54 MILL/MM3 (ref 4.2–5.4)
RBC # BLD: 2.59 MILL/MM3 (ref 4.2–5.4)
RBC # BLD: 2.63 MILL/MM3 (ref 4.2–5.4)
RBC # BLD: 2.64 MILL/MM3 (ref 4.2–5.4)
RBC # BLD: 2.66 MILL/MM3 (ref 4.2–5.4)
RBC # BLD: 2.67 MILL/MM3 (ref 4.2–5.4)
RBC # BLD: 2.8 MILL/MM3 (ref 4.2–5.4)
RBC # BLD: 2.82 MILL/MM3 (ref 4.2–5.4)
RBC # BLD: 2.91 MILL/MM3 (ref 4.2–5.4)
RBC # BLD: 3.03 MILL/MM3 (ref 4.2–5.4)
RBC # BLD: 3.03 MILL/MM3 (ref 4.2–5.4)
RBC # BLD: 3.1 MILL/MM3 (ref 4.2–5.4)
RBC # BLD: 3.11 MILL/MM3 (ref 4.2–5.4)
RBC # BLD: 3.18 10^6/ΜL
RBC # BLD: 3.22 MILL/MM3 (ref 4.2–5.4)
RBC # BLD: 3.29 MILL/MM3 (ref 4.2–5.4)
RBC # BLD: 3.33 MILL/MM3 (ref 4.2–5.4)
RBC # BLD: 3.34 MILL/MM3 (ref 4.2–5.4)
RBC # BLD: 3.34 MILL/MM3 (ref 4.2–5.4)
RBC # BLD: 3.35 MILL/MM3 (ref 4.2–5.4)
RBC # BLD: 3.36 MILL/MM3 (ref 4.2–5.4)
RBC # BLD: 3.39 MILL/MM3 (ref 4.2–5.4)
RBC # BLD: 3.41 MILL/MM3 (ref 4.2–5.4)
RBC # BLD: 3.51 MILL/MM3 (ref 4.2–5.4)
RBC URINE: ABNORMAL /HPF
RENAL EPITHELIAL, UA: ABNORMAL
RH FACTOR: NORMAL
RH FACTOR: NORMAL
SARS-COV-2, NAAT: DETECTED
SARS-COV-2, NAAT: NOT DETECTED
SCAN OF BLOOD SMEAR: NORMAL
SEDIMENTATION RATE, ERYTHROCYTE: 70 MM/HR (ref 0–20)
SEG NEUTROPHILS: 43 %
SEG NEUTROPHILS: 45 %
SEG NEUTROPHILS: 57.1 %
SEG NEUTROPHILS: 57.6 %
SEG NEUTROPHILS: 61.2 %
SEG NEUTROPHILS: 61.3 %
SEG NEUTROPHILS: 63.7 %
SEG NEUTROPHILS: 64 %
SEG NEUTROPHILS: 67 %
SEG NEUTROPHILS: 68.2 %
SEG NEUTROPHILS: 77.3 %
SEG NEUTROPHILS: 85.1 %
SEG NEUTROPHILS: 85.9 %
SEG NEUTROPHILS: 88.1 %
SEGMENTED NEUTROPHILS ABSOLUTE COUNT: 0.3 THOU/MM3 (ref 1.8–7.7)
SEGMENTED NEUTROPHILS ABSOLUTE COUNT: 1.5 THOU/MM3 (ref 1.8–7.7)
SEGMENTED NEUTROPHILS ABSOLUTE COUNT: 1.7 THOU/MM3 (ref 1.8–7.7)
SEGMENTED NEUTROPHILS ABSOLUTE COUNT: 1.9 THOU/MM3 (ref 1.8–7.7)
SEGMENTED NEUTROPHILS ABSOLUTE COUNT: 11.2 THOU/MM3 (ref 1.8–7.7)
SEGMENTED NEUTROPHILS ABSOLUTE COUNT: 13.1 THOU/MM3 (ref 1.8–7.7)
SEGMENTED NEUTROPHILS ABSOLUTE COUNT: 2.1 THOU/MM3 (ref 1.8–7.7)
SEGMENTED NEUTROPHILS ABSOLUTE COUNT: 2.3 THOU/MM3 (ref 1.8–7.7)
SEGMENTED NEUTROPHILS ABSOLUTE COUNT: 2.4 THOU/MM3 (ref 1.8–7.7)
SEGMENTED NEUTROPHILS ABSOLUTE COUNT: 2.5 THOU/MM3 (ref 1.8–7.7)
SEGMENTED NEUTROPHILS ABSOLUTE COUNT: 2.6 THOU/MM3 (ref 1.8–7.7)
SEGMENTED NEUTROPHILS ABSOLUTE COUNT: 2.6 THOU/MM3 (ref 1.8–7.7)
SEGMENTED NEUTROPHILS ABSOLUTE COUNT: 2.9 THOU/MM3 (ref 1.8–7.7)
SEGMENTED NEUTROPHILS ABSOLUTE COUNT: 4.1 THOU/MM3 (ref 1.8–7.7)
SERRATIA MARCESCENS FILM ARRAY: NOT DETECTED
SET PEEP: 10 MMHG
SET PEEP: 20 MMHG
SET PRESS SUPP: 14 CMH2O
SET RESPIRATORY RATE: 16 BPM
SODIUM BLD-SCNC: 129 MEQ/L (ref 135–145)
SODIUM BLD-SCNC: 131 MEQ/L (ref 135–145)
SODIUM BLD-SCNC: 132 MEQ/L (ref 135–145)
SODIUM BLD-SCNC: 132 MEQ/L (ref 135–145)
SODIUM BLD-SCNC: 133 MEQ/L (ref 135–145)
SODIUM BLD-SCNC: 134 MEQ/L (ref 135–145)
SODIUM BLD-SCNC: 134 MEQ/L (ref 135–145)
SODIUM BLD-SCNC: 134 MMOL/L
SODIUM BLD-SCNC: 135 MEQ/L (ref 135–145)
SODIUM BLD-SCNC: 136 MEQ/L (ref 135–145)
SODIUM BLD-SCNC: 136 MEQ/L (ref 135–145)
SODIUM BLD-SCNC: 137 MEQ/L (ref 135–145)
SODIUM BLD-SCNC: 138 MEQ/L (ref 135–145)
SODIUM BLD-SCNC: 139 MEQ/L (ref 135–145)
SODIUM BLD-SCNC: 140 MEQ/L (ref 135–145)
SODIUM BLD-SCNC: 140 MMOL/L
SODIUM BLD-SCNC: 141 MEQ/L (ref 135–145)
SODIUM BLD-SCNC: 147 MEQ/L (ref 135–145)
SODIUM URINE: 25 MEQ/L
SODIUM URINE: 89 MEQ/L
SODIUM, WHOLE BLOOD: 136 MEQ/L (ref 138–146)
SOURCE OF BLOOD CULTURE: ABNORMAL
SOURCE, BLOOD GAS: ABNORMAL
SPECIFIC GRAVITY, URINE: 1.01 (ref 1–1.03)
SPECIFIC GRAVITY, URINE: 1.02 (ref 1–1.03)
SPECIMEN: NORMAL
SPECIMEN: NORMAL
STAPH AUREUS FILM ARRAY: DETECTED
STAPHYLOCOCCUS FILM ARRAY: DETECTED
STREP AGALACTIAE FILM ARRAY: NOT DETECTED
STREP PNEUMONIAE FILM ARRAY: NOT DETECTED
STREP PYOCGENES FILM ARRAY: NOT DETECTED
STREPTOCOCCUS FILM ARRAY: NOT DETECTED
T4 FREE: 1.05 NG/DL (ref 0.93–1.76)
T4 FREE: 1.22 NG/DL (ref 0.93–1.76)
T4 FREE: 1.38 NG/DL (ref 0.93–1.76)
T4 FREE: 1.73 NG/DL (ref 0.93–1.76)
TARGET CELLS: ABNORMAL
TOTAL PROTEIN: 3.1 G/DL (ref 6.1–8)
TOTAL PROTEIN: 5.7 G/DL (ref 6.1–8)
TOTAL PROTEIN: 5.7 G/DL (ref 6.1–8)
TOTAL PROTEIN: 5.8 G/DL (ref 6.1–8)
TOTAL PROTEIN: 5.8 G/DL (ref 6.1–8)
TOTAL PROTEIN: 5.9
TOTAL PROTEIN: 6
TOTAL PROTEIN: 6 G/DL (ref 6.1–8)
TOTAL PROTEIN: 6.9 G/DL (ref 6.1–8)
TOTAL PROTEIN: 7.8 G/DL (ref 6.1–8)
TRIGL SERPL-MCNC: 276 MG/DL
TRIGL SERPL-MCNC: 446 MG/DL (ref 0–199)
TROPONIN T: 0.01 NG/ML
TROPONIN T: 0.02 NG/ML
TROPONIN T: 0.03 NG/ML
TROPONIN T: 0.04 NG/ML
TROPONIN T: < 0.01 NG/ML
TSH SERPL DL<=0.05 MIU/L-ACNC: 0.03 UIU/ML (ref 0.4–4.2)
TSH SERPL DL<=0.05 MIU/L-ACNC: 0.05 UIU/ML (ref 0.4–4.2)
TSH SERPL DL<=0.05 MIU/L-ACNC: 0.06 UIU/ML (ref 0.4–4.2)
TSH SERPL DL<=0.05 MIU/L-ACNC: 0.09 UIU/ML (ref 0.4–4.2)
TSH SERPL DL<=0.05 MIU/L-ACNC: 0.09 UIU/ML (ref 0.4–4.2)
TSH SERPL DL<=0.05 MIU/L-ACNC: 0.24 UIU/ML (ref 0.4–4.2)
UROBILINOGEN, URINE: 0.2 EU/DL (ref 0–1)
UROBILINOGEN, URINE: 1 EU/DL (ref 0–1)
UROBILINOGEN, URINE: 2 EU/DL (ref 0–1)
VANCOMYCIN RESISTANT ENTEROCOCCUS: NEGATIVE
VANCOMYCIN RESISTANT ENTEROCOCCUS: NEGATIVE
VANCOMYCIN RESISTANT FILM ARRAY: ABNORMAL
VITAMIN B-12: 1002 PG/ML (ref 211–911)
VITAMIN B-12: 1179 PG/ML (ref 211–911)
VITAMIN B-12: 555 PG/ML (ref 211–911)
VITAMIN B-12: 904
VITAMIN D 25-HYDROXY: 34 NG/ML (ref 30–100)
VLDLC SERPL CALC-MCNC: 55 MG/DL
WBC # BLD: 0.7 THOU/MM3 (ref 4.8–10.8)
WBC # BLD: 1.4 THOU/MM3 (ref 4.8–10.8)
WBC # BLD: 11.3 THOU/MM3 (ref 4.8–10.8)
WBC # BLD: 13.2 THOU/MM3 (ref 4.8–10.8)
WBC # BLD: 15.2 THOU/MM3 (ref 4.8–10.8)
WBC # BLD: 2.5 THOU/MM3 (ref 4.8–10.8)
WBC # BLD: 2.7 THOU/MM3 (ref 4.8–10.8)
WBC # BLD: 2.8 THOU/MM3 (ref 4.8–10.8)
WBC # BLD: 2.9 THOU/MM3 (ref 4.8–10.8)
WBC # BLD: 3 THOU/MM3 (ref 4.8–10.8)
WBC # BLD: 3.1 THOU/MM3 (ref 4.8–10.8)
WBC # BLD: 3.5 THOU/MM3 (ref 4.8–10.8)
WBC # BLD: 3.5 THOU/MM3 (ref 4.8–10.8)
WBC # BLD: 4 THOU/MM3 (ref 4.8–10.8)
WBC # BLD: 4.1 10^3/ML
WBC # BLD: 4.1 THOU/MM3 (ref 4.8–10.8)
WBC # BLD: 4.5 THOU/MM3 (ref 4.8–10.8)
WBC # BLD: 4.6 THOU/MM3 (ref 4.8–10.8)
WBC # BLD: 4.6 THOU/MM3 (ref 4.8–10.8)
WBC # BLD: 5.1 THOU/MM3 (ref 4.8–10.8)
WBC # BLD: 5.3 THOU/MM3 (ref 4.8–10.8)
WBC # BLD: 5.5 THOU/MM3 (ref 4.8–10.8)
WBC # BLD: 5.5 THOU/MM3 (ref 4.8–10.8)
WBC # BLD: 5.8 THOU/MM3 (ref 4.8–10.8)
WBC # BLD: 6.1 THOU/MM3 (ref 4.8–10.8)
WBC # BLD: 6.1 THOU/MM3 (ref 4.8–10.8)
WBC UA: ABNORMAL /HPF
YEAST: ABNORMAL

## 2021-01-01 PROCEDURE — 36415 COLL VENOUS BLD VENIPUNCTURE: CPT

## 2021-01-01 PROCEDURE — 2580000003 HC RX 258: Performed by: INTERNAL MEDICINE

## 2021-01-01 PROCEDURE — 6370000000 HC RX 637 (ALT 250 FOR IP): Performed by: INTERNAL MEDICINE

## 2021-01-01 PROCEDURE — 97535 SELF CARE MNGMENT TRAINING: CPT

## 2021-01-01 PROCEDURE — 6370000000 HC RX 637 (ALT 250 FOR IP): Performed by: HOSPITALIST

## 2021-01-01 PROCEDURE — 1090F PRES/ABSN URINE INCON ASSESS: CPT | Performed by: INTERNAL MEDICINE

## 2021-01-01 PROCEDURE — 6370000000 HC RX 637 (ALT 250 FOR IP): Performed by: FAMILY MEDICINE

## 2021-01-01 PROCEDURE — 82948 REAGENT STRIP/BLOOD GLUCOSE: CPT

## 2021-01-01 PROCEDURE — 6370000000 HC RX 637 (ALT 250 FOR IP)

## 2021-01-01 PROCEDURE — 2060000000 HC ICU INTERMEDIATE R&B

## 2021-01-01 PROCEDURE — 99211 OFF/OP EST MAY X REQ PHY/QHP: CPT

## 2021-01-01 PROCEDURE — 6370000000 HC RX 637 (ALT 250 FOR IP): Performed by: PHYSICIAN ASSISTANT

## 2021-01-01 PROCEDURE — 36416 COLLJ CAPILLARY BLOOD SPEC: CPT

## 2021-01-01 PROCEDURE — 2580000003 HC RX 258: Performed by: EMERGENCY MEDICINE

## 2021-01-01 PROCEDURE — 83880 ASSAY OF NATRIURETIC PEPTIDE: CPT

## 2021-01-01 PROCEDURE — 85610 PROTHROMBIN TIME: CPT

## 2021-01-01 PROCEDURE — 82803 BLOOD GASES ANY COMBINATION: CPT

## 2021-01-01 PROCEDURE — 1123F ACP DISCUSS/DSCN MKR DOCD: CPT | Performed by: NUCLEAR MEDICINE

## 2021-01-01 PROCEDURE — 1200000003 HC TELEMETRY R&B

## 2021-01-01 PROCEDURE — 99232 SBSQ HOSP IP/OBS MODERATE 35: CPT | Performed by: NURSE PRACTITIONER

## 2021-01-01 PROCEDURE — 36600 WITHDRAWAL OF ARTERIAL BLOOD: CPT

## 2021-01-01 PROCEDURE — 4040F PNEUMOC VAC/ADMIN/RCVD: CPT | Performed by: NURSE PRACTITIONER

## 2021-01-01 PROCEDURE — G8399 PT W/DXA RESULTS DOCUMENT: HCPCS

## 2021-01-01 PROCEDURE — G8399 PT W/DXA RESULTS DOCUMENT: HCPCS | Performed by: INTERNAL MEDICINE

## 2021-01-01 PROCEDURE — 99238 HOSP IP/OBS DSCHRG MGMT 30/<: CPT | Performed by: INTERNAL MEDICINE

## 2021-01-01 PROCEDURE — 97530 THERAPEUTIC ACTIVITIES: CPT

## 2021-01-01 PROCEDURE — 85027 COMPLETE CBC AUTOMATED: CPT

## 2021-01-01 PROCEDURE — 87040 BLOOD CULTURE FOR BACTERIA: CPT

## 2021-01-01 PROCEDURE — 87641 MR-STAPH DNA AMP PROBE: CPT

## 2021-01-01 PROCEDURE — 2709999900 IR FLUORO GUIDED CVA DEVICE PLMT/REPLACE/REMOVAL

## 2021-01-01 PROCEDURE — 1036F TOBACCO NON-USER: CPT | Performed by: PHYSICIAN ASSISTANT

## 2021-01-01 PROCEDURE — 86140 C-REACTIVE PROTEIN: CPT

## 2021-01-01 PROCEDURE — 3017F COLORECTAL CA SCREEN DOC REV: CPT | Performed by: INTERNAL MEDICINE

## 2021-01-01 PROCEDURE — 1036F TOBACCO NON-USER: CPT | Performed by: NUCLEAR MEDICINE

## 2021-01-01 PROCEDURE — 84132 ASSAY OF SERUM POTASSIUM: CPT

## 2021-01-01 PROCEDURE — 2500000003 HC RX 250 WO HCPCS

## 2021-01-01 PROCEDURE — 1200000000 HC SEMI PRIVATE

## 2021-01-01 PROCEDURE — G0108 DIAB MANAGE TRN  PER INDIV: HCPCS | Performed by: INTERNAL MEDICINE

## 2021-01-01 PROCEDURE — 81001 URINALYSIS AUTO W/SCOPE: CPT

## 2021-01-01 PROCEDURE — 92523 SPEECH SOUND LANG COMPREHEN: CPT

## 2021-01-01 PROCEDURE — 97166 OT EVAL MOD COMPLEX 45 MIN: CPT

## 2021-01-01 PROCEDURE — 85025 COMPLETE CBC W/AUTO DIFF WBC: CPT

## 2021-01-01 PROCEDURE — 6370000000 HC RX 637 (ALT 250 FOR IP): Performed by: STUDENT IN AN ORGANIZED HEALTH CARE EDUCATION/TRAINING PROGRAM

## 2021-01-01 PROCEDURE — 97110 THERAPEUTIC EXERCISES: CPT

## 2021-01-01 PROCEDURE — G8399 PT W/DXA RESULTS DOCUMENT: HCPCS | Performed by: NURSE PRACTITIONER

## 2021-01-01 PROCEDURE — 1111F DSCHRG MED/CURRENT MED MERGE: CPT | Performed by: NURSE PRACTITIONER

## 2021-01-01 PROCEDURE — 2022F DILAT RTA XM EVC RTNOPTHY: CPT | Performed by: INTERNAL MEDICINE

## 2021-01-01 PROCEDURE — 70450 CT HEAD/BRAIN W/O DYE: CPT

## 2021-01-01 PROCEDURE — 36416 COLLJ CAPILLARY BLOOD SPEC: CPT | Performed by: PHARMACIST

## 2021-01-01 PROCEDURE — 89190 NASAL SMEAR FOR EOSINOPHILS: CPT

## 2021-01-01 PROCEDURE — 0C993ZX DRAINAGE OF LEFT PAROTID GLAND, PERCUTANEOUS APPROACH, DIAGNOSTIC: ICD-10-PCS | Performed by: RADIOLOGY

## 2021-01-01 PROCEDURE — 6360000002 HC RX W HCPCS: Performed by: INTERNAL MEDICINE

## 2021-01-01 PROCEDURE — 87801 DETECT AGNT MULT DNA AMPLI: CPT

## 2021-01-01 PROCEDURE — XW0DXM6 INTRODUCTION OF BARICITINIB INTO MOUTH AND PHARYNX, EXTERNAL APPROACH, NEW TECHNOLOGY GROUP 6: ICD-10-PCS

## 2021-01-01 PROCEDURE — 0BH18EZ INSERTION OF ENDOTRACHEAL AIRWAY INTO TRACHEA, VIA NATURAL OR ARTIFICIAL OPENING ENDOSCOPIC: ICD-10-PCS | Performed by: NURSE PRACTITIONER

## 2021-01-01 PROCEDURE — 6370000000 HC RX 637 (ALT 250 FOR IP): Performed by: EMERGENCY MEDICINE

## 2021-01-01 PROCEDURE — 99285 EMERGENCY DEPT VISIT HI MDM: CPT

## 2021-01-01 PROCEDURE — 82947 ASSAY GLUCOSE BLOOD QUANT: CPT

## 2021-01-01 PROCEDURE — 86901 BLOOD TYPING SEROLOGIC RH(D): CPT

## 2021-01-01 PROCEDURE — 3017F COLORECTAL CA SCREEN DOC REV: CPT | Performed by: NURSE PRACTITIONER

## 2021-01-01 PROCEDURE — 80048 BASIC METABOLIC PNL TOTAL CA: CPT

## 2021-01-01 PROCEDURE — 84443 ASSAY THYROID STIM HORMONE: CPT

## 2021-01-01 PROCEDURE — 85610 PROTHROMBIN TIME: CPT | Performed by: PHARMACIST

## 2021-01-01 PROCEDURE — G8399 PT W/DXA RESULTS DOCUMENT: HCPCS | Performed by: PHYSICIAN ASSISTANT

## 2021-01-01 PROCEDURE — 96374 THER/PROPH/DIAG INJ IV PUSH: CPT

## 2021-01-01 PROCEDURE — 1090F PRES/ABSN URINE INCON ASSESS: CPT | Performed by: NUCLEAR MEDICINE

## 2021-01-01 PROCEDURE — 02HV33Z INSERTION OF INFUSION DEVICE INTO SUPERIOR VENA CAVA, PERCUTANEOUS APPROACH: ICD-10-PCS | Performed by: INTERNAL MEDICINE

## 2021-01-01 PROCEDURE — 83605 ASSAY OF LACTIC ACID: CPT

## 2021-01-01 PROCEDURE — G8417 CALC BMI ABV UP PARAM F/U: HCPCS | Performed by: INTERNAL MEDICINE

## 2021-01-01 PROCEDURE — 99284 EMERGENCY DEPT VISIT MOD MDM: CPT

## 2021-01-01 PROCEDURE — 99222 1ST HOSP IP/OBS MODERATE 55: CPT | Performed by: HOSPITALIST

## 2021-01-01 PROCEDURE — 2580000003 HC RX 258

## 2021-01-01 PROCEDURE — 84439 ASSAY OF FREE THYROXINE: CPT

## 2021-01-01 PROCEDURE — G0378 HOSPITAL OBSERVATION PER HR: HCPCS

## 2021-01-01 PROCEDURE — G8428 CUR MEDS NOT DOCUMENT: HCPCS | Performed by: NUCLEAR MEDICINE

## 2021-01-01 PROCEDURE — 2500000003 HC RX 250 WO HCPCS: Performed by: NURSE PRACTITIONER

## 2021-01-01 PROCEDURE — 94640 AIRWAY INHALATION TREATMENT: CPT

## 2021-01-01 PROCEDURE — 99222 1ST HOSP IP/OBS MODERATE 55: CPT | Performed by: INTERNAL MEDICINE

## 2021-01-01 PROCEDURE — 2022F DILAT RTA XM EVC RTNOPTHY: CPT | Performed by: NURSE PRACTITIONER

## 2021-01-01 PROCEDURE — 6370000000 HC RX 637 (ALT 250 FOR IP): Performed by: NURSE PRACTITIONER

## 2021-01-01 PROCEDURE — 80162 ASSAY OF DIGOXIN TOTAL: CPT

## 2021-01-01 PROCEDURE — 4040F PNEUMOC VAC/ADMIN/RCVD: CPT | Performed by: INTERNAL MEDICINE

## 2021-01-01 PROCEDURE — 82570 ASSAY OF URINE CREATININE: CPT

## 2021-01-01 PROCEDURE — 87500 VANOMYCIN DNA AMP PROBE: CPT

## 2021-01-01 PROCEDURE — 99214 OFFICE O/P EST MOD 30 MIN: CPT | Performed by: NUCLEAR MEDICINE

## 2021-01-01 PROCEDURE — 6360000002 HC RX W HCPCS

## 2021-01-01 PROCEDURE — 1111F DSCHRG MED/CURRENT MED MERGE: CPT | Performed by: NUCLEAR MEDICINE

## 2021-01-01 PROCEDURE — 2500000003 HC RX 250 WO HCPCS: Performed by: INTERNAL MEDICINE

## 2021-01-01 PROCEDURE — 2580000003 HC RX 258: Performed by: FAMILY MEDICINE

## 2021-01-01 PROCEDURE — G8484 FLU IMMUNIZE NO ADMIN: HCPCS | Performed by: NURSE PRACTITIONER

## 2021-01-01 PROCEDURE — 99232 SBSQ HOSP IP/OBS MODERATE 35: CPT | Performed by: INTERNAL MEDICINE

## 2021-01-01 PROCEDURE — 99222 1ST HOSP IP/OBS MODERATE 55: CPT | Performed by: PHYSICIAN ASSISTANT

## 2021-01-01 PROCEDURE — 82962 GLUCOSE BLOOD TEST: CPT

## 2021-01-01 PROCEDURE — 82306 VITAMIN D 25 HYDROXY: CPT

## 2021-01-01 PROCEDURE — 2580000003 HC RX 258: Performed by: PHYSICIAN ASSISTANT

## 2021-01-01 PROCEDURE — 31500 INSERT EMERGENCY AIRWAY: CPT | Performed by: NURSE PRACTITIONER

## 2021-01-01 PROCEDURE — 1123F ACP DISCUSS/DSCN MKR DOCD: CPT

## 2021-01-01 PROCEDURE — 1090F PRES/ABSN URINE INCON ASSESS: CPT | Performed by: NURSE PRACTITIONER

## 2021-01-01 PROCEDURE — U0002 COVID-19 LAB TEST NON-CDC: HCPCS

## 2021-01-01 PROCEDURE — G8484 FLU IMMUNIZE NO ADMIN: HCPCS

## 2021-01-01 PROCEDURE — 82607 VITAMIN B-12: CPT

## 2021-01-01 PROCEDURE — 2580000003 HC RX 258: Performed by: HOSPITALIST

## 2021-01-01 PROCEDURE — 99231 SBSQ HOSP IP/OBS SF/LOW 25: CPT | Performed by: OTOLARYNGOLOGY

## 2021-01-01 PROCEDURE — 1123F ACP DISCUSS/DSCN MKR DOCD: CPT | Performed by: NURSE PRACTITIONER

## 2021-01-01 PROCEDURE — 99215 OFFICE O/P EST HI 40 MIN: CPT | Performed by: NURSE PRACTITIONER

## 2021-01-01 PROCEDURE — 99231 SBSQ HOSP IP/OBS SF/LOW 25: CPT | Performed by: NURSE PRACTITIONER

## 2021-01-01 PROCEDURE — 82140 ASSAY OF AMMONIA: CPT

## 2021-01-01 PROCEDURE — 99204 OFFICE O/P NEW MOD 45 MIN: CPT | Performed by: INTERNAL MEDICINE

## 2021-01-01 PROCEDURE — 3017F COLORECTAL CA SCREEN DOC REV: CPT | Performed by: NUCLEAR MEDICINE

## 2021-01-01 PROCEDURE — 97116 GAIT TRAINING THERAPY: CPT

## 2021-01-01 PROCEDURE — 5A1935Z RESPIRATORY VENTILATION, LESS THAN 24 CONSECUTIVE HOURS: ICD-10-PCS | Performed by: NURSE PRACTITIONER

## 2021-01-01 PROCEDURE — 86900 BLOOD TYPING SEROLOGIC ABO: CPT

## 2021-01-01 PROCEDURE — 99232 SBSQ HOSP IP/OBS MODERATE 35: CPT | Performed by: FAMILY MEDICINE

## 2021-01-01 PROCEDURE — 70498 CT ANGIOGRAPHY NECK: CPT

## 2021-01-01 PROCEDURE — 99205 OFFICE O/P NEW HI 60 MIN: CPT | Performed by: INTERNAL MEDICINE

## 2021-01-01 PROCEDURE — 99211 OFF/OP EST MAY X REQ PHY/QHP: CPT | Performed by: PHARMACIST

## 2021-01-01 PROCEDURE — 93306 TTE W/DOPPLER COMPLETE: CPT

## 2021-01-01 PROCEDURE — 94761 N-INVAS EAR/PLS OXIMETRY MLT: CPT

## 2021-01-01 PROCEDURE — 84300 ASSAY OF URINE SODIUM: CPT

## 2021-01-01 PROCEDURE — 93005 ELECTROCARDIOGRAM TRACING: CPT

## 2021-01-01 PROCEDURE — G8427 DOCREV CUR MEDS BY ELIG CLIN: HCPCS | Performed by: NUCLEAR MEDICINE

## 2021-01-01 PROCEDURE — 84145 PROCALCITONIN (PCT): CPT

## 2021-01-01 PROCEDURE — 1036F TOBACCO NON-USER: CPT | Performed by: INTERNAL MEDICINE

## 2021-01-01 PROCEDURE — 36573 INSJ PICC RS&I 5 YR+: CPT | Performed by: RADIOLOGY

## 2021-01-01 PROCEDURE — 70496 CT ANGIOGRAPHY HEAD: CPT

## 2021-01-01 PROCEDURE — 88177 CYTP FNA EVAL EA ADDL: CPT

## 2021-01-01 PROCEDURE — G8427 DOCREV CUR MEDS BY ELIG CLIN: HCPCS | Performed by: NURSE PRACTITIONER

## 2021-01-01 PROCEDURE — 70551 MRI BRAIN STEM W/O DYE: CPT

## 2021-01-01 PROCEDURE — 99233 SBSQ HOSP IP/OBS HIGH 50: CPT | Performed by: INTERNAL MEDICINE

## 2021-01-01 PROCEDURE — 6360000002 HC RX W HCPCS: Performed by: FAMILY MEDICINE

## 2021-01-01 PROCEDURE — 1123F ACP DISCUSS/DSCN MKR DOCD: CPT | Performed by: INTERNAL MEDICINE

## 2021-01-01 PROCEDURE — G8417 CALC BMI ABV UP PARAM F/U: HCPCS | Performed by: NUCLEAR MEDICINE

## 2021-01-01 PROCEDURE — 99213 OFFICE O/P EST LOW 20 MIN: CPT | Performed by: INTERNAL MEDICINE

## 2021-01-01 PROCEDURE — 1036F TOBACCO NON-USER: CPT | Performed by: NURSE PRACTITIONER

## 2021-01-01 PROCEDURE — 2580000003 HC RX 258: Performed by: STUDENT IN AN ORGANIZED HEALTH CARE EDUCATION/TRAINING PROGRAM

## 2021-01-01 PROCEDURE — 99214 OFFICE O/P EST MOD 30 MIN: CPT

## 2021-01-01 PROCEDURE — XW0DXM6 INTRODUCTION OF BARICITINIB INTO MOUTH AND PHARYNX, EXTERNAL APPROACH, NEW TECHNOLOGY GROUP 6: ICD-10-PCS | Performed by: HOSPITALIST

## 2021-01-01 PROCEDURE — G8427 DOCREV CUR MEDS BY ELIG CLIN: HCPCS | Performed by: INTERNAL MEDICINE

## 2021-01-01 PROCEDURE — 85014 HEMATOCRIT: CPT

## 2021-01-01 PROCEDURE — 1111F DSCHRG MED/CURRENT MED MERGE: CPT | Performed by: INTERNAL MEDICINE

## 2021-01-01 PROCEDURE — 83735 ASSAY OF MAGNESIUM: CPT

## 2021-01-01 PROCEDURE — G8399 PT W/DXA RESULTS DOCUMENT: HCPCS | Performed by: NUCLEAR MEDICINE

## 2021-01-01 PROCEDURE — 99221 1ST HOSP IP/OBS SF/LOW 40: CPT | Performed by: INTERNAL MEDICINE

## 2021-01-01 PROCEDURE — 87635 SARS-COV-2 COVID-19 AMP PRB: CPT

## 2021-01-01 PROCEDURE — 88173 CYTOPATH EVAL FNA REPORT: CPT

## 2021-01-01 PROCEDURE — 1123F ACP DISCUSS/DSCN MKR DOCD: CPT | Performed by: PHYSICIAN ASSISTANT

## 2021-01-01 PROCEDURE — 1090F PRES/ABSN URINE INCON ASSESS: CPT | Performed by: PHYSICIAN ASSISTANT

## 2021-01-01 PROCEDURE — 83516 IMMUNOASSAY NONANTIBODY: CPT

## 2021-01-01 PROCEDURE — 93005 ELECTROCARDIOGRAM TRACING: CPT | Performed by: EMERGENCY MEDICINE

## 2021-01-01 PROCEDURE — 87081 CULTURE SCREEN ONLY: CPT

## 2021-01-01 PROCEDURE — 97163 PT EVAL HIGH COMPLEX 45 MIN: CPT

## 2021-01-01 PROCEDURE — 76770 US EXAM ABDO BACK WALL COMP: CPT

## 2021-01-01 PROCEDURE — 2580000003 HC RX 258: Performed by: NURSE PRACTITIONER

## 2021-01-01 PROCEDURE — 86850 RBC ANTIBODY SCREEN: CPT

## 2021-01-01 PROCEDURE — 85730 THROMBOPLASTIN TIME PARTIAL: CPT

## 2021-01-01 PROCEDURE — 6360000002 HC RX W HCPCS: Performed by: PHYSICIAN ASSISTANT

## 2021-01-01 PROCEDURE — 1090F PRES/ABSN URINE INCON ASSESS: CPT

## 2021-01-01 PROCEDURE — 99221 1ST HOSP IP/OBS SF/LOW 40: CPT | Performed by: NURSE PRACTITIONER

## 2021-01-01 PROCEDURE — G8417 CALC BMI ABV UP PARAM F/U: HCPCS | Performed by: NURSE PRACTITIONER

## 2021-01-01 PROCEDURE — 4040F PNEUMOC VAC/ADMIN/RCVD: CPT

## 2021-01-01 PROCEDURE — 1111F DSCHRG MED/CURRENT MED MERGE: CPT | Performed by: PHYSICIAN ASSISTANT

## 2021-01-01 PROCEDURE — 4040F PNEUMOC VAC/ADMIN/RCVD: CPT | Performed by: PHYSICIAN ASSISTANT

## 2021-01-01 PROCEDURE — G8427 DOCREV CUR MEDS BY ELIG CLIN: HCPCS | Performed by: PHYSICIAN ASSISTANT

## 2021-01-01 PROCEDURE — 6360000002 HC RX W HCPCS: Performed by: HOSPITALIST

## 2021-01-01 PROCEDURE — 99233 SBSQ HOSP IP/OBS HIGH 50: CPT | Performed by: FAMILY MEDICINE

## 2021-01-01 PROCEDURE — 95819 EEG AWAKE AND ASLEEP: CPT | Performed by: PSYCHIATRY & NEUROLOGY

## 2021-01-01 PROCEDURE — 82746 ASSAY OF FOLIC ACID SERUM: CPT

## 2021-01-01 PROCEDURE — 80053 COMPREHEN METABOLIC PANEL: CPT

## 2021-01-01 PROCEDURE — 84100 ASSAY OF PHOSPHORUS: CPT

## 2021-01-01 PROCEDURE — 99214 OFFICE O/P EST MOD 30 MIN: CPT | Performed by: PHYSICIAN ASSISTANT

## 2021-01-01 PROCEDURE — 95819 EEG AWAKE AND ASLEEP: CPT

## 2021-01-01 PROCEDURE — 93971 EXTREMITY STUDY: CPT

## 2021-01-01 PROCEDURE — 4040F PNEUMOC VAC/ADMIN/RCVD: CPT | Performed by: NUCLEAR MEDICINE

## 2021-01-01 PROCEDURE — 87077 CULTURE AEROBIC IDENTIFY: CPT

## 2021-01-01 PROCEDURE — 02HV33Z INSERTION OF INFUSION DEVICE INTO SUPERIOR VENA CAVA, PERCUTANEOUS APPROACH: ICD-10-PCS | Performed by: RADIOLOGY

## 2021-01-01 PROCEDURE — 99223 1ST HOSP IP/OBS HIGH 75: CPT | Performed by: FAMILY MEDICINE

## 2021-01-01 PROCEDURE — 2500000003 HC RX 250 WO HCPCS: Performed by: HOSPITALIST

## 2021-01-01 PROCEDURE — G8417 CALC BMI ABV UP PARAM F/U: HCPCS | Performed by: PHYSICIAN ASSISTANT

## 2021-01-01 PROCEDURE — 73700 CT LOWER EXTREMITY W/O DYE: CPT

## 2021-01-01 PROCEDURE — 73562 X-RAY EXAM OF KNEE 3: CPT

## 2021-01-01 PROCEDURE — G0296 VISIT TO DETERM LDCT ELIG: HCPCS | Performed by: NURSE PRACTITIONER

## 2021-01-01 PROCEDURE — 90732 PPSV23 VACC 2 YRS+ SUBQ/IM: CPT | Performed by: NURSE PRACTITIONER

## 2021-01-01 PROCEDURE — 99223 1ST HOSP IP/OBS HIGH 75: CPT | Performed by: INTERNAL MEDICINE

## 2021-01-01 PROCEDURE — 1036F TOBACCO NON-USER: CPT

## 2021-01-01 PROCEDURE — 94669 MECHANICAL CHEST WALL OSCILL: CPT

## 2021-01-01 PROCEDURE — 36620 INSERTION CATHETER ARTERY: CPT | Performed by: NURSE PRACTITIONER

## 2021-01-01 PROCEDURE — 97162 PT EVAL MOD COMPLEX 30 MIN: CPT

## 2021-01-01 PROCEDURE — 99222 1ST HOSP IP/OBS MODERATE 55: CPT | Performed by: PSYCHIATRY & NEUROLOGY

## 2021-01-01 PROCEDURE — 96375 TX/PRO/DX INJ NEW DRUG ADDON: CPT

## 2021-01-01 PROCEDURE — 3044F HG A1C LEVEL LT 7.0%: CPT | Performed by: NURSE PRACTITIONER

## 2021-01-01 PROCEDURE — 3017F COLORECTAL CA SCREEN DOC REV: CPT

## 2021-01-01 PROCEDURE — 84484 ASSAY OF TROPONIN QUANT: CPT

## 2021-01-01 PROCEDURE — 80061 LIPID PANEL: CPT

## 2021-01-01 PROCEDURE — G8427 DOCREV CUR MEDS BY ELIG CLIN: HCPCS

## 2021-01-01 PROCEDURE — 87086 URINE CULTURE/COLONY COUNT: CPT

## 2021-01-01 PROCEDURE — 99239 HOSP IP/OBS DSCHRG MGMT >30: CPT | Performed by: HOSPITALIST

## 2021-01-01 PROCEDURE — 87186 SC STD MICRODIL/AGAR DIL: CPT

## 2021-01-01 PROCEDURE — 71045 X-RAY EXAM CHEST 1 VIEW: CPT

## 2021-01-01 PROCEDURE — 93307 TTE W/O DOPPLER COMPLETE: CPT

## 2021-01-01 PROCEDURE — 93000 ELECTROCARDIOGRAM COMPLETE: CPT | Performed by: INTERNAL MEDICINE

## 2021-01-01 PROCEDURE — 60300 ASPIR/INJ THYROID CYST: CPT

## 2021-01-01 PROCEDURE — 80307 DRUG TEST PRSMV CHEM ANLYZR: CPT

## 2021-01-01 PROCEDURE — 82435 ASSAY OF BLOOD CHLORIDE: CPT

## 2021-01-01 PROCEDURE — 87147 CULTURE TYPE IMMUNOLOGIC: CPT

## 2021-01-01 PROCEDURE — 88172 CYTP DX EVAL FNA 1ST EA SITE: CPT

## 2021-01-01 PROCEDURE — 86885 COOMBS TEST INDIRECT QUAL: CPT

## 2021-01-01 PROCEDURE — 6360000002 HC RX W HCPCS: Performed by: STUDENT IN AN ORGANIZED HEALTH CARE EDUCATION/TRAINING PROGRAM

## 2021-01-01 PROCEDURE — 82565 ASSAY OF CREATININE: CPT

## 2021-01-01 PROCEDURE — 10005 FNA BX W/US GDN 1ST LES: CPT

## 2021-01-01 PROCEDURE — 99239 HOSP IP/OBS DSCHRG MGMT >30: CPT | Performed by: FAMILY MEDICINE

## 2021-01-01 PROCEDURE — 3044F HG A1C LEVEL LT 7.0%: CPT | Performed by: INTERNAL MEDICINE

## 2021-01-01 PROCEDURE — G0009 ADMIN PNEUMOCOCCAL VACCINE: HCPCS | Performed by: NURSE PRACTITIONER

## 2021-01-01 PROCEDURE — 99214 OFFICE O/P EST MOD 30 MIN: CPT | Performed by: NURSE PRACTITIONER

## 2021-01-01 PROCEDURE — 2700000000 HC OXYGEN THERAPY PER DAY

## 2021-01-01 PROCEDURE — 2022F DILAT RTA XM EVC RTNOPTHY: CPT

## 2021-01-01 PROCEDURE — 82077 ASSAY SPEC XCP UR&BREATH IA: CPT

## 2021-01-01 PROCEDURE — 6370000000 HC RX 637 (ALT 250 FOR IP): Performed by: PEDIATRICS

## 2021-01-01 PROCEDURE — 82248 BILIRUBIN DIRECT: CPT

## 2021-01-01 PROCEDURE — 3017F COLORECTAL CA SCREEN DOC REV: CPT | Performed by: PHYSICIAN ASSISTANT

## 2021-01-01 PROCEDURE — 93970 EXTREMITY STUDY: CPT

## 2021-01-01 PROCEDURE — 85018 HEMOGLOBIN: CPT

## 2021-01-01 PROCEDURE — 99212 OFFICE O/P EST SF 10 MIN: CPT | Performed by: INTERNAL MEDICINE

## 2021-01-01 PROCEDURE — 82330 ASSAY OF CALCIUM: CPT

## 2021-01-01 PROCEDURE — 84295 ASSAY OF SERUM SODIUM: CPT

## 2021-01-01 PROCEDURE — 94760 N-INVAS EAR/PLS OXIMETRY 1: CPT

## 2021-01-01 PROCEDURE — 6360000004 HC RX CONTRAST MEDICATION: Performed by: PHYSICIAN ASSISTANT

## 2021-01-01 PROCEDURE — 83036 HEMOGLOBIN GLYCOSYLATED A1C: CPT

## 2021-01-01 PROCEDURE — G8417 CALC BMI ABV UP PARAM F/U: HCPCS

## 2021-01-01 PROCEDURE — 83970 ASSAY OF PARATHORMONE: CPT

## 2021-01-01 PROCEDURE — 80076 HEPATIC FUNCTION PANEL: CPT

## 2021-01-01 PROCEDURE — 99232 SBSQ HOSP IP/OBS MODERATE 35: CPT | Performed by: HOSPITALIST

## 2021-01-01 PROCEDURE — 94002 VENT MGMT INPAT INIT DAY: CPT

## 2021-01-01 PROCEDURE — 93005 ELECTROCARDIOGRAM TRACING: CPT | Performed by: NURSE PRACTITIONER

## 2021-01-01 PROCEDURE — 93010 ELECTROCARDIOGRAM REPORT: CPT | Performed by: NUCLEAR MEDICINE

## 2021-01-01 PROCEDURE — 2500000003 HC RX 250 WO HCPCS: Performed by: EMERGENCY MEDICINE

## 2021-01-01 PROCEDURE — 93005 ELECTROCARDIOGRAM TRACING: CPT | Performed by: FAMILY MEDICINE

## 2021-01-01 PROCEDURE — 76536 US EXAM OF HEAD AND NECK: CPT

## 2021-01-01 PROCEDURE — 82150 ASSAY OF AMYLASE: CPT

## 2021-01-01 PROCEDURE — 93005 ELECTROCARDIOGRAM TRACING: CPT | Performed by: INTERNAL MEDICINE

## 2021-01-01 PROCEDURE — 99213 OFFICE O/P EST LOW 20 MIN: CPT | Performed by: NUCLEAR MEDICINE

## 2021-01-01 PROCEDURE — G8484 FLU IMMUNIZE NO ADMIN: HCPCS | Performed by: PHYSICIAN ASSISTANT

## 2021-01-01 PROCEDURE — 88305 TISSUE EXAM BY PATHOLOGIST: CPT

## 2021-01-01 PROCEDURE — 85651 RBC SED RATE NONAUTOMATED: CPT

## 2021-01-01 PROCEDURE — 36556 INSERT NON-TUNNEL CV CATH: CPT | Performed by: NURSE PRACTITIONER

## 2021-01-01 PROCEDURE — 94660 CPAP INITIATION&MGMT: CPT

## 2021-01-01 PROCEDURE — 99495 TRANSJ CARE MGMT MOD F2F 14D: CPT | Performed by: NURSE PRACTITIONER

## 2021-01-01 RX ORDER — ZINC SULFATE 50(220)MG
50 CAPSULE ORAL DAILY
Status: DISCONTINUED | OUTPATIENT
Start: 2021-01-01 | End: 2021-01-01 | Stop reason: HOSPADM

## 2021-01-01 RX ORDER — ACETAMINOPHEN 325 MG/1
650 TABLET ORAL EVERY 4 HOURS PRN
Status: DISCONTINUED | OUTPATIENT
Start: 2021-01-01 | End: 2021-01-01 | Stop reason: HOSPADM

## 2021-01-01 RX ORDER — INSULIN LISPRO 100 [IU]/ML
INJECTION, SOLUTION INTRAVENOUS; SUBCUTANEOUS
Qty: 5 PEN | Refills: 2 | Status: CANCELLED | OUTPATIENT
Start: 2021-01-01

## 2021-01-01 RX ORDER — INSULIN LISPRO 100 [IU]/ML
INJECTION, SUSPENSION SUBCUTANEOUS
Qty: 5 PEN | Refills: 0 | Status: SHIPPED | OUTPATIENT
Start: 2021-01-01 | End: 2021-01-01 | Stop reason: ALTCHOICE

## 2021-01-01 RX ORDER — HYDROCODONE BITARTRATE AND ACETAMINOPHEN 5; 325 MG/1; MG/1
1 TABLET ORAL ONCE
Status: COMPLETED | OUTPATIENT
Start: 2021-01-01 | End: 2021-01-01

## 2021-01-01 RX ORDER — NOREPINEPHRINE BIT/0.9 % NACL 16MG/250ML
2-100 INFUSION BOTTLE (ML) INTRAVENOUS CONTINUOUS
Status: DISCONTINUED | OUTPATIENT
Start: 2021-01-01 | End: 2021-01-01 | Stop reason: HOSPADM

## 2021-01-01 RX ORDER — METOPROLOL SUCCINATE 100 MG/1
TABLET, EXTENDED RELEASE ORAL
Qty: 90 TABLET | Refills: 1 | Status: SHIPPED | OUTPATIENT
Start: 2021-01-01

## 2021-01-01 RX ORDER — 0.9 % SODIUM CHLORIDE 0.9 %
1000 INTRAVENOUS SOLUTION INTRAVENOUS ONCE
Status: COMPLETED | OUTPATIENT
Start: 2021-01-01 | End: 2021-01-01

## 2021-01-01 RX ORDER — ALLOPURINOL 100 MG/1
100 TABLET ORAL DAILY
Status: DISCONTINUED | OUTPATIENT
Start: 2021-01-01 | End: 2021-01-01

## 2021-01-01 RX ORDER — SODIUM CHLORIDE 9 MG/ML
INJECTION, SOLUTION INTRAVENOUS CONTINUOUS
Status: DISCONTINUED | OUTPATIENT
Start: 2021-01-01 | End: 2021-01-01

## 2021-01-01 RX ORDER — ASCORBIC ACID 500 MG
500 TABLET ORAL 2 TIMES DAILY
Status: DISCONTINUED | OUTPATIENT
Start: 2021-01-01 | End: 2021-01-01 | Stop reason: HOSPADM

## 2021-01-01 RX ORDER — INSULIN GLARGINE 100 [IU]/ML
5 INJECTION, SOLUTION SUBCUTANEOUS ONCE
Status: COMPLETED | OUTPATIENT
Start: 2021-01-01 | End: 2021-01-01

## 2021-01-01 RX ORDER — DEXTROSE MONOHYDRATE 25 G/50ML
25 INJECTION, SOLUTION INTRAVENOUS PRN
Status: DISCONTINUED | OUTPATIENT
Start: 2021-01-01 | End: 2021-01-01 | Stop reason: HOSPADM

## 2021-01-01 RX ORDER — CLINDAMYCIN PHOSPHATE 600 MG/50ML
600 INJECTION INTRAVENOUS ONCE
Status: DISCONTINUED | OUTPATIENT
Start: 2021-01-01 | End: 2021-01-01

## 2021-01-01 RX ORDER — MECLIZINE HYDROCHLORIDE CHEWABLE TABLETS 25 MG/1
25 TABLET, CHEWABLE ORAL DAILY PRN
Status: DISCONTINUED | OUTPATIENT
Start: 2021-01-01 | End: 2021-01-01 | Stop reason: HOSPADM

## 2021-01-01 RX ORDER — CALCIUM CARBONATE 200(500)MG
500 TABLET,CHEWABLE ORAL 3 TIMES DAILY PRN
Status: DISCONTINUED | OUTPATIENT
Start: 2021-01-01 | End: 2021-01-01 | Stop reason: HOSPADM

## 2021-01-01 RX ORDER — IPRATROPIUM BROMIDE AND ALBUTEROL SULFATE 2.5; .5 MG/3ML; MG/3ML
1 SOLUTION RESPIRATORY (INHALATION)
Status: DISCONTINUED | OUTPATIENT
Start: 2021-01-01 | End: 2021-01-01 | Stop reason: HOSPADM

## 2021-01-01 RX ORDER — VALSARTAN 80 MG/1
80 TABLET ORAL DAILY
Status: DISCONTINUED | OUTPATIENT
Start: 2021-01-01 | End: 2021-01-01 | Stop reason: HOSPADM

## 2021-01-01 RX ORDER — INSULIN GLARGINE 100 [IU]/ML
15 INJECTION, SOLUTION SUBCUTANEOUS DAILY
Status: DISCONTINUED | OUTPATIENT
Start: 2021-01-01 | End: 2021-01-01

## 2021-01-01 RX ORDER — WARFARIN SODIUM 2.5 MG/1
2.5 TABLET ORAL ONCE
Status: DISCONTINUED | OUTPATIENT
Start: 2021-01-01 | End: 2021-01-01 | Stop reason: HOSPADM

## 2021-01-01 RX ORDER — POLYETHYLENE GLYCOL 3350 17 G/17G
17 POWDER, FOR SOLUTION ORAL DAILY PRN
Status: DISCONTINUED | OUTPATIENT
Start: 2021-01-01 | End: 2021-01-01 | Stop reason: HOSPADM

## 2021-01-01 RX ORDER — LOSARTAN POTASSIUM 100 MG/1
100 TABLET ORAL DAILY
Qty: 90 TABLET | Refills: 3 | Status: ON HOLD | DISCHARGE
Start: 2021-01-01 | End: 2021-01-01 | Stop reason: HOSPADM

## 2021-01-01 RX ORDER — WARFARIN SODIUM 1 MG/1
1 TABLET ORAL
Status: COMPLETED | OUTPATIENT
Start: 2021-01-01 | End: 2021-01-01

## 2021-01-01 RX ORDER — SODIUM CHLORIDE, SODIUM LACTATE, POTASSIUM CHLORIDE, AND CALCIUM CHLORIDE .6; .31; .03; .02 G/100ML; G/100ML; G/100ML; G/100ML
1000 INJECTION, SOLUTION INTRAVENOUS ONCE
Status: COMPLETED | OUTPATIENT
Start: 2021-01-01 | End: 2021-01-01

## 2021-01-01 RX ORDER — DULOXETIN HYDROCHLORIDE 60 MG/1
60 CAPSULE, DELAYED RELEASE ORAL DAILY
Status: DISCONTINUED | OUTPATIENT
Start: 2021-01-01 | End: 2021-01-01 | Stop reason: HOSPADM

## 2021-01-01 RX ORDER — FOLIC ACID 1 MG/1
1 TABLET ORAL DAILY
Status: DISCONTINUED | OUTPATIENT
Start: 2021-01-01 | End: 2021-01-01 | Stop reason: HOSPADM

## 2021-01-01 RX ORDER — DEXAMETHASONE SODIUM PHOSPHATE 4 MG/ML
6 INJECTION, SOLUTION INTRA-ARTICULAR; INTRALESIONAL; INTRAMUSCULAR; INTRAVENOUS; SOFT TISSUE DAILY
Status: DISCONTINUED | OUTPATIENT
Start: 2021-01-01 | End: 2021-01-01 | Stop reason: HOSPADM

## 2021-01-01 RX ORDER — ACETAMINOPHEN 650 MG/1
650 SUPPOSITORY RECTAL EVERY 6 HOURS PRN
Status: DISCONTINUED | OUTPATIENT
Start: 2021-01-01 | End: 2021-01-01 | Stop reason: HOSPADM

## 2021-01-01 RX ORDER — ONDANSETRON 2 MG/ML
4 INJECTION INTRAMUSCULAR; INTRAVENOUS ONCE
Status: COMPLETED | OUTPATIENT
Start: 2021-01-01 | End: 2021-01-01

## 2021-01-01 RX ORDER — INSULIN GLARGINE 100 [IU]/ML
32 INJECTION, SOLUTION SUBCUTANEOUS EVERY MORNING
Qty: 5 PEN | Refills: 3 | Status: SHIPPED | OUTPATIENT
Start: 2021-01-01

## 2021-01-01 RX ORDER — 0.9 % SODIUM CHLORIDE 0.9 %
30 INTRAVENOUS SOLUTION INTRAVENOUS ONCE
Status: DISCONTINUED | OUTPATIENT
Start: 2021-01-01 | End: 2021-01-01 | Stop reason: HOSPADM

## 2021-01-01 RX ORDER — MODAFINIL 100 MG/1
200 TABLET ORAL DAILY
Status: DISCONTINUED | OUTPATIENT
Start: 2021-01-01 | End: 2021-01-01 | Stop reason: HOSPADM

## 2021-01-01 RX ORDER — DIGOXIN 125 MCG
62.5 TABLET ORAL DAILY
Status: DISCONTINUED | OUTPATIENT
Start: 2021-01-01 | End: 2021-01-01 | Stop reason: HOSPADM

## 2021-01-01 RX ORDER — ONDANSETRON 4 MG/1
4 TABLET, ORALLY DISINTEGRATING ORAL EVERY 8 HOURS PRN
Status: DISCONTINUED | OUTPATIENT
Start: 2021-01-01 | End: 2021-01-01 | Stop reason: HOSPADM

## 2021-01-01 RX ORDER — DEXTROSE MONOHYDRATE 50 MG/ML
100 INJECTION, SOLUTION INTRAVENOUS PRN
Status: DISCONTINUED | OUTPATIENT
Start: 2021-01-01 | End: 2021-01-01 | Stop reason: HOSPADM

## 2021-01-01 RX ORDER — ONDANSETRON 2 MG/ML
4 INJECTION INTRAMUSCULAR; INTRAVENOUS EVERY 6 HOURS PRN
Status: DISCONTINUED | OUTPATIENT
Start: 2021-01-01 | End: 2021-01-01

## 2021-01-01 RX ORDER — MODAFINIL 200 MG/1
200 TABLET ORAL DAILY
Qty: 30 TABLET | Refills: 0 | Status: SHIPPED | OUTPATIENT
Start: 2021-01-01 | End: 2021-01-01

## 2021-01-01 RX ORDER — NICOTINE POLACRILEX 4 MG
15 LOZENGE BUCCAL PRN
Status: DISCONTINUED | OUTPATIENT
Start: 2021-01-01 | End: 2021-01-01 | Stop reason: HOSPADM

## 2021-01-01 RX ORDER — ASPIRIN 81 MG/1
81 TABLET ORAL DAILY
Status: DISCONTINUED | OUTPATIENT
Start: 2021-01-01 | End: 2021-01-01 | Stop reason: HOSPADM

## 2021-01-01 RX ORDER — ATORVASTATIN CALCIUM 40 MG/1
TABLET, FILM COATED ORAL
Qty: 90 TABLET | Refills: 3 | Status: SHIPPED | OUTPATIENT
Start: 2021-01-01

## 2021-01-01 RX ORDER — HYDROCODONE BITARTRATE AND ACETAMINOPHEN 5; 325 MG/1; MG/1
1 TABLET ORAL EVERY 6 HOURS PRN
Status: DISCONTINUED | OUTPATIENT
Start: 2021-01-01 | End: 2021-01-01 | Stop reason: HOSPADM

## 2021-01-01 RX ORDER — INSULIN LISPRO 100 [IU]/ML
INJECTION, SOLUTION INTRAVENOUS; SUBCUTANEOUS
Qty: 5 PEN | Refills: 1 | Status: SHIPPED | OUTPATIENT
Start: 2021-01-01

## 2021-01-01 RX ORDER — SODIUM CHLORIDE 0.9 % (FLUSH) 0.9 %
10 SYRINGE (ML) INJECTION PRN
Status: DISCONTINUED | OUTPATIENT
Start: 2021-01-01 | End: 2021-01-01 | Stop reason: HOSPADM

## 2021-01-01 RX ORDER — WARFARIN SODIUM 5 MG/1
5 TABLET ORAL ONCE
Status: COMPLETED | OUTPATIENT
Start: 2021-01-01 | End: 2021-01-01

## 2021-01-01 RX ORDER — ACETAMINOPHEN 500 MG
500 TABLET ORAL EVERY 4 HOURS PRN
Status: DISCONTINUED | OUTPATIENT
Start: 2021-01-01 | End: 2021-01-01 | Stop reason: SDUPTHER

## 2021-01-01 RX ORDER — METOPROLOL SUCCINATE 100 MG/1
100 TABLET, EXTENDED RELEASE ORAL DAILY
Status: DISCONTINUED | OUTPATIENT
Start: 2021-01-01 | End: 2021-01-01 | Stop reason: HOSPADM

## 2021-01-01 RX ORDER — PROPOFOL 10 MG/ML
5-50 INJECTION, EMULSION INTRAVENOUS
Status: DISCONTINUED | OUTPATIENT
Start: 2021-01-01 | End: 2021-01-01 | Stop reason: HOSPADM

## 2021-01-01 RX ORDER — FLASH GLUCOSE SENSOR
1 KIT MISCELLANEOUS CONTINUOUS
Qty: 6 EACH | Refills: 1 | Status: SHIPPED | OUTPATIENT
Start: 2021-01-01

## 2021-01-01 RX ORDER — ACETAMINOPHEN 325 MG/1
650 TABLET ORAL EVERY 6 HOURS PRN
Status: DISCONTINUED | OUTPATIENT
Start: 2021-01-01 | End: 2021-01-01 | Stop reason: HOSPADM

## 2021-01-01 RX ORDER — METOPROLOL TARTRATE 50 MG/1
50 TABLET, FILM COATED ORAL 2 TIMES DAILY
Status: DISCONTINUED | OUTPATIENT
Start: 2021-01-01 | End: 2021-01-01 | Stop reason: HOSPADM

## 2021-01-01 RX ORDER — HYDROCODONE BITARTRATE AND ACETAMINOPHEN 5; 325 MG/1; MG/1
1-2 TABLET ORAL EVERY 8 HOURS PRN
Qty: 20 TABLET | Refills: 0 | Status: SHIPPED | OUTPATIENT
Start: 2021-01-01 | End: 2021-01-01

## 2021-01-01 RX ORDER — WARFARIN SODIUM 2.5 MG/1
2.5 TABLET ORAL
Status: COMPLETED | OUTPATIENT
Start: 2021-01-01 | End: 2021-01-01

## 2021-01-01 RX ORDER — GLIPIZIDE 10 MG/1
10 TABLET ORAL
Status: DISCONTINUED | OUTPATIENT
Start: 2021-01-01 | End: 2021-01-01

## 2021-01-01 RX ORDER — WARFARIN SODIUM 7.5 MG/1
3.75 TABLET ORAL
Status: COMPLETED | OUTPATIENT
Start: 2021-01-01 | End: 2021-01-01

## 2021-01-01 RX ORDER — ATORVASTATIN CALCIUM 40 MG/1
40 TABLET, FILM COATED ORAL NIGHTLY
Status: DISCONTINUED | OUTPATIENT
Start: 2021-01-01 | End: 2021-01-01 | Stop reason: HOSPADM

## 2021-01-01 RX ORDER — DEXAMETHASONE 4 MG/1
6 TABLET ORAL DAILY
Status: DISCONTINUED | OUTPATIENT
Start: 2021-01-01 | End: 2021-01-01

## 2021-01-01 RX ORDER — DEXTROSE MONOHYDRATE 25 G/50ML
12.5 INJECTION, SOLUTION INTRAVENOUS ONCE
Status: COMPLETED | OUTPATIENT
Start: 2021-01-01 | End: 2021-01-01

## 2021-01-01 RX ORDER — ATORVASTATIN CALCIUM 40 MG/1
40 TABLET, FILM COATED ORAL DAILY
Status: DISCONTINUED | OUTPATIENT
Start: 2021-01-01 | End: 2021-01-01 | Stop reason: HOSPADM

## 2021-01-01 RX ORDER — WARFARIN SODIUM 4 MG/1
4 TABLET ORAL
Status: COMPLETED | OUTPATIENT
Start: 2021-01-01 | End: 2021-01-01

## 2021-01-01 RX ORDER — FUROSEMIDE 40 MG/1
40 TABLET ORAL DAILY
COMMUNITY
End: 2021-01-01

## 2021-01-01 RX ORDER — FUROSEMIDE 40 MG/1
40 TABLET ORAL DAILY
Status: DISCONTINUED | OUTPATIENT
Start: 2021-01-01 | End: 2021-01-01 | Stop reason: HOSPADM

## 2021-01-01 RX ORDER — PANTOPRAZOLE SODIUM 40 MG/1
40 TABLET, DELAYED RELEASE ORAL
Status: DISCONTINUED | OUTPATIENT
Start: 2021-01-01 | End: 2021-01-01 | Stop reason: HOSPADM

## 2021-01-01 RX ORDER — GLIPIZIDE 10 MG/1
10 TABLET ORAL
Status: DISCONTINUED | OUTPATIENT
Start: 2021-01-01 | End: 2021-01-01 | Stop reason: HOSPADM

## 2021-01-01 RX ORDER — INSULIN GLARGINE 100 [IU]/ML
32 INJECTION, SOLUTION SUBCUTANEOUS EVERY MORNING
Status: DISCONTINUED | OUTPATIENT
Start: 2021-01-01 | End: 2021-01-01 | Stop reason: HOSPADM

## 2021-01-01 RX ORDER — ALENDRONATE SODIUM 70 MG/1
70 TABLET ORAL WEEKLY
Status: DISCONTINUED | OUTPATIENT
Start: 2021-01-01 | End: 2021-01-01 | Stop reason: CLARIF

## 2021-01-01 RX ORDER — WARFARIN SODIUM 1 MG/1
TABLET ORAL
Status: CANCELLED | OUTPATIENT
Start: 2021-01-01

## 2021-01-01 RX ORDER — DULAGLUTIDE 0.75 MG/.5ML
INJECTION, SOLUTION SUBCUTANEOUS
Qty: 6 ML | Refills: 3 | Status: SHIPPED | OUTPATIENT
Start: 2021-01-01

## 2021-01-01 RX ORDER — ALLOPURINOL 100 MG/1
100 TABLET ORAL DAILY
Qty: 30 TABLET | Refills: 3 | DISCHARGE
Start: 2021-01-01 | End: 2021-01-01 | Stop reason: SDUPTHER

## 2021-01-01 RX ORDER — MORPHINE SULFATE 2 MG/ML
2 INJECTION, SOLUTION INTRAMUSCULAR; INTRAVENOUS
Status: DISCONTINUED | OUTPATIENT
Start: 2021-01-01 | End: 2021-01-01 | Stop reason: HOSPADM

## 2021-01-01 RX ORDER — SODIUM CHLORIDE 0.9 % (FLUSH) 0.9 %
5-40 SYRINGE (ML) INJECTION PRN
Status: DISCONTINUED | OUTPATIENT
Start: 2021-01-01 | End: 2021-01-01 | Stop reason: HOSPADM

## 2021-01-01 RX ORDER — IPRATROPIUM BROMIDE AND ALBUTEROL SULFATE 2.5; .5 MG/3ML; MG/3ML
1 SOLUTION RESPIRATORY (INHALATION) ONCE
Status: COMPLETED | OUTPATIENT
Start: 2021-01-01 | End: 2021-01-01

## 2021-01-01 RX ORDER — SODIUM BICARBONATE 650 MG/1
1300 TABLET ORAL 2 TIMES DAILY
DISCHARGE
Start: 2021-01-01 | End: 2021-01-01

## 2021-01-01 RX ORDER — PANTOPRAZOLE SODIUM 40 MG/1
40 TABLET, DELAYED RELEASE ORAL
Refills: 3 | Status: DISCONTINUED | OUTPATIENT
Start: 2021-01-01 | End: 2021-01-01

## 2021-01-01 RX ORDER — DIPHENHYDRAMINE HYDROCHLORIDE 50 MG/ML
25 INJECTION INTRAMUSCULAR; INTRAVENOUS EVERY 6 HOURS PRN
Status: DISCONTINUED | OUTPATIENT
Start: 2021-01-01 | End: 2021-01-01 | Stop reason: HOSPADM

## 2021-01-01 RX ORDER — LEVOTHYROXINE SODIUM 0.07 MG/1
75 TABLET ORAL DAILY
Status: DISCONTINUED | OUTPATIENT
Start: 2021-01-01 | End: 2021-01-01 | Stop reason: HOSPADM

## 2021-01-01 RX ORDER — WARFARIN SODIUM 2.5 MG/1
TABLET ORAL
Qty: 45 TABLET | Refills: 5 | Status: SHIPPED | OUTPATIENT
Start: 2021-01-01

## 2021-01-01 RX ORDER — ALLOPURINOL 100 MG/1
100 TABLET ORAL DAILY
Status: DISCONTINUED | OUTPATIENT
Start: 2021-01-01 | End: 2021-01-01 | Stop reason: HOSPADM

## 2021-01-01 RX ORDER — KETAMINE HCL IN NACL, ISO-OSM 100MG/10ML
SYRINGE (ML) INJECTION
Status: DISCONTINUED
Start: 2021-01-01 | End: 2021-01-01 | Stop reason: HOSPADM

## 2021-01-01 RX ORDER — VALSARTAN 80 MG/1
80 TABLET ORAL DAILY
Qty: 30 TABLET | Refills: 3 | Status: SHIPPED | OUTPATIENT
Start: 2021-01-01

## 2021-01-01 RX ORDER — ONDANSETRON 2 MG/ML
4 INJECTION INTRAMUSCULAR; INTRAVENOUS EVERY 6 HOURS PRN
Status: DISCONTINUED | OUTPATIENT
Start: 2021-01-01 | End: 2021-01-01 | Stop reason: HOSPADM

## 2021-01-01 RX ORDER — DEXTROSE MONOHYDRATE 25 G/50ML
25 INJECTION, SOLUTION INTRAVENOUS ONCE
Status: COMPLETED | OUTPATIENT
Start: 2021-01-01 | End: 2021-01-01

## 2021-01-01 RX ORDER — FLASH GLUCOSE SENSOR
1 KIT MISCELLANEOUS CONTINUOUS
Qty: 6 EACH | Refills: 1 | Status: SHIPPED | OUTPATIENT
Start: 2021-01-01 | End: 2021-01-01 | Stop reason: SDUPTHER

## 2021-01-01 RX ORDER — HYDROCODONE BITARTRATE AND ACETAMINOPHEN 5; 325 MG/1; MG/1
1-2 TABLET ORAL
Qty: 42 TABLET | Refills: 0 | Status: SHIPPED | OUTPATIENT
Start: 2021-01-01 | End: 2021-01-01 | Stop reason: SDUPTHER

## 2021-01-01 RX ORDER — HYDROCODONE BITARTRATE AND ACETAMINOPHEN 5; 325 MG/1; MG/1
2 TABLET ORAL EVERY 4 HOURS PRN
Status: DISCONTINUED | OUTPATIENT
Start: 2021-01-01 | End: 2021-01-01 | Stop reason: HOSPADM

## 2021-01-01 RX ORDER — SODIUM BICARBONATE 650 MG/1
1300 TABLET ORAL 2 TIMES DAILY
Status: DISCONTINUED | OUTPATIENT
Start: 2021-01-01 | End: 2021-01-01

## 2021-01-01 RX ORDER — 0.9 % SODIUM CHLORIDE 0.9 %
30 INTRAVENOUS SOLUTION INTRAVENOUS ONCE
Status: COMPLETED | OUTPATIENT
Start: 2021-01-01 | End: 2021-01-01

## 2021-01-01 RX ORDER — MUSCLE RUB CREAM 100; 150 MG/G; MG/G
CREAM TOPICAL PRN
Status: DISCONTINUED | OUTPATIENT
Start: 2021-01-01 | End: 2021-01-01 | Stop reason: HOSPADM

## 2021-01-01 RX ORDER — SODIUM CHLORIDE 9 MG/ML
25 INJECTION, SOLUTION INTRAVENOUS PRN
Status: DISCONTINUED | OUTPATIENT
Start: 2021-01-01 | End: 2021-01-01 | Stop reason: HOSPADM

## 2021-01-01 RX ORDER — DIAPER,BRIEF,ADULT, DISPOSABLE
EACH MISCELLANEOUS
Qty: 50 EACH | Refills: 2 | Status: SHIPPED | OUTPATIENT
Start: 2021-01-01

## 2021-01-01 RX ORDER — DEXTROSE AND SODIUM CHLORIDE 10; .45 G/100ML; G/100ML
INJECTION, SOLUTION INTRAVENOUS CONTINUOUS
Status: DISPENSED | OUTPATIENT
Start: 2021-01-01 | End: 2021-01-01

## 2021-01-01 RX ORDER — WARFARIN SODIUM 3 MG/1
3 TABLET ORAL
Status: COMPLETED | OUTPATIENT
Start: 2021-01-01 | End: 2021-01-01

## 2021-01-01 RX ORDER — OYSTER SHELL CALCIUM WITH VITAMIN D 500; 200 MG/1; [IU]/1
1 TABLET, FILM COATED ORAL 2 TIMES DAILY WITH MEALS
Status: DISCONTINUED | OUTPATIENT
Start: 2021-01-01 | End: 2021-01-01 | Stop reason: HOSPADM

## 2021-01-01 RX ORDER — SODIUM BICARBONATE 650 MG/1
1300 TABLET ORAL 2 TIMES DAILY
Status: DISCONTINUED | OUTPATIENT
Start: 2021-01-01 | End: 2021-01-01 | Stop reason: HOSPADM

## 2021-01-01 RX ORDER — DEXTROSE MONOHYDRATE 25 G/50ML
12.5 INJECTION, SOLUTION INTRAVENOUS PRN
Status: DISCONTINUED | OUTPATIENT
Start: 2021-01-01 | End: 2021-01-01 | Stop reason: HOSPADM

## 2021-01-01 RX ORDER — ALLOPURINOL 100 MG/1
100 TABLET ORAL DAILY
Qty: 90 TABLET | Refills: 3 | Status: SHIPPED | OUTPATIENT
Start: 2021-01-01 | End: 2021-01-01

## 2021-01-01 RX ORDER — POLYETHYLENE GLYCOL 3350 17 G/17G
17 POWDER, FOR SOLUTION ORAL DAILY PRN
Qty: 527 G | Refills: 1 | DISCHARGE
Start: 2021-01-01 | End: 2021-01-01

## 2021-01-01 RX ORDER — INSULIN GLARGINE 100 [IU]/ML
36 INJECTION, SOLUTION SUBCUTANEOUS DAILY
Status: DISCONTINUED | OUTPATIENT
Start: 2021-01-01 | End: 2021-01-01 | Stop reason: HOSPADM

## 2021-01-01 RX ORDER — INSULIN LISPRO 100 [IU]/ML
25 INJECTION, SUSPENSION SUBCUTANEOUS 2 TIMES DAILY WITH MEALS
Qty: 5 PEN | Refills: 3 | Status: SHIPPED
Start: 2021-01-01 | End: 2021-01-01 | Stop reason: SDUPTHER

## 2021-01-01 RX ORDER — WARFARIN SODIUM 2 MG/1
2 TABLET ORAL
Status: DISCONTINUED | OUTPATIENT
Start: 2021-01-01 | End: 2021-01-01 | Stop reason: HOSPADM

## 2021-01-01 RX ORDER — GABAPENTIN 100 MG/1
100 CAPSULE ORAL 3 TIMES DAILY
Status: DISCONTINUED | OUTPATIENT
Start: 2021-01-01 | End: 2021-01-01 | Stop reason: HOSPADM

## 2021-01-01 RX ORDER — CALCIUM CHLORIDE 100 MG/ML
1 INJECTION INTRAVENOUS; INTRAVENTRICULAR ONCE
Status: DISCONTINUED | OUTPATIENT
Start: 2021-01-01 | End: 2021-01-01 | Stop reason: ALTCHOICE

## 2021-01-01 RX ORDER — FUROSEMIDE 10 MG/ML
20 INJECTION INTRAMUSCULAR; INTRAVENOUS ONCE
Status: COMPLETED | OUTPATIENT
Start: 2021-01-01 | End: 2021-01-01

## 2021-01-01 RX ORDER — POLYETHYLENE GLYCOL 3350 17 G/17G
17 POWDER, FOR SOLUTION ORAL DAILY PRN
Status: DISCONTINUED | OUTPATIENT
Start: 2021-01-01 | End: 2021-01-01

## 2021-01-01 RX ORDER — SODIUM CHLORIDE 9 MG/ML
INJECTION, SOLUTION INTRAVENOUS CONTINUOUS
Status: DISCONTINUED | OUTPATIENT
Start: 2021-01-01 | End: 2021-01-01 | Stop reason: SDUPTHER

## 2021-01-01 RX ORDER — FAMOTIDINE 20 MG/1
20 TABLET, FILM COATED ORAL DAILY
Status: DISCONTINUED | OUTPATIENT
Start: 2021-01-01 | End: 2021-01-01 | Stop reason: HOSPADM

## 2021-01-01 RX ORDER — METOPROLOL SUCCINATE 50 MG/1
50 TABLET, EXTENDED RELEASE ORAL DAILY
Status: DISCONTINUED | OUTPATIENT
Start: 2021-01-01 | End: 2021-01-01 | Stop reason: HOSPADM

## 2021-01-01 RX ORDER — FUROSEMIDE 10 MG/ML
40 INJECTION INTRAMUSCULAR; INTRAVENOUS ONCE
Status: COMPLETED | OUTPATIENT
Start: 2021-01-01 | End: 2021-01-01

## 2021-01-01 RX ORDER — SODIUM CHLORIDE 0.9 % (FLUSH) 0.9 %
10 SYRINGE (ML) INJECTION EVERY 12 HOURS SCHEDULED
Status: DISCONTINUED | OUTPATIENT
Start: 2021-01-01 | End: 2021-01-01 | Stop reason: HOSPADM

## 2021-01-01 RX ORDER — LOSARTAN POTASSIUM 50 MG/1
50 TABLET ORAL DAILY
Status: DISCONTINUED | OUTPATIENT
Start: 2021-01-01 | End: 2021-01-01 | Stop reason: HOSPADM

## 2021-01-01 RX ORDER — DEXAMETHASONE SODIUM PHOSPHATE 4 MG/ML
10 INJECTION, SOLUTION INTRA-ARTICULAR; INTRALESIONAL; INTRAMUSCULAR; INTRAVENOUS; SOFT TISSUE ONCE
Status: COMPLETED | OUTPATIENT
Start: 2021-01-01 | End: 2021-01-01

## 2021-01-01 RX ORDER — LEVOTHYROXINE SODIUM 0.07 MG/1
75 TABLET ORAL DAILY
COMMUNITY
End: 2021-01-01 | Stop reason: SDUPTHER

## 2021-01-01 RX ORDER — SODIUM CHLORIDE 0.9 % (FLUSH) 0.9 %
5-40 SYRINGE (ML) INJECTION EVERY 12 HOURS SCHEDULED
Status: DISCONTINUED | OUTPATIENT
Start: 2021-01-01 | End: 2021-01-01 | Stop reason: HOSPADM

## 2021-01-01 RX ORDER — WARFARIN SODIUM 3 MG/1
3 TABLET ORAL DAILY
Status: DISCONTINUED | OUTPATIENT
Start: 2021-01-01 | End: 2021-01-01 | Stop reason: ALTCHOICE

## 2021-01-01 RX ORDER — LEVOTHYROXINE SODIUM 0.05 MG/1
50 TABLET ORAL DAILY
Status: DISCONTINUED | OUTPATIENT
Start: 2021-01-01 | End: 2021-01-01 | Stop reason: HOSPADM

## 2021-01-01 RX ORDER — PROPOFOL 10 MG/ML
INJECTION, EMULSION INTRAVENOUS
Status: COMPLETED
Start: 2021-01-01 | End: 2021-01-01

## 2021-01-01 RX ORDER — ACETAMINOPHEN 160 MG
2000 TABLET,DISINTEGRATING ORAL DAILY
COMMUNITY

## 2021-01-01 RX ORDER — FENOFIBRATE 160 MG/1
160 TABLET ORAL DAILY
Status: DISCONTINUED | OUTPATIENT
Start: 2021-01-01 | End: 2021-01-01 | Stop reason: HOSPADM

## 2021-01-01 RX ORDER — DEXAMETHASONE 4 MG/1
10 TABLET ORAL DAILY
Status: DISCONTINUED | OUTPATIENT
Start: 2021-01-01 | End: 2021-01-01

## 2021-01-01 RX ORDER — DICLOXACILLIN SODIUM 250 MG/1
500 CAPSULE ORAL 4 TIMES DAILY
Qty: 112 CAPSULE | Refills: 0 | Status: SHIPPED | OUTPATIENT
Start: 2021-01-01 | End: 2021-01-01

## 2021-01-01 RX ORDER — HYDROCODONE BITARTRATE AND ACETAMINOPHEN 5; 325 MG/1; MG/1
1 TABLET ORAL EVERY 8 HOURS PRN
Status: DISCONTINUED | OUTPATIENT
Start: 2021-01-01 | End: 2021-01-01

## 2021-01-01 RX ORDER — INSULIN LISPRO 100 [IU]/ML
INJECTION, SOLUTION INTRAVENOUS; SUBCUTANEOUS
Qty: 5 CARTRIDGE | Refills: 3 | Status: CANCELLED | OUTPATIENT
Start: 2021-01-01

## 2021-01-01 RX ORDER — KETAMINE HCL IN NACL, ISO-OSM 100MG/10ML
SYRINGE (ML) INJECTION
Status: COMPLETED | OUTPATIENT
Start: 2021-01-01 | End: 2021-01-01

## 2021-01-01 RX ORDER — ZINC SULFATE 50(220)MG
50 CAPSULE ORAL DAILY
COMMUNITY

## 2021-01-01 RX ORDER — SODIUM BICARBONATE 650 MG/1
1300 TABLET ORAL 3 TIMES DAILY
Status: DISCONTINUED | OUTPATIENT
Start: 2021-01-01 | End: 2021-01-01

## 2021-01-01 RX ORDER — WARFARIN SODIUM 1 MG/1
1 TABLET ORAL ONCE
Status: COMPLETED | OUTPATIENT
Start: 2021-01-01 | End: 2021-01-01

## 2021-01-01 RX ORDER — CALCIUM CHLORIDE 100 MG/ML
1 INJECTION INTRAVENOUS; INTRAVENTRICULAR ONCE
Status: COMPLETED | OUTPATIENT
Start: 2021-01-01 | End: 2021-01-01

## 2021-01-01 RX ORDER — ALOGLIPTIN 6.25 MG/1
6.25 TABLET, FILM COATED ORAL DAILY
Qty: 30 TABLET | Refills: 0
Start: 2021-01-01 | End: 2021-01-01

## 2021-01-01 RX ORDER — VITAMIN B COMPLEX
2000 TABLET ORAL DAILY
Status: DISCONTINUED | OUTPATIENT
Start: 2021-01-01 | End: 2021-01-01 | Stop reason: HOSPADM

## 2021-01-01 RX ORDER — GABAPENTIN 100 MG/1
100 CAPSULE ORAL 3 TIMES DAILY
COMMUNITY
End: 2021-01-01

## 2021-01-01 RX ORDER — ZOSTER VACCINE RECOMBINANT, ADJUVANTED 50 MCG/0.5
0.5 KIT INTRAMUSCULAR SEE ADMIN INSTRUCTIONS
Qty: 0.5 ML | Refills: 0 | Status: SHIPPED | OUTPATIENT
Start: 2021-01-01 | End: 2021-01-01

## 2021-01-01 RX ORDER — ACETAMINOPHEN 325 MG/1
650 TABLET ORAL 4 TIMES DAILY PRN
Status: DISCONTINUED | OUTPATIENT
Start: 2021-01-01 | End: 2021-01-01 | Stop reason: SDUPTHER

## 2021-01-01 RX ORDER — LOSARTAN POTASSIUM 100 MG/1
100 TABLET ORAL DAILY
Status: DISCONTINUED | OUTPATIENT
Start: 2021-01-01 | End: 2021-01-01

## 2021-01-01 RX ORDER — SODIUM POLYSTYRENE SULFONATE 15 G/60ML
15 SUSPENSION ORAL; RECTAL ONCE
Status: COMPLETED | OUTPATIENT
Start: 2021-01-01 | End: 2021-01-01

## 2021-01-01 RX ORDER — WARFARIN SODIUM 2.5 MG/1
2.5 TABLET ORAL
Status: DISCONTINUED | OUTPATIENT
Start: 2021-01-01 | End: 2021-01-01 | Stop reason: DRUGHIGH

## 2021-01-01 RX ORDER — FERROUS SULFATE 325(65) MG
TABLET ORAL
Qty: 90 TABLET | Refills: 3 | Status: SHIPPED | OUTPATIENT
Start: 2021-01-01

## 2021-01-01 RX ORDER — ONDANSETRON 4 MG/1
4 TABLET, ORALLY DISINTEGRATING ORAL EVERY 8 HOURS PRN
Status: DISCONTINUED | OUTPATIENT
Start: 2021-01-01 | End: 2021-01-01

## 2021-01-01 RX ORDER — INSULIN GLARGINE 100 [IU]/ML
10 INJECTION, SOLUTION SUBCUTANEOUS NIGHTLY
Status: DISCONTINUED | OUTPATIENT
Start: 2021-01-01 | End: 2021-01-01 | Stop reason: HOSPADM

## 2021-01-01 RX ORDER — UNDERPADS 23" X 36"
EACH MISCELLANEOUS
Qty: 5 PACKET | Refills: 2 | Status: SHIPPED | OUTPATIENT
Start: 2021-01-01

## 2021-01-01 RX ORDER — ALENDRONATE SODIUM 70 MG/1
TABLET ORAL
Qty: 12 TABLET | Refills: 3 | Status: SHIPPED | OUTPATIENT
Start: 2021-01-01

## 2021-01-01 RX ORDER — INSULIN LISPRO 200 [IU]/ML
INJECTION, SOLUTION SUBCUTANEOUS
Qty: 2 PEN | Refills: 3 | Status: CANCELLED | OUTPATIENT
Start: 2021-01-01

## 2021-01-01 RX ORDER — FOLIC ACID 1 MG/1
TABLET ORAL
Qty: 90 TABLET | Refills: 4 | Status: SHIPPED | OUTPATIENT
Start: 2021-01-01

## 2021-01-01 RX ORDER — DOCUSATE SODIUM 100 MG/1
100 CAPSULE, LIQUID FILLED ORAL 2 TIMES DAILY PRN
Status: DISCONTINUED | OUTPATIENT
Start: 2021-01-01 | End: 2021-01-01 | Stop reason: HOSPADM

## 2021-01-01 RX ORDER — DEXAMETHASONE 4 MG/1
6 TABLET ORAL DAILY
Status: DISCONTINUED | OUTPATIENT
Start: 2021-01-01 | End: 2021-01-01 | Stop reason: HOSPADM

## 2021-01-01 RX ORDER — INSULIN GLARGINE 100 [IU]/ML
10 INJECTION, SOLUTION SUBCUTANEOUS NIGHTLY
Qty: 1 VIAL | Refills: 3 | DISCHARGE
Start: 2021-01-01 | End: 2021-01-01

## 2021-01-01 RX ORDER — FUROSEMIDE 40 MG/1
40 TABLET ORAL DAILY
Qty: 60 TABLET | Refills: 3 | DISCHARGE
Start: 2021-01-01 | End: 2021-01-01

## 2021-01-01 RX ORDER — INSULIN LISPRO 100 [IU]/ML
INJECTION, SUSPENSION SUBCUTANEOUS
Qty: 5 PEN | Refills: 5 | Status: CANCELLED | OUTPATIENT
Start: 2021-01-01

## 2021-01-01 RX ORDER — DULOXETIN HYDROCHLORIDE 60 MG/1
CAPSULE, DELAYED RELEASE ORAL
Qty: 90 CAPSULE | Refills: 3 | Status: SHIPPED | OUTPATIENT
Start: 2021-01-01

## 2021-01-01 RX ORDER — ACETAMINOPHEN 500 MG
500 TABLET ORAL EVERY 4 HOURS PRN
Status: DISCONTINUED | OUTPATIENT
Start: 2021-01-01 | End: 2021-01-01

## 2021-01-01 RX ORDER — HYDROCODONE BITARTRATE AND ACETAMINOPHEN 7.5; 325 MG/1; MG/1
1 TABLET ORAL ONCE
Status: COMPLETED | OUTPATIENT
Start: 2021-01-01 | End: 2021-01-01

## 2021-01-01 RX ORDER — WARFARIN SODIUM 3 MG/1
3 TABLET ORAL DAILY
DISCHARGE
Start: 2021-01-01 | End: 2021-01-01

## 2021-01-01 RX ORDER — MULTIVIT-MIN/IRON/FOLIC ACID/K 18-600-40
1 CAPSULE ORAL 2 TIMES DAILY
COMMUNITY

## 2021-01-01 RX ORDER — SITAGLIPTIN AND METFORMIN HYDROCHLORIDE 1000; 50 MG/1; MG/1
1 TABLET, FILM COATED ORAL 2 TIMES DAILY WITH MEALS
Status: ON HOLD | COMMUNITY
End: 2021-01-01 | Stop reason: HOSPADM

## 2021-01-01 RX ORDER — ALOGLIPTIN 6.25 MG/1
6.25 TABLET, FILM COATED ORAL DAILY
Status: DISCONTINUED | OUTPATIENT
Start: 2021-01-01 | End: 2021-01-01 | Stop reason: HOSPADM

## 2021-01-01 RX ORDER — 0.9 % SODIUM CHLORIDE 0.9 %
500 INTRAVENOUS SOLUTION INTRAVENOUS ONCE
Status: COMPLETED | OUTPATIENT
Start: 2021-01-01 | End: 2021-01-01

## 2021-01-01 RX ORDER — METOPROLOL SUCCINATE 100 MG/1
100 TABLET, EXTENDED RELEASE ORAL DAILY
Status: DISCONTINUED | OUTPATIENT
Start: 2021-01-01 | End: 2021-01-01

## 2021-01-01 RX ORDER — INSULIN LISPRO 100 [IU]/ML
40 INJECTION, SUSPENSION SUBCUTANEOUS 2 TIMES DAILY WITH MEALS
Qty: 5 PEN | Refills: 3 | Status: SHIPPED | OUTPATIENT
Start: 2021-01-01 | End: 2021-01-01 | Stop reason: DRUGHIGH

## 2021-01-01 RX ORDER — PROMETHAZINE HYDROCHLORIDE 25 MG/1
12.5 TABLET ORAL EVERY 6 HOURS PRN
Status: DISCONTINUED | OUTPATIENT
Start: 2021-01-01 | End: 2021-01-01 | Stop reason: HOSPADM

## 2021-01-01 RX ORDER — MORPHINE SULFATE 4 MG/ML
4 INJECTION, SOLUTION INTRAMUSCULAR; INTRAVENOUS ONCE
Status: COMPLETED | OUTPATIENT
Start: 2021-01-01 | End: 2021-01-01

## 2021-01-01 RX ORDER — CALCIUM CARBONATE 200(500)MG
500 TABLET,CHEWABLE ORAL 3 TIMES DAILY PRN
DISCHARGE
Start: 2021-01-01 | End: 2021-01-01

## 2021-01-01 RX ORDER — VALSARTAN 80 MG/1
80 TABLET ORAL DAILY
Qty: 30 TABLET | Refills: 3 | Status: SHIPPED | OUTPATIENT
Start: 2021-01-01 | End: 2021-01-01 | Stop reason: SDUPTHER

## 2021-01-01 RX ORDER — PANTOPRAZOLE SODIUM 40 MG/1
40 TABLET, DELAYED RELEASE ORAL
Refills: 3 | Status: DISCONTINUED | OUTPATIENT
Start: 2021-01-01 | End: 2021-01-01 | Stop reason: HOSPADM

## 2021-01-01 RX ORDER — FUROSEMIDE 10 MG/ML
40 INJECTION INTRAMUSCULAR; INTRAVENOUS DAILY
Status: DISCONTINUED | OUTPATIENT
Start: 2021-01-01 | End: 2021-01-01 | Stop reason: HOSPADM

## 2021-01-01 RX ORDER — FENOFIBRATE 160 MG/1
160 TABLET ORAL DAILY
Refills: 3 | Status: DISCONTINUED | OUTPATIENT
Start: 2021-01-01 | End: 2021-01-01 | Stop reason: HOSPADM

## 2021-01-01 RX ORDER — CEPHALEXIN 250 MG/1
500 CAPSULE ORAL EVERY 6 HOURS SCHEDULED
Status: DISCONTINUED | OUTPATIENT
Start: 2021-01-01 | End: 2021-01-01 | Stop reason: HOSPADM

## 2021-01-01 RX ORDER — ALOGLIPTIN 6.25 MG/1
6.25 TABLET, FILM COATED ORAL DAILY
COMMUNITY
End: 2021-01-01

## 2021-01-01 RX ORDER — INSULIN GLARGINE 100 [IU]/ML
30 INJECTION, SOLUTION SUBCUTANEOUS 2 TIMES DAILY
Status: DISCONTINUED | OUTPATIENT
Start: 2021-01-01 | End: 2021-01-01 | Stop reason: HOSPADM

## 2021-01-01 RX ORDER — NOREPINEPHRINE BIT/0.9 % NACL 16MG/250ML
INFUSION BOTTLE (ML) INTRAVENOUS
Status: COMPLETED
Start: 2021-01-01 | End: 2021-01-01

## 2021-01-01 RX ORDER — SODIUM POLYSTYRENE SULFONATE 15 G/60ML
30 SUSPENSION ORAL; RECTAL ONCE
Status: COMPLETED | OUTPATIENT
Start: 2021-01-01 | End: 2021-01-01

## 2021-01-01 RX ORDER — FERROUS SULFATE 325(65) MG
325 TABLET ORAL
Status: DISCONTINUED | OUTPATIENT
Start: 2021-01-01 | End: 2021-01-01 | Stop reason: HOSPADM

## 2021-01-01 RX ORDER — DEXLANSOPRAZOLE 60 MG/1
60 CAPSULE, DELAYED RELEASE ORAL DAILY
Qty: 90 CAPSULE | Refills: 3 | Status: SHIPPED | OUTPATIENT
Start: 2021-01-01

## 2021-01-01 RX ORDER — DEXLANSOPRAZOLE 60 MG/1
60 CAPSULE, DELAYED RELEASE ORAL DAILY
Status: DISCONTINUED | OUTPATIENT
Start: 2021-01-01 | End: 2021-01-01 | Stop reason: CLARIF

## 2021-01-01 RX ORDER — INSULIN GLARGINE 100 [IU]/ML
5 INJECTION, SOLUTION SUBCUTANEOUS EVERY MORNING
Status: DISCONTINUED | OUTPATIENT
Start: 2021-01-01 | End: 2021-01-01 | Stop reason: HOSPADM

## 2021-01-01 RX ORDER — PANTOPRAZOLE SODIUM 40 MG/1
TABLET, DELAYED RELEASE ORAL
Status: DISPENSED
Start: 2021-01-01 | End: 2021-01-01

## 2021-01-01 RX ORDER — WARFARIN SODIUM 2.5 MG/1
2.5 TABLET ORAL
Status: DISCONTINUED | OUTPATIENT
Start: 2021-01-01 | End: 2021-01-01 | Stop reason: SDUPTHER

## 2021-01-01 RX ORDER — FUROSEMIDE 10 MG/ML
20 INJECTION INTRAMUSCULAR; INTRAVENOUS ONCE
Status: DISCONTINUED | OUTPATIENT
Start: 2021-01-01 | End: 2021-01-01

## 2021-01-01 RX ORDER — INSULIN LISPRO 100 [IU]/ML
INJECTION, SOLUTION INTRAVENOUS; SUBCUTANEOUS
Qty: 5 PEN | Refills: 1 | Status: SHIPPED | OUTPATIENT
Start: 2021-01-01 | End: 2021-01-01 | Stop reason: SDUPTHER

## 2021-01-01 RX ORDER — HYDROCODONE BITARTRATE AND ACETAMINOPHEN 5; 325 MG/1; MG/1
1-2 TABLET ORAL
Qty: 40 TABLET | Refills: 0 | Status: SHIPPED | OUTPATIENT
Start: 2021-01-01 | End: 2021-01-01 | Stop reason: SDUPTHER

## 2021-01-01 RX ORDER — METHYLPREDNISOLONE SODIUM SUCCINATE 40 MG/ML
40 INJECTION, POWDER, LYOPHILIZED, FOR SOLUTION INTRAMUSCULAR; INTRAVENOUS ONCE
Status: COMPLETED | OUTPATIENT
Start: 2021-01-01 | End: 2021-01-01

## 2021-01-01 RX ORDER — LEVOTHYROXINE SODIUM 0.05 MG/1
75 TABLET ORAL DAILY
Qty: 90 TABLET | Refills: 1 | Status: SHIPPED | OUTPATIENT
Start: 2021-01-01

## 2021-01-01 RX ORDER — MECLIZINE HYDROCHLORIDE 25 MG/1
25 TABLET ORAL PRN
COMMUNITY

## 2021-01-01 RX ORDER — HYDROCODONE BITARTRATE AND ACETAMINOPHEN 5; 325 MG/1; MG/1
1 TABLET ORAL EVERY 4 HOURS PRN
Status: DISCONTINUED | OUTPATIENT
Start: 2021-01-01 | End: 2021-01-01 | Stop reason: HOSPADM

## 2021-01-01 RX ORDER — CALCIUM GLUCONATE 94 MG/ML
1 INJECTION, SOLUTION INTRAVENOUS ONCE
Status: COMPLETED | OUTPATIENT
Start: 2021-01-01 | End: 2021-01-01

## 2021-01-01 RX ORDER — FENOFIBRATE 145 MG/1
TABLET, COATED ORAL
Qty: 90 TABLET | Refills: 3 | Status: SHIPPED | OUTPATIENT
Start: 2021-01-01

## 2021-01-01 RX ORDER — DEXAMETHASONE 6 MG/1
6 TABLET ORAL
Qty: 9 TABLET | Refills: 0 | Status: SHIPPED | OUTPATIENT
Start: 2021-01-01 | End: 2021-11-06

## 2021-01-01 RX ADMIN — SODIUM CHLORIDE: 9 INJECTION, SOLUTION INTRAVENOUS at 20:25

## 2021-01-01 RX ADMIN — CALCIUM CARBONATE-VITAMIN D TAB 500 MG-200 UNIT 1 TABLET: 500-200 TAB at 09:03

## 2021-01-01 RX ADMIN — DULOXETINE HYDROCHLORIDE 60 MG: 60 CAPSULE, DELAYED RELEASE ORAL at 10:06

## 2021-01-01 RX ADMIN — FENOFIBRATE 160 MG: 160 TABLET ORAL at 15:36

## 2021-01-01 RX ADMIN — INSULIN GLARGINE 5 UNITS: 100 INJECTION, SOLUTION SUBCUTANEOUS at 13:11

## 2021-01-01 RX ADMIN — CALCIUM CARBONATE-VITAMIN D TAB 500 MG-200 UNIT 1 TABLET: 500-200 TAB at 08:08

## 2021-01-01 RX ADMIN — DIPHENHYDRAMINE HYDROCHLORIDE 25 MG: 50 INJECTION, SOLUTION INTRAMUSCULAR; INTRAVENOUS at 15:48

## 2021-01-01 RX ADMIN — FENOFIBRATE 160 MG: 160 TABLET ORAL at 10:32

## 2021-01-01 RX ADMIN — DIPHENHYDRAMINE HYDROCHLORIDE 25 MG: 50 INJECTION INTRAMUSCULAR; INTRAVENOUS at 23:23

## 2021-01-01 RX ADMIN — LEVOTHYROXINE SODIUM 75 MCG: 0.07 TABLET ORAL at 05:48

## 2021-01-01 RX ADMIN — DIGOXIN 62.5 MCG: 125 TABLET ORAL at 09:44

## 2021-01-01 RX ADMIN — HYDROCODONE BITARTRATE AND ACETAMINOPHEN 2 TABLET: 5; 325 TABLET ORAL at 22:26

## 2021-01-01 RX ADMIN — INSULIN GLARGINE 36 UNITS: 100 INJECTION, SOLUTION SUBCUTANEOUS at 09:42

## 2021-01-01 RX ADMIN — SODIUM CHLORIDE: 9 INJECTION, SOLUTION INTRAVENOUS at 05:24

## 2021-01-01 RX ADMIN — DULOXETINE HYDROCHLORIDE 60 MG: 60 CAPSULE, DELAYED RELEASE ORAL at 09:35

## 2021-01-01 RX ADMIN — CEPHALEXIN 500 MG: 250 CAPSULE ORAL at 23:31

## 2021-01-01 RX ADMIN — DIPHENHYDRAMINE HYDROCHLORIDE 25 MG: 50 INJECTION, SOLUTION INTRAMUSCULAR; INTRAVENOUS at 04:43

## 2021-01-01 RX ADMIN — FUROSEMIDE 40 MG: 40 TABLET ORAL at 09:29

## 2021-01-01 RX ADMIN — FOLIC ACID 1 MG: 1 TABLET ORAL at 09:16

## 2021-01-01 RX ADMIN — DEXTROSE MONOHYDRATE 25 G: 25 INJECTION, SOLUTION INTRAVENOUS at 14:49

## 2021-01-01 RX ADMIN — GABAPENTIN 100 MG: 100 CAPSULE ORAL at 15:32

## 2021-01-01 RX ADMIN — ALLOPURINOL 100 MG: 100 TABLET ORAL at 19:50

## 2021-01-01 RX ADMIN — MICONAZOLE NITRATE: 20 POWDER TOPICAL at 21:13

## 2021-01-01 RX ADMIN — CEFAZOLIN 2000 MG: 10 INJECTION, POWDER, FOR SOLUTION INTRAVENOUS at 21:32

## 2021-01-01 RX ADMIN — ATORVASTATIN CALCIUM 40 MG: 40 TABLET, FILM COATED ORAL at 20:02

## 2021-01-01 RX ADMIN — CEPHALEXIN 500 MG: 250 CAPSULE ORAL at 00:27

## 2021-01-01 RX ADMIN — ATORVASTATIN CALCIUM 40 MG: 40 TABLET, FILM COATED ORAL at 20:50

## 2021-01-01 RX ADMIN — CEFAZOLIN 2000 MG: 10 INJECTION, POWDER, FOR SOLUTION INTRAVENOUS at 14:53

## 2021-01-01 RX ADMIN — ATORVASTATIN CALCIUM 40 MG: 40 TABLET, FILM COATED ORAL at 23:31

## 2021-01-01 RX ADMIN — SODIUM CHLORIDE, PRESERVATIVE FREE 10 ML: 5 INJECTION INTRAVENOUS at 07:54

## 2021-01-01 RX ADMIN — Medication 50 MG: at 09:35

## 2021-01-01 RX ADMIN — FOLIC ACID 1 MG: 1 TABLET ORAL at 10:24

## 2021-01-01 RX ADMIN — ALLOPURINOL 100 MG: 100 TABLET ORAL at 07:36

## 2021-01-01 RX ADMIN — INSULIN LISPRO 2 UNITS: 100 INJECTION, SOLUTION INTRAVENOUS; SUBCUTANEOUS at 08:53

## 2021-01-01 RX ADMIN — SODIUM CHLORIDE, PRESERVATIVE FREE 10 ML: 5 INJECTION INTRAVENOUS at 11:02

## 2021-01-01 RX ADMIN — ATORVASTATIN CALCIUM 40 MG: 40 TABLET, FILM COATED ORAL at 21:20

## 2021-01-01 RX ADMIN — HYDROCODONE BITARTRATE AND ACETAMINOPHEN 1 TABLET: 5; 325 TABLET ORAL at 17:02

## 2021-01-01 RX ADMIN — ACETAMINOPHEN 650 MG: 325 TABLET ORAL at 15:12

## 2021-01-01 RX ADMIN — Medication 50 MG: at 07:59

## 2021-01-01 RX ADMIN — PANTOPRAZOLE SODIUM 40 MG: 40 TABLET, DELAYED RELEASE ORAL at 04:58

## 2021-01-01 RX ADMIN — FAMOTIDINE 20 MG: 20 TABLET, FILM COATED ORAL at 10:35

## 2021-01-01 RX ADMIN — ACETAMINOPHEN 650 MG: 325 TABLET ORAL at 21:13

## 2021-01-01 RX ADMIN — INSULIN LISPRO 1 UNITS: 100 INJECTION, SOLUTION INTRAVENOUS; SUBCUTANEOUS at 18:07

## 2021-01-01 RX ADMIN — ALLOPURINOL 100 MG: 100 TABLET ORAL at 10:32

## 2021-01-01 RX ADMIN — INSULIN LISPRO 4 UNITS: 100 INJECTION, SOLUTION INTRAVENOUS; SUBCUTANEOUS at 07:55

## 2021-01-01 RX ADMIN — HYDROCODONE BITARTRATE AND ACETAMINOPHEN 1 TABLET: 5; 325 TABLET ORAL at 22:56

## 2021-01-01 RX ADMIN — PANTOPRAZOLE SODIUM 40 MG: 40 TABLET, DELAYED RELEASE ORAL at 05:25

## 2021-01-01 RX ADMIN — ATORVASTATIN CALCIUM 40 MG: 40 TABLET, FILM COATED ORAL at 20:59

## 2021-01-01 RX ADMIN — FUROSEMIDE 40 MG: 40 TABLET ORAL at 15:36

## 2021-01-01 RX ADMIN — Medication 50 MG: at 04:12

## 2021-01-01 RX ADMIN — SODIUM CHLORIDE, PRESERVATIVE FREE 10 ML: 5 INJECTION INTRAVENOUS at 09:04

## 2021-01-01 RX ADMIN — CEPHALEXIN 500 MG: 250 CAPSULE ORAL at 12:48

## 2021-01-01 RX ADMIN — SODIUM CHLORIDE, PRESERVATIVE FREE 10 ML: 5 INJECTION INTRAVENOUS at 16:02

## 2021-01-01 RX ADMIN — INSULIN GLARGINE 36 UNITS: 100 INJECTION, SOLUTION SUBCUTANEOUS at 11:14

## 2021-01-01 RX ADMIN — SODIUM CHLORIDE 3000 MG: 900 INJECTION INTRAVENOUS at 01:01

## 2021-01-01 RX ADMIN — CEPHALEXIN 500 MG: 250 CAPSULE ORAL at 05:53

## 2021-01-01 RX ADMIN — SODIUM POLYSTYRENE SULFONATE 15 G: 15 SUSPENSION ORAL; RECTAL at 21:27

## 2021-01-01 RX ADMIN — ASPIRIN 81 MG: 81 TABLET, COATED ORAL at 09:02

## 2021-01-01 RX ADMIN — Medication 5 MCG/MIN: at 04:24

## 2021-01-01 RX ADMIN — SODIUM CHLORIDE: 9 INJECTION, SOLUTION INTRAVENOUS at 05:26

## 2021-01-01 RX ADMIN — SODIUM CHLORIDE, PRESERVATIVE FREE 10 ML: 5 INJECTION INTRAVENOUS at 21:13

## 2021-01-01 RX ADMIN — ASPIRIN 81 MG: 81 TABLET, COATED ORAL at 10:51

## 2021-01-01 RX ADMIN — LEVOTHYROXINE SODIUM 75 MCG: 0.07 TABLET ORAL at 06:05

## 2021-01-01 RX ADMIN — HYDROCODONE BITARTRATE AND ACETAMINOPHEN 2 TABLET: 5; 325 TABLET ORAL at 20:30

## 2021-01-01 RX ADMIN — OXYCODONE HYDROCHLORIDE AND ACETAMINOPHEN 500 MG: 500 TABLET ORAL at 08:00

## 2021-01-01 RX ADMIN — INSULIN LISPRO 2 UNITS: 100 INJECTION, SOLUTION INTRAVENOUS; SUBCUTANEOUS at 17:47

## 2021-01-01 RX ADMIN — CEPHALEXIN 500 MG: 250 CAPSULE ORAL at 05:51

## 2021-01-01 RX ADMIN — INSULIN GLARGINE 30 UNITS: 100 INJECTION, SOLUTION SUBCUTANEOUS at 20:04

## 2021-01-01 RX ADMIN — WARFARIN SODIUM 2.5 MG: 2.5 TABLET ORAL at 16:02

## 2021-01-01 RX ADMIN — FENOFIBRATE 160 MG: 160 TABLET ORAL at 10:25

## 2021-01-01 RX ADMIN — MICONAZOLE NITRATE: 20 POWDER TOPICAL at 22:54

## 2021-01-01 RX ADMIN — HYDROCODONE BITARTRATE AND ACETAMINOPHEN 2 TABLET: 5; 325 TABLET ORAL at 04:58

## 2021-01-01 RX ADMIN — METOPROLOL SUCCINATE 100 MG: 100 TABLET, FILM COATED, EXTENDED RELEASE ORAL at 09:41

## 2021-01-01 RX ADMIN — ATORVASTATIN CALCIUM 40 MG: 40 TABLET, FILM COATED ORAL at 21:29

## 2021-01-01 RX ADMIN — CALCIUM CARBONATE-VITAMIN D TAB 500 MG-200 UNIT 1 TABLET: 500-200 TAB at 13:11

## 2021-01-01 RX ADMIN — INSULIN LISPRO 5 UNITS: 100 INJECTION, SOLUTION INTRAVENOUS; SUBCUTANEOUS at 14:13

## 2021-01-01 RX ADMIN — METOPROLOL SUCCINATE 100 MG: 100 TABLET, EXTENDED RELEASE ORAL at 10:26

## 2021-01-01 RX ADMIN — OXYCODONE HYDROCHLORIDE AND ACETAMINOPHEN 500 MG: 500 TABLET ORAL at 10:33

## 2021-01-01 RX ADMIN — INSULIN LISPRO 8 UNITS: 100 INJECTION, SOLUTION INTRAVENOUS; SUBCUTANEOUS at 13:11

## 2021-01-01 RX ADMIN — ASPIRIN 81 MG: 81 TABLET, COATED ORAL at 07:38

## 2021-01-01 RX ADMIN — CEFAZOLIN 2000 MG: 10 INJECTION, POWDER, FOR SOLUTION INTRAVENOUS at 14:06

## 2021-01-01 RX ADMIN — VALSARTAN 80 MG: 80 TABLET, FILM COATED ORAL at 09:35

## 2021-01-01 RX ADMIN — ALLOPURINOL 100 MG: 100 TABLET ORAL at 09:28

## 2021-01-01 RX ADMIN — MORPHINE SULFATE 4 MG: 4 INJECTION, SOLUTION INTRAMUSCULAR; INTRAVENOUS at 06:35

## 2021-01-01 RX ADMIN — SODIUM CHLORIDE: 9 INJECTION, SOLUTION INTRAVENOUS at 09:16

## 2021-01-01 RX ADMIN — SODIUM POLYSTYRENE SULFONATE 15 G: 15 SUSPENSION ORAL; RECTAL at 15:19

## 2021-01-01 RX ADMIN — INSULIN LISPRO 50 UNITS: 100 INJECTION, SOLUTION INTRAVENOUS; SUBCUTANEOUS at 23:28

## 2021-01-01 RX ADMIN — SODIUM CHLORIDE, PRESERVATIVE FREE 10 ML: 5 INJECTION INTRAVENOUS at 13:15

## 2021-01-01 RX ADMIN — INSULIN LISPRO 5 UNITS: 100 INJECTION, SOLUTION INTRAVENOUS; SUBCUTANEOUS at 12:05

## 2021-01-01 RX ADMIN — LEVOTHYROXINE SODIUM 50 MCG: 50 TABLET ORAL at 06:35

## 2021-01-01 RX ADMIN — SODIUM BICARBONATE 1300 MG: 650 TABLET ORAL at 21:20

## 2021-01-01 RX ADMIN — WARFARIN SODIUM 2.5 MG: 2.5 TABLET ORAL at 18:53

## 2021-01-01 RX ADMIN — WARFARIN SODIUM 4 MG: 4 TABLET ORAL at 17:46

## 2021-01-01 RX ADMIN — GABAPENTIN 100 MG: 100 CAPSULE ORAL at 07:53

## 2021-01-01 RX ADMIN — DULOXETINE HYDROCHLORIDE 60 MG: 60 CAPSULE, DELAYED RELEASE ORAL at 10:35

## 2021-01-01 RX ADMIN — CALCIUM CARBONATE-VITAMIN D TAB 500 MG-200 UNIT 1 TABLET: 500-200 TAB at 17:02

## 2021-01-01 RX ADMIN — DULOXETINE HYDROCHLORIDE 60 MG: 60 CAPSULE, DELAYED RELEASE ORAL at 13:11

## 2021-01-01 RX ADMIN — SODIUM CHLORIDE, PRESERVATIVE FREE 10 ML: 5 INJECTION INTRAVENOUS at 21:30

## 2021-01-01 RX ADMIN — DIGOXIN 62.5 MCG: 125 TABLET ORAL at 10:26

## 2021-01-01 RX ADMIN — OXYCODONE HYDROCHLORIDE AND ACETAMINOPHEN 500 MG: 500 TABLET ORAL at 10:25

## 2021-01-01 RX ADMIN — MICONAZOLE NITRATE: 20 POWDER TOPICAL at 20:14

## 2021-01-01 RX ADMIN — DULOXETINE HYDROCHLORIDE 60 MG: 60 CAPSULE, DELAYED RELEASE ORAL at 10:32

## 2021-01-01 RX ADMIN — CEFAZOLIN 2000 MG: 10 INJECTION, POWDER, FOR SOLUTION INTRAVENOUS at 05:25

## 2021-01-01 RX ADMIN — WARFARIN SODIUM 3.75 MG: 7.5 TABLET ORAL at 20:26

## 2021-01-01 RX ADMIN — DEXTROSE MONOHYDRATE 12.5 G: 25 INJECTION, SOLUTION INTRAVENOUS at 14:19

## 2021-01-01 RX ADMIN — FOLIC ACID 1 MG: 1 TABLET ORAL at 07:36

## 2021-01-01 RX ADMIN — BARICITINIB 2 MG: 2 TABLET, FILM COATED ORAL at 22:54

## 2021-01-01 RX ADMIN — LEVOTHYROXINE SODIUM 75 MCG: 0.07 TABLET ORAL at 10:25

## 2021-01-01 RX ADMIN — DIGOXIN 62.5 MCG: 125 TABLET ORAL at 13:11

## 2021-01-01 RX ADMIN — VALSARTAN 80 MG: 80 TABLET, FILM COATED ORAL at 09:21

## 2021-01-01 RX ADMIN — SODIUM CHLORIDE: 9 INJECTION, SOLUTION INTRAVENOUS at 11:16

## 2021-01-01 RX ADMIN — METOPROLOL SUCCINATE 100 MG: 100 TABLET, FILM COATED, EXTENDED RELEASE ORAL at 15:35

## 2021-01-01 RX ADMIN — Medication 50 MG: at 09:27

## 2021-01-01 RX ADMIN — DIPHENHYDRAMINE HYDROCHLORIDE 25 MG: 50 INJECTION, SOLUTION INTRAMUSCULAR; INTRAVENOUS at 10:54

## 2021-01-01 RX ADMIN — GABAPENTIN 100 MG: 100 CAPSULE ORAL at 20:02

## 2021-01-01 RX ADMIN — CEFAZOLIN 2000 MG: 10 INJECTION, POWDER, FOR SOLUTION INTRAVENOUS at 14:32

## 2021-01-01 RX ADMIN — CALCIUM CARBONATE-VITAMIN D TAB 500 MG-200 UNIT 1 TABLET: 500-200 TAB at 09:43

## 2021-01-01 RX ADMIN — INSULIN LISPRO 4 UNITS: 100 INJECTION, SOLUTION INTRAVENOUS; SUBCUTANEOUS at 16:19

## 2021-01-01 RX ADMIN — DULOXETINE HYDROCHLORIDE 60 MG: 60 CAPSULE, DELAYED RELEASE ORAL at 09:32

## 2021-01-01 RX ADMIN — PIPERACILLIN AND TAZOBACTAM 3375 MG: 3; .375 INJECTION, POWDER, LYOPHILIZED, FOR SOLUTION INTRAVENOUS at 18:32

## 2021-01-01 RX ADMIN — INSULIN LISPRO 2 UNITS: 100 INJECTION, SOLUTION INTRAVENOUS; SUBCUTANEOUS at 09:30

## 2021-01-01 RX ADMIN — ATORVASTATIN CALCIUM 40 MG: 40 TABLET, FILM COATED ORAL at 20:14

## 2021-01-01 RX ADMIN — WARFARIN SODIUM 2.5 MG: 2.5 TABLET ORAL at 18:05

## 2021-01-01 RX ADMIN — DIGOXIN 62.5 MCG: 125 TABLET ORAL at 10:32

## 2021-01-01 RX ADMIN — ACETAMINOPHEN 650 MG: 325 TABLET ORAL at 20:07

## 2021-01-01 RX ADMIN — FOLIC ACID 1 MG: 1 TABLET ORAL at 08:20

## 2021-01-01 RX ADMIN — SODIUM BICARBONATE 1300 MG: 650 TABLET ORAL at 08:05

## 2021-01-01 RX ADMIN — IPRATROPIUM BROMIDE AND ALBUTEROL SULFATE 1 AMPULE: .5; 3 SOLUTION RESPIRATORY (INHALATION) at 21:50

## 2021-01-01 RX ADMIN — CEFAZOLIN 2000 MG: 10 INJECTION, POWDER, FOR SOLUTION INTRAVENOUS at 15:11

## 2021-01-01 RX ADMIN — SODIUM CHLORIDE, PRESERVATIVE FREE 10 ML: 5 INJECTION INTRAVENOUS at 09:22

## 2021-01-01 RX ADMIN — GABAPENTIN 100 MG: 100 CAPSULE ORAL at 10:59

## 2021-01-01 RX ADMIN — SODIUM CHLORIDE: 9 INJECTION, SOLUTION INTRAVENOUS at 05:53

## 2021-01-01 RX ADMIN — METOPROLOL SUCCINATE 100 MG: 100 TABLET, FILM COATED, EXTENDED RELEASE ORAL at 10:44

## 2021-01-01 RX ADMIN — Medication 50 MG: at 08:08

## 2021-01-01 RX ADMIN — MENTHOL, METHYL SALICYLATE: 10; 15 CREAM TOPICAL at 20:50

## 2021-01-01 RX ADMIN — CEFAZOLIN 2000 MG: 10 INJECTION, POWDER, FOR SOLUTION INTRAVENOUS at 21:08

## 2021-01-01 RX ADMIN — METOPROLOL SUCCINATE 100 MG: 100 TABLET, FILM COATED, EXTENDED RELEASE ORAL at 07:53

## 2021-01-01 RX ADMIN — ALLOPURINOL 100 MG: 100 TABLET ORAL at 09:39

## 2021-01-01 RX ADMIN — SODIUM CHLORIDE: 9 INJECTION, SOLUTION INTRAVENOUS at 15:38

## 2021-01-01 RX ADMIN — HYDROCODONE BITARTRATE AND ACETAMINOPHEN 2 TABLET: 5; 325 TABLET ORAL at 20:26

## 2021-01-01 RX ADMIN — Medication 10 ML: at 21:28

## 2021-01-01 RX ADMIN — SODIUM CHLORIDE, PRESERVATIVE FREE 10 ML: 5 INJECTION INTRAVENOUS at 08:10

## 2021-01-01 RX ADMIN — ASPIRIN 81 MG: 81 TABLET, COATED ORAL at 10:43

## 2021-01-01 RX ADMIN — SODIUM CHLORIDE, PRESERVATIVE FREE 10 ML: 5 INJECTION INTRAVENOUS at 20:59

## 2021-01-01 RX ADMIN — LEVOTHYROXINE SODIUM 75 MCG: 0.07 TABLET ORAL at 05:51

## 2021-01-01 RX ADMIN — ALOGLIPTIN 6.25 MG: 6.25 TABLET, FILM COATED ORAL at 09:12

## 2021-01-01 RX ADMIN — WARFARIN SODIUM 1 MG: 1 TABLET ORAL at 22:54

## 2021-01-01 RX ADMIN — MODAFINIL 200 MG: 100 TABLET ORAL at 06:35

## 2021-01-01 RX ADMIN — HYDROCODONE BITARTRATE AND ACETAMINOPHEN 1 TABLET: 5; 325 TABLET ORAL at 13:08

## 2021-01-01 RX ADMIN — PANTOPRAZOLE SODIUM 40 MG: 40 TABLET, DELAYED RELEASE ORAL at 05:51

## 2021-01-01 RX ADMIN — ACETAMINOPHEN 650 MG: 325 TABLET ORAL at 10:22

## 2021-01-01 RX ADMIN — FOLIC ACID 1 MG: 1 TABLET ORAL at 10:44

## 2021-01-01 RX ADMIN — CEPHALEXIN 500 MG: 250 CAPSULE ORAL at 19:29

## 2021-01-01 RX ADMIN — CEPHALEXIN 500 MG: 250 CAPSULE ORAL at 17:18

## 2021-01-01 RX ADMIN — FENOFIBRATE 160 MG: 160 TABLET ORAL at 07:53

## 2021-01-01 RX ADMIN — MENTHOL, METHYL SALICYLATE: 10; 15 CREAM TOPICAL at 21:49

## 2021-01-01 RX ADMIN — DIGOXIN 62.5 MCG: 125 TABLET ORAL at 09:38

## 2021-01-01 RX ADMIN — INSULIN GLARGINE 10 UNITS: 100 INJECTION, SOLUTION SUBCUTANEOUS at 23:01

## 2021-01-01 RX ADMIN — SODIUM BICARBONATE 1300 MG: 650 TABLET ORAL at 22:20

## 2021-01-01 RX ADMIN — CALCIUM CARBONATE-VITAMIN D TAB 500 MG-200 UNIT 1 TABLET: 500-200 TAB at 17:38

## 2021-01-01 RX ADMIN — POLYETHYLENE GLYCOL 3350 17 G: 17 POWDER, FOR SOLUTION ORAL at 11:53

## 2021-01-01 RX ADMIN — ASPIRIN 81 MG: 81 TABLET, COATED ORAL at 20:01

## 2021-01-01 RX ADMIN — FOLIC ACID 1 MG: 1 TABLET ORAL at 09:32

## 2021-01-01 RX ADMIN — SODIUM CHLORIDE: 9 INJECTION, SOLUTION INTRAVENOUS at 21:28

## 2021-01-01 RX ADMIN — ONDANSETRON 4 MG: 2 INJECTION INTRAMUSCULAR; INTRAVENOUS at 06:35

## 2021-01-01 RX ADMIN — ASPIRIN 81 MG: 81 TABLET, COATED ORAL at 09:28

## 2021-01-01 RX ADMIN — GLIPIZIDE 10 MG: 10 TABLET ORAL at 06:06

## 2021-01-01 RX ADMIN — CEPHALEXIN 500 MG: 250 CAPSULE ORAL at 17:46

## 2021-01-01 RX ADMIN — HYDROCODONE BITARTRATE AND ACETAMINOPHEN 1 TABLET: 5; 325 TABLET ORAL at 18:40

## 2021-01-01 RX ADMIN — FUROSEMIDE 40 MG: 40 TABLET ORAL at 10:32

## 2021-01-01 RX ADMIN — CALCIUM CARBONATE-VITAMIN D TAB 500 MG-200 UNIT 1 TABLET: 500-200 TAB at 09:16

## 2021-01-01 RX ADMIN — Medication 50 MG: at 10:32

## 2021-01-01 RX ADMIN — LOSARTAN POTASSIUM 50 MG: 100 TABLET, FILM COATED ORAL at 10:06

## 2021-01-01 RX ADMIN — SODIUM CHLORIDE: 9 INJECTION, SOLUTION INTRAVENOUS at 21:37

## 2021-01-01 RX ADMIN — DEXTROSE AND SODIUM CHLORIDE: 10; .45 INJECTION, SOLUTION INTRAVENOUS at 16:11

## 2021-01-01 RX ADMIN — INSULIN LISPRO 4 UNITS: 100 INJECTION, SOLUTION INTRAVENOUS; SUBCUTANEOUS at 17:13

## 2021-01-01 RX ADMIN — SODIUM CHLORIDE: 9 INJECTION, SOLUTION INTRAVENOUS at 23:34

## 2021-01-01 RX ADMIN — CEPHALEXIN 500 MG: 250 CAPSULE ORAL at 17:52

## 2021-01-01 RX ADMIN — DEXAMETHASONE SODIUM PHOSPHATE 6 MG: 4 INJECTION, SOLUTION INTRAMUSCULAR; INTRAVENOUS at 16:00

## 2021-01-01 RX ADMIN — OXYCODONE HYDROCHLORIDE AND ACETAMINOPHEN 500 MG: 500 TABLET ORAL at 09:22

## 2021-01-01 RX ADMIN — ALOGLIPTIN 6.25 MG: 6.25 TABLET, FILM COATED ORAL at 10:06

## 2021-01-01 RX ADMIN — DIPHENHYDRAMINE HYDROCHLORIDE 25 MG: 50 INJECTION, SOLUTION INTRAMUSCULAR; INTRAVENOUS at 20:26

## 2021-01-01 RX ADMIN — FERROUS SULFATE TAB 325 MG (65 MG ELEMENTAL FE) 325 MG: 325 (65 FE) TAB at 10:25

## 2021-01-01 RX ADMIN — GABAPENTIN 100 MG: 100 CAPSULE ORAL at 15:36

## 2021-01-01 RX ADMIN — Medication 10 ML: at 22:19

## 2021-01-01 RX ADMIN — SODIUM BICARBONATE 1300 MG: 650 TABLET ORAL at 09:12

## 2021-01-01 RX ADMIN — WARFARIN SODIUM 3.75 MG: 7.5 TABLET ORAL at 19:49

## 2021-01-01 RX ADMIN — Medication 2000 UNITS: at 10:26

## 2021-01-01 RX ADMIN — ALOGLIPTIN 6.25 MG: 6.25 TABLET, FILM COATED ORAL at 10:32

## 2021-01-01 RX ADMIN — HYDROCODONE BITARTRATE AND ACETAMINOPHEN 1 TABLET: 5; 325 TABLET ORAL at 20:33

## 2021-01-01 RX ADMIN — PANTOPRAZOLE SODIUM 40 MG: 40 TABLET, DELAYED RELEASE ORAL at 06:04

## 2021-01-01 RX ADMIN — SODIUM BICARBONATE 1300 MG: 650 TABLET ORAL at 20:00

## 2021-01-01 RX ADMIN — FERROUS SULFATE TAB 325 MG (65 MG ELEMENTAL FE) 325 MG: 325 (65 FE) TAB at 09:43

## 2021-01-01 RX ADMIN — OXYCODONE HYDROCHLORIDE AND ACETAMINOPHEN 500 MG: 500 TABLET ORAL at 15:35

## 2021-01-01 RX ADMIN — MICONAZOLE NITRATE: 20 POWDER TOPICAL at 11:22

## 2021-01-01 RX ADMIN — HYDROCODONE BITARTRATE AND ACETAMINOPHEN 1 TABLET: 5; 325 TABLET ORAL at 21:21

## 2021-01-01 RX ADMIN — VALSARTAN 80 MG: 80 TABLET, FILM COATED ORAL at 10:32

## 2021-01-01 RX ADMIN — FUROSEMIDE 40 MG: 40 TABLET ORAL at 10:46

## 2021-01-01 RX ADMIN — HYDROCODONE BITARTRATE AND ACETAMINOPHEN 2 TABLET: 5; 325 TABLET ORAL at 13:29

## 2021-01-01 RX ADMIN — INSULIN LISPRO 4 UNITS: 100 INJECTION, SOLUTION INTRAVENOUS; SUBCUTANEOUS at 09:21

## 2021-01-01 RX ADMIN — Medication 2000 UNITS: at 09:43

## 2021-01-01 RX ADMIN — FENOFIBRATE 160 MG: 160 TABLET ORAL at 08:00

## 2021-01-01 RX ADMIN — SODIUM CHLORIDE 3000 MG: 900 INJECTION INTRAVENOUS at 09:27

## 2021-01-01 RX ADMIN — METOPROLOL SUCCINATE 100 MG: 100 TABLET, FILM COATED, EXTENDED RELEASE ORAL at 08:05

## 2021-01-01 RX ADMIN — DEXTROSE MONOHYDRATE 12.5 G: 25 INJECTION, SOLUTION INTRAVENOUS at 21:26

## 2021-01-01 RX ADMIN — DIGOXIN 62.5 MCG: 125 TABLET ORAL at 10:29

## 2021-01-01 RX ADMIN — SODIUM CHLORIDE: 9 INJECTION, SOLUTION INTRAVENOUS at 13:25

## 2021-01-01 RX ADMIN — SODIUM CHLORIDE, PRESERVATIVE FREE 10 ML: 5 INJECTION INTRAVENOUS at 20:16

## 2021-01-01 RX ADMIN — SODIUM CHLORIDE, PRESERVATIVE FREE 10 ML: 5 INJECTION INTRAVENOUS at 20:28

## 2021-01-01 RX ADMIN — LEVOTHYROXINE SODIUM 50 MCG: 50 TABLET ORAL at 06:32

## 2021-01-01 RX ADMIN — HYDROCODONE BITARTRATE AND ACETAMINOPHEN 1 TABLET: 5; 325 TABLET ORAL at 09:31

## 2021-01-01 RX ADMIN — GABAPENTIN 100 MG: 100 CAPSULE ORAL at 20:59

## 2021-01-01 RX ADMIN — SODIUM CHLORIDE: 9 INJECTION, SOLUTION INTRAVENOUS at 18:14

## 2021-01-01 RX ADMIN — INSULIN LISPRO 2 UNITS: 100 INJECTION, SOLUTION INTRAVENOUS; SUBCUTANEOUS at 13:46

## 2021-01-01 RX ADMIN — METOPROLOL SUCCINATE 100 MG: 100 TABLET, FILM COATED, EXTENDED RELEASE ORAL at 11:01

## 2021-01-01 RX ADMIN — VALSARTAN 80 MG: 80 TABLET, FILM COATED ORAL at 10:44

## 2021-01-01 RX ADMIN — METOPROLOL SUCCINATE 100 MG: 100 TABLET, FILM COATED, EXTENDED RELEASE ORAL at 07:36

## 2021-01-01 RX ADMIN — GABAPENTIN 100 MG: 100 CAPSULE ORAL at 15:10

## 2021-01-01 RX ADMIN — SODIUM CHLORIDE 1000 ML: 9 INJECTION, SOLUTION INTRAVENOUS at 06:36

## 2021-01-01 RX ADMIN — OXYCODONE HYDROCHLORIDE AND ACETAMINOPHEN 500 MG: 500 TABLET ORAL at 09:28

## 2021-01-01 RX ADMIN — WARFARIN SODIUM 4 MG: 4 TABLET ORAL at 17:18

## 2021-01-01 RX ADMIN — Medication 2000 UNITS: at 09:02

## 2021-01-01 RX ADMIN — ALLOPURINOL 100 MG: 100 TABLET ORAL at 09:12

## 2021-01-01 RX ADMIN — ASPIRIN 81 MG: 81 TABLET, COATED ORAL at 09:12

## 2021-01-01 RX ADMIN — INSULIN LISPRO 2 UNITS: 100 INJECTION, SOLUTION INTRAVENOUS; SUBCUTANEOUS at 12:32

## 2021-01-01 RX ADMIN — METOPROLOL SUCCINATE 100 MG: 100 TABLET, FILM COATED, EXTENDED RELEASE ORAL at 09:27

## 2021-01-01 RX ADMIN — CEFAZOLIN 2000 MG: 10 INJECTION, POWDER, FOR SOLUTION INTRAVENOUS at 05:22

## 2021-01-01 RX ADMIN — SODIUM CHLORIDE, PRESERVATIVE FREE 10 ML: 5 INJECTION INTRAVENOUS at 21:40

## 2021-01-01 RX ADMIN — ATORVASTATIN CALCIUM 40 MG: 40 TABLET, FILM COATED ORAL at 21:39

## 2021-01-01 RX ADMIN — Medication 10 ML: at 21:20

## 2021-01-01 RX ADMIN — SODIUM CHLORIDE: 9 INJECTION, SOLUTION INTRAVENOUS at 01:11

## 2021-01-01 RX ADMIN — FERROUS SULFATE TAB 325 MG (65 MG ELEMENTAL FE) 325 MG: 325 (65 FE) TAB at 08:00

## 2021-01-01 RX ADMIN — ALLOPURINOL 100 MG: 100 TABLET ORAL at 10:44

## 2021-01-01 RX ADMIN — FENOFIBRATE 160 MG: 160 TABLET ORAL at 10:44

## 2021-01-01 RX ADMIN — METOPROLOL SUCCINATE 100 MG: 100 TABLET, EXTENDED RELEASE ORAL at 09:02

## 2021-01-01 RX ADMIN — INSULIN LISPRO 12 UNITS: 100 INJECTION, SOLUTION INTRAVENOUS; SUBCUTANEOUS at 17:12

## 2021-01-01 RX ADMIN — INSULIN LISPRO 2 UNITS: 100 INJECTION, SOLUTION INTRAVENOUS; SUBCUTANEOUS at 16:39

## 2021-01-01 RX ADMIN — FENOFIBRATE 160 MG: 160 TABLET ORAL at 09:44

## 2021-01-01 RX ADMIN — LEVOTHYROXINE SODIUM 50 MCG: 50 TABLET ORAL at 06:18

## 2021-01-01 RX ADMIN — DIGOXIN 62.5 MCG: 125 TABLET ORAL at 09:14

## 2021-01-01 RX ADMIN — INSULIN GLARGINE 15 UNITS: 100 INJECTION, SOLUTION SUBCUTANEOUS at 07:54

## 2021-01-01 RX ADMIN — SODIUM CHLORIDE 3000 MG: 900 INJECTION INTRAVENOUS at 00:42

## 2021-01-01 RX ADMIN — SODIUM CHLORIDE 3000 MG: 900 INJECTION INTRAVENOUS at 08:30

## 2021-01-01 RX ADMIN — ALLOPURINOL 100 MG: 100 TABLET ORAL at 10:46

## 2021-01-01 RX ADMIN — Medication 2000 UNITS: at 08:00

## 2021-01-01 RX ADMIN — ASPIRIN 81 MG: 81 TABLET, COATED ORAL at 08:06

## 2021-01-01 RX ADMIN — FOLIC ACID 1 MG: 1 TABLET ORAL at 19:50

## 2021-01-01 RX ADMIN — INSULIN LISPRO 4 UNITS: 100 INJECTION, SOLUTION INTRAVENOUS; SUBCUTANEOUS at 09:36

## 2021-01-01 RX ADMIN — SODIUM CHLORIDE, PRESERVATIVE FREE 10 ML: 5 INJECTION INTRAVENOUS at 10:46

## 2021-01-01 RX ADMIN — INSULIN LISPRO 4 UNITS: 100 INJECTION, SOLUTION INTRAVENOUS; SUBCUTANEOUS at 11:19

## 2021-01-01 RX ADMIN — FUROSEMIDE 40 MG: 10 INJECTION, SOLUTION INTRAMUSCULAR; INTRAVENOUS at 08:07

## 2021-01-01 RX ADMIN — CEFAZOLIN 2000 MG: 10 INJECTION, POWDER, FOR SOLUTION INTRAVENOUS at 21:30

## 2021-01-01 RX ADMIN — FOLIC ACID 1 MG: 1 TABLET ORAL at 12:35

## 2021-01-01 RX ADMIN — BARICITINIB 2 MG: 2 TABLET, FILM COATED ORAL at 09:03

## 2021-01-01 RX ADMIN — INSULIN LISPRO 4 UNITS: 100 INJECTION, SOLUTION INTRAVENOUS; SUBCUTANEOUS at 09:32

## 2021-01-01 RX ADMIN — VALSARTAN 80 MG: 80 TABLET, FILM COATED ORAL at 10:46

## 2021-01-01 RX ADMIN — VALSARTAN 80 MG: 80 TABLET, FILM COATED ORAL at 15:35

## 2021-01-01 RX ADMIN — MODAFINIL 200 MG: 100 TABLET ORAL at 06:55

## 2021-01-01 RX ADMIN — GABAPENTIN 100 MG: 100 CAPSULE ORAL at 21:45

## 2021-01-01 RX ADMIN — DEXTROSE MONOHYDRATE 12.5 G: 25 INJECTION, SOLUTION INTRAVENOUS at 04:20

## 2021-01-01 RX ADMIN — WARFARIN SODIUM 1 MG: 1 TABLET ORAL at 17:19

## 2021-01-01 RX ADMIN — CEPHALEXIN 500 MG: 250 CAPSULE ORAL at 06:06

## 2021-01-01 RX ADMIN — VALSARTAN 80 MG: 80 TABLET, FILM COATED ORAL at 08:08

## 2021-01-01 RX ADMIN — GABAPENTIN 100 MG: 100 CAPSULE ORAL at 20:25

## 2021-01-01 RX ADMIN — INSULIN LISPRO 2 UNITS: 100 INJECTION, SOLUTION INTRAVENOUS; SUBCUTANEOUS at 11:43

## 2021-01-01 RX ADMIN — ATORVASTATIN CALCIUM 40 MG: 40 TABLET, FILM COATED ORAL at 20:28

## 2021-01-01 RX ADMIN — HYDROCODONE BITARTRATE AND ACETAMINOPHEN 2 TABLET: 5; 325 TABLET ORAL at 00:45

## 2021-01-01 RX ADMIN — CEPHALEXIN 500 MG: 250 CAPSULE ORAL at 18:52

## 2021-01-01 RX ADMIN — DEXAMETHASONE SODIUM PHOSPHATE 10 MG: 4 INJECTION, SOLUTION INTRAMUSCULAR; INTRAVENOUS at 18:28

## 2021-01-01 RX ADMIN — WARFARIN SODIUM 1 MG: 1 TABLET ORAL at 17:40

## 2021-01-01 RX ADMIN — FOLIC ACID 1 MG: 1 TABLET ORAL at 13:11

## 2021-01-01 RX ADMIN — MICONAZOLE NITRATE: 20 POWDER TOPICAL at 19:33

## 2021-01-01 RX ADMIN — GABAPENTIN 100 MG: 100 CAPSULE ORAL at 14:31

## 2021-01-01 RX ADMIN — DIGOXIN 62.5 MCG: 125 TABLET ORAL at 09:28

## 2021-01-01 RX ADMIN — INSULIN GLARGINE 36 UNITS: 100 INJECTION, SOLUTION SUBCUTANEOUS at 10:31

## 2021-01-01 RX ADMIN — Medication 50 MG: at 10:46

## 2021-01-01 RX ADMIN — ALOGLIPTIN 6.25 MG: 6.25 TABLET, FILM COATED ORAL at 10:46

## 2021-01-01 RX ADMIN — GABAPENTIN 100 MG: 100 CAPSULE ORAL at 09:35

## 2021-01-01 RX ADMIN — METOPROLOL SUCCINATE 100 MG: 100 TABLET, FILM COATED, EXTENDED RELEASE ORAL at 09:12

## 2021-01-01 RX ADMIN — OXYCODONE HYDROCHLORIDE AND ACETAMINOPHEN 500 MG: 500 TABLET ORAL at 09:02

## 2021-01-01 RX ADMIN — ASPIRIN 81 MG: 81 TABLET, COATED ORAL at 07:54

## 2021-01-01 RX ADMIN — ATORVASTATIN CALCIUM 40 MG: 40 TABLET, FILM COATED ORAL at 22:50

## 2021-01-01 RX ADMIN — OXYCODONE HYDROCHLORIDE AND ACETAMINOPHEN 500 MG: 500 TABLET ORAL at 20:28

## 2021-01-01 RX ADMIN — CEPHALEXIN 500 MG: 250 CAPSULE ORAL at 16:00

## 2021-01-01 RX ADMIN — ALLOPURINOL 100 MG: 100 TABLET ORAL at 10:34

## 2021-01-01 RX ADMIN — INSULIN LISPRO 4 UNITS: 100 INJECTION, SOLUTION INTRAVENOUS; SUBCUTANEOUS at 11:08

## 2021-01-01 RX ADMIN — INSULIN LISPRO 5 UNITS: 100 INJECTION, SOLUTION INTRAVENOUS; SUBCUTANEOUS at 12:40

## 2021-01-01 RX ADMIN — PANTOPRAZOLE SODIUM 40 MG: 40 TABLET, DELAYED RELEASE ORAL at 06:06

## 2021-01-01 RX ADMIN — DIGOXIN 62.5 MCG: 125 TABLET ORAL at 08:00

## 2021-01-01 RX ADMIN — SODIUM CHLORIDE, PRESERVATIVE FREE 10 ML: 5 INJECTION INTRAVENOUS at 20:17

## 2021-01-01 RX ADMIN — SODIUM POLYSTYRENE SULFONATE 15 G: 15 SUSPENSION ORAL; RECTAL at 11:19

## 2021-01-01 RX ADMIN — LEVOTHYROXINE SODIUM 75 MCG: 0.07 TABLET ORAL at 06:06

## 2021-01-01 RX ADMIN — ATORVASTATIN CALCIUM 40 MG: 40 TABLET, FILM COATED ORAL at 21:48

## 2021-01-01 RX ADMIN — DULOXETINE HYDROCHLORIDE 60 MG: 60 CAPSULE, DELAYED RELEASE ORAL at 15:35

## 2021-01-01 RX ADMIN — ACETAMINOPHEN 650 MG: 325 TABLET ORAL at 20:34

## 2021-01-01 RX ADMIN — DIGOXIN 62.5 MCG: 125 TABLET ORAL at 09:21

## 2021-01-01 RX ADMIN — ALLOPURINOL 100 MG: 100 TABLET ORAL at 10:06

## 2021-01-01 RX ADMIN — INSULIN LISPRO 1 UNITS: 100 INJECTION, SOLUTION INTRAVENOUS; SUBCUTANEOUS at 22:06

## 2021-01-01 RX ADMIN — DIPHENHYDRAMINE HYDROCHLORIDE 25 MG: 50 INJECTION INTRAMUSCULAR; INTRAVENOUS at 11:54

## 2021-01-01 RX ADMIN — ALOGLIPTIN 6.25 MG: 6.25 TABLET, FILM COATED ORAL at 09:28

## 2021-01-01 RX ADMIN — IPRATROPIUM BROMIDE AND ALBUTEROL SULFATE 1 AMPULE: .5; 3 SOLUTION RESPIRATORY (INHALATION) at 12:13

## 2021-01-01 RX ADMIN — DULOXETINE HYDROCHLORIDE 60 MG: 60 CAPSULE, DELAYED RELEASE ORAL at 10:26

## 2021-01-01 RX ADMIN — SODIUM BICARBONATE 25 MEQ: 84 INJECTION, SOLUTION INTRAVENOUS at 20:50

## 2021-01-01 RX ADMIN — PIPERACILLIN AND TAZOBACTAM 3375 MG: 3; .375 INJECTION, POWDER, LYOPHILIZED, FOR SOLUTION INTRAVENOUS at 10:39

## 2021-01-01 RX ADMIN — SODIUM POLYSTYRENE SULFONATE 15 G: 15 SUSPENSION ORAL; RECTAL at 19:50

## 2021-01-01 RX ADMIN — BARICITINIB 2 MG: 2 TABLET, FILM COATED ORAL at 16:02

## 2021-01-01 RX ADMIN — CALCIUM CARBONATE-VITAMIN D TAB 500 MG-200 UNIT 1 TABLET: 500-200 TAB at 17:52

## 2021-01-01 RX ADMIN — PANTOPRAZOLE SODIUM 40 MG: 40 TABLET, DELAYED RELEASE ORAL at 05:20

## 2021-01-01 RX ADMIN — DEXTROSE MONOHYDRATE 12.5 G: 25 INJECTION, SOLUTION INTRAVENOUS at 06:57

## 2021-01-01 RX ADMIN — INSULIN LISPRO 4 UNITS: 100 INJECTION, SOLUTION INTRAVENOUS; SUBCUTANEOUS at 16:39

## 2021-01-01 RX ADMIN — FENOFIBRATE 160 MG: 160 TABLET ORAL at 08:08

## 2021-01-01 RX ADMIN — FOLIC ACID 1 MG: 1 TABLET ORAL at 09:35

## 2021-01-01 RX ADMIN — INSULIN LISPRO 1 UNITS: 100 INJECTION, SOLUTION INTRAVENOUS; SUBCUTANEOUS at 12:35

## 2021-01-01 RX ADMIN — DIPHENHYDRAMINE HYDROCHLORIDE 25 MG: 50 INJECTION INTRAMUSCULAR; INTRAVENOUS at 11:06

## 2021-01-01 RX ADMIN — DIGOXIN 62.5 MCG: 125 TABLET ORAL at 08:08

## 2021-01-01 RX ADMIN — INSULIN LISPRO 5 UNITS: 100 INJECTION, SOLUTION INTRAVENOUS; SUBCUTANEOUS at 17:52

## 2021-01-01 RX ADMIN — LEVOTHYROXINE SODIUM 50 MCG: 50 TABLET ORAL at 12:32

## 2021-01-01 RX ADMIN — ASPIRIN 81 MG: 81 TABLET, COATED ORAL at 09:35

## 2021-01-01 RX ADMIN — HYDROCODONE BITARTRATE AND ACETAMINOPHEN 1 TABLET: 5; 325 TABLET ORAL at 09:36

## 2021-01-01 RX ADMIN — OXYCODONE HYDROCHLORIDE AND ACETAMINOPHEN 500 MG: 500 TABLET ORAL at 20:59

## 2021-01-01 RX ADMIN — SODIUM CHLORIDE, PRESERVATIVE FREE 10 ML: 5 INJECTION INTRAVENOUS at 10:22

## 2021-01-01 RX ADMIN — FOLIC ACID 1 MG: 1 TABLET ORAL at 08:00

## 2021-01-01 RX ADMIN — METOPROLOL SUCCINATE 50 MG: 50 TABLET, FILM COATED, EXTENDED RELEASE ORAL at 09:16

## 2021-01-01 RX ADMIN — DIPHENHYDRAMINE HYDROCHLORIDE 25 MG: 50 INJECTION, SOLUTION INTRAMUSCULAR; INTRAVENOUS at 13:28

## 2021-01-01 RX ADMIN — INSULIN LISPRO 2 UNITS: 100 INJECTION, SOLUTION INTRAVENOUS; SUBCUTANEOUS at 18:20

## 2021-01-01 RX ADMIN — SODIUM CHLORIDE 3000 MG: 900 INJECTION INTRAVENOUS at 16:05

## 2021-01-01 RX ADMIN — FUROSEMIDE 40 MG: 10 INJECTION, SOLUTION INTRAMUSCULAR; INTRAVENOUS at 08:06

## 2021-01-01 RX ADMIN — METOPROLOL TARTRATE 50 MG: 50 TABLET, FILM COATED ORAL at 01:01

## 2021-01-01 RX ADMIN — ACETAMINOPHEN 650 MG: 325 TABLET ORAL at 01:00

## 2021-01-01 RX ADMIN — LEVOTHYROXINE SODIUM 75 MCG: 0.07 TABLET ORAL at 15:35

## 2021-01-01 RX ADMIN — ALOGLIPTIN 6.25 MG: 6.25 TABLET, FILM COATED ORAL at 15:35

## 2021-01-01 RX ADMIN — CEPHALEXIN 500 MG: 250 CAPSULE ORAL at 11:54

## 2021-01-01 RX ADMIN — GABAPENTIN 100 MG: 100 CAPSULE ORAL at 15:00

## 2021-01-01 RX ADMIN — WARFARIN SODIUM 1 MG: 1 TABLET ORAL at 17:36

## 2021-01-01 RX ADMIN — SODIUM CHLORIDE, PRESERVATIVE FREE 10 ML: 5 INJECTION INTRAVENOUS at 20:49

## 2021-01-01 RX ADMIN — CEFAZOLIN 2000 MG: 10 INJECTION, POWDER, FOR SOLUTION INTRAVENOUS at 21:45

## 2021-01-01 RX ADMIN — OXYCODONE HYDROCHLORIDE AND ACETAMINOPHEN 500 MG: 500 TABLET ORAL at 10:59

## 2021-01-01 RX ADMIN — HYDROCODONE BITARTRATE AND ACETAMINOPHEN 1 TABLET: 5; 325 TABLET ORAL at 10:35

## 2021-01-01 RX ADMIN — CALCIUM CARBONATE-VITAMIN D TAB 500 MG-200 UNIT 1 TABLET: 500-200 TAB at 17:18

## 2021-01-01 RX ADMIN — WARFARIN SODIUM 1 MG: 1 TABLET ORAL at 17:02

## 2021-01-01 RX ADMIN — VANCOMYCIN HYDROCHLORIDE 1750 MG: 1 INJECTION, POWDER, LYOPHILIZED, FOR SOLUTION INTRAVENOUS at 23:39

## 2021-01-01 RX ADMIN — ATORVASTATIN CALCIUM 40 MG: 40 TABLET, FILM COATED ORAL at 19:49

## 2021-01-01 RX ADMIN — CALCIUM CHLORIDE 1 G: 100 INJECTION INTRAVENOUS; INTRAVENTRICULAR at 20:49

## 2021-01-01 RX ADMIN — ATORVASTATIN CALCIUM 40 MG: 40 TABLET, FILM COATED ORAL at 19:43

## 2021-01-01 RX ADMIN — METOPROLOL SUCCINATE 50 MG: 100 TABLET, FILM COATED, EXTENDED RELEASE ORAL at 10:06

## 2021-01-01 RX ADMIN — CEFAZOLIN 2000 MG: 10 INJECTION, POWDER, FOR SOLUTION INTRAVENOUS at 15:33

## 2021-01-01 RX ADMIN — CEPHALEXIN 500 MG: 250 CAPSULE ORAL at 00:11

## 2021-01-01 RX ADMIN — METOPROLOL SUCCINATE 50 MG: 50 TABLET, FILM COATED, EXTENDED RELEASE ORAL at 12:33

## 2021-01-01 RX ADMIN — LEVOTHYROXINE SODIUM 75 MCG: 0.07 TABLET ORAL at 05:19

## 2021-01-01 RX ADMIN — FUROSEMIDE 40 MG: 10 INJECTION, SOLUTION INTRAMUSCULAR; INTRAVENOUS at 08:00

## 2021-01-01 RX ADMIN — GABAPENTIN 100 MG: 100 CAPSULE ORAL at 10:32

## 2021-01-01 RX ADMIN — INSULIN GLARGINE 15 UNITS: 100 INJECTION, SOLUTION SUBCUTANEOUS at 09:32

## 2021-01-01 RX ADMIN — ALOGLIPTIN 6.25 MG: 6.25 TABLET, FILM COATED ORAL at 10:44

## 2021-01-01 RX ADMIN — Medication 10 ML: at 07:37

## 2021-01-01 RX ADMIN — SODIUM BICARBONATE 1300 MG: 650 TABLET ORAL at 08:20

## 2021-01-01 RX ADMIN — GLIPIZIDE 10 MG: 10 TABLET ORAL at 10:22

## 2021-01-01 RX ADMIN — Medication 2000 UNITS: at 08:08

## 2021-01-01 RX ADMIN — ACETAMINOPHEN 650 MG: 325 TABLET ORAL at 19:49

## 2021-01-01 RX ADMIN — INSULIN LISPRO 1 UNITS: 100 INJECTION, SOLUTION INTRAVENOUS; SUBCUTANEOUS at 20:50

## 2021-01-01 RX ADMIN — SODIUM BICARBONATE 1300 MG: 650 TABLET ORAL at 22:50

## 2021-01-01 RX ADMIN — Medication 50 MG: at 09:02

## 2021-01-01 RX ADMIN — ASPIRIN 81 MG: 81 TABLET, COATED ORAL at 10:22

## 2021-01-01 RX ADMIN — SODIUM CHLORIDE, PRESERVATIVE FREE 10 ML: 5 INJECTION INTRAVENOUS at 21:49

## 2021-01-01 RX ADMIN — OXYCODONE HYDROCHLORIDE AND ACETAMINOPHEN 500 MG: 500 TABLET ORAL at 21:40

## 2021-01-01 RX ADMIN — GABAPENTIN 100 MG: 100 CAPSULE ORAL at 09:27

## 2021-01-01 RX ADMIN — ALLOPURINOL 100 MG: 100 TABLET ORAL at 07:54

## 2021-01-01 RX ADMIN — FOLIC ACID 1 MG: 1 TABLET ORAL at 15:36

## 2021-01-01 RX ADMIN — DULOXETINE HYDROCHLORIDE 60 MG: 60 CAPSULE, DELAYED RELEASE ORAL at 07:53

## 2021-01-01 RX ADMIN — INSULIN GLARGINE 36 UNITS: 100 INJECTION, SOLUTION SUBCUTANEOUS at 10:51

## 2021-01-01 RX ADMIN — SODIUM CHLORIDE, PRESERVATIVE FREE 10 ML: 5 INJECTION INTRAVENOUS at 09:28

## 2021-01-01 RX ADMIN — SODIUM CHLORIDE, PRESERVATIVE FREE 10 ML: 5 INJECTION INTRAVENOUS at 22:50

## 2021-01-01 RX ADMIN — OXYCODONE HYDROCHLORIDE AND ACETAMINOPHEN 500 MG: 500 TABLET ORAL at 22:55

## 2021-01-01 RX ADMIN — FUROSEMIDE 20 MG: 10 INJECTION, SOLUTION INTRAMUSCULAR; INTRAVENOUS at 11:06

## 2021-01-01 RX ADMIN — INSULIN LISPRO 5 UNITS: 100 INJECTION, SOLUTION INTRAVENOUS; SUBCUTANEOUS at 17:14

## 2021-01-01 RX ADMIN — LEVOTHYROXINE SODIUM 75 MCG: 0.07 TABLET ORAL at 05:49

## 2021-01-01 RX ADMIN — ATORVASTATIN CALCIUM 40 MG: 40 TABLET, FILM COATED ORAL at 20:26

## 2021-01-01 RX ADMIN — WARFARIN SODIUM 3 MG: 3 TABLET ORAL at 17:18

## 2021-01-01 RX ADMIN — INSULIN HUMAN 10 UNITS: 100 INJECTION, SOLUTION PARENTERAL at 15:56

## 2021-01-01 RX ADMIN — DEXTROSE MONOHYDRATE 25 G: 25 INJECTION, SOLUTION INTRAVENOUS at 15:55

## 2021-01-01 RX ADMIN — INSULIN LISPRO 2 UNITS: 100 INJECTION, SOLUTION INTRAVENOUS; SUBCUTANEOUS at 16:19

## 2021-01-01 RX ADMIN — LEVOTHYROXINE SODIUM 75 MCG: 0.07 TABLET ORAL at 05:25

## 2021-01-01 RX ADMIN — MICONAZOLE NITRATE: 20 POWDER TOPICAL at 09:34

## 2021-01-01 RX ADMIN — VALSARTAN 80 MG: 80 TABLET, FILM COATED ORAL at 07:53

## 2021-01-01 RX ADMIN — SODIUM POLYSTYRENE SULFONATE 30 G: 15 SUSPENSION ORAL; RECTAL at 14:20

## 2021-01-01 RX ADMIN — Medication 50 MG: at 10:44

## 2021-01-01 RX ADMIN — DIPHENHYDRAMINE HYDROCHLORIDE 25 MG: 50 INJECTION, SOLUTION INTRAMUSCULAR; INTRAVENOUS at 20:30

## 2021-01-01 RX ADMIN — PANTOPRAZOLE SODIUM 40 MG: 40 TABLET, DELAYED RELEASE ORAL at 05:49

## 2021-01-01 RX ADMIN — METOPROLOL SUCCINATE 50 MG: 100 TABLET, FILM COATED, EXTENDED RELEASE ORAL at 09:12

## 2021-01-01 RX ADMIN — DULOXETINE HYDROCHLORIDE 60 MG: 60 CAPSULE, DELAYED RELEASE ORAL at 09:16

## 2021-01-01 RX ADMIN — FOLIC ACID 1 MG: 1 TABLET ORAL at 09:12

## 2021-01-01 RX ADMIN — METOPROLOL SUCCINATE 50 MG: 100 TABLET, FILM COATED, EXTENDED RELEASE ORAL at 10:35

## 2021-01-01 RX ADMIN — ATORVASTATIN CALCIUM 40 MG: 40 TABLET, FILM COATED ORAL at 20:30

## 2021-01-01 RX ADMIN — FOLIC ACID 1 MG: 1 TABLET ORAL at 10:46

## 2021-01-01 RX ADMIN — INSULIN HUMAN 10 UNITS: 100 INJECTION, SOLUTION PARENTERAL at 20:49

## 2021-01-01 RX ADMIN — SODIUM CHLORIDE 1986 ML: 9 INJECTION, SOLUTION INTRAVENOUS at 14:02

## 2021-01-01 RX ADMIN — OXYCODONE HYDROCHLORIDE AND ACETAMINOPHEN 500 MG: 500 TABLET ORAL at 09:43

## 2021-01-01 RX ADMIN — ACETAMINOPHEN 650 MG: 325 TABLET ORAL at 10:34

## 2021-01-01 RX ADMIN — FOLIC ACID 1 MG: 1 TABLET ORAL at 08:05

## 2021-01-01 RX ADMIN — ASPIRIN 81 MG: 81 TABLET, COATED ORAL at 15:36

## 2021-01-01 RX ADMIN — CALCIUM CARBONATE-VITAMIN D TAB 500 MG-200 UNIT 1 TABLET: 500-200 TAB at 08:00

## 2021-01-01 RX ADMIN — VALSARTAN 80 MG: 80 TABLET, FILM COATED ORAL at 09:27

## 2021-01-01 RX ADMIN — METOPROLOL SUCCINATE 100 MG: 100 TABLET, FILM COATED, EXTENDED RELEASE ORAL at 10:32

## 2021-01-01 RX ADMIN — CEPHALEXIN 500 MG: 250 CAPSULE ORAL at 12:49

## 2021-01-01 RX ADMIN — LEVOTHYROXINE SODIUM 50 MCG: 50 TABLET ORAL at 13:11

## 2021-01-01 RX ADMIN — ALLOPURINOL 100 MG: 100 TABLET ORAL at 08:20

## 2021-01-01 RX ADMIN — SODIUM CHLORIDE: 9 INJECTION, SOLUTION INTRAVENOUS at 22:06

## 2021-01-01 RX ADMIN — HYDROCODONE BITARTRATE AND ACETAMINOPHEN 1 TABLET: 5; 325 TABLET ORAL at 15:11

## 2021-01-01 RX ADMIN — FOLIC ACID 1 MG: 1 TABLET ORAL at 07:53

## 2021-01-01 RX ADMIN — DIPHENHYDRAMINE HYDROCHLORIDE 25 MG: 50 INJECTION, SOLUTION INTRAMUSCULAR; INTRAVENOUS at 10:35

## 2021-01-01 RX ADMIN — SODIUM BICARBONATE 1300 MG: 650 TABLET ORAL at 19:43

## 2021-01-01 RX ADMIN — WARFARIN SODIUM 2.5 MG: 2.5 TABLET ORAL at 18:15

## 2021-01-01 RX ADMIN — DULOXETINE HYDROCHLORIDE 60 MG: 60 CAPSULE, DELAYED RELEASE ORAL at 09:12

## 2021-01-01 RX ADMIN — PROPOFOL 10 MCG/KG/MIN: 10 INJECTION, EMULSION INTRAVENOUS at 04:17

## 2021-01-01 RX ADMIN — ATORVASTATIN CALCIUM 40 MG: 40 TABLET, FILM COATED ORAL at 21:40

## 2021-01-01 RX ADMIN — METHYLPREDNISOLONE SODIUM SUCCINATE 40 MG: 40 INJECTION, POWDER, FOR SOLUTION INTRAMUSCULAR; INTRAVENOUS at 10:38

## 2021-01-01 RX ADMIN — METOPROLOL SUCCINATE 100 MG: 100 TABLET, FILM COATED, EXTENDED RELEASE ORAL at 19:49

## 2021-01-01 RX ADMIN — INSULIN LISPRO 2 UNITS: 100 INJECTION, SOLUTION INTRAVENOUS; SUBCUTANEOUS at 10:37

## 2021-01-01 RX ADMIN — FENOFIBRATE 160 MG: 160 TABLET ORAL at 09:21

## 2021-01-01 RX ADMIN — HYDROCODONE BITARTRATE AND ACETAMINOPHEN 2 TABLET: 5; 325 TABLET ORAL at 05:48

## 2021-01-01 RX ADMIN — FENOFIBRATE 160 MG: 160 TABLET ORAL at 10:46

## 2021-01-01 RX ADMIN — ALLOPURINOL 100 MG: 100 TABLET ORAL at 08:05

## 2021-01-01 RX ADMIN — FERROUS SULFATE TAB 325 MG (65 MG ELEMENTAL FE) 325 MG: 325 (65 FE) TAB at 09:02

## 2021-01-01 RX ADMIN — ASPIRIN 81 MG: 81 TABLET, COATED ORAL at 09:22

## 2021-01-01 RX ADMIN — HYDROCODONE BITARTRATE AND ACETAMINOPHEN 1 TABLET: 5; 325 TABLET ORAL at 10:29

## 2021-01-01 RX ADMIN — FOLIC ACID 1 MG: 1 TABLET ORAL at 09:43

## 2021-01-01 RX ADMIN — DIGOXIN 62.5 MCG: 125 TABLET ORAL at 09:16

## 2021-01-01 RX ADMIN — Medication 3 MCG/MIN: at 05:56

## 2021-01-01 RX ADMIN — INSULIN GLARGINE 30 UNITS: 100 INJECTION, SOLUTION SUBCUTANEOUS at 07:41

## 2021-01-01 RX ADMIN — ACETAMINOPHEN 650 MG: 325 TABLET ORAL at 22:44

## 2021-01-01 RX ADMIN — ATORVASTATIN CALCIUM 40 MG: 40 TABLET, FILM COATED ORAL at 21:45

## 2021-01-01 RX ADMIN — Medication 10 UNITS: at 14:19

## 2021-01-01 RX ADMIN — SODIUM CHLORIDE: 9 INJECTION, SOLUTION INTRAVENOUS at 10:51

## 2021-01-01 RX ADMIN — CEPHALEXIN 500 MG: 250 CAPSULE ORAL at 06:10

## 2021-01-01 RX ADMIN — SODIUM BICARBONATE 1300 MG: 650 TABLET ORAL at 15:19

## 2021-01-01 RX ADMIN — DIGOXIN 62.5 MCG: 125 TABLET ORAL at 09:33

## 2021-01-01 RX ADMIN — ACETAMINOPHEN 650 MG: 325 TABLET ORAL at 06:19

## 2021-01-01 RX ADMIN — DIPHENHYDRAMINE HYDROCHLORIDE 25 MG: 50 INJECTION, SOLUTION INTRAMUSCULAR; INTRAVENOUS at 23:03

## 2021-01-01 RX ADMIN — MODAFINIL 200 MG: 100 TABLET ORAL at 06:18

## 2021-01-01 RX ADMIN — MICONAZOLE NITRATE: 20 POWDER TOPICAL at 09:17

## 2021-01-01 RX ADMIN — LEVOTHYROXINE SODIUM 75 MCG: 0.07 TABLET ORAL at 05:53

## 2021-01-01 RX ADMIN — WARFARIN SODIUM 2.5 MG: 2.5 TABLET ORAL at 21:49

## 2021-01-01 RX ADMIN — GLIPIZIDE 10 MG: 10 TABLET ORAL at 05:50

## 2021-01-01 RX ADMIN — DULOXETINE HYDROCHLORIDE 60 MG: 60 CAPSULE, DELAYED RELEASE ORAL at 10:46

## 2021-01-01 RX ADMIN — INSULIN LISPRO 2 UNITS: 100 INJECTION, SOLUTION INTRAVENOUS; SUBCUTANEOUS at 07:55

## 2021-01-01 RX ADMIN — VALSARTAN 80 MG: 80 TABLET, FILM COATED ORAL at 08:00

## 2021-01-01 RX ADMIN — LEVOTHYROXINE SODIUM 50 MCG: 50 TABLET ORAL at 06:19

## 2021-01-01 RX ADMIN — Medication 10 ML: at 19:43

## 2021-01-01 RX ADMIN — Medication 50 MG: at 09:44

## 2021-01-01 RX ADMIN — INSULIN LISPRO 2 UNITS: 100 INJECTION, SOLUTION INTRAVENOUS; SUBCUTANEOUS at 11:19

## 2021-01-01 RX ADMIN — DIGOXIN 62.5 MCG: 125 TABLET ORAL at 10:43

## 2021-01-01 RX ADMIN — METOPROLOL SUCCINATE 100 MG: 100 TABLET, EXTENDED RELEASE ORAL at 08:00

## 2021-01-01 RX ADMIN — DIPHENHYDRAMINE HYDROCHLORIDE 25 MG: 50 INJECTION INTRAMUSCULAR; INTRAVENOUS at 15:38

## 2021-01-01 RX ADMIN — CEPHALEXIN 500 MG: 250 CAPSULE ORAL at 01:31

## 2021-01-01 RX ADMIN — ATORVASTATIN CALCIUM 40 MG: 40 TABLET, FILM COATED ORAL at 20:00

## 2021-01-01 RX ADMIN — ACETAMINOPHEN 650 MG: 325 TABLET ORAL at 02:52

## 2021-01-01 RX ADMIN — VALSARTAN 80 MG: 80 TABLET, FILM COATED ORAL at 10:25

## 2021-01-01 RX ADMIN — INSULIN LISPRO 6 UNITS: 100 INJECTION, SOLUTION INTRAVENOUS; SUBCUTANEOUS at 12:05

## 2021-01-01 RX ADMIN — LEVOTHYROXINE SODIUM 50 MCG: 50 TABLET ORAL at 21:27

## 2021-01-01 RX ADMIN — Medication 50 MG: at 15:35

## 2021-01-01 RX ADMIN — DULOXETINE HYDROCHLORIDE 60 MG: 60 CAPSULE, DELAYED RELEASE ORAL at 09:02

## 2021-01-01 RX ADMIN — OXYCODONE HYDROCHLORIDE AND ACETAMINOPHEN 500 MG: 500 TABLET ORAL at 20:30

## 2021-01-01 RX ADMIN — FUROSEMIDE 40 MG: 40 TABLET ORAL at 10:06

## 2021-01-01 RX ADMIN — FOLIC ACID 1 MG: 1 TABLET ORAL at 12:49

## 2021-01-01 RX ADMIN — SODIUM BICARBONATE 1300 MG: 650 TABLET ORAL at 07:36

## 2021-01-01 RX ADMIN — CEFAZOLIN 2000 MG: 10 INJECTION, POWDER, FOR SOLUTION INTRAVENOUS at 06:04

## 2021-01-01 RX ADMIN — CEPHALEXIN 500 MG: 250 CAPSULE ORAL at 13:45

## 2021-01-01 RX ADMIN — CEPHALEXIN 500 MG: 250 CAPSULE ORAL at 10:24

## 2021-01-01 RX ADMIN — OXYCODONE HYDROCHLORIDE AND ACETAMINOPHEN 500 MG: 500 TABLET ORAL at 21:49

## 2021-01-01 RX ADMIN — CEFAZOLIN 2000 MG: 10 INJECTION, POWDER, FOR SOLUTION INTRAVENOUS at 05:19

## 2021-01-01 RX ADMIN — LEVOTHYROXINE SODIUM 50 MCG: 50 TABLET ORAL at 05:10

## 2021-01-01 RX ADMIN — FUROSEMIDE 40 MG: 40 TABLET ORAL at 09:40

## 2021-01-01 RX ADMIN — HYDROCODONE BITARTRATE AND ACETAMINOPHEN 1 TABLET: 5; 325 TABLET ORAL at 18:52

## 2021-01-01 RX ADMIN — HYDROCODONE BITARTRATE AND ACETAMINOPHEN 2 TABLET: 5; 325 TABLET ORAL at 04:44

## 2021-01-01 RX ADMIN — DIGOXIN 62.5 MCG: 125 TABLET ORAL at 10:44

## 2021-01-01 RX ADMIN — FUROSEMIDE 40 MG: 40 TABLET ORAL at 11:01

## 2021-01-01 RX ADMIN — GABAPENTIN 100 MG: 100 CAPSULE ORAL at 10:44

## 2021-01-01 RX ADMIN — ACETAMINOPHEN 650 MG: 325 TABLET ORAL at 09:07

## 2021-01-01 RX ADMIN — CALCIUM CARBONATE-VITAMIN D TAB 500 MG-200 UNIT 1 TABLET: 500-200 TAB at 17:46

## 2021-01-01 RX ADMIN — DULOXETINE HYDROCHLORIDE 60 MG: 60 CAPSULE, DELAYED RELEASE ORAL at 12:35

## 2021-01-01 RX ADMIN — OXYCODONE HYDROCHLORIDE AND ACETAMINOPHEN 500 MG: 500 TABLET ORAL at 08:08

## 2021-01-01 RX ADMIN — METOPROLOL SUCCINATE 50 MG: 50 TABLET, FILM COATED, EXTENDED RELEASE ORAL at 13:11

## 2021-01-01 RX ADMIN — ATORVASTATIN CALCIUM 40 MG: 40 TABLET, FILM COATED ORAL at 20:25

## 2021-01-01 RX ADMIN — DULOXETINE HYDROCHLORIDE 60 MG: 60 CAPSULE, DELAYED RELEASE ORAL at 09:21

## 2021-01-01 RX ADMIN — FOLIC ACID 1 MG: 1 TABLET ORAL at 09:27

## 2021-01-01 RX ADMIN — CALCIUM CARBONATE-VITAMIN D TAB 500 MG-200 UNIT 1 TABLET: 500-200 TAB at 09:30

## 2021-01-01 RX ADMIN — CEPHALEXIN 500 MG: 250 CAPSULE ORAL at 11:19

## 2021-01-01 RX ADMIN — VALSARTAN 80 MG: 80 TABLET, FILM COATED ORAL at 09:43

## 2021-01-01 RX ADMIN — CALCIUM GLUCONATE 1000 MG: 98 INJECTION, SOLUTION INTRAVENOUS at 11:19

## 2021-01-01 RX ADMIN — MORPHINE SULFATE 2 MG: 2 INJECTION, SOLUTION INTRAMUSCULAR; INTRAVENOUS at 11:18

## 2021-01-01 RX ADMIN — PANTOPRAZOLE SODIUM 40 MG: 40 TABLET, DELAYED RELEASE ORAL at 05:19

## 2021-01-01 RX ADMIN — LEVOTHYROXINE SODIUM 75 MCG: 0.07 TABLET ORAL at 06:04

## 2021-01-01 RX ADMIN — INSULIN LISPRO 1 UNITS: 100 INJECTION, SOLUTION INTRAVENOUS; SUBCUTANEOUS at 21:09

## 2021-01-01 RX ADMIN — ASPIRIN 81 MG: 81 TABLET, COATED ORAL at 08:20

## 2021-01-01 RX ADMIN — IPRATROPIUM BROMIDE AND ALBUTEROL SULFATE 1 AMPULE: .5; 3 SOLUTION RESPIRATORY (INHALATION) at 18:00

## 2021-01-01 RX ADMIN — GABAPENTIN 100 MG: 100 CAPSULE ORAL at 21:29

## 2021-01-01 RX ADMIN — SODIUM CHLORIDE, PRESERVATIVE FREE 10 ML: 5 INJECTION INTRAVENOUS at 20:31

## 2021-01-01 RX ADMIN — FAMOTIDINE 20 MG: 20 TABLET, FILM COATED ORAL at 10:29

## 2021-01-01 RX ADMIN — FERROUS SULFATE TAB 325 MG (65 MG ELEMENTAL FE) 325 MG: 325 (65 FE) TAB at 08:08

## 2021-01-01 RX ADMIN — LEVOTHYROXINE SODIUM 75 MCG: 0.07 TABLET ORAL at 05:20

## 2021-01-01 RX ADMIN — GABAPENTIN 100 MG: 100 CAPSULE ORAL at 20:30

## 2021-01-01 RX ADMIN — DIGOXIN 62.5 MCG: 125 TABLET ORAL at 10:34

## 2021-01-01 RX ADMIN — DULOXETINE HYDROCHLORIDE 60 MG: 60 CAPSULE, DELAYED RELEASE ORAL at 10:29

## 2021-01-01 RX ADMIN — CALCIUM CARBONATE-VITAMIN D TAB 500 MG-200 UNIT 1 TABLET: 500-200 TAB at 17:36

## 2021-01-01 RX ADMIN — HYDROCODONE BITARTRATE AND ACETAMINOPHEN 1 TABLET: 5; 325 TABLET ORAL at 15:48

## 2021-01-01 RX ADMIN — INSULIN LISPRO 5 UNITS: 100 INJECTION, SOLUTION INTRAVENOUS; SUBCUTANEOUS at 18:27

## 2021-01-01 RX ADMIN — DIGOXIN 62.5 MCG: 125 TABLET ORAL at 15:36

## 2021-01-01 RX ADMIN — ATORVASTATIN CALCIUM 40 MG: 40 TABLET, FILM COATED ORAL at 20:16

## 2021-01-01 RX ADMIN — VANCOMYCIN HYDROCHLORIDE 2000 MG: 1 INJECTION, POWDER, LYOPHILIZED, FOR SOLUTION INTRAVENOUS at 15:36

## 2021-01-01 RX ADMIN — DEXAMETHASONE SODIUM PHOSPHATE 6 MG: 4 INJECTION, SOLUTION INTRAMUSCULAR; INTRAVENOUS at 09:02

## 2021-01-01 RX ADMIN — INSULIN LISPRO 1 UNITS: 100 INJECTION, SOLUTION INTRAVENOUS; SUBCUTANEOUS at 20:03

## 2021-01-01 RX ADMIN — INSULIN GLARGINE 10 UNITS: 100 INJECTION, SOLUTION SUBCUTANEOUS at 21:15

## 2021-01-01 RX ADMIN — INSULIN LISPRO 1 UNITS: 100 INJECTION, SOLUTION INTRAVENOUS; SUBCUTANEOUS at 23:01

## 2021-01-01 RX ADMIN — INSULIN LISPRO 50 UNITS: 100 INJECTION, SOLUTION INTRAVENOUS; SUBCUTANEOUS at 19:49

## 2021-01-01 RX ADMIN — SODIUM POLYSTYRENE SULFONATE 15 G: 15 SUSPENSION ORAL; RECTAL at 08:00

## 2021-01-01 RX ADMIN — MENTHOL, METHYL SALICYLATE: 10; 15 CREAM TOPICAL at 08:00

## 2021-01-01 RX ADMIN — ALLOPURINOL 100 MG: 100 TABLET ORAL at 09:22

## 2021-01-01 RX ADMIN — SODIUM CHLORIDE, PRESERVATIVE FREE 10 ML: 5 INJECTION INTRAVENOUS at 10:39

## 2021-01-01 RX ADMIN — GABAPENTIN 100 MG: 100 CAPSULE ORAL at 14:58

## 2021-01-01 RX ADMIN — INSULIN LISPRO 1 UNITS: 100 INJECTION, SOLUTION INTRAVENOUS; SUBCUTANEOUS at 21:18

## 2021-01-01 RX ADMIN — DEXTROSE MONOHYDRATE 12.5 G: 25 INJECTION, SOLUTION INTRAVENOUS at 01:33

## 2021-01-01 RX ADMIN — Medication 10 MCG/MIN: at 04:25

## 2021-01-01 RX ADMIN — DEXTROSE MONOHYDRATE 100 ML/HR: 50 INJECTION, SOLUTION INTRAVENOUS at 02:38

## 2021-01-01 RX ADMIN — METOPROLOL SUCCINATE 100 MG: 100 TABLET, FILM COATED, EXTENDED RELEASE ORAL at 10:46

## 2021-01-01 RX ADMIN — DEXTROSE MONOHYDRATE 25 G: 25 INJECTION, SOLUTION INTRAVENOUS at 20:36

## 2021-01-01 RX ADMIN — HYDROCODONE BITARTRATE AND ACETAMINOPHEN 1 TABLET: 7.5; 325 TABLET ORAL at 23:31

## 2021-01-01 RX ADMIN — PANTOPRAZOLE SODIUM 40 MG: 40 TABLET, DELAYED RELEASE ORAL at 09:43

## 2021-01-01 RX ADMIN — DULOXETINE HYDROCHLORIDE 60 MG: 60 CAPSULE, DELAYED RELEASE ORAL at 08:00

## 2021-01-01 RX ADMIN — FUROSEMIDE 40 MG: 10 INJECTION, SOLUTION INTRAMUSCULAR; INTRAVENOUS at 10:22

## 2021-01-01 RX ADMIN — FENOFIBRATE 160 MG: 160 TABLET ORAL at 09:28

## 2021-01-01 RX ADMIN — OXYCODONE HYDROCHLORIDE AND ACETAMINOPHEN 500 MG: 500 TABLET ORAL at 21:45

## 2021-01-01 RX ADMIN — LEVOTHYROXINE SODIUM 75 MCG: 0.07 TABLET ORAL at 06:00

## 2021-01-01 RX ADMIN — FAMOTIDINE 20 MG: 20 TABLET, FILM COATED ORAL at 09:15

## 2021-01-01 RX ADMIN — CALCIUM GLUCONATE 1 G: 98 INJECTION, SOLUTION INTRAVENOUS at 15:56

## 2021-01-01 RX ADMIN — Medication 50 MG: at 09:21

## 2021-01-01 RX ADMIN — OXYCODONE HYDROCHLORIDE AND ACETAMINOPHEN 500 MG: 500 TABLET ORAL at 20:25

## 2021-01-01 RX ADMIN — Medication 50 MG: at 10:25

## 2021-01-01 RX ADMIN — DULOXETINE HYDROCHLORIDE 60 MG: 60 CAPSULE, DELAYED RELEASE ORAL at 09:44

## 2021-01-01 RX ADMIN — HYDROCODONE BITARTRATE AND ACETAMINOPHEN 1 TABLET: 5; 325 TABLET ORAL at 10:06

## 2021-01-01 RX ADMIN — LEVOTHYROXINE SODIUM 75 MCG: 0.07 TABLET ORAL at 04:58

## 2021-01-01 RX ADMIN — DEXTROSE MONOHYDRATE 12.5 G: 25 INJECTION, SOLUTION INTRAVENOUS at 06:14

## 2021-01-01 RX ADMIN — CALCIUM CARBONATE-VITAMIN D TAB 500 MG-200 UNIT 1 TABLET: 500-200 TAB at 10:24

## 2021-01-01 RX ADMIN — GABAPENTIN 100 MG: 100 CAPSULE ORAL at 22:44

## 2021-01-01 RX ADMIN — LEVOTHYROXINE SODIUM 75 MCG: 0.07 TABLET ORAL at 05:24

## 2021-01-01 RX ADMIN — FUROSEMIDE 40 MG: 40 TABLET ORAL at 13:11

## 2021-01-01 RX ADMIN — ALLOPURINOL 100 MG: 100 TABLET ORAL at 10:29

## 2021-01-01 RX ADMIN — Medication: at 04:55

## 2021-01-01 RX ADMIN — CALCIUM CHLORIDE 1 G: 100 INJECTION, SOLUTION INTRAVENOUS; INTRAVENTRICULAR at 14:19

## 2021-01-01 RX ADMIN — DIPHENHYDRAMINE HYDROCHLORIDE 25 MG: 50 INJECTION, SOLUTION INTRAMUSCULAR; INTRAVENOUS at 04:59

## 2021-01-01 RX ADMIN — DULOXETINE HYDROCHLORIDE 60 MG: 60 CAPSULE, DELAYED RELEASE ORAL at 10:45

## 2021-01-01 RX ADMIN — CALCIUM CARBONATE-VITAMIN D TAB 500 MG-200 UNIT 1 TABLET: 500-200 TAB at 16:54

## 2021-01-01 RX ADMIN — ASPIRIN 81 MG: 81 TABLET, COATED ORAL at 08:07

## 2021-01-01 RX ADMIN — FENOFIBRATE 160 MG: 160 TABLET ORAL at 09:35

## 2021-01-01 RX ADMIN — PANTOPRAZOLE SODIUM 40 MG: 40 TABLET, DELAYED RELEASE ORAL at 10:23

## 2021-01-01 RX ADMIN — DEXTROSE MONOHYDRATE 12.5 G: 25 INJECTION, SOLUTION INTRAVENOUS at 23:51

## 2021-01-01 RX ADMIN — FOLIC ACID 1 MG: 1 TABLET ORAL at 10:32

## 2021-01-01 RX ADMIN — METOPROLOL SUCCINATE 100 MG: 100 TABLET, EXTENDED RELEASE ORAL at 09:43

## 2021-01-01 RX ADMIN — ATORVASTATIN CALCIUM 40 MG: 40 TABLET, FILM COATED ORAL at 01:31

## 2021-01-01 RX ADMIN — ATORVASTATIN CALCIUM 40 MG: 40 TABLET, FILM COATED ORAL at 22:55

## 2021-01-01 RX ADMIN — OXYCODONE HYDROCHLORIDE AND ACETAMINOPHEN 500 MG: 500 TABLET ORAL at 20:02

## 2021-01-01 RX ADMIN — Medication 50 MG: at 07:53

## 2021-01-01 RX ADMIN — IOPAMIDOL 80 ML: 755 INJECTION, SOLUTION INTRAVENOUS at 09:00

## 2021-01-01 RX ADMIN — CEFAZOLIN 2000 MG: 10 INJECTION, POWDER, FOR SOLUTION INTRAVENOUS at 22:45

## 2021-01-01 RX ADMIN — WARFARIN SODIUM 5 MG: 5 TABLET ORAL at 18:23

## 2021-01-01 RX ADMIN — ALLOPURINOL 100 MG: 100 TABLET ORAL at 15:35

## 2021-01-01 RX ADMIN — ACETAMINOPHEN 650 MG: 325 TABLET ORAL at 23:47

## 2021-01-01 RX ADMIN — ACETAMINOPHEN 650 MG: 325 TABLET ORAL at 15:18

## 2021-01-01 RX ADMIN — SODIUM CHLORIDE, PRESERVATIVE FREE 10 ML: 5 INJECTION INTRAVENOUS at 08:11

## 2021-01-01 RX ADMIN — SODIUM CHLORIDE 3000 MG: 900 INJECTION INTRAVENOUS at 14:45

## 2021-01-01 RX ADMIN — LEVOTHYROXINE SODIUM 75 MCG: 0.07 TABLET ORAL at 09:44

## 2021-01-01 RX ADMIN — DIGOXIN 62.5 MCG: 125 TABLET ORAL at 07:53

## 2021-01-01 RX ADMIN — ACETAMINOPHEN 650 MG: 325 TABLET ORAL at 08:48

## 2021-01-01 RX ADMIN — ALOGLIPTIN 6.25 MG: 6.25 TABLET, FILM COATED ORAL at 10:34

## 2021-01-01 RX ADMIN — FUROSEMIDE 40 MG: 40 TABLET ORAL at 07:53

## 2021-01-01 RX ADMIN — INSULIN GLARGINE 36 UNITS: 100 INJECTION, SOLUTION SUBCUTANEOUS at 11:08

## 2021-01-01 RX ADMIN — DIGOXIN 62.5 MCG: 125 TABLET ORAL at 09:02

## 2021-01-01 RX ADMIN — OXYCODONE HYDROCHLORIDE AND ACETAMINOPHEN 500 MG: 500 TABLET ORAL at 10:44

## 2021-01-01 RX ADMIN — CALCIUM CARBONATE-VITAMIN D TAB 500 MG-200 UNIT 1 TABLET: 500-200 TAB at 16:02

## 2021-01-01 RX ADMIN — GABAPENTIN 100 MG: 100 CAPSULE ORAL at 14:16

## 2021-01-01 RX ADMIN — ALOGLIPTIN 6.25 MG: 6.25 TABLET, FILM COATED ORAL at 09:35

## 2021-01-01 RX ADMIN — WARFARIN SODIUM 4 MG: 4 TABLET ORAL at 17:26

## 2021-01-01 RX ADMIN — OXYCODONE HYDROCHLORIDE AND ACETAMINOPHEN 500 MG: 500 TABLET ORAL at 01:31

## 2021-01-01 RX ADMIN — MICONAZOLE NITRATE: 20 CREAM TOPICAL at 10:42

## 2021-01-01 RX ADMIN — INSULIN LISPRO 1 UNITS: 100 INJECTION, SOLUTION INTRAVENOUS; SUBCUTANEOUS at 16:57

## 2021-01-01 RX ADMIN — FENOFIBRATE 160 MG: 160 TABLET ORAL at 09:02

## 2021-01-01 RX ADMIN — OXYCODONE HYDROCHLORIDE AND ACETAMINOPHEN 500 MG: 500 TABLET ORAL at 20:49

## 2021-01-01 RX ADMIN — SODIUM CHLORIDE, POTASSIUM CHLORIDE, SODIUM LACTATE AND CALCIUM CHLORIDE 1000 ML: 600; 310; 30; 20 INJECTION, SOLUTION INTRAVENOUS at 12:51

## 2021-01-01 RX ADMIN — FUROSEMIDE 40 MG: 10 INJECTION, SOLUTION INTRAMUSCULAR; INTRAVENOUS at 09:43

## 2021-01-01 RX ADMIN — ATORVASTATIN CALCIUM 40 MG: 40 TABLET, FILM COATED ORAL at 21:13

## 2021-01-01 RX ADMIN — SODIUM CHLORIDE, PRESERVATIVE FREE 10 ML: 5 INJECTION INTRAVENOUS at 22:54

## 2021-01-01 RX ADMIN — OXYCODONE HYDROCHLORIDE AND ACETAMINOPHEN 500 MG: 500 TABLET ORAL at 09:35

## 2021-01-01 RX ADMIN — PANTOPRAZOLE SODIUM 40 MG: 40 TABLET, DELAYED RELEASE ORAL at 05:53

## 2021-01-01 RX ADMIN — CALCIUM CARBONATE-VITAMIN D TAB 500 MG-200 UNIT 1 TABLET: 500-200 TAB at 12:33

## 2021-01-01 RX ADMIN — FUROSEMIDE 40 MG: 40 TABLET ORAL at 10:35

## 2021-01-01 RX ADMIN — HYDROCODONE BITARTRATE AND ACETAMINOPHEN 1 TABLET: 5; 325 TABLET ORAL at 04:55

## 2021-01-01 RX ADMIN — ACETAMINOPHEN 650 MG: 325 TABLET ORAL at 21:20

## 2021-01-01 RX ADMIN — ATORVASTATIN CALCIUM 40 MG: 40 TABLET, FILM COATED ORAL at 22:20

## 2021-01-01 RX ADMIN — SODIUM CHLORIDE 500 ML: 9 INJECTION, SOLUTION INTRAVENOUS at 19:29

## 2021-01-01 RX ADMIN — METOPROLOL SUCCINATE 50 MG: 100 TABLET, FILM COATED, EXTENDED RELEASE ORAL at 10:29

## 2021-01-01 RX ADMIN — ALOGLIPTIN 6.25 MG: 6.25 TABLET, FILM COATED ORAL at 09:22

## 2021-01-01 RX ADMIN — ASPIRIN 81 MG: 81 TABLET, COATED ORAL at 08:00

## 2021-01-01 RX ADMIN — ACETAMINOPHEN 650 MG: 325 TABLET ORAL at 15:20

## 2021-01-01 RX ADMIN — INSULIN LISPRO 1 UNITS: 100 INJECTION, SOLUTION INTRAVENOUS; SUBCUTANEOUS at 13:47

## 2021-01-01 RX ADMIN — FOLIC ACID 1 MG: 1 TABLET ORAL at 09:21

## 2021-01-01 RX ADMIN — HYDROCODONE BITARTRATE AND ACETAMINOPHEN 1 TABLET: 5; 325 TABLET ORAL at 18:29

## 2021-01-01 RX ADMIN — GABAPENTIN 100 MG: 100 CAPSULE ORAL at 14:06

## 2021-01-01 RX ADMIN — MORPHINE SULFATE 2 MG: 2 INJECTION, SOLUTION INTRAMUSCULAR; INTRAVENOUS at 21:47

## 2021-01-01 RX ADMIN — SODIUM CHLORIDE, PRESERVATIVE FREE 10 ML: 5 INJECTION INTRAVENOUS at 09:44

## 2021-01-01 RX ADMIN — ACETAMINOPHEN 650 MG: 325 TABLET ORAL at 22:20

## 2021-01-01 RX ADMIN — PHYTONADIONE 5 MG: 10 INJECTION, EMULSION INTRAMUSCULAR; INTRAVENOUS; SUBCUTANEOUS at 13:08

## 2021-01-01 RX ADMIN — ACETAMINOPHEN 650 MG: 325 TABLET ORAL at 04:41

## 2021-01-01 RX ADMIN — GABAPENTIN 100 MG: 100 CAPSULE ORAL at 09:21

## 2021-01-01 RX ADMIN — ASPIRIN 81 MG: 81 TABLET, COATED ORAL at 09:43

## 2021-01-01 RX ADMIN — ASPIRIN 81 MG: 81 TABLET, COATED ORAL at 10:37

## 2021-01-01 RX ADMIN — INSULIN GLARGINE 10 UNITS: 100 INJECTION, SOLUTION SUBCUTANEOUS at 22:05

## 2021-01-01 RX ADMIN — METOPROLOL SUCCINATE 100 MG: 100 TABLET, FILM COATED, EXTENDED RELEASE ORAL at 08:20

## 2021-01-01 RX ADMIN — SODIUM BICARBONATE 25 MEQ: 84 INJECTION, SOLUTION INTRAVENOUS at 15:55

## 2021-01-01 RX ADMIN — ALOGLIPTIN 6.25 MG: 6.25 TABLET, FILM COATED ORAL at 07:54

## 2021-01-01 RX ADMIN — LEVOTHYROXINE SODIUM 50 MCG: 50 TABLET ORAL at 05:52

## 2021-01-01 RX ADMIN — OXYCODONE HYDROCHLORIDE AND ACETAMINOPHEN 500 MG: 500 TABLET ORAL at 20:16

## 2021-01-01 RX ADMIN — FOLIC ACID 1 MG: 1 TABLET ORAL at 09:03

## 2021-01-01 RX ADMIN — INSULIN LISPRO 2 UNITS: 100 INJECTION, SOLUTION INTRAVENOUS; SUBCUTANEOUS at 16:51

## 2021-01-01 RX ADMIN — FUROSEMIDE 40 MG: 40 TABLET ORAL at 10:44

## 2021-01-01 RX ADMIN — HYDROCODONE BITARTRATE AND ACETAMINOPHEN 1 TABLET: 5; 325 TABLET ORAL at 01:16

## 2021-01-01 RX ADMIN — METOPROLOL SUCCINATE 100 MG: 100 TABLET, EXTENDED RELEASE ORAL at 08:08

## 2021-01-01 RX ADMIN — LEVOTHYROXINE SODIUM 50 MCG: 50 TABLET ORAL at 05:15

## 2021-01-01 RX ADMIN — DEXTROSE MONOHYDRATE 25 G: 25 INJECTION, SOLUTION INTRAVENOUS at 20:49

## 2021-01-01 RX ADMIN — DULOXETINE HYDROCHLORIDE 60 MG: 60 CAPSULE, DELAYED RELEASE ORAL at 08:08

## 2021-01-01 RX ADMIN — HYDROCODONE BITARTRATE AND ACETAMINOPHEN 2 TABLET: 5; 325 TABLET ORAL at 23:03

## 2021-01-01 RX ADMIN — ACETAMINOPHEN 650 MG: 325 TABLET ORAL at 16:26

## 2021-01-01 RX ADMIN — SODIUM CHLORIDE, PRESERVATIVE FREE 10 ML: 5 INJECTION INTRAVENOUS at 01:31

## 2021-01-01 RX ADMIN — Medication 10 ML: at 08:08

## 2021-01-01 RX ADMIN — MICONAZOLE NITRATE: 20 POWDER TOPICAL at 13:14

## 2021-01-01 RX ADMIN — DULOXETINE HYDROCHLORIDE 60 MG: 60 CAPSULE, DELAYED RELEASE ORAL at 09:28

## 2021-01-01 RX ADMIN — SODIUM CHLORIDE, PRESERVATIVE FREE 10 ML: 5 INJECTION INTRAVENOUS at 20:02

## 2021-01-01 RX ADMIN — DIGOXIN 62.5 MCG: 125 TABLET ORAL at 10:06

## 2021-01-01 RX ADMIN — METOPROLOL SUCCINATE 50 MG: 50 TABLET, FILM COATED, EXTENDED RELEASE ORAL at 09:30

## 2021-01-01 RX ADMIN — ACETAMINOPHEN 650 MG: 325 TABLET ORAL at 10:31

## 2021-01-01 RX ADMIN — ACETAMINOPHEN 650 MG: 650 SUPPOSITORY RECTAL at 18:58

## 2021-01-01 RX ADMIN — OXYCODONE HYDROCHLORIDE AND ACETAMINOPHEN 500 MG: 500 TABLET ORAL at 07:53

## 2021-01-01 RX ADMIN — LEVOTHYROXINE SODIUM 75 MCG: 0.07 TABLET ORAL at 09:12

## 2021-01-01 RX ADMIN — INSULIN LISPRO 6 UNITS: 100 INJECTION, SOLUTION INTRAVENOUS; SUBCUTANEOUS at 11:43

## 2021-01-01 SDOH — ECONOMIC STABILITY: FOOD INSECURITY: WITHIN THE PAST 12 MONTHS, THE FOOD YOU BOUGHT JUST DIDN'T LAST AND YOU DIDN'T HAVE MONEY TO GET MORE.: NEVER TRUE

## 2021-01-01 SDOH — ECONOMIC STABILITY: FOOD INSECURITY: WITHIN THE PAST 12 MONTHS, YOU WORRIED THAT YOUR FOOD WOULD RUN OUT BEFORE YOU GOT MONEY TO BUY MORE.: NEVER TRUE

## 2021-01-01 ASSESSMENT — PAIN SCALES - GENERAL
PAINLEVEL_OUTOF10: 7
PAINLEVEL_OUTOF10: 4
PAINLEVEL_OUTOF10: 7
PAINLEVEL_OUTOF10: 5
PAINLEVEL_OUTOF10: 0
PAINLEVEL_OUTOF10: 2
PAINLEVEL_OUTOF10: 5
PAINLEVEL_OUTOF10: 8
PAINLEVEL_OUTOF10: 0
PAINLEVEL_OUTOF10: 9
PAINLEVEL_OUTOF10: 6
PAINLEVEL_OUTOF10: 4
PAINLEVEL_OUTOF10: 7
PAINLEVEL_OUTOF10: 3
PAINLEVEL_OUTOF10: 6
PAINLEVEL_OUTOF10: 0
PAINLEVEL_OUTOF10: 5
PAINLEVEL_OUTOF10: 6
PAINLEVEL_OUTOF10: 7
PAINLEVEL_OUTOF10: 5
PAINLEVEL_OUTOF10: 7
PAINLEVEL_OUTOF10: 6
PAINLEVEL_OUTOF10: 5
PAINLEVEL_OUTOF10: 8
PAINLEVEL_OUTOF10: 8
PAINLEVEL_OUTOF10: 4
PAINLEVEL_OUTOF10: 5
PAINLEVEL_OUTOF10: 4
PAINLEVEL_OUTOF10: 0
PAINLEVEL_OUTOF10: 6
PAINLEVEL_OUTOF10: 9
PAINLEVEL_OUTOF10: 4
PAINLEVEL_OUTOF10: 0
PAINLEVEL_OUTOF10: 3
PAINLEVEL_OUTOF10: 8
PAINLEVEL_OUTOF10: 9
PAINLEVEL_OUTOF10: 9
PAINLEVEL_OUTOF10: 6
PAINLEVEL_OUTOF10: 6
PAINLEVEL_OUTOF10: 8
PAINLEVEL_OUTOF10: 6
PAINLEVEL_OUTOF10: 7
PAINLEVEL_OUTOF10: 6
PAINLEVEL_OUTOF10: 7
PAINLEVEL_OUTOF10: 4
PAINLEVEL_OUTOF10: 6
PAINLEVEL_OUTOF10: 4
PAINLEVEL_OUTOF10: 9
PAINLEVEL_OUTOF10: 9
PAINLEVEL_OUTOF10: 7
PAINLEVEL_OUTOF10: 7
PAINLEVEL_OUTOF10: 4
PAINLEVEL_OUTOF10: 3
PAINLEVEL_OUTOF10: 3
PAINLEVEL_OUTOF10: 0
PAINLEVEL_OUTOF10: 7
PAINLEVEL_OUTOF10: 3
PAINLEVEL_OUTOF10: 7
PAINLEVEL_OUTOF10: 9
PAINLEVEL_OUTOF10: 7
PAINLEVEL_OUTOF10: 8
PAINLEVEL_OUTOF10: 7
PAINLEVEL_OUTOF10: 4
PAINLEVEL_OUTOF10: 7
PAINLEVEL_OUTOF10: 8
PAINLEVEL_OUTOF10: 10
PAINLEVEL_OUTOF10: 5
PAINLEVEL_OUTOF10: 0
PAINLEVEL_OUTOF10: 0
PAINLEVEL_OUTOF10: 7
PAINLEVEL_OUTOF10: 7
PAINLEVEL_OUTOF10: 5
PAINLEVEL_OUTOF10: 1
PAINLEVEL_OUTOF10: 6
PAINLEVEL_OUTOF10: 7
PAINLEVEL_OUTOF10: 7
PAINLEVEL_OUTOF10: 6
PAINLEVEL_OUTOF10: 7
PAINLEVEL_OUTOF10: 3
PAINLEVEL_OUTOF10: 4
PAINLEVEL_OUTOF10: 4
PAINLEVEL_OUTOF10: 7
PAINLEVEL_OUTOF10: 2
PAINLEVEL_OUTOF10: 0
PAINLEVEL_OUTOF10: 8
PAINLEVEL_OUTOF10: 8

## 2021-01-01 ASSESSMENT — PAIN DESCRIPTION - LOCATION
LOCATION: KNEE
LOCATION: HIP;LEG
LOCATION: FACE;NECK
LOCATION: KNEE
LOCATION: FACE
LOCATION: LEG
LOCATION: FACE
LOCATION: KNEE
LOCATION: NECK
LOCATION: FACE;NECK
LOCATION: HIP
LOCATION: LEG
LOCATION: EAR;NECK
LOCATION: BACK;LEG
LOCATION: FACE;NECK
LOCATION: HIP
LOCATION: KNEE
LOCATION: HIP;LEG
LOCATION: FACE;NECK
LOCATION: BACK
LOCATION: LEG
LOCATION: NECK
LOCATION: FACE;NECK
LOCATION: LEG
LOCATION: KNEE
LOCATION: KNEE
LOCATION: LEG
LOCATION: FACE;NECK
LOCATION: BACK;LEG
LOCATION: LEG
LOCATION: KNEE
LOCATION: LEG
LOCATION: KNEE
LOCATION: FACE
LOCATION: FACE;NECK
LOCATION: LEG
LOCATION: KNEE
LOCATION: FACE

## 2021-01-01 ASSESSMENT — PAIN DESCRIPTION - PAIN TYPE
TYPE: CHRONIC PAIN
TYPE: ACUTE PAIN
TYPE: CHRONIC PAIN
TYPE: ACUTE PAIN
TYPE: ACUTE PAIN
TYPE: CHRONIC PAIN
TYPE: CHRONIC PAIN
TYPE: ACUTE PAIN
TYPE: CHRONIC PAIN
TYPE: ACUTE PAIN
TYPE: CHRONIC PAIN
TYPE: ACUTE PAIN
TYPE: CHRONIC PAIN
TYPE: ACUTE PAIN
TYPE: CHRONIC PAIN
TYPE: CHRONIC PAIN
TYPE: ACUTE PAIN
TYPE: ACUTE PAIN
TYPE: CHRONIC PAIN
TYPE: ACUTE PAIN
TYPE: CHRONIC PAIN
TYPE: ACUTE PAIN
TYPE: CHRONIC PAIN
TYPE: CHRONIC PAIN
TYPE: ACUTE PAIN
TYPE: ACUTE PAIN
TYPE: CHRONIC PAIN

## 2021-01-01 ASSESSMENT — PAIN DESCRIPTION - ORIENTATION
ORIENTATION: LEFT
ORIENTATION: LEFT
ORIENTATION: RIGHT;LEFT
ORIENTATION: LEFT
ORIENTATION: LEFT;RIGHT
ORIENTATION: LEFT
ORIENTATION: LEFT
ORIENTATION: LOWER;LEFT;RIGHT
ORIENTATION: LEFT
ORIENTATION: LEFT;LOWER
ORIENTATION: LEFT
ORIENTATION: LEFT;LOWER
ORIENTATION: LEFT

## 2021-01-01 ASSESSMENT — ENCOUNTER SYMPTOMS
PHOTOPHOBIA: 0
SORE THROAT: 0
EYE REDNESS: 1
COLOR CHANGE: 1
WHEEZING: 0
STRIDOR: 0
VOMITING: 0
BLOOD IN STOOL: 0
BACK PAIN: 1
CHEST TIGHTNESS: 0
DIARRHEA: 0
COUGH: 0
COUGH: 0
CONSTIPATION: 0
DIARRHEA: 0
BACK PAIN: 0
BACK PAIN: 0
COUGH: 0
ABDOMINAL PAIN: 0
ABDOMINAL PAIN: 0
BACK PAIN: 0
NAUSEA: 0
EYE DISCHARGE: 0
COLOR CHANGE: 1
CHEST TIGHTNESS: 0
RESPIRATORY NEGATIVE: 1
RESPIRATORY NEGATIVE: 1
VOMITING: 0
SHORTNESS OF BREATH: 0
RHINORRHEA: 0
DIARRHEA: 0
SHORTNESS OF BREATH: 0
PHOTOPHOBIA: 0
EYES NEGATIVE: 1
GASTROINTESTINAL NEGATIVE: 1
NAUSEA: 0
TROUBLE SWALLOWING: 0
WHEEZING: 0
NAUSEA: 0
STRIDOR: 0
ABDOMINAL PAIN: 0
EYE PAIN: 0
COLOR CHANGE: 0
GASTROINTESTINAL NEGATIVE: 1
COUGH: 0
EYE PAIN: 0
VOMITING: 0
ABDOMINAL DISTENTION: 0
COLOR CHANGE: 0
EYE ITCHING: 0
SHORTNESS OF BREATH: 0
EYES NEGATIVE: 1
COUGH: 0
ABDOMINAL PAIN: 0
ABDOMINAL DISTENTION: 0
BACK PAIN: 0
NAUSEA: 0
ABDOMINAL PAIN: 0
RHINORRHEA: 0
SHORTNESS OF BREATH: 0
CONSTIPATION: 0
CHEST TIGHTNESS: 0
BACK PAIN: 1
SHORTNESS OF BREATH: 0
DIARRHEA: 0
ALLERGIC/IMMUNOLOGIC NEGATIVE: 1
NAUSEA: 0
PHOTOPHOBIA: 0
RESPIRATORY NEGATIVE: 1
SHORTNESS OF BREATH: 0
VOICE CHANGE: 0
VOMITING: 0
CONSTIPATION: 0
EYE REDNESS: 0
EYES NEGATIVE: 1
WHEEZING: 0

## 2021-01-01 ASSESSMENT — PAIN DESCRIPTION - PROGRESSION
CLINICAL_PROGRESSION: NOT CHANGED
CLINICAL_PROGRESSION: GRADUALLY WORSENING
CLINICAL_PROGRESSION: NOT CHANGED
CLINICAL_PROGRESSION: GRADUALLY WORSENING
CLINICAL_PROGRESSION: NOT CHANGED
CLINICAL_PROGRESSION: GRADUALLY WORSENING
CLINICAL_PROGRESSION: NOT CHANGED
CLINICAL_PROGRESSION: GRADUALLY WORSENING
CLINICAL_PROGRESSION: NOT CHANGED
CLINICAL_PROGRESSION: GRADUALLY IMPROVING
CLINICAL_PROGRESSION: NOT CHANGED
CLINICAL_PROGRESSION: GRADUALLY WORSENING
CLINICAL_PROGRESSION: NOT CHANGED
CLINICAL_PROGRESSION: GRADUALLY WORSENING
CLINICAL_PROGRESSION: NOT CHANGED
CLINICAL_PROGRESSION: GRADUALLY WORSENING
CLINICAL_PROGRESSION: NOT CHANGED
CLINICAL_PROGRESSION: GRADUALLY WORSENING
CLINICAL_PROGRESSION: NOT CHANGED
CLINICAL_PROGRESSION: NOT CHANGED
CLINICAL_PROGRESSION: GRADUALLY WORSENING
CLINICAL_PROGRESSION: NOT CHANGED
CLINICAL_PROGRESSION: GRADUALLY WORSENING
CLINICAL_PROGRESSION: NOT CHANGED

## 2021-01-01 ASSESSMENT — PAIN DESCRIPTION - DESCRIPTORS
DESCRIPTORS: ACHING;SORE
DESCRIPTORS: DISCOMFORT
DESCRIPTORS: ACHING;BURNING
DESCRIPTORS: ACHING;SHARP
DESCRIPTORS: DISCOMFORT
DESCRIPTORS: SHARP
DESCRIPTORS: DULL;DISCOMFORT;ACHING
DESCRIPTORS: ACHING
DESCRIPTORS: ACHING;THROBBING;SORE
DESCRIPTORS: ACHING
DESCRIPTORS: ACHING
DESCRIPTORS: BURNING
DESCRIPTORS: CONSTANT;DISCOMFORT;POUNDING
DESCRIPTORS: DULL;SHARP
DESCRIPTORS: ACHING;SHARP;SORE
DESCRIPTORS: SHARP
DESCRIPTORS: DISCOMFORT
DESCRIPTORS: DULL
DESCRIPTORS: SHARP
DESCRIPTORS: ACHING
DESCRIPTORS: SHARP
DESCRIPTORS: ACHING
DESCRIPTORS: CONSTANT;DISCOMFORT;ACHING
DESCRIPTORS: SHOOTING;SHARP
DESCRIPTORS: ACHING
DESCRIPTORS: ACHING

## 2021-01-01 ASSESSMENT — PAIN DESCRIPTION - DIRECTION
RADIATING_TOWARDS: KNEE
RADIATING_TOWARDS: NO
RADIATING_TOWARDS: DENIES
RADIATING_TOWARDS: KNEE

## 2021-01-01 ASSESSMENT — PAIN DESCRIPTION - FREQUENCY
FREQUENCY: CONTINUOUS
FREQUENCY: CONTINUOUS
FREQUENCY: INTERMITTENT
FREQUENCY: CONTINUOUS
FREQUENCY: INTERMITTENT
FREQUENCY: CONTINUOUS
FREQUENCY: CONTINUOUS
FREQUENCY: INTERMITTENT
FREQUENCY: CONTINUOUS
FREQUENCY: INTERMITTENT
FREQUENCY: CONTINUOUS
FREQUENCY: INTERMITTENT
FREQUENCY: INTERMITTENT
FREQUENCY: CONTINUOUS
FREQUENCY: INTERMITTENT
FREQUENCY: CONTINUOUS

## 2021-01-01 ASSESSMENT — PAIN - FUNCTIONAL ASSESSMENT

## 2021-01-01 ASSESSMENT — PAIN SCALES - WONG BAKER
WONGBAKER_NUMERICALRESPONSE: 4
WONGBAKER_NUMERICALRESPONSE: 4
WONGBAKER_NUMERICALRESPONSE: 6

## 2021-01-01 ASSESSMENT — PAIN DESCRIPTION - ONSET
ONSET: ON-GOING

## 2021-01-01 ASSESSMENT — PATIENT HEALTH QUESTIONNAIRE - PHQ9
SUM OF ALL RESPONSES TO PHQ QUESTIONS 1-9: 0
1. LITTLE INTEREST OR PLEASURE IN DOING THINGS: 0

## 2021-01-01 ASSESSMENT — VISUAL ACUITY: OU: 1

## 2021-01-01 ASSESSMENT — SOCIAL DETERMINANTS OF HEALTH (SDOH): HOW HARD IS IT FOR YOU TO PAY FOR THE VERY BASICS LIKE FOOD, HOUSING, MEDICAL CARE, AND HEATING?: HARD

## 2021-01-01 ASSESSMENT — PULMONARY FUNCTION TESTS
PIF_VALUE: 30
PIF_VALUE: 33

## 2021-01-03 PROBLEM — E87.5 HYPERKALEMIA: Status: ACTIVE | Noted: 2021-01-01

## 2021-01-03 NOTE — PROGRESS NOTES
Pharmacy Medication History Note      List of current medications patient is taking is complete. Source of information: patient    Changes made to medication list:  Medications removed (include reason, ex. therapy complete or physician discontinued):        Medications added/doses adjusted: Other notes (ex. Recent course of antibiotics, Coumadin dosing):    Denies use of other OTC or herbal medications.       Allergies reviewed      Electronically signed by Gladys Tay [facility] on 1/3/2021 at 5:17 PM

## 2021-01-03 NOTE — ED PROVIDER NOTES
Nitin Stern 12 COMPLAINT       Chief Complaint   Patient presents with    Fall       Nurses Notes reviewed and I agree except as noted in the HPI. HISTORY OF PRESENT ILLNESS    Essence Luna is a 76 y.o. female who presents to the Emergency Department for the evaluation of fall this morning. Patient denies any injuries from her fall. She states she did not hit her head. She is anticoagulated on Coumadin. She has a history of paroxysmal atrial fibrillation. The patient is accompanied by her brother. He reports that she has been having episodes of confusion for the past several weeks. She has had progressive weakness for the past few weeks. The patient was admitted to the hospital 3 weeks ago for urinary tract infection. He had confusion at that time. She has completed her course of antibiotics but has not had her urine rechecked since the admission. Patient also has a history of anemia. Patient is supposed to take iron supplementation but does not take her medication due to resulting diarrhea. The patient is normally wheelchair-bound. This is from previous right knee and left femur fracture. Currently lives at home with her sister. Patient has past medical history of coronary artery disease, diabetes mellitus, chronic renal insufficiency, hypothyroidism, hypertension, obesity, sleep apnea. The HPI was provided by the patient. REVIEW OF SYSTEMS     Review of Systems   Constitutional: Positive for fatigue. Negative for fever. Respiratory: Negative for cough and shortness of breath. Cardiovascular: Negative for chest pain and leg swelling. Gastrointestinal: Negative for abdominal pain. Genitourinary: Positive for frequency. Incontinence   Musculoskeletal: Positive for arthralgias (chronic leg pain). Skin: Negative for color change. Neurological: Positive for weakness.  Negative for dizziness and headaches. Psychiatric/Behavioral: Positive for confusion and decreased concentration. PAST MEDICAL HISTORY    has a past medical history of CAD (coronary artery disease), CRI (chronic renal insufficiency), Difficult intubation, DM (diabetes mellitus) ([facility] [address] GERD (gastroesophageal reflux disease), H/O gastric bypass, Hyperlipidemia, Hypertension, Hypothyroidism, Neuropathy, Obesity, Osteoarthritis, Paroxysmal atrial fibrillation ([facility] [address] Prolonged emergence from general anesthesia, Sleep apnea, and Visit for screening mammogram.    SURGICAL HISTORY      has a past surgical history that includes back surgery; Foot surgery; Colonoscopy; Upper gastrointestinal endoscopy; Mouth Biopsy (9-28-12); Skin tag removal (9/25/12); Tonsillectomy; Patella surgery (7/11/13); Cataract removal (Right, 7/24/14); toenail excision; other surgical history (11/8/2014); other surgical history (4/23/2015); Sleeve Gastrectomy (09/15/2015); EKG 12 Lead (9/18/2015); Hysterectomy, total abdominal; and pr colon ca scrn not hi rsk ind (Left, 1/24/2018). CURRENT MEDICATIONS       Current Discharge Medication List      CONTINUE these medications which have NOT CHANGED    Details   insulin lispro protamine & lispro (HUMALOG MIX) (75-25) 100 UNIT per ML SUSP injection vial Inject 30-40 Units into the skin 2 times daily (with meals) 30 units in AM and 40 units at night      sitaGLIPtan-metFORMIN (JANUMET)  MG per tablet Take 1 tablet by mouth 2 times daily (with meals)      methylPREDNISolone (MEDROL, EVELYN,) 4 MG tablet Take by mouth.  Follow instructions on box  Qty: 1 kit, Refills: 0      acetaminophen (TYLENOL) 500 MG tablet Take 1 tablet by mouth 4 times daily as needed for Pain  Qty: 120 tablet, Refills: 0      cyclobenzaprine (FLEXERIL) 5 MG tablet Take 1 tablet by mouth 2 times daily as needed for Muscle spasms  Qty: 10 tablet, Refills: 0      lidocaine (LIDODERM) 5 % Place 1 patch onto the skin daily for 10 days 12 hours on, 12 Dulaglutide (TRULICITY SC) Inject 0.77 mg into the skin once a week Usually on       folic acid (FOLVITE) 1 MG tablet take 1 tablet by mouth once daily  Qty: 90 tablet, Refills: 4      aspirin (RA ASPIRIN EC) 81 MG EC tablet Take 1 tablet by mouth daily  Qty: 30 tablet, Refills: 3      furosemide (LASIX) 40 MG tablet Take 0.5 tablets by mouth daily  Qty: 30 tablet, Refills: 0      metoprolol succinate (TOPROL XL) 100 MG extended release tablet Take 100 mg by mouth daily      digoxin (LANOXIN) 125 MCG tablet Take 0.5 tablets by mouth daily  Qty: 30 tablet, Refills: 0      isosorbide mononitrate (IMDUR) 60 MG extended release tablet Take 1 tablet by mouth daily  Qty: 30 tablet, Refills: 3      allopurinol (ZYLOPRIM) 100 MG tablet Take 100 mg by mouth daily      meclizine (ANTIVERT) 25 MG CHEW Take 1 tablet by mouth 3 times daily as needed (vertigo)  Qty: 90 tablet, Refills: 0      Calcium Carbonate-Vitamin D (CALCIUM-VITAMIN D) 500-200 MG-UNIT per tablet Take 1 tablet by mouth 2 times daily (with meals)       B-D UF III MINI PEN NEEDLES 31G X 5 MM MISC use three times a day  Qty: 100 each, Refills: 0    Comments: Ultra fine pen needles  Associated Diagnoses: Type 2 diabetes mellitus with other circulatory complication, with long-term current use of insulin (HCC)             ALLERGIES     has No Known Allergies. FAMILY HISTORY     She indicated that her mother is . She indicated that her father is . She indicated that her sister is alive. She indicated that her brother is alive. She indicated that the status of her maternal grandmother is unknown. She indicated that the status of her maternal grandfather is unknown. She indicated that the status of her maternal uncle is unknown. She indicated that the status of her other is unknown.  She indicated that the status of her neg hx is unknown.   family history includes Asthma in her brother and sister; Cancer in her maternal uncle; Diabetes in her maternal grandfather and maternal grandmother; Heart Disease in her father; High Blood Pressure in her maternal grandmother and another family member. SOCIAL HISTORY      reports that she quit smoking about 13 years ago. She has a 30.00 pack-year smoking history. She has never used smokeless tobacco. She reports that she does not drink alcohol or use drugs. PHYSICAL EXAM     INITIAL VITALS:  height is 5' 9\" (1.753 m) and weight is 264 lb (119.7 kg). Her axillary temperature is 98.9 °F (37.2 °C). Her blood pressure is 160/71 (abnormal) and her pulse is 94. Her respiration is 20 and oxygen saturation is 92%. Physical Exam  Constitutional:       General: She is not in acute distress. Appearance: She is not ill-appearing. HENT:      Head: Normocephalic and atraumatic. Nose: Nose normal.      Mouth/Throat:      Mouth: Mucous membranes are moist.   Eyes:      Extraocular Movements: Extraocular movements intact. Pupils: Pupils are equal, round, and reactive to light. Cardiovascular:      Rate and Rhythm: Normal rate and regular rhythm. Pulses: Normal pulses. Heart sounds: Normal heart sounds. Pulmonary:      Effort: Pulmonary effort is normal.      Breath sounds: Normal breath sounds. Abdominal:      General: Abdomen is flat. Bowel sounds are normal. There is no distension. Skin:     General: Skin is warm and dry. Capillary Refill: Capillary refill takes less than 2 seconds. Coloration: Skin is pale. Neurological:      General: No focal deficit present. Mental Status: She is alert. GCS: GCS eye subscore is 4. GCS verbal subscore is 5. GCS motor subscore is 6. Sensory: Sensation is intact. Motor: Motor function is intact. Comments: Alert and oriented but confused to the day of the week and asks occasional inappropriate questions.  Mild confusion   Psychiatric:         Mood and Affect: Mood normal.         Behavior: Behavior normal. DIFFERENTIAL DIAGNOSIS:   UTI, sepsis, electrolyte abnormality, severe anemia, infectious process, dehydration, encephalopathy, CVA    DIAGNOSTIC RESULTS     EKG: All EKG's are interpreted by the Emergency Department Physician who either signs or Co-signs this chart in the absence of a cardiologist.    Atrial fibrillation with controlled rate 85 bpm.  QRS duration 1.6, corrected  ms    RADIOLOGY: non-plainfilm images(s) such as CT, Ultrasound and MRI are read by the radiologist.    XR CHEST PORTABLE   Final Result   1. There is no acute cardiopulmonary process. **This report has been created using voice recognition software. It may contain minor errors which are inherent in voice recognition technology. **      Final report electronically signed by Dr. Velasquez Leong on 1/3/2021 3:31 PM      CT HEAD WO CONTRAST   Final Result   1. Unremarkable noncontrast CT head. **This report has been created using voice recognition software. It may contain minor errors which are inherent in voice recognition technology. **      Final report electronically signed by Dr. Velasquez Leong on 1/3/2021 1:49 PM      US RENAL COMPLETE    (Results Pending)       LABS:     Labs Reviewed   CBC WITH AUTO DIFFERENTIAL - Abnormal; Notable for the following components:       Result Value    RBC 3.34 (*)     Hemoglobin 10.4 (*)     Hematocrit 34.3 (*)     .7 (*)     MCHC 30.3 (*)     RDW-CV 16.6 (*)     RDW-SD 61.1 (*)     Lymphocytes Absolute 0.7 (*)     All other components within normal limits   COMPREHENSIVE METABOLIC PANEL W/ REFLEX TO MG FOR LOW K - Abnormal; Notable for the following components:    Glucose 288 (*)     CREATININE 2.3 (*)     BUN 54 (*)     Sodium 129 (*)     Potassium reflex Magnesium 6.4 (*)     Chloride 97 (*)     CO2 17 (*)     Alb 3.4 (*)     All other components within normal limits   LACTIC ACID, PLASMA - Abnormal; Notable for the following components:    Lactic Acid 4.3 (*)     All other components within normal limits   BRAIN NATRIURETIC PEPTIDE - Abnormal; Notable for the following components:    Pro-BNP > 58239.0 (*)     All other components within normal limits   PROCALCITONIN - Abnormal; Notable for the following components:    Procalcitonin 0.14 (*)     All other components within normal limits   TSH WITHOUT REFLEX - Abnormal; Notable for the following components:    TSH 0.087 (*)     All other components within normal limits   LACTIC ACID, PLASMA - Abnormal; Notable for the following components:    Lactic Acid 4.8 (*)     All other components within normal limits   GLOMERULAR FILTRATION RATE, ESTIMATED - Abnormal; Notable for the following components:    Est, Glom Filt Rate 21 (*)     All other components within normal limits   URINE WITH REFLEXED MICRO - Abnormal; Notable for the following components:    Glucose, Ur 250 (*)     Ketones, Urine TRACE (*)     Protein,  (*)     Leukocyte Esterase, Urine TRACE (*)     Character, Urine TURBID (*)     All other components within normal limits   PROTIME-INR - Abnormal; Notable for the following components:    INR 4.51 (*)     All other components within normal limits   BASIC METABOLIC PANEL W/ REFLEX TO MG FOR LOW K - Abnormal; Notable for the following components:    Sodium 131 (*)     Potassium reflex Magnesium 6.6 (*)     CO2 17 (*)     Glucose 202 (*)     BUN 54 (*)     CREATININE 2.1 (*)     All other components within normal limits   GLOMERULAR FILTRATION RATE, ESTIMATED - Abnormal; Notable for the following components:    Est, Glom Filt Rate 24 (*)     All other components within normal limits   POCT GLUCOSE - Abnormal; Notable for the following components:    POC Glucose 173 (*)     All other components within normal limits   POCT GLUCOSE - Abnormal; Notable for the following components:    POC Glucose 186 (*)     All other components within normal limits   VRE SCREEN BY PCR   CULTURE, MRSA, SCREENING   CULTURE, MRSA, SCREENING Narrative:     Source: nares/nasal       Site:           Current Antibiotics: not stated   TROPONIN   AMMONIA   DIGOXIN LEVEL   ANION GAP   OSMOLALITY   COVID-19   ANION GAP   OSMOLALITY   CREATININE, RANDOM URINE   SODIUM, URINE, RANDOM   EOSINOPHIL SMEAR URINE   BASIC METABOLIC PANEL   CBC   PROTIME-INR   LACTIC ACID, PLASMA   LACTIC ACID, PLASMA   LACTIC ACID, PLASMA   ABO/RH   ANTIBODY SCREEN       EMERGENCY DEPARTMENT COURSE:   Vitals:    Vitals:    01/03/21 1515 01/03/21 1633 01/03/21 1732 01/03/21 2015   BP: 135/86 127/85 139/74 (!) 160/71   Pulse: 87 89 91 94   Resp: 22 19 20 20   Temp:   98.3 °F (36.8 °C) 98.9 °F (37.2 °C)   TempSrc:   Oral Axillary   SpO2: 100% 99% 95% 92%   Weight:   264 lb (119.7 kg)    Height:   5' 9\" (1.753 m)        1:13 PM EST: The patient was seen and evaluated. Appropriate labs and imaging are ordered. Gentle IV rehydration began while work-up being completed. Has concerning labs. Patient has an increase in her creatinine today to 2.3. Previous 1.6. Her BUN is 54. Her CO2 is 17 she has a critically elevated potassium 6.4. Mild hyponatremia but this could be pseudohyponatremia due to patient's blood sugar being 288. Patient also has elevated lactic acid level. Mild anemia 10.4. White blood cell count 5.3. Urinalysis does not appear to be infected, just trace leukocyte esterase, no bacteria noted  Covid not detected. No acute findings on chest x-ray. A 30 mL/kg ideal body weight bolus of IV fluids initiated. Patient is given combination of calcium chloride, dextrose, insulin, Kayexalate for hyperkalemia treatment. MDM:  I contacted  internal medicine and advised of the patient's labs. Concerned that she has acute kidney injury, hyperkalemia, lactic acidosis undetermined cause.   He graciously excepted the patient for admission to the hospital.    CRITICAL CARE:   None    CONSULTS:  Dr Nalini Heart, IM    PROCEDURES:  None    FINAL IMPRESSION      1. Acute delirium    2. Lactic acidosis    3. Dehydration    4. Hyperkalemia          DISPOSITION/PLAN   Admit    PATIENT REFERRED TO:  No follow-up provider specified. DISCHARGE MEDICATIONS:  Current Discharge Medication List          (Please note that portions of this note were completed with a voice recognition program.  Efforts were made to edit the dictations but occasionally words are mis-transcribed.)    The patient was given an opportunity to see the Emergency Attending. The patient voiced understanding that I was a Mid-LevelProvider and was in agreement with being seen independently by myself.           Karli Inman, ARNOL - CNP  01/03/21 [id number]

## 2021-01-03 NOTE — PROGRESS NOTES
Pt arrived in 4K05 from ED and via cart/stretcher. Complaints: chronic back pain rated 6/10. The best day to schedule a follow up  appointment is:  Tuesday a.m. The patient is interested in [facility]. Melys meds to beds program?:  Yes     Patient repositioned for comfort, updated on plan of care. Patient's brother at bedside and also updated regarding plan of care. Two RN skin assessment completed with this RN and John RN, see flowsheet for details. Admission completed and Dr. Jb Nesbitt updated regarding arrival to floor and critical potassium of 6.6.

## 2021-01-03 NOTE — ED NOTES
ED to inpatient nurses report    Chief Complaint   Patient presents with    Fall      Present to ED from home  LOC: alert and orientated to name, place, date  Vital signs   Vitals:    01/03/21 1301 01/03/21 1403 01/03/21 1515 01/03/21 1633   BP: (!) 140/88 (!) 135/90 135/86 127/85   Pulse: 97 94 87 89   Resp: 21 24 22 19   Temp: 97.4 °F (36.3 °C)      TempSrc: Oral      SpO2: 100% 100% 100% 99%   Weight: 256 lb (116.1 kg)      Height: 5' 9\" (1.753 m)         Oxygen Baseline room air    Current needs required none Bipap/Cpap Yes  LDAs:   Peripheral IV 01/03/21 Right Antecubital (Active)   Site Assessment Clean;Dry; Intact 01/03/21 1516   Line Status Normal saline locked 01/03/21 1516   Dressing Status Clean;Dry; Intact 01/03/21 1516       Peripheral IV 01/03/21 Left Hand (Active)   Site Assessment Clean; Intact;Dry 01/03/21 1516   Line Status Infusing 01/03/21 1516   Dressing Status Clean;Dry; Intact 01/03/21 1516     Mobility: Requires assistance * 2  Pending ED orders: none  Present condition: stable      Electronically signed by Cody Erwin RN on 1/3/2021 at 4:34 PM     Cody Erwin [facility]  01/03/21 [id number]

## 2021-01-04 NOTE — CONSULTS
[facility]          Formerly [facility]        [address]        [place] [id number]           Inpatient Initial consult note         1/3/2021 7:22 PM    Patient Name:   Marge Brown  YOB: 1955  Primary Care Physician:  ARNOL Morales CNP     History Obtained From:  patient     Consultation requested by : Humberto Freire MD    requested for  : Evaluation of  worsening renal function and hyperkalemia     History of presentingillness   Marge Brown is a 76 y.o.   female with Past Medical History as stated below presented with a chief complaint of Fall   on 1/3/2021 . Patient presented with chief complaints of fall, this happened this morning, apparently she has been having some associated confusion for the past several weeks and also some progressive weakness. No other modifying factors. Denies any chest pain shortness of breath or urinary complaints. Could not remember what happened around the time of the fall. May be some loss of consciousness as well.   Patient was recently in the hospital few weeks ago for urinary tract infection    Upon arrival to the ER patient was found to be having hyperkalemia of 6.6 and elevated serum creatinine of 2.1 and so nephrology has been consulted for further evaluation and management     Past History      Past Medical History:   Diagnosis Date    CAD (coronary artery disease)     CRI (chronic renal insufficiency)     Difficult intubation     excess skin in back of throat     DM (diabetes mellitus) ([address] 1994    GERD (gastroesophageal reflux disease)     H/O gastric bypass     Hyperlipidemia     Hypertension     Hypothyroidism     Neuropathy     Obesity     Osteoarthritis     Paroxysmal atrial fibrillation ([address]     Prolonged emergence from general anesthesia     Sleep apnea     uses cpap    Visit for screening mammogram      Past Surgical History:   Procedure Laterality Date    BACK SURGERY      xs 2    CATARACT REMOVAL Right 14    Dr. Gamaliel Pineda EKG 12-LEAD  2015         FOOT SURGERY      left foot    HYSTERECTOMY, TOTAL ABDOMINAL      MOUTH BIOPSY  12    left tongue and lingual tonsil    OTHER SURGICAL HISTORY  2014    LEFT FEMUR RETROGRADE NAILING    OTHER SURGICAL HISTORY  2015    HARDWARE REMOVAL LEFT FEMUR, INSERTION OF ANTIBIOTIC SPACER    PATELLA SURGERY  13    fractues rt patella     ME COLON CA SCRN NOT HI RSK IND Left 2018    COLONOSCOPY performed by Dwain José MD at [address] [place] [address] SKIN TAG REMOVAL  12    mole removal    SLEEVE GASTRECTOMY  09/15/2015    Robotic    TOENAIL EXCISION      removal of great toe nails bilateral-still has toenails-had ingrown toenails and had trimmed out     TONSILLECTOMY      UPPER GASTROINTESTINAL ENDOSCOPY       Social History     Socioeconomic History    Marital status: Single     Spouse name: Not on file    Number of children: Not on file    Years of education: Not on file    Highest education level: Not on file   Occupational History    Occupation: retired   Social Needs    Financial resource strain: Not on file    Food insecurity     Worry: Not on file     Inability: Not on file   [employer] needs     Medical: Not on file     Non-medical: Not on file   Tobacco Use    Smoking status: Former Smoker     Packs/day: 1.50     Years: 20.00     Pack years: 30.00     Quit date: 2007     Years since quittin.9    Smokeless tobacco: Never Used   Substance and Sexual Activity    Alcohol use: No     Alcohol/week: 0.0 standard drinks    Drug use: No    Sexual activity: Never   Lifestyle    Physical activity     Days per week: Not on file     Minutes per session: Not on file    Stress: Not on file   Relationships    Social connections     Talks on phone: Not on file     Gets together: Not on file     Attends [employer] service: Not on file     Active member of club or organization: Not on file     Attends meetings of clubs or organizations: Not on file     Relationship status: Not on file    Intimate partner violence     Fear of current or ex partner: Not on file     Emotionally abused: Not on file     Physically abused: Not on file     Forced sexual activity: Not on file   Other Topics Concern    Not on file   Social History Narrative    Not on file     Family History   Problem Relation Age of Onset    Asthma Sister     Asthma Brother     Heart Disease Father     High Blood Pressure Other     Cancer Maternal Uncle         stomach    Diabetes Maternal Grandmother     High Blood Pressure Maternal Grandmother     Diabetes Maternal Grandfather     Stroke Neg Hx      Medications & Allergies      Prior to Admission medications    Medication Sig Start Date End Date Taking? Authorizing Provider   insulin lispro protamine & lispro (HUMALOG MIX) (75-25) 100 UNIT per ML SUSP injection vial Inject 30-40 Units into the skin 2 times daily (with meals) 30 units in AM and 40 units at night   Yes Historical Provider, MD   sitaGLIPtan-metFORMIN (JANUMET)  MG per tablet Take 1 tablet by mouth 2 times daily (with meals)   Yes Historical Provider, MD   methylPREDNISolone (MEDROL, EVELYN,) 4 MG tablet Take by mouth.  Follow instructions on box 12/30/20 1/5/21 Yes Negrita Renteria MD   acetaminophen (TYLENOL) 500 MG tablet Take 1 tablet by mouth 4 times daily as needed for Pain 12/30/20  Yes Negrita Renteria MD   cyclobenzaprine (FLEXERIL) 5 MG tablet Take 1 tablet by mouth 2 times daily as needed for Muscle spasms 12/30/20 1/9/21 Yes Negrita Renteria MD   lidocaine (LIDODERM) 5 % Place 1 patch onto the skin daily for 10 days 12 hours on, 12 hours off. 12/30/20 1/9/21 Yes Negrita Renteria MD   levothyroxine (SYNTHROID) 50 MCG tablet Take 1 tablet by mouth Daily 12/19/20  Yes Frank Ramirez MD   DULoxetine (CYMBALTA) 60 MG extended release capsule take 1 capsule by mouth once daily 11/25/20  Yes ARNOL Cooney CNP   gabapentin (NEURONTIN) 100 MG capsule take 1 capsule by mouth three times a day 11/16/20 2/14/21 Yes ARNOL Cooney CNP   losartan (COZAAR) 100 MG tablet Take 1 tablet by mouth daily 11/5/20  Yes Augustus Stark MD   saline nasal gel (AYR) GEL by Nasal route as needed for Congestion 10/5/20  Yes Juliana Jackson MD   dexlansoprazole [facility]) 60 MG CPDR delayed release capsule Take 1 capsule by mouth daily 9/15/20  Yes ARNOL Cooney CNP   nystatin (MYCOSTATIN) 963165 UNIT/GM powder Apply 3 times daily. 9/15/20  Yes ARNOL Cooney CNP   fenofibrate (TRICOR) 145 MG tablet take 1 tablet by mouth once daily 9/8/20  Yes ARNOL Krishnamurthy CNP   atorvastatin (LIPITOR) 40 MG tablet take 1 tablet by mouth at bedtime 9/8/20  Yes ARNOL Krishnamurthy CNP   RA VITAMIN B-12 100 MCG tablet take 1 tablet by mouth once daily 5/11/20  Yes ARNOL Cooney CNP   warfarin (COUMADIN) 2.5 MG tablet As directed by [facility].  30 days = 45 tabs 5/4/20  Yes Patric Adames MD   alendronate (FOSAMAX) 70 MG tablet Take 1 tablet by mouth every 7 days  Patient taking differently: Take 70 mg by mouth every 7 days Indications: usually on mondays  3/10/20  Yes ARNOL Cooney CNP   Dulaglutide (TRULICITY SC) Inject 3.07 mg into the skin once a week Usually on mondays   Yes Historical Provider, MD   folic acid (FOLVITE) 1 MG tablet take 1 tablet by mouth once daily 1/20/20  Yes ARNOL Cooney CNP   aspirin (RA ASPIRIN EC) 81 MG EC tablet Take 1 tablet by mouth daily 1/7/20  Yes ARNOL Cardoso CNP   furosemide (LASIX) 40 MG tablet Take 0.5 tablets by mouth daily 1/7/20  Yes ARNOL Cardoso CNP   metoprolol succinate (TOPROL XL) 100 MG extended release tablet Take 100 mg by mouth daily   Yes Historical Provider, MD   digoxin (LANOXIN) 125 MCG tablet Take 0.5 tablets by mouth daily 10/8/19  Yes Gabriel Vigil, APRN - CNP   isosorbide mononitrate (IMDUR) 60 MG extended release tablet Take 1 tablet by mouth daily 7/13/19  Yes Russell Poole MD   allopurinol (ZYLOPRIM) 100 MG tablet Take 100 mg by mouth daily 7/3/19  Yes Historical Provider, MD   meclizine (ANTIVERT) 25 MG CHEW Take 1 tablet by mouth 3 times daily as needed (vertigo) 6/3/19  Yes Clydia Kawasaki, MD   Calcium Carbonate-Vitamin D (CALCIUM-VITAMIN D) 500-200 MG-UNIT per tablet Take 1 tablet by mouth 2 times daily (with meals)    Yes Historical Provider, MD BUSTOS UF III MINI PEN NEEDLES 31G X 5 MM MISC use three times a day 12/12/19   Polina Mai DO     Allergies: Patient has no known allergies. IP meds : Scheduled Meds:   sodium chloride flush  10 mL Intravenous 2 times per day    calcium chloride  1 g Intravenous Once    insulin regular  10 Units Intravenous Once    sodium polystyrene  15 g Oral Once    sodium bicarbonate  25 mEq Intravenous Once    levothyroxine  50 mcg Oral Daily    [START ON 1/4/2021] insulin lispro  0-6 Units Subcutaneous TID WC    insulin lispro  0-3 Units Subcutaneous Nightly     Continuous Infusions:   sodium chloride 100 mL/hr at 01/03/21 1814     Review of Systems Physical Exam   Review of Systems   Constitutional: Positive for activity change, appetite change and fatigue. Negative for chills and fever. HENT: Negative. Eyes: Negative. Respiratory: Negative for cough and shortness of breath. Cardiovascular: Positive for leg swelling. Negative for chest pain. Gastrointestinal: Negative for abdominal pain, nausea and vomiting. Genitourinary: Negative for dysuria, frequency and hematuria. Musculoskeletal: Negative for back pain and neck pain. Skin: Negative for rash and wound. Neurological: Positive for weakness. Negative for dizziness and headaches. Fall   Psychiatric/Behavioral: Negative for agitation and confusion. Physical Exam  Vitals signs reviewed.    Constitutional: General: She is not in acute distress. Appearance: She is obese. She is not ill-appearing or diaphoretic. HENT:      Head: Normocephalic and atraumatic. Right Ear: External ear normal.      Left Ear: External ear normal.      Nose: Nose normal.      Mouth/Throat:      Mouth: Mucous membranes are moist.   Eyes:      General: No scleral icterus. Right eye: No discharge. Left eye: No discharge. Conjunctiva/sclera: Conjunctivae normal.   Neck:      Musculoskeletal: Normal range of motion and neck supple. Thyroid: No thyromegaly. Vascular: No JVD. Cardiovascular:      Rate and Rhythm: Normal rate and regular rhythm. Heart sounds: Normal heart sounds. No murmur. Pulmonary:      Effort: Pulmonary effort is normal. No respiratory distress. Breath sounds: Normal breath sounds. No stridor. Comments: Diminished breath sounds bilaterally  Chest:      Chest wall: No tenderness. Abdominal:      General: Bowel sounds are normal. There is distension. Palpations: Abdomen is soft. Tenderness: There is no abdominal tenderness. Musculoskeletal:         General: No tenderness. Right lower leg: Edema present. Left lower leg: Edema present. Skin:     General: Skin is warm and dry. Findings: No erythema or rash. Neurological:      General: No focal deficit present. Mental Status: She is alert and oriented to person, place, and time.    Psychiatric:         Mood and Affect: Mood normal.         Behavior: Behavior normal.           Vitals:    01/03/21 1732   BP: 139/74   Pulse: 91   Resp: 20   Temp: 98.3 °F (36.8 °C)   SpO2: 95%     Labs, Radiology and Tests       Recent Labs     01/03/21  1318   WBC 5.3   RBC 3.34*   HGB 10.4*   HCT 34.3*   .7*   MCH 31.1   MCHC 30.3*        Recent Labs     01/03/21  1318 01/03/21  1648   * 131*   K 6.4* 6.6*   CL 97* 100   CO2 17* 17*   BUN 54* 54*   CREATININE 2.3* 2.1*   CALCIUM 9.0 9.3 PROT 6.9  --    LABALBU 3.4*  --    BILITOT 0.9  --    ALKPHOS 51  --    AST 27  --    ALT 14  --        Radiology : Chest x-ray-reviewed by me shows no significant congestion, mild cardiomegaly noted no consolidation    Other : All lab data have been reviewed and noted patient's baseline creatinine is probably around 1.6-1.7    Assessment    1 Renal -acute kidney injury on chronic kidney disease stage III most likely secondary to the use of diuretics and ARB. ? Patient does have some lower extremity edema but chest x-ray does not show any congestion. ? For now hold the above medications continue IV hydration  ? Follow renal function in the morning    2 Electrolytes -hyponatremia secondary to renal dysfunction and inability to excrete free water improving with IV hydration  3 Hyperkalemia again secondary to KEEGAN and use of ARB received a cocktail of potassium lowering drugs already potassium went up to 6.6. Will be receiving another dose today if still continues to higher might consider giving a dose of diuretic  4 Acid-base status-mild metabolic acidosis secondary to above follow for now  5 Essential hypertension  6 Checks of diabetes mellitus  7 Meds reviewed and discussed with patient and nursing staff      **This report has been created using voice recognition software. It maycontain minor  errors which are inherent in voice recognition technology. **    Juarez Moreno M.D  [facility].

## 2021-01-04 NOTE — FLOWSHEET NOTE
01/03/21 2021   Provider Notification   Reason for Communication Review case   Provider Name    Provider Notification Physician   Method of Communication Secure Message   Response See orders   Notification Time 2021 0821-This RN contacted physician regarding patient having home CPAP and if we could get order for patient to use it. And also after given all medications for patients potassium level of 6.6 when they would like a redraw.  And that there was no dextrose ordered with the 10 units of IV insulin and this RN asked if that was correct or if he wanted dextrose to be given with the iv insulin     See orders

## 2021-01-04 NOTE — PROGRESS NOTES
cystitis without hematuria     Hyperkalemia      Subjective:   Admit Date: 1/3/2021    Interval History:   Seen for acute kidney injury on chronic kidney disease  Awake and alert this morning  Feels better  Does complain of weak legs  Updated by the staff . No new issues  Mostly stable blood pressure  Urine output is not completely documented        Medications:   Scheduled Meds:   phytonadione (VITAMIN K)  IVPB  5 mg Intravenous Once    sodium polystyrene  15 g Oral Once    sodium bicarbonate  1,300 mg Oral TID    sodium chloride flush  10 mL Intravenous 2 times per day    levothyroxine  50 mcg Oral Daily    insulin lispro  0-6 Units Subcutaneous TID WC    insulin lispro  0-3 Units Subcutaneous Nightly     Continuous Infusions:   sodium chloride 100 mL/hr at 01/04/21 1116       CBC:   Recent Labs     01/03/21  1318 01/04/21  0749   WBC 5.3 5.8   HGB 10.4* 10.6*    261     CMP:    Recent Labs     01/03/21  1318 01/03/21  1648 01/03/21  2331 01/04/21  0749   * 131*  --  136   K 6.4* 6.6* 5.7* 5.9*   CL 97* 100  --  103   CO2 17* 17*  --  16*   BUN 54* 54*  --  55*   CREATININE 2.3* 2.1*  --  2.2*   GLUCOSE 288* 202*  --  102   CALCIUM 9.0 9.3  --  9.8   LABGLOM 21* 24*  --  22*     Troponin: No results for input(s): TROPONINI in the last 72 hours. BNP: No results for input(s): BNP in the last 72 hours. INR:   Recent Labs     01/03/21  1318 01/04/21  0749   INR 4.51* 8.15*     Lipids: No results for input(s): CHOL, LDLDIRECT, TRIG, HDL, AMYLASE, LIPASE in the last 72 hours. Liver:   Recent Labs     01/03/21  1318   AST 27   ALT 14   ALKPHOS 51   PROT 6.9   LABALBU 3.4*   BILITOT 0.9     Iron:  No results for input(s): IRONS, FERRITIN in the last 72 hours. Invalid input(s): LABIRONS  US RENAL COMPLETE   Final Result   No acute findings. 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 Nonemergent/incidental findings in the report.       This document has been electronically signed by: Michael Carlos MD on    01/04/2021 02:02 AM         XR CHEST PORTABLE   Final Result   1. There is no acute cardiopulmonary process. **This report has been created using voice recognition software. It may contain minor errors which are inherent in voice recognition technology. **      Final report electronically signed by Dr. Adeel Gordon on 1/3/2021 3:31 PM      CT HEAD WO CONTRAST   Final Result   1. Unremarkable noncontrast CT head. **This report has been created using voice recognition software. It may contain minor errors which are inherent in voice recognition technology. **      Final report electronically signed by Dr. Adeel Gordon on 1/3/2021 1:49 PM            Objective:   Vitals: BP (!) 145/84   Pulse 89   Temp 97.8 °F (36.6 °C) (Oral)   Resp 20   Ht 5' 9\" (1.753 m)   Wt 271 lb 11.2 oz (123.2 kg)   LMP 02/20/2001   SpO2 97%   BMI 40.12 kg/m²    Wt Readings from Last 3 Encounters:   01/04/21 271 lb 11.2 oz (123.2 kg)   12/30/20 253 lb (114.8 kg)   12/18/20 252 lb (114.3 kg)      24HR INTAKE/OUTPUT:      Intake/Output Summary (Last 24 hours) at 1/4/2021 1216  Last data filed at 1/4/2021 1158  Gross per 24 hour   Intake 3002.77 ml   Output 25 ml   Net 2977.77 ml       Constitutional:  Alert, awake, no apparent distress   Skin:normal with no rash or lesions  HEENT:Pupils are reactive . Throat is clear . Oral mucosa is moist   Neck:supple with no carotid bruit  Cardiovascular: Irregular rhythm  Respiratory:  Clear to ausculation without wheezes, rhonchi or rales  Abdomen: +bs, soft, no tenderness to palpation and no palpable mass. No abdominal bruit   Ext: 1+ bilateral LE edema  Musculoskeletal:Intact  Neuro:Alert and awake. Well-oriented. Normal speech. Electronically signed by Skippy Duverney, MD on 1/4/2021 at 12:16 PM  **This report has been created using voice recognition software. It maycontain minor  errors which are inherent in voice recognition technology. **

## 2021-01-04 NOTE — CARE COORDINATION
DISASTER CHARTING    1/4/21, 10:44 AM EST    DISCHARGE ONGOING EVALUATION:     Amy Sweets day: 1  Location: [facility]05/005-A Reason for admit: Hyperkalemia [E87.5]   Barriers to Discharge: Hyperkalemia/Fall.  INR 8.15, K+ 5.9, Lactic Acid 5.7; monitor  PCP: ARNOL Zaragoza CNP  Patient Goals/Plan/Treatment Preferences: met w client; plans home w sister Phyllis Welsh; await therapy input (had fall PTA), has walker, CPAP, WC, curent with [facility]in [facility]: [facility] referral as history frequent falls; [facility]

## 2021-01-04 NOTE — CARE COORDINATION
01/04/21 1101   Readmission Assessment   Number of Days since last admission? 8-30 days   Previous Disposition Home with Family   Who is being Interviewed Patient   What was the patient's/caregiver's perception as to why they think they needed to return back to the hospital?   (my legs weak)   Did you visit your Primary Care Physician after you left the hospital, before you returned this time? No   Why weren't you able to visit your PCP? Did not have an appointment   Did you see a specialist, such as Cardiac, Pulmonary, Orthopedic Physician, etc. after you left the hospital? No   Who advised the patient to return to the hospital? Self-referral   Does the patient report anything that got in the way of taking their medications? No   In our efforts to provide the best possible care to you and others like you, can you think of anything that we could have done to help you after you left the hospital the first time, so that you might not have needed to return so soon?  Other (Comment)  (no)

## 2021-01-04 NOTE — PROGRESS NOTES
This RN called phlebotomy regarding patients lactic acid being drawn every 3 hours and the last one that was drawn was at 2330.

## 2021-01-04 NOTE — H&P
removal    SLEEVE GASTRECTOMY  09/15/2015    Robotic    TOENAIL EXCISION      removal of great toe nails bilateral-still has toenails-had ingrown toenails and had trimmed out     TONSILLECTOMY      UPPER GASTROINTESTINAL ENDOSCOPY         Medications Prior to Admission:    Prior to Admission medications    Medication Sig Start Date End Date Taking? Authorizing Provider   insulin lispro protamine & lispro (HUMALOG MIX) (75-25) 100 UNIT per ML SUSP injection vial Inject 30-40 Units into the skin 2 times daily (with meals) 30 units in AM and 40 units at night   Yes Historical Provider, MD   sitaGLIPtan-metFORMIN (JANUMET)  MG per tablet Take 1 tablet by mouth 2 times daily (with meals)   Yes Historical Provider, MD   methylPREDNISolone (MEDROL, EVLEYN,) 4 MG tablet Take by mouth.  Follow instructions on box 12/30/20 1/5/21 Yes Deuce Costello MD   acetaminophen (TYLENOL) 500 MG tablet Take 1 tablet by mouth 4 times daily as needed for Pain 12/30/20  Yes Deuce Costello MD   cyclobenzaprine (FLEXERIL) 5 MG tablet Take 1 tablet by mouth 2 times daily as needed for Muscle spasms 12/30/20 1/9/21 Yes Deuce Costello MD   lidocaine (LIDODERM) 5 % Place 1 patch onto the skin daily for 10 days 12 hours on, 12 hours off. 12/30/20 1/9/21 Yes Deuce Costello MD   levothyroxine (SYNTHROID) 50 MCG tablet Take 1 tablet by mouth Daily 12/19/20  Yes Humberto Freire MD   DULoxetine (CYMBALTA) 60 MG extended release capsule take 1 capsule by mouth once daily 11/25/20  Yes ARNOL Morales CNP   gabapentin (NEURONTIN) 100 MG capsule take 1 capsule by mouth three times a day 11/16/20 2/14/21 Yes ARNOL Morales CNP   losartan (COZAAR) 100 MG tablet Take 1 tablet by mouth daily 11/5/20  Yes Ashley Yancey MD   saline nasal gel (AYR) GEL by Nasal route as needed for Congestion 10/5/20  Yes Shekhar Rodriguez MD   dexlansoprazole (DEXILANT) 60 MG CPDR delayed release capsule Take 1 capsule by mouth daily 9/15/20  Yes ARNOL Don CNP   nystatin (MYCOSTATIN) 089602 UNIT/GM powder Apply 3 times daily. 9/15/20  Yes ARNOL Don CNP   fenofibrate (TRICOR) 145 MG tablet take 1 tablet by mouth once daily 9/8/20  Yes ARNOL Vasquez CNP   atorvastatin (LIPITOR) 40 MG tablet take 1 tablet by mouth at bedtime 9/8/20  Yes ARNOL Vasquez CNP   RA VITAMIN B-12 100 MCG tablet take 1 tablet by mouth once daily 5/11/20  Yes ARNOL Don CNP   warfarin (COUMADIN) 2.5 MG tablet As directed by [facility].  30 days = 45 tabs 5/4/20  Yes Reshma Hendricks MD   alendronate (FOSAMAX) 70 MG tablet Take 1 tablet by mouth every 7 days  Patient taking differently: Take 70 mg by mouth every 7 days Indications: usually on mondays  3/10/20  Yes ARNOL Don CNP   Dulaglutide (TRULICITY SC) Inject 0.68 mg into the skin once a week Usually on mondays   Yes Historical Provider, MD   folic acid (FOLVITE) 1 MG tablet take 1 tablet by mouth once daily 1/20/20  Yes ARNOL Don CNP   aspirin (RA ASPIRIN EC) 81 MG EC tablet Take 1 tablet by mouth daily 1/7/20  Yes ARNOL Nolasco CNP   furosemide (LASIX) 40 MG tablet Take 0.5 tablets by mouth daily 1/7/20  Yes ARNOL Nolasco CNP   metoprolol succinate (TOPROL XL) 100 MG extended release tablet Take 100 mg by mouth daily   Yes Historical Provider, MD   digoxin (LANOXIN) 125 MCG tablet Take 0.5 tablets by mouth daily 10/8/19  Yes ARNOL Nolasco CNP   isosorbide mononitrate (IMDUR) 60 MG extended release tablet Take 1 tablet by mouth daily 7/13/19  Yes Talya Caal MD   allopurinol (ZYLOPRIM) 100 MG tablet Take 100 mg by mouth daily 7/3/19  Yes Historical Provider, MD   meclizine (ANTIVERT) 25 MG CHEW Take 1 tablet by mouth 3 times daily as needed (vertigo) 6/3/19  Yes Maddison Aleman MD   Calcium Carbonate-Vitamin D (CALCIUM-VITAMIN D) 500-200 MG-UNIT per tablet Take 1 tablet by mouth 2 times daily (with meals)    Yes Historical Provider, MD BUSTOS UF III MINI PEN NEEDLES 31G X 5 MM MISC use three times a day 12/12/19   Roseline Steinberg DO       Allergies:  Patient has no known allergies. Social History:   TOBACCO:   reports that she quit smoking about 13 years ago. She has a 30.00 pack-year smoking history. She has never used smokeless tobacco.  ETOH:   reports no history of alcohol use.       Family History:       Problem Relation Age of Onset    Asthma Sister     Asthma Brother     Heart Disease Father     High Blood Pressure Other     Cancer Maternal Uncle         stomach    Diabetes Maternal Grandmother     High Blood Pressure Maternal Grandmother     Diabetes Maternal Grandfather     Stroke Neg Hx        REVIEW OF SYSTEMS:  CONSTITUTIONAL:  positive for  fatigue and malaise  negative for  anorexia  EYES:  negative for  visual disturbance, irritation, redness and icterus  HEENT:  negative for  sore mouth, sore throat and hoarseness  RESPIRATORY:  positive for  dyspnea  negative for  wheezing and hemoptysis  CARDIOVASCULAR:  negative for  palpitations, orthopnea, PND, exertional chest pressure/discomfort  GASTROINTESTINAL:  negative for nausea, vomiting and change in bowel habits  GENITOURINARY:  negative for frequency, dysuria and hematuria  HEMATOLOGIC/LYMPHATIC:  negative for easy bruising and bleeding  ENDOCRINE:  negative for heat intolerance and cold intolerance  MUSCULOSKELETAL:  positive for  myalgias, arthralgias and muscle weakness  negative for  joint swelling  NEUROLOGICAL:  positive for memory problems, coordination problems, gait problems and weakness  negative for headaches, dizziness and seizures  BEHAVIOR/PSYCH:  positive for decreased energy level, depressed mood and fatigue and negative for increased agitation and anxiety    Physical Exam:    Vitals: BP (!) 171/82   Pulse 110   Temp 97.8 °F (36.6 °C) (Oral)   Resp 18   Ht 5' 9\" (1.753 m)   Wt 271 lb 11.2 oz (123.2 kg)   LMP 02/20/2001   SpO2 100%   BMI 40.12 kg/m²   CONSTITUTIONAL:  fatigued, alert, cooperative, no apparent distress and morbidly obese  EYES:  extra-ocular muscles intact  ENT:  normocepalic, without obvious abnormality  NECK:  supple, symmetrical, trachea midline  HEMATOLOGIC/LYMPHATICS:  no cervical lymphadenopathy and no supraclavicular lymphadenopathy  LUNGS:  diminished breath sounds throughout lungs  CARDIOVASCULAR:  normal S1 and S2  ABDOMEN:  normal bowel sounds, soft, non-distended, non-tender and no hepatosplenomegally  MUSCULOSKELETAL:  there is no redness, warmth, or swelling of the joints  NEUROLOGIC:  Mental Status Exam:  Level of Alertness:   lethargic  Orientation:   person, place  Memory:   normal  Fund of Knowledge:  normal  Cranial Nerves:  cranial nerves II-XII are grossly intact  Motor Exam:  Motor exam is 5 out of 5 all extremities with the exception of legs suman  Sensory:  Sensory intact  SKIN:  no redness, warmth, or swelling      CBC:   Recent Labs     01/03/21  1318 01/04/21  0749   WBC 5.3 5.8   HGB 10.4* 10.6*    261     BMP:    Recent Labs     01/03/21  1318 01/03/21  1648 01/03/21  2331 01/04/21  0749   * 131*  --  136   K 6.4* 6.6* 5.7* 5.9*   CL 97* 100  --  103   CO2 17* 17*  --  16*   BUN 54* 54*  --  55*   CREATININE 2.3* 2.1*  --  2.2*   GLUCOSE 288* 202*  --  102     Hepatic:   Recent Labs     01/03/21  1318   AST 27   ALT 14   BILITOT 0.9   ALKPHOS 51     INR:   Recent Labs     01/03/21  1318 01/04/21  0749   INR 4.51* 8.15*     01/04/21 2116    Specimen Source: Blood gases     pH, Blood Gas 7.36    PCO2 33Low  mmhg    PO2 79 mmhg    HCO3 19Low  mmol/l    Base Excess (Calculated) -5.6Low  mmol/l    O2 Sat 95 %    DEVICE Room Air    Tino Test Positive    Source: R Radial     Renal USS  Right kidney: Length 11.6 cm. No hydronephrosis. Anechoic right renal   cysts measuring up to 4.7 cm.  Echogenic, nonshadowing 1.1 cm right renal   mass (previously 0.8 cm), probably an angiomyolipoma. No calculi. Normal   arterial waveform. Slightly elevated resistive index at 0.70 (previously   0.79). Left kidney: Left kidney is suboptimally visualized. Length 10.4 cm. Possible left renal cyst. No hydronephrosis. No visible mass. No calculi. Demonstrable arterial waveform. Suboptimal resistive index calculation. Impression:     No acute findings. 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 Nonemergent/incidental findings in the report.      -----------------------------------------------------------------  PA/lat CXR:   There is no acute cardiopulmonary process.           EKG: a fib 85 bpm, LAD    Assessment and Plan   1. KEEGAN on CKD stage IV  2. Hyperkalemia  3. HTN  4. Coumadin toxicity  5. hypothyroidism  6.  DARWIN  7. morbid obesity    Cont IVF NS  rx hyperkalemia D50/IV insulin, HCO3, CaCl, kayexalate  IV Vit K x1  F/up inr, BMP  Nocte CPAP      Patient Active Problem List   Diagnosis Code    Diabetes mellitus ([facility] E11.9    Hypertension I10    Hyperlipidemia E78.5    Neuropathy G62.9    Osteoarthritis M19.90    Proteinuria R80.9    Arthritis M19.90    Morbid obesity ([facility] E66.01    Allergic rhinitis J30.9    Chronic kidney disease, stage 4 (severe) ([facility]) N18.4    Diabetes mellitus type 2, insulin dependent ([facility]) E11.9, Z79.4    DARWIN on CPAP G47.33, Z99.89    History of sleeve gastrectomy Z90.3    Pneumonia J18.9    Morbid obesity with BMI of 50.0-59.9, adult ([facility]) E66.01, Z68.43    Coronary artery disease involving native heart without angina pectoris I25.10    Multinodular goiter E04.2    Bilateral renal cysts N28.1    Knee pain, left M25.562    Dyspnea R06.00    Atrial fibrillation ([facility]) I48.91    Acute diastolic CHF (congestive heart failure), NYHA class 2 ([facility]) I50.31    CKD (chronic kidney disease) stage 3, GFR 30-59 ml/min N18.30    Shortness of breath R06.02    Acute on chronic combined systolic (congestive) and diastolic (congestive) heart failure (HCC) I50.43    Chronic anemia D64.9    Paroxysmal A-fib (HCC) I48.0    Chronic kidney disease (CKD), stage III (moderate) N18.30    Nonischemic cardiomyopathy (HCC) I42.8    Mild tricuspid regurgitation I07.1    Debility R53.81    Dietary noncompliance Z91.11    Long term current use of anticoagulant Z79.01    Severe left ventricular systolic dysfunction G37.3    Anticoagulated on Coumadin H70.34    Metabolic encephalopathy D73.57    Acute cystitis without hematuria N30.00    Hyperkalemia E87.5       Randi Bills MD  Admitting Internist

## 2021-01-05 NOTE — PROGRESS NOTES
INTERNAL MEDICINE Progress Note  1/5/2021 10:28 AM  Subjective:   Admit Date: 1/3/2021  PCP: ARNOL Mccabe - CNP  Interval History:   More awake and alert this am, still weak    Objective:   Vitals: /68   Pulse 102   Temp 97.4 °F (36.3 °C) (Axillary)   Resp 18   Ht 5' 9\" (1.753 m)   Wt 268 lb 12.8 oz (121.9 kg)   LMP 02/20/2001   SpO2 98%   BMI 39.69 kg/m²   General appearance: alert and cooperative with exam  HEENT: Head: atraumatic  Neck: no adenopathy, no carotid bruit and no JVD  Lungs: diminished breath sounds bilaterally  Heart: irregularly irregular rhythm and S1, S2 normal  Abdomen: soft, non-tender; bowel sounds normal; no masses,  no organomegaly  Extremities: no edema, redness or tenderness in the calves or thighs  Neurologic: Mental status: alertness: alert, orientation: date, person, place, affect: blunted, thought content exhibits logical connections      Medications:   Scheduled Meds:   warfarin (COUMADIN) daily dosing (placeholder)   Other RX Placeholder    sodium bicarbonate  1,300 mg Oral TID    allopurinol  100 mg Oral Daily    aspirin  81 mg Oral Daily    atorvastatin  40 mg Oral Nightly    folic acid  1 mg Oral Daily    metoprolol succinate  100 mg Oral Daily    insulin glargine  30 Units Subcutaneous BID    sodium chloride flush  10 mL Intravenous 2 times per day    levothyroxine  50 mcg Oral Daily    insulin lispro  0-6 Units Subcutaneous TID WC    insulin lispro  0-3 Units Subcutaneous Nightly     Continuous Infusions:   dextrose      sodium chloride 125 mL/hr at 01/05/21 0553       Lab Results:   CBC:   Recent Labs     01/03/21  1318 01/04/21  0749 01/05/21  0435   WBC 5.3 5.8 5.5   HGB 10.4* 10.6* 10.3*    261 238     BMP:    Recent Labs     01/04/21  0749 01/04/21  1825 01/05/21  0435    133* 138   K 5.9* 5.1 4.5    103 106   CO2 16* 19* 23   BUN 55* 56* 52*   CREATININE 2.2* 2.2* 1.9*   GLUCOSE 102 173* 95     Hepatic:   Recent Labs 01/03/21  1318   AST 27   ALT 14   BILITOT 0.9   ALKPHOS 51     INR:   Recent Labs     01/03/21  1318 01/04/21  0749 01/05/21  0435   INR 4.51* 8.15* 4.67*     Magnesium:    Lab Results   Component Value Date    MG 1.7 10/02/2020     HgBA1c:    Lab Results   Component Value Date    LABA1C 6.8 12/18/2020     TSH:    Lab Results   Component Value Date    TSH 0.087 01/03/2021     FOLATE:    Lab Results   Component Value Date    FOLATE > 20.0 12/15/2020     IRON:    Lab Results   Component Value Date    IRON 37 12/16/2020     FERRITIN:    Lab Results   Component Value Date    FERRITIN 275 12/16/2020           Assessment and Plan:   1. KEEGAN on CKD stage IV, improved  2. Hyperkalemia, resolved  3. HTN  4. Coumadin toxicity  5. hypothyroidism  6. DARWIN  7. morbid obesity     Cont IVF NS  Nocte CPAP  F/up inr, BMP am  Avoid nephrotoxics.   PT/OT    Fredo Hager MD

## 2021-01-05 NOTE — PROGRESS NOTES
Clinical Pharmacy Note    Arti Howell is a 76 y.o. female for whom pharmacy has been asked to manage warfarin therapy. Reason for Admission: recurrent falls at home    Consulting Physician: Dr. Resendiz Diss  Warfarin dose prior to admission: 2.5 mg MWF, 3.75 mg TThSS  Warfarin indication: atrial fibrillation  Target INR range: 2-3   Outpatient warfarin provider: [facility]note 3[facility] Xeros B    Past Medical History:   Diagnosis Date    CAD (coronary artery disease)     CRI (chronic renal insufficiency)     Difficult intubation     excess skin in back of throat     DM (diabetes mellitus) ([facility] 1994    GERD (gastroesophageal reflux disease)     H/O gastric bypass     Hyperlipidemia     Hypertension     Hypothyroidism     Neuropathy     Obesity     Osteoarthritis     Paroxysmal atrial fibrillation ([facility]     Prolonged emergence from general anesthesia     Sleep apnea     uses cpap    Visit for screening mammogram                 Recent Labs     01/05/21  0435   INR 4.67*     Recent Labs     01/03/21  1318 01/04/21  0749 01/05/21  0435   HGB 10.4* 10.6* 10.3*   HCT 34.3* 35.4* 34.0*    261 238       Current warfarin drug-drug interactions: allopurinol, aspirin, levothyroxine    Date INR Warfarin Dose   1/5/2021 4.67 No coumadin                                   Daily PT/INR until stable within therapeutic range. Thank you for the consult.    Teressa Gallegos, PharmD, BCPS  1/5/2021  8:04 AM

## 2021-01-05 NOTE — TELEPHONE ENCOUNTER
Noted patient was admitted to [address] [place] [address] 1/3 (had ER visit 12/30). Currently on clinic schedule for tomorrow (appt. made 12/30).   Message forwarded to clinic pharmacist.

## 2021-01-05 NOTE — CARE COORDINATION
DISASTER CHARTING    1/5/21, 4:34 PM EST    DISCHARGE ONGOING EVALUATION:     Blake Zamora day: 2  Location: [facility]05/005-A Reason for admit: Hyperkalemia [E87.5]   Barriers to Discharge:   Hyperkalemia/Fall/Coumadin Toxicity. INR 4.67; monitor. IVF continued. Kxylate/Vitamin K/IV Insulin/Bicarb/CaCl given for Hyperkalemia.   PCP: ARNOL Holguin - CNP  Patient Goals/Plan/Treatment Preferences:   lives w sister Bryce Harris; therapy recommends SNF; client refused per SW (had fall PTA), has walker, CPAP, WC, curent with Coumadin Clinic: SW referral as history frequent falls; collaborated with Zhane Evans

## 2021-01-05 NOTE — PROGRESS NOTES
[facility] ICU STEPDOWN TELEMETRY 4K  EVALUATION    Time:    Time In: 920  Time Out: 1005  Timed Code Treatment Minutes: 30 Minutes  Minutes: 45          Date: 2021  Patient Name: Cari Hay,   Gender: female      MRN: [id number]  : 1955  (76 y.o.)  Referring Practitioner: Dr.O. Min  Diagnosis: hyperkalemia  Additional Pertinent Hx: The patient is a 76 y.o. female who presents with recurrent falls at home. She feels weak, fatigued. There is also episodic confusion. She denies headache, no fever, no N/V/D. Patient was recently in the hospital few weeks ago for urinary tract infection. In the ER, patient was found to be have hyperkalemia of 6.6 and elevated serum creatinine of 2.1. Restrictions/Precautions:  Restrictions/Precautions: Fall Risk, General Precautions  Position Activity Restriction  Other position/activity restrictions: L drop foot    Subjective  Chart Reviewed: Yes, Orders, Progress Notes, History and Physical  Patient assessed for rehabilitation services?: Yes    Subjective: cooperative, min encouragement to get OOB. fair problem solving noted    Pain:  Pain Assessment  Patient Currently in Pain: Yes  Pain Level: 4    Social/Functional History:  Lives With: Family(sister)  Type of Home: Apartment  Home Layout: One level  Home Access: Level entry  Home Equipment: Wheelchair-manual, Rolling walker   Bathroom Toilet: Handicap height  Bathroom Equipment: 3-in-1 commode       ADL Assistance: Needs assistance  Homemaking Assistance: Needs assistance  Homemaking Responsibilities: No  Ambulation Assistance: (reports not ambulating PTA)  Transfer Assistance: Independent          Additional Comments: reports using w/c as primary mobility, was not ambulating. performing stand pivot to and from w/c indep. sister cooks, pt assist with cleaning. pt reports being indep with dressing, bathing, and toileting.     Cognition/Orientation:      decreased problem solving noted. ADL's:  Feeding: Setup  Grooming: Setup  UE Bathing: Moderate assistance  LE Bathing: Dependent/Total  UE Dressing: Moderate assistance  LE Dressing: Maximum assistance  Toileting: Moderate assistance       Functional Mobility:  Bed mobility  Rolling to Left: Moderate assistance  Rolling to Right: Minimal assistance  Supine to Sit: Minimal assistance  Scooting: Minimal assistance    Functional Mobility  Functional Mobility Comments: used [employer] lift aide to tranfser pt to the [facility] then to the [facility]. Balance:  Balance  Sitting Balance: Contact guard assistance(fair control sitting EOB, leaning to the left  throughout.)  Standing Balance: Minimal assistance  Standing Balance  Time: x 1 min x 3 trials. Transfers:  Sit to stand: Moderate assistance  Stand to sit: Minimal assistance  Toilet Transfers  Equipment Used: Extra wide bedside commode  Toilet Transfer: Moderate assistance  Toilet Transfers Comments: with use of the margo stedy lift aide    Upper Extremity Assessment:   LUE AROM : WFL  RUE AROM : WFL    LUE Strength  LUE Strength Comment: 3+/5 throughout B UEs    Sensation  Overall Sensation Status: Impaired(reports chronic numbness in B feet and intermittent numbness in B hands)       Activity Tolerance: Patient limited by fatigue       Assessment:  Assessment: PT presents with a decrease in independent functionoing due to recent hospitalization. she currently requires signifiance assistance for ADLs and needing lifting equipment, standing lift aide for safe transfers. this is a deline from being independent with self transfers PTA. She would benefit from additional skilled OT services to address increasing independence and strength  for safe ADLs and transfers to reduce burdon of care upon discharge.   Performance deficits / Impairments: Decreased functional mobility , Decreased high-level IADLs, Decreased ADL status, Decreased cognition, Decreased endurance, Decreased strength, Decreased balance, Decreased safe awareness  Prognosis: Fair  REQUIRES OT FOLLOW UP: Yes  Decision Making: Medium Complexity  Safety Devices in place: Yes  Type of devices: All fall risk precautions in place, Gait belt, Call light within reach, Left in chair, Chair alarm in place, Nurse notified    Treatment Initiated: Treatment and education initiated within context of evaluation. Evaluation time included review of current medical information, gathering information related to past medical, social and functional history, completion of standardized testing, formal and informal observation of tasks, assessment of data and development of plan of care and goals. Treatment time included skilled education and facilitation of tasks to increase safety and independence with ADL's for improved functional independence and quality of life. Discharge Recommendations:  Continue to assess pending progress, Subacute/Skilled Nursing Facility, ECF with OT    Patient Education:  OT Education: OT Role, ADL Adaptive Strategies, Transfer Training    Equipment Recommendations:   Other: need to assess    Plan:  Times per week: 3-5  Current Treatment Recommendations: Strengthening, Safety Education & Training, Functional Mobility Training, Home Management Training, Endurance Training, Self-Care / ADL, Patient/Caregiver Education & Training, Balance Training    Goals:  Patient goals : Get stronger to go home with my sister  Short term goals  Time Frame for Short term goals: by discharge pt will  Short term goal 1: tolerate sitting EOB x 10 min with SBA to increase endurance for bedside ADL routien  Short term goal 2: tolerate safe transfers within a margo stedy lift aide with no > min A x 1 consistently to increase endurance for toilet transfers  Short term goal 3: tolerate standing x 1.5 min with CGA to increase endurance for toileting tasks  Short term goal 4: Participate in B UE light strengthening ex x 10 reps with only min rest breaks to increase enduranec and strength for safe transfers  Long term goals  Time Frame for Long term goals : not set due to est short LOS  See long-term goal time frame for expected duration of plan of care. If no long-term goals established, a short length of stay is anticipated. Following session, patient left in safe position with all fall risk precautions in place.

## 2021-01-05 NOTE — PROGRESS NOTES
Nephrology Progress Note    Patient - Marge Brown   MRN -  [id number]   Acct # - [de-identified]      - 1955    76 y.o. Admit Date: 1/3/2021  Hospital Day: 2  Location: --A  Date of evaluation -  2021    Subjective:   CC: fall,   Denies shortness of breath on Room air   Improving appetite  Fatigue  BP Range: Systolic (03IGM), BHT:235 , Min:133 , IHF:658      Diastolic (30IZH), CBX:23, Min:68, Max:104    Objective:   VITALS:  BP (!) 174/69   Pulse 104   Temp 97.5 °F (36.4 °C) (Oral)   Resp 18   Ht 5' 9\" (1.753 m)   Wt 268 lb 12.8 oz (121.9 kg)   LMP 2001   SpO2 95%   BMI 39.69 kg/m²    Patient Vitals for the past 24 hrs:   BP Temp Temp src Pulse Resp SpO2 Weight   21 1113 (!) 174/69 97.5 °F (36.4 °C) Oral 104 18 95 % --   21 0733 133/68 97.4 °F (36.3 °C) Axillary 102 18 98 % --   21 0400 -- -- -- -- -- -- 268 lb 12.8 oz (121.9 kg)   21 0355 (!) 134/94 98.3 °F (36.8 °C) Oral 103 18 97 % --   21 2315 (!) 140/85 98.4 °F (36.9 °C) Oral 108 18 96 % --   21 1930 (!) 167/104 97.6 °F (36.4 °C) Oral 100 16 98 % --   21 1520 (!) 171/82 97.8 °F (36.6 °C) Oral 110 18 100 % --          Intake/Output Summary (Last 24 hours) at 2021 1141  Last data filed at 2021 0920  Gross per 24 hour   Intake 3531.9 ml   Output 300 ml   Net 3231.9 ml       Admission weight: 256 lb (116.1 kg)  Patient Vitals for the past 96 hrs (Last 3 readings):   Weight   21 0400 268 lb 12.8 oz (121.9 kg)   21 0315 271 lb 11.2 oz (123.2 kg)   21 1732 264 lb (119.7 kg)     Body mass index is 39.69 kg/m². EXAM:  CONSTITUTIONAL:  No acute distress. Pleasant  HEENT:  Head is normocephalic, Extraocular movement intact. Neck is supple. Voice is clear. CARDIOVASCULAR:  S1, S2  regular rate and rhythm. RESPIRATORY: Clear to ausculation bilaterally. Equal breath sounds. No wheezes.  No shortness of breath noted at rest.  ABDOMEN: soft, non tender  NEUROLOGICAL: Patient is alert and oriented to person, place, and time. Recent and remote memory intact. Thought is coherant. SKIN: no rash, No significant bruises on exposed surfaces  MUSCULOSKELETAL: Movement is coordinated. Moves all extremities   EXTREMITIES: Distal lower extremity temp is warm, trace lower extremity edema. PSYCHIATRIC: mood and affect appropriate.     Medications:   Med reviewed  Scheduled Meds:   warfarin (COUMADIN) daily dosing (placeholder)   Other RX Placeholder    sodium bicarbonate  1,300 mg Oral TID    allopurinol  100 mg Oral Daily    aspirin  81 mg Oral Daily    atorvastatin  40 mg Oral Nightly    folic acid  1 mg Oral Daily    metoprolol succinate  100 mg Oral Daily    insulin glargine  30 Units Subcutaneous BID    sodium chloride flush  10 mL Intravenous 2 times per day    levothyroxine  50 mcg Oral Daily    insulin lispro  0-6 Units Subcutaneous TID WC    insulin lispro  0-3 Units Subcutaneous Nightly     Continuous Infusions:   dextrose      sodium chloride 125 mL/hr at 01/05/21 0553     PRN Meds glucose, dextrose, glucagon (rDNA), dextrose, sodium chloride flush, acetaminophen, dextrose   Labs:   Labs reviewed  Recent Labs     01/03/21  1318 01/04/21  0749 01/05/21  0435   WBC 5.3 5.8 5.5   RBC 3.34* 3.41* 3.36*   HGB 10.4* 10.6* 10.3*   HCT 34.3* 35.4* 34.0*   .7* 103.8* 101.2*   MCH 31.1 31.1 30.7    261 238     Lab Results   Component Value Date    INR 4.67 (H) 01/05/2021    INR 8.15 (HH) 01/04/2021    INR 4.51 (H) 01/03/2021     Lab Results   Component Value Date    LABA1C 6.8 (H) 12/18/2020     Recent Labs     01/04/21  0749 01/04/21  1825 01/05/21  0435    133* 138   K 5.9* 5.1 4.5    103 106   CO2 16* 19* 23   BUN 55* 56* 52*   CREATININE 2.2* 2.2* 1.9*   LABGLOM 22* 22* 26*   GLUCOSE 102 173* 95   CALCIUM 9.8 9.5 9.1     Lab Results   Component Value Date    ANIONGAP 9.0 01/05/2021      Summary 10/9/2019   limited echo   Ejection fraction is visually estimated at 30-35%. There was moderate global hypokinesis of the left ventricle. Us Renal Complete  Result Date: 1/4/2021  No acute findings. 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 Nonemergent/incidental findings in the report. ASSESSMENT:  · Acute Kidney Injury 2nd to renal hypoperfusion from volume depletion compounded by ARB and diuretic, Improving and nearing baseline. Decrease IV rate to 50/hr. · Chronic Kidney Disease Stage IV  · Metabolic acidosis 2nd to KEEGAN, Improved, Decrease bicarb to 2x/d. · Hyperkalemia 2nd to KEEGAN, consider RTA type IV  · Essential Hypertension with nephrosclerosis. Losartan on hold  · Chronic HFrEF 30-35%  · Diabetes Mellitus Type II with nephrosclerosis with long term use of insulin, A1C 6.8%. Metformin on hold  · Atrial fib  · Deconditioning, s/p fall  · Morbid Obesity    BMP in AM  Active Problems:    Hyperkalemia  Resolved Problems:    * No resolved hospital problems.  ARNOL Schaeffer - CNP 11:41 AM 1/5/2021

## 2021-01-05 NOTE — PROGRESS NOTES
[address] [address]  INPATIENT PHYSICAL THERAPY  EVALUATION  [facility] ICU STEPDOWN TELEMETRY 4K - 4K-05/005-A    Time In: 8587  Time Out: 0818  Timed Code Treatment Minutes: 25 Minutes  Minutes: 36          Date: 2021  Patient Name: Alex Bloom,  Gender:  female        MRN: [id number]  : 1955  (76 y.o.)      Referring Practitioner: Goldie Martinez MD and Jane Nunez MD  Diagnosis: hyperkalemia  Additional Pertinent Hx: Per chart review: The patient is a 76 y.o. female who presents with recurrent falls at home. She feels weak, fatigued. There is also episodic confusion. She denies headache, no fever, no N/V/D. Patient was recently in the hospital few weeks ago for urinary tract infection. In the ER, patient was found to be have hyperkalemia of 6.6 and elevated serum creatinine of 2. 1. She was treated for the hyperkalemia and admitted for further evaluation. Restrictions/Precautions:  Restrictions/Precautions: Fall Risk, General Precautions  Position Activity Restriction  Other position/activity restrictions: L drop foot    Subjective:  Chart Reviewed: Yes  Patient assessed for rehabilitation services?: Yes  Family / Caregiver Present: No  Subjective: Rn approved PT evaluation. Upon entry, pt supine in bed, pleasant and agreeable to participate in therapy. Pt slightly confused throughout session and often repeating herself. Post eval pt returned to supine in bed with bed alarm on and all needs within reach.     General:  Overall Orientation Status: Within Functional Limits  Follows Commands: Within Functional Limits    Vision: Impaired  Vision Exceptions: Wears glasses for reading    Hearing: Within functional limits         Pain: 7/10: L leg    Social/Functional History:    Lives With: Family(sister)  Type of Home: Apartment  Home Layout: One level  Home Access: Level entry  Home Equipment: Wheelchair-manual, Rolling walker             ADL Assistance: Independent  Homemaking Assistance: Needs assistance  Ambulation Assistance: (reports not ambulating PTA)  Transfer Assistance: Independent          Additional Comments: reports using w/c as primary mobility, was not ambulating. performing stand pivot to and from w/c indep. sister kelseas, pt assist with cleaning. pt reports being indep with dressing, bathing, and toileting. OBJECTIVE:  Range of Motion:  Bilateral Lower Extremity: WFL grossly, except L ankle limited actively due to chronic foot drop)    Strength:  Right Lower Extremity: hip 3/5, knee 3+/5, ankle 4-/5  Left Lower Extremity: hip 3-/5, knee 3+/5, ankle 1/5 (chronic foot drop)    Balance:  Dynamic Sitting Balance: assist varied from SBA initially to min-mod A as pt fatigued due to reduced postural control  Static Standing Balance: Maximum Assistance, X 1, with verbal cues , max cues for erect posture in standing at RW. Pt with significant ant trunk lean.  however pt unable to achieve. Tolerated ~15 sec static standing prior to returning to sitting due to fatigue    Bed Mobility:  Rolling to Left: Minimal Assistance, X 1, with verbal cues , with increased time for completion   Supine to Sit: Moderate Assistance, X 1, with verbal cues , with increased time for completion, assist at trunk  Sit to Supine: Moderate Assistance, X 1, with verbal cues , with increased time for completion, assist at B LE to clear bed   Scooting: Stand By Assistance, with verbal cues , with increased time for completion    Transfers:  Sit to Stand: Maximum Assistance, X 1, with increased time for completion, cues for hand placement, x3 attempts. pt unable to acheive standing on initial 2 attempts. third attempt from slighlty elevated bed height and pt able to acheive standing with max A and max cues  Stand to Sit:Moderate Assistance, X 1, cues for hand placement   *Pt to benefit from [address] tal[place]. Unable to assess this date due to pt fatigue.     Ambulation:  Not Tested    Exercise:  Patient was guided in 1 set(s) 10 reps of exercise to both lower extremities. Ankle pumps, Glut sets, Quad sets, Heelslides and Hip abduction/adduction. Exercises were completed for increased independence with functional mobility. Active assist required to perform heelslides and hip ABD/ADD. Limited ankle DF on L during ankle pumps. Functional Outcome Measures: Completed  -PAC Inpatient Mobility without Stair Climbing Raw Score : 9  AM-PAC Inpatient without Stair Climbing T-Scale Score : 32.44    ASSESSMENT:  Activity Tolerance:  Patient tolerance of  treatment: fair. Limited by fatigue and cognition      Treatment Initiated: Treatment and education initiated within context of evaluation. Evaluation time included review of current medical information, gathering information related to past medical, social and functional history, completion of standardized testing, formal and informal observation of tasks, assessment of data and development of plan of care and goals. Treatment time included skilled education and facilitation of tasks to increase safety and independence with functional mobility for improved independence and quality of life. Assessment: Body structures, Functions, Activity limitations: Decreased functional mobility , Decreased strength, Decreased endurance, Decreased cognition, Decreased balance, Increased pain  Assessment: Pt presents with hyperkalemia. Demonstrates a decrease from baseline functional mobility of bed mobility and transfers. Uses w/c as primary mode of mobility. Demonstrates gross weakness in B LE with decreased standing tolerance and difficulty acheiving erect posture. Pt limited by fatigue and is slightly confused. Pt to mani from skilled PT services to promote increased indep with functional mobility activities for return to OF.   Prognosis: Good    REQUIRES PT FOLLOW UP: Yes    Discharge Recommendations:  Discharge Recommendations: Continue to assess pending progress, Subacute/Skilled Nursing Facility    Patient Education:  PT Education: Goals, PT Role, Plan of Care, Home Exercise Program, Transfer Training, Functional Mobility Training    Equipment Recommendations:  Equipment Needed: No  Other: has w/c and RW    Plan:  Times per week: 5xGM  Current Treatment Recommendations: Strengthening, ROM, Balance Training, Functional Mobility Training, Transfer Training, Endurance Training, Home Exercise Program, Safety Education & Training, Patient/Caregiver Education & Training, Wheelchair Mobility Training    Goals:  Patient goals : not stated  Short term goals  Time Frame for Short term goals: by discharge  Short term goal 1: Pt to perform supine<>sit with SBA to promote ease of getting in and out of bed. Short term goal 2: Pt to perform sit<>stand with min A to promote ease of transfers. Short term goal 3: PT to assess stand pivot  Short term goal 4: PT to assess w/c mobility  Long term goals  Time Frame for Long term goals : n/a due to short ELOS    Following session, patient left in safe position with all fall risk precautions in place.

## 2021-01-05 NOTE — CARE COORDINATION
DISCHARGE/PLANNING EVALUATION  1/5/21, 3:50 PM EST    Reason for Referral: Discharge needs  Mental Status:  Alert and oreinted  Decision Making: Makes own decisions  Family/Social/Home Environment: Patient stated that she reside at home with her sister whom works. She stated that home is one floor with no steps inside. She stated that she has a walker she uses. Stated that shower is a tub shower. Current Services including food security, transportation and housekeeping:  Stated they do grocery , her sister drives and they share chores  Current Equipment: walker  Payment Source:  Medicare/ [employer]  Concerns or Barriers to Discharge:  NA  Post acute provider list with quality measures, geographic area and applicable managed care information provided. Questions regarding selection process answered: NA    Teach Back Method used with Dakotachristianojustuscaleb White regarding care plan and options for discharge  Patient verbalize understanding of the plan of care and contribute to goal setting. Patient goals, treatment preferences and discharge plan: Discussed that therapy recommends ECF, patient does not want ECF. She is agreeable to home health and thought that she had it in the past and [facility] set up requested this writer call [facility] to find out who it was. Spoke with [facility] and they never set up home health for patient. Will need to check back with patient for an agency.      Electronically signed by JORI Little on 1/5/2021 at 3:50 PM

## 2021-01-06 NOTE — CARE COORDINATION
Update: nsg reports patient prefers new [facility] ECF prior today; ECF possible 1/6 if INR okay and bed available; collaborated w Lidia Og RN  Electronically signed by Jj Mckeon RN on 1/6/2021 at 4:12 PM

## 2021-01-06 NOTE — PROGRESS NOTES
[address]  INPATIENT PHYSICAL THERAPY  DAILY NOTE  [facility] ICU STEPDOWN TELEMETRY 4K - 4K-05/005-A    Time In:   Time Out: 0528  Timed Code Treatment Minutes: 23 Minutes  Minutes: 23          Date: 2021  Patient Name: Cristin Barbosa,  Gender:  female        MRN: [id number]  : 1955  (76 y.o.)     Referring Practitioner: Torie Duarte MD and Abdon Trinidad MD  Diagnosis: hyperkalemia  Additional Pertinent Hx: Per chart review: The patient is a 76 y.o. female who presents with recurrent falls at home. She feels weak, fatigued. There is also episodic confusion. She denies headache, no fever, no N/V/D. Patient was recently in the hospital few weeks ago for urinary tract infection. In the ER, patient was found to be have hyperkalemia of 6.6 and elevated serum creatinine of 2. 1. She was treated for the hyperkalemia and admitted for further evaluation. Prior Level of Function:  Lives With: Family(sister)  Type of Home: Apartment  Home Layout: One level  Home Access: Level entry  Home Equipment: Wheelchair-manual, Rolling walker   Bathroom Toilet: Handicap height  Bathroom Equipment: 3-in-1 commode    ADL Assistance: Needs assistance  Homemaking Assistance: Needs assistance  Homemaking Responsibilities: No  Ambulation Assistance: (reports not ambulating PTA)  Transfer Assistance: Independent  Additional Comments: reports using w/c as primary mobility, was not ambulating. performing stand pivot to and from w/c indep. sister cooks, pt assist with cleaning. pt reports being indep with dressing, bathing, and toileting. Restrictions/Precautions:  Restrictions/Precautions: Fall Risk, General Precautions  Position Activity Restriction  Other position/activity restrictions: L drop foot     SUBJECTIVE: Patient reports feeling \"same as always\" when asked how doing. Patient agreeable to therapeutic exercise and transferring up to chair.     PAIN: /10: bilateral LE     OBJECTIVE:  Bed Mobility:  Rolling to Left: Supervision   Supine to Sit: Supervision  Scooting: Supervision  All with bed flat and use of one hand rail     Transfers:  Sit to Stand: Moderate Assistance, X 1  Stand to [facility], X 1, with verbal cues, to/from chair with arms    Ambulation:  Contact Guard Assistance, X 1  Distance:  3 steps   Surface: Level Tile  Device:Rolling Walker  Gait Deviations:  Decreased Step Length Bilaterally, Decreased Weight Shift Bilaterally and Wide Base of Support    Balance:  Static Sitting Balance:  Supervision    Exercise:  Patient was guided in 1 set(s) 10 reps of exercise to both lower extremities. Ankle pumps and Quad sets. Exercises were completed for increased independence with functional mobility. Functional Outcome Measures: Not completed       ASSESSMENT:  Assessment: Patient progressing toward established goals. Activity Tolerance:  Patient tolerance of  treatment: fair. Equipment Recommendations:Equipment Needed: No  Other: has w/c and RW  Discharge Recommendations:  Subacute/Skilled Nursing Facility    Plan: Times per week: 5xGM  Current Treatment Recommendations: Strengthening, ROM, Balance Training, Functional Mobility Training, Transfer Training, Endurance Training, Home Exercise Program, Safety Education & Training, Patient/Caregiver Education & Training, Wheelchair Mobility Training    Patient Education  Patient Education: Plan of Care, Transfers, Verbal Exercise Instruction, Discharge Recommendations , - Patient Requires Continued Education    Goals:  Patient goals : not stated  Short term goals  Time Frame for Short term goals: by discharge  Short term goal 1: Pt to perform supine<>sit with SBA to promote ease of getting in and out of bed. Short term goal 2: Pt to perform sit<>stand with min A to promote ease of transfers.   Short term goal 3: PT to assess stand pivot  Short term goal 4: PT to assess w/c mobility  Long term goals  Time Frame for Long term goals : n/a due to short ELOS    Following session, patient left in safe position with all fall risk precautions in place.

## 2021-01-06 NOTE — PROGRESS NOTES
INTERNAL MEDICINE Progress Note  1/6/2021 12:36 PM  Subjective:   Admit Date: 1/3/2021  PCP: ARNOL Morales - CNP  Interval History:   alert, weak  Poor oral intake  Objective:   Vitals: BP (!) 145/82   Pulse 107   Temp 98.3 °F (36.8 °C) (Oral)   Resp 18   Ht 5' 9\" (1.753 m)   Wt 272 lb 12.8 oz (123.7 kg)   LMP 02/20/2001   SpO2 98%   BMI 40.29 kg/m²   General appearance: alert and cooperative with exam  HEENT: atraumatic  Neck: no JVD  Lungs: diminished breath sounds bilaterally  Heart: irregularly irregular rhythm, S1, S2 normal  Abdomen: soft, non-tender; bowel sounds normal; no masses,  no organomegaly  Extremities: no pitting edema,   Neurologic:  alert, orientation: date, person, place, affect: blunted, thought content exhibits logical connections      Medications:   Scheduled Meds:   warfarin (COUMADIN) daily dosing (placeholder)   Other RX Placeholder    sodium bicarbonate  1,300 mg Oral BID    allopurinol  100 mg Oral Daily    aspirin  81 mg Oral Daily    atorvastatin  40 mg Oral Nightly    folic acid  1 mg Oral Daily    metoprolol succinate  100 mg Oral Daily    insulin glargine  30 Units Subcutaneous BID    sodium chloride flush  10 mL Intravenous 2 times per day    levothyroxine  50 mcg Oral Daily    insulin lispro  0-6 Units Subcutaneous TID WC    insulin lispro  0-3 Units Subcutaneous Nightly     Continuous Infusions:   dextrose      sodium chloride 50 mL/hr at 01/05/21 1302       Lab Results:   CBC:   Recent Labs     01/04/21  0749 01/05/21  0435 01/06/21  0350   WBC 5.8 5.5 5.5   HGB 10.6* 10.3* 10.4*    238 215     BMP:    Recent Labs     01/04/21  1825 01/05/21  0435 01/06/21  0350   * 138 134*   K 5.1 4.5 4.0    106 107   CO2 19* 23 19*   BUN 56* 52* 49*   CREATININE 2.2* 1.9* 1.6*   GLUCOSE 173* 95 72     Hepatic:   Recent Labs     01/03/21  1318   AST 27   ALT 14   BILITOT 0.9   ALKPHOS 51     INR:   Recent Labs     01/04/21  0749 01/05/21  0435 01/06/21  0803   INR 8.15* 4.67* 2.25*     Magnesium:    Lab Results   Component Value Date    MG 1.7 10/02/2020     HgBA1c:    Lab Results   Component Value Date    LABA1C 6.8 12/18/2020     TSH:    Lab Results   Component Value Date    TSH 0.087 01/03/2021     FOLATE:    Lab Results   Component Value Date    FOLATE > 20.0 12/15/2020     IRON:    Lab Results   Component Value Date    IRON 37 12/16/2020     FERRITIN:    Lab Results   Component Value Date    FERRITIN 275 12/16/2020           Assessment and Plan:   1. KEEGAN on CKD stage IV, improving  2. Hyperkalemia, resolved  3. HTN  4. Coumadin toxicity, improved  5. hypothyroidism  6. DARWIN  7. morbid obesity  8. DM 2, hold insulin for hypoglycemia     Restart warfarin, cont synthroid  Nocte CPAP  inr, BMP am  Avoid nephrotoxics.   PT/OT    Tyrell Ott MD

## 2021-01-06 NOTE — PROGRESS NOTES
Renal Progress Note    Assessment and Plan:    1. Acute kidney injury progressively improving  2. Metabolic acidosis mild  3. Hyponatremia  4. Deconditioning  5. Macrocytic anemia  6. Atrial fibrillation with controlled rate  7.   Diabetes mellitus type 2  PLAN:   Labs reviewed  Result discussed with the patient  Serum creatinine is improved  Serum bicarbonate is decreased today from yesterday  Medications reviewed  Furosemide 40 mg intravenously once  Labs in the morning  We will follow    Patient Active Problem List:     Diabetes mellitus ([facility]     Hypertension     Hyperlipidemia     Neuropathy     Osteoarthritis     Proteinuria     Arthritis     Morbid obesity ([facility]     Allergic rhinitis     Chronic kidney disease, stage 4 (severe) ([facility])     Diabetes mellitus type 2, insulin dependent ([facility])     DARWIN on CPAP     History of sleeve gastrectomy     Pneumonia     Morbid obesity with BMI of 50.0-59.9, adult ([facility]     Coronary artery disease involving native heart without angina pectoris     Multinodular goiter     Bilateral renal cysts     Knee pain, left     Dyspnea     Atrial fibrillation (HCC)     Acute diastolic CHF (congestive heart failure), NYHA class 2 ([facility])     CKD (chronic kidney disease) stage 3, GFR 30-59 ml/min     Shortness of breath     Acute on chronic combined systolic (congestive) and diastolic (congestive) heart failure (HCC)     Chronic anemia     Paroxysmal A-fib ([facility])     Chronic kidney disease (CKD), stage III (moderate)     Nonischemic cardiomyopathy ([facility])     Mild tricuspid regurgitation     Debility     Dietary noncompliance     Long term current use of anticoagulant     Severe left ventricular systolic dysfunction     Anticoagulated on Coumadin     Metabolic encephalopathy     Acute cystitis without hematuria     Hyperkalemia      Subjective:   Admit Date: 1/3/2021    Interval History:   Seen for acute kidney injury and metabolic acidosis  Very awake and alert this morning  No complaint  Updated by the staff  No issues of concern      Medications:   Scheduled Meds:   warfarin (COUMADIN) daily dosing (placeholder)   Other RX Placeholder    sodium bicarbonate  1,300 mg Oral BID    allopurinol  100 mg Oral Daily    aspirin  81 mg Oral Daily    atorvastatin  40 mg Oral Nightly    folic acid  1 mg Oral Daily    metoprolol succinate  100 mg Oral Daily    insulin glargine  30 Units Subcutaneous BID    sodium chloride flush  10 mL Intravenous 2 times per day    levothyroxine  50 mcg Oral Daily    insulin lispro  0-6 Units Subcutaneous TID WC    insulin lispro  0-3 Units Subcutaneous Nightly     Continuous Infusions:   dextrose      sodium chloride 50 mL/hr at 01/05/21 1302       CBC:   Recent Labs     01/04/21  0749 01/05/21  0435 01/06/21  0350   WBC 5.8 5.5 5.5   HGB 10.6* 10.3* 10.4*    238 215     CMP:    Recent Labs     01/04/21  1825 01/05/21  0435 01/06/21  0350   * 138 134*   K 5.1 4.5 4.0    106 107   CO2 19* 23 19*   BUN 56* 52* 49*   CREATININE 2.2* 1.9* 1.6*   GLUCOSE 173* 95 72   CALCIUM 9.5 9.1 9.0   LABGLOM 22* 26* 32*     Troponin: No results for input(s): TROPONINI in the last 72 hours. BNP: No results for input(s): BNP in the last 72 hours. INR:   Recent Labs     01/03/21  1318 01/04/21  0749 01/05/21  0435   INR 4.51* 8.15* 4.67*     Lipids: No results for input(s): CHOL, LDLDIRECT, TRIG, HDL, AMYLASE, LIPASE in the last 72 hours. Liver:   Recent Labs     01/03/21  1318   AST 27   ALT 14   ALKPHOS 51   PROT 6.9   LABALBU 3.4*   BILITOT 0.9     Iron:  No results for input(s): IRONS, FERRITIN in the last 72 hours. Invalid input(s): LABIRONS  US RENAL COMPLETE   Final Result   No acute findings. 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 Nonemergent/incidental findings in the report. This document has been electronically signed by: Philippe Ahmadi MD on    01/04/2021 02:02 AM         XR CHEST PORTABLE   Final Result   1.  There

## 2021-01-06 NOTE — FLOWSHEET NOTE
01/06/21 0741   Provider Notification   Reason for Communication Review case   Provider Name Dr. Rockie Fothergill   Provider Notification Physician   Method of Communication Secure Message   Response See orders  (INR now & hold Lantus)   Notification Time 6613     Notified provider that patient's blood sugar was 103 last evening the night shift nurse did not give the patient any fast acting insulin and also held her Lantus 30 units. Her AM blood sugar is 74. Asked provider if he wants this RN to give or hold morning Lantus. Also notified that patient did not have an INR ordered this AM. Asked if he would like one drawn since it had been high the past two mornings. Provider ordered an INR now & he asked this RN to hold this AM's Lantus.

## 2021-01-06 NOTE — PROGRESS NOTES
Clinical Pharmacy Note    Warfarin consult follow-up    Recent Labs     01/06/21  0803   INR 2.25*     Recent Labs     01/04/21  0749 01/05/21  0435 01/06/21  0350   HGB 10.6* 10.3* 10.4*   HCT 35.4* 34.0* 34.0*    238 215       Significant Drug-Drug Interactions:  New warfarin drug-drug interactions: None  Discontinued drug-drug interactions: None  Current warfarin drug-drug interactions: allopurinol, aspirin, levothyroxine     Date INR Warfarin Dose   1/4/2021 8.15 Vitamin K 5 mg given   1/5/2021 4.67 No coumadin    1/6/2021 2.25  4 mg                                                 Notes:                     Daily PT/INR until stable within therapeutic range.      Tyrel Lofton PharmD  1/6/2021  4:15 PM

## 2021-01-07 PROBLEM — N17.9 AKI (ACUTE KIDNEY INJURY) (HCC): Status: ACTIVE | Noted: 2021-01-01

## 2021-01-07 NOTE — PROGRESS NOTES
INTERNAL MEDICINE Progress Note  1/7/2021 4:28 PM  Subjective:   Admit Date: 1/3/2021  PCP: ARNOL Cooney CNP  Interval History:   No new c/o  BM +  Objective:   Vitals: /73   Pulse 93   Temp 98.5 °F (36.9 °C) (Axillary)   Resp 18   Ht 5' 9\" (1.753 m)   Wt 268 lb (121.6 kg)   LMP 02/20/2001   SpO2 95%   BMI 39.58 kg/m²   General appearance: alert and cooperative with exam  HEENT: atraumatic  Neck: no JVD  Lungs: diminished breath sounds bilaterally  Heart: irregularly irregular rhythm, S1, S2 normal  Abdomen: soft, non-tender; bowel sounds normal; no masses,  no organomegaly  Extremities: no pitting edema,   Neurologic:  alert, orientation: date, person, place, affect: blunted, thought content exhibits logical connections      Medications:   Scheduled Meds:   warfarin  2.5 mg Oral Once    warfarin (COUMADIN) daily dosing (placeholder)   Other RX Placeholder    sodium bicarbonate  1,300 mg Oral BID    allopurinol  100 mg Oral Daily    aspirin  81 mg Oral Daily    atorvastatin  40 mg Oral Nightly    folic acid  1 mg Oral Daily    metoprolol succinate  100 mg Oral Daily    [Held by provider] insulin glargine  30 Units Subcutaneous BID    sodium chloride flush  10 mL Intravenous 2 times per day    levothyroxine  50 mcg Oral Daily    insulin lispro  0-6 Units Subcutaneous TID WC    insulin lispro  0-3 Units Subcutaneous Nightly     Continuous Infusions:   dextrose         Lab Results:   CBC:   Recent Labs     01/05/21  0435 01/06/21  0350   WBC 5.5 5.5   HGB 10.3* 10.4*    215     BMP:    Recent Labs     01/05/21  0435 01/06/21  0350 01/07/21  0343    134* 140   K 4.5 4.0 4.0  4.0    107 107   CO2 23 19* 20*   BUN 52* 49* 49*   CREATININE 1.9* 1.6* 1.4*   GLUCOSE 95 72 248*     INR:   Recent Labs     01/05/21  0435 01/06/21  0803 01/07/21  0344   INR 4.67* 2.25* 1.94*     Magnesium:    Lab Results   Component Value Date    MG 1.7 10/02/2020     HgBA1c:    Lab Results   Component Value Date    LABA1C 6.8 12/18/2020     TSH:    Lab Results   Component Value Date    TSH 0.087 01/03/2021     FOLATE:    Lab Results   Component Value Date    FOLATE > 20.0 12/15/2020     IRON:    Lab Results   Component Value Date    IRON 37 12/16/2020     FERRITIN:    Lab Results   Component Value Date    FERRITIN 275 12/16/2020           Assessment and Plan:   1. KEEGAN on CKD stage IV, improved  2. Hyperkalemia, resolved  3. HTN  4. Coumadin toxicity, improved  5. hypothyroidism  6. DARWIN  7. morbid obesity  8. DM 2, resume insulin      Cont warfarin,   cont synthroid  [place] CPAP  Stable for dc to SNF.   Cont PT/OT    Jarrod Up MD

## 2021-01-07 NOTE — PROGRESS NOTES
Clinical Pharmacy Note                                               Warfarin Discharge Recommendations      Pt discharged from [facility] today after admission for recurrent falls    INR today:  Recent Labs     01/07/21  0344   INR 1.94*         Interacting medications at discharge: allopurinol, aspirin, levothyroxine    INR goal during admission: 2-3    Recommendations for discharge:   Date Warfarin Dose   1/8/21 2.5 mg   1/9/21 3.75 mg   1/10/21 3.75 mg   1/11/21 Check INR     Provider dosing warfarin: [facility] Provider    Recheck INR:  1/11/21 with results to [facility] Provider

## 2021-01-07 NOTE — FLOWSHEET NOTE
01/07/21 1023   Encounter Summary   Services provided to: Patient   Referral/Consult From: Rounding   Continue Visiting Yes  (1/7)   Complexity of Encounter Moderate   Length of Encounter 15 minutes   Routine   Type Follow up   Assessment Calm; Approachable   Intervention Nurtured hope   Outcome Comfort   Assessment: In my encounter with the 76 yr old patient, while rounding  the unit 4K,  I provided spiritual care to patient through conversation, I also came to assess the patients spiritual needs present. The pt was admitted for hyperkalemia. Interventions:  I provided prayer, emotional support and words of comfort.  provided a listening presence and encouraged pt to share their beliefs and how they support him during their hospitalization. Outcomes: The patient was encouraged and didnt share any further spiritual needs at this time. The pt remains optimistic and hopeful. The pt shared that they were appreciative for the support. Plan:  1. Chaplains will follow-up at a later time for assessment of any spiritual care needs present. 2.   The Chaplains will be available to provide further emotional support per request.        3.    When discharged the pt will be going to [facility].

## 2021-01-07 NOTE — PROGRESS NOTES
Clinical Pharmacy Note    Warfarin consult follow-up    Recent Labs     01/07/21  0344   INR 1.94*     Recent Labs     01/05/21  0435 01/06/21  0350   HGB 10.3* 10.4*   HCT 34.0* 34.0*    215       Significant Drug-Drug Interactions:  New warfarin drug-drug interactions: None  Discontinued drug-drug interactions: Nonw  Current warfarin drug-drug interactions: allopurinol, aspirin, levothyroxine     Date INR Warfarin Dose   1/4/2021 8.15 Vitamin K 5 mg given   1/5/2021 4.67 No coumadin    1/6/2021 2.25  4 mg     1/7/2021  1.94 2.5 mg                                          Notes:                     Daily PT/INR until stable within therapeutic range.      Natalie Martinez, PharmD  1/7/2021  2:02 PM

## 2021-01-07 NOTE — PROGRESS NOTES
[facility] 59  PHYSICAL THERAPY MISSED TREATMENT NOTE  [facility] ICU STEPDOWN TELEMETRY 4K    Date: 2021  Patient Name: Arti Howell        MRN: [id number]   : 1955  (76 y.o.)  Gender: female   Referring Practitioner: Morrell Pallas, MD and Aurelia Arreola MD  Diagnosis: hyperkalemia         REASON FOR MISSED TREATMENT:  Missed treat due to pt refusal. Pt resting in bed with breakfast tray untouched. Refuses therapy. Offered up to chair for breakfast and pt refuses. Pt states she is tired and having pain this morning. Will try back as time allows. Pt probable discharge to SNF later today.

## 2021-01-07 NOTE — CARE COORDINATION
1/7/21, 3:28 PM EST  Anticipate discharge today to [facility]. She will be skilled under Medicare benefit. She will transport by ambulette. DANI provided the nurse with the phone number for report [phone] and fax number [phone]  Patient goals/plan/ treatment preferences discussed by  and . Patient goals/plan/ treatment preferences reviewed with patient/ family. Patient/ family verbalize understanding of discharge plan and are in agreement with goal/plan/treatment preferences. Understanding was demonstrated using the teach back method. AVS provided by RN at time of discharge, which includes all necessary medical information pertaining to the patients current course of illness, treatment, post-discharge goals of care, and treatment preferences.     Services After Discharge  Services At/After Discharge: Nursing Services, Aide Services, Skilled Therapy, In ambulette([facility])   IMM Letter  IMM Letter given to Patient/Family/Significant other/Guardian/POA/by[de-identified]   IMM Letter date given[de-identified] 01/07/21  IMM Letter time given[de-identified] [phone]

## 2021-01-07 NOTE — DISCHARGE INSTR - MEDS
Recommendations for discharge:   Date Warfarin Dose   1/8/21 3 mg   1/9/21 3.75 mg   1/10/21 3.75 mg   1/11/21 Check INR     Provider dosing warfarin: [facility] Provider    Recheck INR:  1/11/21 with results to [facility] Provider    Inpatient Warfarin dosing for Continuity of Care at the nursing home:  Date INR Warfarin Dose   1/4/2021 8.15 Vitamin K 5 mg given   1/5/2021 4.67 No coumadin    1/6/2021 2.25  4 mg    1/7/2021  1.94 2.5 mg

## 2021-01-07 NOTE — CARE COORDINATION
1/7/21, 10:08 AM EST    DISCHARGE PLANNING EVALUATION      SW informed patient that a referral was made to [facility] at her request and that they would [facility] room for her\". This made her smile. DANI advised that she could be discharged today.

## 2021-01-07 NOTE — PROGRESS NOTES
Nephrology Progress Note    Patient - Balaji Luu   MRN -  [id number]   Acct # - [de-identified]      - 1955    76 y.o. Admit Date: 1/3/2021  Hospital Day: 4  Location: --A  Date of evaluation -  2021    Subjective:   CC: fall  Denies shortness of breath on Room air   UOP 1400+ voids  Tired today  BP ok  BP Range: Systolic (05SHV), QVK:682 , Min:133 , XVL:907      Diastolic (82PJK), DKS:42, Min:63, Max:99    Objective:   VITALS:  /63   Pulse 89   Temp 98.4 °F (36.9 °C) (Oral)   Resp 16   Ht 5' 9\" (1.753 m)   Wt 268 lb (121.6 kg)   LMP 2001   SpO2 97%   BMI 39.58 kg/m²    Patient Vitals for the past 24 hrs:   BP Temp Temp src Pulse Resp SpO2 Weight   21 0618 -- -- -- -- -- -- 268 lb (121.6 kg)   21 0600 -- -- -- -- -- -- 264 lb (119.7 kg)   21 0300 136/63 98.4 °F (36.9 °C) Oral 89 16 97 % --   21 2330 (!) 133/91 98.4 °F (36.9 °C) Oral 93 16 99 % --   21 2100 (!) 157/70 97.6 °F (36.4 °C) Oral 102 16 100 % --   21 1515 (!) 137/99 97.4 °F (36.3 °C) Oral 100 16 98 % --   21 1442 -- -- -- -- -- 98 % --   21 1129 (!) 145/82 98.3 °F (36.8 °C) Oral 107 18 98 % --          Intake/Output Summary (Last 24 hours) at 2021 0832  Last data filed at 2021 0305  Gross per 24 hour   Intake 2256.49 ml   Output 1400 ml   Net 856.49 ml       Admission weight: 256 lb (116.1 kg)  Patient Vitals for the past 96 hrs (Last 3 readings):   Weight   21 0618 268 lb (121.6 kg)   21 0600 264 lb (119.7 kg)   21 0328 272 lb 12.8 oz (123.7 kg)     Body mass index is 39.58 kg/m². EXAM:  CONSTITUTIONAL:  No acute distress. Pleasant  HEENT:  Head is normocephalic, Extraocular movement intact. Neck is supple. Voice is clear. CARDIOVASCULAR:  S1, S2  regular rate and rhythm. RESPIRATORY: Clear to ausculation bilaterally. Equal breath sounds. No wheezes.  No shortness of breath noted at rest.  ABDOMEN: soft, non tender  NEUROLOGICAL: Patient is alert and oriented to person, place, and time. Recent and remote memory intact. Thought is coherant. SKIN: no rash, No significant bruises on exposed surfaces  MUSCULOSKELETAL: Movement is coordinated. Moves all extremities   EXTREMITIES: Distal lower extremity temp is warm, trace lower extremity edema. PSYCHIATRIC: mood and affect appropriate. Medications:   Med reviewed  Scheduled Meds:   warfarin (COUMADIN) daily dosing (placeholder)   Other RX Placeholder    sodium bicarbonate  1,300 mg Oral BID    allopurinol  100 mg Oral Daily    aspirin  81 mg Oral Daily    atorvastatin  40 mg Oral Nightly    folic acid  1 mg Oral Daily    metoprolol succinate  100 mg Oral Daily    [Held by provider] insulin glargine  30 Units Subcutaneous BID    sodium chloride flush  10 mL Intravenous 2 times per day    levothyroxine  50 mcg Oral Daily    insulin lispro  0-6 Units Subcutaneous TID WC    insulin lispro  0-3 Units Subcutaneous Nightly     Continuous Infusions:   dextrose      sodium chloride 50 mL/hr at 01/06/21 1325     PRN Meds glucose, dextrose, glucagon (rDNA), dextrose, sodium chloride flush, acetaminophen, dextrose   Labs:   Labs reviewed  Recent Labs     01/05/21  0435 01/06/21  0350   WBC 5.5 5.5   RBC 3.36* 3.35*   HGB 10.3* 10.4*   HCT 34.0* 34.0*   .2* 101.5*   MCH 30.7 31.0    215       Recent Labs     01/05/21  0435 01/06/21  0350 01/07/21  0343    134* 140   K 4.5 4.0 4.0  4.0    107 107   CO2 23 19* 20*   BUN 52* 49* 49*   CREATININE 1.9* 1.6* 1.4*   LABGLOM 26* 32* 38*   GLUCOSE 95 72 248*   CALCIUM 9.1 9.0 8.5      Summary 10/9/2019   limited echo   Ejection fraction is visually estimated at 30-35%. There was moderate global hypokinesis of the left ventricle. Us Renal Complete  Result Date: 1/4/2021  No acute findings. 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 Nonemergent/incidental findings in the report.     ASSESSMENT:  · Acute Kidney Injury

## 2021-01-07 NOTE — DISCHARGE SUMMARY
Discharge Summary    Alexandra Rodriguez  :  1955  MRN:  [id number]    Admit date:  1/3/2021  Discharge date:      Admitting Physician:  Fransico Nieto MD    Discharge Diagnoses:    1. KEEGAN on CKD stage IV, improved  2. Hyperkalemia, resolved  3. HTN  4. Coumadin toxicity, improved  5. hypothyroidism  6. DARWIN  7. morbid obesity  8. DM 2,           Admission Condition:  serious  Discharged Condition:  good    Hospital Course:   Patient responded to IV hydration, renal function improved and stabilized. Initiated PT/OT and was discharged to SNF to continue therapy.     Discharge Medications:       Rebecca Scherer   [place] Medication Instructions EIB:[id number]    Printed on:21 5323   Medication Information                      acetaminophen (TYLENOL) 500 MG tablet  Take 1 tablet by mouth 4 times daily as needed for Pain             alendronate (FOSAMAX) 70 MG tablet  Take 1 tablet by mouth every 7 days             allopurinol (ZYLOPRIM) 100 MG tablet  Take 100 mg by mouth daily             aspirin (RA ASPIRIN EC) 81 MG EC tablet  Take 1 tablet by mouth daily             atorvastatin (LIPITOR) 40 MG tablet  take 1 tablet by mouth at bedtime             Calcium Carbonate-Vitamin D (CALCIUM-VITAMIN D) 500-200 MG-UNIT per tablet  Take 1 tablet by mouth 2 times daily (with meals)              dexlansoprazole (DEXILANT) 60 MG CPDR delayed release capsule  Take 1 capsule by mouth daily             digoxin (LANOXIN) 125 MCG tablet  Take 0.5 tablets by mouth daily             Dulaglutide (TRULICITY SC)  Inject 0.99 mg into the skin once a week Usually on              DULoxetine (CYMBALTA) 60 MG extended release capsule  take 1 capsule by mouth once daily             fenofibrate (TRICOR) 145 MG tablet  take 1 tablet by mouth once daily             folic acid (FOLVITE) 1 MG tablet  take 1 tablet by mouth once daily             gabapentin (NEURONTIN) 100 MG capsule  take 1 capsule by mouth three times a day insulin glargine (LANTUS) 100 UNIT/ML injection vial  Inject 10 Units into the skin nightly             insulin lispro (HUMALOG) 100 UNIT/ML injection vial  Inject 0-3 Units into the skin nightly             insulin lispro (HUMALOG) 100 UNIT/ML injection vial  Inject 0-6 Units into the skin 3 times daily (with meals)             levothyroxine (SYNTHROID) 50 MCG tablet  Take 1 tablet by mouth Daily             lidocaine (LIDODERM) 5 %  Place 1 patch onto the skin daily for 10 days 12 hours on, 12 hours off.             losartan (COZAAR) 100 MG tablet  Take 1 tablet by mouth daily Hold for sbp < 110             meclizine (ANTIVERT) 25 MG CHEW  Take 1 tablet by mouth 3 times daily as needed (vertigo)             metoprolol succinate (TOPROL XL) 100 MG extended release tablet  Take 100 mg by mouth daily             nystatin (MYCOSTATIN) 288323 UNIT/GM powder  Apply 3 times daily. RA VITAMIN B-12 100 MCG tablet  take 1 tablet by mouth once daily             sodium bicarbonate 650 MG tablet  Take 2 tablets by mouth 2 times daily             warfarin (COUMADIN) 3 MG tablet  Take 1 tablet by mouth daily As directed by [facility].  30 days = 45 tabs                 Consults:  none    Significant Diagnostic Studies: labs:  CBC:        Recent Labs     01/05/21  0435 01/06/21  0350   WBC 5.5 5.5   HGB 10.3* 10.4*    215      BMP:          Recent Labs     01/05/21  0435 01/06/21  0350 01/07/21  0343    134* 140   K 4.5 4.0 4.0  4.0    107 107   CO2 23 19* 20*   BUN 52* 49* 49*   CREATININE 1.9* 1.6* 1.4*   GLUCOSE 95 72 248*      INR:         Recent Labs     01/05/21  0435 01/06/21  0803 01/07/21  0344   INR 4.67* 2.25* 1.94*      Magnesium:    Lab Results   Component Value Date     MG 1.7 10/02/2020      HgBA1c:          Lab Results   Component Value Date     LABA1C 6.8 12/18/2020      TSH:          Lab Results   Component Value Date     TSH 0.087 01/03/2021      FOLATE: Lab Results   Component Value Date     FOLATE > 20.0 12/15/2020      IRON:          Lab Results   Component Value Date     IRON 37 12/16/2020      FERRITIN:          Lab Results   Component Value Date     FERRITIN 275 12/16/2020               Assessment and Plan:   9. KEEGAN on CKD stage IV, improved  10. Hyperkalemia, resolved  11. HTN  12. Coumadin toxicity, improved  13. hypothyroidism  14. DARWIN  15. morbid obesity  16. DM 2,      Cont warfarin,   cont synthroid  [place] CPAP  Stable for dc to SNF. Cont PT/OT    Treatments:   Hydration, treated hyperkalemia, adjusted insulin regimen.  PT/OT, treated coumadin toxicity    Disposition:   SNF    Signed:  Julius Min  1/7/2021, 4:38 PM

## 2021-01-07 NOTE — DISCHARGE INSTR - COC
Continuity of Care Form    Patient Name: Manjinder West   :  1955  MRN:  [id number]    Admit date:  1/3/2021  Discharge date:  2021    Code Status Order: Full Code   Advance Directives:   Advance Care Flowsheet Documentation       Date/Time Healthcare Directive Type of Healthcare Directive Copy in [address] Agent's Name Healthcare Agent's Phone Number    21 1708  --  --  --  --  --  [phone]    216  Yes, patient has an advance directive for healthcare treatment  Durable power of  for health care  Yes, copy in chart  Healthcare power of   Jasminолег Cuadra  --            Admitting Physician:  Izaiah Horton MD  PCP: ARNOL Tierney - CNP    Discharging Nurse: [facility] Unit/Room#: 4K-05/005-A  Discharging Unit Phone Number: [phone]    Emergency Contact:   Extended Emergency Contact Information  Primary Emergency Contact: [facility]  Address: [address] [place] [address] Dr NEGRON [address] [place] [address] Phone: [phone]  Mobile Phone: [phone]  Relation: Brother/Sister  Hearing or visual needs: None  Other needs: None  Preferred language: English   needed? No  Secondary Emergency Contact: Jeffrey Schafer, [address] [place] [address] Phone: [phone]  Mobile Phone: [phone]  Relation: Brother/Sister  Preferred language: English   needed?  No    Past Surgical History:  Past Surgical History:   Procedure Laterality Date    BACK SURGERY      xs 2    CATARACT REMOVAL Right 14    Dr. Kylie Farris EKG 12-LEAD  2015         FOOT SURGERY      left foot    HYSTERECTOMY, TOTAL ABDOMINAL      MOUTH BIOPSY  12    left tongue and lingual tonsil    OTHER SURGICAL HISTORY  2014    LEFT FEMUR RETROGRADE NAILING    OTHER SURGICAL HISTORY  2015    HARDWARE REMOVAL LEFT FEMUR, INSERTION OF ANTIBIOTIC SPACER    PATELLA SURGERY  13    fractues rt patella     MO COLON CA SCRN NOT HI RSK IND Left 1/24/2018    COLONOSCOPY performed by Lewanda Lesch, MD at [address] [facility] TAG REMOVAL  9/25/12    mole removal    SLEEVE GASTRECTOMY  09/15/2015    Robotic    TOENAIL EXCISION      removal of great toe nails bilateral-still has toenails-had ingrown toenails and had trimmed out     TONSILLECTOMY      UPPER GASTROINTESTINAL ENDOSCOPY         Immunization History:   Immunization History   Administered Date(s) Administered    PPD Test 07/01/2013, 07/12/2013    Pneumococcal Conjugate 13-valent ([id number]) 04/10/2019    Pneumococcal Polysaccharide ([id number]) 09/21/2015    Tdap (Boostrix, Adacel) 11/07/2017       Active Problems:  Patient Active Problem List   Diagnosis Code    Diabetes mellitus ([facility] E11.9    Hypertension I10    Hyperlipidemia E78.5    Neuropathy G62.9    Osteoarthritis M19.90    Proteinuria R80.9    Arthritis M19.90    Morbid obesity ([facility] E66.01    Allergic rhinitis J30.9    Chronic kidney disease, stage 4 (severe) ([facility]) N18.4    Diabetes mellitus type 2, insulin dependent ([facility]) E11.9, Z79.4    DARWIN on CPAP G47.33, Z99.89    History of sleeve gastrectomy Z90.3    Pneumonia J18.9    Morbid obesity with BMI of 50.0-59.9, adult ([facility]) E66.01, Z68.43    Coronary artery disease involving native heart without angina pectoris I25.10    Multinodular goiter E04.2    Bilateral renal cysts N28.1    Knee pain, left M25.562    Dyspnea R06.00    Atrial fibrillation ([facility]) I48.91    Acute diastolic CHF (congestive heart failure), NYHA class 2 ([facility]) I50.31    CKD (chronic kidney disease) stage 3, GFR 30-59 ml/min N18.30    Shortness of breath R06.02    Acute on chronic combined systolic (congestive) and diastolic (congestive) heart failure ([facility]) I50.43    Chronic anemia D64.9    Paroxysmal A-fib ([facility]) I48.0    Chronic kidney disease (CKD), stage III (moderate) N18.30    Nonischemic cardiomyopathy ([facility]) I42.8    Mild 01/03/21 1732   Dressing/Treatment Open to air 01/07/21 1100   Wound Assessment Other (Comment) 01/07/21 0300   Danielle-wound Assessment Fragile 01/07/21 0300   Number of days: 3        Elimination:  Continence:   · Bowel: Yes  · Bladder: Yes  Urinary Catheter: None   Colostomy/Ileostomy/Ileal Conduit: No       Date of Last BM: 1-8-2021    Intake/Output Summary (Last 24 hours) at 1/7/2021 1503  Last data filed at 1/7/2021 1015  Gross per 24 hour   Intake 1566.59 ml   Output 1050 ml   Net 516.59 ml     I/O last 3 completed shifts: In: 1566.6 [P.O.:850; I.V.:716.6]  Out: 1050 [Urine:1050]    Safety Concerns: At Risk for Falls    Impairments/Disabilities:      None    Nutrition Therapy:  Current Nutrition Therapy:   - Oral Diet:  Carb Control 4 carbs/meal (1800kcals/day), Renal, Low Sodium (2gm) and low potassium    Routes of Feeding: Oral  Liquids: Thin Liquids  Daily Fluid Restriction: no  Last Modified Barium Swallow with Video (Video Swallowing Test):     Treatments at the Time of Hospital Discharge:   Respiratory Treatments: See attached med rec  Oxygen Therapy:  is not on home oxygen therapy. Ventilator:    - CPAP   device from home    Rehab Therapies: Physical Therapy and Occupational Therapy  Weight Bearing Status/Restrictions: No weight bearing restirctions  Other Medical Equipment (for information only, NOT a DME order):     Other Treatments:     Patient's personal belongings (please select all that are sent with patient):      RN SIGNATURE:  Electronically signed by Leilani Cisneros RN on 1/7/21 at 11:03 AM EST    CASE MANAGEMENT/SOCIAL WORK SECTION    Inpatient Status Date:  01/03/2021    Readmission Risk Assessment Score:  Readmission Risk              Risk of Unplanned Readmission:        31           Discharging to Facility/ Agency   · Name:  [facility]  · Address:  [address] [place], [address] [place] [address]  · Phone:  [phone]  · Fax:  [phone]    Dialysis Facility (if applicable)

## 2021-01-08 NOTE — PROGRESS NOTES
Renal Progress Note    Assessment and Plan:    1. Acute kidney injury improved and stable. 2.  Metabolic acidosis improved  3. Hyponatremia resolved  4. Deconditioning  5. Diabetes mellitus type 2 with renal manifestations  6.   Atrial fibrillation with controlled rate    PLAN:   Labs reviewed  Serum creatinine is improved and stable  Serum bicarbonate is improved from yesterday  Medications reviewed  No changes  Labs in the morning if not discharged today  We will follow    Patient Active Problem List:     Diabetes mellitus ([facility]     Hypertension     Hyperlipidemia     Neuropathy     Osteoarthritis     Proteinuria     Arthritis     Morbid obesity ([facility]     Allergic rhinitis     Chronic kidney disease, stage 4 (severe) ([facility])     Diabetes mellitus type 2, insulin dependent (HCC)     DARWIN on CPAP     History of sleeve gastrectomy     Pneumonia     Morbid obesity with BMI of 50.0-59.9, adult ([facility]     Coronary artery disease involving native heart without angina pectoris     Multinodular goiter     Bilateral renal cysts     Knee pain, left     Dyspnea     Atrial fibrillation (HCC)     Acute diastolic CHF (congestive heart failure), NYHA class 2 ([facility])     CKD (chronic kidney disease) stage 3, GFR 30-59 ml/min     Shortness of breath     Acute on chronic combined systolic (congestive) and diastolic (congestive) heart failure (HCC)     Chronic anemia     Paroxysmal A-fib (HCC)     Chronic kidney disease (CKD), stage III (moderate)     Nonischemic cardiomyopathy ([facility])     Mild tricuspid regurgitation     Debility     Dietary noncompliance     Long term current use of anticoagulant     Severe left ventricular systolic dysfunction     Anticoagulated on Coumadin     Metabolic encephalopathy     Acute cystitis without hematuria     Hyperkalemia      Subjective:   Admit Date: 1/3/2021    Interval History:   Seen for acute kidney injury   Comfortably asleep this morning  Updated by the staff  No issues of concern  Blood pressure is stable  Urine output is not documented      Medications:   Scheduled Meds:   warfarin (COUMADIN) daily dosing (placeholder)   Other RX Placeholder    sodium bicarbonate  1,300 mg Oral BID    allopurinol  100 mg Oral Daily    aspirin  81 mg Oral Daily    atorvastatin  40 mg Oral Nightly    folic acid  1 mg Oral Daily    metoprolol succinate  100 mg Oral Daily    [Held by provider] insulin glargine  30 Units Subcutaneous BID    sodium chloride flush  10 mL Intravenous 2 times per day    levothyroxine  50 mcg Oral Daily    insulin lispro  0-6 Units Subcutaneous TID WC    insulin lispro  0-3 Units Subcutaneous Nightly     Continuous Infusions:   dextrose         CBC:   Recent Labs     01/06/21  0350   WBC 5.5   HGB 10.4*        CMP:    Recent Labs     01/06/21  0350 01/07/21  0343 01/08/21  0350   * 140 137   K 4.0 4.0  4.0 4.1    107 104   CO2 19* 20* 22*   BUN 49* 49* 45*   CREATININE 1.6* 1.4* 1.4*   GLUCOSE 72 248* 223*   CALCIUM 9.0 8.5 8.3*   LABGLOM 32* 38* 38*     Troponin: No results for input(s): TROPONINI in the last 72 hours. BNP: No results for input(s): BNP in the last 72 hours. INR:   Recent Labs     01/06/21  0803 01/07/21  0344 01/08/21  0350   INR 2.25* 1.94* 1.89*     Lipids: No results for input(s): CHOL, LDLDIRECT, TRIG, HDL, AMYLASE, LIPASE in the last 72 hours. Liver:   No results for input(s): AST, ALT, ALKPHOS, PROT, LABALBU, BILITOT in the last 72 hours. Invalid input(s): BILDIR  Iron:  No results for input(s): IRONS, FERRITIN in the last 72 hours. Invalid input(s): LABIRONS  US RENAL COMPLETE   Final Result   No acute findings. 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 Nonemergent/incidental findings in the report. This document has been electronically signed by: Odessa Holguin MD on    01/04/2021 02:02 AM         XR CHEST PORTABLE   Final Result   1. There is no acute cardiopulmonary process.             **This report has

## 2021-01-08 NOTE — CARE COORDINATION
1/8/21, 8:34 AM EST  Discharged today to [facility], see full note from yesterday  Patient goals/plan/ treatment preferences discussed by  and . Patient goals/plan/ treatment preferences reviewed with patient/ family. Patient/ family verbalize understanding of discharge plan and are in agreement with goal/plan/treatment preferences. Understanding was demonstrated using the teach back method. AVS provided by RN at time of discharge, which includes all necessary medical information pertaining to the patients current course of illness, treatment, post-discharge goals of care, and treatment preferences.     Services After Discharge  Services At/After Discharge: Nursing Services, Aide Services, Skilled Therapy, [facility]   IMM Letter  IMM Letter given to Patient/Family/Significant other/Guardian/POA/by[de-identified]   IMM Letter date given[de-identified] 01/07/21  IMM Letter time given[de-identified] [phone] [id number]

## 2021-01-08 NOTE — PROGRESS NOTES
Patients sister Toyin Rascon called and updated that patient will be leaving at 9am this morning. Stated that was ok and asked for me to give the patient her phone number so she could call when she got there. This RN did as she asked.

## 2021-01-08 NOTE — PROGRESS NOTES
Updated AVS and last dose [facility] faxed to URIEL Parada at both [phone] and [phone] due to both being written on stickey note.

## 2021-01-08 NOTE — PROGRESS NOTES
[facility] now planning transport for tonight at 6 PM, [facility]. Patient's sister updated at this time. 1945 - Patient now refusing discharge tonight stating \"that's too late\". [facility] notified and transport set up for tomorrow at [address] updated and agreeable.

## 2021-01-11 NOTE — CARE COORDINATION
Patient discharged to [facility] on 1/8/2021.     Sarah Sequeira LPN    [phone]  Rocio Larry / Edwin  Coordinator

## 2021-01-19 NOTE — PROGRESS NOTES
[facility]      Jeremías Gutiérrez         [id number]  1/19/2021   Chief Complaint   Patient presents with    Follow-up     DARWIN 1 yr f/u with download        Pt of Dr. Carmelina Chavez    PAP Download:   Original or initial AHI: 93     Date of initial study: 6/4/2008      Compliant  90%     Noncompliant 3 %     PAP Type CPAP Level  14   Avg Hrs/Day 8 hr 52 min  AHI: 3.8   Recorded compliance dates ,   12/16/20-1/14/21   Machine/Mfg:   [x] ResMed    [] Respironics/Dreamstation   Interface:   [x] Nasal    [] Nasal pillows   [] FFM      Provider:      [x] [facility]     []Harriet     [] Gail    [] Fabrizio Emmanuel    [] Shankar               [] [facility]      [] Adaptive    [] [address]      [] Other    Neck Size: 16  Mallampati Mallampati 4  ESS:  3  SAQLI: 72    Here is a scan of the most recent download:            Presentation:   Chriss Grande presents for sleep medicine follow up for obstructive sleep apnea  Since the last visit, Chriss Grande is doing well with PAP. She is sleeping well and feels rested. Mask is fitting well. Currently residing in [facility] for therapy. She has dry mouth during the day but not at night. Equipment issues: The pressure is  acceptable, the mask is acceptable     Sleep issues:  Do you feel better? Yes  More rested? Yes   Better concentration? yes    Progress History:   Since last visit any new medical issues? UTI  New ER or hospitlal visits? Yes  Any new or changes in medicines? No  Any new sleep medicines?  No        Past Medical History:   Diagnosis Date    CAD (coronary artery disease)     CRI (chronic renal insufficiency)     Difficult intubation     excess skin in back of throat     DM (diabetes mellitus) ([facility] 1994    GERD (gastroesophageal reflux disease)     H/O gastric bypass     Hyperlipidemia     Hypertension     Hypothyroidism     Neuropathy     Obesity     Osteoarthritis     Paroxysmal atrial fibrillation ([address]  Prolonged emergence from general anesthesia     Sleep apnea     uses cpap    Visit for screening mammogram        Past Surgical History:   Procedure Laterality Date    BACK SURGERY      xs 2    CATARACT REMOVAL Right 14    Dr. Dee Dee Marc EKG 12-LEAD  2015         FOOT SURGERY      left foot    HYSTERECTOMY, TOTAL ABDOMINAL      MOUTH BIOPSY  12    left tongue and lingual tonsil    OTHER SURGICAL HISTORY  2014    LEFT FEMUR RETROGRADE NAILING    OTHER SURGICAL HISTORY  2015    HARDWARE REMOVAL LEFT FEMUR, INSERTION OF ANTIBIOTIC SPACER    PATELLA SURGERY  13    fractues rt patella     [place] COLON CA SCRN NOT HI RSK IND Left 2018    COLONOSCOPY performed by Alicia [facility]ilario [facility]    SKIN TAG REMOVAL  12    mole removal    SLEEVE GASTRECTOMY  09/15/2015    Robotic    TOENAIL EXCISION      removal of great toe nails bilateral-still has toenails-had ingrown toenails and had trimmed out     TONSILLECTOMY      UPPER GASTROINTESTINAL ENDOSCOPY         Social History     Tobacco Use    Smoking status: Former Smoker     Packs/day: 1.50     Years: 20.00     Pack years: 30.00     Quit date: 2007     Years since quittin.9    Smokeless tobacco: Never Used   Substance Use Topics    Alcohol use: No     Alcohol/week: 0.0 standard drinks    Drug use: No       No Known Allergies    Current Outpatient Medications   Medication Sig Dispense Refill    warfarin (COUMADIN) 3 MG tablet Take 1 tablet by mouth daily As directed by [facility].  30 days = 45 tabs      losartan (COZAAR) 100 MG tablet Take 1 tablet by mouth daily Hold for sbp < 110 90 tablet 3    insulin glargine (LANTUS) 100 UNIT/ML injection vial Inject 10 Units into the skin nightly 1 vial 3    insulin lispro (HUMALOG) 100 UNIT/ML injection vial Inject 0-3 Units into the skin nightly 1 vial 3  insulin lispro (HUMALOG) 100 UNIT/ML injection vial Inject 0-6 Units into the skin 3 times daily (with meals) 1 vial 3    sodium bicarbonate 650 MG tablet Take 2 tablets by mouth 2 times daily      acetaminophen (TYLENOL) 500 MG tablet Take 1 tablet by mouth 4 times daily as needed for Pain 120 tablet 0    levothyroxine (SYNTHROID) 50 MCG tablet Take 1 tablet by mouth Daily 30 tablet 3    DULoxetine (CYMBALTA) 60 MG extended release capsule take 1 capsule by mouth once daily 90 capsule 3    gabapentin (NEURONTIN) 100 MG capsule take 1 capsule by mouth three times a day 270 capsule 0    dexlansoprazole (DEXILANT) 60 MG CPDR delayed release capsule Take 1 capsule by mouth daily 90 capsule 3    nystatin (MYCOSTATIN) 493642 UNIT/GM powder Apply 3 times daily.  60 g 2    fenofibrate (TRICOR) 145 MG tablet take 1 tablet by mouth once daily 90 tablet 3    atorvastatin (LIPITOR) 40 MG tablet take 1 tablet by mouth at bedtime 90 tablet 3    RA VITAMIN B-12 100 MCG tablet take 1 tablet by mouth once daily 90 tablet 3    alendronate (FOSAMAX) 70 MG tablet Take 1 tablet by mouth every 7 days (Patient taking differently: Take 70 mg by mouth every 7 days Indications: usually on mondays ) 12 tablet 3    Dulaglutide (TRULICITY SC) Inject 8.44 mg into the skin once a week Usually on mondays      folic acid (FOLVITE) 1 MG tablet take 1 tablet by mouth once daily 90 tablet 4    aspirin (RA ASPIRIN EC) 81 MG EC tablet Take 1 tablet by mouth daily 30 tablet 3    metoprolol succinate (TOPROL XL) 100 MG extended release tablet Take 100 mg by mouth daily      digoxin (LANOXIN) 125 MCG tablet Take 0.5 tablets by mouth daily 30 tablet 0    allopurinol (ZYLOPRIM) 100 MG tablet Take 100 mg by mouth daily      meclizine (ANTIVERT) 25 MG CHEW Take 1 tablet by mouth 3 times daily as needed (vertigo) 90 tablet 0  Calcium Carbonate-Vitamin D (CALCIUM-VITAMIN D) 500-200 MG-UNIT per tablet Take 1 tablet by mouth 2 times daily (with meals)        No current facility-administered medications for this visit. Family History   Problem Relation Age of Onset    Asthma Sister     Asthma Brother     Heart Disease Father     High Blood Pressure Other     Cancer Maternal Uncle         stomach    Diabetes Maternal Grandmother     High Blood Pressure Maternal Grandmother     Diabetes Maternal Grandfather     Stroke Neg Hx         Review of Systems -   Review of Systems   Constitutional: Negative for activity change, appetite change, chills and fever. HENT: Negative for congestion and postnasal drip. Dry mouth   Eyes: Negative. Respiratory: Negative for cough, chest tightness, shortness of breath, wheezing and stridor. Cardiovascular: Negative for chest pain and leg swelling. Gastrointestinal: Negative for diarrhea and nausea. Endocrine: Negative. Genitourinary: Negative. Musculoskeletal: Negative. Negative for arthralgias and back pain. Skin: Negative. Allergic/Immunologic: Negative. Neurological: Negative. Negative for dizziness and light-headedness. Psychiatric/Behavioral: Negative. Memory loss   All other systems reviewed and are negative. Physical Exam:    BMI:  Body mass index is 39.78 kg/m². Wt Readings from Last 3 Encounters:   01/19/21 269 lb 6.4 oz (122.2 kg)   01/08/21 270 lb 9.6 oz (122.7 kg)   12/30/20 253 lb (114.8 kg)     Weight gained 16 lbs over 2 months  Vitals: /74 (Site: Left Lower Arm, Position: Sitting, Cuff Size: Medium Adult)   Pulse 76   Temp 97.1 °F (36.2 °C)   Ht 5' 9\" (1.753 m)   Wt 269 lb 6.4 oz (122.2 kg)   LMP 02/20/2001   SpO2 93% Comment: on room air  BMI 39.78 kg/m²       Physical Exam  Constitutional:       Appearance: She is well-developed. HENT:      Head: Normocephalic and atraumatic. Right Ear: External ear normal.      Left Ear: External ear normal.   Eyes:      Conjunctiva/sclera: Conjunctivae normal.      Pupils: Pupils are equal, round, and reactive to light. Neck:      Musculoskeletal: Normal range of motion and neck supple. Cardiovascular:      Rate and Rhythm: Normal rate and regular rhythm. Heart sounds: Normal heart sounds. Pulmonary:      Effort: Pulmonary effort is normal.      Breath sounds: Normal breath sounds. Musculoskeletal: Normal range of motion. Skin:     General: Skin is warm and dry. Neurological:      Mental Status: She is alert and oriented to person, place, and time. Psychiatric:         Behavior: Behavior normal.         Thought Content: Thought content normal.         Judgment: Judgment normal.           ASSESSMENT/DIAGNOSIS     Diagnosis Orders   1. DARWIN on CPAP     2. Obesity (BMI 30-39. 9)              Plan   Do you need any equipment today? Yes   - Dry mouth during the day so not likely related to DARWIN- likely medications, ? DM ? Sjogrens   - Download reviewed and discussed with patient  - She  was advised to continue current positive airway pressure therapy with above described pressure. - She  advised to keep good compliance with current recommended pressure to get optimal results and clinical improvement  - Recommend 7-9 hours of sleep with PAP  - She was advised to call [employer] regarding supplies if needed.   -She call my office for earlier appointment if needed for worsening of sleep symptoms.   - She was instructed on weight loss  - Susanne Espana was educated about my impression and plan. Patient verbalizesunderstanding.   We will see Carlo Novaking back in: 1 year with download    Information added by my medical assistant/LPN was reviewed today         Vero Adams PA-C, MCKAYLA  1/19/2021       Total time for visit was 30 min

## 2021-01-25 PROBLEM — R41.82 ALTERED MENTAL STATUS: Status: ACTIVE | Noted: 2021-01-01

## 2021-01-25 NOTE — ED NOTES
Bed: 011A  Expected date: 1/25/21  Expected time: 2:44 PM  Means of arrival: [facility] Dept  Comments:     Karla Morgan RN  01/25/21 5987

## 2021-01-25 NOTE — ED PROVIDER NOTES
[facility]  eMERGENCY dEPARTMENT eNCOUnter          CHIEF COMPLAINT       Chief Complaint   Patient presents with    Altered Mental Status       Nurses Notes reviewed and I agree except as noted inthe HPI. HISTORY OF PRESENT ILLNESS    Lucian Zimmerman is a 76 y.o. female with a history of A. Fib, HTN, hyperlipidemia, T2DM, CAD, chronic kidney disease, anemia and hypothyroidism who presents to the Emergency Department by EMS for the evaluation of altered mental status. Patient was recently admitted to the hospital on 01/03 for acute delirium, lactic acidosis, dehydration and hyperkalemia. She is unsure why she is here today but stated that the nursing home workers said she hasn't been acting like herself recently. She has been having memory issues the past 2-3 years but stated the past 2-3 weeks it has worsened and she is now having trouble putting her thoughts into words. She also expresses some word finding difficulty which has been an ongoing problem but worsened recently. Also states she has been more tired than usual, causing her to take a nap midday and sleep through dinner and wake up confused around 4:30am. Patient states the nursing home workers give her her medications everyday and that she has been compliant. Denies fever, chills, shortness of breath, cough, chest pain, frequency, dysuria. Stated her body has been twitching recently as well. Denies HA, numbness, paresthesia, weakness. Patient is on Warfarin. Supplemental information from Brandon Hathaway, the patient's sister with whom she lived prior to her recent nursing home transfer, was at the patient has had increasing confusion over the past couple of weeks. It did temporarily improve after treatment for UTI but has recurred. She reports that the patient seemed to be doing okay upon placement to the nursing home until she had a couple of falls last weekend.   The sister states that she had 1 reported fall when she was sitting on the edge of the bed and when she tried to move, grabbed the nightstand which was on rollers and fell onto the trash can. She also had a reported fall when she was in the shower by herself and had an x-ray after without any evidence of fracture and was diagnosed with contusions. Sister reports that today the patient thought it was September and did not know the date which is unusual for her as she typically knows this information. The sister reports that the patient was started on an antibiotic recently but she is unsure of why. Jeimy Calvillo reports that that her mother had a brain tumor which was diagnosed around the same age as the patient. Jeimy Calvillo was not informed of the patient having any head injury during her falls. The HPI was provided by the patient. REVIEW OF SYSTEMS     Review of Systems   Constitutional: Positive for fatigue. Negative for chills, diaphoresis, fever and unexpected weight change. HENT: Negative for congestion and rhinorrhea. Positive for dry mouth. Respiratory: Negative for cough, shortness of breath and wheezing. Cardiovascular: Negative for chest pain. Gastrointestinal: Negative for abdominal pain, constipation, diarrhea, nausea and vomiting. Genitourinary: Positive for decreased urine volume. Negative for dysuria, flank pain, frequency and urgency. Skin: Negative for color change and rash. Neurological: Negative for weakness, light-headedness, numbness and headaches. Psychiatric/Behavioral: Positive for confusion.        PAST MEDICAL HISTORY    has a past medical history of CAD (coronary artery disease), CRI (chronic renal insufficiency), Difficult intubation, DM (diabetes mellitus) ([address] GERD (gastroesophageal reflux disease), H/O gastric bypass, Hyperlipidemia, Hypertension, Hypothyroidism, Neuropathy, Obesity, Osteoarthritis, Paroxysmal atrial fibrillation ([address] Prolonged emergence from general anesthesia, Sleep apnea, and Visit for screening mammogram.    SURGICAL HISTORY      has a past surgical history that includes back surgery; Foot surgery; Colonoscopy; Upper gastrointestinal endoscopy; Mouth Biopsy (9-28-12); Skin tag removal (9/25/12); Tonsillectomy; Patella surgery (7/11/13); Cataract removal (Right, 7/24/14); toenail excision; other surgical history (11/8/2014); other surgical history (4/23/2015); Sleeve Gastrectomy (09/15/2015); EKG 12 Lead (9/18/2015); Hysterectomy, total abdominal; and pr colon ca scrn not hi rsk ind (Left, 1/24/2018).     CURRENT MEDICATIONS       Previous Medications    ACETAMINOPHEN (TYLENOL) 500 MG TABLET    Take 1 tablet by mouth 4 times daily as needed for Pain    ALENDRONATE (FOSAMAX) 70 MG TABLET    Take 1 tablet by mouth every 7 days    ALLOPURINOL (ZYLOPRIM) 100 MG TABLET    Take 100 mg by mouth daily    ASPIRIN (RA ASPIRIN EC) 81 MG EC TABLET    Take 1 tablet by mouth daily    ATORVASTATIN (LIPITOR) 40 MG TABLET    take 1 tablet by mouth at bedtime    CALCIUM CARBONATE-VITAMIN D (CALCIUM-VITAMIN D) 500-200 MG-UNIT PER TABLET    Take 1 tablet by mouth 2 times daily (with meals)     DEXLANSOPRAZOLE (DEXILANT) 60 MG CPDR DELAYED RELEASE CAPSULE    Take 1 capsule by mouth daily    DIGOXIN (LANOXIN) 125 MCG TABLET    Take 0.5 tablets by mouth daily    DULAGLUTIDE (TRULICITY SC)    Inject 0.75 mg into the skin once a week Usually on mondays    DULOXETINE (CYMBALTA) 60 MG EXTENDED RELEASE CAPSULE    take 1 capsule by mouth once daily    FENOFIBRATE (TRICOR) 145 MG TABLET    take 1 tablet by mouth once daily    FOLIC ACID (FOLVITE) 1 MG TABLET    take 1 tablet by mouth once daily    GABAPENTIN (NEURONTIN) 100 MG CAPSULE    take 1 capsule by mouth three times a day    INSULIN GLARGINE (LANTUS) 100 UNIT/ML INJECTION VIAL    Inject 10 Units into the skin nightly    INSULIN LISPRO (HUMALOG) 100 UNIT/ML INJECTION VIAL    Inject 0-3 Units into the skin nightly    INSULIN LISPRO (HUMALOG) 100 UNIT/ML INJECTION VIAL    Inject 0-6 Units into the skin 3 times daily (with meals)    LEVOTHYROXINE (SYNTHROID) 50 MCG TABLET    Take 1 tablet by mouth Daily    LOSARTAN (COZAAR) 100 MG TABLET    Take 1 tablet by mouth daily Hold for sbp < 110    MECLIZINE (ANTIVERT) 25 MG CHEW    Take 1 tablet by mouth 3 times daily as needed (vertigo)    METOPROLOL SUCCINATE (TOPROL XL) 100 MG EXTENDED RELEASE TABLET    Take 100 mg by mouth daily    NYSTATIN (MYCOSTATIN) 577178 UNIT/GM POWDER    Apply 3 times daily. RA VITAMIN B-12 100 MCG TABLET    take 1 tablet by mouth once daily    SODIUM BICARBONATE 650 MG TABLET    Take 2 tablets by mouth 2 times daily    WARFARIN (COUMADIN) 3 MG TABLET    Take 1 tablet by mouth daily As directed by [facility] Coumadin clinic. 30 days = 45 tabs       ALLERGIES     has No Known Allergies. FAMILY HISTORY     She indicated that her mother is . She indicated that her father is . She indicated that her sister is alive. She indicated that her brother is alive. She indicated that the status of her maternal grandmother is unknown. She indicated that the status of her maternal grandfather is unknown. She indicated that the status of her maternal uncle is unknown. She indicated that the status of her other is unknown. She indicated that the status of her neg hx is unknown.   family history includes Asthma in her brother and sister; Cancer in her maternal uncle; Diabetes in her maternal grandfather and maternal grandmother; Heart Disease in her father; High Blood Pressure in her maternal grandmother and another family member. SOCIAL HISTORY      reports that she quit smoking about 14 years ago. She has a 30.00 pack-year smoking history. She has never used smokeless tobacco. She reports that she does not drink alcohol or use drugs. PHYSICAL EXAM     INITIAL VITALS:  height is 5' 9\" (1.753 m) and weight is 270 lb (122.5 kg). Her oral temperature is 97.4 °F (36.3 °C).  Her blood pressure is 116/75 and her pulse is 78. Her respiration is 18 and oxygen saturation is 98%. Physical Exam  Vitals signs and nursing note reviewed. Constitutional:       General: She is awake. She is not in acute distress. Appearance: Normal appearance. She is morbidly obese. She is not ill-appearing, toxic-appearing or diaphoretic. HENT:      Mouth/Throat:      Lips: Pink. Mouth: Mucous membranes are dry. Tongue: Tongue does not deviate from midline. Eyes:      General: Lids are normal. Vision grossly intact. Gaze aligned appropriately. No scleral icterus. Extraocular Movements: Extraocular movements intact. Right eye: Normal extraocular motion and no nystagmus. Left eye: Normal extraocular motion and no nystagmus. Conjunctiva/sclera: Conjunctivae normal.      Right eye: Right conjunctiva is not injected. No chemosis. Left eye: Left conjunctiva is not injected. No chemosis. Pupils: Pupils are equal, round, and reactive to light. Neck:      Musculoskeletal: Normal range of motion. Cardiovascular:      Rate and Rhythm: Normal rate. Rhythm irregular. Heart sounds: Heart sounds are distant. No murmur. No friction rub. No gallop. Pulmonary:      Effort: Pulmonary effort is normal. No accessory muscle usage or respiratory distress. Breath sounds: Normal breath sounds. No wheezing, rhonchi or rales. Abdominal:      General: Bowel sounds are normal. There is no distension or abdominal bruit. There are no signs of injury. Palpations: Abdomen is soft. There is no mass or pulsatile mass. Tenderness: There is no abdominal tenderness. There is no guarding or rebound. Musculoskeletal:      Right lower leg: Edema present. Left lower leg: Edema present. Comments: Ace wrap is noted to bilateral lower extremities   Skin:     General: Skin is warm. Neurological:      Mental Status: She is alert. GCS: GCS eye subscore is 4. GCS verbal subscore is 5. GCS motor subscore is 6. AUTO DIFFERENTIAL - Abnormal; Notable for the following components:       Result Value    WBC 4.1 (*)     RBC 3.33 (*)     Hemoglobin 10.6 (*)     Hematocrit 33.5 (*)     .6 (*)     MCHC 31.6 (*)     RDW-CV 20.0 (*)     RDW-SD 73.6 (*)     All other components within normal limits   COMPREHENSIVE METABOLIC PANEL - Abnormal; Notable for the following components:    Glucose 150 (*)     CREATININE 1.9 (*)     BUN 38 (*)     Total Protein 6.0 (*)     Alb 2.8 (*)     Total Bilirubin 1.3 (*)     All other components within normal limits   APTT - Abnormal; Notable for the following components:    aPTT 46.7 (*)     All other components within normal limits   PROTIME-INR - Abnormal; Notable for the following components:    INR 2.43 (*)     All other components within normal limits   ANION GAP - Abnormal; Notable for the following components:    Anion Gap 7.0 (*)     All other components within normal limits   GLOMERULAR FILTRATION RATE, ESTIMATED - Abnormal; Notable for the following components:    Est, Glom Filt Rate 26 (*)     All other components within normal limits   URINE WITH REFLEXED MICRO - Abnormal; Notable for the following components:    Protein,  (*)     All other components within normal limits   POCT GLUCOSE - Abnormal; Notable for the following components:    POC Glucose 142 (*)     All other components within normal limits   MAGNESIUM   DIGOXIN LEVEL   OSMOLALITY   SCAN OF BLOOD SMEAR       EMERGENCY DEPARTMENT COURSE:   Vitals:    Vitals:    01/25/21 1640 01/25/21 1725 01/25/21 1902 01/25/21 2022   BP: 120/66 126/80 (!) 148/76 116/75   Pulse: 71 60 71 78   Resp: 18 18 16 18   Temp:       TempSrc:       SpO2: 99% 98% 97% 98%   Weight:       Height:          4:20 PM EST: The patient was seen and evaluated. Patient presents from nursing home for altered mental status.   According to sister and staff, she is normally alert and oriented but today was noted to have disorientation to her location as well as the date. She is unable to state the president for me. She denies any focal numbness or weakness but does have some chronic weakness to bilateral lower extremities and is nonambulatory, per her reports. There was concern for UTI as she had recent diagnosis of this but there is no evidence of UTI on her labs today. Labs and imaging were reassuring including CT of the head. Discussed with Dr. Keira Jay, neurology who recommends admission for stroke/TIA work-up and rule out of seizure/tumor. Discussed with the patient who is agreeable. Discussed with internal medicine service will admit the patient for further care. She is not a TPA candidate due to last known well being 4 to 5 days ago. CRITICAL CARE:   None    CONSULTS:  Neurology  Internal medicine    PROCEDURES:  None    FINAL IMPRESSION      1. Altered mental status, unspecified altered mental status type          DISPOSITION/PLAN   Admit    PATIENT REFERRED TO:  No follow-up provider specified.     DISCHARGEMEDICATIONS:  New Prescriptions    No medications on file       (Please note that portions of this note were completedwith a voice recognition program.  Efforts were made to edit the dictations but occasionally words are mis-transcribed.)        Rico Roberts PA-C  01/25/21 2052

## 2021-01-25 NOTE — ED TRIAGE NOTES
Pt presents to the ED via EMS from [facility] with complaints of confusion. On arrival patient is alert and oriented times 4, just states she feels confused and tired. On arrival, patient blood sugar 142. Per report from EMS, patient had recent UTI and was treated with antibiotics.

## 2021-01-25 NOTE — ED NOTES
Spoke with nursing home and states that disorientation started started on Thursday. Per nurse from nursing home patient is A&O X 4, however on Thursday night patient started to be come disoriented to location. Pt denies any falls. After URSZULA Alejandro spoke with patient family member, we found out that patient has fallen 2 X in the past week. Pt is nonambulatory. Nurse noted redness and swelling bilaterally and patient was started on cephalexin 500 mg 3X daily for cellulitis.       Preethi Fay RN  01/25/21 9331

## 2021-01-26 NOTE — CONSULTS
NEUROLOGY CONSULT NOTE      Requesting Physician: Josi Garibay MD    Reason for Consult:  Evaluate for altered mental state    History of Present Illness:  Jimmy Pham is a 76 y.o. female admitted to [facility] on 1/25/2021. She presents to the Emergency Department by EMS for the evaluation of altered mental status. Patient was recently admitted to the hospital on 01/03 for acute delirium, lactic acidosis, dehydration and hyperkalemia. Nursing home workers said she hasn't been acting like herself recently. She has been having memory issues the past 2-3 years but stated the past 2-3 weeks it has worsened. Patient states the nursing home workers give her her medications everyday and that she has been compliant. Denies fever, chills, shortness of breath, cough, chest pain, frequency, dysuria. Stated her body has been twitching recently as well. Denies HA, numbness, paresthesia, weakness. Patient is on Warfarin. Supplemental information from Jake, the patient's sister with whom she lived prior to her recent nursing home transfer, was at the patient has had increasing confusion over the past couple of weeks. It did temporarily improve after treatment for UTI but has recurred. She reports that the patient seemed to be doing okay upon placement to the nursing home until she had a couple of falls last weekend. The sister reports that the patient was started on an antibiotic recently but she is unsure of why. Jake reports that that her mother had a brain tumor which was diagnosed around the same age as the patient. Reports no chest pain. No shortness of breath with exertion. Reports no neck pain. No vision changes. No dysphagia. No fever. No rash. No weight loss. MRI brain done earlier today showed no acute findings. Patient is a poor historian, history obtained from review of medical record.     Past Medical History:        Diagnosis Date    CAD (coronary artery disease)     CRI (chronic renal insufficiency)     Difficult intubation     excess skin in back of throat     DM (diabetes mellitus) ([facility] [address] 1994    GERD (gastroesophageal reflux disease)     H/O gastric bypass     Hyperlipidemia     Hypertension     Hypothyroidism     Neuropathy     Obesity     Osteoarthritis     Paroxysmal atrial fibrillation (HCC)     Prolonged emergence from general anesthesia     Sleep apnea     uses cpap    Visit for screening mammogram            Procedure Laterality Date    BACK SURGERY      xs 2    CATARACT REMOVAL Right 7/24/14    Dr. Rodolfo Paniagua EKG 12-LEAD  9/18/2015         ENDOSCOPY, COLON, DIAGNOSTIC      EYE SURGERY      FOOT SURGERY      left foot    HYSTERECTOMY, TOTAL ABDOMINAL      MOUTH BIOPSY  9-28-12    left tongue and lingual tonsil    OTHER SURGICAL HISTORY  11/8/2014    LEFT FEMUR RETROGRADE NAILING    OTHER SURGICAL HISTORY  4/23/2015    HARDWARE REMOVAL LEFT FEMUR, INSERTION OF ANTIBIOTIC SPACER    PATELLA SURGERY  7/11/13    fractues rt patella     MO COLON CA SCRN NOT HI [facility] IND Left 1/24/2018    COLONOSCOPY performed by Misha Ferreira MD at [address] [place] [address] TAG REMOVAL  9/25/12    mole removal    SLEEVE GASTRECTOMY  09/15/2015    Robotic    TOENAIL EXCISION      removal of great toe nails bilateral-still has toenails-had ingrown toenails and had trimmed out     TONSILLECTOMY      UPPER GASTROINTESTINAL ENDOSCOPY         Allergies:    No Known Allergies     Current Medications:       warfarin (COUMADIN) tablet 1 mg, Once      allopurinol (ZYLOPRIM) tablet 100 mg, Daily      atorvastatin (LIPITOR) tablet 40 mg, Nightly      calcium-vitamin D (OSCAL-500) 500-200 MG-UNIT per tablet 1 tablet, BID WC      digoxin (LANOXIN) tablet 62.5 mcg, Daily      DULoxetine (CYMBALTA) extended release capsule 60 mg, Daily      folic acid (FOLVITE) tablet 1 mg, Daily      insulin glargine (LANTUS) injection vial 10 Units, Nightly     levothyroxine (SYNTHROID) tablet 50 mcg, Daily      metoprolol succinate (TOPROL XL) extended release tablet 50 mg, Daily      miconazole (MICOTIN) 2 % powder, BID      sodium chloride flush 0.9 % injection 10 mL, 2 times per day      sodium chloride flush 0.9 % injection 10 mL, PRN      promethazine (PHENERGAN) tablet 12.5 mg, Q6H PRN    Or      ondansetron (ZOFRAN) injection 4 mg, Q6H PRN      polyethylene glycol (GLYCOLAX) packet 17 g, Daily PRN      acetaminophen (TYLENOL) tablet 650 mg, Q6H PRN    Or      acetaminophen (TYLENOL) suppository 650 mg, Q6H PRN      0.9 % sodium chloride infusion, Continuous      insulin lispro (HUMALOG) injection vial 0-6 Units, TID WC      insulin lispro (HUMALOG) injection vial 0-3 Units, Nightly      warfarin (COUMADIN) daily dosing (placeholder), RX Placeholder      glucose (GLUTOSE) 40 % oral gel 15 g, PRN      dextrose 50 % IV solution, PRN      glucagon (rDNA) injection 1 mg, PRN      dextrose 5 % solution, PRN         Social History:  Social History     Tobacco Use   Smoking Status Former Smoker    Packs/day: 1.50    Years: 20.00    Pack years: 30.00    Quit date: 2007    Years since quittin.0   Smokeless Tobacco Never Used     Social History     Substance and Sexual Activity   Alcohol Use No    Alcohol/week: 0.0 standard drinks     Social History     Substance and Sexual Activity   Drug Use No     Single    Family History:       Problem Relation Age of Onset    Asthma Sister     Asthma Brother     Heart Disease Father     High Blood Pressure Other     Cancer Maternal Uncle         stomach    Diabetes Maternal Grandmother     High Blood Pressure Maternal Grandmother     Diabetes Maternal Grandfather     Stroke Neg Hx        Review of Systems:  All systems reviewed and are all negative except what is mentioned in history of present illness. I reviewed the patient PMH,medications, family history, social history.     Physical Exam:  /82   Pulse 78   Temp 97.6 °F (36.4 °C) (Oral)   Resp 16   Ht 5' 9\" (1.753 m)   Wt 277 lb 9 oz (125.9 kg)   LMP 02/20/2001   SpO2 95%   BMI 40.99 kg/m²  I Body mass index is 40.99 kg/m². I   Wt Readings from Last 1 Encounters:   01/25/21 277 lb 9 oz (125.9 kg)           General appearance - alert, in no distress, oriented to  person, place, and time and over weight, she is sitting up in bed, eating lunch. She follows commands and responds to questions appropriately, but does appear to be slow with her responses. Mental status -Level of Alertness: awake  Orientation: person, place, time  Memory: normal  Fund of Knowledge: normal  Attention/Concentration: normal  Language: normal. Mood is flat  Neck - supple, no neck adenopathy, carotids upstroke normal bilaterally, no carotid bruits. There is no LAP in the neck. There is no thyroid enlargement. Neurological -   Cranial Chesen-BX-YBU:   Cranial nerve II: Normal. There is full visual fields  Cranial nerve III: Pupils: equal, round, reactive to light   Cranial nerves III, IV, VI: Extraocular Movements: intact   Cranial nerve V: Facial sensation: intact   Cranial nerve VII:Facial strength: intact   Cranial nerve VIII: Hearing: intact   Cranial nerve IX: Palate Elevation intact bilaterally  Cranial nerve XI: Shoulder shrug intact bilaterally  Cranial nerve XII: Tongue midline   neck supple without rigidity  DTR's are decreased distal and symmetric  Babinski sign is negative on bilaterally. Motor exam is 5/5 in the upper and lower extremities. Normal muscle tone. There is no muscle atrophy. There is no muscle fasciculation    Sensory is intact forlight touch. Coordination: finger to nose intact  Gait and station not tested  Abnormal movement none. Long tracts are  deferred.    Skin - no rashes or lesions, warm and dry to touch  Superficial temporal artery pulses are normal.   Musculoskeletal: Has no hand arthritis, no limitation of ROM in any clinically. There is slightly increased signal intensity in the left mastoid air cells and in the ethmoid air cells bilaterally suggestive of inflammatory changes      Impression    1. Mild atrophy and dilatation of the third and lateral ventricles. 2. Probable ischemic changes in the periventricular white matter and in the denis with no evidence of an acute   infarct. 3. Otherwise negative MRI scan of the brain. 4. Inflammatory changes in the left mastoid air cells and in the ethmoid air cells bilaterally. **This report has been created using voice recognition software. It may contain minor errors which are inherent in voice recognition technology. **    Final report electronically signed by DR Rosaura Muniz on 1/26/2021 2:23 PM     Reviewed   Results for orders placed during the hospital encounter of 01/25/21   CT HEAD WO CONTRAST    Narrative PROCEDURE: CT HEAD WO CONTRAST    CLINICAL INFORMATION: AMS, falls. COMPARISON: No prior study. TECHNIQUE: Noncontrast 5 mm axial images were obtained through the brain. All CT scans at this facility use dose modulation, iterative reconstruction, and/or weight-based dosing when appropriate to reduce radiation dose to as low as reasonably achievable. FINDINGS:    Generalized mild volume loss and small vessel ischemic changes. Intracranial vascular calcification. Ventricles are symmetric. No hemorrhage or midline shift. Paranasal sinuses are clear. Mastoid air cells are patent. Skull: Unremarkable. Soft tissues: Unremarkable. Impression No acute intracranial findings. **This report has been created using voice recognition software. It may contain minor errors which are inherent in voice recognition technology. **    Final report electronically signed by Dr. Padmini Calles on 1/25/2021 6:15 PM         We reviewed the patient records and available information in the EHR       Impression:    Principal Problem:    Altered mental status  Active Problems:    Atrial fibrillation (HCC)    Hypertension    Diabetes mellitus (HCC)    Neuropathy    Morbid obesity (HCC)    Chronic kidney disease, stage 4 (severe) (HCC)    CKD (chronic kidney disease) stage 3, GFR 30-59 ml/min    Metabolic encephalopathy  Resolved Problems:    * No resolved hospital problems. *  This is a 58-year-old female who presents with altered mental status. Apparently, she was admitted approximately 3 weeks ago for similar symptoms and was found to have hypernatremia, dehydration, lactic acidosis. At the time of my evaluation, she is sitting up in bed, eating her lunch. She responds to questions appropriately, however is slow with her responses. There are no focal neurologic deficits on exam.  She did undergo an MRI brain earlier today that showed no acute findings. I reviewed the MRI brain and agree with interpretation. We will arrange for her to undergo an EEG to screen for seizure tendency. After detailed discussion with the patient we agreed on the following plan. Recommendations:                                              1. MRI brain WO contrast, reviewed, agree with interpretation  2. EEG  3. Correct metabolic derangements  4. Nephrology following for KEEGAN  5. DVT prophylaxis  6. Discussed with primary service. 7. Please call if any questions.            Electronically signed by Genaro Reyes MD on 1/26/2021 at 3:27 PM

## 2021-01-26 NOTE — PROGRESS NOTES
Clinical Pharmacy Note    Jimmy Pham is a 76 y.o. female for whom pharmacy has been asked to manage warfarin therapy. Reason for Admission: [facility]    Consulting Physician: Dr. Matteo Tiwari  Warfarin dose prior to admission: held dose 1/21 for INR = 3.5 then 1 mg on 1/22, 1 mg on 1/23 and 1 mg on 1/24  Warfarin indication: afib  Target INR range: 2.0-3.0   Outpatient warfarin provider: Terrie Gordon provider    Past Medical History:   Diagnosis Date    CAD (coronary artery disease)     CRI (chronic renal insufficiency)     Difficult intubation     excess skin in back of throat     DM (diabetes mellitus) ([facility] [address] 1994    GERD (gastroesophageal reflux disease)     H/O gastric bypass     Hyperlipidemia     Hypertension     Hypothyroidism     Neuropathy     Obesity     Osteoarthritis     Paroxysmal atrial fibrillation ([facility] [address]     Prolonged emergence from general anesthesia     Sleep apnea     uses cpap    Visit for screening mammogram                 Recent Labs     01/25/21  1525   INR 2.43*     Recent Labs     01/25/21  1525   HGB 10.6*   HCT 33.5*          Current warfarin drug-drug interactions: allopurinol (home), levothyroxine (home)      Date INR Warfarin Dose   01/25/21 2.43 1 mg                                   Daily PT/INR until stable within therapeutic range. Thank you for the consult.      Cong Cleaning, PharmD 1/25/2021  9:54 PM

## 2021-01-26 NOTE — ED NOTES
ED to inpatient nurses report    Chief Complaint   Patient presents with    Altered Mental Status      Present to ED from nursing home  LOC: alert and orientated to name, place, date  Vital signs   Vitals:    01/25/21 1539 01/25/21 1640 01/25/21 1725 01/25/21 1902   BP: (!) 136/91 120/66 126/80 (!) 148/76   Pulse: 80 71 60 71   Resp: 18 18 18 16   Temp:       TempSrc:       SpO2: 100% 99% 98% 97%   Weight:       Height:          Oxygen Baseline room air    Current needs required 0 L NC Bipap/Cpap No  LDAs:   Peripheral IV 01/25/21 Left Antecubital (Active)   Site Assessment Clean;Dry; Intact 01/25/21 1902   Line Status Blood return noted; Flushed;Specimen collected 01/25/21 1514   Dressing Status Clean;Dry; Intact 01/25/21 1902     Mobility: Requires assistance * 2  Pending ED orders: ED orders complete  Present condition: stable      Electronically signed by Marco Bardales RN on 1/25/2021 at 8:20 PM       Marco Bardales, [address] [place] [address]  01/25/21 2021

## 2021-01-26 NOTE — H&P
Altered level of awarenessInternal Medicine  History and Physical    Patient:  Alexandra Rodriguez  MRN: [id number]      History Obtained From:  patient  PCP: ARNOL Torres CNP    CHIEF COMPLAINT: Altered level of awareness, increased weakness    HISTORY OF PRESENT ILLNESS:   The patient is a 76 y.o. female who presents with increasing weakness and altered level of awareness. Patient is a nursing home resident. Nursing home caregivers noted increasing weakness and reduced alertness. Patient reports that she gets  weaker by the end of the day and she falls to sleep in the middle of sentences. She has poor participation with activities. Appetite has been good. She denies fever, she denies chills, denies cough, she denies shortness of breath. She was evaluated in the emergency room. CT scan of the head was negative for any acute intracranial abnormality. She had evidence of acute kidney injury with dehydration. Patient was thus admitted for further evaluation.     Past Medical History:        Diagnosis Date    CAD (coronary artery disease)     CRI (chronic renal insufficiency)     Difficult intubation     excess skin in back of throat     DM (diabetes mellitus) ([facility] [address] 1994    GERD (gastroesophageal reflux disease)     H/O gastric bypass     Hyperlipidemia     Hypertension     Hypothyroidism     Neuropathy     Obesity     Osteoarthritis     Paroxysmal atrial fibrillation (HCC)     Prolonged emergence from general anesthesia     Sleep apnea     uses cpap    Visit for screening mammogram        Past Surgical History:        Procedure Laterality Date    BACK SURGERY      xs 2    CATARACT REMOVAL Right 7/24/14    Dr. Daya Moore EKG 12-LEAD  9/18/2015         ENDOSCOPY, COLON, DIAGNOSTIC      EYE SURGERY      FOOT SURGERY      left foot    HYSTERECTOMY, TOTAL ABDOMINAL      MOUTH BIOPSY  9-28-12    left tongue and lingual tonsil    OTHER SURGICAL HISTORY  11/8/2014 LEFT FEMUR RETROGRADE NAILING    OTHER SURGICAL HISTORY  4/23/2015    HARDWARE REMOVAL LEFT FEMUR, INSERTION OF ANTIBIOTIC SPACER    PATELLA SURGERY  7/11/13    fractues rt patella     AL COLON CA SCRN NOT  [address] IND Left 1/24/2018    COLONOSCOPY performed by Rebekah Mccray MD at [address] [facility] TAG REMOVAL  9/25/12    mole removal    SLEEVE GASTRECTOMY  09/15/2015    Robotic    TOENAIL EXCISION      removal of great toe nails bilateral-still has toenails-had ingrown toenails and had trimmed out     TONSILLECTOMY      UPPER GASTROINTESTINAL ENDOSCOPY         Medications Prior to Admission:    Prior to Admission medications    Medication Sig Start Date End Date Taking? Authorizing Provider   warfarin (COUMADIN) 3 MG tablet Take 1 tablet by mouth daily As directed by [facility].  30 days = 45 tabs 1/7/21   Colonel Raquel MD   losartan (COZAAR) 100 MG tablet Take 1 tablet by mouth daily Hold for sbp < 110 1/7/21   Colonel Raquel MD   insulin glargine (LANTUS) 100 UNIT/ML injection vial Inject 10 Units into the skin nightly 1/7/21   Colonel Raquel MD   insulin lispro (HUMALOG) 100 UNIT/ML injection vial Inject 0-3 Units into the skin nightly 1/7/21   Colonel Raquel MD   insulin lispro (HUMALOG) 100 UNIT/ML injection vial Inject 0-6 Units into the skin 3 times daily (with meals) 1/7/21   Colonel Raquel MD   sodium bicarbonate 650 MG tablet Take 2 tablets by mouth 2 times daily 1/7/21   Colonel Raquel MD   acetaminophen (TYLENOL) 500 MG tablet Take 1 tablet by mouth 4 times daily as needed for Pain 12/30/20   Ryan Gonsales MD   levothyroxine (SYNTHROID) 50 MCG tablet Take 1 tablet by mouth Daily 12/19/20   Colonel Raquel MD   DULoxetine (CYMBALTA) 60 MG extended release capsule take 1 capsule by mouth once daily 11/25/20   ARNOL Strauss CNP   gabapentin (NEURONTIN) 100 MG capsule take 1 capsule by mouth three times a day 11/16/20 2/14/21  ARNOL Strauss CNP dexlansoprazole (DEXILANT) 60 MG CPDR delayed release capsule Take 1 capsule by mouth daily 9/15/20   ARNOL Liu CNP   nystatin (MYCOSTATIN) 029488 UNIT/GM powder Apply 3 times daily. 9/15/20   ARNOL Liu CNP   fenofibrate (TRICOR) 145 MG tablet take 1 tablet by mouth once daily 9/8/20   RolaARNOL Latif CNP   atorvastatin (LIPITOR) 40 MG tablet take 1 tablet by mouth at bedtime 9/8/20   ARNOL Fox CNP   RA VITAMIN B-12 100 MCG tablet take 1 tablet by mouth once daily 5/11/20   ARNOL Liu CNP   alendronate (FOSAMAX) 70 MG tablet Take 1 tablet by mouth every 7 days  Patient taking differently: Take 70 mg by mouth every 7 days Indications: usually on mondays  3/10/20   ARNOL Liu CNP   Dulaglutide (TRULICITY SC) Inject 3.98 mg into the skin once a week Usually on mondays    Historical Provider, MD   folic acid (FOLVITE) 1 MG tablet take 1 tablet by mouth once daily 1/20/20   ARNOL Liu CNP   aspirin (RA ASPIRIN EC) 81 MG EC tablet Take 1 tablet by mouth daily 1/7/20   ARNOL Guerrero CNP   metoprolol succinate (TOPROL XL) 100 MG extended release tablet Take 100 mg by mouth daily    Historical Provider, MD   digoxin (LANOXIN) 125 MCG tablet Take 0.5 tablets by mouth daily 10/8/19   ARNOL Guerrero CNP   allopurinol (ZYLOPRIM) 100 MG tablet Take 100 mg by mouth daily 7/3/19   Historical Provider, MD   meclizine (ANTIVERT) 25 MG CHEW Take 1 tablet by mouth 3 times daily as needed (vertigo) 6/3/19   Gris Mariscal MD   Calcium Carbonate-Vitamin D (CALCIUM-VITAMIN D) 500-200 MG-UNIT per tablet Take 1 tablet by mouth 2 times daily (with meals)     Historical Provider, MD       Allergies:  Patient has no known allergies. Social History:   TOBACCO:   reports that she quit smoking about 14 years ago. She has a 30.00 pack-year smoking history. She has never used smokeless tobacco.  ETOH:   reports no history of alcohol use.       Family History:       Problem Relation Age of Onset    Asthma Sister     Asthma Brother     Heart Disease Father     High Blood Pressure Other     Cancer Maternal Uncle         stomach    Diabetes Maternal Grandmother     High Blood Pressure Maternal Grandmother     Diabetes Maternal Grandfather     Stroke Neg Hx        REVIEW OF SYSTEMS:  CONSTITUTIONAL:  positive for  fatigue and malaise  negative for  fevers and chills  EYES:  negative for  visual disturbance, irritation, redness and icterus  HEENT:  negative for  snoring, sore mouth, sore throat and hoarseness  RESPIRATORY:  negative for  dyspnea, wheezing and hemoptysis  CARDIOVASCULAR:  negative for  palpitations, orthopnea, early saiety  GASTROINTESTINAL:  negative for nausea, vomiting, change in bowel habits, abdominal pain, abdominal mass and abdominal distention  GENITOURINARY:  negative for frequency, dysuria and hematuria  HEMATOLOGIC/LYMPHATIC:  negative for easy bruising, bleeding and lymphadenopathy  ENDOCRINE:  negative for heat intolerance and cold intolerance  MUSCULOSKELETAL:  positive for  myalgias, arthralgias and muscle weakness  negative for  bone pain  NEUROLOGICAL:  positive for memory problems, coordination problems, gait problems and weakness  negative for headaches, seizures and dysphagia  BEHAVIOR/PSYCH:  positive for increased sleep, decreased energy level, poor concentration and fatigue and negative for increased agitation and anxiety    Physical Exam:    Vitals: BP (!) 140/89   Pulse 86   Temp 98.7 °F (37.1 °C) (Oral)   Resp 18   Ht 5' 9\" (1.753 m)   Wt 277 lb 9 oz (125.9 kg)   LMP 02/20/2001   SpO2 95%   BMI 40.99 kg/m²   CONSTITUTIONAL:  fatigued, somnolent, cooperative, no apparent distress and morbidly obese  EYES:  extra-ocular muscles intact  ENT:  normocepalic, without obvious abnormality  NECK:  supple, symmetrical, trachea midline  HEMATOLOGIC/LYMPHATICS:  no cervical lymphadenopathy and no supraclavicular lymphadenopathy  LUNGS:  no increased work of breathing and diminished breath sounds throughout lungs  CARDIOVASCULAR:  normal S1 and S2  ABDOMEN:  normal bowel sounds, soft, distended, non-tender and no hepatosplenomegally  MUSCULOSKELETAL:  there is no redness, warmth, or swelling of the joints  NEUROLOGIC:  Mental Status Exam:  Level of Alertness:   somnolent  Orientation:   person, place  Memory:   normal  Fund of Knowledge:  normal  Attention/Concentration:  abnormal - short attention span  Cranial Nerves:  cranial nerves II-XII are grossly intact  Motor Exam:  Motor exam is 5 out of 5 all extremities with the exception of legs suman  SKIN: Mild stasis dermatitis legs      CBC:   Recent Labs     01/25/21  1525 01/26/21  0510   WBC 4.1* 4.0*   HGB 10.6* 10.7*    235     BMP:    Recent Labs     01/25/21  1525 01/26/21  0510    137   K 5.0 4.9    101   CO2 29 29   BUN 38* 38*   CREATININE 1.9* 1.9*   GLUCOSE 150* 132*     Hepatic:   Recent Labs     01/25/21  1525   AST 24   ALT 14   BILITOT 1.3*   ALKPHOS 69     INR:   Recent Labs     01/25/21  1525 01/26/21  0510   INR 2.43* 2.51*     Ammonia 22     CT Head  No acute intracranial findings    PA/lat CXR:   Reticular markings in the lung bases correlate for atelectasis versus infiltrate.            EKG: Atrial fibrillation, rate 74 bpm    Assessment and Plan   1. Altered level of awareness/Encephalopathy  2. Acute kidney injury/dehydration  3. Chronic atrial fibrillation, chronically anticoagulated  4. Hypertension  5. Diabetes mellitus type 2   6. Morbid obesity  7. Hypothyroidism    Cautious rehydration. MRI of the brain, neurology consult  Optimize her blood pressure control  Optimize glycemic control  Acetylcholine receptor antibody assay  A.m. labs.       Randi Bills MD  Admitting Internist

## 2021-01-26 NOTE — PROGRESS NOTES
Clinical Pharmacy Note    Warfarin consult follow-up    Recent Labs     01/26/21  0510   INR 2.51*     Recent Labs     01/25/21  1525 01/26/21  0510   HGB 10.6* 10.7*   HCT 33.5* 34.3*    235       Significant Drug-Drug Interactions:  New warfarin drug-drug interactions: None  Discontinued drug-drug interactions: None  Current warfarin drug-drug interactions: allopurinol (home), levothyroxine (home)        Date INR Warfarin Dose   01/25/21 2.43 1 mg    1/26/2021  2.51   1 mg                                                 Notes:                     Daily PT/INR until stable within therapeutic range.      Rakan Alcaraz PharmD  1/26/2021  1:23 PM

## 2021-01-26 NOTE — PROGRESS NOTES
[facility] Laboratory Technician Worksheet      EEG Date: 2021    Name: Jennifer Quiñones   : 1955   Age: 76 y.o.   SEX: female    ROOM: [facility] MRN: [id number]           CSN: [id number]      Ordering Provider: leopold  EEG Number: [id number] Time of Test:  1013    Hand: Right   Sedation: no    H.V. Done: No NOT DONE Photic: Yes    Sleep: Yes  Drowsy: Yes   Sleep Deprived: No    Seizures observed: no    Mentality: alert  BUT SLEEPY    Clinical History:AMS, KEEGAN, FALLS    Past Medical History:       Diagnosis Date    CAD (coronary artery disease)     CRI (chronic renal insufficiency)     Difficult intubation     excess skin in back of throat     DM [facility]diabetes mellitus) ([facility] [address]     GERD (gastroesophageal reflux disease)     H/O gastric bypass     Hyperlipidemia     Hypertension     Hypothyroidism     Neuropathy     Obesity     Osteoarthritis     Paroxysmal atrial fibrillation (HCC)     Prolonged emergence from general anesthesia     Sleep apnea     uses cpap    Visit for screening mammogram        Scheduled Meds:   allopurinol  100 mg Oral Daily    atorvastatin  40 mg Oral Nightly    calcium-vitamin D  1 tablet Oral BID WC    digoxin  62.5 mcg Oral Daily    DULoxetine  60 mg Oral Daily    folic acid  1 mg Oral Daily    insulin glargine  10 Units Subcutaneous Nightly    levothyroxine  50 mcg Oral Daily    metoprolol succinate  50 mg Oral Daily    miconazole   Topical BID    sodium chloride flush  10 mL Intravenous 2 times per day    insulin lispro  0-6 Units Subcutaneous TID WC    insulin lispro  0-3 Units Subcutaneous Nightly    warfarin (COUMADIN) daily dosing (placeholder)   Other RX Placeholder     Continuous Infusions:   sodium chloride 100 mL/hr at 21 1051    dextrose       PRN Meds:.sodium chloride flush, promethazine **OR** ondansetron, polyethylene glycol, acetaminophen **OR** acetaminophen, glucose, dextrose, glucagon (rDNA), dextrose    Technician: Ciarra Pink 1/26/2021

## 2021-01-26 NOTE — ED NOTES
Pt transferred to [place] [address] room 07. Nurse notified.       Kevin Mclaughlin  01/25/21 2052

## 2021-01-26 NOTE — DISCHARGE INSTR - COC
Continuity of Care Form    Patient Name: Katelynn Patel   :  1955  MRN:  [id number]    Admit date:  2021  Discharge date:  21    Code Status Order: Full Code   Advance Directives:   [facility] Documentation       Date/Time Healthcare Directive Type of Healthcare Directive Copy in [facility] [address] Agent's Name Healthcare Agent's Phone Number    21 [phone]  Yes, patient has an advance directive for healthcare treatment  Durable power of  for health care  Other (Comment)  Healthcare power of   Kishore Nones or [place] Fabian  [phone]            Admitting Physician:  Db Berumen MD  PCP: ARNOL Lane CNP    Discharging Nurse: 5 [address] Unit/Room#: 5K-07/007-A  Discharging Unit Phone Number: [phone]    Emergency Contact:   Extended Emergency Contact Information  [facility] Contact[facility]  Address: [address] [place] [address] Dr NEGRON [address] [place] [address] Phone: [phone]  Mobile Phone: [phone]  Relation: Brother/Sister  Hearing or visual needs: None  Other needs: None  Preferred language: English   needed? No  Secondary Emergency Contact: Allen Romero, [address] [place] [address] Phone: [phone]  Mobile Phone: [phone]  Relation: Brother/Sister  Preferred language: English   needed?  No    Past Surgical History:  Past Surgical History:   Procedure Laterality Date    BACK SURGERY      xs 2    CATARACT REMOVAL Right 14    Dr. Rodolfo Paniagua EKG 12-LEAD  2015         ENDOSCOPY, COLON, DIAGNOSTIC      EYE SURGERY      FOOT SURGERY      left foot    HYSTERECTOMY, TOTAL ABDOMINAL      MOUTH BIOPSY  12    left tongue and lingual tonsil    OTHER SURGICAL HISTORY  2014    LEFT FEMUR RETROGRADE NAILING    OTHER SURGICAL HISTORY  2015    HARDWARE REMOVAL LEFT FEMUR, INSERTION OF ANTIBIOTIC SPACER    PATELLA SURGERY  7/11/13    fractues rt patella     NY COLON CA SCRN NOT HI RSK IND Left 1/24/2018    COLONOSCOPY performed by Ramon Solorzano MD at [address] [facility] TAG REMOVAL  9/25/12    mole removal    SLEEVE GASTRECTOMY  09/15/2015    Robotic    TOENAIL EXCISION      removal of great toe nails bilateral-still has toenails-had ingrown toenails and had trimmed out     TONSILLECTOMY      UPPER GASTROINTESTINAL ENDOSCOPY         Immunization History:   Immunization History   Administered Date(s) Administered    PPD Test 07/01/2013, 07/12/2013    Pneumococcal Conjugate 13-valent ([id number]) 04/10/2019    Pneumococcal Polysaccharide ([id number]) 09/21/2015    Tdap (Boostrix, Adacel) 11/07/2017       Active Problems:  Patient Active Problem List   Diagnosis Code    Diabetes mellitus ([facility] [address] E11.9    Hypertension I10    Hyperlipidemia E78.5    Neuropathy G62.9    Osteoarthritis M19.90    Proteinuria R80.9    Arthritis M19.90    Morbid obesity ([facility] [address] E66.01    Allergic rhinitis J30.9    Chronic kidney disease, stage 4 (severe) ([facility]) N18.4    Diabetes mellitus type 2, insulin dependent ([facility]) E11.9, Z79.4    DARWIN on CPAP G47.33, Z99.89    History of sleeve gastrectomy Z90.3    Pneumonia J18.9    Morbid obesity with BMI of 50.0-59.9, adult ([facility]) E66.01, Z68.43    Coronary artery disease involving native heart without angina pectoris I25.10    Multinodular goiter E04.2    Bilateral renal cysts N28.1    Knee pain, left M25.562    Dyspnea R06.00    Atrial fibrillation ([facility]) I48.91    Acute diastolic CHF (congestive heart failure), NYHA class 2 ([facility]) I50.31    CKD (chronic kidney disease) stage 3, GFR 30-59 ml/min N18.30    Shortness of breath R06.02    Acute on chronic combined systolic (congestive) and diastolic (congestive) heart failure ([facility]) I50.43    Chronic anemia D64.9    Paroxysmal A-fib ([facility]) I48.0    Chronic kidney disease (CKD), stage III (moderate) N18.30    Nonischemic cardiomyopathy ([facility] [address] I42.8    Mild tricuspid regurgitation I07.1    Debility R53.81    Dietary noncompliance Z91.11    Long term current use of anticoagulant Z79.01    Severe left ventricular systolic dysfunction A40.2    Anticoagulated on Coumadin Z42.32    Metabolic encephalopathy K83.32    Acute cystitis without hematuria N30.00    Hyperkalemia E87.5    KEEGAN (acute kidney injury) ([facility] [address] N17.9    Altered mental status R41.82       Isolation/Infection:   Isolation            No Isolation          Patient Infection Status       Infection Onset Added Last Indicated Last Indicated By Review Planned Expiration Resolved Resolved By    None active    Resolved    COVID-19 Rule Out 01/03/21 01/03/21 01/03/21 COVID-19 (Ordered)   01/03/21 Rule-Out Test Resulted    COVID-19 Rule Out 12/15/20 12/15/20 12/15/20 COVID-19 (Ordered)   12/15/20 Rule-Out Test Resulted    ESBL (Extended Spectrum Beta Lactamase)  10/30/17 10/30/17 Maru Hutton RN   05/17/19 Evette May, KYLAH    E-coli urine 10/17            Nurse Assessment:  Last Vital Signs: BP (!) 140/89   Pulse 86   Temp 98.7 °F (37.1 °C) (Oral)   Resp 18   Ht 5' 9\" (1.753 m)   Wt 277 lb 9 oz (125.9 kg)   LMP 02/20/2001   SpO2 95%   BMI 40.99 kg/m²     Last documented pain score (0-10 scale): Pain Level: 8  Last Weight:   Wt Readings from Last 1 Encounters:   01/25/21 277 lb 9 oz (125.9 kg)     Mental Status:  alert    IV Access:  - None    Nursing Mobility/ADLs:  Walking   Assisted  Transfer  Assisted  Bathing  Assisted  Dressing  Assisted  Toileting  Assisted  Feeding  Independent  Med Admin  Dependent  Med Delivery   whole    Wound Care Documentation and Therapy:  Wound 01/03/21 Buttocks MASD (Active)   Number of days: 22       Wound 01/03/21 Ankle Right; Outer skin tear (Active)   Number of days: 22       Wound 01/25/21 Buttocks Inner (Active)   Wound Etiology Skin Tear 01/26/21 0810   Wound Cleansed Soap and water 01/25/21 1141 1/26/21 at 12:12 PM EST    PHYSICIAN SECTION    Prognosis: Good    Condition at Discharge: Stable    Rehab Potential (if transferring to Rehab): Good    Recommended Labs or Other Treatments After Discharge: INR monitoring weekly    Physician Certification: I certify the above information and transfer of Kristian Fagan  is necessary for the continuing treatment of the diagnosis listed and that she requires [facility] for greater 30 days.      Update Admission H&P: No change in H&P    PHYSICIAN SIGNATURE:  Electronically signed by Vianney Anne MD on 1/29/21 at 12:09 PM EST

## 2021-01-26 NOTE — CONSULTS
Nephrology Consult Note  Patient's Name: Jasbir Ayala  9:07 AM  1/26/2021    Nephrologist: Moni Garcia    Reason for Consult: Acute kidney injury  Requesting Physician:  Tg Strickland MD  PCP: ARNOL Murrieta CNP    Chief Complaint: None  Assessment  1. Acute kidney injury likely due to recent hypotension with subsequent renal hypoperfusion as well as volume contraction. 2.   Dehydration  3. Change in mental status improving  4. Bicytopenia with macrocytosis. 5. Diabetes mellitus type 2.  6. Hypotension much improved. 7. Hypertension primary  8. Atrial fibrillation with controlled ventricular rate  9. Cardiomyopathy with low ejection fraction of 30 to 35% from echocardiogram done in October 2019. Plan    1. Labs reviewed  2. Medications reviewed  3. Discontinue sodium bicarbonate  4. Continue intravenous fluid  5. Kidney function is expected to return to baseline  6. Labs in the morning  7. We will follow      History Obtained From: Patient, staff and medical record  History of Present Ilness:    Jasbir Ayala is a 76 y.o. female with history of chronic kidney disease stage IIIb, hypertension, diabetes mellitus among other multiple medical entities who was admitted through the emergency department after the patient presented there with change in mental status. She was found to be hypotensive. She was also noted to have acute kidney injury. Admitted for evaluation and management. Very awake and alert when I saw her. Appears to be in her normal state. No chest pain. No shortness of breath. No nausea vomiting. No fever chills. No difficulties with urination. No headaches. Appetite has been good. The last serum creatinine was done on day 8 January 2021 with a serum creatinine of 1.4 mg/L. Today it is 1.9 mg/L. As a result we were asked to see her.     Past Medical History:   Diagnosis Date    CAD (coronary artery disease)     CRI (chronic renal insufficiency)     Difficult intubation allergies. Current Medications:        allopurinol (ZYLOPRIM) tablet 100 mg, Daily      atorvastatin (LIPITOR) tablet 40 mg, Nightly      calcium-vitamin D (OSCAL-500) 500-200 MG-UNIT per tablet 1 tablet, BID WC      digoxin (LANOXIN) tablet 62.5 mcg, Daily      DULoxetine (CYMBALTA) extended release capsule 60 mg, Daily      folic acid (FOLVITE) tablet 1 mg, Daily      insulin glargine (LANTUS) injection vial 10 Units, Nightly      levothyroxine (SYNTHROID) tablet 50 mcg, Daily      metoprolol succinate (TOPROL XL) extended release tablet 50 mg, Daily      miconazole (MICOTIN) 2 % powder, BID      sodium bicarbonate tablet 1,300 mg, BID      sodium chloride flush 0.9 % injection 10 mL, 2 times per day      sodium chloride flush 0.9 % injection 10 mL, PRN      promethazine (PHENERGAN) tablet 12.5 mg, Q6H PRN    Or      ondansetron (ZOFRAN) injection 4 mg, Q6H PRN      polyethylene glycol (GLYCOLAX) packet 17 g, Daily PRN      acetaminophen (TYLENOL) tablet 650 mg, Q6H PRN    Or      acetaminophen (TYLENOL) suppository 650 mg, Q6H PRN      0.9 % sodium chloride infusion, Continuous      insulin lispro (HUMALOG) injection vial 0-6 Units, TID WC      insulin lispro (HUMALOG) injection vial 0-3 Units, Nightly      warfarin (COUMADIN) daily dosing (placeholder), RX Placeholder      glucose (GLUTOSE) 40 % oral gel 15 g, PRN      dextrose 50 % IV solution, PRN      glucagon (rDNA) injection 1 mg, PRN      dextrose 5 % solution, PRN        Review of Systems:   Pertinent positives stated above in HPI. All other systems were reviewed and were negative. ROS: As in history of present illness. Other systems are negative. Physical exam:   Constitutional: Well-developed elderly lady in no distress. Vitals:   Vitals:    01/26/21 0810   BP: (!) 140/89   Pulse: 86   Resp: 18   Temp: 98.7 °F (37.1 °C)   SpO2: 95%       Skin: no rash, turgor is normal  Heent: Pupils are reactive to light.   Throat is clear.  Oral mucosa is moist.  Neck: no bruits or jvd noted   Cardiovascular:  S1, S2 without murmur   Respiratory: Clear with no wheezes,rhonchi or rales   Abdomen: soft,non tender,good bowel sound and no palpable mass  Ext: No lower extremity edema  Psychiatric: mood and affect appropriate  Musculoskeletal:  Rom, muscular strength intact  CNS: Very awake and very alert. Well-oriented. Normal speech. Good motor strength. No obvious focal deficit. Data:   Labs:  Lab Results   Component Value Date     01/26/2021     01/25/2021     01/08/2021    K 4.9 01/26/2021    K 5.0 01/25/2021    K 4.1 01/08/2021     01/26/2021    CO2 29 01/26/2021    CO2 29 01/25/2021    CO2 22 (L) 01/08/2021    CREATININE 1.9 (H) 01/26/2021    CREATININE 1.9 (H) 01/25/2021    CREATININE 1.4 (H) 01/08/2021    BUN 38 (H) 01/26/2021    BUN 38 (H) 01/25/2021    BUN 45 (H) 01/08/2021    GLUCOSE 132 (H) 01/26/2021    GLUCOSE 150 (H) 01/25/2021    GLUCOSE 223 (H) 01/08/2021    PHOS 2.8 04/29/2020    PHOS 4.2 07/12/2019    PHOS 3.5 04/24/2019    WBC 4.0 (L) 01/26/2021    WBC 4.1 (L) 01/25/2021    WBC 5.5 01/06/2021    HGB 10.7 (L) 01/26/2021    HGB 10.6 (L) 01/25/2021    HGB 10.4 (L) 01/06/2021    HCT 34.3 (L) 01/26/2021    HCT 33.5 (L) 01/25/2021    HCT 34.0 (L) 01/06/2021    .2 (H) 01/26/2021     01/26/2021     {Labs reviewed    Imaging:  CXR results: Thank you Dr. Wing Haney, APRN - CNP for allowing us to participate in care of Philippe Ahmadi   **This report has been created using voice recognition software. It maycontain minor  errors which are inherent in voice recognition technology. **    Electronically signed by Ilsa Collado MD on 1/26/2021 at 9:07 AM

## 2021-01-26 NOTE — ED NOTES
Incotient care completed. External catheter in placed.  Provided patient with turkey sandwich per request.      Kylie Avila RN  01/25/21 2017

## 2021-01-26 NOTE — PLAN OF CARE
Consult received-see DANI notes for 1/25/2021-return to [address] [place] [address] at discharge.

## 2021-01-27 PROBLEM — R41.82 AMS (ALTERED MENTAL STATUS): Status: ACTIVE | Noted: 2021-01-01

## 2021-01-27 NOTE — PROGRESS NOTES
Work order placed for home CPAP. Patient's sister called me, patient stated okay to update. Sister Lauren Escobedo updated, all questions answered.

## 2021-01-27 NOTE — PROGRESS NOTES
[address] [place] [address]  INPATIENT PHYSICAL THERAPY  EVALUATION  [facility] ONC MED 5K - 5K-07/007-A    Time In: 0311  Time Out: 8130  Timed Code Treatment Minutes: 25 Minutes  Minutes: 40          Date: 2021  Patient Name: Nava William,  Gender:  female        MRN: [id number]  : 1955  (76 y.o.)      Referring Practitioner: Dima Ivan MD  Diagnosis: altered mental status  Additional Pertinent Hx: Per chart review:  Nava William is a 76 y.o. female with a history of A. Fib, HTN, hyperlipidemia, T2DM, CAD, chronic kidney disease, anemia and hypothyroidism who presents to the Emergency Department by EMS for the evaluation of altered mental status. Patient was recently admitted to the hospital on  for acute delirium, lactic acidosis, dehydration and hyperkalemia. She is unsure why she is here today but stated that the nursing home workers said she hasn't been acting like herself recently. She has been having memory issues the past 2-3 years but stated the past 2-3 weeks it has worsened and she is now having trouble putting her thoughts into words. She also expresses some word finding difficulty which has been an ongoing problem but worsened recently. Also states she has been more tired than usual, causing her to take a nap midday and sleep through dinner and wake up confused around 4:30am. Patient states the nursing home workers give her her medications everyday and that she has been compliant. Denies fever, chills, shortness of breath, cough, chest pain, frequency, dysuria. Stated her body has been twitching recently as well. Denies HA, numbness, paresthesia, weakness. Patient is on Warfarin. Supplemental information from Lucía Adams, the patient's sister with whom she lived prior to her recent nursing home transfer, was at the patient has had increasing confusion over the past couple of weeks. It did temporarily improve after treatment for UTI but has recurred.   She reports that the patient seemed to be doing okay upon placement to the nursing home until she had a couple of falls last weekend. The sister states that she had 1 reported fall when she was sitting on the edge of the bed and when she tried to move, grabbed the nightstand which was on rollers and fell onto the trash can. She also had a reported fall when she was in the shower by herself and had an x-ray after without any evidence of fracture and was diagnosed with contusions. Sister reports that today the patient thought it was September and did not know the date which is unusual for her as she typically knows this information. The sister reports that the patient was started on an antibiotic recently but she is unsure of why. Malissa Guillory reports that that her mother had a brain tumor which was diagnosed around the same age as the patient. Malissa Guillory was not informed of the patient having any head injury during her falls. Restrictions/Precautions:  Restrictions/Precautions: Fall Risk, General Precautions  Position Activity Restriction  Other position/activity restrictions: L foot drop    Subjective:  Chart Reviewed: Yes  Patient assessed for rehabilitation services?: Yes  Family / Caregiver Present: No  Subjective: Rn approved PT evaluation. Upon entry, pt resting in bed but woke easily and awas agreeable to participate in therapy. Post session, pt returned to supine in bed with bed alarm on and all needs within reach.     General:  Overall Orientation Status: Within Functional Limits  Orientation Level: Oriented X4  Follows Commands: Within Functional Limits    Vision: Impaired  Vision Exceptions: Wears glasses for reading    Hearing: Within functional limits         Pain: 7/10: L hip    Social/Functional History:    Type of Home: Facility([facility])  Home Layout: One level  Home Access: Level entry  Home Equipment: Rolling walker, Wheelchair-manual     Bathroom Toilet: Handicap height       ADL Assistance: Needs assistance  Homemaking Assistance: transfers, requiring prolonged rest breaks    Ambulation:  Not Tested    Exercise:  Patient was guided in 1 set(s) 10 reps of exercise to both lower extremities. Ankle pumps, Glut sets, Quad sets, Heelslides and Hip abduction/adduction. Exercises were completed for increased independence with functional mobility. Active assist to complete ankle pump on L due to foot drop, also provided active assist B for heel slides and hip ABD/ADD. Functional Outcome Measures: Completed  AM-PAC Inpatient Mobility without Stair Climbing Raw Score : 8  AM-PAC Inpatient without Stair Climbing T-Scale Score : 30.65    ASSESSMENT:  Activity Tolerance:  Patient tolerance of  treatment: fair. Limited by fatigue requiring prolonged rest breaks following all mobility. Treatment Initiated: Treatment and education initiated within context of evaluation. Evaluation time included review of current medical information, gathering information related to past medical, social and functional history, completion of standardized testing, formal and informal observation of tasks, assessment of data and development of plan of care and goals. Treatment time included skilled education and facilitation of tasks to increase safety and independence with functional mobility for improved independence and quality of life. *cues provided throughout all mobility for proper technique. Cues for erect posture with transfers. Assessment: Body structures, Functions, Activity limitations: Decreased functional mobility , Decreased strength, Decreased ROM, Decreased cognition, Decreased endurance, Decreased balance, Increased pain  Assessment: [facility] status. Demonstrates a decrease from baseline mobility of bed mobility and transfers. Unable to assess gait this date due to pt fatigue. Pt limited by decreased endurance.  Pt to benefit from skilled PT services during admission and post d/c to promote increased indep with functional mobility activities for return to PLOF. Prognosis: Good    REQUIRES PT FOLLOW UP: Yes    Discharge Recommendations:  Discharge Recommendations: [address]    Patient Education:  PT Education: Goals, PT Role, Plan of Care, Home Exercise Program, Transfer Training, Functional Mobility Training    Equipment Recommendations:  Equipment Needed: No    Plan:  Times per week: 5x GM  Current Treatment Recommendations: Strengthening, ROM, Balance Training, Functional Mobility Training, Transfer Training, Endurance Training, Home Exercise Program, Safety Education & Training, Patient/Caregiver Education & Training    Goals:  Patient goals : get stronger  Short term goals  Time Frame for Short term goals: by discharge  Short term goal 1: Pt to perform sit<>supine with min A and HOB elevated (HOB elevates at [facility]) to promote ease of getting in and out of bed. Short term goal 2: Pt to perform rolling in bed with CGA to promote ease of bed mobility. Short term goal 3: Pt to perform sit<>stand from various surface heights with CGA to promote ease of transfers. Short term goal 4: PT to assess stand pivot transfer, w/c mobility, and gait. Long term goals  Time Frame for Long term goals : n/a due to short ELOS    Following session, patient left in safe position with all fall risk precautions in place.

## 2021-01-27 NOTE — PROGRESS NOTES
Marlen Antonio 59  INPATIENT OCCUPATIONAL THERAPY  [facility] ONC MED 5K  EVALUATION    Time:    Time In: 1004  Time Out: 1039  Timed Code Treatment Minutes: 23 Minutes  Minutes: 35          Date: 2021  Patient Name: Michael Carlos,   Gender: female      MRN: [id number]  : 1955  (76 y.o.)  Referring Practitioner: Dr. Kerry Luna  Diagnosis: altered mental status  Additional Pertinent Hx: 76 y.o. female with a history of A. Fib, HTN, hyperlipidemia, T2DM, CAD, chronic kidney disease, anemia and hypothyroidism who presents to the Emergency Department by EMS for the evaluation of altered mental status. Patient was recently admitted to the hospital on  for acute delirium, lactic acidosis, dehydration and hyperkalemia. She is unsure why she is here today but stated that the nursing home workers said she hasn't been acting like herself recently. She has been having memory issues the past 2-3 years but stated the past 2-3 weeks it has worsened and she is now having trouble putting her thoughts into words. She also expresses some word finding difficulty which has been an ongoing problem but worsened recently. Also states she has been more tired than usual, causing her to take a nap midday and sleep through dinner and wake up confused around 4:30am. Patient states the nursing home workers give her her medications everyday and that she has been compliant. CT of head negative    Restrictions/Precautions:  Restrictions/Precautions: Fall Risk, General Precautions  Position Activity Restriction  Other position/activity restrictions: L foot drop    Subjective  Chart Reviewed: Yes, Orders, Progress Notes, History and Physical  Patient assessed for rehabilitation services?: Yes    Subjective: Pt stating she still doesnt feel well. Pt reporting she just cant get comfortable in bed d/t her breathing.     Pain:  Pain Assessment  Patient Currently in Pain: Denies    Social/Functional History:  Type of Home: Facility([facility])  Home Layout: One level  Home Access: Level entry  Home Equipment: Rolling walker, Wheelchair-manual   Bathroom Toilet: Handicap height       ADL Assistance: Needs assistance  Homemaking Assistance: Needs assistance  Ambulation Assistance: Needs assistance  Transfer Assistance: Independent          Additional Comments: reports using manual w/c at [facility], only amb very short distances with RW. Performs stand pivot to/from w/c indep. able to to perform dressing, bathing, and toileting with occassional assist from staff- per PT eval. During OT eval pt stating she had assistance for al transfer and ADL tasks. Pt very active in activities at [facility]. VISION:WNL    HEARING:  WNL    COGNITION: Slow Processing and cues for attention to tasks and to follow a conversation  Pt with increased difficulty choosing appropriate lunch options and to stay on tasks when trying to decide    RANGE OF MOTION:  Right Upper Extremity: approx 90 degress of shoulder flexion   Left Upper Extremity:  approx 45 degrees of shoulder flexion. pt is left hand dominant as well    STRENGTH:  Bilateral Upper Extremity:  bilatearl UE general weakness and deconditioning throughout with decreased bilatearl UE AROM    SENSATION:   WFL    ADL:   Feeding: with set-up. Grooming: Stand By Assistance. for oral care  Lower Extremity Dressing: Maximum Assistance. [facility] BALANCE:  Sitting Balance:  Contact Guard Assistance. t min A    BED MOBILITY:  Supine to Sit: Maximum Assistance    Sit to Supine: Maximum Assistance      TRANSFERS:  OTR to further assess    FUNCTIONAL MOBILITY:  OTR to further assess        Activity Tolerance:  Patient tolerance of  treatment: fair. Pt with complaints of wheezing and increased difficulty breathing with pt relating it to how she was positioned in bed      Assessment:  Assessment: Pt demo decreased endruance and strength for completing her ADL asks at [facility].  Pt would benefit from skilled OT Services to increase her ability to complete ADL tasks and trnansfers at [facility]. Performance deficits / Impairments: Decreased functional mobility , Decreased endurance, Decreased ADL status, Decreased balance, Decreased strength, Decreased ROM, Decreased safe awareness, Decreased cognition  Prognosis: Fair  REQUIRES OT FOLLOW UP: Yes  Decision Making: Medium Complexity    Treatment Initiated: Treatment and education initiated within context of evaluation. Evaluation time included review of current medical information, gathering information related to past medical, social and functional history, completion of standardized testing, formal and informal observation of tasks, assessment of data and development of plan of care and goals. Treatment time included skilled education and facilitation of tasks to increase safety and independence with ADL's for improved functional independence and quality of life. Discharge Recommendations:  [address], Patient would benefit from continued therapy after discharge    Patient Education:  OT Education: OT Role, Plan of Care  Patient Education: importance of theray    Equipment Recommendations:  Equipment Needed: No    Plan:  Times per week: 3-5x  Current Treatment Recommendations: Strengthening, Patient/Caregiver Education & Training, Balance Training, Functional Mobility Training, Endurance Training, Safety Education & Training, Self-Care / ADL. See long-term goal time frame for expected duration of plan of care. If no long-term goals established, a short length of stay is anticipated.     Goals:  Patient goals : get strogner and feel better  Short term goals  Time Frame for Short term goals: by discharge  Short term goal 1: Pt to complete UB ADL tasks with SBA and no cues for safety  Short term goal 2: Pt to complete bilateral UE A/AAROM ex to icnreaase bilateral UE ROM and strength for ADL tasks and tranfsers  Short term goal 3: pt to complete morningADL routine with min cues for directions to tasks and to stay on tasks  Short term goal 4: OTR to further assess mobility         Following session, patient left in safe position with all fall risk precautions in place.

## 2021-01-27 NOTE — PROCEDURES
[address] [id number]                          ELECTROENCEPHALOGRAM REPORT    PATIENT NAME: Yesenia Dempsey                     :        1955  MED REC NO:   [id number]                           ROOM:       0007  ACCOUNT NO:   [de-identified]                           ADMIT DATE: 2021  PROVIDER:     Samantha Guadarrama MD    DATE OF EE2021    REFERRING PROVIDER:  Sathya Alex CNP    CLINICAL HISTORY:  A 65-year-old female presenting with altered mental  status, renal insufficiency, and fall. CLINICAL INTERPRETATION:  This is a 17-channel EEG performed without  sleep deprivation. Hyperventilation was not performed. Photic  stimulation was performed. The patient is described as alert. The background rhythm activity is noted to be 7 to 8 Hz, symmetric. Excessive slowing was seen in theta and occasional delta range activity,  suggestive of cortical dysfunction such as seen with encephalopathy. Hyperventilation was not performed. Photic stimulation was performed  with poor driving seen. Light stages of sleep are seen during the  recording. IMPRESSION:  This is an abnormal EEG due to the excessive slowing seen  in theta range suggestive of cortical dysfunction such as seen with  encephalopathy. However, there was no evidence of epileptiform activity  appreciated.         Oswald Be MD    D: 2021 12:53:33       T: 2021 13:03:47     SCARLETT/[email or web address]  Job#: [id number]     Doc#: [id number]    CC:

## 2021-01-27 NOTE — PROGRESS NOTES
INTERNAL MEDICINE Progress Note  1/27/2021 4:50 PM  Subjective:   Admit Date: 1/25/2021  PCP: ARNOL Zarate - CNP  Interval History:   No new c/o, alert, weak    Objective:   Vitals: BP (!) 134/91   Pulse 114   Temp 97.6 °F (36.4 °C) (Oral)   Resp 16   Ht 5' 9\" (1.753 m)   Wt 288 lb 12.8 oz (131 kg)   LMP 02/20/2001   SpO2 97%   BMI 42.65 kg/m²   General appearance: alert and cooperative with exam  HEENT: Head: atraumatic  Neck: no adenopathy, no carotid bruit and no JVD  Lungs: diminished breath sounds bilaterally  Heart: S1, S2 normal  Abdomen: soft, non-tender; bowel sounds normal; no masses,  no organomegaly  Extremities: venous stasis dermatitis noted  Neurologic: Mental status: alertness: alert, orientation: date, person, place, affect: blunted, thought content exhibits logical connections      Medications:   Scheduled Meds:   warfarin  1 mg Oral Once    modafinil  200 mg Oral Daily    atorvastatin  40 mg Oral Nightly    calcium-vitamin D  1 tablet Oral BID WC    digoxin  62.5 mcg Oral Daily    DULoxetine  60 mg Oral Daily    folic acid  1 mg Oral Daily    insulin glargine  10 Units Subcutaneous Nightly    levothyroxine  50 mcg Oral Daily    metoprolol succinate  50 mg Oral Daily    miconazole   Topical BID    sodium chloride flush  10 mL Intravenous 2 times per day    insulin lispro  0-6 Units Subcutaneous TID WC    insulin lispro  0-3 Units Subcutaneous Nightly    warfarin (COUMADIN) daily dosing (placeholder)   Other RX Placeholder     Continuous Infusions:   sodium chloride 50 mL/hr at 01/27/21 0916    dextrose         Lab Results:   CBC:   Recent Labs     01/25/21  1525 01/26/21  0510   WBC 4.1* 4.0*   HGB 10.6* 10.7*    235     BMP:    Recent Labs     01/25/21  1525 01/26/21  0510 01/27/21  0430    137 135   K 5.0 4.9 4.9    101 104   CO2 29 29 22*   BUN 38* 38* 35*   CREATININE 1.9* 1.9* 1.6*   GLUCOSE 150* 132* 154*     Hepatic:   Recent Labs 01/25/21  1525   AST 24   ALT 14   BILITOT 1.3*   ALKPHOS 69     INR:   Recent Labs     01/25/21  1525 01/26/21  0510 01/27/21  0430   INR 2.43* 2.51* 2.56*     Magnesium:    Lab Results   Component Value Date    MG 2.2 01/25/2021     HgBA1c:    Lab Results   Component Value Date    LABA1C 6.8 12/18/2020     TSH:    Lab Results   Component Value Date    TSH 0.244 01/26/2021     FOLATE:    Lab Results   Component Value Date    FOLATE > 20.0 01/26/2021     IRON:    Lab Results   Component Value Date    IRON 37 12/16/2020     FERRITIN:    Lab Results   Component Value Date    FERRITIN 275 12/16/2020     MRI brain:  1. Mild atrophy and dilatation of the third and lateral ventricles. 2. Probable ischemic changes in the periventricular white matter and in the denis with no evidence of an acute   infarct. 3. Otherwise negative MRI scan of the brain. 4. Inflammatory changes in the left mastoid air cells and in the ethmoid air cells bilaterally. Assessment and Plan:   1. Altered level of awareness/Encephalopathy  2. Acute kidney injury/dehydration  3. Chronic atrial fibrillation, chronically anticoagulated  4. Hypertension  5. Diabetes mellitus type 2   6. Morbid obesity  7. Hypothyroidism     Cont IV hydration.   Cont blood pressure control  Optimize glycemic control  Cont provigil  Am labs  PT/OT        Yenny García MD

## 2021-01-27 NOTE — PROGRESS NOTES
Renal Progress Note    Assessment and Plan:    1. Acute kidney injury progressively improving  2. Dehydration  3. Resolved change in mental status  4. Diabetes mellitus type 2  5. Atrial fibrillation with a controlled ventricular rate  6. Cardiomyopathy with low ejection fraction  7. Primary hypertension  PLAN:   I discussed my thoughts with the patient. She understood. She had no questions. She does not want to go back to sleep. Labs reviewed. Serum creatinine is improved today. Medications reviewed. No changes. Labs in the morning.     Patient Active Problem List:     Diabetes mellitus ([address]     Hypertension     Hyperlipidemia     Neuropathy     Osteoarthritis     Proteinuria     Arthritis     Morbid obesity (HCC)     Allergic rhinitis     Chronic kidney disease, stage 4 (severe) ([facility])     Diabetes mellitus type 2, insulin dependent ([address]     DARWIN on CPAP     History of sleeve gastrectomy     Pneumonia     Morbid obesity with BMI of 50.0-59.9, adult ([address]     Coronary artery disease involving native heart without angina pectoris     Multinodular goiter     Bilateral renal cysts     Knee pain, left     Dyspnea     Atrial fibrillation (HCC)     Acute diastolic CHF (congestive heart failure), NYHA class 2 ([facility])     CKD (chronic kidney disease) stage 3, GFR 30-59 ml/min     Shortness of breath     Acute on chronic combined systolic (congestive) and diastolic (congestive) heart failure (HCC)     Chronic anemia     Paroxysmal A-fib ([facility])     Chronic kidney disease (CKD), stage III (moderate)     Nonischemic cardiomyopathy ([facility])     Mild tricuspid regurgitation     Debility     Dietary noncompliance     Long term current use of anticoagulant     Severe left ventricular systolic dysfunction     Anticoagulated on Coumadin     Metabolic encephalopathy     Acute cystitis without hematuria     Hyperkalemia     KEEGAN (acute kidney injury) ([address]     Altered mental status      Subjective:   Admit Date: 1/25/2021    Interval History:   Seen for acute kidney injury on chronic kidney disease. Awake and alert. Denies any complaints. Did not sleep well last night however. Updated by the staff. No new issues. Blood pressure is good. Urine output is not completely documented. Medications:   Scheduled Meds:   warfarin  1 mg Oral Once    modafinil  200 mg Oral Daily    atorvastatin  40 mg Oral Nightly    calcium-vitamin D  1 tablet Oral BID WC    digoxin  62.5 mcg Oral Daily    DULoxetine  60 mg Oral Daily    folic acid  1 mg Oral Daily    insulin glargine  10 Units Subcutaneous Nightly    levothyroxine  50 mcg Oral Daily    metoprolol succinate  50 mg Oral Daily    miconazole   Topical BID    sodium chloride flush  10 mL Intravenous 2 times per day    insulin lispro  0-6 Units Subcutaneous TID WC    insulin lispro  0-3 Units Subcutaneous Nightly    warfarin (COUMADIN) daily dosing (placeholder)   Other RX Placeholder     Continuous Infusions:   sodium chloride 50 mL/hr at 01/27/21 0916    dextrose         CBC:   Recent Labs     01/25/21  1525 01/26/21  0510   WBC 4.1* 4.0*   HGB 10.6* 10.7*    235     CMP:    Recent Labs     01/25/21  1525 01/26/21  0510 01/27/21  0430    137 135   K 5.0 4.9 4.9    101 104   CO2 29 29 22*   BUN 38* 38* 35*   CREATININE 1.9* 1.9* 1.6*   GLUCOSE 150* 132* 154*   CALCIUM 8.8 8.8 8.6   LABGLOM 26* 26* 32*     Troponin: No results for input(s): TROPONINI in the last 72 hours. BNP: No results for input(s): BNP in the last 72 hours. INR:   Recent Labs     01/25/21  1525 01/26/21  0510 01/27/21  0430   INR 2.43* 2.51* 2.56*     Lipids: No results for input(s): CHOL, LDLDIRECT, TRIG, HDL, AMYLASE, LIPASE in the last 72 hours. Liver:   Recent Labs     01/25/21  1525   AST 24   ALT 14   ALKPHOS 69   PROT 6.0*   LABALBU 2.8*   BILITOT 1.3*     Iron:  No results for input(s): IRONS, FERRITIN in the last 72 hours.     Invalid input(s): LABIRONS  MRI brain without contrast   Final Result       1. Mild atrophy and dilatation of the third and lateral ventricles. 2. Probable ischemic changes in the periventricular white matter and in the denis with no evidence of an acute   infarct. 3. Otherwise negative MRI scan of the brain. 4. Inflammatory changes in the left mastoid air cells and in the ethmoid air cells bilaterally. **This report has been created using voice recognition software. It may contain minor errors which are inherent in voice recognition technology. **      Final report electronically signed by DR Lanny Newell on 1/26/2021 2:23 PM      CT HEAD WO CONTRAST   Final Result   No acute intracranial findings. **This report has been created using voice recognition software. It may contain minor errors which are inherent in voice recognition technology. **      Final report electronically signed by Dr. Hardy Comment on 1/25/2021 6:15 PM      XR CHEST PORTABLE   Final Result   Reticular markings in the lung bases correlate for atelectasis versus infiltrate. **This report has been created using voice recognition software. It may contain minor errors which are inherent in voice recognition technology. **      Final report electronically signed by Dr. Hardy Comment on 1/25/2021 3:55 PM            Objective:   Vitals: /87   Pulse 94   Temp 97.6 °F (36.4 °C) (Oral)   Resp 20   Ht 5' 9\" (1.753 m)   Wt 288 lb 12.8 oz (131 kg)   LMP 02/20/2001   SpO2 96%   BMI 42.65 kg/m²    Wt Readings from Last 3 Encounters:   01/27/21 288 lb 12.8 oz (131 kg)   01/19/21 269 lb 6.4 oz (122.2 kg)   01/08/21 270 lb 9.6 oz (122.7 kg)      24HR INTAKE/OUTPUT:      Intake/Output Summary (Last 24 hours) at 1/27/2021 1515  Last data filed at 1/27/2021 1310  Gross per 24 hour   Intake 2738. 56 ml   Output 1050 ml   Net 1688.56 ml       Constitutional:  Alert, awake, no apparent distress   Skin:normal with no rash or lesions.   HEENT:Pupils are reactive . Throat is clear . Oral mucosa is moist   Neck:supple with no thyromegaly or carotid bruit  Cardiovascular:  S1, S2 without murmur or rubs   Respiratory: Decreased breath sound bilaterally. Abdomen: +bs, soft, no tenderness to palpation and no palpable mass. No abdominal bruit   Ext: No LE edema  Musculoskeletal:Intact  Neuro:Alert and awake. Well oriented. No obvious focal deficit. Speech is normal.      Electronically signed by Ricardo Heller MD on 1/27/2021 at 3:15 PM  **This report has been created using voice recognition software. It maycontain minor  errors which are inherent in voice recognition technology. **

## 2021-01-27 NOTE — PROGRESS NOTES
Clinical Pharmacy Note    Warfarin consult follow-up    Recent Labs     01/27/21  0430   INR 2.56*     Recent Labs     01/25/21  1525 01/26/21  0510   HGB 10.6* 10.7*   HCT 33.5* 34.3*    235       Significant Drug-Drug Interactions:  New warfarin drug-drug interactions: None  Discontinued drug-drug interactions: None  Current warfarin drug-drug interactions: allopurinol (home), levothyroxine (home)        Date INR Warfarin Dose   01/25/21 2.43 1 mg    1/26/2021  2.51   1 mg     1/27/2021 2.56  1 mg                                         Notes:                     Daily PT/INR until stable within therapeutic range.      Fifi Self, PharmD  1/27/2021  1:28 PM

## 2021-01-28 NOTE — PROGRESS NOTES
Patient is alert and oriented to person, place, and time. Patients pain level is 7/10, see MAR. Speech is appropriate and clear. Pupils equal in size, round, and reactive to light. Pupil size 5mm down to 3mm, bilaterally. Upper extremities are pink/tan, warm and dry without numbness and tingling. Moves upper extremities without difficulty. Capillary refill and skin turgor are less than 3 seconds. Hand grasps are unequal, left side weaker, not a new finding. Apical pulse is 65 and regular. Lung sounds clear and regular, no cough present. Regular breathing pattern. Symmetrical chest chest movents. Abdominal is flat, and non - tender without masses. Bowel sounds are heard in all four quadrants. Last bm was 1/26/2021. Lower extremities are pink/tan, warm and dry. There is numbness and tingling in left leg, not a new finding. +3 pitting edema in bilateral lower extremities. Left leg is not mobile. Pedal, push, and pull are unequal is left side is weak. Dorsalis pedis and posterior tibialis pulses are present and strong bilaterally. Small coccyx sore, stage 2. IV in left arm, no signs of infection or infiltration noted. Patient denies needs at this time, call light within reach and bed at lowest position. RSN NOELLE Dietrich Ast

## 2021-01-28 NOTE — PROGRESS NOTES
Patient resting in bed, refusing lunch, states no needs at this time. Bed at lowest position and call light in reach [facility]

## 2021-01-28 NOTE — PROGRESS NOTES
Renal Progress Note    Assessment and Plan:    1. Acute kidney injury improved and stable  2. Diabetes mellitus type 2  3. Hypertension primary  4. Obstructive sleep apnea  5. Deconditioning   6. Bicytopenia  7.   Bilateral lower extremity edema  PLAN:   Labs reviewed  Serum creatinine is very slightly improved today  Medications reviewed  Discontinue intravenous fluid  Labs in the morning  We will follow    Patient Active Problem List:     Diabetes mellitus ([facility]     Hypertension     Hyperlipidemia     Neuropathy     Osteoarthritis     Proteinuria     Arthritis     Morbid obesity ([facility]     Allergic rhinitis     Chronic kidney disease, stage 4 (severe) ([facility])     Diabetes mellitus type 2, insulin dependent (HCC)     DARWIN on CPAP     History of sleeve gastrectomy     Pneumonia     Morbid obesity with BMI of 50.0-59.9, adult ([facility]     Coronary artery disease involving native heart without angina pectoris     Multinodular goiter     Bilateral renal cysts     Knee pain, left     Dyspnea     Atrial fibrillation (HCC)     Acute diastolic CHF (congestive heart failure), NYHA class 2 ([facility])     CKD (chronic kidney disease) stage 3, GFR 30-59 ml/min     Shortness of breath     Acute on chronic combined systolic (congestive) and diastolic (congestive) heart failure (HCC)     Chronic anemia     Paroxysmal A-fib (HCC)     Chronic kidney disease (CKD), stage III (moderate)     Nonischemic cardiomyopathy (HCC)     Mild tricuspid regurgitation     Debility     Dietary noncompliance     Long term current use of anticoagulant     Severe left ventricular systolic dysfunction     Anticoagulated on Coumadin     Metabolic encephalopathy     Acute cystitis without hematuria     Hyperkalemia     KEEGAN (acute kidney injury) ([facility]     Altered mental status     AMS (altered mental status)      Subjective:   Admit Date: 1/25/2021    Interval History:   Seen for acute kidney injury on chronic kidney disease  Comfortably asleep this morning  Stable blood pressure  Urine output is not completely documented        Medications:   Scheduled Meds:   warfarin  1 mg Oral Once    modafinil  200 mg Oral Daily    atorvastatin  40 mg Oral Nightly    calcium-vitamin D  1 tablet Oral BID WC    digoxin  62.5 mcg Oral Daily    DULoxetine  60 mg Oral Daily    folic acid  1 mg Oral Daily    insulin glargine  10 Units Subcutaneous Nightly    levothyroxine  50 mcg Oral Daily    metoprolol succinate  50 mg Oral Daily    miconazole   Topical BID    sodium chloride flush  10 mL Intravenous 2 times per day    insulin lispro  0-6 Units Subcutaneous TID WC    insulin lispro  0-3 Units Subcutaneous Nightly    warfarin (COUMADIN) daily dosing (placeholder)   Other RX Placeholder     Continuous Infusions:   sodium chloride 50 mL/hr at 01/27/21 0916    dextrose         CBC:   Recent Labs     01/25/21  1525 01/26/21  0510   WBC 4.1* 4.0*   HGB 10.6* 10.7*    235     CMP:    Recent Labs     01/26/21  0510 01/27/21  0430 01/28/21  0420    135 137   K 4.9 4.9 4.6    104 103   CO2 29 22* 24   BUN 38* 35* 35*   CREATININE 1.9* 1.6* 1.5*   GLUCOSE 132* 154* 111*   CALCIUM 8.8 8.6 8.9   LABGLOM 26* 32* 35*     Troponin: No results for input(s): TROPONINI in the last 72 hours. BNP: No results for input(s): BNP in the last 72 hours. INR:   Recent Labs     01/26/21  0510 01/27/21  0430 01/28/21  0420   INR 2.51* 2.56* 2.41*     Lipids: No results for input(s): CHOL, LDLDIRECT, TRIG, HDL, AMYLASE, LIPASE in the last 72 hours. Liver:   Recent Labs     01/25/21  1525   AST 24   ALT 14   ALKPHOS 69   PROT 6.0*   LABALBU 2.8*   BILITOT 1.3*     Iron:  No results for input(s): IRONS, FERRITIN in the last 72 hours. Invalid input(s): LABIRONS  MRI brain without contrast   Final Result       1. Mild atrophy and dilatation of the third and lateral ventricles.    2. Probable ischemic changes in the periventricular white matter and in the denis with no evidence of an acute   infarct. 3. Otherwise negative MRI scan of the brain. 4. Inflammatory changes in the left mastoid air cells and in the ethmoid air cells bilaterally. **This report has been created using voice recognition software. It may contain minor errors which are inherent in voice recognition technology. **      Final report electronically signed by DR Joellen Green on 1/26/2021 2:23 PM      CT HEAD WO CONTRAST   Final Result   No acute intracranial findings. **This report has been created using voice recognition software. It may contain minor errors which are inherent in voice recognition technology. **      Final report electronically signed by Dr. Duke Ruby on 1/25/2021 6:15 PM      XR CHEST PORTABLE   Final Result   Reticular markings in the lung bases correlate for atelectasis versus infiltrate. **This report has been created using voice recognition software. It may contain minor errors which are inherent in voice recognition technology. **      Final report electronically signed by Dr. Duke Ruby on 1/25/2021 3:55 PM            Objective:   Vitals: /71   Pulse 80   Temp 97.6 °F (36.4 °C) (Axillary)   Resp 16   Ht 5' 9\" (1.753 m)   Wt 288 lb 12.8 oz (131 kg)   LMP 02/20/2001   SpO2 95%   BMI 42.65 kg/m²    Wt Readings from Last 3 Encounters:   01/27/21 288 lb 12.8 oz (131 kg)   01/19/21 269 lb 6.4 oz (122.2 kg)   01/08/21 270 lb 9.6 oz (122.7 kg)      24HR INTAKE/OUTPUT:      Intake/Output Summary (Last 24 hours) at 1/28/2021 0123  Last data filed at 1/28/2021 0259  Gross per 24 hour   Intake 2073.13 ml   Output 525 ml   Net 1548.13 ml       Constitutional: Well-developed elderly lady comfortably asleep in no distress  Skin:normal with no rash or lesions. HEENT:Pupils are reactive . Throat is clear . Oral mucosa is moist   Neck:supple with no carotid bruit  Cardiovascular:  S1, S2 without murmur or rubs   Respiratory:  Clear to ausculation without wheezes, rhonchi or rales. Abdomen: Soft. Good bowel sounds. Ext: 2+ bilateral LE edema  Musculoskeletal:Intact  Neuro: Deferred      Electronically signed by Isabel Friedman MD on 1/28/2021 at 8:23 AM  **This report has been created using voice recognition software. It maycontain minor  errors which are inherent in voice recognition technology. **

## 2021-01-28 NOTE — PROGRESS NOTES
Physician Progress Note      Dannielle Reyes  CSN #:                  [id number]  :                       1955  ADMIT DATE:       2021 2:52 PM  [address] [place] DATE:  RESPONDING  PROVIDER #:        Jesus Jung MD          QUERY TEXT:    Dr Brian Correa,    Pt admitted with KEEGAN & AMS. Pt. noted to be total care per RN and require   assistance with all ADLS. Patient is non-ambulatory and resides in nursing   home. If possible, please document in the progress notes and/or discharge   summary if you are treating and/or evaluating any of the following: The medical record reflects the following:  Risk Factors: HTN, GERD, AFIB, DM  Clinical Indicators: Non Ambulatory, Total care by RN  Treatment: Bed alarm, Fall Risk posted, Turn every 2 hours, Toilet every 2   hours, assistance with nutrition. Options provided:  -- Functional quadriplegia  -- Severe debility/impaired mobility  -- Other - I will add my own diagnosis  -- Disagree - Not applicable / Not valid  -- Disagree - Clinically unable to determine / Unknown  -- Refer to Clinical Documentation Reviewer    PROVIDER RESPONSE TEXT:    This patient has severe debility/ impaired mobility.     Query created by: Lior Anthony on 2021 6:11 AM      Electronically signed by:  Jesus Jung MD 2021 3:29 PM

## 2021-01-28 NOTE — PROGRESS NOTES
INTERNAL MEDICINE Progress Note  1/28/2021 3:43 PM  Subjective:   Admit Date: 1/25/2021  PCP: ARNOL Rueda - CNP  Interval History:     much less somnolent today  Objective:   Vitals: /82   Pulse 97   Temp 97.5 °F (36.4 °C) (Oral)   Resp 16   Ht 5' 9\" (1.753 m)   Wt 288 lb 12.8 oz (131 kg)   LMP 02/20/2001   SpO2 93%   BMI 42.65 kg/m²   General appearance: alert and cooperative with exam  HEENT: atraumatic  Neck: no JVD  Lungs: diminished breath sounds bilaterally  Heart: S1, S2 normal  Abdomen: soft, non-tender; bowel sounds normal; no masses,  no organomegaly  Extremities: venous stasis dermatitis noted  Neurologic:  alert, orientation: date, person, place, affect: blunted, thought content exhibits logical connections      Medications:   Scheduled Meds:   warfarin  1 mg Oral Once    modafinil  200 mg Oral Daily    atorvastatin  40 mg Oral Nightly    calcium-vitamin D  1 tablet Oral BID WC    digoxin  62.5 mcg Oral Daily    DULoxetine  60 mg Oral Daily    folic acid  1 mg Oral Daily    insulin glargine  10 Units Subcutaneous Nightly    levothyroxine  50 mcg Oral Daily    metoprolol succinate  50 mg Oral Daily    miconazole   Topical BID    sodium chloride flush  10 mL Intravenous 2 times per day    insulin lispro  0-6 Units Subcutaneous TID WC    insulin lispro  0-3 Units Subcutaneous Nightly    warfarin (COUMADIN) daily dosing (placeholder)   Other RX Placeholder     Continuous Infusions:   dextrose         Lab Results:   CBC:   Recent Labs     01/26/21  0510   WBC 4.0*   HGB 10.7*        BMP:    Recent Labs     01/26/21  0510 01/27/21  0430 01/28/21 0420    135 137   K 4.9 4.9 4.6    104 103   CO2 29 22* 24   BUN 38* 35* 35*   CREATININE 1.9* 1.6* 1.5*   GLUCOSE 132* 154* 111*     INR:   Recent Labs     01/26/21  0510 01/27/21  0430 01/28/21  0420   INR 2.51* 2.56* 2.41*     Magnesium:    Lab Results   Component Value Date    MG 2.2 01/25/2021     HgBA1c: Lab Results   Component Value Date    LABA1C 6.8 12/18/2020     TSH:    Lab Results   Component Value Date    TSH 0.244 01/26/2021     FOLATE:    Lab Results   Component Value Date    FOLATE > 20.0 01/26/2021     IRON:    Lab Results   Component Value Date    IRON 37 12/16/2020     FERRITIN:    Lab Results   Component Value Date    FERRITIN 275 12/16/2020     MRI brain:  1. Mild atrophy and dilatation of the third and lateral ventricles. 2. Probable ischemic changes in the periventricular white matter and in the denis with no evidence of an acute   infarct. 3. Otherwise negative MRI scan of the brain. 4. Inflammatory changes in the left mastoid air cells and in the ethmoid air cells bilaterally. Assessment and Plan:   1. Altered level of awareness/Encephalopathy  2. Acute kidney injury/dehydration  3. Chronic atrial fibrillation, chronically anticoagulated  4. Hypertension  5. Diabetes mellitus type 2   6. Morbid obesity  7. Hypothyroidism  8.  Narcolepsy, cont provigil     Reduce IVF  Cont blood pressure control  Optimize glycemic control  Cont provigil  PT/OT    Jane Nunez MD

## 2021-01-28 NOTE — PROGRESS NOTES
[address] [id number]  PHYSICAL THERAPY MISSED TREATMENT NOTE  ACUTE CARE  STR ONC MED 5K          Missed Treatment:  Attempted PT treatment this AM, however pt refused stating she was too tired and needed rest. Will check back 1/29.

## 2021-01-28 NOTE — PROGRESS NOTES
Clinical Pharmacy Note    Warfarin consult follow-up    Recent Labs     01/28/21  0420   INR 2.41*     Recent Labs     01/25/21  1525 01/26/21  0510   HGB 10.6* 10.7*   HCT 33.5* 34.3*    235       Significant Drug-Drug Interactions:  New warfarin drug-drug interactions: None  Discontinued drug-drug interactions: None  Current warfarin drug-drug interactions: allopurinol (home), levothyroxine (home)        Date INR Warfarin Dose   01/25/21 2.43 1 mg    1/26/2021  2.51   1 mg     1/27/2021 2.56  1 mg    1/28/2021  2.41  1 mg                                Notes:                     Daily PT/INR until stable within therapeutic range.      El Brannon, PharmD  1/28/2021  7:30 AM

## 2021-01-28 NOTE — FLOWSHEET NOTE
[facility] PROGRESS NOTE      Patient: Odessa Holguin  Room #: 5K-07/007-A            YOB: 1955  Age: 76 y.o. Gender: female            Admit Date & Time: 1/25/2021  2:52 PM    Assessment:  Ubaldo Faustin is a 76year old female who is in bed on 5K. No family is present at this time. Ubaldo Faustin stated she will be going back to her nursing home at the [facility]. Interventions:  Student  Denny Sandoval prayed for Karla's healing and strength    Outcomes:  encouraged    Plan: 1. Care Plan:  Continue spiritual and emotional care for patient and family. Including prayers.      Electronically signed by Aurelia Trevino on 1/28/2021 at 4:47 PM.  [address] [facility]  [phone]

## 2021-01-28 NOTE — PROGRESS NOTES
Patient in bed resting,bath given with assistance from with Adán Cabrera RN bed at lowest position with call light in reach, patient states no needs at time [facility] R. Ariel Klinefelter

## 2021-01-28 NOTE — PLAN OF CARE
Problem: Falls - Risk of:  Goal: Will remain free from falls  Description: Will remain free from falls  Outcome: Ongoing  Goal: Absence of physical injury  Description: Absence of physical injury  Outcome: Ongoing     Problem: Pain:  Goal: Pain level will decrease  Description: Pain level will decrease  Outcome: Ongoing  Goal: Control of acute pain  Description: Control of acute pain  Outcome: Ongoing  Goal: Control of chronic pain  Description: Control of chronic pain  Outcome: Ongoing     Problem: Confusion - Acute:  Goal: Absence of continued neurological deterioration signs and symptoms  Description: Absence of continued neurological deterioration signs and symptoms  Outcome: Ongoing  Goal: Mental status will be restored to baseline  Description: Mental status will be restored to baseline  Outcome: Ongoing     Problem: Discharge Planning:  Goal: Ability to perform activities of daily living will improve  Description: Ability to perform activities of daily living will improve  Outcome: Ongoing  Goal: Participates in care planning  Description: Participates in care planning  Outcome: Ongoing     Problem: Injury - Risk of, Physical Injury:  Goal: Will remain free from falls  Description: Will remain free from falls  Outcome: Ongoing  Goal: Absence of physical injury  Description: Absence of physical injury  Outcome: Ongoing     Problem: Mood - Altered:  Goal: Mood stable  Description: Mood stable  Outcome: Ongoing  Goal: Absence of abusive behavior  Description: Absence of abusive behavior  Outcome: Ongoing  Goal: Verbalizations of feeling emotionally comfortable while being cared for will increase  Description: Verbalizations of feeling emotionally comfortable while being cared for will increase  Outcome: Ongoing     Problem: Psychomotor Activity - Altered:  Goal: Absence of psychomotor disturbance signs and symptoms  Description: Absence of psychomotor disturbance signs and symptoms  Outcome: Ongoing Problem: Sensory Perception - Impaired:  Goal: Demonstrations of improved sensory functioning will increase  Description: Demonstrations of improved sensory functioning will increase  Outcome: Ongoing  Goal: Decrease in sensory misperception frequency  Description: Decrease in sensory misperception frequency  Outcome: Ongoing  Goal: Able to refrain from responding to false sensory perceptions  Description: Able to refrain from responding to false sensory perceptions  Outcome: Ongoing  Goal: Demonstrates accurate environmental perceptions  Description: Demonstrates accurate environmental perceptions  Outcome: Ongoing  Goal: Able to distinguish between reality-based and nonreality-based thinking  Description: Able to distinguish between reality-based and nonreality-based thinking  Outcome: Ongoing  Goal: Able to interrupt nonreality-based thinking  Description: Able to interrupt nonreality-based thinking  Outcome: Ongoing     Problem: Sleep Pattern Disturbance:  Goal: Appears well-rested  Description: Appears well-rested  Outcome: Ongoing

## 2021-01-29 NOTE — PROGRESS NOTES
Clinical Pharmacy Note                                               Warfarin Discharge Recommendations      Pt discharged from [facility] today after admission for AMS    INR today:  Recent Labs     01/29/21  0401   INR 2.36*         Interacting medications at discharge: allopurinol, levothyroxine    INR goal during admission: 2-3    Recommendations for discharge:   Date Warfarin Dose   1/29/2021 1 mg   1/30/2021 1 mg   1/31/2021 1 mg   2/1/2021 1 mg   2/2/2021 Check INR     Provider dosing warfarin: [facility] Physician    Recheck INR:  2/2/2021 with results to [facility] Physician     Freda Sterling, PharmD  1/29/2021  12:56 PM

## 2021-01-29 NOTE — PROGRESS NOTES
Renal Progress Note    Assessment and Plan:    1. Acute kidney injury improved and stable for the most part. 2.  Diabetes mellitus type 2  3. Hypertension primary with blood pressure mostly controlled  4. Obstructive sleep apnea  5. Macrocytic anemia  6.   Leukopenia    PLAN:   Labs reviewed  Serum creatinine is very slightly increased today otherwise stable for the most part  Medications reviewed  No changes  Labs in the morning if not discharged today  We will follow    Patient Active Problem List:     Diabetes mellitus ([facility]     Hypertension     Hyperlipidemia     Neuropathy     Osteoarthritis     Proteinuria     Arthritis     Morbid obesity (HCC)     Allergic rhinitis     Chronic kidney disease, stage 4 (severe) ([facility])     Diabetes mellitus type 2, insulin dependent (HCC)     DARWIN on CPAP     History of sleeve gastrectomy     Pneumonia     Morbid obesity with BMI of 50.0-59.9, adult ([facility]     Coronary artery disease involving native heart without angina pectoris     Multinodular goiter     Bilateral renal cysts     Knee pain, left     Dyspnea     Atrial fibrillation (HCC)     Acute diastolic CHF (congestive heart failure), NYHA class 2 ([facility])     CKD (chronic kidney disease) stage 3, GFR 30-59 ml/min     Shortness of breath     Acute on chronic combined systolic (congestive) and diastolic (congestive) heart failure (HCC)     Chronic anemia     Paroxysmal A-fib (HCC)     Chronic kidney disease (CKD), stage III (moderate)     Nonischemic cardiomyopathy ([facility])     Mild tricuspid regurgitation     Debility     Dietary noncompliance     Long term current use of anticoagulant     Severe left ventricular systolic dysfunction     Anticoagulated on Coumadin     Metabolic encephalopathy     Acute cystitis without hematuria     Hyperkalemia     KEEGAN (acute kidney injury) ([facility]     Altered mental status     AMS (altered mental status)      Subjective:   Admit Date: 1/25/2021    Interval History:   Seen for acute kidney injury on chronic kidney disease  Very awake and very alert this morning   Denies any complaints  Had a good night sleep last night according to her  Stable blood pressure for the most part  Urine output is okay        Medications:   Scheduled Meds:   modafinil  200 mg Oral Daily    atorvastatin  40 mg Oral Nightly    calcium-vitamin D  1 tablet Oral BID     digoxin  62.5 mcg Oral Daily    DULoxetine  60 mg Oral Daily    folic acid  1 mg Oral Daily    insulin glargine  10 Units Subcutaneous Nightly    levothyroxine  50 mcg Oral Daily    metoprolol succinate  50 mg Oral Daily    miconazole   Topical BID    sodium chloride flush  10 mL Intravenous 2 times per day    insulin lispro  0-6 Units Subcutaneous TID     insulin lispro  0-3 Units Subcutaneous Nightly    warfarin (COUMADIN) daily dosing (placeholder)   Other RX Placeholder     Continuous Infusions:   dextrose         CBC:   No results for input(s): WBC, HGB, PLT in the last 72 hours. CMP:    Recent Labs     01/27/21  0430 01/28/21  0420 01/29/21  0401    137 137   K 4.9 4.6 5.0    103 105   CO2 22* 24 24   BUN 35* 35* 35*   CREATININE 1.6* 1.5* 1.6*   GLUCOSE 154* 111* 136*   CALCIUM 8.6 8.9 8.6   LABGLOM 32* 35* 32*     Troponin: No results for input(s): TROPONINI in the last 72 hours. BNP: No results for input(s): BNP in the last 72 hours. INR:   Recent Labs     01/27/21  0430 01/28/21  0420 01/29/21  0401   INR 2.56* 2.41* 2.36*     Lipids: No results for input(s): CHOL, LDLDIRECT, TRIG, HDL, AMYLASE, LIPASE in the last 72 hours. Liver:   No results for input(s): AST, ALT, ALKPHOS, PROT, LABALBU, BILITOT in the last 72 hours. Invalid input(s): BILDIR  Iron:  No results for input(s): IRONS, FERRITIN in the last 72 hours. Invalid input(s): LABIRONS  MRI brain without contrast   Final Result       1. Mild atrophy and dilatation of the third and lateral ventricles.    2. Probable ischemic changes in the periventricular white matter and in the denis with no evidence of an acute   infarct. 3. Otherwise negative MRI scan of the brain. 4. Inflammatory changes in the left mastoid air cells and in the ethmoid air cells bilaterally. **This report has been created using voice recognition software. It may contain minor errors which are inherent in voice recognition technology. **      Final report electronically signed by DR Radha David on 1/26/2021 2:23 PM      CT HEAD WO CONTRAST   Final Result   No acute intracranial findings. **This report has been created using voice recognition software. It may contain minor errors which are inherent in voice recognition technology. **      Final report electronically signed by Dr. Avery Maldonado on 1/25/2021 6:15 PM      XR CHEST PORTABLE   Final Result   Reticular markings in the lung bases correlate for atelectasis versus infiltrate. **This report has been created using voice recognition software. It may contain minor errors which are inherent in voice recognition technology. **      Final report electronically signed by Dr. Avery Maldonado on 1/25/2021 3:55 PM            Objective:   Vitals: /68   Pulse 94   Temp 98 °F (36.7 °C) (Axillary)   Resp 18   Ht 5' 9\" (1.753 m)   Wt 288 lb 12.8 oz (131 kg)   LMP 02/20/2001   SpO2 95%   BMI 42.65 kg/m²    Wt Readings from Last 3 Encounters:   01/27/21 288 lb 12.8 oz (131 kg)   01/19/21 269 lb 6.4 oz (122.2 kg)   01/08/21 270 lb 9.6 oz (122.7 kg)      24HR INTAKE/OUTPUT:      Intake/Output Summary (Last 24 hours) at 1/29/2021 5178  Last data filed at 1/29/2021 9689  Gross per 24 hour   Intake 1155.86 ml   Output 1000 ml   Net 155.86 ml       Constitutional: Well-developed elderly lady awake and alert in no distress  Skin:normal with no rash or lesions. HEENT:Pupils are reactive . Throat is clear . Oral mucosa is moist   Neck:supple with no carotid bruit  Cardiovascular:  S1, S2 without murmur or rubs Respiratory:  Clear to ausculation without wheezes, rhonchi or rales. Abdomen: Soft. Good bowel sounds. Ext: 2+ bilateral LE edema  Musculoskeletal:Intact  Neuro: Awake and alert . Well-oriented. Speech is normal.  No obvious focal deficit. Electronically signed by Kaur Cai MD on 1/29/2021 at 9:29 AM  **This report has been created using voice recognition software. It maycontain minor  errors which are inherent in voice recognition technology. **

## 2021-01-29 NOTE — PROGRESS NOTES
INTERNAL MEDICINE Progress Note  1/29/2021 11:52 AM  Subjective:   Admit Date: 1/25/2021  PCP: Lianna Raza, APRN - CNP  Interval History:     More alert today on provigil  No SOB  Objective:   Vitals: BP (!) 151/71   Pulse 102   Temp 98.1 °F (36.7 °C) (Axillary)   Resp 18   Ht 5' 9\" (1.753 m)   Wt 288 lb 12.8 oz (131 kg)   LMP 02/20/2001   SpO2 97%   BMI 42.65 kg/m²   General appearance: alert and cooperative with exam  HEENT: atraumatic  Neck: no JVD  Lungs: diminished breath sounds bilaterally  Heart: S1, S2 normal  Abdomen: soft, non-tender; bowel sounds normal; no masses,  no organomegaly  Extremities: venous stasis dermatitis noted, edema  trace  Neurologic:  alert, orientation: date, person, place, affect: normal      Medications:   Scheduled Meds:   furosemide  40 mg Oral Daily    modafinil  200 mg Oral Daily    atorvastatin  40 mg Oral Nightly    calcium-vitamin D  1 tablet Oral BID WC    digoxin  62.5 mcg Oral Daily    DULoxetine  60 mg Oral Daily    folic acid  1 mg Oral Daily    insulin glargine  10 Units Subcutaneous Nightly    levothyroxine  50 mcg Oral Daily    metoprolol succinate  50 mg Oral Daily    miconazole   Topical BID    sodium chloride flush  10 mL Intravenous 2 times per day    insulin lispro  0-6 Units Subcutaneous TID WC    insulin lispro  0-3 Units Subcutaneous Nightly    warfarin (COUMADIN) daily dosing (placeholder)   Other RX Placeholder     Continuous Infusions:   dextrose         Lab Results:   CBC:   No results for input(s): WBC, HGB, PLT in the last 72 hours.   BMP:    Recent Labs     01/27/21  0430 01/28/21 0420 01/29/21  0401    137 137   K 4.9 4.6 5.0    103 105   CO2 22* 24 24   BUN 35* 35* 35*   CREATININE 1.6* 1.5* 1.6*   GLUCOSE 154* 111* 136*     INR:   Recent Labs     01/27/21  0430 01/28/21  0420 01/29/21  0401   INR 2.56* 2.41* 2.36*     Magnesium:    Lab Results   Component Value Date    MG 2.2 01/25/2021     HgBA1c:    Lab Results Component Value Date    LABA1C 6.8 12/18/2020     TSH:    Lab Results   Component Value Date    TSH 0.244 01/26/2021     FOLATE:    Lab Results   Component Value Date    FOLATE > 20.0 01/26/2021     IRON:    Lab Results   Component Value Date    IRON 37 12/16/2020     FERRITIN:    Lab Results   Component Value Date    FERRITIN 275 12/16/2020     MRI brain:  1. Mild atrophy and dilatation of the third and lateral ventricles. 2. Probable ischemic changes in the periventricular white matter and in the denis with no evidence of an acute   infarct. 3. Otherwise negative MRI scan of the brain. 4. Inflammatory changes in the left mastoid air cells and in the ethmoid air cells bilaterally. Assessment and Plan:   1. Altered level of awareness/Encephalopathy  2. Acute kidney injury/dehydration  3. Chronic atrial fibrillation, chronically anticoagulated  4. Hypertension  5. Diabetes mellitus type 2   6. Morbid obesity  7. Hypothyroidism  8. Narcolepsy, cont provigil     Cont blood pressure control  Optimize glycemic control  Cont provigil  Lasix po  Cont PT/OT  Stable for dc to SNF for rehab.     Dima Ivan MD none

## 2021-01-29 NOTE — PROGRESS NOTES
[address] [address]  INPATIENT PHYSICAL THERAPY  DAILY NOTE  [facility] 5K - 5K-07/007-A    Time In: 6436  Time Out: 1415  Timed Code Treatment Minutes: 31 Minutes  Minutes: 31          Date: 2021  Patient Name: Wander Verde,  Gender:  female        MRN: [id number]  : 1955  (76 y.o.)     Referring Practitioner: Lisa Carlisle MD  Diagnosis: altered mental status  Additional Pertinent Hx: Per chart review:  Wander Verde is a 76 y.o. female with a history of A. Fib, HTN, hyperlipidemia, T2DM, CAD, chronic kidney disease, anemia and hypothyroidism who presents to the Emergency Department by EMS for the evaluation of altered mental status. Patient was recently admitted to the hospital on  for acute delirium, lactic acidosis, dehydration and hyperkalemia. She is unsure why she is here today but stated that the nursing home workers said she hasn't been acting like herself recently. She has been having memory issues the past 2-3 years but stated the past 2-3 weeks it has worsened and she is now having trouble putting her thoughts into words. She also expresses some word finding difficulty which has been an ongoing problem but worsened recently. Also states she has been more tired than usual, causing her to take a nap midday and sleep through dinner and wake up confused around 4:30am. Patient states the nursing home workers give her her medications everyday and that she has been compliant. Denies fever, chills, shortness of breath, cough, chest pain, frequency, dysuria. Stated her body has been twitching recently as well. Denies HA, numbness, paresthesia, weakness. Patient is on Warfarin. Supplemental information from Jake, the patient's sister with whom she lived prior to her recent nursing home transfer, was at the patient has had increasing confusion over the past couple of weeks. It did temporarily improve after treatment for UTI but has recurred.   She reports that the patient seemed to be doing okay upon placement to the nursing home until she had a couple of falls last weekend. The sister states that she had 1 reported fall when she was sitting on the edge of the bed and when she tried to move, grabbed the nightstand which was on rollers and fell onto the trash can. She also had a reported fall when she was in the shower by herself and had an x-ray after without any evidence of fracture and was diagnosed with contusions. Sister reports that today the patient thought it was September and did not know the date which is unusual for her as she typically knows this information. The sister reports that the patient was started on an antibiotic recently but she is unsure of why. Jeimy Calvillo reports that that her mother had a brain tumor which was diagnosed around the same age as the patient. Jeimy Calvillo was not informed of the patient having any head injury during her falls. Prior Level of Function:  Type of Home: Facility([facility])  Home Layout: One level  Home Access: Level entry  Home Equipment: Rolling walker, Wheelchair-manual   Bathroom Toilet: Handicap height    ADL Assistance: Needs assistance  Homemaking Assistance: Needs assistance  Ambulation Assistance: Needs assistance  Transfer Assistance: Independent  Additional Comments: reports using manual w/c at F, only amb very short distances with RW. Performs stand pivot to/from w/c indep. able to to perform dressing, bathing, and toileting with occassional assist from staff- per PT eval. During OT eval pt stating she had assistance for al transfer and ADL tasks. Pt very active in activities at [facility]. Restrictions/Precautions:  Restrictions/Precautions: Fall Risk, General Precautions  Position Activity Restriction  Other position/activity restrictions: L foot drop; confusion     SUBJECTIVE: Rn approved PT treatment. Upon entry, pt supine in bed, required encouragement to participate in therapy.  Post session, pt reclined in bedside chair with chair alarm on and all needs within reach. PAIN: no pain stated    OBJECTIVE:  Bed Mobility:  Supine to Sit: Minimal Assistance, Moderate Assistance, X 1, with head of bed raised, with verbal cues , with increased time for completion, min A at trunk, mod A at B LE    Transfers:  Sit to Stand: mod A with bed at lowest position and pt unable to acheive fully erect posture, min A from very slightly elevated bed and pt able to acheive standing   Stand to [address] [address], to/from chair with arms  Stand Pivot:Contact [employer], with RW, performed bed>chair     Ambulation:  Not Tested    Balance:  Static Standing Balance: Contact Guard Assistance, with RW  Dynamic Standing Balance: Contact Guard Assistance, with RW    Exercise:  Patient was guided in 1 set(s) 12 reps of exercise to both lower extremities. Ankle pumps, Glut sets, Quad sets, Heelslides, Hip abduction/adduction, Seated marches, Seated heel/toe raises and Long arc quads. Exercises were completed for increased independence with functional mobility. Active assist to complete hip ABD/ADD and heelslides on L. Improved DF noted on L this date    Functional Outcome Measures: Completed  AM-PAC Inpatient Mobility without Stair Climbing Raw Score : 10  AM-PAC Inpatient without Stair Climbing T-Scale Score : 34.07    ASSESSMENT:  Assessment: Patient progressing toward established goals. Activity Tolerance:  Patient tolerance of  treatment: good. Required encouragement to participate, however demonstrated improved ability to perform functional mobility tasks this date. Pt to benefit from cont skilled PT services during admission and post d/c to promote increased indep with functional mobility tasks for return to OF.      Equipment Recommendations:Equipment Needed: No  Discharge Recommendations:   Subacute/Skilled Nursing Facility    Plan: Times per week: 5x GM  Current Treatment Recommendations: Strengthening, ROM, Balance Training, Functional Mobility Training, Transfer Training, Endurance Training, Home Exercise Program, Safety Education & Training, Patient/Caregiver Education & Training    Patient Education  Patient Education: Plan of Care, [employer] Mobility, Transfers, Verbal Exercise Instruction    Goals:  Patient goals : get stronger  Short term goals  Time Frame for Short term goals: by discharge  Short term goal 1: Pt to perform sit<>supine with min A and HOB elevated (HOB elevates at [facility]) to promote ease of getting in and out of bed. Short term goal 2: Pt to perform rolling in bed with CGA to promote ease of bed mobility. Short term goal 3: Pt to perform sit<>stand from various surface heights with CGA to promote ease of transfers. Short term goal 4: Pt to perform stand pivot with S to promote ease of transfers for OOB activity. Short term goal 5: PT to assess gait when appropriate  Long term goals  Time Frame for Long term goals : n/a due to short ELOS    Following session, patient left in safe position with all fall risk precautions in place.

## 2021-01-29 NOTE — PROGRESS NOTES
[address] [employer] [address]  Occupational Therapy  Daily Note  Time:    Time In: 813  Time Out: 7445  Timed Code Treatment Minutes: 39 Minutes  Minutes: 45          Date: 2021  Patient Name: Alexandra Rodriguez,   Gender: female      Room: [facility]007-A  MRN: [id number]  : 1955  (76 y.o.)  Referring Practitioner: Dr. Alban Rudolph  Diagnosis: altered mental status  Additional Pertinent Hx: 76 y.o. female with a history of A. Fib, HTN, hyperlipidemia, T2DM, CAD, chronic kidney disease, anemia and hypothyroidism who presents to the Emergency Department by EMS for the evaluation of altered mental status. Patient was recently admitted to the hospital on  for acute delirium, lactic acidosis, dehydration and hyperkalemia. She is unsure why she is here today but stated that the nursing home workers said she hasn't been acting like herself recently. She has been having memory issues the past 2-3 years but stated the past 2-3 weeks it has worsened and she is now having trouble putting her thoughts into words. She also expresses some word finding difficulty which has been an ongoing problem but worsened recently. Also states she has been more tired than usual, causing her to take a nap midday and sleep through dinner and wake up confused around 4:30am. Patient states the nursing home workers give her her medications everyday and that she has been compliant. CT of head negative    Restrictions/Precautions:  Restrictions/Precautions: Fall Risk, General Precautions  Position Activity Restriction  Other position/activity restrictions: L foot drop; confusion     SUBJECTIVE:  Required encouragement, confusion noted, tangential-required redirection throughout session    PAIN: 0/10: Pt describing chronic pains    COGNITION: Slow Processing, Decreased Recall, Decreased Insight, Impaired Memory, Decreased Problem Solving, Decreased Safety Awareness and Impaired Attention    ADL:   Bathing: Moderate Assistance.   A for BLE & bottom  Upper Extremity Dressing: Moderate Assistance. for pull over gown (Pt wanted to wear own gown)  Lower Extremity Dressing: Dependent. for don/doffing slipper socks. BALANCE:  Sitting Balance:  Stand By Assistance. Standing Balance: Minimal Assistance, X 2. forward flexed posture with heavy WB through RW on forearms; unable to trial MELODY WALLIS at this time d/t this posture    BED MOBILITY:  Rolling to Left: Moderate Assistance    Rolling to Right: Moderate Assistance    Supine to Sit: Moderate Assistance with HOB elevated & using bedrails  Sit to Supine: Moderate Assistance      TRANSFERS:  Sit to Stand: Moderate Assistance, X 2. blocking at PLOF      ASSESSMENT:     Activity Tolerance:  Patient tolerance of  treatment: fair.         Discharge Recommendations: [address], Patient would benefit from continued therapy after discharge    Equipment Recommendations: Equipment Needed: No  Plan: Times per week: 3-5x  Current Treatment Recommendations: Strengthening, Patient/Caregiver Education & Training, Balance Training, Functional Mobility Training, Endurance Training, Safety Education & Training, Self-Care / ADL    Patient Education  Patient Education: see above    Goals  Short term goals  Time Frame for Short term goals: by discharge  Short term goal 1: Pt to complete UB ADL tasks with SBA and no cues for safety NOT MET, CONTINUE  Short term goal 2: Pt to complete bilateral UE A/AAROM ex to icnreaase bilateral UE ROM and strength for ADL tasks and tranfsers NOT MET, CONTINUE  Short term goal 3: pt to complete morningADL routine with min cues for directions to tasks and to stay on tasks NOT MET, CONTINUE  Short term goal 4: OTR to further assess mobility MET, REVISE    Revised Short-Term Goals  Short term goals  Time Frame for Short term goals: by discharge  Short term goal 1: Pt to complete UB ADL tasks with SBA and no cues for safety  Short term goal 2: Pt to complete bilateral UE A/AAROM ex to icnreaase bilateral UE ROM and strength for ADL tasks and tranfsers  Short term goal 3: pt to complete morningADL routine with min cues for directions to tasks and to stay on tasks  Short term goal 4: Pt will complete sit-stand with min A x 2 for eventual BSC t/fs  Short term goal 5: Pt will tolerate standing 1 min with min A x 2 good posture for possible MELODY STEDY trial by OTR if/when appropriate      Following session, patient left in safe position with all fall risk precautions in place.

## 2021-01-29 NOTE — DISCHARGE SUMMARY
Discharge Summary    Guillermina Stein  :  1955  MRN:  [id number]    Admit date:  2021  Discharge date:      Admitting Physician:  Chicho Diaz MD    Discharge Diagnoses:    1. Altered level of awareness/Encephalopathy  2. Acute kidney injury/dehydration  3. CKD stage 3  4. Chronic atrial fibrillation, chronically anticoagulated  5. Hypertension  6. Diabetes mellitus type 2   7. Morbid obesity  8. Hypothyroidism  9.  Narcolepsy, cont provigil        Patient Active Problem List   Diagnosis    Diabetes mellitus ([facility]    Hypertension    Hyperlipidemia    Neuropathy    Osteoarthritis    Proteinuria    Arthritis    Morbid obesity ([facility]    Allergic rhinitis    Chronic kidney disease, stage 4 (severe) ([facility]    Diabetes mellitus type 2, insulin dependent ([facility]    DARWIN on CPAP    History of sleeve gastrectomy    Pneumonia    Morbid obesity with BMI of 50.0-59.9, adult ([facility]    Coronary artery disease involving native heart without angina pectoris    Multinodular goiter    Bilateral renal cysts    Knee pain, left    Dyspnea    Atrial fibrillation (HCC)    Acute diastolic CHF (congestive heart failure), NYHA class 2 (HCC)    CKD (chronic kidney disease) stage 3, GFR 30-59 ml/min    Shortness of breath    Acute on chronic combined systolic (congestive) and diastolic (congestive) heart failure (HCC)    Chronic anemia    Paroxysmal A-fib (HCC)    Chronic kidney disease (CKD), stage III (moderate)    Nonischemic cardiomyopathy (HCC)    Mild tricuspid regurgitation    Debility    Dietary noncompliance    Long term current use of anticoagulant    Severe left ventricular systolic dysfunction    Anticoagulated on Coumadin    Metabolic encephalopathy    Acute cystitis without hematuria    Hyperkalemia    KEEGAN (acute kidney injury) (HCC)    Altered mental status    AMS (altered mental status)       Admission Condition:  serious  Discharged Condition:  good    Hospital Course:   Patient was admitted for altered awareness, increased somnolence. CT head and MRI brain showed no acute process, eeg was non epileptogenic. Started on provigil for increased somnolence with positive result. Stabilized bp and BS.       Discharge Medications:       Gavi Vyas   Home Medication Instructions SHASTA    Printed on:21 [id number]   Medication Information                      acetaminophen (TYLENOL) 500 MG tablet  Take 1 tablet by mouth 4 times daily as needed for Pain             alendronate (FOSAMAX) 70 MG tablet  Take 1 tablet by mouth every 7 days             aspirin (RA ASPIRIN EC) 81 MG EC tablet  Take 1 tablet by mouth daily             atorvastatin (LIPITOR) 40 MG tablet  take 1 tablet by mouth at bedtime             calcium carbonate (TUMS) 500 MG chewable tablet  Take 1 tablet by mouth 3 times daily as needed for Heartburn             Calcium Carbonate-Vitamin D (CALCIUM-VITAMIN D) 500-200 MG-UNIT per tablet  Take 1 tablet by mouth 2 times daily (with meals)              dexlansoprazole (DEXILANT) 60 MG CPDR delayed release capsule  Take 1 capsule by mouth daily             digoxin (LANOXIN) 125 MCG tablet  Take 0.5 tablets by mouth daily             Dulaglutide (TRULICITY SC)  Inject 5.12 mg into the skin once a week Usually on              DULoxetine (CYMBALTA) 60 MG extended release capsule  take 1 capsule by mouth once daily             fenofibrate (TRICOR) 145 MG tablet  take 1 tablet by mouth once daily             folic acid (FOLVITE) 1 MG tablet  take 1 tablet by mouth once daily             furosemide (LASIX) 40 MG tablet  Take 1 tablet by mouth daily             gabapentin (NEURONTIN) 100 MG capsule  take 1 capsule by mouth three times a day             insulin glargine (LANTUS) 100 UNIT/ML injection vial  Inject 10 Units into the skin nightly             insulin lispro (HUMALOG) 100 UNIT/ML injection vial  Inject 0-3 Units into the skin nightly             insulin lispro (HUMALOG) 100 UNIT/ML injection vial  Inject 0-6 Units into the skin 3 times daily (with meals)             levothyroxine (SYNTHROID) 50 MCG tablet  Take 1 tablet by mouth Daily             losartan (COZAAR) 100 MG tablet  Take 1 tablet by mouth daily Hold for sbp < 110             metoprolol succinate (TOPROL XL) 100 MG extended release tablet  Take 100 mg by mouth daily             modafinil (PROVIGIL) 200 MG tablet  Take 1 tablet by mouth Daily for 30 days. nystatin (MYCOSTATIN) 286920 UNIT/GM powder  Apply 3 times daily. polyethylene glycol (GLYCOLAX) 17 g packet  Take 17 g by mouth daily as needed for Constipation             RA VITAMIN B-12 100 MCG tablet  take 1 tablet by mouth once daily             sodium bicarbonate 650 MG tablet  Take 2 tablets by mouth 2 times daily             warfarin (COUMADIN) 3 MG tablet  Take 1 tablet by mouth daily As directed by [facility]. 30 days = 45 tabs                 Consults:      nephrology   Assessment  1. Acute kidney injury likely due to recent hypotension with subsequent renal hypoperfusion as well as volume contraction. 2.   Dehydration  3. Change in mental status improving  4. Bicytopenia with macrocytosis. 5. Diabetes mellitus type 2.  6. Hypotension much improved. 7. Hypertension primary  8. Atrial fibrillation with controlled ventricular rate  9. Cardiomyopathy with low ejection fraction of 30 to 35% from echocardiogram done in October 2019.      Plan    1. Labs reviewed  2. Medications reviewed  3. Discontinue sodium bicarbonate  4. Continue intravenous fluid  5.  Kidney function is expected to return to baseline      Neurology Impression:     Principal Problem:    Altered mental status  Active Problems:    Atrial fibrillation (HCC)    Hypertension    Diabetes mellitus (HCC)    Neuropathy    Morbid obesity (HCC)    Chronic kidney disease, stage 4 (severe) (HCC)    CKD (chronic kidney disease) stage 3, GFR 30-59 ml/min    Metabolic encephalopathy  Resolved Problems:    * No resolved hospital problems. *  This is a 58-year-old female who presents with altered mental status. Apparently, she was admitted approximately 3 weeks ago for similar symptoms and was found to have hypernatremia, dehydration, lactic acidosis. At the time of my evaluation, she is sitting up in bed, eating her lunch. She responds to questions appropriately, however is slow with her responses. There are no focal neurologic deficits on exam.  She did undergo an MRI brain earlier today that showed no acute findings. I reviewed the MRI brain and agree with interpretation. We will arrange for her to undergo an EEG to screen for seizure tendency. After detailed discussion with the patient we agreed on the following plan.     Recommendations:                                          1. MRI brain WO contrast, reviewed, agree with interpretation  2. EEG  3. Correct metabolic derangements  4. Nephrology following for KEEGAN  5. DVT prophylaxis     Significant Diagnostic Studies: labs:   Recent Labs     01/27/21  0430 01/28/21 0420 01/29/21  0401    137 137   K 4.9 4.6 5.0    103 105   CO2 22* 24 24   BUN 35* 35* 35*   CREATININE 1.6* 1.5* 1.6*   GLUCOSE 154* 111* 136*      INR:         Recent Labs     01/27/21  0430 01/28/21  0420 01/29/21  0401   INR 2.56* 2.41* 2.36*      Magnesium:          Lab Results   Component Value Date     MG 2.2 01/25/2021      HgBA1c:    Lab Results   Component Value Date     LABA1C 6.8 12/18/2020      TSH:          Lab Results   Component Value Date     TSH 0.244 01/26/2021      FOLATE:          Lab Results   Component Value Date     FOLATE > 20.0 01/26/2021      IRON:          Lab Results   Component Value Date     IRON 37 12/16/2020      FERRITIN:          Lab Results   Component Value Date     FERRITIN 275 12/16/2020      MRI brain:  1. Mild atrophy and dilatation of the third and lateral ventricles.    2. Probable ischemic changes in the periventricular white matter and in the denis with no evidence of an acute   infarct. 3. Otherwise negative MRI scan of the brain. 4. Inflammatory changes in the left mastoid air cells and in the ethmoid air cells bilaterally.           ELECTROENCEPHALOGRAM      This is an abnormal EEG due to the excessive slowing seen   in theta range suggestive of cortical dysfunction such as seen with   encephalopathy.  However, there was no evidence of epileptiform activity   appreciated. Assessment and Plan:   10. Altered level of awareness/Encephalopathy  11. Acute kidney injury/dehydration  12. CKD stage 3  13. Chronic atrial fibrillation, chronically anticoagulated  14. Hypertension  15. Diabetes mellitus type 2   16. Morbid obesity  17. Hypothyroidism  18.  Narcolepsy, cont provigil     Cont blood pressure control  Optimize glycemic control  Cont provigil  Lasix po  Cont PT/OT  Stable for dc to SNF for rehab.       Treatments:   Hydration, bp control, added provigil with good response    Disposition:   SNF    Signed:  Julius Min  1/29/2021, 11:59 AM

## 2021-01-29 NOTE — DISCHARGE INSTR - MEDS
Recommendations for discharge:   Date Warfarin Dose   1/29/2021 1 mg   1/30/2021 1 mg   1/31/2021 1 mg   2/1/2021 1 mg   2/2/2021 Check INR     Provider dosing warfarin: ECF Physician    Inpatient Coumadin Dosing for Continuity of Care at the Nursing Home:   Date INR Warfarin Dose   01/25/21 2.43 1 mg    1/26/2021  2.51   1 mg     1/27/2021 2.56  1 mg    1/28/2021  2.41  1 mg

## 2021-01-29 NOTE — PROGRESS NOTES
Called report to Michelle Don at [facility], [facility] faxed and patient will be transported by [address].

## 2021-02-01 NOTE — TELEPHONE ENCOUNTER
[facility] Transitions Initial Follow Up Call    Outreach made within 2 business days of discharge: Yes    Patient: Reyes Homans Patient : 1955   MRN: [id number]  Reason for Admission: There are no discharge diagnoses documented for the most recent discharge. Discharge Date: 21       Spoke with: Jairo Pendleton, (on HIPAA)    Discharge department/facility: [facility] Interactive Patient Contact:  Was patient able to fill all prescriptions: Yes  Was patient instructed to bring all medications to the follow-up visit: Yes  Is patient taking all medications as directed in the discharge summary? Yes  Does patient understand their discharge instructions: Yes  Does patient have questions or concerns that need addressed prior to 7-14 day follow up office visit: no    Scheduled appointment with PCP within 7-14 days    Follow Up  Future Appointments   Date Time Provider Adonis Dolan   3/15/2021  1:00 PM ARNOL Carpenter - [address] [address] [place]   2022  1:15 PM Becca Portillo, [address] [place] [address] ([address] [place] [address])         Pt is currently residing at [facility] for therapy.

## 2021-03-15 NOTE — TELEPHONE ENCOUNTER
Patient was discharged from [facility] on 3/12/21. [facility] will fax over [facility] with Coumadin dosing and discharge instructions. Spoke with patient's sister, she states that Dr. Mike Babcock ordered labs to be drawn in her office on 3/18 (including an INR). Told patient's sister that we will call on 3/18 when the INR results and give her dosing instructions, but to follow the nursing home instructions for now.     Rahul Mendosa, PharmD, BCPS  3/15/2021  3:58 PM

## 2021-03-18 NOTE — PROGRESS NOTES
Medication Management [address] [place] [address]  [phone] (phone)  [phone] (fax)      Ms. Michael Carlos is a 76 y.o.  female with history of Afib who presents today for anticoagulation monitoring and adjustment. Pt unable to verify recent regimen. Was discharged from [facility] 3/13/31. Did not any Coumadin 3/13, said she didn't know if she had already received it at [facility]. Took 2.5mg on 3/14, 1.25mg on 3/15 and 3/16 and 2.5mg on 3/17. Pt states she \"just guessed\" at what dose to take. Pt was dosed by [facility] on 12/23/20, last known regimen was 2.5mg MWF and 3.75mg TThSaS. Pt has had several hospilizations since that time and an extended admission to [facility]. Dosing history obtained from [facility], see scanned document. Doses added to track board. Dosing varied from 1mg daily to 2.5mg daily on 2/26, 3mg daily on 3/5. Of note, INR was subtherapeutic from 2/2/21 to discharge from [facility] on 3/13/31. Patient denies s/s bleeding/bruising/SOB/chest pain - Pt does states that she has LE swelling, doppler ordered by PCP and completed prior to this appt. No blood in urine or stool. No dietary changes. No changes in medication/OTC agents/Herbals. No change in alcohol use or tobacco use. No change in activity level. Patient denies headaches/dizziness/lightheadedness/falls. No vomiting/diarrhea or acute illness. No Procedures scheduled in the future at this time. Assessment:   Lab Results   Component Value Date    INR 1.30 (H) 03/18/2021    INR 2.36 (H) 01/29/2021    INR 2.41 (H) 01/28/2021     INR subtherapeutic   Recent Labs     03/18/21  1514   INR 1.30*         Plan:  Coumadin 3.75mg today, 2.5mg daily. Recheck INR in 5 day(s). Patient reminded to call the Anticoagulation Clinic with any signs or symptoms of bleeding or with any medication changes. Patient given instructions utilizing the teach back method. Discharged in [facility] in no apparent distress.         After visit summary printed and reviewed with patient.       Medications reviewed and updated on home medication list Yes    Influenza vaccine:     [] given    [] declined   [] received previously   [] plans to receive at a later time   [] refused    [x] documented in [facility]: [address]: No  Total # of Interventions Recommended: 1  - Increased Dose #: 1  - Maintenance Safety Lab Monitoring #: 1  Total Interventions Accepted: 1  Time Spent (min): 10 Cosme Lopez., PharmD

## 2021-03-18 NOTE — PROGRESS NOTES
fibrillation, chronically anticoagulated  5. Hypertension  6. Diabetes mellitus type 2   7. Morbid obesity  8. Hypothyroidism  9. Narcolepsy, cont provigil    Will repeat bmp to assess kidney function. Pt states since being home she feels good but weak . She was D/C with [facility] and is receiving PT/OT and nursing services. Is in a wheelchair today. She did not bring any D/S papers in with her today. Diabetes Type 2    Glucose control:   Does patient check blood glucoses at home? Yes  Report of hypoglycemia: no  Lab Results       Component                Value               Date                       LABA1C                   6.9 (H)             03/18/2021            No results found for: EAG    Symptoms  Polyuria, Polydipsia or Polyphagia? No  Chest Pain, SOB, or Palpitations? -  No  New Vision complaints? No  Paresthesias of the extremities? Yes takes gabapentin for this. Medications  Current medication were reviewed. Compliant with medications? No, pt has adjusted her own diabetic medicine since being discharged. She was on lantus and trulicity, stopped taking the lantus at D.c and went back to novolog per scale as she thought the lantus was not working Lab Results       Component                Value               Date                       LABA1C                   6.9 (H)             03/18/2021            No results found for: EAG. Medication side effects? Has been over a year she is due. On ACE-I or ARB? Yes  On antiplatelet therapy? Yes  On Statin? Yes    Last Diabetic Eye Exam: over a year    Exercise  Exercise? No  Wt Readings from Last 3 Encounters:  03/18/21 : 227 lb 12.8 oz (103.3 kg)  01/27/21 : 288 lb 12.8 oz (131 kg)  01/19/21 : 269 lb 6.4 oz (122.2 kg)      Diet discipline?:  Low salt, fat, sugar diet?   yes    Blood pressure control:  BP Readings from Last 3 Encounters:  03/18/21 : 138/86    Pt also diagnosed with   01/29/21 : (!) 144/83  01/19/21 : 112/74      Lab Results Component                Value               Date                       LABMICR                  278.24              11/03/2020              Lab Results       Component                Value               Date                       LDLCALC                  54                  12/16/2020                   Pt with a past history of A fib and CHF, is on chronic anticoagulation but does not know who is managing it. She had been seeing local Cardiology and then went to [address] [place] [address], had also bee current with our CHF clinic but has not gone back or seen anyone in months. Needs cardiology, referral placed. She does have some LLE edema, the leg is tender to touch, some N/T of left foot, states it feels cool to touch. Hypothyroidism    HPI:  Currently treated for Hypothyroidism? Yes  Fatigue? Yes - but has been in hospital for several  Months   Recent change in weight? Yes - she has lost weight which is a positive thing  Cold/Heat intolerance? No  Diarrhea/Constipation? No  Diaphoresis? No  Anxiety? No  Palpitations? No   Hair Loss? No      Had KEEGAN in hospital will recheck labs. Was started on provigil for narcolepsy while in hospital, will need f/u for this. Will look at [facility] records and then adjust plan as needed.      Last echo 2019 with EF of 30% , has diagnosis of non ischemic cardiomyopathy, bp controlled, denies sob or chest pain, taking lasix daily but not sure of dose, await records, cozaar 100 mg daily, toprol  mg daily lanoxin 125 0.5 tablets daily     Lab Results       Component                Value               Date                       WBC                      4.0 (L)             01/26/2021                 HGB                      10.7 (L)            01/26/2021                 HCT                      34.3 (L)            01/26/2021                 MCV                      101.2 (H)           01/26/2021                 PLT                      235                 01/26/2021              Stats she has some popping and cracking of her right ear off and on,     Pt with history of parathyroidism, had been treated by endocrinology will repeat labs and see where we are. She complained of achey flank pain but no dysuria urine normal today  No infection increase water intake. Current Outpatient Medications   Medication Sig Dispense Refill    furosemide (LASIX) 40 MG tablet Take 1 tablet by mouth daily 60 tablet 3    allopurinol (ZYLOPRIM) 100 MG tablet Take 1 tablet by mouth daily 30 tablet 3    warfarin (COUMADIN) 3 MG tablet Take 1 tablet by mouth daily As directed by [place] Coumadin clinic. 30 days = 45 tabs      losartan (COZAAR) 100 MG tablet Take 1 tablet by mouth daily Hold for sbp < 110 90 tablet 3    insulin glargine (LANTUS) 100 UNIT/ML injection vial Inject 10 Units into the skin nightly 1 vial 3    insulin lispro (HUMALOG) 100 UNIT/ML injection vial Inject 0-3 Units into the skin nightly 1 vial 3    insulin lispro (HUMALOG) 100 UNIT/ML injection vial Inject 0-6 Units into the skin 3 times daily (with meals) 1 vial 3    sodium bicarbonate 650 MG tablet Take 2 tablets by mouth 2 times daily      acetaminophen (TYLENOL) 500 MG tablet Take 1 tablet by mouth 4 times daily as needed for Pain 120 tablet 0    levothyroxine (SYNTHROID) 50 MCG tablet Take 1 tablet by mouth Daily 30 tablet 3    DULoxetine (CYMBALTA) 60 MG extended release capsule take 1 capsule by mouth once daily 90 capsule 3    dexlansoprazole (DEXILANT) 60 MG CPDR delayed release capsule Take 1 capsule by mouth daily 90 capsule 3    nystatin (MYCOSTATIN) 093758 UNIT/GM powder Apply 3 times daily.  60 g 2    fenofibrate (TRICOR) 145 MG tablet take 1 tablet by mouth once daily 90 tablet 3    atorvastatin (LIPITOR) 40 MG tablet take 1 tablet by mouth at bedtime 90 tablet 3    RA VITAMIN B-12 100 MCG tablet take 1 tablet by mouth once daily 90 tablet 3    alendronate (FOSAMAX) 70 MG tablet Take 1 tablet by mouth every 7 days (Patient taking differently: Take 70 mg by mouth every 7 days Indications: usually on mondays ) 12 tablet 3    Dulaglutide (TRULICITY SC) Inject 9.13 mg into the skin once a week Usually on mondays      folic acid (FOLVITE) 1 MG tablet take 1 tablet by mouth once daily 90 tablet 4    aspirin (RA ASPIRIN EC) 81 MG EC tablet Take 1 tablet by mouth daily 30 tablet 3    metoprolol succinate (TOPROL XL) 100 MG extended release tablet Take 100 mg by mouth daily      digoxin (LANOXIN) 125 MCG tablet Take 0.5 tablets by mouth daily 30 tablet 0    Calcium Carbonate-Vitamin D (CALCIUM-VITAMIN D) 500-200 MG-UNIT per tablet Take 1 tablet by mouth 2 times daily (with meals)       gabapentin (NEURONTIN) 100 MG capsule take 1 capsule by mouth three times a day 270 capsule 0     No current facility-administered medications for this visit.            Past Medical History:   Diagnosis Date    CAD (coronary artery disease)     CRI (chronic renal insufficiency)     Difficult intubation     excess skin in back of throat     DM (diabetes mellitus) ([facility] 1994    GERD (gastroesophageal reflux disease)     H/O gastric bypass     Hyperlipidemia     Hypertension     Hypothyroidism     Neuropathy     Obesity     Osteoarthritis     Paroxysmal atrial fibrillation (HCC)     Prolonged emergence from general anesthesia     Sleep apnea     uses cpap    Visit for screening mammogram       Past Surgical History:   Procedure Laterality Date    BACK SURGERY      xs 2    CATARACT REMOVAL Right 7/24/14    Dr. Julio Zhou EKG 12-LEAD  9/18/2015         ENDOSCOPY, COLON, DIAGNOSTIC      EYE SURGERY      FOOT SURGERY      left foot    HYSTERECTOMY, TOTAL ABDOMINAL      MOUTH BIOPSY  9-28-12    left tongue and lingual tonsil    OTHER SURGICAL HISTORY  11/8/2014    LEFT FEMUR RETROGRADE NAILING    OTHER SURGICAL HISTORY  4/23/2015    HARDWARE REMOVAL LEFT FEMUR, INSERTION OF ANTIBIOTIC SPACER    Subjective:      Review of Systems   Constitutional: Positive for fatigue. Negative for chills and fever. HENT: Negative for congestion. Respiratory: Negative. Cardiovascular: Positive for leg swelling (LLE). Negative for chest pain and palpitations. Gastrointestinal: Negative. Genitourinary: Negative for difficulty urinating and dysuria. Musculoskeletal: Positive for gait problem (in a wheelchair due to deconditioning). Skin: Positive for color change (LLE with erythema and positive maya's ). Neurological: Positive for weakness (in general ). Negative for dizziness, facial asymmetry and headaches. Psychiatric/Behavioral: Negative for self-injury, sleep disturbance and suicidal ideas. Objective:      /86   Pulse 80   Temp 97.2 °F (36.2 °C) (Temporal)   Resp 14   Ht 5' 9\" (1.753 m)   Wt 227 lb 12.8 oz (103.3 kg)   LMP 02/20/2001   SpO2 95%   BMI 33.64 kg/m²      Physical Exam  Vitals signs and nursing note reviewed. Constitutional:       Appearance: She is not ill-appearing. HENT:      Right Ear: Hearing, ear canal and external ear normal. A middle ear effusion is present. Left Ear: Hearing, ear canal and external ear normal. A middle ear effusion is present. Ears:      Comments: No erythema or bulge of TM  Cardiovascular:      Rate and Rhythm: Normal rate and regular rhythm. Pulses: Normal pulses. Heart sounds: Normal heart sounds. No murmur. Comments: Anterior LLE erythema, cap refill 2-3+, left toes cold to touch and dusky but when elevated dusky color disappeared and toes pinked up, positive maya's left calf  Left calf 16 inches in diameter    Right calf 13 inches in diameter. Pulmonary:      Effort: Pulmonary effort is normal. No respiratory distress. Breath sounds: Normal breath sounds. No wheezing. Abdominal:      General: Abdomen is flat. Bowel sounds are normal. There is no distension. Palpations: Abdomen is soft. Musculoskeletal:      Left lower le+ Edema present. Feet:      Comments: Right great toe bunion noted with pre ulcerative callous formation. Skin:     General: Skin is warm and dry. Capillary Refill: Capillary refill takes 2 to 3 seconds. Findings: Erythema present. Neurological:      Mental Status: She is alert. Psychiatric:         Attention and Perception: Attention normal.         Mood and Affect: Mood normal.         Speech: Speech normal.         Behavior: Behavior normal. Behavior is cooperative. Thought Content: Thought content normal.         Cognition and Memory: Cognition normal.         Judgment: Judgment normal.          Assessment/Plan:           1. Hospital discharge follow-up    - ND DISCHARGE MEDS RECONCILED W/ CURRENT OUTPATIENT MED LIST    2. Leg edema, left  Will rule out DVT and if normal will then do KATE studies   Stat hold and call order placed   - VL DUP LOWER EXTREMITY VENOUS LEFT; Future    3. Left leg pain    - VL DUP LOWER EXTREMITY VENOUS LEFT; Future    4. Homans sign present    - VL DUP LOWER EXTREMITY VENOUS LEFT; Future    5. 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 Atrial fibrillation, unspecified type [facility])  Needs referred to cardiology  For anti coagulation management  Referral placed    7. Hypothyroidism, unspecified type  Continue current meds  Repeat labs    8. Essential hypertension  Cardiology to manage  Continue med for now     9. NICM (nonischemic cardiomyopathy) ([facility]    - Oseas Douglas MD, Cardiology, [facility] [place]    10. Encounter for screening mammogram for malignant neoplasm of breast    - ROSE DIGITAL SCREEN W OR WO CAD BILATERAL; Future    11. Hyperparathyroidism ([facility]    - PTH, Intact; Future    12. Elevated BUN  question dehydration, vs KEEGAN  recheck   - Basic Metabolic Panel; Future    13.  Bunion, right foot  Avoid getting a foot ulcer  Maintain good foot care  This was discussed with her     14. Pre-ulcerative calluses  #13    15. Chronic anticoagulation    - Fitz Quesada MD, Cardiology, [facility]  Pt in agreement with plan  Will get her D/C records and update chart as needed. Return in about 3 months (around 6/18/2021) for a1c. Reccommended tobaccocessation options including pharmacologic methods, counseled great than 3 minutesduring this visit:  Yes[]  No  []       Patient given educational materials -see patient instructions. Discussed use, benefit, and side effects of prescribedmedications. All patient questions answered. Pt voiced understanding. Reviewedhealth maintenance. Instructed to continue current medications, diet and exercise. Patient agreed with treatment plan. Follow up as directed.        Electronicallysigned by ARNOL Gonzalez CNP on 3/18/2021 at 1:55 PM

## 2021-03-22 NOTE — TELEPHONE ENCOUNTER
Please call pt and see what the hold up is.  She needs cardiology to manage her coumadin so will need an appt with them thanks

## 2021-03-22 NOTE — TELEPHONE ENCOUNTER
Pt states she has an appt with Dr Jun Thompson and the coumadin clinic.   Future Appointments   Date Time Provider Adonis Dolan   3/23/2021  2:40 PM Nya [facility], [facility]   4/1/2021  2:30 PM Jeffrey García MD [facility] [place]   6/17/2021  3:20 PM Tani Tiwari, APRN - [address] [address] [address] [facility] [place]   1/19/2022  1:15 PM Hanny Lay [address]

## 2021-03-22 NOTE — TELEPHONE ENCOUNTER
FYI   We have attempted to call pt 3x to set up new pt appt from the referral Puneet sent us   Pt has not returned called

## 2021-03-22 NOTE — TELEPHONE ENCOUNTER
Patient called back and appt scheduled on 4/1/2021 at 230pm.   She confirmed appt date, time and location.

## 2021-03-23 NOTE — PROGRESS NOTES
Medication Management [address]  [phone] (phone)  [phone] (fax)      Ms. Carol Villalobos is a 76 y.o.  female with history of Afib who presents today for anticoagulation monitoring and adjustment. Patient verifies current dosing regimen and tablet strength. No missed or extra doses. Patient denies s/s bleeding/bruising/swelling/SOB/chest pain  No blood in urine or stool. No dietary changes. No changes in medication/OTC agents/Herbals. Patient brought in med list --> updated levothyroxine, furosemide, and Humalog doses. Added meclizine and Janumet. Discontinued sodium bicarbonate (not on med list). Please see current medication list in epic for changes. No change in alcohol use or tobacco use. No change in activity level. Patient denies headaches/dizziness/falls. Patient occasionally feels lightheaded when getting out of bed. Instructed patient to get up slowly to avoid lightheadedness/falls. No vomiting/diarrhea or acute illness. No Procedures scheduled in the future at this time. Assessment:   Lab Results   Component Value Date    INR 1.60 (H) 03/23/2021    INR 1.30 (H) 03/18/2021    INR 2.36 (H) 01/29/2021     INR subtherapeutic   Recent Labs     03/23/21  1522   INR 1.60*       Plan:  3.75 mg x 1 then Coumadin 3.75 mg MWF and 2.5 mg TuThSaSu. Recheck INR in 6 day(s). Patient reminded to call the Anticoagulation Clinic with signs or symptoms of bleeding or with any medication changes. Patient given instructions utilizing the teach back method. Discharged in [facility] in no apparent distress. Discussed assessment and plan with Finley Dancer, RPh    After visit summary printed and reviewed with patient.       Medications reviewed and updated on home medication list Yes    CLINICAL PHARMACY CONSULT: MED RECONCILIATION/REVIEW ADDENDUM    For Pharmacy Admin Tracking Only    PHSO: No  Total # of Interventions Recommended: 1  - Increased Dose #: 1  - Maintenance Safety Lab Monitoring #: 1  Total Interventions Accepted: 1  Time Spent (min): 20    Jamey Burnham PharmD  3/23/2021  3:57 PM

## 2021-03-29 PROBLEM — Z51.81 ENCOUNTER FOR THERAPEUTIC DRUG MONITORING: Status: ACTIVE | Noted: 2021-01-01

## 2021-03-29 NOTE — PROGRESS NOTES
Medication Management [address]   [phone] (phone)  [phone] (fax)      Ms. Karen Garcia is a 76 y.o.  female with history of Afib who presents today for anticoagulation monitoring and adjustment. Patient verifies current dosing regimen and tablet strength. - Follows calendar  No missed or extra doses. Patient denies s/s bleeding/bruising/swelling/SOB/chest pain  No blood in urine or stool. No dietary changes. No changes in medication/OTC agents/Herbals. No change in alcohol use or tobacco use. No change in activity level. Patient denies headaches/dizziness/lightheadedness/falls. No vomiting/diarrhea or acute illness. No Procedures scheduled in the future at this time. Assessment:   Lab Results   Component Value Date    INR 2.10 (H) 03/29/2021    INR 1.60 (H) 03/23/2021    INR 1.30 (H) 03/18/2021     INR therapeutic   Recent Labs     03/29/21  1548   INR 2.10*         Plan:  Continue Coumadin 3.75 mg MWF, 2.5 mg all other days. Recheck INR in 2 week(s). Patient reminded to call the Anticoagulation Clinic with any signs or symptoms of bleeding or with any medication changes. Patient given instructions utilizing the teach back method. Discharged ambulatory in no apparent distress. After visit summary printed and reviewed with patient.       Medications reviewed and updated on home medication list Yes    Influenza vaccine:     [] given    [x] declined   [] received previously   [] plans to receive at a later time   [x] refused    [] documented in [facility]: [address]: No  Total # of Interventions Recommended: 0  - Maintenance Safety Lab Monitoring #: 1  Total Interventions Accepted: 0  Time Spent (min): [address], PharmD, BCPS  3/29/2021  4:10 PM

## 2021-03-31 NOTE — TELEPHONE ENCOUNTER
Pt is in need of incontinence supplies, a script is needing to be faxed to: [phone], attn Vasquez Blood    Pt is needing:  Large pull ups  Poise pads  Washable bed pads  Wipes  Chucks       Orders pending, please review

## 2021-04-01 NOTE — PROGRESS NOTES
[id number]  [facility] [address]  Dept: [phone]  Dept Fax: [phone]  Loc: [phone]    Visit Date: 4/1/2021    Gladis Farr is a 76 y.o. female who presents todayfor:  Chief Complaint   Patient presents with    New Patient    Cardiomyopathy    Hypertension    Coronary Artery Disease    Hyperlipidemia   admitted for uro sepsis  Here for evaluation  Not seen in over a year   Does have known CMP   Last EF 35%  Also known A fib before and 100% occluded  She is getting out of the nursing home now]  Here to follow up on her recent admission  Lost good amount of weight   No chest pain reported   Does have baseline dyspnea  No home o2  Bp seems stable  No dizziness  No syncope  A fib is stable   No bleeding         HPI:  HPI  Past Medical History:   Diagnosis Date    CAD (coronary artery disease)     CRI (chronic renal insufficiency)     Difficult intubation     excess skin in back of throat     DM (diabetes mellitus) ([address] 1994    GERD (gastroesophageal reflux disease)     H/O gastric bypass     Hyperlipidemia     Hypertension     Hypothyroidism     Neuropathy     Obesity     Osteoarthritis     Paroxysmal atrial fibrillation ([address]     Prolonged emergence from general anesthesia     Sleep apnea     uses cpap    Visit for screening mammogram       Past Surgical History:   Procedure Laterality Date    BACK SURGERY      xs 2    CATARACT REMOVAL Right 7/24/14    Dr. Bryon Shetty EKG 12-LEAD  9/18/2015         ENDOSCOPY, COLON, DIAGNOSTIC      EYE SURGERY      FOOT SURGERY      left foot    HYSTERECTOMY, TOTAL ABDOMINAL      MOUTH BIOPSY  9-28-12    left tongue and lingual tonsil    OTHER SURGICAL HISTORY  11/8/2014    LEFT FEMUR RETROGRADE NAILING    OTHER SURGICAL HISTORY  4/23/2015    HARDWARE REMOVAL LEFT FEMUR, INSERTION OF ANTIBIOTIC SPACER    PATELLA SURGERY  7/11/13 fractues rt patella     NM COLON CA SCRN NOT HI [facility] IND Left 2018    COLONOSCOPY performed by Mario Benito [facility] at [facility]    SKIN TAG REMOVAL  12    mole removal    SLEEVE GASTRECTOMY  09/15/2015    Robotic    TOENAIL EXCISION      removal of great toe nails bilateral-still has toenails-had ingrown toenails and had trimmed out     TONSILLECTOMY      UPPER GASTROINTESTINAL ENDOSCOPY       Family History   Problem Relation Age of Onset    Asthma Sister     Asthma Brother     Heart Disease Father     High Blood Pressure Other     Cancer Maternal Uncle         stomach    Diabetes Maternal Grandmother     High Blood Pressure Maternal Grandmother     Diabetes Maternal Grandfather     Stroke Neg Hx      Social History     Tobacco Use    Smoking status: Former Smoker     Packs/day: 1.50     Years: 20.00     Pack years: 30.00     Quit date: 2007     Years since quittin.1    Smokeless tobacco: Never Used   Substance Use Topics    Alcohol use: No     Alcohol/week: 0.0 standard drinks      Current Outpatient Medications   Medication Sig Dispense Refill    Incontinence Supply Disposable (INCONTINENCE BRIEF LARGE) MISC Use prn for incontinence 5 packet 2    Incontinence Supply Disposable (POISE PAD) PADS Use as needed 50 each 2    Misc. Devices MISC Washable bed pads 50 each 2    Misc. Devices MISC Baby wipes 200 each 2    Misc.  Devices MISC Chucks as needed 200 each 2    levothyroxine (SYNTHROID) 75 MCG tablet Take 75 mcg by mouth Daily      furosemide (LASIX) 40 MG tablet Take 40 mg by mouth 2 times daily      meclizine (ANTIVERT) 25 MG tablet Take 25 mg by mouth as needed      sitaGLIPtan-metFORMIN (JANUMET)  MG per tablet Take 1 tablet by mouth 2 times daily (with meals)      insulin lispro (HUMALOG) 100 UNIT/ML injection vial Inject 30 Units into the skin every morning      insulin lispro (HUMALOG) 100 UNIT/ML injection vial Inject 50 Units into the skin nightly      Continuous Blood Gluc Sensor (FREESTYLE LINNETTE 14 DAY SENSOR) MISC 1 Device by Does not apply route continuous 6 each 1    allopurinol (ZYLOPRIM) 100 MG tablet Take 1 tablet by mouth daily 30 tablet 3    warfarin (COUMADIN) 3 MG tablet Take 1 tablet by mouth daily As directed by [facility]. 30 days = 45 tabs      losartan (COZAAR) 100 MG tablet Take 1 tablet by mouth daily Hold for sbp < 110 90 tablet 3    acetaminophen (TYLENOL) 500 MG tablet Take 1 tablet by mouth 4 times daily as needed for Pain 120 tablet 0    DULoxetine (CYMBALTA) 60 MG extended release capsule take 1 capsule by mouth once daily 90 capsule 3    dexlansoprazole (DEXILANT) 60 MG CPDR delayed release capsule Take 1 capsule by mouth daily 90 capsule 3    nystatin (MYCOSTATIN) 749036 UNIT/GM powder Apply 3 times daily.  (Patient taking differently: prn) 60 g 2    fenofibrate (TRICOR) 145 MG tablet take 1 tablet by mouth once daily 90 tablet 3    atorvastatin (LIPITOR) 40 MG tablet take 1 tablet by mouth at bedtime 90 tablet 3    RA VITAMIN B-12 100 MCG tablet take 1 tablet by mouth once daily 90 tablet 3    alendronate (FOSAMAX) 70 MG tablet Take 1 tablet by mouth every 7 days (Patient taking differently: Take 70 mg by mouth every 7 days Indications: usually on mondays ) 12 tablet 3    Dulaglutide (TRULICITY SC) Inject 3.18 mg into the skin once a week Usually on mondays      folic acid (FOLVITE) 1 MG tablet take 1 tablet by mouth once daily 90 tablet 4    aspirin (RA ASPIRIN EC) 81 MG EC tablet Take 1 tablet by mouth daily 30 tablet 3    metoprolol succinate (TOPROL XL) 100 MG extended release tablet Take 100 mg by mouth daily      digoxin (LANOXIN) 125 MCG tablet Take 0.5 tablets by mouth daily 30 tablet 0    Calcium Carbonate-Vitamin D (CALCIUM-VITAMIN D) 500-200 MG-UNIT per tablet Take 1 tablet by mouth 2 times daily (with meals)       gabapentin (NEURONTIN) 100 MG capsule take 1 capsule by mouth three times a day 270 capsule 0     No current facility-administered medications for this visit. No Known Allergies  Health Maintenance   Topic Date Due    Hepatitis C screen  Never done    COVID-19 Vaccine (1) Never done    Shingles Vaccine (1 of 2) Never done    Diabetic retinal exam  08/30/2019    Breast cancer screen  08/28/2020    Pneumococcal 65+ yrs at Risk Vaccine (2 of 2 - PPSV23) 09/21/2020    Flu vaccine (Season Ended) 09/01/2021    Annual Wellness Visit (AWV)  09/16/2021    Low dose CT lung screening  09/24/2021    Lipid screen  12/16/2021    Potassium monitoring  02/08/2022    Creatinine monitoring  02/08/2022    Diabetic foot exam  03/18/2022    A1C test (Diabetic or Prediabetic)  03/18/2022    DTaP/Tdap/Td vaccine (2 - Td) 11/07/2027    Colon cancer screen colonoscopy  01/24/2028    DEXA (modify frequency per FRAX score)  Completed    Hepatitis A vaccine  Aged Out    Hib vaccine  Aged Out    Meningococcal (ACWY) vaccine  Aged Out       Subjective:  Review of Systems  General:   No fever, no chills, some fatigue or weight loss  Pulmonary:    some dyspnea, no wheezing  Cardiac:    Denies recent chest pain,   GI:     No nausea or vomiting, no abdominal pain  Neuro:     No dizziness or light headedness,   Musculoskeletal:  No recent active issues  Extremities:   No edema, no obvious claudication       Objective:  Physical Exam  /70   Pulse 63   Ht 5' 9\" (1.753 m)   Wt 217 lb (98.4 kg)   LMP 02/20/2001   BMI 32.05 kg/m²   General:   Well developed, well nourished  Lungs:    Clear to auscultation  Heart:    Normal S1 S2, Slight murmur. no rubs, no gallops  Abdomen:   Soft, non tender, no organomegalies, positive bowel sounds  Extremities:   No edema, no cyanosis, good peripheral pulses  Neurological:   Awake, alert, oriented. No obvious focal deficits  Musculoskelatal:  No obvious deformities    Assessment:      Diagnosis Orders   1. Ischemic cardiomyopathy     2. Essential hypertension     3. Coronary artery disease involving native coronary artery of native heart without angina pectoris     4. Familial hypercholesterolemia     as above  Cardiomyopathy: improving, no CHF symptoms, no change in clinical condition. Will need periodic echocardiograms depending on symptoms. Higher risk patient   Here to re connect with us      Plan:  No follow-ups on file. As above  Echo   Decide if we need ICD or adjust meds  Continue risk factor modification and medical management,  Thank you for allowing me to participate in the care of your patient. Please don't hesitate to contact me regarding any further issues related to the patient care    Orders Placed:  No orders of the defined types were placed in this encounter. Medications Prescribed:  No orders of the defined types were placed in this encounter. Discussed use, benefit, and side effects of prescribed medications. All patient questions answered. Pt voicedunderstanding. Instructed to continue current medications, diet and exercise. Continue risk factor modification and medical management. Patient agreed with treatment plan. Follow up as directed.     Electronically signedby Constantino Rosa MD on 4/1/2021 at 2:36 PM

## 2021-04-06 PROBLEM — S72.402A CLOSED FRACTURE OF DISTAL END OF LEFT FEMUR, INITIAL ENCOUNTER (HCC): Status: ACTIVE | Noted: 2021-01-01

## 2021-04-06 NOTE — ED NOTES
Pt was outside in her wheelchair headed back to her home today. She stopped to pet her neighbors dogs. She states she heard a pop and tried to move, but was unable to move her left knee without excruciating pain. Pt states she was told in the past she has \"weak bones\". Pt arrives with some swelling noted to left knee compared to right. Pt states this knee has been bothering her for the past week, but nothing like the pain she is feeling now. No redness/warmth noted.       Ramirez Jaramillo RN  04/06/21 [id number]

## 2021-04-06 NOTE — ED NOTES
Patient transferred to [address] [place] [address] room 022. Nurse informed.       Joe Lot  04/06/21 1945

## 2021-04-06 NOTE — ED NOTES
ED to inpatient nurses report    Chief Complaint   Patient presents with    Knee Pain      Present to ED from home  LOC: alert and orientated to name, place, date  Vital signs   Vitals:    04/06/21 1717 04/06/21 1840   BP: 139/89    Pulse: 73 75   Resp: 18 18   Temp: 98.2 °F (36.8 °C)    TempSrc: Oral    SpO2: 98% 99%   Weight: 217 lb (98.4 kg)       Oxygen Baseline 99%    Current needs required room air Bipap/Cpap Yes CPAP at night   LDAs:    Mobility: Independent  Pending ED orders: none  Present condition: stable, alert, oriented    Electronically signed by Shania Salcedo RN on 4/6/2021 at 7:12 PM     Shania Salcedo RN  04/06/21 1621

## 2021-04-06 NOTE — ED NOTES
ED nurse-to-nurse bedside report    Chief Complaint   Patient presents with    Knee Pain      LOC: alert and orientated to name, place, date  Vital signs   Vitals:    04/06/21 1717 04/06/21 1840   BP: 139/89    Pulse: 73 75   Resp: 18 18   Temp: 98.2 °F (36.8 °C)    TempSrc: Oral    SpO2: 98% 99%   Weight: 217 lb (98.4 kg)       Pain:    Pain Interventions: Norco Tablet  Pain Goal: 5  Oxygen: No    Current needs required none   Telemetry: No  LDAs:    Continuous Infusions:   Mobility: Requires assistance * 2  Morales Fall Risk Score: Fall Risk 9/15/2020 12/3/2019   2 or more falls in past year? no no   Fall with injury in past year? no yes     Fall Interventions: Bed in lowest position, Call light In reach, Side Rails up x2, Family at bedside.   Report given to: Miguel Jaramillo RN  04/06/21 6245

## 2021-04-06 NOTE — ED NOTES
Upon initial contact pt resting in bed. Dr. John Marti and family at bedside. Pt made aware of admission. Verbalized understanding. Will monitor.       Richie Varner RN  04/06/21 2899

## 2021-04-06 NOTE — ED PROVIDER NOTES
[address] ENCOUNTER      PATIENT NAME: Jalen Vergara  MRN: [id number]  : 1955  CROWLEY: 2021  PROVIDER: Lobo Qiu MD      CHIEF COMPLAINT       Chief Complaint   Patient presents with    Knee Pain       Nurses Notes reviewed and I agree except as noted in the HPI. HISTORY OF PRESENT ILLNESS    Jalen Vergara is a 76 y.o. female who presents to Emergency Department with Knee Pain      Onset: Sudden  Location: at home  Quality: Pain and swelling from left knee  Radiation: None  Severity: Moderate  Timing: Last hour  Modifying factors: Worse on touch, weight bearing and knee movement. Better on rest.   Duration: Persistent  Context: History of prior left distal femur fracture status post ORIF. Patient was at her baseline 1 hour ago. No fall, no knee injury. She was trying to get off at wheelchair and noticed that she could not use her left knee with left knee having severe pain and moderate swelling. Past medical history remarkable for CAD, chronic renal insufficiency, diabetes, hyperlipidemia, hypertension, hypothyroidism, diabetic neuropathy, paroxysmal atrial fibrillation on Coumadin anticoagulation. This HPI was provided by the patient. REVIEW OF SYSTEMS     Review of Systems   Constitutional: Negative for activity change, appetite change, chills, fatigue, fever and unexpected weight change. HENT: Negative for congestion, ear discharge, ear pain, hearing loss, nosebleeds, rhinorrhea and sore throat. Eyes: Negative for photophobia, pain, discharge, redness and itching. Respiratory: Negative for cough, chest tightness, shortness of breath, wheezing and stridor. Cardiovascular: Negative for chest pain, palpitations and leg swelling. Gastrointestinal: Negative for abdominal distention, abdominal pain, constipation, diarrhea, nausea and vomiting. Endocrine: Negative for cold intolerance, heat intolerance, polydipsia and polyphagia. Genitourinary: Negative for dysuria, flank pain, frequency and hematuria. Musculoskeletal: Positive for arthralgias, gait problem, joint swelling and myalgias. Negative for back pain, neck pain and neck stiffness. Skin: Negative for pallor, rash and wound. Allergic/Immunologic: Negative for environmental allergies and food allergies. Neurological: Negative for dizziness, tremors, syncope, weakness and headaches. Psychiatric/Behavioral: Negative for agitation, behavioral problems, confusion, self-injury, sleep disturbance and suicidal ideas.         PAST MEDICAL HISTORY     Past Medical History:   Diagnosis Date    CAD (coronary artery disease)     CRI (chronic renal insufficiency)     Difficult intubation     excess skin in back of throat     DM (diabetes mellitus) ([facility] 1994    GERD (gastroesophageal reflux disease)     H/O gastric bypass     Hyperlipidemia     Hypertension     Hypothyroidism     Neuropathy     Obesity     Osteoarthritis     Paroxysmal atrial fibrillation (HCC)     Prolonged emergence from general anesthesia     Sleep apnea     uses cpap    Visit for screening mammogram        SURGICAL HISTORY       Past Surgical History:   Procedure Laterality Date    BACK SURGERY      xs 2    CATARACT REMOVAL Right 7/24/14    Dr. Santosh Reid EKG 12-LEAD  9/18/2015         ENDOSCOPY, COLON, DIAGNOSTIC      EYE SURGERY      FOOT SURGERY      left foot    HYSTERECTOMY, TOTAL ABDOMINAL      MOUTH BIOPSY  9-28-12    left tongue and lingual tonsil    OTHER SURGICAL HISTORY  11/8/2014    LEFT FEMUR RETROGRADE NAILING    OTHER SURGICAL HISTORY  4/23/2015    HARDWARE REMOVAL LEFT FEMUR, INSERTION OF ANTIBIOTIC SPACER    PATELLA SURGERY  7/11/13    fractues rt patella     TX COLON CA SCRN NOT  [address] IND Left 1/24/2018    COLONOSCOPY performed by Catie Her MD at [address] [facility] TAG REMOVAL  9/25/12    mole removal    SLEEVE GASTRECTOMY 09/15/2015    Robotic    TOENAIL EXCISION      removal of great toe nails bilateral-still has toenails-had ingrown toenails and had trimmed out     TONSILLECTOMY      UPPER GASTROINTESTINAL ENDOSCOPY         CURRENT MEDICATIONS       Previous Medications    ACETAMINOPHEN (TYLENOL) 500 MG TABLET    Take 1 tablet by mouth 4 times daily as needed for Pain    ALENDRONATE (FOSAMAX) 70 MG TABLET    Take 1 tablet by mouth every 7 days    ALLOPURINOL (ZYLOPRIM) 100 MG TABLET    Take 1 tablet by mouth daily    ASPIRIN (RA ASPIRIN EC) 81 MG EC TABLET    Take 1 tablet by mouth daily    ATORVASTATIN (LIPITOR) 40 MG TABLET    take 1 tablet by mouth at bedtime    CALCIUM CARBONATE-VITAMIN D (CALCIUM-VITAMIN D) 500-200 MG-UNIT PER TABLET    Take 1 tablet by mouth 2 times daily (with meals)     CONTINUOUS BLOOD GLUC SENSOR (WAKU WAKU ????STYLE LINNETTE 14 DAY SENSOR) MISC    1 Device by Does not apply route continuous    DEXLANSOPRAZOLE (DEXILANT) 60 MG CPDR DELAYED RELEASE CAPSULE    Take 1 capsule by mouth daily    DIGOXIN (LANOXIN) 125 MCG TABLET    Take 0.5 tablets by mouth daily    DULAGLUTIDE (TRULICITY SC)    Inject 0.75 mg into the skin once a week Usually on mondays    DULOXETINE (CYMBALTA) 60 MG EXTENDED RELEASE CAPSULE    take 1 capsule by mouth once daily    FENOFIBRATE (TRICOR) 145 MG TABLET    take 1 tablet by mouth once daily    FOLIC ACID (FOLVITE) 1 MG TABLET    take 1 tablet by mouth once daily    FUROSEMIDE (LASIX) 40 MG TABLET    Take 40 mg by mouth 2 times daily    GABAPENTIN (NEURONTIN) 100 MG CAPSULE    take 1 capsule by mouth three times a day    INCONTINENCE SUPPLY DISPOSABLE (INCONTINENCE BRIEF LARGE) MISC    Use prn for incontinence    INCONTINENCE SUPPLY DISPOSABLE (POISE PAD) PADS    Use as needed    INSULIN LISPRO (HUMALOG) 100 UNIT/ML INJECTION VIAL    Inject 30 Units into the skin every morning    INSULIN LISPRO (HUMALOG) 100 UNIT/ML INJECTION VIAL    Inject 50 Units into the skin nightly    LEVOTHYROXINE blood pressure is 139/89 and her pulse is 75. Her respiration is 18 and oxygen saturation is 99%. Physical Exam  Vitals signs and nursing note reviewed. Constitutional:       Appearance: She is well-developed. She is not diaphoretic. HENT:      Head: Normocephalic and atraumatic. Nose: Nose normal.   Eyes:      General: No scleral icterus. Right eye: No discharge. Left eye: No discharge. Conjunctiva/sclera: Conjunctivae normal.      Pupils: Pupils are equal, round, and reactive to light. Neck:      Musculoskeletal: Normal range of motion and neck supple. Vascular: No JVD. Trachea: No tracheal deviation. Cardiovascular:      Rate and Rhythm: Normal rate and regular rhythm. Heart sounds: Normal heart sounds. No murmur. No friction rub. No gallop. Pulmonary:      Effort: Pulmonary effort is normal. No respiratory distress. Breath sounds: Normal breath sounds. No stridor. No wheezing or rales. Chest:      Chest wall: No tenderness. Abdominal:      General: Bowel sounds are normal. There is no distension. Palpations: Abdomen is soft. There is no mass. Tenderness: There is no abdominal tenderness. There is no guarding or rebound. Hernia: No hernia is present. Musculoskeletal:         General: Swelling and tenderness present. No deformity. Comments: Diffuse swelling and tenderness to left knee with limited range of motion due to pain. Left lower extremity has intact neurovascular exam.  Capillary refill less than 2 seconds. Lymphadenopathy:      Cervical: No cervical adenopathy. Skin:     General: Skin is warm and dry. Capillary Refill: Capillary refill takes less than 2 seconds. Coloration: Skin is not pale. Findings: No erythema or rash. Neurological:      Mental Status: She is alert and oriented to person, place, and time. Cranial Nerves: No cranial nerve deficit. Sensory: No sensory deficit.       Motor: Value Ref Range    SCAN OF BLOOD SMEAR see below    Anion Gap   Result Value Ref Range    Anion Gap 11.0 8.0 - 16.0 meq/L   Glomerular Filtration Rate, Estimated   Result Value Ref Range    Est, Glom Filt Rate 25 (A) ml/min/1.73m2   Osmolality   Result Value Ref Range    Osmolality Calc 288. 2 275.0 - 300.0 mOsmol/kg       RADIOLOGY  CT KNEE LEFT WO CONTRAST   Final Result   1. Cortical disruptions along the medial margin of the distal femur, likely chronic but acute fracture cannot be excluded. 2. Lucency at the inferior patella consistent with fracture of indeterminate age. 3. Broken distal component of an intramedullary femoral royer. 4. Tricompartmental osteoarthritis. 5. Extensive surgical changes and osseous remodeling in the distal femur. Final report electronically signed by Dr. Mylene Luciano on 4/6/2021 6:23 PM      XR KNEE LEFT (3 VIEWS)   Final Result   1. No large displaced fracture of the knee. 2. Tricompartmental osteoarthritis. 3. Extensive surgical changes and osseous remodeling of the distal femur. 4. Stable disruption of a distal intramedullary femoral royer. Final report electronically signed by Dr. Mylene Luciano on 4/6/2021 5:51 PM          RATIONALE:    Pain control with oral Norco, left knee x-ray is nonconclusive for definite fracture, therefore CT left knee was obtained which revealed:     1. Cortical disruptions along the medial margin of the distal femur, likely chronic but acute fracture cannot be excluded. 2. Lucency at the inferior patella consistent with fracture of indeterminate age. 3. Broken distal component of an intramedullary femoral royer. 4. Tricompartmental osteoarthritis. 5. Extensive surgical changes and osseous remodeling in the distal femur. She lives at home, she is Coumadin, INR is 1.92, she cannot ambulate, high risk of falling, admission is warranted.   Discussed with Ortho on-call, admitted to Ortho with hospitalist consult    CRITICAL CARE: None    CONSULTS:  Orthopedic surgery on-call, Saad Holly    PROCEDURES:  None    RE-EXAMINATION AND RE-EVALUATION   Stable    VITALS IN ED  Vitals:    04/06/21 1717 04/06/21 1840   BP: 139/89    Pulse: 73 75   Resp: 18 18   Temp: 98.2 °F (36.8 °C)    TempSrc: Oral    SpO2: 98% 99%   Weight: 217 lb (98.4 kg)        FINAL IMPRESSION      1. Closed fracture of distal end of left femur, unspecified fracture morphology, initial encounter ([facility] [address] [address]    2. Current use of long term anticoagulation          DISPOSITION/PLAN   Admit    PATIENT REFERRED TO:  No follow-up provider specified.     DISCHARGE MEDICATIONS:  New Prescriptions    No medications on file       (Please note that portions of this note were completed with a voice recognition program.  Efforts were made to edit the dictations but occasionally words aremis-transcribed.)    MD Pascale Grey MD  04/06/21 5623

## 2021-04-07 PROBLEM — E43 SEVERE MALNUTRITION (HCC): Chronic | Status: ACTIVE | Noted: 2021-01-01

## 2021-04-07 NOTE — PLAN OF CARE
Problem: Falls - Risk of:  Goal: Will remain free from falls  Description: Will remain free from falls  Outcome: Ongoing  Note: Patient has remained free of falls throughout this shift. Bed in lowest position and call light in reach     Problem: Falls - Risk of:  Goal: Absence of physical injury  Description: Absence of physical injury  Outcome: Ongoing  Note: Patient has remained free of physical injury throughout this shift. Bed in lowest position and call light in reach     Problem: Pain:  Goal: Pain level will decrease  Description: Pain level will decrease  Outcome: Ongoing  Note: Patient rates pain 9/10 in left leg. Patient repositioned for comfort. Medicated per MAR orders. Problem: Pain:  Goal: Control of acute pain  Description: Control of acute pain  Outcome: Ongoing  Note: Patient rates pain 9/10 in left leg. Patient repositioned for comfort. Medicated per MAR orders. Problem: Pain:  Goal: Control of chronic pain  Description: Control of chronic pain  Outcome: Ongoing  Note: Patient denies chronic pain      Problem: Skin Integrity:  Goal: Will show no infection signs and symptoms  Description: Will show no infection signs and symptoms  Outcome: Ongoing  Note: Patient has remained afebrile throughout this shift      Problem: Skin Integrity:  Goal: Absence of new skin breakdown  Description: Absence of new skin breakdown  Outcome: Ongoing  Note: Patient has remained free of new skin breakdown throughout this shift. Problem: Cardiovascular  Goal: No DVT, peripheral vascular complications  Outcome: Ongoing  Note: Patient has remained free of DVT and peripheral vascular complications throughout this shift. Problem: Respiratory  Goal: O2 Sat > 90%  Outcome: Ongoing  Note: Patient has maintaiend O2 sat above 90% on room air. Problem:   Goal: No urinary complication  Outcome: Ongoing  Note: Patient is voiding adequately and appropriately.       Problem: GI  Intervention: Assessment for constipation risk factors including opiods, immobility, etc.  Note: Patient instructed on importance of taking bowel medications while taking narcotics. Problem: Skin Integrity/Risk  Intervention: Donnie Risk/Wound Assessment  Note: Patient is encouraged to turn ane reposition every 2 hours to prevent new skin breakdown. Problem: Musculor/Skeletal Functional Status  Intervention: Fall precautions  Note: Bed alarm on. Bed in lowest position. Call light in reach. Problem: Intellectual/Education/Knowledge Deficit  Intervention: Verbal/written education provided  Note: Patient care board update with plan of care. Problem: Discharge Planning  Intervention: Discharge to appropriate level of care  Note: Pt plans home at discharge. Care manager and social working helping with discharge needs. Care plan reviewed with patient and family. Patient and family verbalize understanding of the plan of care and contribute to goal setting.

## 2021-04-07 NOTE — PROGRESS NOTES
[facility]  INPATIENT PHYSICAL THERAPY  EVALUATION  [facility] ORTHOPEDICS 7K - 7K-22/022-A    Time In: 9436  Time Out: 1136  Timed Code Treatment Minutes: 45 Minutes  Minutes: 49          Date: 2021  Patient Name: Solis Solis,  Gender:  female        MRN: [id number]  : 1955  (76 y.o.)      Referring Practitioner: Sg Adrian  Diagnosis: Closed fx of distal end of femur  Additional Pertinent Hx: From admission note 4-6:The patient is a 76 y.o. female with multiple medical comorbidities who was admitted for the above complaint. Patient has a history of chronic left knee pain stemming from a fracture of the distal femur in , stabilized with IMN. The nail was removed and replaced with non-biodegradable abx nail in  to drainage. She has history of foot drop which has caused her to fall several times over the past 3 years eventually leading to being in a wheelchair with WB for transfers. Essentially what I gather from the patient, she was discharged 1-2 weeks ago from the [facility] to her private home that she shares with her sister. Her sister was pushing her in the wheelchair yesterday and the left foot got caught on the concrete causing sharp, shooting pain. Patient states she is unable to safely care for herself in the home and bear weight since the incident. She wishes to return to the [place]. X-rays and CT scan demonstrate fracture abx nail distal femur. Restrictions/Precautions:  Restrictions/Precautions: Weight Bearing, Fall Risk  Left Lower Extremity Weight Bearing: Weight Bearing As Tolerated  Position Activity Restriction  Other position/activity restrictions: WBAT left LE    Subjective:  Chart Reviewed: Yes  Patient assessed for rehabilitation services?: Yes  Family / Caregiver Present: No  Subjective: RN approved PT and pt up to chair. Ortho saw this am and ordered WBAT for mobility. Ortho does not plan to do sx.     General:  Overall Orientation .      Ambulation:  unable to unweight the right LE inorder to place a little weight on the left LE and the walker. Pt has too much pain in left LE    Exercise:  Patient was guided in 1 set(s) 10 reps of exercise to right lower extremities. Ankle pumps and Heelslides. Exercises were completed for increased independence with functional mobility. Pt declined ex this date on left LE due to pain     Functional Outcome Measures: Completed  AM-PAC Inpatient Mobility Raw Score : 13  AM-PAC Inpatient T-Scale Score : 36.74    ASSESSMENT:  Activity Tolerance:  Patient tolerance of  treatment: good. Treatment Initiated: Treatment and education initiated within context of evaluation. Evaluation time included review of current medical information, gathering information related to past medical, social and functional history, completion of standardized testing, formal and informal observation of tasks, assessment of data and development of plan of care and goals. Treatment time included skilled education and facilitation of tasks to increase safety and independence with functional mobility for improved independence and quality of life. Assessment: Body structures, Functions, Activity limitations: Decreased functional mobility , Decreased endurance, Decreased strength, Decreased ROM, Decreased balance, Increased pain  Assessment: Pt has severe pain in left knee area naomi with WB. Pt has decreased strength and ROM as well due to pain. Pt has decreased functional mobility, balance and endurance as well. Pt needs skilled therapy to improve safety with transfers and progress to gait short distances as tolerated . Pt not at baseline  at this time.   Prognosis: Good    REQUIRES PT FOLLOW UP: Yes    Discharge Recommendations:  Discharge Recommendations: Continue to assess pending progress, Patient would benefit from continued therapy after discharge    Patient Education:  PT Education: General Safety, Goals, PT Role, Plan of Care, Functional Mobility Training, Transfer Training    Equipment Recommendations: Other: monitor-may need drop arm commode,slide board and hospital bed if pt needs to go home wusing a slide board for transfers . Pt reports she can not fit the W/C into her bedroom to do slide board transfers if she needs to do so. Plan:  Times per week: 5 X O  Current Treatment Recommendations: Strengthening, ROM, Home Exercise Program, Balance Training, Endurance Training, Functional Mobility Training, Transfer Training    Goals:  Patient goals : Go to rehab  Short term goals  Time Frame for Short term goals: by discharge  Short term goal 1: supine to sit and return with SBA to get in and out of bed  Short term goal 2: sit to stand up to walker with min of 1  to progress to gait if knee pain decreases  Short term goal 3: slide board transfer bed to W/C and return with CGA to get from one surface to another  Short term goal 4: Improve standing tolerance time at walker with pregait actvities as tolerated  Long term goals  Time Frame for Long term goals : NA due to short ELOS    Following session, patient left in safe position with all fall risk precautions in place.

## 2021-04-07 NOTE — CARE COORDINATION
4/7/21, 10:27 AM EDT  DISCHARGE PLANNING EVALUATION:    Reji Solorzano       Admitted: 4/6/2021/ Narenrda Yepez 44. day: 1   Location: [facility]22/022-A Reason for admit: Closed fracture of distal end of left femur, initial encounter ([facility].) Kory Robles   PMH:  has a past medical history of CAD (coronary artery disease), CRI (chronic renal insufficiency), Difficult intubation, DM (diabetes mellitus) ([facility] GERD (gastroesophageal reflux disease), H/O gastric bypass, Hyperlipidemia, Hypertension, Hypothyroidism, Neuropathy, Obesity, Osteoarthritis, Paroxysmal atrial fibrillation ([facility] Prolonged emergence from general anesthesia, Sleep apnea, and Visit for screening mammogram.  Procedure: no  Barriers to Discharge:  Non-op management distal left femur fx. Resume Coumadin. PT.OT. WBAT. N/V checks. Pain management  PCP: ARNOL Ramirez CNP  Readmission Risk Score: 38%    Patient Goals/Plan/Treatment Preferences: I spoke with Dell Estrada. She is requesting to go to [facility] or to the nursing home\"  SW consulted. Await her input. Ortho noted plan to followup and plan total knee replacement as elective surgery. She has her C-pap machine here. Transportation/Food Security/Housekeeping Addressed:  No issues identified.

## 2021-04-07 NOTE — PROGRESS NOTES
[facility]  OCCUPATIONAL THERAPY MISSED TREATMENT NOTE  [facility] ORTHOPEDICS 7K  7K-22/022-A      Date: 2021  Patient Name: Ena Villegas        CSN: [id number]   : 1955  (76 y.o.)  Gender: female                REASON FOR MISSED TREATMENT: Pt off floor upon attempt, will check back later as time allows.

## 2021-04-07 NOTE — H&P
History and Physical        CHIEF COMPLAINT:  Left knee pain    HISTORY OF PRESENT ILLNESS:      The patient is a 76 y.o. female with multiple medical comorbidities who was admitted for the above complaint. Patient has a history of chronic left knee pain stemming from a fracture of the distal femur in 2014, stabilized with IMN. The nail was removed and replaced with non-biodegradable abx nail in 2015 2/2 to drainage. She has history of foot drop which has caused her to fall several times over the past 3 years eventually leading to being in a wheelchair with WB for transfers. Essentially what I gather from the patient, she was discharged 1-2 weeks ago from the [facility] to her private home that she shares with her sister. Her sister was pushing her in the wheelchair yesterday and the left foot got caught on the concrete causing sharp, shooting pain. Patient states she is unable to safely care for herself in the home and bear weight since the incident. She wishes to return to the [facility]. X-rays and CT scan demonstrate fracture abx nail distal femur.      Past Medical History:    Past Medical History:   Diagnosis Date    CAD (coronary artery disease)     CRI (chronic renal insufficiency)     Difficult intubation     excess skin in back of throat     DM (diabetes mellitus) ([facility] [address] 1994    GERD (gastroesophageal reflux disease)     H/O gastric bypass     Hyperlipidemia     Hypertension     Hypothyroidism     Neuropathy     Obesity     Osteoarthritis     Paroxysmal atrial fibrillation (HCC)     Prolonged emergence from general anesthesia     Sleep apnea     uses cpap    Visit for screening mammogram        Past Surgical History:    Past Surgical History:   Procedure Laterality Date    BACK SURGERY      xs 2    CATARACT REMOVAL Right 7/24/14    Dr. José Luis Easley EKG 12-LEAD  9/18/2015         ENDOSCOPY, COLON, DIAGNOSTIC      EYE SURGERY      FOOT SURGERY      left foot (DEXILANT) 60 MG CPDR delayed release capsule Take 1 capsule by mouth daily 9/15/20  Yes ARNOL Alejandra CNP   nystatin (MYCOSTATIN) 880031 UNIT/GM powder Apply 3 times daily. Patient taking differently: prn 9/15/20  Yes ARNOL Alejandra CNP   fenofibrate (TRICOR) 145 MG tablet take 1 tablet by mouth once daily 9/8/20  Yes ARNOL Alexis CNP   atorvastatin (LIPITOR) 40 MG tablet take 1 tablet by mouth at bedtime 9/8/20  Yes ARNOL Alexis CNP   RA VITAMIN B-12 100 MCG tablet take 1 tablet by mouth once daily 5/11/20  Yes ARNOL Alejandra CNP   alendronate (FOSAMAX) 70 MG tablet Take 1 tablet by mouth every 7 days  Patient taking differently: Take 70 mg by mouth every 7 days Indications: usually on mondays  3/10/20  Yes ARNOL Alejandra CNP   Dulaglutide (TRULICITY SC) Inject 9.25 mg into the skin once a week Usually on mondays   Yes Historical Provider, MD   folic acid (FOLVITE) 1 MG tablet take 1 tablet by mouth once daily 1/20/20  Yes ARNOL Alejandra CNP   aspirin (RA ASPIRIN EC) 81 MG EC tablet Take 1 tablet by mouth daily 1/7/20  Yes ARNOL Arellano CNP   metoprolol succinate (TOPROL XL) 100 MG extended release tablet Take 100 mg by mouth daily   Yes Historical Provider, MD   digoxin (LANOXIN) 125 MCG tablet Take 0.5 tablets by mouth daily 10/8/19  Yes ARNOL Arellano CNP   Calcium Carbonate-Vitamin D (CALCIUM-VITAMIN D) 500-200 MG-UNIT per tablet Take 1 tablet by mouth 2 times daily (with meals)    Yes Historical Provider, MD   Incontinence Supply Disposable (INCONTINENCE BRIEF LARGE) MISC Use prn for incontinence 4/1/21   ARNOL Alejandra CNP   Incontinence Supply Disposable (POISE PAD) PADS Use as needed 4/1/21   ARNOL Alejandra CNP   Misc. Devices MISC Washable bed pads 4/1/21   ARNOL Alejandra CNP   Misc. Devices MISC Baby wipes 4/1/21   ARNOL Alejandra - CNP   Misc.  Devices MISC Chucks as needed 4/1/21   ARNOL Alejandra - CNP hematuria, hesitancy, or dysuria. Heme/Lymph: Denies any bleeding. Musculoskeletal: left knee pain  Neuro: Denies any dizziness, paresthesia or weakness. PHYSICAL EXAM:  Patient Vitals for the past 24 hrs:   BP Temp Temp src Pulse Resp SpO2 Height Weight   04/07/21 0437 (!) 98/51 98.2 °F (36.8 °C) Oral 56 16 98 % -- --   04/06/21 2048 (!) 148/71 98.2 °F (36.8 °C) Axillary 80 18 99 % -- --   04/06/21 2015 -- -- -- -- -- -- 5' 9\" (1.753 m) 217 lb (98.4 kg)   04/06/21 1840 -- -- -- 75 18 99 % -- --   04/06/21 1717 139/89 98.2 °F (36.8 °C) Oral 73 18 98 % -- 217 lb (98.4 kg)     General appearance:  Alert and oriented x 3. No apparent distress, appears stated age and cooperative. HEENT:  Normal cephalic, atraumatic without obvious deformity. Pupils equal, round, and reactive to light. Conjunctivae/corneas clear. Neck: Supple, with full range of motion. Trachea midline. Respiratory:  Normal respiratory effort. No audible Wheezes or Rhonchi. Cardiovascular:  Regular rate and rhythm. Abdomen: Soft, non-tender, non-distended. Musculoskeletal: LLE knee skin intact, no evidence of cellulitis or abscess. No knee effusion. No TTP. ROM of knee stable, 0-40-50 degrees motion. Stable varus and valgus stressing. Provacative testing unremarkable. Chronic left foot drop. Soft compartments. DP 2+. Sensation intact distally. Skin: Skin color, texture, turgor normal.  No rashes or lesions. Neurologic:  Neurovascularly intact without any focal sensory/motor deficits. Sensation intact.        DATA:  CBC:   Lab Results   Component Value Date    WBC 4.1 04/06/2021    HGB 10.5 04/06/2021     04/06/2021     BMP:    Lab Results   Component Value Date     04/06/2021    K 4.5 04/06/2021    K 5.0 01/29/2021    CL 94 04/06/2021    CO2 27 04/06/2021    BUN 59 04/06/2021    CREATININE 2.0 04/06/2021    CALCIUM 9.5 04/06/2021    GLUCOSE 227 04/06/2021    GLUCOSE 213 03/27/2012     PT/INR:    Lab Results   Component Value Date    INR 1.92 04/06/2021     Troponin:  No results found for: TROPONINI  No results for input(s): LIPASE, AMYLASE in the last 72 hours. No results for input(s): AST, ALT, BILIDIR, BILITOT, ALKPHOS in the last 72 hours. Radiology:  Echo Complete 2d W Doppler W Color    Result Date: 4/6/2021  Transthoracic Echocardiography Report (TTE)  Demographics   Patient Name    Ursula Lux Gender                Female   MR #            [id number]      Race                                                    Ethnicity   Account #       [de-identified]      Room Number   Accession       [id number]     Date of Study         04/06/2021  Number   Date of Birth   1955     Referring Physician   Nery Rebolledo, MINDY Puneet   Age             76 year(s)     Shannan Wilkins RDCS                                  Interpreting          Nery Toscano MD                                 Physician  Procedure Type of Study   TTE procedure:ECHOCARDIOGRAM COMPLETE 2D W DOPPLER W COLOR. Procedure Date Date: 04/06/2021 Start: 03:41 PM Study Location: Bedside Technical Quality: Adequate visualization Indications:Cardiomyopathy. Additional Medical History:Coronary artery disease, Hypertension, Hyperlipidemia, Osteoarthritis, Diabetic, Atrial fibrillation, GERD, Sleep apnea, Thyroid disease, Ex Smoker, Family history of coronary artery disease. Patient Status: Routine Height: 69 inches Weight: 217 pounds BSA: 2.14 m^2 BMI: 32.05 kg/m^2 BP: 136/70 mmHg Allergies   - See Epic.   - No Known Allergies.   - No Known Allergies. Conclusions   Summary  Ejection fraction is visually estimated at 30%. There was moderate global hypokinesis of the left ventricle.    Signature   ----------------------------------------------------------------  Electronically signed by Nery Toscano MD (Interpreting  physician) on 04/06/2021 at 08:08 PM ----------------------------------------------------------------   Findings   Mitral Valve  Mild mitral regurgitation is present. Aortic Valve  The aortic valve was trileaflet with normal thickness and cuspal  separation. DOPPLER: Transaortic velocity was within the normal range with  no evidence of aortic stenosis. There was no evidence of aortic  regurgitation. Tricuspid Valve  Trivial tricuspid regurgitation visualized. Pulmonic Valve  The pulmonic valve was not well visualized . Trivial pulmonic regurgitation visualized. Left Atrium  Moderately dilated left atrium. Left Ventricle  Ejection fraction is visually estimated at 30%. There was moderate global hypokinesis of the left ventricle. Right Atrium  Right atrial size was normal.   Right Ventricle  The right ventricular size was normal with normal systolic function and  wall thickness. Pericardial Effusion  The pericardium was normal in appearance with no evidence of a pericardial  effusion. Pleural Effusion  No evidence of pleural effusion. Aorta / Great Vessels  -Aortic root dimension within normal limits.  -The Pulmonary artery is within normal limits. -IVC size is within normal limits with normal respiratory phasic changes.   M-Mode/2D Measurements & Calculations   LV Diastolic   LV Systolic Dimension:    AV Cusp Separation: 2 cmLA  Dimension: 5.9 5.4 cm                    Dimension: 4.8 cmAO Root  cm             LV Volume Diastolic:      Dimension: 3.4 cmLA Area: 36.1  LV FS:8.5 %    168.8 ml                  cm^2  LV PW          LV Volume Systolic: 649.6  Diastolic: 1.2 ml  cm             LV EDV/LV EDV Index:  Septum         168.8 ml/79 m^2LV ESV/LV  RV Diastolic Dimension: 3.1 cm  Diastolic: 1.4 ESV Index: 749.6 ml/55  cm             m^2                       LA/Aorta: 1.41                 EF Calculated: 30.3 %     Ascending Aorta: 3.4 cm                                           LA volume/Index: 151.9 ml /71m^2                 LV Length: 8.8 cm  LV Area  Diastolic:  99.0 cm^2  LV Area  Systolic: 54.1  cm^2  Doppler Measurements & Calculations   MV Peak E-Wave: 87.8 cm/s AV Peak Velocity: 121 LVOT Peak Velocity: 76.3                            cm/s                  cm/s  MV Peak Gradient: 3.08    AV Peak Gradient:     LVOT Peak Gradient: 2 mmHg  mmHg                      5.86 mmHg                                                  TV Peak E-Wave: 90.4 cm/s  MV Deceleration Time: 215  msec                                            TV Peak Gradient: 3.27                                                  mmHg                            IVRT: 99 msec         TR Velocity:240 cm/s  MV E' Septal Velocity:                          TR Gradient:23.04 mmHg  4.8 cm/s                                        PV Peak Velocity: 70.7  MV A' Septal Velocity:    AV DVI (Vmax):0.63    cm/s  2.9 cm/s                                        PV Peak Gradient: 2 mmHg  MV E' Lateral Velocity:  8.5 cm/s  MV A' Lateral Velocity:  3.6 cm/s  E/E' septal: 18.29  E/E' lateral: 10.33  MR Velocity: 455 cm/s  [email or web address] [id number]%3d    Xr Knee Left (3 Views)    Result Date: 4/6/2021  PROCEDURE: XR KNEE LEFT (3 VIEWS) CLINICAL INFORMATION: Pain TECHNIQUE: 4 views of the left knee COMPARISON: Left knee 5/31/2019 FINDINGS: There is a fractured distal component of an intramedullary royer. This was also present on the prior study. Radiopaque cement at the distal femur is stable. There is extensive stable osseous irregularity at the distal femur. No acute displaced fracture is identified. Severe joint space narrowing and osteophyte formation are present at the medial lateral compartments of the tibial femoral joint and at the patellofemoral joint. Soft tissue calcifications are noted. 1. No large displaced fracture of the knee. 2. Tricompartmental osteoarthritis.  3. Extensive surgical changes and osseous remodeling of the distal femur. 4. Stable disruption of a distal intramedullary femoral royer. Final report electronically signed by Dr. Mena Vasquez on 4/6/2021 5:51 PM    Ct Knee Left Wo Contrast    Result Date: 4/6/2021  PROCEDURE: CT KNEE LEFT WO CONTRAST CLINICAL INFORMATION: Left knee pain and swelling TECHNIQUE: CT of the left knee was performed without use of intravenous contrast. Axial images as well as coronal and sagittal reconstructions were obtained. All CT scans at this facility use dose modulation, iterative reconstruction, and/or weight-based dosing when appropriate to reduce radiation dose to as low as reasonably achievable. COMPARISON: None. Correlation: Left knee radiographs 5/31/2019 FINDINGS: There is a broken distal component of an intramedullary femoral royer. The surgical pin in the distal femur is poorly visualized. There is extensive surgical cement and osseous deformation in the distal femur. Cortical disruptions along the medial margin of the distal femur were likely present in the prior radiographs and may be chronic. However, evaluation is limited. There is extensive joint space narrowing, osteophyte formation and sclerosis involving the medial lateral compartments of the tibiofemoral joint and at the patellofemoral joint. There is a broken osteophyte at the superior patella. There is an incompletely visualized lucency at the inferior patella. No dislocation is identified. Chronic soft tissue calcifications are noted. 1. Cortical disruptions along the medial margin of the distal femur, likely chronic but acute fracture cannot be excluded. 2. Lucency at the inferior patella consistent with fracture of indeterminate age. 3. Broken distal component of an intramedullary femoral royer. 4. Tricompartmental osteoarthritis. 5. Extensive surgical changes and osseous remodeling in the distal femur.  Final report electronically signed by Dr. Mena Vasquez on 4/6/2021 6:23 PM    Vl Dup Lower Extremity Venous Left    Result Date: 3/18/2021  PROCEDURE: Left lower extremity venous duplex examination CLINICAL INFORMATION: Homans sign present, Left leg pain, Leg edema, left COMPARISON: 4/29/2015 TECHNIQUE: Grayscale, color-flow, and spectral waveform ultrasound images were obtained through the left lower extremity venous structures. Augmentation and compression maneuvers were performed at multiple levels. FINDINGS: RIGHT SIDE: The common femoral vein demonstrates normal flow, augmentation and compressibility. LEFT SIDE: The common femoral vein demonstrates normal flow, augmentation and compressibility. The saphenofemoral junction appears patent and compressible. The greater saphenous vein demonstrates normal flow proximally. The superficial femoral and popliteal veins demonstrate normal flow, compressibility and augmentation. The deep veins of the calf appear patent including the gastrocnemius, posterior tibial, peroneal and anterior tibial veins. 1. No sonographic evidence of left lower extremity DVT. **This report has been created using voice recognition software. It may contain minor errors which are inherent in voice recognition technology. ** Final report electronically signed by Dr. Domonique Ríos on 3/18/2021 3:59 PM      ASSESSMENT:Principal Problem:    Closed fracture of distal end of left femur, initial encounter ([facility] [address]  Active Problems:    DARWIN on CPAP    Atrial fibrillation ([facility])    Acute diastolic CHF (congestive heart failure), NYHA class 2 (HCC)    Diabetes mellitus (HCC)    CKD (chronic kidney disease) stage 3, GFR 30-59 ml/min  Resolved Problems:    * No resolved hospital problems. *       PLAN as discussed with Dr. Enedina Spain:  No urgent surgical considerations. No acute fractures noted CT scan or X-rays. Hypertrophic bone. Patient will benefit from continued therapy.  She is unable to get around her home with a wheelchair, uses a walker at baseline for transfers

## 2021-04-07 NOTE — PROGRESS NOTES
Hospitalist Progress Note    Patient:  Sae Martin      Unit/Bed:7K-22/022-A    YOB: 1955    MRN: [id number]       Acct: [de-identified]     PCP: ARNOL Tinajero CNP    Date of Admission: 4/6/2021    Chief Complaint: Follow-up for consult    Hospital Course:     Per hospitalist consult note:    Toya [age] y.o. female who we are asked to see/evaluate by Edvin Echavarria MD for medical management of CKD, DM II, CHF, a fib, and DARWIN. Patient had a fracture to her distal femur today with an unknown mechanism as she was not weight bearing when it happened. \"    Subjective:     Patient seen and examined. Pt reports that she has intermittent left knee pain x past 1 week, then had sudden onset of severe left knee pain yesterday, 10/10, now 9/10. Denies knee injury. She reports left calf pain also started yesterday, 9/10 today. She reports B/L legs swelling, left>right, chronic per pt. She denies taking NSAIDs. She denies chest pain, sob, abd pain, N/V, diarrhea, fever, chills.        Medications:  Reviewed    Infusion Medications    dextrose       Scheduled Medications    pantoprazole        [Held by provider] losartan  50 mg Oral Daily    metoprolol succinate  50 mg Oral Daily    warfarin (COUMADIN) daily dosing (placeholder)   Other RX Placeholder    warfarin  3.75 mg Oral Once    [START ON 4/8/2021] famotidine  20 mg Oral Daily    allopurinol  100 mg Oral Daily    atorvastatin  40 mg Oral Nightly    digoxin  62.5 mcg Oral Daily    [START ON 4/12/2021] Dulaglutide  0.75 mg Subcutaneous Weekly    DULoxetine  60 mg Oral Daily    furosemide  40 mg Oral Daily    insulin lispro  30 Units Subcutaneous QAM    insulin lispro  50 Units Subcutaneous Nightly    levothyroxine  75 mcg Oral Daily    alogliptin  6.25 mg Oral Daily     PRN Meds: diphenhydrAMINE, diclofenac sodium, HYDROcodone 5 mg - acetaminophen, HYDROcodone 5 mg - acetaminophen, morphine, glucose, dextrose, glucagon (rDNA), dextrose      Intake/Output Summary (Last 24 hours) at 4/7/2021 1229  Last data filed at 4/7/2021 0437  Gross per 24 hour   Intake 654.82 ml   Output 650 ml   Net 4.82 ml       Diet:  DIET CARB CONTROL; Dietary Nutrition Supplements: Low Calorie High Protein Supplement    Exam:  /65   Pulse 62   Temp 97.6 °F (36.4 °C) (Oral)   Resp 16   Ht 5' 9\" (1.753 m)   Wt 217 lb (98.4 kg) Comment: bed scale states 83.9kg (pt states dr today was 217lb  LMP 02/20/2001   SpO2 99%   BMI 32.05 kg/m²     General appearance: alert  HEENT: Pupils equal, round, and reactive to light. Conjunctivae clear. Clear oral mucosa   Neck: Supple, with full range of motion. No jugular venous distention. Trachea midline. Respiratory:  Normal respiratory effort. Clear to auscultation, bilaterally without Rales/Wheezes/Rhonchi. Cardiovascular: normal rate, regular rhythm with normal S1/S2 without murmurs, rubs or gallops. Abdomen: Soft, non-tender, non-distended  Musculoskeletal: passive and active ROM x 4 extremities. Skin: No rashes or lesions. Exam of extremities: peripheral pulses normal, (+) grade 1+ pitting bipedal edema up to below knees ( left leg swelling>right leg swelling), no clubbing or cyanosis        Labs:   Recent Labs     04/06/21 1812 04/07/21  0843   WBC 4.1* 3.5*   HGB 10.5* 9.5*   HCT 33.2* 31.3*    150     Recent Labs     04/06/21 1812 04/07/21  0843   * 137   K 4.5 4.1   CL 94* 99   CO2 27 25   BUN 59* 65*   CREATININE 2.0* 2.3*   CALCIUM 9.5 8.9     No results for input(s): AST, ALT, BILIDIR, BILITOT, ALKPHOS in the last 72 hours. Recent Labs     04/06/21 1812 04/07/21  0843   INR 1.92* 1.71*     No results for input(s): [place] in the last 72 hours.     Urinalysis:      Lab Results   Component Value Date    NITRU Negative 03/18/2021    WBCUA 0-2 01/25/2021    BACTERIA NONE SEEN 01/25/2021    RBCUA NONE SEEN 01/25/2021    BLOODU Negative 03/18/2021    BLOODU NEGATIVE 01/25/2021 SPECGRAV 1.020 01/26/2020    GLUCOSEU Negative 03/18/2021       Radiology:  CT KNEE LEFT WO CONTRAST   Final Result   1. Cortical disruptions along the medial margin of the distal femur, likely chronic but acute fracture cannot be excluded. 2. Lucency at the inferior patella consistent with fracture of indeterminate age. 3. Broken distal component of an intramedullary femoral royer. 4. Tricompartmental osteoarthritis. 5. Extensive surgical changes and osseous remodeling in the distal femur. Final report electronically signed by Dr. Stef Arambula on 4/6/2021 6:23 PM      XR KNEE LEFT (3 VIEWS)   Final Result   1. No large displaced fracture of the knee. 2. Tricompartmental osteoarthritis. 3. Extensive surgical changes and osseous remodeling of the distal femur. 4. Stable disruption of a distal intramedullary femoral royer. Final report electronically signed by Dr. Stef Arambula on 4/6/2021 5:51 PM      US RENAL COMPLETE    (Results Pending)       Diet: DIET CARB CONTROL; Dietary Nutrition Supplements: Low Calorie High Protein Supplement      Assessment/Plan:      1. Distal femur fracture: primary team orthopedics managing, per orthopedics NP Castillo Alejandre no plan for inpatient surgery  2. KEEGAN on CKD Stage 3, possibly prerenal secondary to losartan and diuretics: Creatinine 2.3 today, 2.0 on admission. Baseline creatinine 1.4-1.7. BUN elevated at 65. Hold losartan for now. Continue diuretics as patient has bilateral lower extremity edema. Stop IV fluids. Avoid nephrotoxins. BMP in a.m.  Kidney ultrasound ordered to rule out obstruction. Change PPI to Pepcid  3. Ischemic cardiomyopathy/chronic HFrEF, compensated: EF of 30% per echocardiogram 4/6/2021. EF of 30 to 35% in 2019. Patient follows cardiologist Dr. Cash Blair, who saw the patient on 4/1/2021 in the office. Per cardiology note, may consider ICD. Patient reports that she used to wear her LifeVest years ago.   Case discussed with  Homero Umaña over the phone who recommend inpatient cardiology consult for LifeVest evaluation. Discussed with primary team orthopedic NP Chari Leventhal, who agreed with the plan. Cardiology consult ordered. Continue telemetry. Continue Toprol-XL, Lasix. Losartan on hold due to KEEGAN. 4. Severe malnutrition: Dietitian on board  5. Essential hypertension, with hypotension: Hold losartan due to KEEGAN. Please holding parameters to antihypertensive medications. 6. DM II: continue home insulin regimen, hypoglycemic treatment orders  7. Paroxysmal a fib: Orthopedics is okay to resume Coumadin today, pharmacy consulted. Continue digoxin and Toprol-XL. 8. DARWIN on CPAP:  Continue home CPAP  9.  Pre op risk assessment: Please see hospitalist's consult note on 4/6/2021 for details          DVT prophylaxis: [] Lovenox                                 [] SCDs                                 [] SQ Heparin                                 [] Encourage ambulation           [] Already on Anticoagulation     Disposition:    [] Home       [] TCU       [] Rehab       [] Psych       [] SNF       [] [place]       [x] Other-per primary team      Code Status: Prior      Electronically signed by Katie Mayorga MD on 4/7/2021 at 12:29 PM

## 2021-04-07 NOTE — PROGRESS NOTES
Comprehensive Nutrition Assessment    Type and Reason for Visit:  Initial, Positive Nutrition Screen(Weight Loss/Wound)    Nutrition Recommendations/Plan:   *Recommend a Multivitamin w/minerals daily. *Started Ensure High Protein TID. *Continue current diet. Nutrition Assessment: Pt. severely malnourished AEB criteria listed below. At risk for further nutritional compromise r/t admit d/t knee pain with femur fracture, increased nutrient needs to aid in healing of fracture and underlying medical condition (hx gastric sleeve in 2015, CHF, HTN, HLD, Obesity, CKD). Nutrition recommendations/interventions as per above. Malnutrition Assessment:  Malnutrition Status:  Severe malnutrition    Context:  Chronic Illness     Findings of the 6 clinical characteristics of malnutrition:  Energy Intake:  7 - 75% or less estimated energy requirements for 1 month or longer  Weight Loss:  (-33% weight loss in 11 months)     Body Fat Loss:  No significant body fat loss     Muscle Mass Loss:  No significant muscle mass loss    Fluid Accumulation:  No significant fluid accumulation Extremities   Strength:  Not Performed    Estimated Daily Nutrient Needs:  Energy (kcal):  6338-8254 kcal/day (20-25 kcal/kg); Weight Used for Energy Requirements:  (87.5 kg on 4/7)     Protein (g):  85-99 g/day (1.3-1.5 g/kg); Weight Used for Protein Requirements:  (IBW 66 kg)          Nutrition Related Findings: admit d/t knee pain with femur fracture; pt seen; she reports decreased intake of meals over the past year d/t decreased appetite and food not tasting or smelling good; hx gastric sleeve in 2015, but reports weight loss over this past year was unplanned and that she feels weak; pt denies N/V or chewing/swallowing difficulty with food; pt amenable to Ensure High Protein during LOS (chocolate); no BM yet. Labs: BUN 65, Cr. 2.3.  Rx includes: Lipitor, Lasix, Humalog      Wounds:  None       Current Nutrition Therapies:    DIET CARB CONTROL; Dietary Nutrition Supplements: Low Calorie High Protein Supplement    Anthropometric Measures:  · Height: 5' 9\" (175.3 cm)  · Current Body Weight: 193 lb (87.5 kg)(4/7; bedscale; +1 non-pitting LLE)   · Admission Body Weight: 193 lb (87.5 kg)(4/7; bedscale; +1 non-pitting LLE)    · Usual Body Weight: (Per EMR: 227 lb 12.8 oz (3/8/21); 288 lb 12.8 oz on 1/25/21; 288 lb 12.8 oz on 5/26/20)     · Ideal Body Weight: 145 lbs  · BMI: 28.5  · BMI Categories: Overweight (BMI 25.0-29. 9)       Nutrition Diagnosis:   · Severe malnutrition, In context of chronic illness related to inadequate protein-energy intake as evidenced by poor intake prior to admission, weight loss(-33% weight loss in 11 months per EMR)    Nutrition Interventions:   Food and/or Nutrient Delivery:  Continue Current Diet, Start Oral Nutrition Supplement, Vitamin Supplement  Nutrition Education/Counseling:  Education initiated(Encouraged po intake of meals and ONS at best effort)   Coordination of Nutrition Care:  Continue to monitor while inpatient    Goals:  Pt will consume 75% or more of meals during LOS       Nutrition Monitoring and Evaluation:   Behavioral-Environmental Outcomes:  None Identified   Food/Nutrient Intake Outcomes:  Food and Nutrient Intake, Supplement Intake, Vitamin/Mineral Intake  Physical Signs/Symptoms Outcomes:  Biochemical Data, Weight, Skin, Nutrition Focused Physical Findings, GI Status, Fluid Status or Edema     Discharge Planning:     Too soon to determine     Electronically signed by Juan C Wills RD, LD on 4/7/21 at 10:15 AM EDT    Contact: [phone]

## 2021-04-07 NOTE — CONSULTS
Hospitalist Consult Note        Patient:  Rebekah Espinal  YOB: 1955  Date of Service: 4/6/2021  MRN: [id number]   Acct:  [de-identified]   Primary Care Physician: ARNOL Renee - CNP    Chief Complaint:  Distal femur fracture  Reason for consult  Pre op risk assessment and medical management    Date of Service: Pt seen/examined in consultation on 4/6/2021     History Of Present Illness:      Gil[age] Jacob[age] y.o. female who we are asked to see/evaluate by Sachin Thomas MD for medical management of CKD, DM II, CHF, a fib, and DARWIN. Patient had a fracture to her distal femur today with an unknown mechanism as she was not weight bearing when it happened. Assessment and Plan:-  1. Distal femur fracture: primary to manage  2. CKD Stage 3: creatinine a little above baseline, gentle hydration, monitor output  3. DM II: continue home insulin regimen, hypoglycemic treatment orders  4. CHF NYHA class 2: ECHO done 4/6/2021 as outpatient prior to onset of pain shows EF 30%, daily weights, monitor output  5. Paroxysmal a fib: EKG pending, anticoagulated on coumadin, will need to bridge with heparin if patient is a surgical candidate  6. DARWIN on CPAP: home CPAP  7. Pre op risk assessment: Patient has extensive comorbities. She is in her wheelchair \"90% of the time\" per her own history. NSQIP demonstrates an above average risk compared to patients her age for post operative complications. Revised Cardiac Risk Index is 4 or Class IV risk which represents at 15% 30 day risk of death, MI, or cardiac arrest. Patient is high risk for surgery.       Past Medical History:        Diagnosis Date    CAD (coronary artery disease)     CRI (chronic renal insufficiency)     Difficult intubation     excess skin in back of throat     DM (diabetes mellitus) ([facility] [address] 1994    GERD (gastroesophageal reflux disease)     H/O gastric bypass     Hyperlipidemia     Hypertension     Hypothyroidism     Neuropathy     Obesity     Osteoarthritis     Paroxysmal atrial fibrillation (HCC)     Prolonged emergence from general anesthesia     Sleep apnea     uses cpap    Visit for screening mammogram        Past Surgical History:        Procedure Laterality Date    BACK SURGERY      xs 2    CATARACT REMOVAL Right 7/24/14    Dr. Ashley Erazo EKG 12-LEAD  9/18/2015         ENDOSCOPY, COLON, DIAGNOSTIC      EYE SURGERY      FOOT SURGERY      left foot    HYSTERECTOMY, TOTAL ABDOMINAL      MOUTH BIOPSY  9-28-12    left tongue and lingual tonsil    OTHER SURGICAL HISTORY  11/8/2014    LEFT FEMUR RETROGRADE NAILING    OTHER SURGICAL HISTORY  4/23/2015    HARDWARE REMOVAL LEFT FEMUR, INSERTION OF ANTIBIOTIC SPACER    PATELLA SURGERY  7/11/13    fractues rt patella     DC COLON CA SCRN NOT HI RSK IND Left 1/24/2018    COLONOSCOPY performed by Leigh Torres MD at [address] [facility] TAG REMOVAL  9/25/12    mole removal    SLEEVE GASTRECTOMY  09/15/2015    Robotic    TOENAIL EXCISION      removal of great toe nails bilateral-still has toenails-had ingrown toenails and had trimmed out     TONSILLECTOMY      UPPER GASTROINTESTINAL ENDOSCOPY         Home Medications:   No current facility-administered medications on file prior to encounter.       Current Outpatient Medications on File Prior to Encounter   Medication Sig Dispense Refill    levothyroxine (SYNTHROID) 75 MCG tablet Take 75 mcg by mouth Daily      furosemide (LASIX) 40 MG tablet Take 40 mg by mouth daily       sitaGLIPtan-metFORMIN (JANUMET)  MG per tablet Take 1 tablet by mouth 2 times daily (with meals)      insulin lispro (HUMALOG) 100 UNIT/ML injection vial Inject 30 Units into the skin every morning      insulin lispro (HUMALOG) 100 UNIT/ML injection vial Inject 50 Units into the skin nightly      allopurinol (ZYLOPRIM) 100 MG tablet Take 1 tablet by mouth daily 30 tablet 3    warfarin (COUMADIN) 3 MG tablet Take 1 tablet by mouth daily As directed by [facility]. 30 days = 45 tabs      losartan (COZAAR) 100 MG tablet Take 1 tablet by mouth daily Hold for sbp < 110 90 tablet 3    DULoxetine (CYMBALTA) 60 MG extended release capsule take 1 capsule by mouth once daily 90 capsule 3    dexlansoprazole (DEXILANT) 60 MG CPDR delayed release capsule Take 1 capsule by mouth daily 90 capsule 3    nystatin (MYCOSTATIN) 261478 UNIT/GM powder Apply 3 times daily. (Patient taking differently: prn) 60 g 2    fenofibrate (TRICOR) 145 MG tablet take 1 tablet by mouth once daily 90 tablet 3    atorvastatin (LIPITOR) 40 MG tablet take 1 tablet by mouth at bedtime 90 tablet 3    RA VITAMIN B-12 100 MCG tablet take 1 tablet by mouth once daily 90 tablet 3    alendronate (FOSAMAX) 70 MG tablet Take 1 tablet by mouth every 7 days (Patient taking differently: Take 70 mg by mouth every 7 days Indications: usually on mondays ) 12 tablet 3    Dulaglutide (TRULICITY SC) Inject 6.52 mg into the skin once a week Usually on mondays      folic acid (FOLVITE) 1 MG tablet take 1 tablet by mouth once daily 90 tablet 4    aspirin (RA ASPIRIN EC) 81 MG EC tablet Take 1 tablet by mouth daily 30 tablet 3    metoprolol succinate (TOPROL XL) 100 MG extended release tablet Take 100 mg by mouth daily      digoxin (LANOXIN) 125 MCG tablet Take 0.5 tablets by mouth daily 30 tablet 0    Calcium Carbonate-Vitamin D (CALCIUM-VITAMIN D) 500-200 MG-UNIT per tablet Take 1 tablet by mouth 2 times daily (with meals)       Incontinence Supply Disposable (INCONTINENCE BRIEF LARGE) MISC Use prn for incontinence 5 packet 2    Incontinence Supply Disposable (POISE PAD) PADS Use as needed 50 each 2    Misc. Devices MISC Washable bed pads 50 each 2    Misc. Devices MISC Baby wipes 200 each 2    Misc.  Devices MISC Chucks as needed 200 each 2    meclizine (ANTIVERT) 25 MG tablet Take 25 mg by mouth as needed      Continuous Blood Gluc Sensor (LifeblobYLE LINNETTE 14 DAY SENSOR) MISC 1 Device by Does not apply route continuous 6 each 1    acetaminophen (TYLENOL) 500 MG tablet Take 1 tablet by mouth 4 times daily as needed for Pain 120 tablet 0    gabapentin (NEURONTIN) 100 MG capsule take 1 capsule by mouth three times a day 270 capsule 0       Allergies:    Patient has no known allergies. Social History:    reports that she quit smoking about 14 years ago. She has a 30.00 pack-year smoking history. She has never used smokeless tobacco. She reports that she does not drink alcohol or use drugs. Family History:       Problem Relation Age of Onset    Asthma Sister     Asthma Brother     Heart Disease Father     Other Mother     High Blood Pressure Other     Cancer Maternal Uncle         stomach    Diabetes Maternal Grandmother     High Blood Pressure Maternal Grandmother     Diabetes Maternal Grandfather     Stroke Neg Hx        Diet:  DIET GENERAL;    Review of systems:   Pertinent positives as noted in the HPI. All other systems reviewed and negative. PHYSICAL EXAM:  BP (!) 148/71   Pulse 80   Temp 98.2 °F (36.8 °C) (Axillary)   Resp 18   Ht 5' 9\" (1.753 m)   Wt 217 lb (98.4 kg) Comment: bed scale states 83.9kg (pt states dr today was 217lb  LMP 02/20/2001   SpO2 99%   BMI 32.05 kg/m²   General appearance: No apparent distress, appears stated age and cooperative. HEENT: Normal cephalic, atraumatic without obvious deformity. Pupils equal, round, and reactive to light. Extra ocular muscles intact. Conjunctivae/corneas clear. Neck: Supple, with full range of motion. No jugular venous distention. Trachea midline. Respiratory:  Normal respiratory effort. Clear to auscultation, bilaterally without Rales/Wheezes/Rhonchi. Cardiovascular: Regular rate and rhythm with normal S1/S2 without murmurs, rubs or gallops. Abdomen: Soft, non-tender, non-distended with normal bowel sounds.   Musculoskeletal:  No clubbing, cyanosis or edema bilaterally. Skin: Skin color, texture, turgor normal.  No rashes or lesions. Neurologic:  Neurovascularly intact without any focal sensory/motor deficits. Cranial nerves: II-XII intact, grossly non-focal.  Psychiatric: Alert and oriented, thought content appropriate, normal insight  Capillary Refill: Brisk,< 3 seconds   Peripheral Pulses: +2 palpable, equal bilaterally     Labs:   Recent Labs     04/06/21 1812   WBC 4.1*   HGB 10.5*   HCT 33.2*        Recent Labs     04/06/21 1812   *   K 4.5   CL 94*   CO2 27   BUN 59*   CREATININE 2.0*   CALCIUM 9.5     No results for input(s): AST, ALT, BILIDIR, BILITOT, ALKPHOS in the last 72 hours. Recent Labs     04/06/21 1812   INR 1.92*     No results for input(s): Daniella Blanco in the last 72 hours. Urinalysis:    Lab Results   Component Value Date    NITRU Negative 03/18/2021    WBCUA 0-2 01/25/2021    BACTERIA NONE SEEN 01/25/2021    RBCUA NONE SEEN 01/25/2021    BLOODU Negative 03/18/2021    BLOODU NEGATIVE 01/25/2021    SPECGRAV 1.020 01/26/2020    GLUCOSEU Negative 03/18/2021       Radiology:   CT KNEE LEFT WO CONTRAST   Final Result   1. Cortical disruptions along the medial margin of the distal femur, likely chronic but acute fracture cannot be excluded. 2. Lucency at the inferior patella consistent with fracture of indeterminate age. 3. Broken distal component of an intramedullary femoral royer. 4. Tricompartmental osteoarthritis. 5. Extensive surgical changes and osseous remodeling in the distal femur. Final report electronically signed by Dr. Moira Dawn on 4/6/2021 6:23 PM      XR KNEE LEFT (3 VIEWS)   Final Result   1. No large displaced fracture of the knee. 2. Tricompartmental osteoarthritis. 3. Extensive surgical changes and osseous remodeling of the distal femur. 4. Stable disruption of a distal intramedullary femoral royer.       Final report electronically signed by Dr. Moira Dawn on 4/6/2021 5:51 PM Echo Complete 2d W Doppler W Color    Result Date: 4/6/2021  Transthoracic Echocardiography Report (TTE)  Demographics   Patient Name    Noam Johnson Gender                Female   MR #            [id number]      Race                                                    Ethnicity   Account #       [de-identified]      Room Number   Accession       [id number]     Date of Study         04/06/2021  Number   Date of Birth   1955     Referring Physician   Yuniel Denise MD                                                       Shai Reeves, MINDY Teixeira   Age             76 year(s)     [facility]                                  Interpreting          Yuniel Denise MD                                 Physician  Procedure Type of Study   TTE procedure:ECHOCARDIOGRAM COMPLETE 2D W DOPPLER W COLOR. Procedure Date Date: 04/06/2021 Start: 03:41 PM Study Location: Bedside Technical Quality: Adequate visualization Indications:Cardiomyopathy. Additional Medical History:Coronary artery disease, Hypertension, Hyperlipidemia, Osteoarthritis, Diabetic, Atrial fibrillation, GERD, Sleep apnea, Thyroid disease, Ex Smoker, Family history of coronary artery disease. Patient Status: Routine Height: 69 inches Weight: 217 pounds BSA: 2.14 m^2 BMI: 32.05 kg/m^2 BP: 136/70 mmHg Allergies   - See Epic.   - No Known Allergies.   - No Known Allergies. Conclusions   Summary  Ejection fraction is visually estimated at 30%. There was moderate global hypokinesis of the left ventricle. Signature   ----------------------------------------------------------------  Electronically signed by Yuniel Denise MD (Interpreting  physician) on 04/06/2021 at 08:08 PM  ----------------------------------------------------------------   Findings   Mitral Valve  Mild mitral regurgitation is present. Aortic Valve  The aortic valve was trileaflet with normal thickness and cuspal  separation.  DOPPLER: Transaortic velocity was within the normal range with  no evidence of aortic stenosis. There was no evidence of aortic  regurgitation. Tricuspid Valve  Trivial tricuspid regurgitation visualized. Pulmonic Valve  The pulmonic valve was not well visualized . Trivial pulmonic regurgitation visualized. Left Atrium  Moderately dilated left atrium. Left Ventricle  Ejection fraction is visually estimated at 30%. There was moderate global hypokinesis of the left ventricle. Right Atrium  Right atrial size was normal.   Right Ventricle  The right ventricular size was normal with normal systolic function and  wall thickness. Pericardial Effusion  The pericardium was normal in appearance with no evidence of a pericardial  effusion. Pleural Effusion  No evidence of pleural effusion. Aorta / Great Vessels  -Aortic root dimension within normal limits.  -The Pulmonary artery is within normal limits. -IVC size is within normal limits with normal respiratory phasic changes.   M-Mode/2D Measurements & Calculations   LV Diastolic   LV Systolic Dimension:    AV Cusp Separation: 2 cmLA  Dimension: 5.9 5.4 cm                    Dimension: 4.8 cmAO Root  cm             LV Volume Diastolic:      Dimension: 3.4 cmLA Area: 36.1  LV FS:8.5 %    168.8 ml                  cm^2  LV PW          LV Volume Systolic: 648.7  Diastolic: 1.2 ml  cm             LV EDV/LV EDV Index:  Septum         168.8 ml/79 m^2LV ESV/LV  RV Diastolic Dimension: 3.1 cm  Diastolic: 1.4 ESV Index: 228.6 ml/55  cm             m^2                       LA/Aorta: 1.41                 EF Calculated: 30.3 %     Ascending Aorta: 3.4 cm                                           LA volume/Index: 151.9 ml /71m^2                 LV Length: 8.8 cm  LV Area  Diastolic:  48.5 cm^2  LV Area  Systolic: 54.1  cm^2  Doppler Measurements & Calculations   MV Peak E-Wave: 87.8 cm/s AV Peak Velocity: 121 LVOT Peak Velocity: 76.3                            cm/s                  cm/s  MV Peak Gradient: 3.08    AV Peak Gradient:     LVOT Peak Gradient: 2 mmHg  mmHg                      5.86 mmHg                                                  TV Peak E-Wave: 90.4 cm/s  MV Deceleration Time: 215  msec                                            TV Peak Gradient: 3.27                                                  mmHg                            IVRT: 99 msec         TR Velocity:240 cm/s  MV E' Septal Velocity:                          TR Gradient:23.04 mmHg  4.8 cm/s                                        PV Peak Velocity: 70.7  MV A' Septal Velocity:    AV DVI (Vmax):0.63    cm/s  2.9 cm/s                                        PV Peak Gradient: 2 mmHg  MV E' Lateral Velocity:  8.5 cm/s  MV A' Lateral Velocity:  3.6 cm/s  E/E' septal: 18.29  E/E' lateral: 10.33  MR Velocity: 455 cm/s  http://[employer]MDWeb? DocKey=[id number]    Xr Knee Left (3 Views)    Result Date: 4/6/2021  PROCEDURE: XR KNEE LEFT (3 VIEWS) CLINICAL INFORMATION: Pain TECHNIQUE: 4 views of the left knee COMPARISON: Left knee 5/31/2019 FINDINGS: There is a fractured distal component of an intramedullary royer. This was also present on the prior study. Radiopaque cement at the distal femur is stable. There is extensive stable osseous irregularity at the distal femur. No acute displaced fracture is identified. Severe joint space narrowing and osteophyte formation are present at the medial lateral compartments of the tibial femoral joint and at the patellofemoral joint. Soft tissue calcifications are noted. 1. No large displaced fracture of the knee. 2. Tricompartmental osteoarthritis. 3. Extensive surgical changes and osseous remodeling of the distal femur. 4. Stable disruption of a distal intramedullary femoral royer.  Final report electronically signed by Dr. Latisha Pineda on 4/6/2021 5:51 PM    Ct Knee Left Wo Contrast    Result Date: 4/6/2021  PROCEDURE: CT KNEE LEFT WO CONTRAST CLINICAL INFORMATION: Left knee pain and swelling TECHNIQUE: CT of the left knee was performed without use of intravenous contrast. Axial images as well as coronal and sagittal reconstructions were obtained. All CT scans at this facility use dose modulation, iterative reconstruction, and/or weight-based dosing when appropriate to reduce radiation dose to as low as reasonably achievable. COMPARISON: None. Correlation: Left knee radiographs 5/31/2019 FINDINGS: There is a broken distal component of an intramedullary femoral royer. The surgical pin in the distal femur is poorly visualized. There is extensive surgical cement and osseous deformation in the distal femur. Cortical disruptions along the medial margin of the distal femur were likely present in the prior radiographs and may be chronic. However, evaluation is limited. There is extensive joint space narrowing, osteophyte formation and sclerosis involving the medial lateral compartments of the tibiofemoral joint and at the patellofemoral joint. There is a broken osteophyte at the superior patella. There is an incompletely visualized lucency at the inferior patella. No dislocation is identified. Chronic soft tissue calcifications are noted. 1. Cortical disruptions along the medial margin of the distal femur, likely chronic but acute fracture cannot be excluded. 2. Lucency at the inferior patella consistent with fracture of indeterminate age. 3. Broken distal component of an intramedullary femoral royer. 4. Tricompartmental osteoarthritis. 5. Extensive surgical changes and osseous remodeling in the distal femur.  Final report electronically signed by Dr. Anna Subramanian on 4/6/2021 6:23 PM        EKG: pending      Arielle 55    Electronically signed by Anjel Lee PA-C on 4/6/2021 at 10:04 PM

## 2021-04-08 NOTE — PROGRESS NOTES
Hospitalist Progress Note    Patient:  Cole Danielle      Unit/Bed:7K-22/022-A    YOB: 1955    MRN: [id number]       Acct: [de-identified]     PCP: Daryle Riser, APRN - CNP    Date of Admission: 4/6/2021    Chief Complaint: Follow-up for consult    Hospital Course:     Per hospitalist consult note:    [place] Burton[age] y.o. female who we are asked to see/evaluate by Andra Ayers MD for medical management of CKD, DM II, CHF, a fib, and DARWIN. Patient had a fracture to her distal femur today with an unknown mechanism as she was not weight bearing when it happened. \"    4/8: overnight, pt had hypoglycemia    Subjective:     Patient seen and examined. She denies chest pain, sob, abd pain, N/V, diarrhea, fever, chills. Denies knee pain       Medications:  Reviewed    Infusion Medications    dextrose Stopped (04/08/21 0545)     Scheduled Medications    insulin lispro  0-6 Units Subcutaneous TID WC    insulin lispro  0-3 Units Subcutaneous Nightly    warfarin  5 mg Oral Once    [Held by provider] losartan  50 mg Oral Daily    metoprolol succinate  50 mg Oral Daily    warfarin (COUMADIN) daily dosing (placeholder)   Other RX Placeholder    famotidine  20 mg Oral Daily    allopurinol  100 mg Oral Daily    atorvastatin  40 mg Oral Nightly    digoxin  62.5 mcg Oral Daily    [START ON 4/12/2021] Dulaglutide  0.75 mg Subcutaneous Weekly    DULoxetine  60 mg Oral Daily    [Held by provider] furosemide  40 mg Oral Daily    levothyroxine  75 mcg Oral Daily    alogliptin  6.25 mg Oral Daily     PRN Meds: diphenhydrAMINE, diclofenac sodium, HYDROcodone 5 mg - acetaminophen, HYDROcodone 5 mg - acetaminophen, morphine, glucose, dextrose, glucagon (rDNA), dextrose      Intake/Output Summary (Last 24 hours) at 4/8/2021 1321  Last data filed at 4/8/2021 1021  Gross per 24 hour   Intake 300 ml   Output 1650 ml   Net -1350 ml       Diet:  DIET CARB CONTROL;   Dietary Nutrition Supplements: Low Calorie High Protein Supplement    Exam:  /65   Pulse 72   Temp 97.5 °F (36.4 °C) (Oral)   Resp 18   Ht 5' 9\" (1.753 m)   Wt 217 lb (98.4 kg) Comment: bed scale states 83.9kg (pt states dr today was 217lb  LMP 02/20/2001   SpO2 96%   BMI 32.05 kg/m²     General appearance: alert, not in acute distress   HEENT: Pupils equal, round, and reactive to light. Conjunctivae clear. Clear oral mucosa   Neck: Supple, with full range of motion. No jugular venous distention. Trachea midline. Respiratory:  Normal respiratory effort. Clear to auscultation, bilaterally without Rales/Wheezes/Rhonchi. Cardiovascular: normal rate, regular rhythm with normal S1/S2 without murmurs, rubs or gallops. Abdomen: Soft, non-tender, non-distended  Musculoskeletal: passive and active ROM x 4 extremities. Skin: No rashes or lesions. Exam of extremities: peripheral pulses normal, (+) grade 1+ pitting bipedal edema up to below knees ( left leg swelling>right leg swelling), no clubbing or cyanosis        Labs:   Recent Labs     04/06/21  1812 04/07/21  0843 04/08/21  0621   WBC 4.1* 3.5* 3.5*   HGB 10.5* 9.5* 10.2*   HCT 33.2* 31.3* 32.7*    150 176     Recent Labs     04/06/21 1812 04/07/21  0843 04/08/21  0621   * 137 135   K 4.5 4.1 4.7   CL 94* 99 98   CO2 27 25 29   BUN 59* 65* 64*   CREATININE 2.0* 2.3* 2.1*   CALCIUM 9.5 8.9 9.0     No results for input(s): AST, ALT, BILIDIR, BILITOT, ALKPHOS in the last 72 hours. Recent Labs     04/06/21  1812 04/07/21  0843 04/08/21  1149   INR 1.92* 1.71* 1.64*     No results for input(s): CKTOTAL, TROPONINI in the last 72 hours. Urinalysis:      Lab Results   Component Value Date    NITRU Negative 03/18/2021    WBCUA 0-2 01/25/2021    BACTERIA NONE SEEN 01/25/2021    RBCUA NONE SEEN 01/25/2021    BLOODU Negative 03/18/2021    BLOODU NEGATIVE 01/25/2021    SPECGRAV 1.020 01/26/2020    GLUCOSEU Negative 03/18/2021       Radiology:  US RENAL COMPLETE   Final Result   1.   No hydronephrosis. 2.  Stable benign right renal cysts and nonobstructing right renal stone. This document has been electronically signed by: Presley Khan MD on    04/07/2021 08:46 PM      VL DUP LOWER EXTREMITY VENOUS BILATERAL   Final Result      No evidence of deep venous thrombosis in either lower extremity. **This report has been created using voice recognition software. It may contain minor errors which are inherent in voice recognition technology. **             Final report electronically signed by Dr. Shala Odonnell on 4/7/2021 3:00 PM      CT KNEE LEFT WO CONTRAST   Final Result   1. Cortical disruptions along the medial margin of the distal femur, likely chronic but acute fracture cannot be excluded. 2. Lucency at the inferior patella consistent with fracture of indeterminate age. 3. Broken distal component of an intramedullary femoral royer. 4. Tricompartmental osteoarthritis. 5. Extensive surgical changes and osseous remodeling in the distal femur. Final report electronically signed by Dr. Shala Odonnell on 4/6/2021 6:23 PM      XR KNEE LEFT (3 VIEWS)   Final Result   1. No large displaced fracture of the knee. 2. Tricompartmental osteoarthritis. 3. Extensive surgical changes and osseous remodeling of the distal femur. 4. Stable disruption of a distal intramedullary femoral royer. Final report electronically signed by Dr. Shala Odonnell on 4/6/2021 5:51 PM          Diet: DIET CARB CONTROL; Dietary Nutrition Supplements: Low Calorie High Protein Supplement      Assessment/Plan:      1. Distal femur fracture: primary team orthopedics managing, per orthopedics QAMAR Sinha no plan for inpatient surgery  2. KEEGAN on CKD Stage 3, possibly prerenal secondary to losartan and diuretics, slightly improving: Creatinine 2.1 today from 2.3 yesterday, 2.0 on admission. Baseline creatinine 1.4-1.7. BUN elevated at 64. HOLD lasix. Cont to Hold losartan for now.  Stopped IV fluids on 4/7/2021. Avoid nephrotoxins. BMP in a.m.  Kidney ultrasound (-) hydronephrosis. 3. Ischemic cardiomyopathy/chronic HFrEF, compensated: EF of 30% per echocardiogram 4/6/2021. EF of 30 to 35% in 2019. Patient follows cardiologist Dr. Mikhail Jackson, who saw the patient on 4/1/2021 in the office. Per cardiology note, may consider ICD. Patient reports that she used to wear her LifeVest years ago. Case discussed with Dr. Mikhail Jackson over the phone who recommend inpatient cardiology consult for LifeVest evaluation. Discussed with primary team orthopedic NP Lashawn Tamez, who agreed with the plan. Cardiology consult ordered. Cardiology saw the pt on 4/8/2021, pt declined lifevest. cardiology recommend OP ICD and f/u with Dr. Yessy Wells in 1-2 weeks. Continue telemetry. Continue Toprol-XL, Lasix. Losartan on hold due to KEEGAN. Hold lasix   4. Stable benign right renal cysts and nonobstructing right renal stone: noted on kidney US. Pt is awar. F/u with PCP on discharge   5. Severe malnutrition: Dietitian on board  6. Essential hypertension, with hypotension: Hold losartan due to KEEGAN. Please holding parameters to antihypertensive medications. 7. DM II with hypoglycemia: Hypoglycemia resolvedcont SSI and alogliptin and dulglutide as ordered, hypoglycemic treatment orders. Home meds Janumet, humalog with meals on hold due to hypoglycemia   8. Chronic leukopenia and anemia, stable: f/u with PCP on discharge   9. Paroxysmal a fib: Orthopedics is okay to resume Coumadin on 4/7/2021, pharmacy consulted. Continue digoxin and Toprol-XL. 10. Subtherapeutic INR: pharmacy on-board to dose coumadin   11. DARWIN on CPAP:  Continue home CPAP  12.  Pre op risk assessment: Please see hospitalist's consult note on 4/6/2021 for details          DVT prophylaxis: [] Lovenox                                 [] SCDs                                 [] SQ Heparin                                 [] Encourage ambulation           [] Already on Anticoagulation     Disposition:    [] Home       [] TCU       [] Rehab       [] Psych       [] SNF       [] [facility]       [x] Other-per primary team      Code Status: Prior      Electronically signed by Fatmata Ca MD on 4/8/2021 at 1:21 PM

## 2021-04-08 NOTE — PROGRESS NOTES
for longer period of time. Balance:  Dynamic sitting balance competed SBA while pt reached outside SRAVANI ~ 5 minutes. Pt lost balance 1 time with self correction. Exercise:  Patient was guided in 1 set(s) 10 reps of exercise to both lower extremities. Ankle pumps, Glut sets, Heelslides, Hip abduction/adduction and Long arc quads. Exercises were completed for increased independence with functional mobility. Functional Outcome Measures: Completed  -PAC Inpatient Mobility Raw Score : 13  AM-PAC Inpatient T-Scale Score : 36.74    ASSESSMENT:  Assessment: Patient progressing toward established goals. Activity Tolerance:  Patient tolerance of  treatment: good. Pt tolerated session well. Decreased balance, endurance, and strength. Pt very little endurance when standing. Pt limited by pain in L LE this date. Discussed with pt possible SNF placement with therapy to improve strength and mobility. Pt would benefit from continued PT for improved functional mobility. Equipment Recommendations: Other: monitor-may need drop arm commode,slide board and hospital bed if pt needs to go home wusing a slide board for transfers . Pt reports she can not fit the W/C into her bedroom to do slide board transfers if she needs to do so. Discharge Recommendations:  Continue to assess pending progress, Patient would benefit from continued therapy after discharge SNF initially with therapy.  Pt unsafe to return home    Plan: Times per week: 5 X O  Current Treatment Recommendations: Strengthening, ROM, Home Exercise Program, Balance Training, Endurance Training, Functional Mobility Training, Transfer Training    Patient Education  Patient Education: Plan of Care, [employer] Mobility, Transfers, Gait, Use of Gait Belt, Verbal Exercise Instruction    Goals:  Patient goals : Go to rehab  Short term goals  Time Frame for Short term goals: by discharge  Short term goal 1: supine to sit and return with SBA to get in and out of bed  Short term goal 2: sit to stand up to walker with min of 1  to progress to gait if knee pain decreases  Short term goal 3: slide board transfer bed to W/C and return with CGA to get from one surface to another  Short term goal 4: Improve standing tolerance time at walker with pregait actvities as tolerated  Long term goals  Time Frame for Long term goals : NA due to short ELOS    Following session, patient left in safe position with all fall risk precautions in place.

## 2021-04-08 NOTE — CONSULTS
CMP  CHF  atr fib  Left knee pain  Hx of distal femur fracture    Plan  OMT  Declined lifevest  ICD as out pat to arranged  F/u Dr. Garret Bermudez in 1 to 2 weeks  Follow as needed    [id number]    Ragini Gonsales MD     SUMMARY:  Nonobstructive coronary artery disease. Findings are similar  to the findings that were in prior cath in 2017.     RECOMMENDATIONS:  1. Aggressive risk factor modification. 2.  Lipid-lowering therapy. 3.  Optimized medical management.   4.  Follow up in clinic in one to two weeks to evaluate symptoms  further.           Salvador Cesar MD     D: 07/17/2019 16:42:38

## 2021-04-08 NOTE — CONSULTS
PAST SURGICAL HISTORY:  Includes back surgery, foot surgery,  colonoscopy, upper GI endoscopy, tonsillectomy, cataract removal.    FAMILY HISTORY:  Positive for heart disease and hypertension. SOCIAL HISTORY:  She is a former smoker, quit in 2007. No alcohol, no  drug use. ALLERGIES:  No known drug allergies. HOME MEDICATIONS:  Prior to this admission, aspirin; Lipitor; Dexilant;  digoxin; Trulicity; Cymbalta; Tricor; folic acid; Lasix 40 daily; Neurontin; Humalog; Synthroid; losartan; meclizine; warfarin, that is  because of paroxysmal atrial fibrillation; Toprol-XL. PHYSICAL EXAMINATION:  GENERAL:  Looks sick, in no form of distress, but frail. VITAL SIGNS:  Blood pressure 133/56, pulse rate 71, respiratory rate 18,  temperature 97. 6. HEENT:  Pink conjunctivae. Anicteric sclerae. Pupils equal and  reactive bilaterally to light. NECK:  No lymphadenopathy. No goiter. No JVD. CHEST:  Clear to auscultation and resonant to percussion. CARDIOVASCULAR:  Arteries are felt; carotid, femoral, pedal.  No bruit. S1, S2 well heard. No murmur or gallop rhythm. ABDOMEN:  Soft, obese, nontender. Bowel sounds are positive. GENITOURINARY:  No CVA, flank or suprapubic tenderness. LOCOMOTOR:  No cyanosis, clubbing, muscle or joint tenderness. SKIN:  No rashes, ulcers or nodules. CNS:  Alert, awake. Oriented to time, person, and place. Cranial  nerves are intact. Sensation is intact to light touch and pain. Motor:  Normal muscle strength and tone. Appropriate mood and affect. WORKUP:  EKG:  Atrial fibrillation with low voltage and left bundle  branch block noted. No acute EKG abnormality. Troponin less than 0.01,  not done this admission actually. Hematology:  White blood cells 3.5,  hemoglobin 10, platelets 670. Echocardiogram on 04/06/2021, ejection  fraction 30%, and she had an echocardiogram in 07/2019, ejection  fraction 25%, and so she is known to have cardiomyopathy.   Cardiac catheterization in 07/2019, nonobstructive coronary artery disease. ASSESSMENT:  1. Nonischemic dilated cardiomyopathy. 2.  Congestive heart failure, chronic, combined systolic and diastolic,  stable. 3.  History of distal femur fracture status post fixation on previous  admission, now stable. No acute fracture noted per the report from the  orthopedist.  4.  Left knee pain. 5.  Persistent atrial fibrillation. 6.  Hypertension. 7.  Hyperlipidemia. 8.  History of diabetes. 9.  History of gastric bypass. 10.  History of gastroesophageal reflux disease. PLAN AND RECOMMENDATIONS:  1. At this level, we will continue with current optimal medical therapy  that she has for cardiomyopathy. She is on beta-blocker and ARBs and we  will continue that and continue with gentle diuresis that she has been  on. Her CHF is well compensated, stable. 2.  The patient certainly needs LifeVest and defibrillator. I discussed  with the patient the need for LifeVest.  Risks and benefits why the  patient needs a defibrillator, whether it is temporary or permanent, why  she needed defibrillator well explained, and the patient verbalized  understanding of the plan, risks, and benefit; however, she declines to  have a LifeVest and she knows about it and she is well aware about  LifeVest.  She does not want it, she does not like it, so basically  there is no way that she is going to have a LifeVest.  3.  About ICD implantable defibrillator, I discussed with her as well  and that one she will think about it and make a determination and  decision. So, for now, she does not want to have anything now at this  time but she wants to think about the implantable defibrillator, and so  in view of that, I will get an appointment and follow up with Dr. Kendra Rodriguez  in one to two weeks. I informed her to discuss with her family and  decide and to see Dr. Kendra Rodriguez for decision as well as for the plan of care  for defibrillator placement.   4. Arrange followup with Dr. Dave Colvin for ICD placement, _____ the  patient. Follow up with Dr. Nicole Ling also in a couple of weeks. Thank you for allowing me to participate in the care of this patient. I  will follow up with you as needed. Raphael Rosa.  Fer Loera M.D.    D: 04/08/2021 11:16:13       T: 04/08/2021 13:12:34     [email or web address]  Job#: [id number]     Doc#: [id number]    CC:

## 2021-04-08 NOTE — PROGRESS NOTES
Clinical Pharmacy Note    Warfarin consult follow-up    Recent Labs     04/08/21  1149   INR 1.64*     Recent Labs     04/06/21  1812 04/07/21  0843 04/08/21  0621   HGB 10.5* 9.5* 10.2*   HCT 33.2* 31.3* 32.7*    150 176       Significant Drug-Drug Interactions:  New warfarin drug-drug interactions: none  Discontinued drug-drug interactions: none  Current warfarin drug-drug interactions: allopurinol, levothyroxine, digoxin, duloxetine, pantoprazole (all home medications)     Date INR Warfarin Dose   4/7/2021 1.71 3.75 mg    4/8/2021 1.64  5 mg                                             Notes:                   Daily PT/INR until stable within therapeutic range. Will continue to follow and monitor.    Bard Castle, PharmD 4/8/2021 12:21 PM

## 2021-04-08 NOTE — FLOWSHEET NOTE
[facility] 88 PROGRESS NOTE      Patient: Luna Watkins  Room #: 5O-10/034-Y            YOB: 1955  Age: 76 y.o. Gender: female            Admit Date & Time: 4/6/2021  5:17 PM    Assessment:  Imani Sharma is a 76year old female who is sitting up in a recliner on 7k. She welcomed this spiritual care  for a bit and talked about her medical condition, especially her surgeries on her broken femer (3X)    Interventions:  Prayer was accepted for her healing and strength    Outcomes:  encouraged    Plan: 1. Care Plan:  Continue spiritual and emotional care for patient and family. Including prayers.      Electronically signed by Ariadna Luz on 4/8/2021 at 5:24 PM.  [address] [facility]  [phone]

## 2021-04-08 NOTE — PROGRESS NOTES
[facility] 59  INPATIENT OCCUPATIONAL THERAPY  [facility] 7K  EVALUATION    Time In: 8702  Time Out: 1135  Timed Code Treatment Minutes: 25 Minutes  Minutes: 44   Time Adjusted -9 min for MD discussion with pt in room       Date: 2021  Patient Name: Jackie Teresa,   Gender: female      MRN: [id number]  : 1955  (76 y.o.)  Referring Practitioner: ARNOL Paredes - CNP  Diagnosis: closed fracture of distal end of left femur  Additional Pertinent Hx: From admission note 4-6:The patient is a 76 y.o. female with multiple medical comorbidities who was admitted for the above complaint. Patient has a history of chronic left knee pain stemming from a fracture of the distal femur in , stabilized with IMN. The nail was removed and replaced with non-biodegradable abx nail in  to drainage. She has history of foot drop which has caused her to fall several times over the past 3 years eventually leading to being in a wheelchair with WB for transfers. Essentially what I gather from the patient, she was discharged 1-2 weeks ago from the [facility] to her private home that she shares with her sister. Her sister was pushing her in the wheelchair yesterday and the left foot got caught on the concrete causing sharp, shooting pain. Patient states she is unable to safely care for herself in the home and bear weight since the incident. She wishes to return to the [place]. X-rays and CT scan demonstrate fracture abx nail distal femur. Restrictions/Precautions:  Restrictions/Precautions: Weight Bearing, Fall Risk  Left Lower Extremity Weight Bearing: Weight Bearing As Tolerated  Position Activity Restriction  Other position/activity restrictions: WBAT left LE    Subjective  Chart Reviewed: Yes, Orders, Progress Notes  Patient assessed for rehabilitation services?: Yes  Family / Caregiver Present: No    Subjective: RN okayed session.  Pt was resting in bed upon arrival. Pt was pleasant and agreeable to OT. Pain:  Pain Assessment  Patient Currently in Pain: Yes  Pain Level: 6    Vitals: Vitals not assessed per clinical judgement, see nursing flowsheet    Social/Functional History:  Lives With: (sister)  Type of Home: Apartment  Home Layout: One level  Home Access: Level entry  Home Equipment: Rolling walker, Wheelchair-manual   Bathroom Equipment: 3-in-1 commode       ADL Assistance: Independent  Homemaking Assistance: Independent  Ambulation Assistance: Independent  Transfer Assistance: Independent          Additional Comments: Pt just [facility]. Pt uses W/C in the home mainly but was able to walk short distances with RW on her own or S into bathroom or her bedroom. Pt does have a commode next to her bed. Pt reports she is not able to fit her W/C in her bedroom. VISION:WFL    HEARING:  WFL    COGNITION: Slow Processing, Decreased Recall, Decreased Insight, Impaired Memory, Decreased Problem Solving and Decreased Safety Awareness    RANGE OF MOTION:  Bilateral Upper Extremity:  WFL    STRENGTH:  Bilateral Upper Extremity:  Grossly 3+/5    SENSATION:   WFL    ADL:   Grooming: with set-up. brushing hair  Toileting: Minimal Assistance. standing for clothing mgmt with RW for support  Toilet Transfer: Minimal Assistance. with VC for control of descent and safe body placement. BALANCE:  Sitting Balance:  Stand By Assistance. on EOB in prep for mobility  Standing Balance: [employer]. with RW for BUE support, Min A required when weight shifiting    BED MOBILITY:  Supine to Sit: Minimal Assistance with increased time for completion  Scooting: Minimal Assistance to scoot towards EOB    TRANSFERS:  Sit to Stand:  Minimal Assistance, Moderate Assistance. from EOB and BSC with increased time for completion and VC for safety  Stand to Sit: Minimal Assistance. to guide descent into recliner and to [facility].  Pt required increased time and VC for body placement prior to sitting    FUNCTIONAL MOBILITY:  Assistive Device: Rolling Walker  Assist Level:  Minimal Assistance. Distance: from EOB to [facility] to recliner  Pt completed at very slow pace with multiple rest x3 standing rest breaks. Pt with increased pain in LLE; however, able to weight shift. Pt relies moderately on BUE to advance BLE. Pt tolerated well with no LOB. Min A required for maintenance of balance. Activity Tolerance:  Patient tolerance of  treatment: good. Assessment:  Assessment: Pt presented with the listed deficits on this date s/p L distal tibia fx. Pt with decreased strength, endurance, and activity tolerance with ADLs and IADLs. Pt would benefit from continued OT to further increase strength, endurance, and indpe with ADLs and IADLs. Performance deficits / Impairments: Decreased functional mobility , Decreased safe awareness, Decreased balance, Decreased posture, Decreased endurance, Decreased high-level IADLs, Decreased strength  Prognosis: Good  REQUIRES OT FOLLOW UP: Yes  Decision Making: Medium Complexity  Safety Devices in place: Yes  Type of devices: All fall risk precautions in place, Call light within reach, Chair alarm in place    Treatment Initiated: Treatment and education initiated within context of evaluation. Evaluation time included review of current medical information, gathering information related to past medical, social and functional history, completion of standardized testing, formal and informal observation of tasks, assessment of data and development of plan of care and goals. Treatment time included skilled education and facilitation of tasks to increase safety and independence with ADL's for improved functional independence and quality of life.     Discharge Recommendations:  Subacute/Skilled Nursing Facility    Patient Education:  OT Education: OT Role, Plan of Care, ADL Adaptive Strategies, Transfer Training, Energy Conservation, IADL Safety    Equipment Recommendations:  Equipment Needed: (continue to assess)    Plan:  Times per week: 6x  Times per day: Daily  Current Treatment Recommendations: Strengthening, Balance Training, Functional Mobility Training, Endurance Training, Home Management Training, Patient/Caregiver Education & Training, Self-Care / ADL, Safety Education & Training. See long-term goal time frame for expected duration of plan of care. If no long-term goals established, a short length of stay is anticipated. Goals:  Patient goals : not stated  Short term goals  Time Frame for Short term goals: by discharge  Short term goal 1: Pt will complete functional mobility short distances with CGA and no safety cues to increase ease with ADL routine  Short term goal 2: Pt will tolerate dynamic standing x4 min with B hand release and CGA to increase ease with grooming  Short term goal 3: Pt will complete various t/fs with CGA and no safety cues to increase indep with ADL routine  Short term goal 4: Pt will complete BUE strengthening HEP to increase ease and indep with ADLs and IADLs. Following session, patient left in safe position with all fall risk precautions in place.

## 2021-04-08 NOTE — PROGRESS NOTES
Orthopaedic Progress Note      SUBJECTIVE   Ms. Genaro Shearer is hospital stay day #2 s/p fracture abx nail distal femur  hgb 10.2  Pt seen resting in bed   Pt states pain is tolerable with norco   No plan for surgical intervention at this time, will see in the office and discuss possible outpatient procedure  Pt wishes to return to [facility] at discharge     Allergies:     Allergies as of 04/06/2021    (No Known Allergies)     Current Inpatient Medications:  Current Facility-Administered Medications: insulin lispro (HUMALOG) injection vial 0-6 Units, 0-6 Units, Subcutaneous, TID WC  insulin lispro (HUMALOG) injection vial 0-3 Units, 0-3 Units, Subcutaneous, Nightly  diphenhydrAMINE (BENADRYL) injection 25 mg, 25 mg, Intravenous, Q6H PRN  [Held by provider] losartan (COZAAR) tablet 50 mg, 50 mg, Oral, Daily  metoprolol succinate (TOPROL XL) extended release tablet 50 mg, 50 mg, Oral, Daily  warfarin (COUMADIN) daily dosing (placeholder), , Other, RX Placeholder  diclofenac sodium (VOLTAREN) 1 % gel 4 g, 4 g, Topical, 4x Daily PRN  famotidine (PEPCID) tablet 20 mg, 20 mg, Oral, Daily  HYDROcodone-acetaminophen (NORCO) 5-325 MG per tablet 1 tablet, 1 tablet, Oral, Q4H PRN  HYDROcodone-acetaminophen (NORCO) 5-325 MG per tablet 2 tablet, 2 tablet, Oral, Q4H PRN  morphine (PF) injection 2 mg, 2 mg, Intravenous, Q2H PRN  allopurinol (ZYLOPRIM) tablet 100 mg, 100 mg, Oral, Daily  atorvastatin (LIPITOR) tablet 40 mg, 40 mg, Oral, Nightly  digoxin (LANOXIN) tablet 62.5 mcg, 62.5 mcg, Oral, Daily  [START ON 4/12/2021] Dulaglutide SOPN 0.75 mg, 0.75 mg, Subcutaneous, Weekly  DULoxetine (CYMBALTA) extended release capsule 60 mg, 60 mg, Oral, Daily  furosemide (LASIX) tablet 40 mg, 40 mg, Oral, Daily  levothyroxine (SYNTHROID) tablet 75 mcg, 75 mcg, Oral, Daily  glucose (GLUTOSE) 40 % oral gel 15 g, 15 g, Oral, PRN  dextrose 50 % IV solution, 12.5 g, Intravenous, PRN  glucagon (rDNA) injection 1 mg, 1 mg, Intramuscular, PRN  dextrose 5 % found for: RANDY    ASSESSMENT:  Active Hospital Problems    Diagnosis Date Noted    Acute diastolic CHF (congestive heart failure), NYHA class 2 (HCC) [I50.31]      Priority: High    Atrial fibrillation (HCC) [I48.91]      Priority: High    DARWIN on CPAP [G47.33, Z99.89] 07/09/2015     Priority: High    Severe malnutrition ([facility] [E43] 04/07/2021     Class: Chronic    Closed fracture of distal end of left femur, initial encounter ([facility] [S72.402A] 04/06/2021    CKD (chronic kidney disease) stage 3, GFR 30-59 ml/min [N18.30]     Diabetes mellitus ([facility] [E11.9]        PLAN  1. [address] Melba Abreu for discharge to [facility] when accepted    2. WBAT LLE  3. PT/OT to eval and treat   4. Continue current medical management   5.  F/U with Dr. Wanda Joseph in 2-3 weeks       Electronically signed by ARNOL Nj CNP on 4/8/2021 at 10:06 AM

## 2021-04-08 NOTE — DISCHARGE INSTR - COC
Continuity of Care Form    Patient Name: Cole Danielle   :  1955  MRN:  [id number]    Admit date:  2021  Discharge date:  4/10/2021    Code Status Order: Prior   Advance Directives:   [facility] Documentation       Date/Time Healthcare Directive Type of Healthcare Directive Copy in [facility] [address] Agent's Name Healthcare Agent's Phone Number    21  No, patient does not have an advance directive for healthcare treatment States Bill Le knows her wishes and makes decisions -- -- -- -- --            Admitting Physician:  Andra Ayers MD  PCP: Daryle Riser, APRN - CNP    Discharging Nurse: SUNRISE CANYON Unit/Room#: 7K-22/022-A  Discharging Unit Phone Number: [phone]    Emergency Contact:   Extended Emergency Contact Information  Primary Emergency Contact: [facility]  Address: [address] [place] [address] Dr NEGRON [address] [place] [address] Phone: [phone]  Mobile Phone: [phone]  Relation: Brother/Sister  Hearing or visual needs: None  Other needs: None  Preferred language: English   needed? No  Secondary Emergency Contact: Daphnie De León, [facility] Phone: [phone]  Mobile Phone: [phone]  Relation: Brother/Sister  Preferred language: English   needed?  No    Past Surgical History:  Past Surgical History:   Procedure Laterality Date    BACK SURGERY      xs 2    CATARACT REMOVAL Right 14    Dr. Santosh Reid EKG 12-LEAD  2015         ENDOSCOPY, COLON, DIAGNOSTIC      EYE SURGERY      FOOT SURGERY      left foot    HYSTERECTOMY, TOTAL ABDOMINAL      MOUTH BIOPSY  12    left tongue and lingual tonsil    OTHER SURGICAL HISTORY  2014    LEFT FEMUR RETROGRADE NAILING    OTHER SURGICAL HISTORY  2015    HARDWARE REMOVAL LEFT FEMUR, INSERTION OF ANTIBIOTIC SPACER    PATELLA SURGERY  13    fractues rt patella     NM COLON CA SCRN NOT HI [address] IND Left 1/24/2018    COLONOSCOPY performed by Andrew Wilkinson MD at [address] [facility] TAG REMOVAL  9/25/12    mole removal    SLEEVE GASTRECTOMY  09/15/2015    Robotic    TOENAIL EXCISION      removal of great toe nails bilateral-still has toenails-had ingrown toenails and had trimmed out     TONSILLECTOMY      UPPER GASTROINTESTINAL ENDOSCOPY         Immunization History:   Immunization History   Administered Date(s) Administered    PPD Test 07/01/2013, 07/12/2013    Pneumococcal Conjugate 13-valent (Rnerdig26) 04/10/2019    Pneumococcal Polysaccharide (Ansgueyrz60) 09/21/2015    Tdap (Boostrix, Adacel) 11/07/2017       Active Problems:  Patient Active Problem List   Diagnosis Code    Diabetes mellitus ([facility] E11.9    Hypertension I10    Hyperlipidemia E78.5    Neuropathy G62.9    Osteoarthritis M19.90    Proteinuria R80.9    Arthritis M19.90    Morbid obesity ([facility] E66.01    Allergic rhinitis J30.9    Chronic kidney disease, stage 4 (severe) ([facility]) N18.4    Diabetes mellitus type 2, insulin dependent ([facility]) E11.9, Z79.4    DARWIN on CPAP G47.33, Z99.89    History of sleeve gastrectomy Z90.3    Pneumonia J18.9    Morbid obesity with BMI of 50.0-59.9, adult ([facility]) E66.01, Z68.43    Coronary artery disease involving native heart without angina pectoris I25.10    Multinodular goiter E04.2    Bilateral renal cysts N28.1    Knee pain, left M25.562    Dyspnea R06.00    Atrial fibrillation ([facility]) I48.91    Acute diastolic CHF (congestive heart failure), NYHA class 2 ([facility]) I50.31    CKD (chronic kidney disease) stage 3, GFR 30-59 ml/min N18.30    Shortness of breath R06.02    Acute on chronic combined systolic (congestive) and diastolic (congestive) heart failure ([facility]) I50.43    Chronic anemia D64.9    Paroxysmal A-fib ([facility]) I48.0    Chronic kidney disease (CKD), stage III (moderate) N18.30    Nonischemic cardiomyopathy ([facility]) I42.8    Mild tricuspid regurgitation I07.1    Debility R53.81    Dietary noncompliance Z91.11    Long term current use of anticoagulant Z79.01    Severe left ventricular systolic dysfunction G84.1    Anticoagulated on Coumadin M77.88    Metabolic encephalopathy P86.39    Acute cystitis without hematuria N30.00    Hyperkalemia E87.5    KEEGAN (acute kidney injury) (HCC) N17.9    Altered mental status R41.82    AMS (altered mental status) R41.82    Encounter for therapeutic drug monitoring Z51.81    Closed fracture of distal end of left femur, initial encounter ([facility] S72.402A    Severe malnutrition ([facility] E43       Isolation/Infection:   Isolation            No Isolation          Patient Infection Status       Infection Onset Added Last Indicated Last Indicated By Review Planned Expiration Resolved Resolved By    None active    Resolved    COVID-19 Rule Out 01/29/21 01/29/21 01/29/21 COVID-19 (Ordered)   01/29/21 Rule-Out Test Resulted    COVID-19 Rule Out 01/03/21 01/03/21 01/03/21 COVID-19 (Ordered)   01/03/21 Rule-Out Test Resulted    COVID-19 Rule Out 12/15/20 12/15/20 12/15/20 COVID-19 (Ordered)   12/15/20 Rule-Out Test Resulted    ESBL (Extended Spectrum Beta Lactamase)  10/30/17 10/30/17 Johnie Grossman RN   05/17/19 Shantell Up RN    E-coli urine 10/17            Nurse Assessment:  Last Vital Signs: BP (!) 112/56   Pulse 71   Temp 97.6 °F (36.4 °C) (Oral)   Resp 18   Ht 5' 9\" (1.753 m)   Wt 217 lb (98.4 kg) Comment: bed scale states 83.9kg (pt states dr today was 217lb  LMP 02/20/2001   SpO2 95%   BMI 32.05 kg/m²     Last documented pain score (0-10 scale): Pain Level: 9  Last Weight:   Wt Readings from Last 1 Encounters:   04/06/21 217 lb (98.4 kg)     Mental Status:  oriented and alert    IV Access:  - None    Nursing Mobility/ADLs:  Walking   Assisted  Transfer  Assisted  Bathing  Assisted  Dressing  Assisted  Toileting  Assisted  Feeding  Assisted  Med Admin  Assisted  Med Delivery   whole    Wound Care Documentation and Therapy:  Wound 01/03/21 Buttocks Right (Active)   Number of days: 94       Wound 01/25/21 Buttocks Inner (Active)   Number of days: 72        Elimination:  Continence:   · Bowel: yes  · Bladder: Yes  Urinary Catheter: None   Colostomy/Ileostomy/Ileal Conduit: No       Date of Last BM: 4/8/2021    Intake/Output Summary (Last 24 hours) at 4/8/2021 1020  Last data filed at 4/8/2021 0957  Gross per 24 hour   Intake 300 ml   Output 550 ml   Net -250 ml     I/O last 3 completed shifts:  In: -   Out: 250 [Urine:250]    Safety Concerns:     History of Falls (last 30 days) and At Risk for Falls    Impairments/Disabilities:      None    Nutrition Therapy:  Current Nutrition Therapy:   - Oral Diet:  Carb Control 5 carbs/meal (2000kcals/day) and Cardiac    Routes of Feeding: Oral  Liquids: Thin Liquids  Daily Fluid Restriction: no  Last Modified Barium Swallow with Video (Video Swallowing Test): not done    Treatments at the Time of Hospital Discharge:   Respiratory Treatments: n/a  Oxygen Therapy:  is not on home oxygen therapy.   Ventilator:    - No ventilator support    Rehab Therapies: Physical Therapy and Occupational Therapy  Weight Bearing Status/Restrictions: No weight bearing restirctions  Other Medical Equipment (for information only, NOT a DME order):  wheelchair, walker, bath bench, bedside commode and hospital bed  Other Treatments: n/a    Patient's personal belongings (please select all that are sent with patient):  Glasses    RN SIGNATURE:  Electronically signed by Gifty Martin RN on 4/10/21 at 2:28 PM EDT    CASE MANAGEMENT/SOCIAL WORK SECTION    Inpatient Status Date: 04/06/21    Readmission Risk Assessment Score:  Readmission Risk              Risk of Unplanned Readmission:        38           Discharging to Facility/ Agency   · Name:  [facility]  · Address: [address] [place] [address]   ·                  [address] [place] [address] Road  [address]  · Phone: [phone]   · Fax:    Dialysis Facility (if applicable)   · Name:  · Address:  · Dialysis Schedule:  · Phone:  · Fax:    / signature: Electronically signed by JORI Villarreal on 4/9/2021 at 3:20 PM     PHYSICIAN SECTION    Prognosis: Fair    Condition at Discharge: Stable    Rehab Potential (if transferring to Rehab): Fair    Recommended Labs or Other Treatments After Discharge: n/a    Physician Certification: I certify the above information and transfer of Suki Powers  is necessary for the continuing treatment of the diagnosis listed and that she requires [place] for less 30 days.      Update Admission H&P: No change in H&P    PHYSICIAN SIGNATURE:  Electronically signed by ARNOL Valentin CNP on 4/9/21 at 8:20 AM EDT

## 2021-04-08 NOTE — PLAN OF CARE
Problem: Falls - Risk of:  Goal: Will remain free from falls  Description: Will remain free from falls  Outcome: Ongoing  Note: Patient has remained free of falls throughout this shift. Bed alarm on for safety. Call light in reach. Problem: Falls - Risk of:  Goal: Absence of physical injury  Description: Absence of physical injury  Outcome: Ongoing  Note: Patient has remained free of physical injury throughout this shift. Bed alarm on for safety. Call light in reach. Problem: Pain:  Goal: Pain level will decrease  Description: Pain level will decrease  Outcome: Ongoing  Note: Patient reports pain 10/10. Patient medicated per MAR orders. Patient repositioned for comfort. Problem: Pain:  Goal: Control of acute pain  Description: Control of acute pain  Outcome: Ongoing  Note: Patient reports pain 10/10. Patient medicated per MAR orders. Patient repositioned for comfort. Problem: Pain:  Goal: Control of chronic pain  Description: Control of chronic pain  Outcome: Ongoing  Note: Patient states her chronic pain has improved      Problem: Skin Integrity:  Goal: Will show no infection signs and symptoms  Description: Will show no infection signs and symptoms  Outcome: Ongoing  Note: Patient has remained afebrile throughout this shift. Problem: Skin Integrity:  Goal: Absence of new skin breakdown  Description: Absence of new skin breakdown  Outcome: Ongoing  Note: Patient encouraged to turn and reposition for optimal comfort. Problem: Cardiovascular  Goal: No DVT, peripheral vascular complications  Outcome: Ongoing  Note: Patient has remained free of DVT complications throughout his shift. Problem: Respiratory  Goal: O2 Sat > 90%  Outcome: Ongoing  Note: Patient has maintained an O2 saturation above 90% on room air throughout this shift.       Problem:   Goal: No urinary complication  Outcome: Ongoing  Note: Patient is voiding adequately     Problem: Nutrition  Goal: Optimal nutrition therapy  Outcome: Ongoing  Note: Patient has adequate intake      Problem: GI  Intervention: Assessment for constipation risk factors including opiods, immobility, etc.  Note: Patient has active bowel sounds but no bowel movement since admission      Problem: Skin Integrity/Risk  Intervention: Donnie Risk/Wound Assessment  Note: Patient is encouraged to turn and reposition every 2 hours to decrease the risk of skin breakdown     Problem: Musculor/Skeletal Functional Status  Intervention: Fall precautions  Note: Bed alarm on for safety. Call light in reach     Problem: Intellectual/Education/Knowledge Deficit  Intervention: Verbal/written education provided  Note: Patient care board updated with plan of care. Patient verbalizes understanding      Problem: Discharge Planning  Intervention: Discharge to appropriate level of care  Note: Pt plans home or ECF rehab at discharge. Care manager and social working helping with discharge needs. Care plan reviewed with patient. Patient verbalize understanding of the plan of care and contribute to goal setting.

## 2021-04-09 NOTE — PLAN OF CARE
Problem: Falls - Risk of:  Goal: Will remain free from falls  Description: Will remain free from falls  4/9/2021 0516 by Analia [place]  Outcome: Ongoing  Note: Patient has remained free of falls throughout his shift. Bed alarm on for safety. Call light in reach. Problem: Falls - Risk of:  Goal: Absence of physical injury  Description: Absence of physical injury  4/9/2021 0516 by Analia [place]  Outcome: Ongoing  Note: Patient has remained free of physical injury throughout his shift. Bed alarm on for safety. Call light in reach. Problem: Pain:  Goal: Pain level will decrease  Description: Pain level will decrease  4/9/2021 0516 by Analia Seth  Outcome: Ongoing  Note: Patient rates pain 9/10, repositioned for comfort. Patient medicated per MAR orders. Problem: Pain:  Goal: Control of acute pain  Description: Control of acute pain  4/9/2021 0516 by Analia Seth  Outcome: Ongoing  Note: Patient rates pain 9/10, repositioned for comfort. Patient medicated per MAR orders. Problem: Pain:  Goal: Control of chronic pain  Description: Control of chronic pain  4/9/2021 0516 by Analia [place]  Outcome: Ongoing  Note: Patient states her chronic pain is less then before      Problem: Skin Integrity:  Goal: Will show no infection signs and symptoms  Description: Will show no infection signs and symptoms  4/9/2021 0516 by Analia [place]  Outcome: Ongoing  Note: Patient has remained afebrile throughout this shift. Problem: Skin Integrity:  Goal: Absence of new skin breakdown  Description: Absence of new skin breakdown  4/9/2021 0516 by Analia Seth  Outcome: Ongoing  Note: Patient has remained free of new skin breakdown throughout his shift. Patient encouraged to turn and reposition for optimal skin integrity.       Problem: Cardiovascular  Goal: No DVT, peripheral vascular complications  9/2/1797 7521 by Analia Seth  Outcome: Ongoing  Note: Patient has remained free of DVT and peripheral vascular problems throughout this shift. Problem: Respiratory  Goal: O2 Sat > 90%  4/9/2021 0516 by Bettian Johnson  Outcome: Ongoing  Note: Patient has maintained O2 saturation above 90% on room air. Problem:   Goal: No urinary complication  7/6/4481 0004 by Bettina Johnosn  Outcome: Ongoing  Note: Patient has voided properly and adequately     Problem: Nutrition  Goal: Optimal nutrition therapy  4/9/2021 0516 by Bettina Johnson  Outcome: Ongoing  Note: Patient eating adequately and appropriately. Problem: DISCHARGE BARRIERS  Goal: Patient's continuum of care needs are met  4/9/2021 0516 by Bettina Johnson  Outcome: Ongoing  Note: Pt plans home at discharge. Care manager and social working helping with discharge needs. Problem: GI  Intervention: Assessment for constipation risk factors including opiods, immobility, etc.  Note: Patient has not had a BM since admission     Problem: Skin Integrity/Risk  Intervention: Donnie Risk/Wound Assessment  Note: Patient encouraged to turn and reposition often to decrease risk of new skin breakdown     Problem: Musculor/Skeletal Functional Status  Intervention: Fall precautions  Note: Bed alarm on for safety. Call light in reach     Problem: Intellectual/Education/Knowledge Deficit  Intervention: Verbal/written education provided  Note: Patient care board updated with plan of care. Patient verbalizes understanding. Problem: Discharge Planning  Intervention: Discharge to appropriate level of care  Note: Pt plans home at discharge. Care manager and social working helping with discharge needs.        Problem: Musculor/Skeletal Functional Status  Intervention: PT Evaluation/treatment  Note: Patient working with therapy as tolerated with a walker and gait belt     Problem: Musculor/Skeletal Functional Status  Intervention: OT Evaluation/treatment  Note: Patient working with therapy as tolerated with a walker and gait belt   Care plan reviewed with patient. Patient verbalize understanding of the plan of care and contribute to goal setting.

## 2021-04-09 NOTE — CARE COORDINATION
4/9/21, 9:37 AM EDT    DISCHARGE ON GOING [facility] day: 3  Location: -22/022-A Reason for admit: Closed fracture of distal end of left femur, initial encounter ([facility] [S72.402A]   Procedure: no  Barriers to Discharge: Need ECF bed.  Non-op management distal left femur fx. Resume Coumadin. PT.OT. WBAT. Telemetry monitoring. Cardiology consult - she declined life vest.   PCP: ARNOL Mortensen - CNP  Readmission Risk Score: 38%  Patient Goals/Plan/Treatment Preferences: Peterson Gonzales is requesting to go to [facility] noted plan to followup and plan total knee replacement as elective surgery. She has her C-pap machine here. See SW notes - planning ECF tomorrow

## 2021-04-09 NOTE — PROGRESS NOTES
Orthopaedic Progress Note      SUBJECTIVE   Ms. Ania Naranjo is hospital stay day #3 s/p fracture abx nail distal femur  hgb 9.4  Pt seen resting in bed asleep, arouses to name   Pt states pain is tolerable with norco   No plan for surgical intervention at this time, will see in the office and discuss possible outpatient procedure  Pt wishes to return to ECF at discharge       Allergies:     Allergies as of 04/06/2021    (No Known Allergies)     Current Inpatient Medications:  Current Facility-Administered Medications: insulin lispro (HUMALOG) injection vial 0-6 Units, 0-6 Units, Subcutaneous, TID WC  insulin lispro (HUMALOG) injection vial 0-3 Units, 0-3 Units, Subcutaneous, Nightly  diphenhydrAMINE (BENADRYL) injection 25 mg, 25 mg, Intravenous, Q6H PRN  [Held by provider] losartan (COZAAR) tablet 50 mg, 50 mg, Oral, Daily  metoprolol succinate (TOPROL XL) extended release tablet 50 mg, 50 mg, Oral, Daily  warfarin (COUMADIN) daily dosing (placeholder), , Other, RX Placeholder  diclofenac sodium (VOLTAREN) 1 % gel 4 g, 4 g, Topical, 4x Daily PRN  famotidine (PEPCID) tablet 20 mg, 20 mg, Oral, Daily  HYDROcodone-acetaminophen (NORCO) 5-325 MG per tablet 1 tablet, 1 tablet, Oral, Q4H PRN  HYDROcodone-acetaminophen (NORCO) 5-325 MG per tablet 2 tablet, 2 tablet, Oral, Q4H PRN  morphine (PF) injection 2 mg, 2 mg, Intravenous, Q2H PRN  allopurinol (ZYLOPRIM) tablet 100 mg, 100 mg, Oral, Daily  atorvastatin (LIPITOR) tablet 40 mg, 40 mg, Oral, Nightly  digoxin (LANOXIN) tablet 62.5 mcg, 62.5 mcg, Oral, Daily  [START ON 4/12/2021] Dulaglutide SOPN 0.75 mg, 0.75 mg, Subcutaneous, Weekly  DULoxetine (CYMBALTA) extended release capsule 60 mg, 60 mg, Oral, Daily  [Held by provider] furosemide (LASIX) tablet 40 mg, 40 mg, Oral, Daily  levothyroxine (SYNTHROID) tablet 75 mcg, 75 mcg, Oral, Daily  glucose (GLUTOSE) 40 % oral gel 15 g, 15 g, Oral, PRN  dextrose 50 % IV solution, 12.5 g, Intravenous, PRN  glucagon (rDNA) injection 1 mg, 1 mg, Intramuscular, PRN  dextrose 5 % solution, 100 mL/hr, Intravenous, PRN  alogliptin (NESINA) tablet 6.25 mg, 6.25 mg, Oral, Daily    REVIEW OF SYSTEMS:  Constitutional: Denies any fever, chills. Derm: Denies any rash or skin color change. Musculoskeletal: Denies numbness and tingling LLE, Pain to L leg  Neuro: Denies any dizziness, paresthesia or weakness. OBJECTIVE    Patient Vitals for the past 24 hrs:   BP Temp Temp src Pulse Resp SpO2   04/09/21 0213 (!) 141/74 98.3 °F (36.8 °C) Oral 82 18 96 %   04/08/21 1949 128/61 98 °F (36.7 °C) Oral 66 16 97 %   04/08/21 1530 135/63 96.9 °F (36.1 °C) Axillary 72 18 98 %   04/08/21 1230 120/65 97.5 °F (36.4 °C) Oral 72 18 96 %   04/08/21 1015 (!) 112/56 97.6 °F (36.4 °C) Oral 71 18 95 %     INTAKE/OUTPUT:    Intake/Output Summary (Last 24 hours) at 4/9/2021 0835  Last data filed at 4/8/2021 1823  Gross per 24 hour   Intake 1173 ml   Output 1100 ml   Net 73 ml     I/O last 3 completed shifts: In: 2782 [P.O.:900; I.V.:273]  Out: 1400 [Urine:1400]    PHYSICAL EXAM:  General appearance:  Alert and oriented x 3. No apparent distress, appears stated age and cooperative. Musculoskeletal: LLE:  Denies calf pain to palpation. decreased range of motion without deformity. Pt can flex and extend left toes. Left knee skin dry and intact. No redness or drainage noted from incision site. Swelling noted around l knee. Skin: Skin color normal.  No rashes or lesions. Neurologic:  Neurovascularly intact without any focal sensory/motor deficits. Sensation intact.        Data  CBC:   Lab Results   Component Value Date    WBC 4.1 04/09/2021    HGB 9.4 04/09/2021     04/09/2021     BMP:    Lab Results   Component Value Date     04/09/2021    K 4.8 04/09/2021    K 5.0 01/29/2021     04/09/2021    CO2 28 04/09/2021    BUN 61 04/09/2021    CREATININE 2.1 04/09/2021    CALCIUM 9.1 04/09/2021    GLUCOSE 201 04/09/2021    GLUCOSE 213 03/27/2012     Uric Acid:  No components found for: URIC  PT/INR:    Lab Results   Component Value Date    INR 2.07 04/09/2021     Troponin:  No results found for: TROPONINI  Urine Culture:  No components found for: RANDY    ASSESSMENT:  Active Hospital Problems    Diagnosis Date Noted    Acute diastolic CHF (congestive heart failure), NYHA class 2 (HCC) [I50.31]      Priority: High    Atrial fibrillation (HCC) [I48.91]      Priority: High    DARWIN on CPAP [G47.33, Z99.89] 07/09/2015     Priority: High    Severe malnutrition ([facility] [E43] 04/07/2021     Class: Chronic    Closed fracture of distal end of left femur, initial encounter ([facility] [S72.402A] 04/06/2021    CKD (chronic kidney disease) stage 3, GFR 30-59 ml/min [N18.30]     Diabetes mellitus ([facility] [E11.9]        PLAN  1. [address] Melba Abreu for discharge to [facility] today      2. WBAT LLE  3. PT/OT to eval and treat   4. Continue current medical management   5.  F/U with Dr. Jenny Polo in 2-3 weeks      Electronically signed by ARNOL Scanlon CNP on 4/9/2021 at 8:34 AM

## 2021-04-09 NOTE — CONSULTS
breath. No nausea vomiting. No fever or chills. No diarrhea. No headaches. She does have  chronic joint ache and joint pain worsened by activity and relieved by rest and analgesic. CT of the left knee done in the emergency department revealed distal fracture of the intramedullary femoral royer. Presence of tricompartmental osteoarthritis was also noted. I was asked to see half elevated serum creatinine level. Her serum creatinine baseline is mostly around the 2 to 2.2 mg/dL. Nadyne [place] She recently received opiate analgesic and intravenous of Benadryl. The after she has been ' loopy' according to the staff.     Past Medical History:   Diagnosis Date    CAD (coronary artery disease)     CRI (chronic renal insufficiency)     Difficult intubation     excess skin in back of throat     DM (diabetes mellitus) ([facility] 1994    GERD (gastroesophageal reflux disease)     H/O gastric bypass     Hyperlipidemia     Hypertension     Hypothyroidism     Neuropathy     Obesity     Osteoarthritis     Paroxysmal atrial fibrillation (HCC)     Prolonged emergence from general anesthesia     Sleep apnea     uses cpap    Visit for screening mammogram        Past Surgical History:   Procedure Laterality Date    BACK SURGERY      xs 2    CATARACT REMOVAL Right 7/24/14    Dr. Karine Pierre EKG 12-LEAD  9/18/2015         ENDOSCOPY, COLON, DIAGNOSTIC      EYE SURGERY      FOOT SURGERY      left foot    HYSTERECTOMY, TOTAL ABDOMINAL      MOUTH BIOPSY  9-28-12    left tongue and lingual tonsil    OTHER SURGICAL HISTORY  11/8/2014    LEFT FEMUR RETROGRADE NAILING    OTHER SURGICAL HISTORY  4/23/2015    HARDWARE REMOVAL LEFT FEMUR, INSERTION OF ANTIBIOTIC SPACER    PATELLA SURGERY  7/11/13    fractues rt patella     AK COLON CA SCRN NOT  [address] IND Left 1/24/2018    COLONOSCOPY performed by Conor Hooker MD at [address] [facility] TAG REMOVAL  9/25/12    mole removal    SLEEVE GASTRECTOMY 09/15/2015    Robotic    TOENAIL EXCISION      removal of great toe nails bilateral-still has toenails-had ingrown toenails and had trimmed out     TONSILLECTOMY      UPPER GASTROINTESTINAL ENDOSCOPY         Family History   Problem Relation Age of Onset    Asthma Sister     Asthma Brother     Heart Disease Father     Other Mother     High Blood Pressure Other     Cancer Maternal Uncle         stomach    Diabetes Maternal Grandmother     High Blood Pressure Maternal Grandmother     Diabetes Maternal Grandfather     Stroke Neg Hx         reports that she quit smoking about 14 years ago. She has a 30.00 pack-year smoking history. She has never used smokeless tobacco. She reports that she does not drink alcohol or use drugs. Allergies:  Patient has no known allergies.     Current Medications:    warfarin (COUMADIN) tablet 3.75 mg, Once  insulin lispro (HUMALOG) injection vial 0-12 Units, TID WC  insulin lispro (HUMALOG) injection vial 0-6 Units, Nightly  diphenhydrAMINE (BENADRYL) injection 25 mg, Q6H PRN  [Held by provider] losartan (COZAAR) tablet 50 mg, Daily  metoprolol succinate (TOPROL XL) extended release tablet 50 mg, Daily  warfarin (COUMADIN) daily dosing (placeholder), RX Placeholder  diclofenac sodium (VOLTAREN) 1 % gel 4 g, 4x Daily PRN  famotidine (PEPCID) tablet 20 mg, Daily  HYDROcodone-acetaminophen (NORCO) 5-325 MG per tablet 1 tablet, Q4H PRN  HYDROcodone-acetaminophen (NORCO) 5-325 MG per tablet 2 tablet, Q4H PRN  morphine (PF) injection 2 mg, Q2H PRN  allopurinol (ZYLOPRIM) tablet 100 mg, Daily  atorvastatin (LIPITOR) tablet 40 mg, Nightly  digoxin (LANOXIN) tablet 62.5 mcg, Daily  [START ON 4/12/2021] Dulaglutide SOPN 0.75 mg, Weekly  DULoxetine (CYMBALTA) extended release capsule 60 mg, Daily  [Held by provider] furosemide (LASIX) tablet 40 mg, Daily  levothyroxine (SYNTHROID) tablet 75 mcg, Daily  glucose (GLUTOSE) 40 % oral gel 15 g, PRN  dextrose 50 % IV solution, PRN  glucagon (rDNA) injection 1 mg, PRN  dextrose 5 % solution, PRN  [Held by provider] alogliptin (NESINA) tablet 6.25 mg, Daily        Review of Systems:   Pertinent positives stated above in HPI. All other systems were reviewed and were negative. ROS: Per HPI. Physical exam:   Constitutional: Well-developed elderly lady in no obvious distress. Vitals:   Vitals:    04/09/21 0830   BP: 139/72   Pulse: 81   Resp: 18   Temp: 98.1 °F (36.7 °C)   SpO2:        Skin: no rash, turgor is normal  Heent: Pupils are reactive to light. Throat is clear. Oral mucosa is moist.  Oral mucosa appears very slightly dry. Neck: no bruits or jvd noted   Cardiovascular:  S1, S2 without murmur   Respiratory: Clear with no wheezes,rhonchi or rales   Abdomen: soft,non tender,good bowel sound and no palpable mass  Ext: No lower extremity edema. There is pain to deep pressure and palpation of the left knee. Psychiatric: mood and affect appropriate  Musculoskeletal:  intact   CNS: Very awake and very alert. Patient oriented to self, place and well remembrance of recent events. However she mistaken today for Thursday instead of Friday. Normal speech. Good motor strength. No obvious focal deficit.     Data:   Labs:  Lab Results   Component Value Date     04/09/2021     04/08/2021     04/07/2021    K 4.8 04/09/2021    K 4.7 04/08/2021    K 4.1 04/07/2021     04/09/2021    CO2 28 04/09/2021    CO2 29 04/08/2021    CO2 25 04/07/2021    CREATININE 2.1 (H) 04/09/2021    CREATININE 2.1 (H) 04/08/2021    CREATININE 2.3 (H) 04/07/2021    BUN 61 (H) 04/09/2021    BUN 64 (H) 04/08/2021    BUN 65 (H) 04/07/2021    GLUCOSE 201 (H) 04/09/2021    GLUCOSE 222 (H) 04/08/2021    GLUCOSE 95 04/07/2021    PHOS 2.8 04/29/2020    PHOS 4.2 07/12/2019    PHOS 3.5 04/24/2019    WBC 4.1 (L) 04/09/2021    WBC 3.5 (L) 04/08/2021    WBC 3.5 (L) 04/07/2021    HGB 9.4 (L) 04/09/2021    HGB 10.2 (L) 04/08/2021    HGB 9.5 (L) 04/07/2021    HCT 30.2 (L) 04/09/2021    HCT 32.7 (L) 04/08/2021    HCT 31.3 (L) 04/07/2021    MCV 99.7 (H) 04/09/2021     04/09/2021     {Labs reviewed    Imaging:  CXR results: Diagnostic images reports reviewed        Thank you Dr. Saad Constantino, ARNOL - CNP for allowing us to participate in care of Romelia Soares   **This report has been created using voice recognition software. It maycontain minor  errors which are inherent in voice recognition technology. **    Electronically signed by Lola Luciano MD on 4/9/2021 at 5:41 PM

## 2021-04-09 NOTE — PROGRESS NOTES
Comprehensive Nutrition Assessment    Type and Reason for Visit:  Reassess    Nutrition Recommendations/Plan:   Continue current diet. Will discontinue ONS per pt. Request.  Recommend MVI. Encouraged po, good nutrition at best efforts (declines other ONS at present). Question if pt. Would benefit from appetite stimulant if ongoing poor appetite. Nutrition Assessment:    Pt. with no improvement from a nutritional standpoint AEB ongoing poor appetite; dislikes ONS. Remains at risk for further nutritional compromise r/t severe malnutrition, femur fracture and underlying medical condition (hx gastric sleeve '15, CHF, CKD, HTN, HLD). Nutrition recommendations/interventions as per above. Malnutrition Assessment:  Malnutrition Status:  Severe malnutrition    Context:  Chronic Illness     Findings of the 6 clinical characteristics of malnutrition:  Energy Intake:  7 - 75% or less estimated energy requirements for 1 month or longer  Weight Loss:  (-33% weight loss in 11 months)     Body Fat Loss:  No significant body fat loss     Muscle Mass Loss:  No significant muscle mass loss    Fluid Accumulation:  No significant fluid accumulation Extremities   Strength:  Not Performed    Estimated Daily Nutrient Needs:  Energy (kcal):  6548-6914 kcal/day (20-25 kcal/kg); Weight Used for Energy Requirements:  (87.5 kg on 4/7)     Protein (g):  46-52 gm (0.7-0.8) -CKD; Weight Used for Protein Requirements:  (66 kgm)        Nutrition Related Findings:  Pt. Seen while working with therapy; reports poor appetite - denies any trouble tolerating diet but just not hungry; dislikes ONS; states was able to drink ONS received at [facility] pta (but appears reluctant to drink again when returns to [facility]); reviewed other protein sources however pt.  Appears picky and does not like ones mentioned; no BM noted; 4/9: BUN 61, Cr 2.1, Glucose 201; plan return to ECF 4/10    Wounds:  None       Current Nutrition Therapies:    DIET CARB

## 2021-04-09 NOTE — PROGRESS NOTES
[facility]  Occupational Therapy  Daily Note  Time:   Time In: 8832  Time Out: 1100  Timed Code Treatment Minutes: 31 Minutes  Minutes: 31          Date: 2021  Patient Name: Sae Martin,   Gender: female      Room: [facility]/022-A  MRN: [id number]  : 1955  (76 y.o.)  Referring Practitioner: ARNOL Wynne CNP  Diagnosis: closed fracture of distal end of left femur  Additional Pertinent Hx: From admission note 4-6:The patient is a 76 y.o. female with multiple medical comorbidities who was admitted for the above complaint. Patient has a history of chronic left knee pain stemming from a fracture of the distal femur in , stabilized with IMN. The nail was removed and replaced with non-biodegradable abx nail in  to drainage. She has history of foot drop which has caused her to fall several times over the past 3 years eventually leading to being in a wheelchair with WB for transfers. Essentially what I gather from the patient, she was discharged 1-2 weeks ago from the [facility] to her private home that she shares with her sister. Her sister was pushing her in the wheelchair yesterday and the left foot got caught on the concrete causing sharp, shooting pain. Patient states she is unable to safely care for herself in the home and bear weight since the incident. She wishes to return to the [place]. X-rays and CT scan demonstrate fracture abx nail distal femur. Restrictions/Precautions:  Restrictions/Precautions: Weight Bearing, Fall Risk  Left Lower Extremity Weight Bearing: Weight Bearing As Tolerated  Position Activity Restriction  Other position/activity restrictions: WBAT left LE     SUBJECTIVE: Pt lying supine upon arrival, agreeable to OT session with min encouragement stating she is not feeling well today.     PAIN: Did not rate: LLE    Vitals: Vitals not assessed per clinical judgement, see nursing flowsheet    COGNITION: Slow Processing

## 2021-04-09 NOTE — PLAN OF CARE
Problem: Nutrition  Goal: Optimal nutrition therapy  4/9/2021 1134 by Leoncio Moya RD, LD  Outcome: Ongoing  Nutrition Problem #1: Severe malnutrition, In context of chronic illness  Intervention: Food and/or Nutrient Delivery: Continue Current Diet, Discontinue Oral Nutrition Supplement  Nutritional Goals: Pt will consume 75% or more of meals during LOS

## 2021-04-09 NOTE — PROGRESS NOTES
[address] [address]  INPATIENT PHYSICAL THERAPY  DAILY NOTE  [facility] ORTHOPEDICS 7K - 7K-22/022-A    Time In: 3732  Time Out: 6267  Timed Code Treatment Minutes: 27 Minutes  Minutes: 27          Date: 2021  Patient Name: Sophia Weller,  Gender:  female        MRN: [id number]  : 1955  (76 y.o.)     Referring Practitioner: Norberto Adrian  Diagnosis: Closed fx of distal end of femur  Additional Pertinent Hx: From admission note 4-6:The patient is a 76 y.o. female with multiple medical comorbidities who was admitted for the above complaint. Patient has a history of chronic left knee pain stemming from a fracture of the distal femur in , stabilized with IMN. The nail was removed and replaced with non-biodegradable abx nail in  to drainage. She has history of foot drop which has caused her to fall several times over the past 3 years eventually leading to being in a wheelchair with WB for transfers. Essentially what I gather from the patient, she was discharged 1-2 weeks ago from the [facility] to her private home that she shares with her sister. Her sister was pushing her in the wheelchair yesterday and the left foot got caught on the concrete causing sharp, shooting pain. Patient states she is unable to safely care for herself in the home and bear weight since the incident. She wishes to return to the [place]. X-rays and CT scan demonstrate fracture abx nail distal femur. Prior Level of Function:  Lives With: (sister)  Type of Home: Apartment  Home Layout: One level  Home Access: Level entry  Home Equipment: Rolling walker, Wheelchair-manual   Bathroom Equipment: 3-in-1 commode    ADL Assistance: Independent  Homemaking Assistance: Independent  Ambulation Assistance: Independent  Transfer Assistance: Independent  Additional Comments: Pt just finished with [facility] therapy.  Pt uses W/C in the home mainly but was able to walk short distances with RW on her own or S into bathroom or her bedroom. Pt does have a commode next to her bed. Pt reports she is not able to fit her W/C in her bedroom. Restrictions/Precautions:  Restrictions/Precautions: Weight Bearing, Fall Risk  Left Lower Extremity Weight Bearing: Weight Bearing As Tolerated  Position Activity Restriction  Other position/activity restrictions: WBAT left LE     SUBJECTIVE: RN approved session. Pt in bed sleeping upon arrival. Pt states does not want therapy due to fatigue and not feeling well. Pt agreed after encouragement. Pt requested to use bedside commode. PT came into room to assess transfers and ambulation. PAIN: Not Rated, L LE    Vitals: Vitals not assessed per clinical judgement, see nursing flowsheet    OBJECTIVE:  Bed Mobility:  Supine to Sit: Stand By Assistance, with head of bed raised, with verbal cues , with increased time for completion. Cues for sequencing  Sit to Supine: Stand By Assistance, with verbal cues , with increased time for completion. Cues for LE management  Scooting: Stand By Assistance    Transfers:  Sit to Stand: [employer], Minimal Assistance, X 1, with increased time for completion, cues for hand placement, with verbal cues. Cues to push off with B UE  Stand to Sit:Minimal Assistance, X 1, with increased time for completion, cues for hand placement, with verbal cues. PT present to assess transfers    Ambulation:  Contact Guard Assistance, Minimal Assistance, with verbal cues , with increased time for completion  Distance: 3'x2 to commode then back to bed  Surface: Level Tile  Device:Rolling Walker  Gait Deviations: Forward Flexed Posture, Slow Mary, Decreased Step Length Bilaterally, Decreased Weight Shift Left, Decreased Gait Speed, Decreased Heel Strike Bilaterally, Narrow Base of Support and Unsteady Gait  PT present to assess gait. Balance:  Static Sitting Balance:  Supervision.  Pt sat EOB with cues for erect trunk  Dynamic Sitting Balance: Stand By Assistance, with verbal cues for erect trunk and midline positioning. Exercise:  Patient was guided in 1 set(s) 10 reps of exercise to both lower extremities. Ankle pumps, Glut sets, Quad sets, Heelslides, Hip abduction/adduction, Straight leg raises, Seated marches and Long arc quads. Exercises were completed for increased independence with functional mobility. Functional Outcome Measures: Completed  AM-PAC Inpatient Mobility Raw Score : 15  AM-PAC Inpatient T-Scale Score : 39.45    ASSESSMENT:  Assessment: Patient progressing toward established goals. Activity Tolerance:  Patient tolerance of  treatment: good. Pt tolerated session well. Decrease strength, balance, mobility, and endurance. Pt limited by pain. Pt would benefit from continued PT to improve functional mobility and increase independence. Equipment Recommendations: Other: monitor-may need drop arm commode,slide board and hospital bed if pt needs to go home wusing a slide board for transfers . Pt reports she can not fit the W/C into her bedroom to do slide board transfers if she needs to do so.   Discharge Recommendations: Continue to assess pending progress, Patient would benefit from continued therapy after discharge [address]: Times per week: 5 X O  Current Treatment Recommendations: Strengthening, ROM, Home Exercise Program, Balance Training, Endurance Training, Functional Mobility Training, Transfer Training    Patient Education  Patient Education: Plan of Care, [employer] Mobility, Transfers, Gait, Use of Gait Belt, Verbal Exercise Instruction    Goals:  Patient goals : Go to rehab  Short term goals  Time Frame for Short term goals: by discharge  Short term goal 1: supine to sit and return with SBA to get in and out of bed  Short term goal 2: sit to stand up to walker with min of 1  to progress to gait if knee pain decreases  Short term goal 3: slide board transfer bed to W/C and return with CGA to get from one surface to another  Short term goal 4: Ambulate 39' with 2WW and CGA for improved independence with ambulation. Long term goals  Time Frame for Long term goals : NA due to short ELOS     *Goals updated 4/9/21 by Romeo Pierson DPT supervising PT and PT present for assessment of ambulation. Following session, patient left in safe position with all fall risk precautions in place.

## 2021-04-09 NOTE — PROGRESS NOTES
Hospitalist Progress Note    Patient:  Charlie Phillip      Unit/Bed:7K-22/022-A    YOB: 1955    MRN: [id number]       Acct: [de-identified]     PCP: ARNOL Miranda CNP    Date of Admission: 4/6/2021    Chief Complaint: Follow-up for consult    Hospital Course:     Per hospitalist consult note:    Gina Ord[age] y.o. female who we are asked to see/evaluate by Chelsi Sparks MD for medical management of CKD, DM II, CHF, a fib, and DARWIN. Patient had a fracture to her distal femur today with an unknown mechanism as she was not weight bearing when it happened. \"    4/8: overnight, pt had hypoglycemia    4/9: no overnight events noted. Having hyperglycemia today     Subjective:     Patient seen and examined. Pt's sister Bright Yin on the phone during my rounds. Pt is c/o left hip pain, just had norco per RN. She rate the pain 10/10. She denies chest pain, sob, abd pain, N/V, diarrhea, fever, chills.  Denies knee pain       Medications:  Reviewed    Infusion Medications    dextrose Stopped (04/08/21 0545)     Scheduled Medications    warfarin  3.75 mg Oral Once    insulin lispro  0-12 Units Subcutaneous TID WC    insulin lispro  0-6 Units Subcutaneous Nightly    [Held by provider] losartan  50 mg Oral Daily    metoprolol succinate  50 mg Oral Daily    warfarin (COUMADIN) daily dosing (placeholder)   Other RX Placeholder    famotidine  20 mg Oral Daily    allopurinol  100 mg Oral Daily    atorvastatin  40 mg Oral Nightly    digoxin  62.5 mcg Oral Daily    [START ON 4/12/2021] Dulaglutide  0.75 mg Subcutaneous Weekly    DULoxetine  60 mg Oral Daily    [Held by provider] furosemide  40 mg Oral Daily    levothyroxine  75 mcg Oral Daily    alogliptin  6.25 mg Oral Daily     PRN Meds: diphenhydrAMINE, diclofenac sodium, HYDROcodone 5 mg - acetaminophen, HYDROcodone 5 mg - acetaminophen, morphine, glucose, dextrose, glucagon (rDNA), dextrose      Intake/Output Summary (Last 24 hours) at 4/9/2021 1425  Last data filed at 4/8/2021 1823  Gross per 24 hour   Intake 300 ml   Output --   Net 300 ml       Diet:  DIET CARB CONTROL; Exam:  /72   Pulse 81   Temp 98.1 °F (36.7 °C) (Oral)   Resp 18   Ht 5' 9\" (1.753 m)   Wt 217 lb (98.4 kg) Comment: bed scale states 83.9kg (pt states dr today was 217lb  LMP 02/20/2001   SpO2 96%   BMI 32.05 kg/m²     General appearance: alert, not in acute distress   HEENT: Pupils equal, round, and reactive to light. Conjunctivae clear. Clear oral mucosa   Neck: Supple, with full range of motion. No jugular venous distention. Trachea midline. Respiratory:  Normal respiratory effort. Clear to auscultation, bilaterally without Rales/Wheezes/Rhonchi. Cardiovascular: normal rate, regular rhythm with normal S1/S2 without murmurs, rubs or gallops. Abdomen: Soft, non-tender, non-distended  Musculoskeletal: passive and active ROM x 4 extremities. Skin: No rashes or lesions. Exam of extremities: peripheral pulses normal, (+) trace pitting bipedal edema up to below knees ( left leg swelling>right leg swelling), no clubbing or cyanosis        Labs:   Recent Labs     04/07/21  0843 04/08/21 0621 04/09/21  0621   WBC 3.5* 3.5* 4.1*   HGB 9.5* 10.2* 9.4*   HCT 31.3* 32.7* 30.2*    176 154     Recent Labs     04/07/21  0843 04/08/21  0621 04/09/21  0621    135 138   K 4.1 4.7 4.8   CL 99 98 101   CO2 25 29 28   BUN 65* 64* 61*   CREATININE 2.3* 2.1* 2.1*   CALCIUM 8.9 9.0 9.1     No results for input(s): AST, ALT, BILIDIR, BILITOT, ALKPHOS in the last 72 hours. Recent Labs     04/07/21  0843 04/08/21  1149 04/09/21  0621   INR 1.71* 1.64* 2.07*     No results for input(s): CKTOTAL, TROPONINI in the last 72 hours.     Urinalysis:      Lab Results   Component Value Date    NITRU Negative 03/18/2021    WBCUA 0-2 01/25/2021    BACTERIA NONE SEEN 01/25/2021    RBCUA NONE SEEN 01/25/2021    BLOODU Negative 03/18/2021    BLOODU NEGATIVE 01/25/2021    SPECGRAV 1.020 01/26/2020    GLUCOSEU Negative 03/18/2021       Radiology:  US RENAL COMPLETE   Final Result   1. No hydronephrosis. 2.  Stable benign right renal cysts and nonobstructing right renal stone. This document has been electronically signed by: Vickie Medina MD on    04/07/2021 08:46 PM      VL DUP LOWER EXTREMITY VENOUS BILATERAL   Final Result      No evidence of deep venous thrombosis in either lower extremity. **This report has been created using voice recognition software. It may contain minor errors which are inherent in voice recognition technology. **             Final report electronically signed by Dr. Andrea Harding on 4/7/2021 3:00 PM      CT KNEE LEFT WO CONTRAST   Final Result   1. Cortical disruptions along the medial margin of the distal femur, likely chronic but acute fracture cannot be excluded. 2. Lucency at the inferior patella consistent with fracture of indeterminate age. 3. Broken distal component of an intramedullary femoral royer. 4. Tricompartmental osteoarthritis. 5. Extensive surgical changes and osseous remodeling in the distal femur. Final report electronically signed by Dr. Andrea Harding on 4/6/2021 6:23 PM      XR KNEE LEFT (3 VIEWS)   Final Result   1. No large displaced fracture of the knee. 2. Tricompartmental osteoarthritis. 3. Extensive surgical changes and osseous remodeling of the distal femur. 4. Stable disruption of a distal intramedullary femoral royer. Final report electronically signed by Dr. Andrea Harding on 4/6/2021 5:51 PM          Diet: DIET CARB CONTROL; Assessment/Plan:      1. Distal femur fracture: primary team orthopedics managing, per orthopedics NP Jessica Keller no plan for inpatient surgery  2. KEEGAN on CKD Stage 3, possibly prerenal secondary to losartan and diuretics, slightly improving/stable: Creatinine stable at 2.1 today, was 2.3 yesterday, 2.0 on admission. Baseline creatinine 1.4-1.7. BUN elevated at 64.   HOLD lasix. Cont to Hold losartan for now. Stopped IV fluids on 4/7/2021. Avoid nephrotoxins. BMP in a.m.  Kidney ultrasound (-) hydronephrosis. Pt and her sister are requesting to see Dr. Solis Keating ( nephrology), sent message to primary team regarding nephro consult and primary team is okay for nephro consult, ordered   3. Ischemic cardiomyopathy/chronic HFrEF, compensated: EF of 30% per echocardiogram 4/6/2021. EF of 30 to 35% in 2019. Patient follows cardiologist Dr. Kia Her, who saw the patient on 4/1/2021 in the office. Per cardiology note, may consider ICD. Patient reports that she used to wear her LifeVest years ago. Case discussed with Dr. Kia Her over the phone who recommend inpatient cardiology consult for LifeVest evaluation. Discussed with primary team orthopedic NP Fei Reyes, who agreed with the plan. Cardiology consult ordered. Cardiology saw the pt on 4/8/2021, pt declined lifevest. cardiology recommend OP ICD and f/u with Dr. Darby Rodriguez in 1-2 weeks. Continue telemetry. Continue Toprol-XL, Lasix. Losartan on hold due to KEEGAN. Cont to hold lasix   4. Stable benign right renal cysts and nonobstructing right renal stone: noted on kidney US. Pt is awar. F/u with PCP on discharge   5. Severe malnutrition: Dietitian on board  6. Essential hypertension, with hypotension: Hold losartan due to KEEGAN. Please holding parameters to antihypertensive medications. 7. DM II with hypoglycemia: Hypoglycemia resolved. Now having hyperglycemia. Increase SSI from low to medium dose. HOLD alogliptin. Cont dulglutide as ordered. Hypoglycemic treatment orders. Home meds Janumet, humalog with meals on hold due to hypoglycemia   8. Chronic leukopenia and anemia, stable: f/u with PCP on discharge   9. Paroxysmal a fib: Orthopedics is okay to resume Coumadin on 4/7/2021, pharmacy consulted. Continue digoxin and Toprol-XL. 10. Subtherapeutic INR, resolved: pharmacy on-board to dose coumadin   11.  DARWIN on CPAP:  Continue home CPAP  12.  Pre op risk assessment: Please see hospitalist's consult note on 4/6/2021 for details          DVT prophylaxis: [] Lovenox                                 [] SCDs                                 [] SQ Heparin                                 [] Encourage ambulation           [x] Already on Anticoagulation     Disposition:    [] Home       [] TCU       [] Rehab       [] Psych       [] SNF       [] [place]       [x] Other-per primary team      Code Status: Prior      Electronically signed by Merleen Aschoff, MD on 4/9/2021 at 2:25 PM

## 2021-04-09 NOTE — CARE COORDINATION
4/9/21, 3:20 PM EDT    DISCHARGE PLANNING EVALUATION     spoke with [address] admissions, confirmed plan for admission tomorrow. Transfer packet on chart for discharge, ambulette form completed. 4/9/21, 3:21 PM EDT    Patient goals/plan/ treatment preferences discussed by  and . Patient goals/plan/ treatment preferences reviewed with patient/ family. Patient/ family verbalize understanding of discharge plan and are in agreement with goal/plan/treatment preferences. Understanding was demonstrated using the teach back method. AVS provided by RN at time of discharge, which includes all necessary medical information pertaining to the patients current course of illness, treatment, post-discharge goals of care, and treatment preferences.     Services After Discharge  Services At/After Discharge: Nursing Services, In [place], [place] Services([facility])   IMM Letter  IMM Letter given to Patient/Family/Significant other/Guardian/POA/by[de-identified] cm  IMM Letter date given[de-identified] 04/09/21  IMM Letter time given[de-identified] 8801

## 2021-04-10 NOTE — PROGRESS NOTES
[facility]  Occupational Therapy  Daily Note  Time:   Time In: 6424  Time Out: 1409  Timed Code Treatment Minutes: 24 Minutes  Minutes: 24          Date: 4/10/2021  Patient Name: Sophia Weller,   Gender: female      Room: [facility]/022-A  MRN: [id number]  : 1955  (76 y.o.)  Referring Practitioner: ARNOL Holden CNP  Diagnosis: closed fracture of distal end of left femur  Additional Pertinent Hx: From admission note 4-6:The patient is a 76 y.o. female with multiple medical comorbidities who was admitted for the above complaint. Patient has a history of chronic left knee pain stemming from a fracture of the distal femur in , stabilized with IMN. The nail was removed and replaced with non-biodegradable abx nail in  to drainage. She has history of foot drop which has caused her to fall several times over the past 3 years eventually leading to being in a wheelchair with WB for transfers. Essentially what I gather from the patient, she was discharged 1-2 weeks ago from the [facility] to her private home that she shares with her sister. Her sister was pushing her in the wheelchair yesterday and the left foot got caught on the concrete causing sharp, shooting pain. Patient states she is unable to safely care for herself in the home and bear weight since the incident. She wishes to return to the [place]. X-rays and CT scan demonstrate fracture abx nail distal femur. Restrictions/Precautions:  Restrictions/Precautions: Weight Bearing, Fall Risk  Left Lower Extremity Weight Bearing: Weight Bearing As Tolerated  Position Activity Restriction  Other position/activity restrictions: WBAT left LE     SUBJECTIVE: Pt seated in bedside chair upon arrival; agreeable to therapy this date. Pt demonstrates delayed reply with questions asked, nursing notified.     PAIN: 7/10    Vitals: Nurse checked vitals prior to session    COGNITION: Slow Processing, Decreased Insight, Decreased Problem Solving, Decreased Safety Awareness, Difficulty Following Commands and Impulsive    ADL:   Grooming: Contact Guard Assistance. standing at sink with forward flex posture leaning on counter for support. Patient required max verbal cues for RW orientation and body positioning however patient declined to follow through demonstrating decreased safety awarenss. Lower Extremity Dressing: Moderate Assistance. patient able to doff B socks/brief however patient requires assistance in order to abdias B socks/brief seated on toilet  Toileting: Stand By Assistance. standard toilet  Toilet Transfer: [address] standard toilet. BALANCE:  Sitting Balance:  Supervision. bedside chair  Standing Balance: Contact Guard Assistance. with RW with max verbal cues for upright posture; demonstrating understanding    BED MOBILITY:  Sit to Supine: Supervision HOB flat    TRANSFERS:  Sit to Stand:  Minimal Assistance. bedside chair to RW  Stand to Sit: [address] RW to EOB    FUNCTIONAL MOBILITY:  Assistive Device: Rolling Walker  Assist Level:  Contact Guard Assistance. Distance: To and from bathroom  Verbal/tactile cues for RW management and upright posture; demonstrating undersatnding; shuffled gait, no LOB     ASSESSMENT:     Activity Tolerance:  Patient tolerance of  treatment: fair.        Discharge Recommendations: Subacute/Skilled Nursing Facility   Equipment Recommendations: Equipment Needed: (continue to assess)  Plan: Times per week: 6x  Times per day: Daily  Current Treatment Recommendations: Strengthening, Balance Training, Functional Mobility Training, Endurance Training, Home Management Training, Patient/Caregiver Education & Training, Self-Care / ADL, Safety Education & Training    Patient Education  Patient Education: Plan of Care, ADL's, Home Safety, Importance of Increasing Activity and Assistive Device Safety    Goals  Short term goals  Time Frame for Short term goals: by discharge  Short term goal 1: Pt will complete functional mobility short distances with CGA and no safety cues to increase ease with ADL routine  Short term goal 2: Pt will tolerate dynamic standing x4 min with B hand release and CGA to increase ease with grooming  Short term goal 3: Pt will complete various t/fs with CGA and no safety cues to increase indep with ADL routine  Short term goal 4: Pt will complete BUE strengthening HEP to increase ease and indep with ADLs and IADLs. Following session, patient left in safe position with all fall risk precautions in place.

## 2021-04-10 NOTE — PROGRESS NOTES
encephalopathy     Acute cystitis without hematuria     Hyperkalemia     KEEGAN (acute kidney injury) ([facility]     Altered mental status     AMS (altered mental status)     Encounter for therapeutic drug monitoring     Closed fracture of distal end of left femur, [facility]     Severe malnutrition ([facility]      Subjective:   Admit Date: 4/6/2021    Interval History:   Seen for chronic kidney disease  Awake and alert this morning  Still complaining of itching with redness in the right antecubital fossa  Blood pressure is good  Urine output is not complete documented      Medications:   Scheduled Meds:   warfarin  2.5 mg Oral Once    methylPREDNISolone  40 mg Intravenous Once    insulin lispro  0-12 Units Subcutaneous TID WC    insulin lispro  0-6 Units Subcutaneous Nightly    [Held by provider] losartan  50 mg Oral Daily    metoprolol succinate  50 mg Oral Daily    warfarin (COUMADIN) daily dosing (placeholder)   Other RX Placeholder    famotidine  20 mg Oral Daily    allopurinol  100 mg Oral Daily    atorvastatin  40 mg Oral Nightly    digoxin  62.5 mcg Oral Daily    [START ON 4/12/2021] Dulaglutide  0.75 mg Subcutaneous Weekly    DULoxetine  60 mg Oral Daily    [Held by provider] furosemide  40 mg Oral Daily    levothyroxine  75 mcg Oral Daily    [Held by provider] alogliptin  6.25 mg Oral Daily     Continuous Infusions:   dextrose Stopped (04/08/21 0545)       CBC:   Recent Labs     04/08/21  0621 04/09/21  0621 04/10/21  0521   WBC 3.5* 4.1* 4.1*   HGB 10.2* 9.4* 9.7*    154 157     CMP:    Recent Labs     04/08/21  0621 04/09/21  0621 04/10/21  0521    138 135   K 4.7 4.8 4.4   CL 98 101 99   CO2 29 28 27   BUN 64* 61* 59*   CREATININE 2.1* 2.1* 2.0*   GLUCOSE 222* 201* 295*   CALCIUM 9.0 9.1 8.7   LABGLOM 24* 24* 25*     Troponin: No results for input(s): TROPONINI in the last 72 hours. BNP: No results for input(s): BNP in the last 72 hours.   INR:   Recent Labs 04/08/21  1149 04/09/21  0621 04/10/21  0521   INR 1.64* 2.07* 2.44*     Lipids: No results for input(s): CHOL, LDLDIRECT, TRIG, HDL, AMYLASE, LIPASE in the last 72 hours. Liver: No results for input(s): AST, ALT, ALKPHOS, PROT, LABALBU, BILITOT in the last 72 hours. Invalid input(s): BILDIR  Iron:  No results for input(s): IRONS, FERRITIN in the last 72 hours. Invalid input(s): LABIRONS  US RENAL COMPLETE   Final Result   1. No hydronephrosis. 2.  Stable benign right renal cysts and nonobstructing right renal stone. This document has been electronically signed by: Tashi Anaya MD on    04/07/2021 08:46 PM      VL DUP LOWER EXTREMITY VENOUS BILATERAL   Final Result      No evidence of deep venous thrombosis in either lower extremity. **This report has been created using voice recognition software. It may contain minor errors which are inherent in voice recognition technology. **             Final report electronically signed by Dr. Rees January on 4/7/2021 3:00 PM      CT KNEE LEFT WO CONTRAST   Final Result   1. Cortical disruptions along the medial margin of the distal femur, likely chronic but acute fracture cannot be excluded. 2. Lucency at the inferior patella consistent with fracture of indeterminate age. 3. Broken distal component of an intramedullary femoral royer. 4. Tricompartmental osteoarthritis. 5. Extensive surgical changes and osseous remodeling in the distal femur. Final report electronically signed by Dr. Rees January on 4/6/2021 6:23 PM      XR KNEE LEFT (3 VIEWS)   Final Result   1. No large displaced fracture of the knee. 2. Tricompartmental osteoarthritis. 3. Extensive surgical changes and osseous remodeling of the distal femur. 4. Stable disruption of a distal intramedullary femoral royer.       Final report electronically signed by Dr. Rees January on 4/6/2021 5:51 PM            Objective:   Vitals: BP (!) 121/95   Pulse 81   Temp 98.4 °F (36.9 °C) (Oral)   Resp 17   Ht 5' 9\" (1.753 m)   Wt 217 lb (98.4 kg) Comment: bed scale states 83.9kg (pt states dr today was 217lb  LMP 02/20/2001   SpO2 98%   BMI 32.05 kg/m²    Wt Readings from Last 3 Encounters:   04/06/21 217 lb (98.4 kg)   04/01/21 217 lb (98.4 kg)   03/18/21 227 lb 12.8 oz (103.3 kg)      24HR INTAKE/OUTPUT:      Intake/Output Summary (Last 24 hours) at 4/10/2021 0956  Last data filed at 4/9/2021 1400  Gross per 24 hour   Intake 840 ml   Output 800 ml   Net 40 ml       Constitutional: Well-developed elderly lady alert, awake, no apparent distress   Skin:normal with macular erythematous rash right antecubital fossa. HEENT:Pupils are reactive . Throat is clear . Oral mucosa is moist   Neck:supple with no carotid bruit  Cardiovascular:  S1, S2 without murmur or rubs   Respiratory:  Clear to ausculation without wheezes, rhonchi or rales. Abdomen: +bs, soft, no tenderness to palpation and no palpable mass. No abdominal bruit   Ext: No LE edema  Musculoskeletal:Intact  Neuro:Alert and awake. Well oriented  with no focal deficit. Speech is normal      Electronically signed by Charlie Ellis MD on 4/10/2021 at 9:56 AM  **This report has been created using voice recognition software. It maycontain minor  errors which are inherent in voice recognition technology. **

## 2021-04-10 NOTE — DISCHARGE SUMMARY
Physician Discharge Summary     Patient ID:  Tammie Barrientos  [id number]  76 y.o.  1955    Admit date: 4/6/2021    Discharge date and time: No discharge date for patient encounter. Admitting Physician: Rolly Acosta MD     Discharge Physician: Rolly Acosta MD    Admission Diagnoses: Closed fracture of distal end of left femur, initial encounter [facility]) [S72.402A]    Discharge Diagnoses: Closed fracture of distal end of left femur, initial encounter [facility]) [facility] Course: admitted for increased pain in left knee. 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 Intramedullary nail was removed and antibiotic spacer nail inserted. previous xray for 2019 showed distal nail (antibiotic spacer) broke then. Has not moved.  Noted post traumatic OA. riman is a minimal ambulatory   CKD, DM II, CHF, a fib, and DARWIN  Consults:  Internal medicine     Condition: stable     Treatments: non op treatment     Disposition: ECF    Patient Instructions:    Franky Moon   Home Medication Instructions POA:[id number]    Printed on:04/10/21 1054   Medication Information                      acetaminophen (TYLENOL) 500 MG tablet  Take 1 tablet by mouth 4 times daily as needed for Pain             alendronate (FOSAMAX) 70 MG tablet  Take 1 tablet by mouth every 7 days             allopurinol (ZYLOPRIM) 100 MG tablet  Take 1 tablet by mouth daily             alogliptin (NESINA) 6.25 MG TABS tablet  Take 1 tablet by mouth daily             aspirin (RA ASPIRIN EC) 81 MG EC tablet  Take 1 tablet by mouth daily             atorvastatin (LIPITOR) 40 MG tablet  take 1 tablet by mouth at bedtime             Calcium Carbonate-Vitamin D (CALCIUM-VITAMIN D) 500-200 MG-UNIT per tablet  Take 1 tablet by mouth 2 times daily (with meals)              Continuous Blood Gluc Sensor (FREESTYLE LINNETTE 14 DAY SENSOR) MISC  1 Device by Does not apply route continuous dexlansoprazole (DEXILANT) 60 MG CPDR delayed release capsule  Take 1 capsule by mouth daily             digoxin (LANOXIN) 125 MCG tablet  Take 0.5 tablets by mouth daily             Dulaglutide (TRULICITY SC)  Inject 7.77 mg into the skin once a week Usually on mondays             DULoxetine (CYMBALTA) 60 MG extended release capsule  take 1 capsule by mouth once daily             fenofibrate (TRICOR) 145 MG tablet  take 1 tablet by mouth once daily             folic acid (FOLVITE) 1 MG tablet  take 1 tablet by mouth once daily             furosemide (LASIX) 40 MG tablet  Take 40 mg by mouth daily              gabapentin (NEURONTIN) 100 MG capsule  take 1 capsule by mouth three times a day             HYDROcodone-acetaminophen (NORCO) 5-325 MG per tablet  Take 1-2 tablets by mouth every 4-6 hours as needed for Pain for up to 7 days. Incontinence Supply Disposable (INCONTINENCE BRIEF LARGE) MISC  Use prn for incontinence             Incontinence Supply Disposable (POISE PAD) PADS  Use as needed             insulin lispro (HUMALOG) 100 UNIT/ML injection vial  Inject 30 Units into the skin every morning             insulin lispro (HUMALOG) 100 UNIT/ML injection vial  Inject 50 Units into the skin nightly             levothyroxine (SYNTHROID) 75 MCG tablet  Take 75 mcg by mouth Daily             meclizine (ANTIVERT) 25 MG tablet  Take 25 mg by mouth as needed             metoprolol succinate (TOPROL XL) 100 MG extended release tablet  Take 100 mg by mouth daily             Misc. Devices MISC  Washable bed pads             Misc. Devices MISC  Baby wipes             Misc. Devices MISC  Chucks as needed             nystatin (MYCOSTATIN) 859384 UNIT/GM powder  Apply 3 times daily. RA VITAMIN B-12 100 MCG tablet  take 1 tablet by mouth once daily             warfarin (COUMADIN) 3 MG tablet  Take 1 tablet by mouth daily As directed by . [facility].  30 days = 45 tabs Activity: as tolerated   Diet: regular  Follow-up 4 weeks  Signed:  Keturah Galo PA-C  4/10/2021  10:55 AM

## 2021-04-10 NOTE — PROGRESS NOTES
Hospitalist Progress Note    Patient:  Ezequiel Jack      Unit/Bed:7K-22/022-A    YOB: 1955    MRN: [id number]       Acct: [de-identified]     PCP: ARNOL Beckham CNP    Date of Admission: 4/6/2021    Chief Complaint: Follow-up for consult    Hospital Course:     Per hospitalist consult note:    Argeliaashlee rosado.o. female who we are asked to see/evaluate by Zoie Lima MD for medical management of CKD, DM II, CHF, a fib, and DARWIN. Patient had a fracture to her distal femur today with an unknown mechanism as she was not weight bearing when it happened. \"    4/8: overnight, pt had hypoglycemia    4/9: no overnight events noted. Having hyperglycemia today     4/10: no overnight events noted. Creatinine improving     Subjective:     Patient seen and examined. Pt reports left knee pain today, 7/10. also having left hip pain 8/10.  She denies chest pain, sob, abd pain, N/V, diarrhea, fever, chills, dysuria, urinary frequency, urgency       Medications:  Reviewed    Infusion Medications    dextrose Stopped (04/08/21 0545)     Scheduled Medications    warfarin  2.5 mg Oral Once    methylPREDNISolone  40 mg Intravenous Once    insulin lispro  0-12 Units Subcutaneous TID WC    insulin lispro  0-6 Units Subcutaneous Nightly    [Held by provider] losartan  50 mg Oral Daily    metoprolol succinate  50 mg Oral Daily    warfarin (COUMADIN) daily dosing (placeholder)   Other RX Placeholder    famotidine  20 mg Oral Daily    allopurinol  100 mg Oral Daily    atorvastatin  40 mg Oral Nightly    digoxin  62.5 mcg Oral Daily    [START ON 4/12/2021] Dulaglutide  0.75 mg Subcutaneous Weekly    DULoxetine  60 mg Oral Daily    [Held by provider] furosemide  40 mg Oral Daily    levothyroxine  75 mcg Oral Daily    [Held by provider] alogliptin  6.25 mg Oral Daily     PRN Meds: Pramoxine-Calamine, diphenhydrAMINE, diclofenac sodium, HYDROcodone 5 mg - acetaminophen, HYDROcodone 5 mg - and diuretics, slightly improving: Creatinine down to 2.0 today today, was 2.1 yesterday, 2.0 on admission. Baseline creatinine 1.4-1.7. BUN elevated at 64. HOLD lasix. Cont to Hold losartan on discharge per nephrology. Stopped IV fluids on 4/7/2021. Avoid nephrotoxins. BMP in a.m.  Kidney ultrasound (-) hydronephrosis. on 4/9/2021, pt and her sister requested to see Dr. Henrine Epley ( nephrology), sent message to primary team regarding nephro consult and primary team was okay for nephro consult, ordered. Dr. Henrine Epley on-board, recommend to cont to HOLD losartan, cont lasix 40 mg PO daily on discharge and f/u in his office in 2 weeks with a repeat BMP 1 week prior nephrology appointment    3. Ischemic cardiomyopathy/chronic HFrEF, compensated: EF of 30% per echocardiogram 4/6/2021. EF of 30 to 35% in 2019. Patient follows cardiologist Dr. Mikhail Jackson, who saw the patient on 4/1/2021 in the office. Per cardiology note, may consider ICD. Patient reports that she used to wear her LifeVest years ago. Case discussed with Dr. Mikhail Jackson over the phone who recommend inpatient cardiology consult for LifeVest evaluation. Discussed with primary team orthopedic NP Lashawn Tamez, who agreed with the plan. Cardiology consult ordered. Cardiology saw the pt on 4/8/2021, pt declined lifevest. cardiology recommend OP ICD and f/u with Dr. Yessy Wells in 1-2 weeks. Continue telemetry. Continue Toprol-XL, Lasix. Losartan on hold due to KEEGAN. Cont to hold lasix   4. Stable benign right renal cysts and nonobstructing right renal stone: noted on kidney US. Pt is awar. F/u with PCP on discharge   5. Severe malnutrition: Dietitian on board  6. Essential hypertension, with hypotension: Hold losartan due to KEEGAN. Please holding parameters to antihypertensive medications. 7. DM II with hypoglycemia: Hypoglycemia resolved. Now having hyperglycemia. Increased SSI from low to medium dose and held aloglipitin on 4/9/2021. Cont dulglutide as ordered. Hypoglycemic treatment orders. Home meds Janumet, humalog with meals on hold due to hypoglycemia. Pt also takes metformin prior admission. On discharge, recommend to STOP metformin due to CKD. Cont alogliptin and dulaglutide on discharge. 8. Chronic leukopenia and anemia, stable: f/u with PCP on discharge   9. Paroxysmal a fib: Orthopedics is okay to resume Coumadin on 4/7/2021, pharmacy consulted. Continue digoxin and Toprol-XL. 10. Subtherapeutic INR, resolved: pharmacy on-board to dose coumadin   11. DARWIN on CPAP:  Continue home CPAP  12. Pre op risk assessment: Please see hospitalist's consult note on 4/6/2021 for details          DVT prophylaxis: [] Lovenox                                 [] SCDs                                 [] SQ Heparin                                 [] Encourage ambulation           [x] Already on Anticoagulation     Disposition:    [] Home       [] TCU       [] Rehab       [] Psych       [] SNF       [] [facility]       [x] Other-per primary team. Okay for discharge per hospital medicine standpoint. F/u with Dr. Joel Zhou and Dr. Keeley Petty (cardiologist) in 1-2 weeks and with Dr. Jennifer Mathur (nephrology) in 2 weeks after discharge with a repeat BMP 1 week prior nephrology appointment. Thank you for the consult. Please do not hesitate to call hospital medicine service for questions/concerns.        Code Status: Prior      Electronically signed by Franky Arias MD on 4/10/2021 at 10:05 AM

## 2021-04-20 NOTE — PROGRESS NOTES
Hyperkalemia    [facility]GAN [facility]) [facility]    Altered mental status    AMS (altered mental status)    Encounter for therapeutic drug monitoring    Closed fracture of distal end of left femur, initial encounter ([facility]    Severe malnutrition ([facility]       Subjective:   Chief complaint:  Chief Complaint   Patient presents with    Follow-Up from [facility]     Stage IV      HPI:This is a follow up visit for Beto Morillo who is here today for post hospital discharge appointment. Recently she was at her [facility] for volume overload. She was treated and discharged in good condition. Doing well since discharge. She has cardiomyopathy with low ejection fraction. She is wearing a LifeVest now. She is also scheduled to get AICD. Bear  Denies any chest pain. No shortness of breath. No nausea vomiting. No fever or chills. She has gait abnormalities and uses wheelchair. ROS:  Pertinent positives stated above in HPI. All other systems were reviewed and were negative. Medications:     Current Outpatient Medications   Medication Sig Dispense Refill    Ascorbic Acid (VITAMIN C) 500 MG CAPS Take 1 tablet by mouth 2 times daily      Cholecalciferol (VITAMIN D3) 50 MCG (2000 UT) CAPS Take 2,000 Units by mouth daily      zinc sulfate (ZINCATE) 220 (50 Zn) MG capsule Take 50 mg by mouth daily      alogliptin (NESINA) 6.25 MG TABS tablet Take 1 tablet by mouth daily 30 tablet 0    Incontinence Supply Disposable (INCONTINENCE BRIEF LARGE) MISC Use prn for incontinence 5 packet 2    Incontinence Supply Disposable (POISE PAD) PADS Use as needed 50 each 2    Misc. Devices MISC Washable bed pads 50 each 2    Misc. Devices MISC Baby wipes 200 each 2    Misc.  Devices MISC Chucks as needed 200 each 2    levothyroxine (SYNTHROID) 75 MCG tablet Take 75 mcg by mouth Daily      furosemide (LASIX) 40 MG tablet Take 40 mg by mouth daily       meclizine (ANTIVERT) 25 MG tablet Take 25 mg by mouth as needed      insulin lispro (HUMALOG) 100 UNIT/ML injection vial Inject 30 Units into the skin every morning      insulin lispro (HUMALOG) 100 UNIT/ML injection vial Inject 50 Units into the skin nightly      Continuous Blood Gluc Sensor (FREESTYLE LINNETTE 14 DAY SENSOR) MISC 1 Device by Does not apply route continuous 6 each 1    allopurinol (ZYLOPRIM) 100 MG tablet Take 1 tablet by mouth daily 30 tablet 3    warfarin (COUMADIN) 3 MG tablet Take 1 tablet by mouth daily As directed by [facility]. 30 days = 45 tabs      acetaminophen (TYLENOL) 500 MG tablet Take 1 tablet by mouth 4 times daily as needed for Pain 120 tablet 0    DULoxetine (CYMBALTA) 60 MG extended release capsule take 1 capsule by mouth once daily 90 capsule 3    gabapentin (NEURONTIN) 100 MG capsule take 1 capsule by mouth three times a day 270 capsule 0    dexlansoprazole (DEXILANT) 60 MG CPDR delayed release capsule Take 1 capsule by mouth daily 90 capsule 3    nystatin (MYCOSTATIN) 861794 UNIT/GM powder Apply 3 times daily.  (Patient taking differently: prn) 60 g 2    fenofibrate (TRICOR) 145 MG tablet take 1 tablet by mouth once daily 90 tablet 3    atorvastatin (LIPITOR) 40 MG tablet take 1 tablet by mouth at bedtime 90 tablet 3    RA VITAMIN B-12 100 MCG tablet take 1 tablet by mouth once daily 90 tablet 3    alendronate (FOSAMAX) 70 MG tablet Take 1 tablet by mouth every 7 days (Patient taking differently: Take 70 mg by mouth every 7 days Indications: usually on mondays ) 12 tablet 3    Dulaglutide (TRULICITY SC) Inject 8.13 mg into the skin once a week Usually on mondays      folic acid (FOLVITE) 1 MG tablet take 1 tablet by mouth once daily 90 tablet 4    aspirin (RA ASPIRIN EC) 81 MG EC tablet Take 1 tablet by mouth daily 30 tablet 3    metoprolol succinate (TOPROL XL) 100 MG extended release tablet Take 100 mg by mouth daily      digoxin (LANOXIN) 125 MCG tablet Take 0.5 tablets by mouth daily 30 SEEN 04/10/2021    CLARITYU clear 09/25/2019    SPECGRAV 1.020 01/26/2020    LEUKOCYTESUR SMALL 04/10/2021    UROBILINOGEN 1.0 04/10/2021    BILIRUBINUR NEGATIVE 04/10/2021    BILIRUBINUR neg 09/25/2019    BLOODU NEGATIVE 04/10/2021    GLUCOSEU 100 04/10/2021    KETUA NEGATIVE 04/10/2021    AMORPHOUS DEBRIS 01/03/2021      Microalbumen/Creatinine ratio:  No components found for: RUCREAT    Objective:   Vitals: /64 (Site: Right Lower Arm, Position: Sitting, Cuff Size: Medium Adult)   Pulse 87   LMP 02/20/2001   SpO2 97%      Constitutional:  Alert, awake, no apparent distress  Skin:normal with no rash or any lesions  HEENT:Pupils are reactive . Throat is clear. Oral mucosa is moist.  Neck:supple with no thyromegaly or bruit   Cardiovascular:  S1, S2 without murmur   Respiratory:  Clear to auscultation with no wheezes or rales  Abdomen: +bowel sound, soft, non tender and no bruit  Ext: Both legs are wrapped  Musculoskeletal:Intact  Neuro:Alert, awake and oriented with no obvious focal deficit. Speech is normal.    Electronically signed by Marco A Maguire MD on 4/20/2021 at 10:26 AM   **This report has been created using voice recognition software. It maycontain minor  errors which are inherent in voice recognition technology. **

## 2021-04-22 NOTE — PROGRESS NOTES
[id number] [place]  [address]  [address]  [facility] [address]  Dept: [phone]  Dept Fax: [phone]  Loc: [phone]    Visit Date: 4/22/2021    January Go is a 76 y.o. female who presents todayfor:  Chief Complaint   Patient presents with    Check-Up     ECHO done    Coronary Artery Disease    Cardiomyopathy    Hypertension    Atrial Fibrillation   here to discuss the echo   EF is lower  At 30%  Known 100% RCA  As well A fib   Coming out of the NH due to bad urosepsis  Known renal patient   Creatinine 2.0  No chest pain  Refused life vest before  No chest pain   Baseline dyspnea  Off of ACEI due to renal status  Limited by her knee  Feel and had a fracture  No chest pain         HPI:  HPI  Past Medical History:   Diagnosis Date    CAD (coronary artery disease)     CRI (chronic renal insufficiency)     Difficult intubation     excess skin in back of throat     DM (diabetes mellitus) ([facility] [address] 1994    GERD (gastroesophageal reflux disease)     H/O gastric bypass     Hyperlipidemia     Hypertension     Hypothyroidism     Neuropathy     Obesity     Osteoarthritis     Paroxysmal atrial fibrillation ([facility] [address]     Prolonged emergence from general anesthesia     Sleep apnea     uses cpap    Visit for screening mammogram       Past Surgical History:   Procedure Laterality Date    BACK SURGERY      xs 2    CATARACT REMOVAL Right 7/24/14    Dr. Maye Noble EKG 12-LEAD  9/18/2015         ENDOSCOPY, COLON, DIAGNOSTIC      EYE SURGERY      FOOT SURGERY      left foot    HYSTERECTOMY, TOTAL ABDOMINAL      MOUTH BIOPSY  9-28-12    left tongue and lingual tonsil    OTHER SURGICAL HISTORY  11/8/2014    LEFT FEMUR RETROGRADE NAILING    OTHER SURGICAL HISTORY  4/23/2015    HARDWARE REMOVAL LEFT FEMUR, INSERTION OF ANTIBIOTIC SPACER    PATELLA SURGERY  7/11/13    fractues rt patella     WA COLON CA SCRN NOT  [address] IND Left 2018    COLONOSCOPY performed by Marquez Lama MD at [address] [place] [address] SKIN TAG REMOVAL  12    mole removal    SLEEVE GASTRECTOMY  09/15/2015    Robotic    TOENAIL EXCISION      removal of great toe nails bilateral-still has toenails-had ingrown toenails and had trimmed out     TONSILLECTOMY      UPPER GASTROINTESTINAL ENDOSCOPY       Family History   Problem Relation Age of Onset    Asthma Sister     Asthma Brother     Heart Disease Father     Other Mother     High Blood Pressure Other     Cancer Maternal Uncle         stomach    Diabetes Maternal Grandmother     High Blood Pressure Maternal Grandmother     Diabetes Maternal Grandfather     Stroke Neg Hx      Social History     Tobacco Use    Smoking status: Former Smoker     Packs/day: 1.50     Years: 20.00     Pack years: 30.00     Quit date: 2007     Years since quittin.2    Smokeless tobacco: Never Used   Substance Use Topics    Alcohol use: No     Alcohol/week: 0.0 standard drinks      Current Outpatient Medications   Medication Sig Dispense Refill    Ascorbic Acid (VITAMIN C) 500 MG CAPS Take 1 tablet by mouth 2 times daily      Cholecalciferol (VITAMIN D3) 50 MCG (2000 UT) CAPS Take 2,000 Units by mouth daily      zinc sulfate (ZINCATE) 220 (50 Zn) MG capsule Take 50 mg by mouth daily      alogliptin (NESINA) 6.25 MG TABS tablet Take 1 tablet by mouth daily 30 tablet 0    Incontinence Supply Disposable (INCONTINENCE BRIEF LARGE) MISC Use prn for incontinence 5 packet 2    Incontinence Supply Disposable (POISE PAD) PADS Use as needed 50 each 2    Misc. Devices MISC Washable bed pads 50 each 2    Misc. Devices MISC Baby wipes 200 each 2    Misc.  Devices MISC Chucks as needed 200 each 2    levothyroxine (SYNTHROID) 75 MCG tablet Take 75 mcg by mouth Daily      furosemide (LASIX) 40 MG tablet Take 40 mg by mouth daily       meclizine (ANTIVERT) 25 MG tablet Take 25 mg by mouth as needed      insulin meals)       gabapentin (NEURONTIN) 100 MG capsule take 1 capsule by mouth three times a day 270 capsule 0     No current facility-administered medications for this visit. No Known Allergies  Health Maintenance   Topic Date Due    Hepatitis C screen  Never done    COVID-19 Vaccine (1) Never done    Shingles Vaccine (1 of 2) Never done    Diabetic retinal exam  08/30/2019    Breast cancer screen  08/28/2020    Pneumococcal 65+ yrs at Risk Vaccine (2 of 2 - PPSV23) 09/21/2020    Flu vaccine (Season Ended) 09/01/2021    Annual Wellness Visit (AWV)  09/16/2021    Low dose CT lung screening  09/24/2021    Diabetic foot exam  03/18/2022    A1C test (Diabetic or Prediabetic)  03/18/2022    Lipid screen  04/12/2022    Potassium monitoring  04/12/2022    Creatinine monitoring  04/12/2022    DTaP/Tdap/Td vaccine (2 - Td) 11/07/2027    Colon cancer screen colonoscopy  01/24/2028    DEXA (modify frequency per FRAX score)  Completed    Hepatitis A vaccine  Aged Out    Hib vaccine  Aged Out    Meningococcal (ACWY) vaccine  Aged Out       Subjective:  Review of Systems  General:   No fever, no chills, some fatigue or weight loss  Pulmonary:    some  dyspnea, no wheezing  Cardiac:    Denies recent chest pain,   GI:     No nausea or vomiting, no abdominal pain  Neuro:     No dizziness or light headedness,   Musculoskeletal:  No recent active issues  Extremities:   No edema, no obvious claudication       Objective:  Physical Exam  /64   Pulse 82   Ht 5' 9\" (1.753 m)   LMP 02/20/2001   BMI 32.05 kg/m²   General:   Well developed, well nourished  Lungs:    Clear to auscultation  Heart:    Normal S1 S2, Slight murmur. no rubs, no gallops  Abdomen:   Soft, non tender, no organomegalies, positive bowel sounds  Extremities:   No edema, no cyanosis, good peripheral pulses  Neurological:   Awake, alert, oriented.  No obvious focal deficits  Musculoskelatal:  No obvious deformities    Assessment: Diagnosis Orders   1. Permanent atrial fibrillation ([facility] [address] [facility]     2. Dilated cardiomyopathy ([facility] [address]     3. Coronary artery disease involving native coronary artery of native heart without angina pectoris     4. Essential hypertension     as above  Cardiac as above  ?/ ischemia vs due to her sepsis  Refusing life vest     Plan:  No follow-ups on file. Discussed  Need to see from anjana if ARB is okay   Discussed life vest   She is no interested  Not the best candidate for a cath   Refer for ICD if EF is not better in 3 months  Continue risk factor modification and medical management. Thank you for allowing me to participate in the care of your patient. Please don't hesitate to contact me regarding any further issues related to the patient care    Orders Placed:  No orders of the defined types were placed in this encounter. Medications Prescribed:  No orders of the defined types were placed in this encounter. Discussed use, benefit, and side effects of prescribed medications. All patient questions answered. Pt voicedunderstanding. Instructed to continue current medications, diet and exercise. Continue risk factor modification and medical management. Patient agreed with treatment plan. Follow up as directed.     Electronically signedby Kory Brandon MD on 4/22/2021 at 2:21 PM
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
## 2021-04-27 NOTE — TELEPHONE ENCOUNTER
Spoke with Miguel Angel Huitron regarding Coumadin dosing since discharge from [facility] on 4/23. Miguel Angel Huitron reports taking 2.5mg daily since 4/23. Instructed her to take 3.75mg today, 4/27. While at the home, patient's most recent dose was: Valentin: 2.5mg  Mo: 3.75mg  Tu: 2.5mg  We: 2.5mg    Last INR: 4/15: 2.4     Unclear what remainder of dosing schedule was, unable to get further information from [facility].      Patient has INR check scheduled for 4/28/21    Electronically signed by NISA Lucas [facility] on 4/27/2021 at 4:29 PM

## 2021-04-28 NOTE — PROGRESS NOTES
Medication Management [address]   [phone] (phone)  [phone] (fax)      Ms. Gladis Farr is a 76 y.o.  female with history of Afib who presents today for anticoagulation monitoring and adjustment. Patient was recently discharged from the nursing home on 4/23 following a hospital admission 4/6 to 4/10 for breaking her knee cap    Patient verifies current dosing regimen and tablet strength. Patient follows calendar and marks off the days. No extra doses. Patient missed dose on Monday. Educated on compliance. Patient denies s/s bleeding/bruising/swelling/SOB/chest pain  No blood in urine or stool. Dietary changes. Started taking Healthy Shot protein drinks in the nursing home. Considering taking them at home too if she can find them in the store. No vitamin K. No changes in medication/OTC agents/Herbals. No change in alcohol use or tobacco use. No change in activity level. Patient denies headaches/dizziness/lightheadedness/falls. No vomiting/diarrhea or acute illness. No Procedures scheduled in the future at this time. Assessment:   Lab Results   Component Value Date    INR 1.60 (H) 04/28/2021    INR 2.44 (H) 04/10/2021    INR 2.07 (H) 04/09/2021     INR subtherapeutic   Recent Labs     04/28/21  1428   INR 1.60*     Patient interview conducted by Romaine Rendon, PharmD Candidate    There was difficulty obtaining the patient's coumadin dosing from the nursing home. It appears that she was taking some 2.5 mg and 3.75 mg doses. Plan:  Coumadin 5 mg x 1 then resume Coumadin 3.75 mg MWF, 2.5 mg TThSS as previously taking prior to hospital/nursing home admissions. Recheck INR in 1 week(s). Patient reminded to call the Anticoagulation Clinic with any signs or symptoms of bleeding or with any medication changes. Patient given instructions utilizing the teach back method. Discharged ambulatory in no apparent distress.           After visit summary printed and reviewed with patient.       Medications reviewed and updated on home medication list Yes    Influenza vaccine:     [] given    [x] declined   [] received previously   [] plans to receive at a later time   [] refused    [] documented in [address] Intervention Detail: Adherence Monitorin and Dose Adjustment: 1: reason: Therapy Optimization   Total # of Interventions Recommended: 2   Total # of Interventions Accepted: 2   Time Spent (min): 1607 Howie Kinney PharmD, BCPS  2021  3:43 PM

## 2021-04-28 NOTE — PROGRESS NOTES
differently: prn) 60 g 2    fenofibrate (TRICOR) 145 MG tablet take 1 tablet by mouth once daily 90 tablet 3    atorvastatin (LIPITOR) 40 MG tablet take 1 tablet by mouth at bedtime 90 tablet 3    RA VITAMIN B-12 100 MCG tablet take 1 tablet by mouth once daily 90 tablet 3    alendronate (FOSAMAX) 70 MG tablet Take 1 tablet by mouth every 7 days (Patient taking differently: Take 70 mg by mouth every 7 days Indications: usually on mondays ) 12 tablet 3    Dulaglutide (TRULICITY SC) Inject 1.96 mg into the skin once a week Usually on mondays      folic acid (FOLVITE) 1 MG tablet take 1 tablet by mouth once daily 90 tablet 4    aspirin (RA ASPIRIN EC) 81 MG EC tablet Take 1 tablet by mouth daily 30 tablet 3    metoprolol succinate (TOPROL XL) 100 MG extended release tablet Take 100 mg by mouth daily      digoxin (LANOXIN) 125 MCG tablet Take 0.5 tablets by mouth daily 30 tablet 0    Calcium Carbonate-Vitamin D (CALCIUM-VITAMIN D) 500-200 MG-UNIT per tablet Take 1 tablet by mouth 2 times daily (with meals)       gabapentin (NEURONTIN) 100 MG capsule take 1 capsule by mouth three times a day 270 capsule 0     No current facility-administered medications for this visit. PHYSICAL EXAM:  Vitals:    04/28/21 1320   BP: 128/74   Pulse: 76   SpO2: 100%     Body mass index is 31.75 kg/m². GENERAL: Alert and oriented. No distress. EYES: No pallor or icterus. ENT: No cyanosis. VESSELS: No jugular venous distension or carotid bruits. HEART: Normal S1/S2. No murmur, rub or gallop. LUNGS: Clear to auscultation. ABDOMEN: Obese, soft and non-tender. EXTREMITIES: No lower extremity edema. Feet are warm. NEUROLOGICAL: Left foot drop.      LABORATORY DATA AND DIAGNOSTIC DATA:  I have personally reviewed and interpreted the results of the following diagnostic testing    Lab Results   Component Value Date    WBC 4.1 04/12/2021    HGB 9.9 (A) 04/12/2021    HCT 30.5 (A) 04/12/2021     04/12/2021    CHOL 125 04/12/2021    TRIG 276 04/12/2021    HDL 18 (A) 04/12/2021    ALT 7 04/12/2021    AST 16 04/12/2021     04/12/2021    K 4.8 04/12/2021    CL 99 04/12/2021    CREATININE 2.0 04/12/2021    BUN 62 04/12/2021    CO2 26 04/12/2021    TSH 0.244 (L) 01/26/2021    INR 1.60 (H) 04/28/2021    LABA1C 6.9 (H) 03/18/2021    LABMICR 278.24 11/03/2020     Echocardiogram dated 4/6/2021: LVEF 30%, LVEDD 5.9 cm, LVESI 117  ECG atrial fibrillation, controlled ventricular rate. Nonspecific IVCD ( ms)  Coronary angiogram dated 5/16/2017: Multivessel coronary artery disease including 90% occlusion of left circumflex and RCA. IMPRESSION:  1. Chronic systolic heart failure, LVEF 30%, NYHA class difficult to assess. 2.  Ischemic cardiomyopathy. 3.  Nonrevascularized multivessel severe coronary artery disease. 4.  Primary atrial fibrillation. [id number] score 5.  5.  DM2, HTN, HPL, sleep apnea on CPAP. 6.  CKD stage III. ASSESSMENT AND RECOMMENDATIONS:  The patient is functional class is difficult to assess because of her left foot drop and multiple left femur fractures. Due to renal failure her asymmetries/ARB is well withheld initially. She was started on valsartan yesterday. She is on a good dose of metoprolol and not a candidate for Entresto and spironolactone. We will continue patient on current medications and repeat echocardiogram in 3 months. If her LVEF continues to be less than 30% we will proceed with single-chamber AICD implantation. She will continue anticoagulation with apixaban for stroke prevention. I discussed the diagnosis and the management options with the patient and her family. Their questions were answered. They were all agreed with the plan. **This report has been created using voice recognition software. It may contain minor errors which are inherent in voice recognition technology. **       Electronically signed by Kacey Mcintosh MD, [facility], [facility] on 4/28/2021 at 3:33 PM

## 2021-04-29 NOTE — TELEPHONE ENCOUNTER
So is she only on insulins?  If sugar controlled with that continue with the insulins humalog and trulicity

## 2021-04-29 NOTE — TELEPHONE ENCOUNTER
I reviewed pt discharge med list from [facility] it had janumet 1 tab bid, this is not on patients current med list please see if she is taking it. It was 50/1000, she should continue the insulins as ordered. When I saw her at her recent visit she had alogliptan(nesina) tablet on her med list for diabetes. f please have her check her pill box to see if taking b/c should not be on both janumet and nesina.    Thanks

## 2021-04-29 NOTE — TELEPHONE ENCOUNTER
Pt states she is not taking either one of these medications. She is wanting to know which one she is to be taking?

## 2021-04-30 NOTE — TELEPHONE ENCOUNTER
[facility] received a call from:    Name of Caller: Anatoliy Mai    Relationship to patient: Self    Organization name:  [facility] contact number:  [phone]    Reason for call:     Pt calling in b/c she received a call about her medication but missed it.  She is calling back to speak w/someone about it and would like the office to call her when able to clarify messag

## 2021-04-30 NOTE — TELEPHONE ENCOUNTER
Pt states she couldn't find the bottles but has a list written down and will call us back on Monday to let us know.

## 2021-05-04 NOTE — TELEPHONE ENCOUNTER
Noted, have her continue those meds in the meantime. I am going to refer her to [facility] diabetic clinic for insulin regulation, she has had a history of low blood sugars and she  Needs to be on a regulated insulin regimen. The clinic will be contacting her for the appt.

## 2021-05-04 NOTE — PROGRESS NOTES
Medication Management [address]  [phone] (phone)  [phone] (fax)    Ms. Estrella Moseley is a 76 y.o.  female with history of Afib who presents today for anticoagulation monitoring and adjustment. Patient verifies current dosing regimen and tablet strength. No missed or extra doses. Patient denies s/s bleeding/bruising/SOB/chest pain +swelling LLE, chronic. No blood in urine or stool. No dietary changes. Recent significant weight loss over the past year, states PCP aware. No changes in medication/OTC agents/Herbals. No change in alcohol use or tobacco use. Doing slightly more walking lately. Patient denies headaches/dizziness/lightheadedness/falls. No vomiting/diarrhea or acute illness. No Procedures scheduled in the future at this time. Assessment:   Lab Results   Component Value Date    INR 3.20 (H) 05/04/2021    INR 1.60 (H) 04/28/2021    INR 2.44 (H) 04/10/2021     INR supratherapeutic   Recent Labs     05/04/21  1514   INR 3.20*         Plan:  Coumadin 2.5mg today and tomorrow, then continue Coumadin 3.75 MWF and 2.5mg TThSaS. Recheck INR in 9 day(s). Patient reminded to call the Anticoagulation Clinic with any signs or symptoms of bleeding or with any medication changes. Patient given instructions utilizing the teach back method. After visit summary printed and reviewed with patient. Discharged in [facility] in no apparent distress.     Michael Wilson, PharmD, BCPS, CACP, CTTS 5/4/2021 3:44 PM      For Pharmacy Admin Tracking Only     Intervention Detail: Dose Adjustment: 1: reason: Therapy Optimization   Total # of Interventions Recommended: 1   Total # of Interventions Accepted: 1   Time Spent (min): 20

## 2021-05-04 NOTE — TELEPHONE ENCOUNTER
Pt states she is taking the Humalog 30 units every am, 50 units in kerry.   Not taking nesina or janument

## 2021-05-10 NOTE — TELEPHONE ENCOUNTER
I am trying to get Pt's camelia meter approved. They are requesting her downloads of recent sugars. Can she fax those to [address]?  thanks

## 2021-05-11 NOTE — PROGRESS NOTES
[address] [facility] Internal Medicine   [facility] [address] [place], [address]  Dept: [phone]  Dept Fax: [phone]        YOB: 1955    Chief Complaint   Patient presents with    Diabetes     inconsistant blood sugars, highs and lows. used to see Dr. Kelli Weston         thryoid disorder ( non toxic multinodular goiter )     Obese  presents for evaluation of diabetes mellitus type 2. I have never seen the patient before. This patient is followed regularly by   Dionna Mejia CNP. She does have a complicated past medical history of chronic systolic heart failure with a EF of 30% has atrial fibrillation along with diabetes dyslipidemia and sleep apnea uses CPAP long-term anticoagulation, seen Dr. Dave Colvin plans on proceeding with a single-chamber ? ? AICD insertion, patient has CKD stage IV with morbid obesity. Patient is a diabetic fortunately she does have CGM, here for further recommendations regarding her blood sugars. Seeing her for the first time in the office today. Dr. Jose Lara got her started on meds for the heart and after three months trial they will re evaluate for ICD. She had couple of ortho surgeries, is wheel chair bound. Here with brother, she lives with her sister. Claims she does not eat on a regular basis, had a gastric sleeve surgery, she lost about 100 lbs. Last A1c is 6.9 . Claims she has thyroid supplements. We just got a confirmed dose, . She had non toxic multinodular goiter, and last U/S was on July 2020. She is having problems with her insurance co, as he wants to get  [place].       Patient Active Problem List   Diagnosis    Diabetes mellitus ([address]    Hypertension    Hyperlipidemia    Neuropathy    Osteoarthritis    Proteinuria    Arthritis    Morbid obesity ([address]    Allergic rhinitis    Chronic kidney disease, stage 4 (severe) (HCC)    Diabetes mellitus type 2, insulin dependent ([address]    DARWIN on CPAP    History of sleeve gastrectomy    Pneumonia tablet by mouth daily 30 tablet 3    metoprolol succinate (TOPROL XL) 100 MG extended release tablet Take 100 mg by mouth daily      digoxin (LANOXIN) 125 MCG tablet Take 0.5 tablets by mouth daily 30 tablet 0    Calcium Carbonate-Vitamin D (CALCIUM-VITAMIN D) 500-200 MG-UNIT per tablet Take 1 tablet by mouth 2 times daily (with meals)        No current facility-administered medications for this visit.         Past Medical History:   Diagnosis Date    CAD (coronary artery disease)     CRI (chronic renal insufficiency)     Difficult intubation     excess skin in back of throat     DM (diabetes mellitus) [facility] 1994    GERD (gastroesophageal reflux disease)     H/O gastric bypass     Hyperlipidemia     Hypertension     Hypothyroidism     Neuropathy     Obesity     Osteoarthritis     Paroxysmal atrial fibrillation (HCC)     Prolonged emergence from general anesthesia     Sleep apnea     uses cpap    Visit for screening mammogram        Past Surgical History:   Procedure Laterality Date    BACK SURGERY      xs 2    CATARACT REMOVAL Right 7/24/14    Dr. German Harris EKG 12-LEAD  9/18/2015         ENDOSCOPY, COLON, DIAGNOSTIC      EYE SURGERY      FOOT SURGERY      left foot    HYSTERECTOMY, TOTAL ABDOMINAL      MOUTH BIOPSY  9-28-12    left tongue and lingual tonsil    OTHER SURGICAL HISTORY  11/8/2014    LEFT FEMUR RETROGRADE NAILING    OTHER SURGICAL HISTORY  4/23/2015    HARDWARE REMOVAL LEFT FEMUR, INSERTION OF ANTIBIOTIC SPACER    PATELLA SURGERY  7/11/13    fractues rt patella     AL COLON CA SCRN NOT  [address] IND Left 1/24/2018    COLONOSCOPY performed by Xiomy Frank MD at [address] [facility] TAG REMOVAL  9/25/12    mole removal    SLEEVE GASTRECTOMY  09/15/2015    Robotic    TOENAIL EXCISION      removal of great toe nails bilateral-still has toenails-had ingrown toenails and had trimmed out     TONSILLECTOMY      UPPER GASTROINTESTINAL ENDOSCOPY No Known Allergies    Social History     Socioeconomic History    Marital status: Single     Spouse name: Not on file    Number of children: Not on file    Years of education: Not on file    Highest education level: Not on file   Occupational History    Occupation: retired   Social Needs    Financial resource strain: Not on file    Food insecurity     Worry: Not on file     Inability: Not on file   [employer] needs     Medical: Not on file     Non-medical: Not on file   Tobacco Use    Smoking status: Former Smoker     Packs/day: 1.50     Years: 20.00     Pack years: 30.00     Quit date: 2007     Years since quittin.3    Smokeless tobacco: Never Used   Substance and Sexual Activity    Alcohol use: No     Alcohol/week: 0.0 standard drinks    Drug use: No    Sexual activity: Never   Lifestyle    Physical activity     Days per week: Not on file     Minutes per session: Not on file    Stress: Not on file   Relationships    Social connections     Talks on phone: Not on file     Gets together: Not on file     Attends [employer] service: Not on file     Active member of club or organization: Not on file     Attends meetings of clubs or organizations: Not on file     Relationship status: Not on file    Intimate partner violence     Fear of current or ex partner: Not on file     Emotionally abused: Not on file     Physically abused: Not on file     Forced sexual activity: Not on file   Other Topics Concern    Not on file   Social History Narrative    Not on file       Family History   Problem Relation Age of Onset    Asthma Sister     Asthma Brother     Heart Disease Father     Other Mother     High Blood Pressure Other     Cancer Maternal Uncle         stomach    Diabetes Maternal Grandmother     High Blood Pressure Maternal Grandmother     Diabetes Maternal Grandfather     Stroke Neg Hx        Review of Systems     See HPI    /70 (Site: Left Upper Arm)   Pulse 72   Temp 98.1 °F (36.7 °C)   Ht 5' 9\" (1.753 m)   Wt 214 lb 12.8 oz (97.4 kg)   LMP 02/20/2001   BMI 31.72 kg/m²     Physical Examination: General appearance - alert, well appearing, and in no distress and overweight  Mental status - alert, oriented to person, place, and time  Neck - supple, no significant adenopathy  Chest - clear to auscultation, no wheezes, rales or rhonchi, symmetric air entry  Heart - normal rate, regular rhythm, normal S1, S2, no murmurs, rubs, clicks or gallops  Abdomen - soft, nontender, nondistended, no masses or organomegaly  Neurological - alert, oriented, normal speech, no focal findings or movement disorder noted  Musculoskeletal - no joint tenderness, deformity or swelling  Extremities - peripheral pulses normal, trace  pedal edema, no clubbing or cyanosis  Skin - normal coloration and turgor, no rashes, no suspicious skin lesions noted     I have reviewed recent diagnostic testing including labs and EKG. Diagnosis Orders   1. Type 2 diabetes mellitus with hyperglycemia, with long-term current use of insulin [facility]. [facility] Internal Medicine - Dietitian   2. Multinodular goiter  T4, Free    TSH With Reflex Ft4   3. Essential hypertension     4.  Morbid obesity with BMI of 50.0-59.9, adult [facility]. [facility] - Dietitian       Orders Placed This Encounter   Procedures    T4, Free     Standing Status:   Future     Standing Expiration Date:   5/11/2022    TSH With Reflex Ft4     Standing Status:   Future     Standing Expiration Date:   5/11/2022   [facility]. [facility] - Dietitian     Referral Priority:   Routine     Referral Type:   Eval and Treat     Referral Reason:   Specialty Services Required     Requested Specialty:   Dietitian     Number of Visits Requested:   1       PLAN:    DM-2 continue with the same Rx, recent [place] reviewed  And refer to dietician, and diabetic educator    Goiter - check TSH and T4 and obtain an ultrasound of the thyroid     Essential HTN is stable on the current regimen, of diovan 80 mg daily   And toprol  mg daily . Obesity - recommended dietary and life style changes, and  Dietician  Evaluation should benefit her, if he consents. RTO  3 months      Signed by : Suresh Gifford M.D.     [address] [facility] Internal Medicine  [address] [facility], [facility]  [address] [facility], [facility]    Tel  [phone]  Fax [phone]

## 2021-05-12 NOTE — TELEPHONE ENCOUNTER
Pt called and states when she was in the nursing home they had her on allopurinol which was started by Dr. Tyler Knowles while she was in the hospital.  Pt states she has been out of the hospital for about a month and has not taken the allopurinol. Pt has not had any gout symptoms or a uric acid level drawn. What do you want the pt to do?

## 2021-05-13 NOTE — TELEPHONE ENCOUNTER
Pt having a thyroid bx 5- asking if she can hold her coumadin 5 days, coumadin clinic asking if she should be bridged?

## 2021-05-13 NOTE — PROGRESS NOTES
Medication Management [address]  [phone] (phone)  [phone] (fax)    Ms. Reji Solorzano is a 76 y.o.  female with history of Afib who presents today for anticoagulation monitoring and adjustment. Patient verifies tablet strength. Unable to verify regimen, states that she followed the printed calendar. No missed or extra doses. Patient denies s/s bleeding/bruising/swelling/SOB/chest pain  No blood in urine or stool. No dietary changes. No changes in medication/OTC agents/Herbals. No change in alcohol use or tobacco use. No change in activity level. Patient denies headaches/dizziness/lightheadedness/falls. No vomiting/diarrhea or acute illness. Thyroid biopsy on 5/21, pt states she needs to be off Coumadin x 5 days prior. CHADsVASc calculated as 5. Msg left with Dr. Linda Sena office to ask if pt should be bridged w/ Lovenox. Await call back. Pt aware that she will need to come back to [facility] for an appt for Lovenox instructions if necessary. Assessment:   Lab Results   Component Value Date    INR 4.20 (H) 05/13/2021    INR 3.20 (H) 05/04/2021    INR 1.60 (H) 04/28/2021     INR supratherapeutic   Recent Labs     05/13/21  1513   INR 4.20*     Second supratherapeutic INR on this regimen. Plan:  Hold Coumadin today, then 2.5mg daily x 2. Then hold Coumadin 5/16-5/21 for procedure. Coumadin 3.75mg on 5/22 and 5/23, then decrease Coumadin 3.75mg W and 2.5mg MTThFSaS. Recheck INR in 1 week(s) after procedure. Patient reminded to call the Anticoagulation Clinic with any signs or symptoms of bleeding or with any medication changes. Patient given instructions utilizing the teach back method. After visit summary printed and reviewed with patient. Discharged in [facility] in no apparent distress.       For Pharmacy Admin Tracking Only     Intervention Detail: Dose Adjustment: 1: reason: Therapy De-escalation   Total # of Interventions Recommended: 1   Total # of Interventions Accepted: 1   Time Spent (min): 30

## 2021-05-13 NOTE — TELEPHONE ENCOUNTER
----- Message from Gera Jacob MD sent at 5/12/2021  4:18 PM EDT -----      Need a thyroid biopsy , orders done and our office will be ordered.      Electronically signed by Gera Jacob MD on 5/12/2021 at 4:18 PM

## 2021-05-19 NOTE — TELEPHONE ENCOUNTER
Carlos Boxer from [facility] is requesting Puneet to write on the order (it can be on bottem of the page or very front) that was scanned in on 5/10/21 that \"pt is suing CGM monitoring system. \" they said its for insurance purposes. Also needs to say \" Addended\" with jans signature and the date she addended it.          Pt's account # [[email or web address]]    [email or web address] phone- [phone]     Fax- [phone]

## 2021-05-20 NOTE — TELEPHONE ENCOUNTER
Pharm sent over request for Humalog Mix 75/25 pens. Clarified with patient. This is what she takes and she takes 30 units in Am and 50 units in the PM. Please send into RA.

## 2021-05-25 NOTE — TELEPHONE ENCOUNTER
----- Message from Hina Dahl MD sent at 5/24/2021  3:03 PM EDT -----  Inform her thyroid biopsy results -  not cancerous, it was benign

## 2021-05-27 NOTE — PROGRESS NOTES
Medication Management [address]  [phone] (phone)  [phone] (fax)    Ms. Alec Pelletier is a 76 y.o.  female with history of Afib with RVR who presents today for anticoagulation monitoring and adjustment. Patient had thyroid biopsy 5/21/21. Had held Coumadin 5 days prior. States her results were not cancerous. Patient verifies current dosing regimen and tablet strength. No missed or extra doses. Patient denies s/s bleeding/bruising/swelling/SOB/chest pain  No blood in urine or stool. States her stool is dark green  No dietary changes. No changes in medication/OTC agents/Herbals. No change in alcohol use or tobacco use. No change in activity level. Patient denies headaches/dizziness/lightheadedness/falls. No vomiting/diarrhea or acute illness. No Procedures scheduled in the future at this time. Assessment:   Lab Results   Component Value Date    INR 2.10 (H) 05/27/2021    INR 4.20 (H) 05/13/2021    INR 3.20 (H) 05/04/2021     INR therapeutic   Recent Labs     05/27/21  1503   INR 2.10*     Patient had recent dose adjustment before holding for procedure. Has been back on Coumadin 5 days. Plan:  Continue Coumadin 3.75 mg Wed and 2.5 mg [id number]. Recheck INR in 2 week(s). Patient reminded to call the Anticoagulation Clinic with any signs or symptoms of bleeding or with any medication changes. Patient given instructions utilizing the teach back method. After visit summary printed and reviewed with patient. Discharged ambulatory in no apparent distress.     For Pharmacy Admin Tracking Only     Time Spent (min): 20

## 2021-06-01 NOTE — TELEPHONE ENCOUNTER
durable medical equipment Pt needs a shower chair with a back. Pt is deconditioned and unstable with ambulation his a history of falls, Pt needs the chair to assist in personal care and hygiene.  Script printed and in basket by printer

## 2021-06-10 NOTE — PROGRESS NOTES
Medication Management [address]  [phone] (phone)  [phone] (fax)    Ms. Candace Martin is a 76 y.o.  female with history of Afib who presents today for anticoagulation monitoring and adjustment. Patient verifies current dosing regimen and tablet strength. No missed or extra doses. Patient denies s/s bleeding/bruising/SOB/chest pain. Swelling in legs due to old injury but is normal  No blood in urine or stool. No dietary changes. No changes in medication/OTC agents/Herbals. No change in alcohol use or tobacco use. No change in activity level. Patient denies headaches/dizziness/lightheadedness/falls. No vomiting/diarrhea or acute illness. No Procedures scheduled in the future at this time. Assessment:   Lab Results   Component Value Date    INR 2.20 (H) 06/10/2021    INR 2.10 (H) 05/27/2021    INR 4.20 (H) 05/13/2021     INR therapeutic   Recent Labs     06/10/21  1452   INR 2.20*         Plan:  Continue Coumadin 3.75mg W and 2.5mg MTThFSaS. Recheck INR in 3 week(s). Patient reminded to call the Anticoagulation Clinic with any signs or symptoms of bleeding or with any medication changes. Patient given instructions utilizing the teach back method. After visit summary printed and reviewed with patient. Discharged in [facility] in no apparent distress.       For Pharmacy Admin Tracking Only     Time Spent (min): 20

## 2021-07-01 PROBLEM — I50.43 ACUTE ON CHRONIC COMBINED SYSTOLIC (CONGESTIVE) AND DIASTOLIC (CONGESTIVE) HEART FAILURE (HCC): Status: RESOLVED | Noted: 2019-08-27 | Resolved: 2021-01-01

## 2021-07-01 PROBLEM — M25.562 KNEE PAIN, LEFT: Status: RESOLVED | Noted: 2019-05-31 | Resolved: 2021-01-01

## 2021-07-01 PROBLEM — E43 SEVERE MALNUTRITION (HCC): Chronic | Status: RESOLVED | Noted: 2021-01-01 | Resolved: 2021-01-01

## 2021-07-01 NOTE — PROGRESS NOTES
[address] [facility]  [address] [place] [address]  Dept: [phone]  Dept Fax: [phone]  Loc: [phone]    Sharath Monroy is a 76 y.o. femalewho presents today for her medical conditions/complaints as noted below. Marietta Nino c/o of Follow-up and Health Maintenance (due for mammo, colonoscopy, DM eye exam )      HPI:      Diabetes Type 2    Glucose control:   Does patient check blood glucoses at home? Yes  Report of hypoglycemia: yes, she reports episodes of hypoglycemia 3-4 times a week with sugars dropping to 60 range. In the office today she suffered a hypoglycemia episode when she began shaking and sweating and became nauseous, had reeses pieces and peanut butter crackers and symptoms relieved. A1c today 5.2 will decrease her insulin. Lab Results       Component                Value               Date                       LABA1C                   5.2                 07/01/2021            No results found for: EAG    Symptoms  Polyuria, Polydipsia or Polyphagia? No  Chest Pain, SOB, or Palpitations? -  No  New Vision complaints? No  Paresthesias of the extremities? she does have neuropathy treated with gabapentin    Medications  Current medication were reviewed. Compliant with medications? yes  Medication side effects? No  On ACE-I or ARB? Yes  On antiplatelet therapy? Yes  On Statin? Yes    Last Diabetic Eye Exam: over  A year advised to have eye exam     Exercise  Exercise? No  Wt Readings from Last 3 Encounters:  07/01/21 : 229 lb (103.9 kg)  05/11/21 : 214 lb 12.8 oz (97.4 kg)  04/28/21 : 215 lb (97.5 kg)      Diet discipline?:  Low salt, fat, sugar diet?   No she does eat high carb and sugars    Blood pressure control:  BP Readings from Last 3 Encounters:  07/01/21 : (!) 168/96  05/11/21 : 124/70  04/28/21 : 128/74      Lab Results       Component                Value               Date                       LABMICR 278.24              11/03/2020              Lab Results       Component                Value               Date                       LDLCALC                  52                  04/12/2021                Pt with chg being managed by cardiology;, did not take lasix today and bp is elevated she denies chest pain or sob. She has LLE pedal edema today to foot, 2-3+, toes pink pedal pulses strong, advised to take her lasix when she gets home has not had it in 2 days. Lab Results       Component                Value               Date                       TSH                      0.244 (L)           01/26/2021                 FT3                      3.02                07/09/2019                 T4FREE                   1.57                12/15/2020            taking her thyroid medications. Current Outpatient Medications   Medication Sig Dispense Refill    alendronate (FOSAMAX) 70 MG tablet take 1 tablet by mouth every week 12 tablet 3    FEROSUL 325 (65 Fe) MG tablet take 1 tablet by mouth three times a day with food 90 tablet 3    metoprolol succinate (TOPROL XL) 100 MG extended release tablet take 1 tablet by mouth once daily 90 tablet 1    folic acid (FOLVITE) 1 MG tablet take 1 tablet by mouth once daily 90 tablet 4    insulin lispro protamine & lispro (HUMALOG MIX 75/25 KWIKPEN) (75-25) 100 UNIT per ML SUPN injection pen Inject 0.4 mLs into the skin 2 times daily (with meals) 30 units in the moring and 50 units in the evening. Max daily dose is 80 units.  5 pen 3    allopurinol (ZYLOPRIM) 100 MG tablet Take 1 tablet by mouth daily 90 tablet 3    Continuous Blood Gluc Sensor (FREESTYLE LINNETTE 14 DAY SENSOR) MISC 1 Device by Does not apply route continuous 6 each 1    valsartan (DIOVAN) 80 MG tablet Take 1 tablet by mouth daily 30 tablet 3    Ascorbic Acid (VITAMIN C) 500 MG CAPS Take 1 tablet by mouth 2 times daily      Cholecalciferol (VITAMIN D3) 50 MCG (2000 UT) CAPS Take 2,000 Units by mouth daily      zinc sulfate (ZINCATE) 220 (50 Zn) MG capsule Take 50 mg by mouth daily      alogliptin (NESINA) 6.25 MG TABS tablet Take 1 tablet by mouth daily 30 tablet 0    Incontinence Supply Disposable (INCONTINENCE BRIEF LARGE) MISC Use prn for incontinence 5 packet 2    Incontinence Supply Disposable (POISE PAD) PADS Use as needed 50 each 2    Misc. Devices MISC Washable bed pads 50 each 2    Misc. Devices MISC Baby wipes 200 each 2    Misc. Devices MISC Chucks as needed 200 each 2    levothyroxine (SYNTHROID) 75 MCG tablet Take 75 mcg by mouth Daily      furosemide (LASIX) 40 MG tablet Take 40 mg by mouth daily       meclizine (ANTIVERT) 25 MG tablet Take 25 mg by mouth as needed       warfarin (COUMADIN) 3 MG tablet Take 1 tablet by mouth daily As directed by [facility]. 30 days = 45 tabs      acetaminophen (TYLENOL) 500 MG tablet Take 1 tablet by mouth 4 times daily as needed for Pain 120 tablet 0    DULoxetine (CYMBALTA) 60 MG extended release capsule take 1 capsule by mouth once daily 90 capsule 3    gabapentin (NEURONTIN) 100 MG capsule take 1 capsule by mouth three times a day 270 capsule 0    dexlansoprazole (DEXILANT) 60 MG CPDR delayed release capsule Take 1 capsule by mouth daily 90 capsule 3    nystatin (MYCOSTATIN) 390396 UNIT/GM powder Apply 3 times daily.  (Patient taking differently: prn) 60 g 2    fenofibrate (TRICOR) 145 MG tablet take 1 tablet by mouth once daily 90 tablet 3    atorvastatin (LIPITOR) 40 MG tablet take 1 tablet by mouth at bedtime 90 tablet 3    RA VITAMIN B-12 100 MCG tablet take 1 tablet by mouth once daily 90 tablet 3    Dulaglutide (TRULICITY SC) Inject 7.03 mg into the skin once a week Usually on mondays      aspirin (RA ASPIRIN EC) 81 MG EC tablet Take 1 tablet by mouth daily 30 tablet 3    digoxin (LANOXIN) 125 MCG tablet Take 0.5 tablets by mouth daily 30 tablet 0    Calcium Carbonate-Vitamin D (CALCIUM-VITAMIN Mother     High Blood Pressure Other     Cancer Maternal Uncle         stomach    Diabetes Maternal Grandmother     High Blood Pressure Maternal Grandmother     Diabetes Maternal Grandfather     Stroke Neg Hx      Social History     Tobacco Use    Smoking status: Former Smoker     Packs/day: 1.50     Years: 20.00     Pack years: 30.00     Quit date: 2007     Years since quittin.4    Smokeless tobacco: Never Used   Substance Use Topics    Alcohol use: No     Alcohol/week: 0.0 standard drinks        No Known Allergies    Health Maintenance   Topic Date Due    Hepatitis C screen  Never done    COVID-19 Vaccine (1) Never done    Shingles Vaccine (1 of 2) Never done    Diabetic retinal exam  2019    Breast cancer screen  2020    Flu vaccine (1) 2021    Annual Wellness Visit (AWV)  2021    Low dose CT lung screening  2021    Diabetic foot exam  2022    Lipid screen  2022    Potassium monitoring  2022    Creatinine monitoring  2022    A1C test (Diabetic or Prediabetic)  2022    DTaP/Tdap/Td vaccine (2 - Td or Tdap) 2027    Colon cancer screen colonoscopy  2028    DEXA (modify frequency per FRAX score)  Completed    Pneumococcal 65+ yrs at Risk Vaccine  Completed    Hepatitis A vaccine  Aged Out    Hib vaccine  Aged Out    Meningococcal (ACWY) vaccine  Aged Out       Subjective:      Review of Systems    Objective:      BP (!) 168/96   Pulse 105   Temp 98.4 °F (36.9 °C) (Oral)   Resp 14   Ht 5' 6\" (1.676 m)   Wt 229 lb (103.9 kg)   LMP 2001   SpO2 97%   BMI 36.96 kg/m²      Physical Exam     Assessment/Plan:           1. Type 2 diabetes mellitus with other circulatory complication, with long-term current use of insulin (HCC)  A1C decreasing having hypoglycemia  Will decrease humalog    2.  Hypoglycemia  Decrease humalog to 25 unit sin am and 35 units in pm  Call office if hypoglycemic episodes develop 3. Pedal edema  Take lasix today wear ace wrap or nilton hose to decrease swelling     4. Neuropathy  Continue with neurontin    5. Acute on chronic congestive heart failure, unspecified heart failure type ([address]  Continue to follow with cardiology  Take lasix daily    Pt in agreement with plan   Return in about 3 months (around 10/1/2021) for A1C and check up . Reccommended tobaccocessation options including pharmacologic methods, counseled great than 3 minutesduring this visit:  Yes[]  No  []       Patient given educational materials -see patient instructions. Discussed use, benefit, and side effects of prescribedmedications. All patient questions answered. Pt voiced understanding. Reviewedhealth maintenance. Instructed to continue current medications, diet and exercise. Patient agreed with treatment plan. Follow up as directed.        Electronicallysigned by ARNOL Walden CNP on 7/1/2021 at 4:21 PM

## 2021-07-01 NOTE — PROGRESS NOTES
Medication Management [address]   [phone] (phone)  [phone] (fax)    Ms. Genaro Lang is a 76 y.o.  female with history of atrial fib./RVR, per Dr. Philippe Nyhan referral, who presents today for Warfarin monitoring and adjustment (3 week visit - late for today's visit). Patient verifies current dosing regimen and tablet strength. No missed or extra doses. Patient denies bleeding/bruising/SOB/chest pain. Has usual left leg swelling. No blood in urine or stool. No dietary changes. No changes in medication/OTC agents/herbals. No change in alcohol use or tobacco use. No change in activity level. Patient denies headaches/dizziness/lightheadedness/falls. No vomiting/diarrhea or acute illness. No procedures scheduled in the future at this time. Assessment:     Lab Results   Component Value Date    INR 3.00 (H) 07/01/2021    INR 2.20 (H) 06/10/2021    INR 2.10 (H) 05/27/2021     INR therapeutic - goal 2-3. Recent Labs     07/01/21  1448   INR 3.00*     Plan:  POCT INR ordered/performed/result reviewed. Continue PO Coumadin 3.75 mg W, 2.5 mg MTThFSS. Recheck INR in 3 week(s). Patient reminded to call the Anticoagulation Clinic with any signs or symptoms of bleeding or with any medication changes. Patient given instructions utilizing the teach back method. After visit summary printed and reviewed with patient. Sees PCP next. Discharged via wheelchair in no apparent distress.

## 2021-07-01 NOTE — TELEPHONE ENCOUNTER
Pt scheduled for appt today 7/1/21 with  Lavern Vargas at 3:20 . Provider requested that we call pt to see if this time still works for pt. Left detailed msg on mach requesting pt to call back and reschedule if appt does not work for her.

## 2021-07-21 NOTE — PROGRESS NOTES
Medication Management [address]   [phone] (phone)  [phone] (fax)    Ms. Martín Schneider is a 76 y.o.  female with history of atrial fib./RVR, per Dr. Malia José referral, who presents today for Warfarin monitoring and adjustment (3 week visit). Patient verifies current dosing regimen and tablet strength. No missed or extra doses. Patient denies bleeding/bruising/SOB/chest pain. Has usual slight intermittent swelling of left leg - old fracture. No blood in urine or stool. No dietary changes. No changes in medication/OTC agents/herbals. No change in alcohol use or tobacco use. No change in activity level. Patient denies headaches/dizziness/lightheadedness/falls. No vomiting/diarrhea or acute illness. No procedures scheduled in the future at this time. Assessment:   Lab Results   Component Value Date    INR 2.00 (H) 07/21/2021    INR 3.00 (H) 07/01/2021    INR 2.20 (H) 06/10/2021     INR therapeutic - goal 2-3. Recent Labs     07/21/21  1458   INR 2.00*       Plan:  POCT INR ordered/performed/result reviewed. Continue PO Coumadin 3.75 mg W, 2.5 mg MTThFSS; today is W.  Recheck INR in 4 week(s). Patient reminded to call the Anticoagulation Clinic with any signs or symptoms of bleeding or with any medication changes. Patient given instructions utilizing the teach back method. After visit summary printed and reviewed with patient. Discharged via wheelchair in no apparent distress, wearing mask. Declined assistance.

## 2021-07-22 NOTE — PROGRESS NOTES
Renal Progress Note    Assessment and Plan:      Diagnosis Orders   1. Stage 3b chronic kidney disease (HCC)     2. Persistent proteinuria     3. Essential hypertension     4. Hyperparathyroidism, secondary renal ([address]     5. Anemia of chronic disease     6. Lymphedema of left lower extremity     7. Hyponatremia       PLAN:  Lab result discussed with the patient and the family. They understood. Serum creatinine the same improved to 1.5 mg/dl from 2.0 mg/dL. Medications reviewed   No changes   It will be okay according to the literature today to discontinue allopurinol in the absence of acute gout attack for the last 5 years. We will however defer this to the discretion of the primary care provider. Return visit in 6 months and 12 months thereafter if no changes in kidney function.           Patient Active Problem List   Diagnosis    Diabetes mellitus ([address]    Hypertension    Hyperlipidemia    Neuropathy    Osteoarthritis    Proteinuria    Arthritis    Morbid obesity ([facility]    Allergic rhinitis    Chronic kidney disease, stage 4 (severe) ([facility]    Diabetes mellitus type 2, insulin dependent ([facility]    DARWIN on CPAP    History of sleeve gastrectomy    Pneumonia    Morbid obesity with BMI of 50.0-59.9, adult ([address]    Coronary artery disease involving native heart without angina pectoris    Multinodular goiter    Bilateral renal cysts    Dyspnea    Atrial fibrillation (HCC)    Acute diastolic CHF (congestive heart failure), NYHA class 2 ([facility]    CKD (chronic kidney disease) stage 3, GFR 30-59 ml/min    Shortness of breath    Chronic anemia    Paroxysmal A-fib (HCC)    Chronic kidney disease (CKD), stage III (moderate) ([facility]    Nonischemic cardiomyopathy (HCC)    Mild tricuspid regurgitation    Debility    Dietary noncompliance    Long term current use of anticoagulant    Severe left ventricular systolic dysfunction    Anticoagulated on Coumadin    Metabolic encephalopathy    Acute cystitis without hematuria    Hyperkalemia    [facility]) [facility]    Altered mental status    AMS (altered mental status)    Encounter for therapeutic drug monitoring    Closed fracture of distal end of left femur, initial encounter ([facility]       PLAN:  1. Subjective:   Chief complaint:  Chief Complaint   Patient presents with    Chronic Kidney Disease     Stage IIIb      HPI:This is a follow up visit for Ms. Fransisco Hunter who is here today for return appointment I see her for chronic kidney disease. She was last seen about 2 months ago. Doing well with no complaints since then. No chest pain. No shortness of breath she has chronic deconditioning and therefore mostly on wheelchair. ROS:  Pertinent positives stated above in HPI. All other systems were reviewed and were negative. Medications:     Current Outpatient Medications   Medication Sig Dispense Refill    levothyroxine (SYNTHROID) 50 MCG tablet Take 1.5 tablets by mouth Daily 90 tablet 1    insulin lispro protamine & lispro (HUMALOG MIX 75/25 KWIKPEN) (75-25) 100 UNIT per ML SUPN injection pen Inject 0.25 mLs into the skin 2 times daily (with meals) 25 units in the moring and 35 units in the evening. Max daily dose is 60 units.  5 pen 3    alendronate (FOSAMAX) 70 MG tablet take 1 tablet by mouth every week 12 tablet 3    FEROSUL 325 (65 Fe) MG tablet take 1 tablet by mouth three times a day with food 90 tablet 3    metoprolol succinate (TOPROL XL) 100 MG extended release tablet take 1 tablet by mouth once daily 90 tablet 1    folic acid (FOLVITE) 1 MG tablet take 1 tablet by mouth once daily 90 tablet 4    allopurinol (ZYLOPRIM) 100 MG tablet Take 1 tablet by mouth daily 90 tablet 3    Continuous Blood Gluc Sensor (FREESTYLE LINNETTE 14 DAY SENSOR) MISC 1 Device by Does not apply route continuous 6 each 1    valsartan (DIOVAN) 80 MG tablet Take 1 tablet by mouth daily 30 tablet 3    Ascorbic Acid (VITAMIN C) 500 MG CAPS Take 1 tablet by mouth 2 times daily      Cholecalciferol (VITAMIN D3) 50 MCG (2000 UT) CAPS Take 2,000 Units by mouth daily      zinc sulfate (ZINCATE) 220 (50 Zn) MG capsule Take 50 mg by mouth daily      alogliptin (NESINA) 6.25 MG TABS tablet Take 1 tablet by mouth daily 30 tablet 0    Incontinence Supply Disposable (INCONTINENCE BRIEF LARGE) MISC Use prn for incontinence 5 packet 2    Incontinence Supply Disposable (POISE PAD) PADS Use as needed 50 each 2    Misc. Devices MISC Washable bed pads 50 each 2    Misc. Devices MISC Baby wipes 200 each 2    Misc. Devices MISC Chucks as needed 200 each 2    furosemide (LASIX) 40 MG tablet Take 40 mg by mouth daily       warfarin (COUMADIN) 3 MG tablet Take 1 tablet by mouth daily As directed by [facility]. 30 days = 45 tabs      DULoxetine (CYMBALTA) 60 MG extended release capsule take 1 capsule by mouth once daily 90 capsule 3    gabapentin (NEURONTIN) 100 MG capsule take 1 capsule by mouth three times a day 270 capsule 0    dexlansoprazole (DEXILANT) 60 MG CPDR delayed release capsule Take 1 capsule by mouth daily 90 capsule 3    nystatin (MYCOSTATIN) 267943 UNIT/GM powder Apply 3 times daily.  (Patient taking differently: prn) 60 g 2    fenofibrate (TRICOR) 145 MG tablet take 1 tablet by mouth once daily 90 tablet 3    atorvastatin (LIPITOR) 40 MG tablet take 1 tablet by mouth at bedtime 90 tablet 3    RA VITAMIN B-12 100 MCG tablet take 1 tablet by mouth once daily 90 tablet 3    Dulaglutide (TRULICITY SC) Inject 8.15 mg into the skin once a week Usually on mondays      aspirin (RA ASPIRIN EC) 81 MG EC tablet Take 1 tablet by mouth daily 30 tablet 3    digoxin (LANOXIN) 125 MCG tablet Take 0.5 tablets by mouth daily 30 tablet 0    Calcium Carbonate-Vitamin D (CALCIUM-VITAMIN D) 500-200 MG-UNIT per tablet Take 1 tablet by mouth 2 times daily (with meals)       meclizine (ANTIVERT) 25 MG tablet Take 25 mg by mouth as needed  (Patient not taking: Reported on 7/21/2021)      acetaminophen (TYLENOL) 500 MG tablet Take 1 tablet by mouth 4 times daily as needed for Pain (Patient not taking: Reported on 7/21/2021) 120 tablet 0     No current facility-administered medications for this visit.        Lab Results:    CBC:   Lab Results   Component Value Date    WBC 4.6 (L) 07/21/2021    HGB 9.1 (L) 07/21/2021    HCT 29.2 (L) 07/21/2021    .4 (H) 07/21/2021     07/21/2021     BMP:    Lab Results   Component Value Date     07/21/2021     04/12/2021     04/10/2021    K 5.0 07/21/2021    K 4.8 04/12/2021    K 4.4 04/10/2021     07/21/2021    CL 99 04/12/2021    CL 99 04/10/2021    CO2 22 (L) 07/21/2021    CO2 26 04/12/2021    CO2 27 04/10/2021    BUN 35 (H) 07/21/2021    BUN 62 04/12/2021    BUN 59 (H) 04/10/2021    CREATININE 1.5 (H) 07/21/2021    CREATININE 200 07/01/2021    CREATININE 2.0 04/12/2021    GLUCOSE 162 (H) 07/21/2021    GLUCOSE 292 04/12/2021    GLUCOSE 295 (H) 04/10/2021      Hepatic:   Lab Results   Component Value Date    AST 16 04/12/2021    AST 24 02/08/2021    AST 24 01/25/2021    ALT 7 04/12/2021    ALT 9 02/08/2021    ALT 14 01/25/2021    BILITOT 0.8 04/12/2021    BILITOT 1.5 (A) 02/08/2021    BILITOT 1.3 (H) 01/25/2021    ALKPHOS 59 04/12/2021    ALKPHOS 90 02/08/2021    ALKPHOS 69 01/25/2021     BNP:   Lab Results   Component Value Date    BNP 3,061.9 02/08/2021     Lipids:   Lab Results   Component Value Date    CHOL 122 07/21/2021    HDL 19 07/21/2021     INR:   Lab Results   Component Value Date    INR 2.00 (H) 07/21/2021    INR 3.00 (H) 07/01/2021    INR 2.20 (H) 06/10/2021     URINE:   Lab Results   Component Value Date    NAUR 89 01/26/2021    PROTUR 100 03/18/2021     Lab Results   Component Value Date    NITRU NEGATIVE 04/10/2021    COLORU YELLOW 04/10/2021    PHUR 5.5 04/10/2021    LABCAST NONE SEEN 08/27/2019    LABCAST NONE SEEN 08/27/2019    WBCUA 5-9 04/10/2021    RBCUA 0-2 04/10/2021 YEAST NONE SEEN 04/10/2021    BACTERIA NONE SEEN 04/10/2021    CLARITYU clear 09/25/2019    SPECGRAV 1.020 01/26/2020    LEUKOCYTESUR SMALL 04/10/2021    UROBILINOGEN 1.0 04/10/2021    BILIRUBINUR NEGATIVE 04/10/2021    BILIRUBINUR neg 09/25/2019    BLOODU NEGATIVE 04/10/2021    GLUCOSEU 100 04/10/2021    KETUA NEGATIVE 04/10/2021    AMORPHOUS DEBRIS 01/03/2021      Microalbumen/Creatinine ratio:  No components found for: RUCREAT    Objective:   Vitals: /73 (Site: Left Upper Arm, Position: Sitting, Cuff Size: Large Adult)   Pulse 76   Temp 97.2 °F (36.2 °C)   Wt 223 lb 12.8 oz (101.5 kg)   LMP 02/20/2001   SpO2 100%   BMI 36.12 kg/m²      Constitutional:  Alert, awake, no apparent distress  Skin:normal with no rash or any lesions  HEENT:Pupils are reactive . Throat is clear. Oral mucosa is moist.  Neck:supple with no thyromegaly or bruit   Cardiovascular:  S1, S2 without murmur   Respiratory:  Clear to auscultation with no wheezes or rales  Abdomen: +bowel sound, soft, non tender and no bruit  Ext: 1+ bilateral LE edema  Musculoskeletal:Intact  Neuro:Alert, awake and oriented with no obvious focal deficit. Speech is normal.  Electronically signed by Clarisse Belle MD on 7/22/2021 at 3:14 PM   **This report has been created using voice recognition software. It maycontain minor  errors which are inherent in voice recognition technology. **

## 2021-07-22 NOTE — TELEPHONE ENCOUNTER
I recommend pt decrease thyroid dose to 50 mcg and then recheck labs in 6-8 weeks both orders have been placed thanks

## 2021-07-22 NOTE — TELEPHONE ENCOUNTER
----- Message from ARNOL Guillermo CNP sent at 7/21/2021  5:38 PM EDT -----  Let pt know I have reviewed her labs from Dr. Abhay Ariza and he had ordered a TSH and it is not in normal range, I show synthroid 75 mcg in her chart is that the dose she has been taking for at least 6 weeks? If so we need to decrease it to get her thyroid levels back in  normal range.

## 2021-07-25 NOTE — CARE COORDINATION
4/8/21, 2:53 PM EDT    DISCHARGE ON GOING [address] day: 2  Location: -22/022-A Reason for admit: Closed fracture of distal end of left femur, initial encounter ([facility] [S72.402A]   Procedure: : no  Barriers to Discharge:  Non-op management distal left femur fx. Resume Coumadin. PT.OT. WBAT. Telemetry monitoring. Cardiology consult - she declined life vest.   PCP: ARNOL Bernstein - CNP  Readmission Risk Score: 38%  Patient Goals/Plan/Treatment Preferences: [place] is requesting to go to [facility]. Ortho noted plan to followup and plan total knee replacement as elective surgery. She has her C-pap machine here. See  notes. Patient

## 2021-07-29 NOTE — TELEPHONE ENCOUNTER
----- Message from Enmanuel Fernando RN sent at 7/29/2021  4:14 PM EDT -----  Current referral for Anticoagulation Services expires in approx. 30 days; please renew. Thanks, HAILEY Guzman, RN, BSN

## 2021-08-02 PROBLEM — K11.20 PAROTIDITIS: Status: ACTIVE | Noted: 2021-01-01

## 2021-08-02 NOTE — PROGRESS NOTES
Pt admitted to  6K27 from ED. Complaints: Left side facial and ear pain. IV INT into the antecubital right, condition patent and no redness. . IV site free of s/s of infection or infiltration. Vital signs obtained. Assessment and data collection initiated. Two nurse skin assessment performed by Tosha Davis and [address] RN. Oriented to room. Policies and procedures for 6K explained. Dieter Davis RN discussed hourly rounding with patient addressing 5 P's. Fall prevention and safety brochure discussed with patient. Bed alarm on. Call light in reach. The best day to schedule a follow up Dr appointment is:  Monday a.m. Explained patients right to have family, representative or physician notified of their admission. Patient has Declined for physician to be notified. Patient has Declined for family/representative to be notified. All questions answered with no further questions at this time.

## 2021-08-02 NOTE — CARE COORDINATION
DISCHARGE/PLANNING EVALUATION  8/2/21, 1:31 PM EDT    Reason for Referral: pt has [employer]port Services. ..aide. Mental Status: Patient is alert answered questions appropriately. Decision Making: Patient is making own decisions, sister Quinn Langley does assist.   Family/Social/Home Environment: Assessment completed with Patient. Patient is from home with sister where she is wheelchair dependent and can transfer and take very small steps. Patient is able to get herself in/out of the bath but they do have aides that assist as needed. Patient is current with [employer] for ERS, meals, wipes and 2 hours of Aides a week. Patient has no current RN services. Current Services including food security, transportation and housekeeping:  See above  Current Equipment: wheelchair, bedside commode, walker  Payment Source: Medicare, Medicaid  Concerns or Barriers to Discharge: not at this time. Post acute provider list with quality measures, geographic area and applicable managed care information provided. Questions regarding selection process answered: offered and current with [facility] for Aides only. Teach Back Method used with Patient regarding care plan and discharge plan. Patient and sister verbalize understanding of the plan of care and contribute to goal setting. Patient goals, treatment preferences and discharge plan: Patient current with [employer] and [facility] for aides only, from home with sister, watch PT/OT notes for further needs.     Electronically signed by MORENA Chatterjee LSW on 8/2/2021 at 1:31 PM

## 2021-08-02 NOTE — ED PROVIDER NOTES
ATTENDING NOTE:    I supervised and discussed the history, physical exam and the management of this patient with the PA. I reviewed the PA's note and agree with the documented findings and plan of care.       Electronically verified by MD Noemí Ricardo MD  08/02/21 7815

## 2021-08-02 NOTE — H&P
History & Physical    Patient:  Deisy Uriarte  YOB: 1955  Date of Service: 8/2/2021  MRN: [id number]   Acct:  [de-identified]   Primary Care Physician: ARNOL Guillermo CNP    Chief Complaint: Left neck pain and swelling    History of Present Illness:   History obtained from the patient and her brother who is at bedside. The patient is a 76 y.o. female who presents with complaints of increased confusion over the last several days as well as worsening left sided neck pain and swelling. Brother is at bedside and providing history states that he first noticed some erythema and swelling about a week ago. He states over the last it is increased in size, it is become very hard and tender. He reports that she has had several hospitalizations with recurrent infections and has never recovered mentally from this. He states that she has had confusion ongoing for the last year however is noted increased amount over the last several days. Reports that she was extremely cold, no reports of fevers, nausea or vomiting. Patient awakens when calling her by her name and denies any shortness of breath, difficulty swallowing or breathing. Patient reports she complained of some lightheadedness earlier today.    Brother also reports that she has chronic lower extremity swelling, follows up with nephrology but feels that they are a little more swollen today than usual.      Past Medical History:        Diagnosis Date    CAD (coronary artery disease)     CRI (chronic renal insufficiency)     Difficult intubation     excess skin in back of throat     DM (diabetes mellitus) ([facility] [address] 1994    GERD (gastroesophageal reflux disease)     H/O gastric bypass     Hyperlipidemia     Hypertension     Hypothyroidism     Neuropathy     Obesity     Osteoarthritis     Paroxysmal atrial fibrillation (HCC)     Prolonged emergence from general anesthesia     Sleep apnea     uses cpap    Sleep apnea     Visit for screening mammogram        Past Surgical History:        Procedure Laterality Date    BACK SURGERY      xs 2    CATARACT REMOVAL Right 7/24/14    Dr. Jose Landers EKG 12-LEAD  9/18/2015         ENDOSCOPY, COLON, DIAGNOSTIC      EYE SURGERY      FOOT SURGERY      left foot    HYSTERECTOMY, TOTAL ABDOMINAL      MOUTH BIOPSY  9-28-12    left tongue and lingual tonsil    OTHER SURGICAL HISTORY  11/8/2014    LEFT FEMUR RETROGRADE NAILING    OTHER SURGICAL HISTORY  4/23/2015    HARDWARE REMOVAL LEFT FEMUR, INSERTION OF ANTIBIOTIC SPACER    PATELLA SURGERY  7/11/13    fractues rt patella     AK COLON CA SCRN NOT HI RSK IND Left 1/24/2018    COLONOSCOPY performed by Marylee Starks, MD at [address] [place] [address] SKIN TAG REMOVAL  9/25/12    mole removal    SLEEVE GASTRECTOMY  09/15/2015    Robotic    TOENAIL EXCISION      removal of great toe nails bilateral-still has toenails-had ingrown toenails and had trimmed out    [address] THYROID CYST ASPIRATION AND OR INJECTION  5/21/2021     THYROID CYST ASPIRATION AND OR INJECTION 5/21/2021 [facility] ULTRASOUND       Home Medications:   No current facility-administered medications on file prior to encounter. Current Outpatient Medications on File Prior to Encounter   Medication Sig Dispense Refill    levothyroxine (SYNTHROID) 50 MCG tablet Take 1.5 tablets by mouth Daily 90 tablet 1    insulin lispro protamine & lispro (HUMALOG MIX 75/25 KWIKPEN) (75-25) 100 UNIT per ML SUPN injection pen Inject 0.25 mLs into the skin 2 times daily (with meals) 25 units in the moring and 35 units in the evening. Max daily dose is 60 units.  5 pen 3    alendronate (FOSAMAX) 70 MG tablet take 1 tablet by mouth every week 12 tablet 3    FEROSUL 325 (65 Fe) MG tablet take 1 tablet by mouth three times a day with food 90 tablet 3    metoprolol succinate (TOPROL XL) 100 MG extended release tablet take 1 tablet by mouth once daily 90 tablet 1    folic acid (FOLVITE) 1 MG tablet take 1 tablet by mouth once daily 90 tablet 4    allopurinol (ZYLOPRIM) 100 MG tablet Take 1 tablet by mouth daily 90 tablet 3    Continuous Blood Gluc Sensor (FREESTYLE LINNETTE 14 DAY SENSOR) MISC 1 Device by Does not apply route continuous 6 each 1    valsartan (DIOVAN) 80 MG tablet Take 1 tablet by mouth daily 30 tablet 3    Ascorbic Acid (VITAMIN C) 500 MG CAPS Take 1 tablet by mouth 2 times daily      Cholecalciferol (VITAMIN D3) 50 MCG (2000 UT) CAPS Take 2,000 Units by mouth daily      zinc sulfate (ZINCATE) 220 (50 Zn) MG capsule Take 50 mg by mouth daily      alogliptin (NESINA) 6.25 MG TABS tablet Take 1 tablet by mouth daily 30 tablet 0    Incontinence Supply Disposable (INCONTINENCE BRIEF LARGE) MISC Use prn for incontinence 5 packet 2    Incontinence Supply Disposable (POISE PAD) PADS Use as needed 50 each 2    Misc. Devices MISC Washable bed pads 50 each 2    Misc. Devices MISC Baby wipes 200 each 2    Misc. Devices MISC Chucks as needed 200 each 2    furosemide (LASIX) 40 MG tablet Take 40 mg by mouth daily       meclizine (ANTIVERT) 25 MG tablet Take 25 mg by mouth as needed  (Patient not taking: Reported on 7/21/2021)      warfarin (COUMADIN) 3 MG tablet Take 1 tablet by mouth daily As directed by [facility]. 30 days = 45 tabs      acetaminophen (TYLENOL) 500 MG tablet Take 1 tablet by mouth 4 times daily as needed for Pain (Patient not taking: Reported on 7/21/2021) 120 tablet 0    DULoxetine (CYMBALTA) 60 MG extended release capsule take 1 capsule by mouth once daily 90 capsule 3    gabapentin (NEURONTIN) 100 MG capsule take 1 capsule by mouth three times a day 270 capsule 0    dexlansoprazole (DEXILANT) 60 MG CPDR delayed release capsule Take 1 capsule by mouth daily 90 capsule 3    nystatin (MYCOSTATIN) 649813 UNIT/GM powder Apply 3 times daily.  (Patient taking differently: prn) 60 g 2    fenofibrate (TRICOR) 145 MG tablet take 1 tablet by mouth once daily 90 tablet 3    atorvastatin (LIPITOR) 40 MG tablet take 1 tablet by mouth at bedtime 90 tablet 3    RA VITAMIN B-12 100 MCG tablet take 1 tablet by mouth once daily 90 tablet 3    Dulaglutide (TRULICITY SC) Inject 3.61 mg into the skin once a week Usually on mondays      aspirin (RA ASPIRIN EC) 81 MG EC tablet Take 1 tablet by mouth daily 30 tablet 3    digoxin (LANOXIN) 125 MCG tablet Take 0.5 tablets by mouth daily 30 tablet 0    Calcium Carbonate-Vitamin D (CALCIUM-VITAMIN D) 500-200 MG-UNIT per tablet Take 1 tablet by mouth 2 times daily (with meals)          Allergies:  Patient has no known allergies. Social History:    reports that she quit smoking about 14 years ago. She has a 30.00 pack-year smoking history. She has never used smokeless tobacco. She reports that she does not drink alcohol and does not use drugs. Family History:       Problem Relation Age of Onset    Asthma Sister     Asthma Brother     Heart Disease Father     Other Mother     High Blood Pressure Other     Cancer Maternal Uncle         stomach    Diabetes Maternal Grandmother     High Blood Pressure Maternal Grandmother     Diabetes Maternal Grandfather     Stroke Neg Hx        Review of systems:  Constitutional: no fever, no night sweats, no fatigue. Positive for chills  Head: no headache, no head injury, no migranes. Eye: no blurring of vision, no double vision. Ears: no hearing difficulty, no tinnitus  Mouth/throat: no ulceration, dental caries, dysphagia. Left-sided neck pain and swelling  Lungs: no cough, no shortness of breath, no wheeze  CVS: no palpitation, no chest pain, no shortness of breath.   Bilateral lower extremity swelling  GI: no abdominal pain, no nausea , no vomiting, no constipation  KATALINA: no dysuria, frequency and urgency, no hematuria, no kidney stones  Musculoskeletal: no joint pain, swelling , stiffness  Endocrine: no polyuria, polydypsia, no cold or heat intolerence  Hematology: no anemia, no easy brusing or bleeding, no hx of clotting disorder  Dermatology: no skin rash, no eczema, no prurities,  Psychiatry: no depression, no anxiety,no panic attacks, no suicide ideation  Neurology: no syncope, no seizures, no numbness or tingling of hands, no numbness or tingling of feet, no paresis    10 point review of systems completed, all other than noted above are negative. Vitals:   Vitals:    08/02/21 1114   BP: (!) 162/102   Pulse: 113   Resp: 18   Temp:    SpO2: 94%      BMI: Body mass index is 33.23 kg/m². Exam:  Physical Examination: General appearance - alert, awake appears to be in no acute distress  HEENT: Atraumatic normocephalic,   Neck - left-sided neck swelling, induration and erythematous. Tender to touch. Chest - Bilateral air entry, no wheezes, crackles or rhonchi. Non tender to palpation of chest wall  Heart - S1S2 RRR, no murmurs or gallops  Abdomen - Soft, non tender non distended. Normoactive bowel sounds  Neurological - II-XII grossly intact, no neurological deficits  Musculoskeletal - 5/5 power upper and lower extremities equal bilaterally.   Full ROM of all limbs  Extremities -bilateral lower extremity edema  Skin - normal coloration and turgor, no rashes, no suspicious skin lesions noted      Review of Labs and Diagnostic Testing:  CBC:   Recent Labs     08/02/21  0615   WBC 13.2*   HGB 10.4*        BMP:    Recent Labs     08/02/21  0615      K 4.4      CO2 21*   BUN 29*   CREATININE 1.4*   GLUCOSE 165*     Calcium:  Recent Labs     08/02/21  0615   CALCIUM 9.8       Recent Labs     08/02/21  0634   POCGLU 149*     INR:   Recent Labs     08/02/21  0615   INR 2.94*     Hepatic:   Recent Labs     08/02/21  0615   ALKPHOS 79   ALT 15   AST 30   PROT 7.8   BILITOT 1.4*   LABALBU 4.1     Amylase and Lipase:  Recent Labs     08/02/21  0615   AMYLASE 240*       ABGs:   Lab Results   Component Value Date    PH 7.36 01/04/2021    PCO2 33 01/04/2021    PO2 79 01/04/2021    HCO3 19 01/04/2021    O2SAT 95 01/04/2021       Radiology:     CTA HEAD W WO CONTRAST    Addendum Date: 8/2/2021    ** ADDENDUM #1 ** The study was reviewed again at the request of the referring provider to assess for inflammation in the left side of the neck. There is increased density in the left parotid gland consistent with hepatitis. There is punctate calcification in the left parotid gland. There is increased density in the skin and subcutaneous soft tissues over the left side of the neck extending posteriorly. These findings are consistent with edema and/or cellulitis. There is no drainable fluid collection. There are small lymph nodes behind the angle the mandible on the left side and in the posterior triangle of the neck on the left side. There is an enlarged left lobe of the thyroid gland. Result Date: 8/2/2021  PROCEDURE: CTA HEAD W WO CONTRAST, CTA NECK W WO CONTRAST CLINICAL INFORMATION: AMS. COMPARISON: CT scan of the brain obtained on the same day. TECHNIQUE: 1 mm axial images were obtained through the head and neck after the fast bolus administration of contrast. A noncontrast localizer was obtained. 3-D reconstructions were performed on a dedicated 3-D workstation. These include multiplanar MPR images and multiplanar MIP images. Centerline reconstructions were obtained of the carotid systems. Isovue intravenous contrast was given. All CT scans at this facility use dose modulation, iterative reconstruction, and/or weight-based dosing when appropriate to reduce radiation dose to as low as reasonably achievable. FINDINGS: CTA NECK: Aortic arch and branches:  There is atherosclerotic calcification in aortic arch and at the origin of the brachiocephalic, left common carotid and left subclavian arteries Right common carotid artery/ICA: There is a small amount of calcified plaque involving the right carotid bifurcation. There is no significant hemodynamic stenosis in the right common and internal carotid arteries. Left common carotid artery/ICA: There is no significant hemodynamic stenosis in the left common and internal carotid arteries. Vertebral arteries: There is antegrade flow in the right and left vertebral arteries. There is atherosclerotic calcification in the cavernous segments of both internal carotid arteries. This no evidence of severe stenosis CTA HEAD: Internal carotid arteries: There is atherosclerotic calcification in the cavernous segments of both internal carotid arteries. There is no evidence of severe stenosis. . Middle cerebral arteries: Antegrade flow in the middle cerebral arteries bilaterally. Anterior cerebral arteries: Antegrade flow in the anterior cerebral arteries bilaterally. Vertebral arteries: There is some calcification in the distal left vertebral artery. There is antegrade flow in the right and left vertebral arteries. Basilar artery: No significant stenosis. Superior cerebellar arteries: Antegrade flow in the superior cerebellar arteries bilaterally. Rebecca Salen Posterior cerebral arteries: Antegrade flow in the posterior cerebral arteries bilaterally No aneurysms, stenoses or occlusions are noted. The superior sagittal sinus, vein of Nelson, internal cerebral veins, straight sinus, transverse sinuses and sigmoid sinuses are patent. Axial source data: There is evidence for granulomatous disease in left upper lobe of the lung and in the mediastinum. There is enlargement of the left and to lesser extent right lobes of thyroid gland. Cervical spondylosis. 1. Abnormal left parotid gland which is enlarged and demonstrates increased density consistent with peritonitis. There is punctate calcification in the left parotid gland. 2. Increased density in the skin and subcutaneous soft tissues over the left side of neck consistent with edema and/or cellulitis.  3. Enlarged lymph nodes adjacent to the angle of mandible on the left side and in the posterior triangle of the neck. 4. Enlarged left lobe of the thyroid gland Final report electronically signed by  [facility] on 8/2/2021 10:29 AM ** ORIGINAL REPORT ** PROCEDURE: CTA HEAD W WO CONTRAST, CTA NECK W WO CONTRAST CLINICAL INFORMATION: AMS. COMPARISON: CT scan of the brain obtained on the same day. TECHNIQUE: 1 mm axial images were obtained through the head and neck after the fast bolus administration of contrast. A noncontrast localizer was obtained. 3-D reconstructions were performed on a dedicated 3-D workstation. These include multiplanar MPR images and multiplanar MIP images. Centerline reconstructions were obtained of the carotid systems. Isovue intravenous contrast was given. All CT scans at this facility use dose modulation, iterative reconstruction, and/or weight-based dosing when appropriate to reduce radiation dose to as low as reasonably achievable. FINDINGS: CTA NECK: Aortic arch and branches: There is atherosclerotic calcification in aortic arch and at the origin of the brachiocephalic, left common carotid and left subclavian arteries Right common carotid artery/ICA: There is a small amount of calcified plaque involving the right carotid bifurcation. There is no significant hemodynamic stenosis in the right common and internal carotid arteries. Left common carotid artery/ICA: There is no significant hemodynamic stenosis in the left common and internal carotid arteries. Vertebral arteries: There is antegrade flow in the right and left vertebral arteries. There is atherosclerotic calcification in the cavernous segments of both internal carotid arteries. This no evidence of severe stenosis CTA HEAD: Internal carotid arteries: There is atherosclerotic calcification in the cavernous segments of both internal carotid arteries. There is no evidence of severe stenosis. . Middle cerebral arteries: Antegrade flow in the middle cerebral arteries bilaterally. Anterior cerebral arteries: Antegrade flow in the anterior cerebral arteries bilaterally. Vertebral arteries: There is some calcification in the distal left vertebral artery. There is antegrade flow in the right and left vertebral arteries. Basilar artery: No significant stenosis. Superior cerebellar arteries: Antegrade flow in the superior cerebellar arteries bilaterally. [facility] Posterior cerebral arteries: Antegrade flow in the posterior cerebral arteries bilaterally No aneurysms, stenoses or occlusions are noted. The superior sagittal sinus, vein of Nelson, internal cerebral veins, straight sinus, transverse sinuses and sigmoid sinuses are patent. Axial source data: There is evidence for granulomatous disease in left upper lobe of the lung and in the mediastinum. There is enlargement of the left and to lesser extent right lobes of thyroid gland. Cervical spondylosis. IMPRESSION:  1. Calcification in the right carotid bulb. No significant hemodynamic stenosis in the right common and internal carotid arteries. 2. No significant hemodynamic stenosis in the left common and internal carotid arteries. 3. Antegrade flow in the right and left vertebral arteries. 4. Atherosclerotic calcification in the aortic arch and at the origins of the brachiocephalic, left common carotid left subclavian arteries. 5. Atherosclerotic calcification in the cavernous segments of both internal carotid arteries. No evidence of severe stenosis. 6. Atherosclerotic calcification in the distal left vertebral artery. Antegrade flow in both distal vertebral, basilar and posterior cerebral arteries. 7. Otherwise negative CTA of the brain. 8. Evidence for granulomatous disease in left upper lobe of the lung and in the mediastinum. 9. Enlargement of the left and to lesser extent right lobes of the thyroid gland. 10. Cervical spondylosis. [facility] **This report has been created using voice recognition software.  It may contain minor errors which are CLINICAL INFORMATION: AMS. COMPARISON: CT scan of the brain obtained on the same day. TECHNIQUE: 1 mm axial images were obtained through the head and neck after the fast bolus administration of contrast. A noncontrast localizer was obtained. 3-D reconstructions were performed on a dedicated 3-D workstation. These include multiplanar MPR images and multiplanar MIP images. Centerline reconstructions were obtained of the carotid systems. Isovue intravenous contrast was given. All CT scans at this facility use dose modulation, iterative reconstruction, and/or weight-based dosing when appropriate to reduce radiation dose to as low as reasonably achievable. FINDINGS: CTA NECK: Aortic arch and branches: There is atherosclerotic calcification in aortic arch and at the origin of the brachiocephalic, left common carotid and left subclavian arteries Right common carotid artery/ICA: There is a small amount of calcified plaque involving the right carotid bifurcation. There is no significant hemodynamic stenosis in the right common and internal carotid arteries. Left common carotid artery/ICA: There is no significant hemodynamic stenosis in the left common and internal carotid arteries. Vertebral arteries: There is antegrade flow in the right and left vertebral arteries. There is atherosclerotic calcification in the cavernous segments of both internal carotid arteries. This no evidence of severe stenosis CTA HEAD: Internal carotid arteries: There is atherosclerotic calcification in the cavernous segments of both internal carotid arteries. There is no evidence of severe stenosis. . Middle cerebral arteries: Antegrade flow in the middle cerebral arteries bilaterally. Anterior cerebral arteries: Antegrade flow in the anterior cerebral arteries bilaterally. Vertebral arteries: There is some calcification in the distal left vertebral artery. There is antegrade flow in the right and left vertebral arteries.  Basilar artery: No significant stenosis. Superior cerebellar arteries: Antegrade flow in the superior cerebellar arteries bilaterally. [place]  Posterior cerebral arteries: Antegrade flow in the posterior cerebral arteries bilaterally No aneurysms, stenoses or occlusions are noted. The superior sagittal sinus, vein of Nelson, internal cerebral veins, straight sinus, transverse sinuses and sigmoid sinuses are patent. Axial source data: There is evidence for granulomatous disease in left upper lobe of the lung and in the mediastinum. There is enlargement of the left and to lesser extent right lobes of thyroid gland. Cervical spondylosis. 1. Abnormal left parotid gland which is enlarged and demonstrates increased density consistent with peritonitis. There is punctate calcification in the left parotid gland. 2. Increased density in the skin and subcutaneous soft tissues over the left side of neck consistent with edema and/or cellulitis. 3. Enlarged lymph nodes adjacent to the angle of mandible on the left side and in the posterior triangle of the neck. 4. Enlarged left lobe of the thyroid gland Final report electronically signed by DR Yfn Marquez on 8/2/2021 10:29 AM ** ORIGINAL REPORT ** PROCEDURE: CTA HEAD W WO CONTRAST, CTA NECK W WO CONTRAST CLINICAL INFORMATION: AMS. COMPARISON: CT scan of the brain obtained on the same day. TECHNIQUE: 1 mm axial images were obtained through the head and neck after the fast bolus administration of contrast. A noncontrast localizer was obtained. 3-D reconstructions were performed on a dedicated 3-D workstation. These include multiplanar MPR images and multiplanar MIP images. Centerline reconstructions were obtained of the carotid systems. Isovue intravenous contrast was given. All CT scans at this facility use dose modulation, iterative reconstruction, and/or weight-based dosing when appropriate to reduce radiation dose to as low as reasonably achievable.  FINDINGS: CTA NECK: Aortic arch and branches: right common and internal carotid arteries. 2. No significant hemodynamic stenosis in the left common and internal carotid arteries. 3. Antegrade flow in the right and left vertebral arteries. 4. Atherosclerotic calcification in the aortic arch and at the origins of the brachiocephalic, left common carotid left subclavian arteries. 5. Atherosclerotic calcification in the cavernous segments of both internal carotid arteries. No evidence of severe stenosis. 6. Atherosclerotic calcification in the distal left vertebral artery. Antegrade flow in both distal vertebral, basilar and posterior cerebral arteries. 7. Otherwise negative CTA of the brain. 8. Evidence for granulomatous disease in left upper lobe of the lung and in the mediastinum. 9. Enlargement of the left and to lesser extent right lobes of the thyroid gland. 10. Cervical spondylosis. Leticia Spears **This report has been created using voice recognition software. It may contain minor errors which are inherent in voice recognition technology. ** Final report electronically signed by DR Malini Velasquez on 8/2/2021 9:44 AM       Active Problems:    Atrial fibrillation ([address]    Parotiditis    Diabetes mellitus type 2, insulin dependent ([address]    Chronic kidney disease (CKD), stage III (moderate) ([facility]  Resolved Problems:    * No resolved hospital problems. *      Assessment and Plan:  1. Acute parotiditis: Elevated inflammatory markers and white count. Blood cultures ordered and are pending. We will start patient on IV antibiotics. ENT consult. Warm compression. 2. Acute metabolic encephalopathy secondary to infection: CTA head and neck done in the ED and are negative for acute intracranial abnormalities. Patient has confusion at baseline, she is alert awake and orientated  to person and place. Check urinalysis. 3. Chronic A. fib: Continue with metoprolol and Coumadin, INR checked and is therapeutic.   She is also on digoxin, will hold this until we can check her levels and make sure that is not contributing to her increasing confusion  4. Diabetes type 2: Continue with insulin and as needed sliding scale  5. Chronic kidney disease stage IIIb: Creatinine 1.4 today, avoid nephrotoxic medications and monitor renal function. Continue with allopurinol and Nephrocaps. 6. Diabetic neuropathy: Continue Cymbalta and Neurontin. 7. Hypertension: Resume home dose of metoprolol and valsartan. Monitor blood pressures.       DVT prophylaxis: [] Lovenox                                 [] SCDs                                 [] SQ Heparin                                 [] Encourage ambulation, low risk for DVT, no chemical or mechanical prophylaxis necessary              [x] Already on Anticoagulation            Anticipated Disposition upon discharge: [] Home                                                                         [x] Home with [address]                                                                         [] [facility]                                                                         [] [address]          Electronically signed by Iesha White MD on 8/2/2021 at 11:51 AM

## 2021-08-02 NOTE — ED TRIAGE NOTES
Pt arrives with family with concern of left ear pain, confusion, mass under chin, and dizziness. This nurse as to assist pt off toilet in ER. Pt reports she has been feeling \"off\". Family reports increased confusions in the am before working. Pt has redness noted from under neck up up jaw to ear. EKG complete. Pt placed on cardiac monitor. Will monitor.

## 2021-08-02 NOTE — CONSULTS
Department of Otolaryngology  Consult Note    Reason for Consult:  Left parotiditis  Requesting Physician:  Dr Jatin Maurer:  Left neck swelling    History Obtained From:  patient, electronic medical record    HISTORY OF PRESENT ILLNESS:                The patient is a 76 y.o. female who was admitted to [address] [place] [address] this morning for treatment of left parotid infection and evaluation of ongoing confusion. Patient states swelling present for \"a while\"; brother reported to ER that swelling present for about a week, but progressively worsening. Patient denies having this type of problem in the past.  She is unable to provide much detail; very sleepy and drifts off during conversation. Per ER note, her brother notes that she has been confused, which she tends to do with urinary tract infections. No family is present at this time. Patient with history of type II DM; glucose 165 on admission. WBC 13.2. Temperature 99. Currently on Unasyn 3gm and Vancomycin.       Past Medical History:        Diagnosis Date    CAD (coronary artery disease)     CRI (chronic renal insufficiency)     Difficult intubation     excess skin in back of throat     DM (diabetes mellitus) ([facility] [address] 1994    GERD (gastroesophageal reflux disease)     H/O gastric bypass     Hyperlipidemia     Hypertension     Hypothyroidism     Neuropathy     Obesity     Osteoarthritis     Paroxysmal atrial fibrillation (HCC)     Prolonged emergence from general anesthesia     Sleep apnea     uses cpap    Sleep apnea     Visit for screening mammogram        Past Surgical History:        Procedure Laterality Date    BACK SURGERY      xs 2    CATARACT REMOVAL Right 7/24/14    Dr. Tamia Lepe EKG 12-LEAD  9/18/2015         ENDOSCOPY, COLON, DIAGNOSTIC      EYE SURGERY      FOOT SURGERY      left foot    HYSTERECTOMY, TOTAL ABDOMINAL      MOUTH BIOPSY  9-28-12    left tongue and lingual tonsil    OTHER SURGICAL HISTORY  11/8/2014    LEFT FEMUR RETROGRADE NAILING    OTHER SURGICAL HISTORY  4/23/2015    HARDWARE REMOVAL LEFT FEMUR, INSERTION OF ANTIBIOTIC SPACER    PATELLA SURGERY  7/11/13    fractues rt patella     [place] COLON CA SCRN NOT HI RSK IND Left 1/24/2018    COLONOSCOPY performed by Janki Shirley MD at [address] [place] [address] SKIN TAG REMOVAL  9/25/12    mole removal    SLEEVE GASTRECTOMY  09/15/2015    Robotic    TOENAIL EXCISION      removal of great toe nails bilateral-still has toenails-had ingrown toenails and had trimmed out    [address]  THYROID CYST ASPIRATION AND OR INJECTION  5/21/2021     THYROID CYST ASPIRATION AND OR INJECTION 5/21/2021 [facility] ULTRASOUND       Current Medications:   Current Facility-Administered Medications: sodium chloride flush 0.9 % injection 5-40 mL, 5-40 mL, Intravenous, 2 times per day  sodium chloride flush 0.9 % injection 5-40 mL, 5-40 mL, Intravenous, PRN  0.9 % sodium chloride infusion, 25 mL, Intravenous, PRN  enoxaparin (LOVENOX) injection 40 mg, 40 mg, Subcutaneous, Daily  ondansetron (ZOFRAN-ODT) disintegrating tablet 4 mg, 4 mg, Oral, Q8H PRN **OR** ondansetron (ZOFRAN) injection 4 mg, 4 mg, Intravenous, Q6H PRN  polyethylene glycol (GLYCOLAX) packet 17 g, 17 g, Oral, Daily PRN  acetaminophen (TYLENOL) tablet 650 mg, 650 mg, Oral, Q6H PRN **OR** acetaminophen (TYLENOL) suppository 650 mg, 650 mg, Rectal, Q6H PRN  ampicillin-sulbactam (UNASYN) 3000 mg ivpb minibag, 3,000 mg, Intravenous, Q8H  insulin lispro (HUMALOG) injection vial 0-12 Units, 0-12 Units, Subcutaneous, TID WC  insulin lispro (HUMALOG) injection vial 0-6 Units, 0-6 Units, Subcutaneous, Nightly  glucose (GLUTOSE) 40 % oral gel 15 g, 15 g, Oral, PRN  dextrose 50 % IV solution, 12.5 g, Intravenous, PRN  glucagon (rDNA) injection 1 mg, 1 mg, Intramuscular, PRN  dextrose 5 % solution, 100 mL/hr, Intravenous, PRN  HYDROcodone-acetaminophen (NORCO) 5-325 MG per tablet 1 tablet, 1 tablet, Oral, Q6H PRN  vancomycin (VANCOCIN) intermittent dosing (placeholder), , Other, RX Placeholder  vancomycin (VANCOCIN) 2,000 mg in dextrose 5 % 500 mL IVPB, 2,000 mg, Intravenous, Once  [START ON 8/3/2021] vancomycin (VANCOCIN) 1250 mg in dextrose 5 % 250 mL IVPB, 1,250 mg, Intravenous, Q24H  allopurinol (ZYLOPRIM) tablet 100 mg, 100 mg, Oral, Daily  alogliptin (NESINA) tablet 6.25 mg, 6.25 mg, Oral, Daily  Vitamin C CAPS 1 tablet, 1 tablet, Oral, BID  aspirin EC tablet 81 mg, 81 mg, Oral, Daily  atorvastatin (LIPITOR) tablet 40 mg, 40 mg, Oral, Nightly  [START ON 8/3/2021] pantoprazole (PROTONIX) tablet 40 mg, 40 mg, Oral, QAM AC  digoxin (LANOXIN) tablet 62.5 mcg, 62.5 mcg, Oral, Daily  DULoxetine (CYMBALTA) extended release capsule 60 mg, 60 mg, Oral, Daily  fenofibrate (TRIGLIDE) tablet 160 mg, 160 mg, Oral, Daily  folic acid (FOLVITE) tablet 1 mg, 1 mg, Oral, Daily  furosemide (LASIX) tablet 40 mg, 40 mg, Oral, Daily  gabapentin (NEURONTIN) capsule 100 mg, 100 mg, Oral, TID  [START ON 8/3/2021] insulin lispro protamine & lispro (HUMALOG MIX) (75-25) 100 UNIT per ML injection vial SUSP 25 Units, 25 Units, Subcutaneous, Daily with breakfast  metoprolol succinate (TOPROL XL) extended release tablet 100 mg, 100 mg, Oral, Daily  levothyroxine (SYNTHROID) tablet 75 mcg, 75 mcg, Oral, Daily  valsartan (DIOVAN) tablet 80 mg, 80 mg, Oral, Daily  warfarin (COUMADIN) tablet 3 mg, 3 mg, Oral, Daily  zinc sulfate (ZINCATE) capsule 50 mg, 50 mg, Oral, Daily    Allergies:  Review of patient's allergies indicates no known allergies. Social History:    TOBACCO:   reports that she quit smoking about 14 years ago. She has a 30.00 pack-year smoking history. She has never used smokeless tobacco.  ETOH:   reports no history of alcohol use. DRUGS:   reports no history of drug use.     Family History:       Problem Relation Age of Onset    Asthma Sister HGB 10.4 08/02/2021    HCT 34.2 08/02/2021     08/02/2021    .2 08/02/2021    MCH 32.3 08/02/2021    MCHC 30.4 08/02/2021    RDW 13.8 10/26/2017    NRBC 0 08/02/2021    SEGSPCT 85.1 08/02/2021    MONOPCT 7.4 08/02/2021    MONOSABS 1.0 08/02/2021    LYMPHSABS 0.8 08/02/2021    EOSABS 0.1 08/02/2021    BASOSABS 0.1 08/02/2021     Radiology Review:  CTA Neck W WO Contrast 8/2/2021 0944    ** ADDENDUM #1 **      The study was reviewed again at the request of the referring provider to    assess for inflammation in the left side of the neck. There is increased density in the left parotid gland consistent with    hepatitis. There is punctate calcification in the left parotid gland. There is increased density in the skin and subcutaneous soft tissues over    the left side of the neck extending    posteriorly. These findings are consistent with edema and/or cellulitis. There is no drainable fluid collection. There are small lymph nodes behind    the angle the mandible on the left side and in the posterior triangle of    the neck on the left side. There    is an enlarged left lobe of the thyroid gland. Narrative   PROCEDURE: CTA HEAD W WO CONTRAST, CTA NECK W WO CONTRAST       CLINICAL INFORMATION: AMS.     COMPARISON: CT scan of the brain obtained on the same day.       TECHNIQUE: 1 mm axial images were obtained through the head and neck after the fast bolus administration of contrast. A noncontrast localizer was obtained. 3-D reconstructions were performed on a dedicated 3-D workstation. These include multiplanar MPR    images and multiplanar MIP images.  Centerline reconstructions were obtained of the carotid systems.  Isovue intravenous contrast was given.       All CT scans at this facility use dose modulation, iterative reconstruction, and/or weight-based dosing when appropriate to reduce radiation dose to as low as reasonably achievable.       FINDINGS:           CTA NECK:       Aortic spondylosis.       Impression   1. Abnormal left parotid gland which is enlarged and demonstrates increased density consistent with peritonitis. There is punctate calcification in the left parotid gland. 2. Increased density in the skin and subcutaneous soft tissues over the left side of neck consistent with edema and/or cellulitis. 3. Enlarged lymph nodes adjacent to the angle of mandible on the left side and in the posterior triangle of the neck. 4. Enlarged left lobe of the thyroid gland       Final report electronically signed by DR Michael Lacy on 8/2/2021 10:29 AM       ** ORIGINAL REPORT **   PROCEDURE: CTA HEAD W WO CONTRAST, CTA NECK W WO CONTRAST       CLINICAL INFORMATION: AMS.     COMPARISON: CT scan of the brain obtained on the same day.       TECHNIQUE: 1 mm axial images were obtained through the head and neck after the fast bolus administration of contrast. A noncontrast localizer was obtained. 3-D reconstructions were performed on a dedicated 3-D workstation. These include multiplanar MPR    images and multiplanar MIP images.  Centerline reconstructions were obtained of the carotid systems. Isovue intravenous contrast was given.       All CT scans at this facility use dose modulation, iterative reconstruction, and/or weight-based dosing when appropriate to reduce radiation dose to as low as reasonably achievable.       FINDINGS:       CTA NECK:   Aortic arch and branches: There is atherosclerotic calcification in aortic arch and at the origin of the brachiocephalic, left common carotid and left subclavian arteries       Right common carotid artery/ICA: There is a small amount of calcified plaque involving the right carotid bifurcation. There is no significant hemodynamic stenosis in the right common and internal carotid arteries.       Left common carotid artery/ICA: There is no significant hemodynamic stenosis in the left common and internal carotid arteries.       Vertebral arteries:  There is antegrade flow in the right and left vertebral arteries.       There is atherosclerotic calcification in the cavernous segments of both internal carotid arteries. This no evidence of severe stenosis       CTA HEAD:   Internal carotid arteries: There is atherosclerotic calcification in the cavernous segments of both internal carotid arteries. There is no evidence of severe stenosis. .       Middle cerebral arteries: Antegrade flow in the middle cerebral arteries bilaterally.       Anterior cerebral arteries: Antegrade flow in the anterior cerebral arteries bilaterally.       Vertebral arteries: There is some calcification in the distal left vertebral artery. There is antegrade flow in the right and left vertebral arteries.       Basilar artery: No significant stenosis.       Superior cerebellar arteries: Antegrade flow in the superior cerebellar arteries bilaterally. .       Posterior cerebral arteries: Antegrade flow in the posterior cerebral arteries bilaterally       No aneurysms, stenoses or occlusions are noted.       The superior sagittal sinus, vein of Nelson, internal cerebral veins, straight sinus, transverse sinuses and sigmoid sinuses are patent.       Axial source data: There is evidence for granulomatous disease in left upper lobe of the lung and in the mediastinum. There is enlargement of the left and to lesser extent right lobes of thyroid gland. Cervical spondylosis.       IMPRESSION:       1. Calcification in the right carotid bulb. No significant hemodynamic stenosis in the right common and internal carotid arteries. 2. No significant hemodynamic stenosis in the left common and internal carotid arteries. 3. Antegrade flow in the right and left vertebral arteries. 4. Atherosclerotic calcification in the aortic arch and at the origins of the brachiocephalic, left common carotid left subclavian arteries.    5. Atherosclerotic calcification in the cavernous segments of both internal carotid arteries. No evidence of severe stenosis. 6. Atherosclerotic calcification in the distal left vertebral artery. Antegrade flow in both distal vertebral, basilar and posterior cerebral arteries. 7. Otherwise negative CTA of the brain. 8. Evidence for granulomatous disease in left upper lobe of the lung and in the mediastinum. 9. Enlargement of the left and to lesser extent right lobes of the thyroid gland. 10. Cervical spondylosis. .       **This report has been created using voice recognition software. It may contain minor errors which are inherent in voice recognition technology. **       Final report electronically signed by DR Hair Carlin on 8/2/2021 9:44 AM     IMPRESSION/RECOMMENDATIONS:      Acute left parotiditis  - Currently on IV Vancomycin and IV Unasyn 3gm  - Unable to obtain culture, no material expressed. Will have IR get culture through FNA of left parotid. Spoke with Dr Brandee Galindo who felt this could easily be done and will not need to hold Warfarin.  - Following is important to help promote salivation and decrease swelling/pain; patient will need assistance:     - Good oral hygiene      - Increased water/PO fluid intake      - Apply heat to left face/neck      - Massage of left face/neck (from in front of the ear toward the front of the mouth)      - Sialagogues (lemon juice, lemon wedges, sour candies) to promote salivation    Patient seen with Dr Nathalia Pinto; care discussed with Dr Nathalia Pinto and Dr Shaina Murillo.     Electronically signed by ARNOL Thompson CNP on 8/2/2021 at 11:55 AM

## 2021-08-02 NOTE — ED NOTES
ED nurse-to-nurse bedside report    Chief Complaint   Patient presents with    Mass    Otalgia    Dizziness    Altered Mental Status      LOC: alert and orientated to name, place, date  Vital signs   Vitals:    08/02/21 0602 08/02/21 0626 08/02/21 0705   BP: (!) 162/93  (!) 158/95   Pulse: 95  108   Resp: 14  16   Temp: 99 °F (37.2 °C)     SpO2: 98%  100%   Weight:  225 lb (102.1 kg)    Height:  5' 9\" (1.753 m)       Pain:    Pain Interventions: morphine  Pain Goal: 3  Oxygen: No    Current needs required RA   Telemetry: Yes  LDAs:   Peripheral IV 08/02/21 Right Antecubital (Active)   Site Assessment Clean;Dry; Intact 08/02/21 0617   Dressing Status Clean;Dry; Intact 08/02/21 0617     Continuous Infusions:   Mobility: Requires assistance * 2  Morales Fall Risk Score: Fall Risk 9/15/2020 12/3/2019   2 or more falls in past year? no no   Fall with injury in past year? no yes     Fall Interventions: call light in reach, side rails up x2.  Call light in reach   Report given to: [facility], THE RN     Michael Avila RN  08/02/21 4136

## 2021-08-02 NOTE — PROGRESS NOTES
Pharmacy Medication History Note      List of current medications patient is taking is complete. Source of information: outside dispense history, continued care [facility], patient unreliable historian at this time. Patient's sister unable to provide much clarification of homemeds. Changes made to medication list:  Medications removed (include reason, ex.  therapy complete or physician discontinued):  Gabapentin-     Medications added/doses adjusted:  Gabapentin 100 mg TID     Electronically signed by Karyle Norma, [facility] on 2021 at 4:00 PM     Medication history completed by Marli Wayne, PharmD Candidate

## 2021-08-02 NOTE — ACP (ADVANCE CARE PLANNING)
patient's previously recorded wishes  [] New Advance Directive completed  [] Portable Do Not Rescitate prepared for Provider review and signature  [] POLST/POST/MOLST/MOST prepared for Provider review and signature      Follow-up plan:    [] Schedule follow-up conversation to continue planning  [] Referred individual to Provider for additional questions/concerns   [] Advised patient/agent/surrogate to review completed ACP document and update if needed with changes in condition, patient preferences or care setting    [x] This note routed to one or more involved healthcare providers     Patient in ED with infection in neck. Brothluz Vargas at bedside. Reviewed that HCPOA is on file. No changes desired. We reviewed code status. She is a full code. She declines any further discussion but does know we have staff available that can assist with any questions. Discussed briefly the 3 levels in [place]. Brother indicated they need to fill out a will and he would like financial POA. He also indicated that patient lives in [place]. [facility] apartments with her sister Alva Irwin and they are both in wheelchairs, but it's not handicap accessible at doorways. He mentioned in patient room that he's like to \"get her into a place with more help. \"  He does help shop for that household, another sister and has his own place with some children that he helps care for.

## 2021-08-02 NOTE — ED PROVIDER NOTES
[facility]  eMERGENCY dEPARTMENT eNCOUnter          CHIEF COMPLAINT       Chief Complaint   Patient presents with    Mass    Otalgia    Dizziness    Altered Mental Status       Nurses Notes reviewed and I agree except as noted inthe HPI. HISTORY OF PRESENT ILLNESS    Isaiah Pfeiffer is a 76 y.o. female who presents to the Emergency Department for the evaluation of ear pain and confusion. History is somewhat difficult to completely obtain. Is provided via the patient as well as family at bedside. They report that 3 days ago, the patient began complaining of some pain in her left ear and jaw, some difficulty chewing but they thought that maybe it was the start of Bell's palsy and knew nothing could be done for it. She states that now there is a hard lump in the area though the onset of this is very poorly described. Today she was having some dizziness and confusion as well so they brought her into the emergency department for evaluation. She has difficulty describing her dizziness but does deny any lightheadedness, vertigo, or balance disturbance. States she has a history of vertigo but this is not similar. She reports chills without any associated fever or injury to the affected areas. She is diabetic, reports glucose in the 300s. She reports new vision changes over the past few days but cannot elaborate on these, reporting that she feels goofy today. She is minimally weightbearing at baseline, primarily wheelchair dependent though she can stand for transfer and occasionally walk a couple of steps with difficulty. She is on Coumadin, reports her last check was good and she did not need recheck for the next month. She has history of intermittent numbness in her legs which is chronic with associated foot drop secondary to multiple bilateral lower extremity fractures. Family at bedside reports the patient has a history of similar confusion in the past with urinary tract infection. They also report that the patient has a history of benign thyroid nodules. Patient denies any urinary symptoms. She denies any hearing change, tinnitus, exudate from the ear. Reports she tried to clean out the ear with peroxide without relief of her symptoms. No difficulty breathing or difficulty swallowing. No chest pain or voice change. REVIEW OF SYSTEMS     Review of Systems   Constitutional: Positive for chills. Negative for fever. HENT: Positive for ear pain. Negative for trouble swallowing and voice change. Eyes: Positive for visual disturbance. Negative for photophobia. Respiratory: Negative for cough and shortness of breath. Cardiovascular: Negative for chest pain. Gastrointestinal: Negative for abdominal pain. Genitourinary: Negative for dysuria. Musculoskeletal: Positive for neck pain. Skin: Positive for color change. Neurological: Positive for dizziness. Negative for light-headedness. Psychiatric/Behavioral: Positive for confusion. All other systems reviewed and are negative. PAST MEDICAL HISTORY    has a past medical history of CAD (coronary artery disease), CRI (chronic renal insufficiency), Difficult intubation, DM (diabetes mellitus) ([address] GERD (gastroesophageal reflux disease), H/O gastric bypass, Hyperlipidemia, Hypertension, Hypothyroidism, Neuropathy, Obesity, Osteoarthritis, Paroxysmal atrial fibrillation ([address] Prolonged emergence from general anesthesia, Sleep apnea, Sleep apnea, and Visit for screening mammogram.    SURGICAL HISTORY      has a past surgical history that includes back surgery; Foot surgery; Colonoscopy; Upper gastrointestinal endoscopy; Mouth Biopsy (9-28-12); Skin tag removal (9/25/12); Tonsillectomy; Patella surgery (7/11/13); Cataract removal (Right, 7/24/14); toenail excision; other surgical history (11/8/2014); other surgical history (4/23/2015); Sleeve Gastrectomy (09/15/2015); EKG 12 Lead (9/18/2015);  Hysterectomy, total abdominal; pr colon ca scrn not hi rsk ind (Left, 1/24/2018); Endoscopy, colon, diagnostic; eye surgery; and US ASP/INJ THYROID CYST (5/21/2021). CURRENT MEDICATIONS       Current Discharge Medication List      CONTINUE these medications which have NOT CHANGED    Details   gabapentin (NEURONTIN) 100 MG capsule Take 100 mg by mouth 3 times daily. levothyroxine (SYNTHROID) 50 MCG tablet Take 1.5 tablets by mouth Daily  Qty: 90 tablet, Refills: 1    Associated Diagnoses: Hypothyroidism, unspecified type      insulin lispro protamine & lispro (HUMALOG MIX 75/25 KWIKPEN) (75-25) 100 UNIT per ML SUPN injection pen Inject 0.25 mLs into the skin 2 times daily (with meals) 25 units in the moring and 35 units in the evening. Max daily dose is 60 units.   Qty: 5 pen, Refills: 3      alendronate (FOSAMAX) 70 MG tablet take 1 tablet by mouth every week  Qty: 12 tablet, Refills: 3    Associated Diagnoses: Osteopenia of hip, unspecified laterality      FEROSUL 325 (65 Fe) MG tablet take 1 tablet by mouth three times a day with food  Qty: 90 tablet, Refills: 3      metoprolol succinate (TOPROL XL) 100 MG extended release tablet take 1 tablet by mouth once daily  Qty: 90 tablet, Refills: 1      folic acid (FOLVITE) 1 MG tablet take 1 tablet by mouth once daily  Qty: 90 tablet, Refills: 4      allopurinol (ZYLOPRIM) 100 MG tablet Take 1 tablet by mouth daily  Qty: 90 tablet, Refills: 3      valsartan (DIOVAN) 80 MG tablet Take 1 tablet by mouth daily  Qty: 30 tablet, Refills: 3      Ascorbic Acid (VITAMIN C) 500 MG CAPS Take 1 tablet by mouth 2 times daily      Cholecalciferol (VITAMIN D3) 50 MCG (2000 UT) CAPS Take 2,000 Units by mouth daily      zinc sulfate (ZINCATE) 220 (50 Zn) MG capsule Take 50 mg by mouth daily      alogliptin (NESINA) 6.25 MG TABS tablet Take 1 tablet by mouth daily  Qty: 30 tablet, Refills: 0      furosemide (LASIX) 40 MG tablet Take 40 mg by mouth daily       warfarin (COUMADIN) 3 MG tablet Take 1 tablet by mouth daily As directed by [facility]. 30 days = 45 tabs      DULoxetine (CYMBALTA) 60 MG extended release capsule take 1 capsule by mouth once daily  Qty: 90 capsule, Refills: 3    Associated Diagnoses: Neuropathy      dexlansoprazole (DEXILANT) 60 MG CPDR delayed release capsule Take 1 capsule by mouth daily  Qty: 90 capsule, Refills: 3    Associated Diagnoses: Gastroesophageal reflux disease, esophagitis presence not specified      fenofibrate (TRICOR) 145 MG tablet take 1 tablet by mouth once daily  Qty: 90 tablet, Refills: 3    Associated Diagnoses: Hyperlipidemia, unspecified hyperlipidemia type; Type 2 diabetes mellitus with diabetic polyneuropathy, with long-term current use of insulin ([facility])      atorvastatin (LIPITOR) 40 MG tablet take 1 tablet by mouth at bedtime  Qty: 90 tablet, Refills: 3    Associated Diagnoses: Hyperlipidemia, unspecified hyperlipidemia type      RA VITAMIN B-12 100 MCG tablet take 1 tablet by mouth once daily  Qty: 90 tablet, Refills: 3      Dulaglutide (TRULICITY SC) Inject 6.91 mg into the skin once a week Usually on mondays      aspirin (RA ASPIRIN EC) 81 MG EC tablet Take 1 tablet by mouth daily  Qty: 30 tablet, Refills: 3      digoxin (LANOXIN) 125 MCG tablet Take 0.5 tablets by mouth daily  Qty: 30 tablet, Refills: 0      Calcium Carbonate-Vitamin D (CALCIUM-VITAMIN D) 500-200 MG-UNIT per tablet Take 1 tablet by mouth 2 times daily (with meals)       Continuous Blood Gluc Sensor (FREESTYLE LINNETTE 14 DAY SENSOR) MISC 1 Device by Does not apply route continuous  Qty: 6 each, Refills: 1      !! Incontinence Supply Disposable (INCONTINENCE BRIEF LARGE) MISC Use prn for incontinence  Qty: 5 packet, Refills: 2    Associated Diagnoses: Urge incontinence of urine      !! Incontinence Supply Disposable (POISE PAD) PADS Use as needed  Qty: 50 each, Refills: 2    Associated Diagnoses: Urge incontinence of urine      !! Misc.  Devices MISC Washable bed pads  Qty: 50 each, Refills: 2      !! Misc. Devices MISC Baby wipes  Qty: 200 each, Refills: 2    Associated Diagnoses: Urge incontinence of urine      !! Misc. Devices MISC Chucks as needed  Qty: 200 each, Refills: 2    Associated Diagnoses: Urge incontinence of urine      meclizine (ANTIVERT) 25 MG tablet Take 25 mg by mouth as needed       acetaminophen (TYLENOL) 500 MG tablet Take 1 tablet by mouth 4 times daily as needed for Pain  Qty: 120 tablet, Refills: 0      nystatin (MYCOSTATIN) 676920 UNIT/GM powder Apply 3 times daily. Qty: 60 g, Refills: 2    Associated Diagnoses: Candida infection of flexural skin       ! ! - Potential duplicate medications found. Please discuss with provider. ALLERGIES     has No Known Allergies. FAMILY HISTORY     She indicated that her mother is . She indicated that her father is . She indicated that her sister is alive. She indicated that her brother is alive. She indicated that the status of her maternal grandmother is unknown. She indicated that the status of her maternal grandfather is unknown. She indicated that the status of her maternal uncle is unknown. She indicated that the status of her other is unknown. She indicated that the status of her neg hx is unknown.   family history includes Asthma in her brother and sister; Cancer in her maternal uncle; Diabetes in her maternal grandfather and maternal grandmother; Heart Disease in her father; High Blood Pressure in her maternal grandmother and another family member; Other in her mother. SOCIAL HISTORY      reports that she quit smoking about 14 years ago. She has a 30.00 pack-year smoking history. She has never used smokeless tobacco. She reports that she does not drink alcohol and does not use drugs. PHYSICAL EXAM     INITIAL VITALS:  height is 5' 9\" (1.753 m) and weight is 224 lb 6.9 oz (101.8 kg). Her oral temperature is 99.1 °F (37.3 °C). Her blood pressure is 135/108 (abnormal) and her pulse is 119. Her respiration is 18 and oxygen saturation is 94%. Physical Exam  Vitals and nursing note reviewed. Constitutional:       Appearance: She is obese. HENT:      Head: Normocephalic. Jaw: There is normal jaw occlusion. No trismus. Comments: No dental tenderness. Blunted angle of the mandible on the left on exam.  No sublingual edema/tenderness. Left Ear: Tympanic membrane normal. No decreased hearing noted. Drainage (Small amount of thick, white drainage in the 6 o'clock position) and swelling (Mild) present. There is no impacted cerumen. Mouth/Throat:      Mouth: Mucous membranes are moist.      Pharynx: Oropharynx is clear. Uvula midline. Comments: Oropharynx patent  Eyes:      Extraocular Movements: Extraocular movements intact. Pupils: Pupils are equal, round, and reactive to light. Neck:      Trachea: Trachea and phonation normal.     Cardiovascular:      Rate and Rhythm: Normal rate. Pulmonary:      Effort: Pulmonary effort is normal. No respiratory distress. Breath sounds: Normal breath sounds. Musculoskeletal:      Cervical back: No spinous process tenderness. Skin:     General: Skin is warm. Neurological:      Mental Status: She is alert. GCS: GCS eye subscore is 4. GCS verbal subscore is 5. GCS motor subscore is 6. Comments: Patient is somewhat distracted with delayed responses and slow, methodical speech           DIFFERENTIAL DIAGNOSIS:   Differential diagnoses are discussed    DIAGNOSTIC RESULTS     EKG: All EKG's are interpreted by the Emergency Department Physician who either signs or Co-signsthis chart in the absence of a cardiologist.    Vent. Rate: 102 bpm  PRinterval: * ms  QRS duration: 106 ms  QTc: 432 ms  P-R-T axes: *, -23, 158  Atrial fibrillation with RVR. No STEMI.   Compared to old EKG on 4-9-21      RADIOLOGY: non-plain film images(s) such as CT, Ultrasound and MRI are read by the radiologist.    [address]   Final Result Stable CT brain no acute process            **This report has been created using voice recognition software. It may contain minor errors which are inherent in voice recognition technology. **      Final report electronically signed by Dr. Anurag Suggs on 8/2/2021 9:18 AM      CTA HEAD W WO CONTRAST   Final Result   Addendum 1 of 1   ** ADDENDUM #1 **      The study was reviewed again at the request of the referring provider to    assess for inflammation in the left side of the neck. There is increased density in the left parotid gland consistent with    hepatitis. There is punctate calcification in the left parotid gland. There is increased density in the skin and subcutaneous soft tissues over    the left side of the neck extending    posteriorly. These findings are consistent with edema and/or cellulitis. There is no drainable fluid collection. There are small lymph nodes behind    the angle the mandible on the left side and in the posterior triangle of    the neck on the left side. There    is an enlarged left lobe of the thyroid gland. Final   1. Abnormal left parotid gland which is enlarged and demonstrates increased density consistent with peritonitis. There is punctate calcification in the left parotid gland. 2. Increased density in the skin and subcutaneous soft tissues over the left side of neck consistent with edema and/or cellulitis. 3. Enlarged lymph nodes adjacent to the angle of mandible on the left side and in the posterior triangle of the neck. 4. Enlarged left lobe of the thyroid gland      Final report electronically signed by DR Ayleen Trejo on 8/2/2021 10:29 AM      ** ORIGINAL REPORT **   PROCEDURE: CTA HEAD W WO CONTRAST, CTA NECK W WO CONTRAST      CLINICAL INFORMATION: AMS. COMPARISON: CT scan of the brain obtained on the same day.       TECHNIQUE: 1 mm axial images were obtained through the head and neck after the fast bolus administration of contrast. A noncontrast localizer was obtained. 3-D reconstructions were performed on a dedicated 3-D workstation. These include multiplanar MPR    images and multiplanar MIP images. Centerline reconstructions were obtained of the carotid systems. Isovue intravenous contrast was given. All CT scans at this facility use dose modulation, iterative reconstruction, and/or weight-based dosing when appropriate to reduce radiation dose to as low as reasonably achievable. FINDINGS:         CTA NECK:      Aortic arch and branches: There is atherosclerotic calcification in aortic arch and at the origin of the brachiocephalic, left common carotid and left subclavian arteries      Right common carotid artery/ICA: There is a small amount of calcified plaque involving the right carotid bifurcation. There is no significant hemodynamic stenosis in the right common and internal carotid arteries. Left common carotid artery/ICA: There is no significant hemodynamic stenosis in the left common and internal carotid arteries. Vertebral arteries: There is antegrade flow in the right and left vertebral arteries. There is atherosclerotic calcification in the cavernous segments of both internal carotid arteries. This no evidence of severe stenosis      CTA HEAD:         Internal carotid arteries: There is atherosclerotic calcification in the cavernous segments of both internal carotid arteries. There is no evidence of severe stenosis. .      Middle cerebral arteries: Antegrade flow in the middle cerebral arteries bilaterally. Anterior cerebral arteries: Antegrade flow in the anterior cerebral arteries bilaterally. Vertebral arteries: There is some calcification in the distal left vertebral artery. There is antegrade flow in the right and left vertebral arteries. Basilar artery: No significant stenosis. Superior cerebellar arteries: Antegrade flow in the superior cerebellar arteries bilaterally. Benedetta Ou       Posterior cerebral arteries: Antegrade flow in the posterior cerebral arteries bilaterally      No aneurysms, stenoses or occlusions are noted. The superior sagittal sinus, vein of Nelson, internal cerebral veins, straight sinus, transverse sinuses and sigmoid sinuses are patent. Axial source data: There is evidence for granulomatous disease in left upper lobe of the lung and in the mediastinum. There is enlargement of the left and to lesser extent right lobes of thyroid gland. Cervical spondylosis. IMPRESSION:       1. Calcification in the right carotid bulb. No significant hemodynamic stenosis in the right common and internal carotid arteries. 2. No significant hemodynamic stenosis in the left common and internal carotid arteries. 3. Antegrade flow in the right and left vertebral arteries. 4. Atherosclerotic calcification in the aortic arch and at the origins of the brachiocephalic, left common carotid left subclavian arteries. 5. Atherosclerotic calcification in the cavernous segments of both internal carotid arteries. No evidence of severe stenosis. 6. Atherosclerotic calcification in the distal left vertebral artery. Antegrade flow in both distal vertebral, basilar and posterior cerebral arteries. 7. Otherwise negative CTA of the brain. 8. Evidence for granulomatous disease in left upper lobe of the lung and in the mediastinum. 9. Enlargement of the left and to lesser extent right lobes of the thyroid gland. 10. Cervical spondylosis. Arun Perry **This report has been created using voice recognition software. It may contain minor errors which are inherent in voice recognition technology. **      Final report electronically signed by DR Michael Lacy on 8/2/2021 9:44 AM      CTA NECK W WO CONTRAST   Final Result   Addendum 1 of 1   ** ADDENDUM #1 **      The study was reviewed again at the request of the referring provider to    assess for inflammation in the left side of the neck. There is increased density in the left parotid gland consistent with    hepatitis. There is punctate calcification in the left parotid gland. There is increased density in the skin and subcutaneous soft tissues over    the left side of the neck extending    posteriorly. These findings are consistent with edema and/or cellulitis. There is no drainable fluid collection. There are small lymph nodes behind    the angle the mandible on the left side and in the posterior triangle of    the neck on the left side. There    is an enlarged left lobe of the thyroid gland. Final   1. Abnormal left parotid gland which is enlarged and demonstrates increased density consistent with peritonitis. There is punctate calcification in the left parotid gland. 2. Increased density in the skin and subcutaneous soft tissues over the left side of neck consistent with edema and/or cellulitis. 3. Enlarged lymph nodes adjacent to the angle of mandible on the left side and in the posterior triangle of the neck. 4. Enlarged left lobe of the thyroid gland      Final report electronically signed by DR Chica Mg on 8/2/2021 10:29 AM      ** ORIGINAL REPORT **   PROCEDURE: CTA HEAD W WO CONTRAST, CTA NECK W WO CONTRAST      CLINICAL INFORMATION: AMS. COMPARISON: CT scan of the brain obtained on the same day. TECHNIQUE: 1 mm axial images were obtained through the head and neck after the fast bolus administration of contrast. A noncontrast localizer was obtained. 3-D reconstructions were performed on a dedicated 3-D workstation. These include multiplanar MPR    images and multiplanar MIP images. Centerline reconstructions were obtained of the carotid systems. Isovue intravenous contrast was given. All CT scans at this facility use dose modulation, iterative reconstruction, and/or weight-based dosing when appropriate to reduce radiation dose to as low as reasonably achievable.       FINDINGS:         CTA NECK: Aortic arch and branches: There is atherosclerotic calcification in aortic arch and at the origin of the brachiocephalic, left common carotid and left subclavian arteries      Right common carotid artery/ICA: There is a small amount of calcified plaque involving the right carotid bifurcation. There is no significant hemodynamic stenosis in the right common and internal carotid arteries. Left common carotid artery/ICA: There is no significant hemodynamic stenosis in the left common and internal carotid arteries. Vertebral arteries: There is antegrade flow in the right and left vertebral arteries. There is atherosclerotic calcification in the cavernous segments of both internal carotid arteries. This no evidence of severe stenosis      CTA HEAD:         Internal carotid arteries: There is atherosclerotic calcification in the cavernous segments of both internal carotid arteries. There is no evidence of severe stenosis. .      Middle cerebral arteries: Antegrade flow in the middle cerebral arteries bilaterally. Anterior cerebral arteries: Antegrade flow in the anterior cerebral arteries bilaterally. Vertebral arteries: There is some calcification in the distal left vertebral artery. There is antegrade flow in the right and left vertebral arteries. Basilar artery: No significant stenosis. Superior cerebellar arteries: Antegrade flow in the superior cerebellar arteries bilaterally. Kathrene Bolk Posterior cerebral arteries: Antegrade flow in the posterior cerebral arteries bilaterally      No aneurysms, stenoses or occlusions are noted. The superior sagittal sinus, vein of Nelson, internal cerebral veins, straight sinus, transverse sinuses and sigmoid sinuses are patent. Axial source data: There is evidence for granulomatous disease in left upper lobe of the lung and in the mediastinum. There is enlargement of the left and to lesser extent right lobes of thyroid gland.  Cervical spondylosis. IMPRESSION:       1. Calcification in the right carotid bulb. No significant hemodynamic stenosis in the right common and internal carotid arteries. 2. No significant hemodynamic stenosis in the left common and internal carotid arteries. 3. Antegrade flow in the right and left vertebral arteries. 4. Atherosclerotic calcification in the aortic arch and at the origins of the brachiocephalic, left common carotid left subclavian arteries. 5. Atherosclerotic calcification in the cavernous segments of both internal carotid arteries. No evidence of severe stenosis. 6. Atherosclerotic calcification in the distal left vertebral artery. Antegrade flow in both distal vertebral, basilar and posterior cerebral arteries. 7. Otherwise negative CTA of the brain. 8. Evidence for granulomatous disease in left upper lobe of the lung and in the mediastinum. 9. Enlargement of the left and to lesser extent right lobes of the thyroid gland. 10. Cervical spondylosis. Marlon Cardenas **This report has been created using voice recognition software. It may contain minor errors which are inherent in voice recognition technology. **      Final report electronically signed by DR Brandee Puga on 8/2/2021 9:44 AM      US FINE NEEDLE ASPIRATION    (Results Pending)       LABS:      Labs Reviewed   CBC WITH AUTO DIFFERENTIAL - Abnormal; Notable for the following components:       Result Value    WBC 13.2 (*)     RBC 3.22 (*)     Hemoglobin 10.4 (*)     Hematocrit 34.2 (*)     .2 (*)     MCHC 30.4 (*)     RDW-SD 53.2 (*)     Segs Absolute 11.2 (*)     Lymphocytes Absolute 0.8 (*)     All other components within normal limits   COMPREHENSIVE METABOLIC PANEL - Abnormal; Notable for the following components:    Glucose 165 (*)     CREATININE 1.4 (*)     BUN 29 (*)     CO2 21 (*)     Total Bilirubin 1.4 (*)     All other components within normal limits   APTT - Abnormal; Notable for the following components: aPTT 47.9 (*)     All other components within normal limits   PROTIME-INR - Abnormal; Notable for the following components:    INR 2.94 (*)     All other components within normal limits   PROCALCITONIN - Abnormal; Notable for the following components:    Procalcitonin 0.38 (*)     All other components within normal limits   TSH WITH REFLEX - Abnormal; Notable for the following components:    TSH 0.085 (*)     All other components within normal limits   C-REACTIVE PROTEIN - Abnormal; Notable for the following components:    CRP 15.44 (*)     All other components within normal limits   SEDIMENTATION RATE - Abnormal; Notable for the following components:    Sed Rate 70 (*)     All other components within normal limits   GLOMERULAR FILTRATION RATE, ESTIMATED - Abnormal; Notable for the following components:    Est, Glom Filt Rate 38 (*)     All other components within normal limits   AMYLASE - Abnormal; Notable for the following components:    Amylase 240 (*)     All other components within normal limits   URINE WITH REFLEXED MICRO - Abnormal; Notable for the following components:    Protein,  (*)     Urobilinogen, Urine 2.0 (*)     All other components within normal limits   BLOOD GAS, ARTERIAL - Abnormal; Notable for the following components:    PCO2 30 (*)     PO2 68 (*)     HCO3 20 (*)     Base Excess (Calculated) -3.4 (*)     All other components within normal limits   POCT GLUCOSE - Abnormal; Notable for the following components:    POC Glucose 149 (*)     All other components within normal limits   COVID-19, RAPID   CULTURE, BLOOD 1   CULTURE, BLOOD 2   LACTATE, SEPSIS   T4, FREE   ANION GAP   OSMOLALITY   DIGOXIN LEVEL   URINE RT REFLEX TO CULTURE   CBC WITH AUTO DIFFERENTIAL   BASIC METABOLIC PANEL W/ REFLEX TO MG FOR LOW K   PROTIME-INR   POCT GLUCOSE   POCT GLUCOSE       EMERGENCY DEPARTMENT COURSE:   Vitals:    Vitals:    08/02/21 1038 08/02/21 1114 08/02/21 1525 08/02/21 1605   BP: (!) 140/74 (!)

## 2021-08-02 NOTE — PROGRESS NOTES
Brother Tao 14Araceli HARDY answers most admit questions. Tao 14Araceli HARDY will bring pt's cpap and her med list.  Pt lives w/ her sister MISHEL BENEDICT [facility] who just had surgery and will be unable to come to the hospital.  Pt has not had her home meds today yet. Tao HARDY reports that pt does a stand/turn/pivot to and from wheelchair and is pretty independent at home. He believes she has Passport services, get some help w/ housework, and they are allowed to assist her w/ bathing but pt usually handles bathing on her own.

## 2021-08-02 NOTE — ED NOTES
Pt and family updated on poc at this time. Resting on cot. Remains hooked up to monitor. VSS. Call light in reach.      Anna Hudson RN  08/02/21 8044

## 2021-08-02 NOTE — PLAN OF CARE
Problem: DISCHARGE BARRIERS  Goal: Patient's continuum of care needs are met  Outcome: Ongoing  Note: Patient current with Passport and home with sister. See SW notes 8/2/21.

## 2021-08-02 NOTE — PROGRESS NOTES
Clinical Pharmacy Note    Amilcar Elizabeth is a 76 y.o. female for whom pharmacy has been asked to manage warfarin therapy. Reason for Admission: Confusion/Cellulitis    Consulting Physician: Dr. Annamaria Chapman  Warfarin dose prior to admission: 3.75mg Wed, 2.5mg [id number]  Warfarin indication: atrial fib/RVR  Target INR range: 2-3   Outpatient warfarin provider: Dr. Michael Costello    Past Medical History:   Diagnosis Date    CAD (coronary artery disease)     CRI (chronic renal insufficiency)     Difficult intubation     excess skin in back of throat     DM (diabetes mellitus) ([facility] 1994    GERD (gastroesophageal reflux disease)     H/O gastric bypass     Hyperlipidemia     Hypertension     Hypothyroidism     Neuropathy     Obesity     Osteoarthritis     Paroxysmal atrial fibrillation ([facility]     Prolonged emergence from general anesthesia     Sleep apnea     uses cpap    Sleep apnea     Visit for screening mammogram               Recent Labs     08/02/21  0615   INR 2.94*     Recent Labs     08/02/21  0615   HGB 10.4*   HCT 34.2*        Current warfarin drug-drug interactions: aspirin 81 mg daily, levothyroxine 75mcg (), fenofibrate (), allopurinol ()    Date INR Warfarin Dose   8/2/2021 2.94 2.5mg                                   Discontinue lovenox for INR=2.94  Daily PT/INR until stable within therapeutic range. Thank you for the consult.       Haleigh Plummer, PharmD  8/2/2021  2:49 PM

## 2021-08-02 NOTE — PROGRESS NOTES
Pharmacy Note  Vancomycin Consult    Cielo Sotelo is a 76 y.o. female started on Vancomycin for SSTI; consult received from Dr. Dale Villar to manage therapy. Also receiving the following antibiotics: Unasyn. Patient Active Problem List   Diagnosis    Diabetes mellitus ([facility]    Hypertension    Hyperlipidemia    Neuropathy    Osteoarthritis    Proteinuria    Arthritis    Morbid obesity ([facility]    Allergic rhinitis    Chronic kidney disease, stage 4 (severe) ([facility]    Diabetes mellitus type 2, insulin dependent ([facility]    DARWIN on CPAP    History of sleeve gastrectomy    Pneumonia    Morbid obesity with BMI of 50.0-59.9, adult ([facility]    Coronary artery disease involving native heart without angina pectoris    Multinodular goiter    Bilateral renal cysts    Dyspnea    Atrial fibrillation ([facility])    Acute diastolic CHF (congestive heart failure), NYHA class 2 ([facility])    CKD (chronic kidney disease) stage 3, GFR 30-59 ml/min    Shortness of breath    Chronic anemia    Paroxysmal A-fib ([facility])    Chronic kidney disease (CKD), stage III (moderate) ([facility])    Nonischemic cardiomyopathy ([facility])    Mild tricuspid regurgitation    Debility    Dietary noncompliance    Long term current use of anticoagulant    Severe left ventricular systolic dysfunction    Anticoagulated on Coumadin    Metabolic encephalopathy    Acute cystitis without hematuria    Hyperkalemia    KEEGAN (acute kidney injury) ([facility]    Altered mental status    AMS (altered mental status)    Encounter for therapeutic drug monitoring    Closed fracture of distal end of left femur, initial encounter ([facility]    Parotiditis     Allergies:  Patient has no known allergies.      Temp max: 99    Recent Labs     08/02/21  0615   BUN 29*   CREATININE 1.4*   WBC 13.2*     No intake or output data in the 24 hours ending 08/02/21 1154  Culture Date      Source                       Results  Culture Date Source Results   08/02/21 Blood x2 -   - - -   - - -        Ht Readings from Last 1 Encounters:   08/02/21 5' 9\" (1.753 m)        Wt Readings from Last 1 Encounters:   08/02/21 225 lb (102.1 kg)       Body mass index is 33.23 kg/m². Estimated Creatinine Clearance: 50 mL/min (A) (based on SCr of 1.4 mg/dL (H)). Goal AUC Level: <500    Assessment/Plan:  Will initiate Vancomycin with a one time loading dose of 2000 mg x1, followed by 1250 mg IV every 24 hours. No levels ordered at this time. Thank you for the consult. Will continue to follow.      Tammy Cazares PharmD  8/2/2021  12:28 PM

## 2021-08-03 NOTE — PLAN OF CARE
Problem: Falls - Risk of:  Goal: Will remain free from falls  Description: Will remain free from falls  8/3/2021 1504 by Mary Jackson RN  Outcome: Ongoing  Call light within reach. Side rails up x2. Bed alarm on. Non skid slippers available. Problem: Skin Integrity:  Goal: Absence of new skin breakdown  Description: Absence of new skin breakdown  8/3/2021 1504 by Mary Jackson RN  Outcome: Ongoing   Patient free from skin breakdown. Patient turned every 2 hours and as needed using pillow support and barrier cream. Will continue to monitor. Problem: DISCHARGE BARRIERS  Goal: Patient's continuum of care needs are met  8/3/2021 1504 by Mary Jackson RN  Outcome: Ongoing  Discharge planning in progress. Social work following. Problem: Pain:  Goal: Pain level will decrease  Description: Pain level will decrease  Outcome: Ongoing  Patient complained of pain her left jaw throughout the shift. Pain rated on 0-10 pain rating scale. Pain goal 3/10. Will continue to reassess. Problem: Infection - Methicillin-Resistant Staphylococcus Aureus Infection:  Goal: Absence of methicillin-resistant Staphylococcus aureus infection  Description: Absence of methicillin-resistant Staphylococcus aureus infection  Outcome: Ongoing  Patient's preliminary blood cultures positive. Patient on IV antibiotics. Care plan reviewed with patient and family. Patient and family verbalize understanding of the plan of care and contribute to goal setting.

## 2021-08-03 NOTE — PROGRESS NOTES
Clinical Pharmacy Note    Warfarin consult follow-up    Recent Labs     08/03/21  0536   INR 2.55*     Recent Labs     08/02/21  0615 08/03/21  0536   HGB 10.4* 9.2*   HCT 34.2* 30.2*    197       Significant Drug-Drug Interactions:  New warfarin drug-drug interactions: none  Discontinued drug-drug interactions: none  Current warfarin drug-drug interactions: aspirin 81 mg daily, levothyroxine 75mcg (), fenofibrate (), allopurinol ()     Date INR Warfarin Dose   08/2/2021 2.94 2.5mg    08/3/2021  2.55 3 mg                                              Notes:                   Daily PT/INR until stable within therapeutic range.      Pedro Luis Rogel, PharmD  8/3/2021  11:07 AM

## 2021-08-03 NOTE — PROGRESS NOTES
Hospitalist Progress Note    Patient:  Monica Shahu      Unit/Bed:6K-27/027-A    YOB: 1955    MRN: [id number]       Acct: [de-identified]     PCP: ARNOL Juarez CNP    Date of Admission: 8/2/2021    Assessment/Plan:    1. Acute left-sided parotiditis--appreciate ENT input, planning FNA of left parotid per IR today; vancomycin 8/2-8/3, Unasyn 8/2  2. Septicemia with MSSA (POA)--likely secondary to #1, will maintain Unasyn 8/2 and stop vancomycin  3. Acute metabolic encephalopathy--patient is sitting up eating, fully alert and oriented  4. Hyponatremia--monitor  5. Chronic atrial fibrillation--on aspirin, Lanoxin, Toprol and Coumadin with therapeutic INR 2.55 and pharmacy dosing  6. Diabetes mellitus type 2, uncontrolled--on Nesina; on medium dose sliding scale along with hypoglycemia protocol and monitor  7. Essential hypertension, uncontrolled--Toprol, monitor  8. CKD stage IIIb  9. Diabetic neuropathy--treated with Cymbalta and Neurontin  10. Obesity with BMI 33.14      Expected discharge date: Pending clinical course    Disposition:    [x] Home       [] TCU       [] Rehab       [] Psych       [] SNF       [] [facility]       [] Other-    Chief Complaint: Left neck pain and swelling    Hospital Course: Per H&P dated 8/2: \"The patient is a 76 y.o. female who presents with complaints of increased confusion over the last several days as well as worsening left sided neck pain and swelling. Brother is at bedside and providing history states that he first noticed some erythema and swelling about a week ago. He states over the last it is increased in size, it is become very hard and tender. He reports that she has had several hospitalizations with recurrent infections and has never recovered mentally from this. He states that she has had confusion ongoing for the last year however is noted increased amount over the last several days.   Reports that she was extremely cold, no reports of fevers, nausea or vomiting. Patient awakens when calling her by her name and denies any shortness of breath, difficulty swallowing or breathing. Patient reports she complained of some lightheadedness earlier today.    Brother also reports that she has chronic lower extremity swelling, follows up with nephrology but feels that they are a little more swollen today than usual.\"    8/3--> ENT saw yesterday and unable to obtain culture as no material was expressed so plan is to have IR get culture through FNA of left parotid and no need to hold Coumadin per note; hemodynamically stable however did have some RVR earlier which is better controlled now       Subjective (past 24 hours): Sitting up eating, complains of pain to the left side of her neck currently rates 8 out of 10, she denies chest pain and shortness of breath along with nausea; denies significant dysphagia      Medications:  Reviewed    Infusion Medications    sodium chloride      dextrose       Scheduled Medications    sodium chloride flush  5-40 mL Intravenous 2 times per day    ampicillin-sulbactam  3,000 mg Intravenous Q8H    insulin lispro  0-12 Units Subcutaneous TID WC    insulin lispro  0-6 Units Subcutaneous Nightly    vancomycin (VANCOCIN) intermittent dosing (placeholder)   Other RX Placeholder    vancomycin  1,250 mg Intravenous Q24H    allopurinol  100 mg Oral Daily    alogliptin  6.25 mg Oral Daily    ascorbic acid  500 mg Oral BID    aspirin  81 mg Oral Daily    atorvastatin  40 mg Oral Nightly    pantoprazole  40 mg Oral QAM AC    digoxin  62.5 mcg Oral Daily    DULoxetine  60 mg Oral Daily    fenofibrate  160 mg Oral Daily    folic acid  1 mg Oral Daily    furosemide  40 mg Oral Daily    gabapentin  100 mg Oral TID    metoprolol succinate  100 mg Oral Daily    levothyroxine  75 mcg Oral Daily    valsartan  80 mg Oral Daily    zinc sulfate  50 mg Oral Daily    warfarin (COUMADIN) daily dosing (placeholder) Other RX Placeholder    insulin glargine  15 Units Subcutaneous Daily    insulin lispro  2 Units Subcutaneous TID      PRN Meds: sodium chloride flush, sodium chloride, ondansetron **OR** ondansetron, polyethylene glycol, acetaminophen **OR** acetaminophen, glucose, dextrose, glucagon (rDNA), dextrose, HYDROcodone-acetaminophen      Intake/Output Summary (Last 24 hours) at 8/3/2021 0816  Last data filed at 8/3/2021 0140  Gross per 24 hour   Intake 1541.81 ml   Output 1600 ml   Net -58.19 ml       Diet:  ADULT DIET; Regular; 4 carb choices (60 gm/meal)    Exam:  BP (!) 140/86   Pulse 107   Temp 98 °F (36.7 °C) (Oral)   Resp 18   Ht 5' 9\" (1.753 m)   Wt 224 lb 6.9 oz (101.8 kg)   LMP 02/20/2001   SpO2 97%   BMI 33.14 kg/m²     General appearance: No apparent distress, appears stated age and cooperative. HEENT: Pupils equal, round, and reactive to light. Conjunctivae/corneas clear. Neck: Left side of the neck with large hardened area  Respiratory:  Normal respiratory effort. Clear to auscultation, bilaterally without Rales/Wheezes/Rhonchi. Cardiovascular: irregular rate and rhythm   Abdomen: Soft, non-tender, non-distended with normal bowel sounds. Musculoskeletal: passive and active ROM x 4 extremities. Skin: Skin color, texture, turgor normal.    Neurologic:  Neurovascularly intact without any focal sensory/motor deficits.  Cranial nerves: II-XII intact, grossly non-focal.  Psychiatric: Alert and oriented, thought content appropriate  Capillary Refill: Brisk,< 3 seconds   Peripheral Pulses: +2 palpable, equal bilaterally       Labs:   Recent Labs     08/02/21 0615 08/03/21  0536   WBC 13.2* 15.2*   HGB 10.4* 9.2*   HCT 34.2* 30.2*    197     Recent Labs     08/02/21  0615 08/03/21  0536    133*   K 4.4 4.9    99   CO2 21* 19*   BUN 29* 28*   CREATININE 1.4* 1.5*   CALCIUM 9.8 8.9     Recent Labs     08/02/21 0615   AST 30   ALT 15   BILITOT 1.4*   ALKPHOS 79     Recent Labs to as low as reasonably achievable. FINDINGS: CTA NECK: Aortic arch and branches: There is atherosclerotic calcification in aortic arch and at the origin of the brachiocephalic, left common carotid and left subclavian arteries Right common carotid artery/ICA: There is a small amount of calcified plaque involving the right carotid bifurcation. There is no significant hemodynamic stenosis in the right common and internal carotid arteries. Left common carotid artery/ICA: There is no significant hemodynamic stenosis in the left common and internal carotid arteries. Vertebral arteries: There is antegrade flow in the right and left vertebral arteries. There is atherosclerotic calcification in the cavernous segments of both internal carotid arteries. This no evidence of severe stenosis CTA HEAD: Internal carotid arteries: There is atherosclerotic calcification in the cavernous segments of both internal carotid arteries. There is no evidence of severe stenosis. . Middle cerebral arteries: Antegrade flow in the middle cerebral arteries bilaterally. Anterior cerebral arteries: Antegrade flow in the anterior cerebral arteries bilaterally. Vertebral arteries: There is some calcification in the distal left vertebral artery. There is antegrade flow in the right and left vertebral arteries. Basilar artery: No significant stenosis. Superior cerebellar arteries: Antegrade flow in the superior cerebellar arteries bilaterally. Cleophus [place] Posterior cerebral arteries: Antegrade flow in the posterior cerebral arteries bilaterally No aneurysms, stenoses or occlusions are noted. The superior sagittal sinus, vein of Nelson, internal cerebral veins, straight sinus, transverse sinuses and sigmoid sinuses are patent. Axial source data: There is evidence for granulomatous disease in left upper lobe of the lung and in the mediastinum. There is enlargement of the left and to lesser extent right lobes of thyroid gland. Cervical spondylosis.      1. Abnormal left parotid gland which is enlarged and demonstrates increased density consistent with peritonitis. There is punctate calcification in the left parotid gland. 2. Increased density in the skin and subcutaneous soft tissues over the left side of neck consistent with edema and/or cellulitis. 3. Enlarged lymph nodes adjacent to the angle of mandible on the left side and in the posterior triangle of the neck. 4. Enlarged left lobe of the thyroid gland Final report electronically signed by DR Padmini Gomez on 8/2/2021 10:29 AM  ORIGINAL REPORT  PROCEDURE: CTA HEAD W WO CONTRAST, CTA NECK W WO CONTRAST CLINICAL INFORMATION: AMS. COMPARISON: CT scan of the brain obtained on the same day. TECHNIQUE: 1 mm axial images were obtained through the head and neck after the fast bolus administration of contrast. A noncontrast localizer was obtained. 3-D reconstructions were performed on a dedicated 3-D workstation. These include multiplanar MPR images and multiplanar MIP images. Centerline reconstructions were obtained of the carotid systems. Isovue intravenous contrast was given. All CT scans at this facility use dose modulation, iterative reconstruction, and/or weight-based dosing when appropriate to reduce radiation dose to as low as reasonably achievable. FINDINGS: CTA NECK: Aortic arch and branches: There is atherosclerotic calcification in aortic arch and at the origin of the brachiocephalic, left common carotid and left subclavian arteries Right common carotid artery/ICA: There is a small amount of calcified plaque involving the right carotid bifurcation. There is no significant hemodynamic stenosis in the right common and internal carotid arteries. Left common carotid artery/ICA: There is no significant hemodynamic stenosis in the left common and internal carotid arteries. Vertebral arteries: There is antegrade flow in the right and left vertebral arteries.  There is atherosclerotic calcification in the cavernous segments of both internal carotid arteries. This no evidence of severe stenosis CTA HEAD: Internal carotid arteries: There is atherosclerotic calcification in the cavernous segments of both internal carotid arteries. There is no evidence of severe stenosis. . Middle cerebral arteries: Antegrade flow in the middle cerebral arteries bilaterally. Anterior cerebral arteries: Antegrade flow in the anterior cerebral arteries bilaterally. Vertebral arteries: There is some calcification in the distal left vertebral artery. There is antegrade flow in the right and left vertebral arteries. Basilar artery: No significant stenosis. Superior cerebellar arteries: Antegrade flow in the superior cerebellar arteries bilaterally. Cleophus Carpenter Posterior cerebral arteries: Antegrade flow in the posterior cerebral arteries bilaterally No aneurysms, stenoses or occlusions are noted. The superior sagittal sinus, vein of Nelson, internal cerebral veins, straight sinus, transverse sinuses and sigmoid sinuses are patent. Axial source data: There is evidence for granulomatous disease in left upper lobe of the lung and in the mediastinum. There is enlargement of the left and to lesser extent right lobes of thyroid gland. Cervical spondylosis. IMPRESSION:  1. Calcification in the right carotid bulb. No significant hemodynamic stenosis in the right common and internal carotid arteries. 2. No significant hemodynamic stenosis in the left common and internal carotid arteries. 3. Antegrade flow in the right and left vertebral arteries. 4. Atherosclerotic calcification in the aortic arch and at the origins of the brachiocephalic, left common carotid left subclavian arteries. 5. Atherosclerotic calcification in the cavernous segments of both internal carotid arteries. No evidence of severe stenosis. 6. Atherosclerotic calcification in the distal left vertebral artery. Antegrade flow in both distal vertebral, basilar and posterior cerebral arteries.  7. Otherwise negative CTA of the brain. 8. Evidence for granulomatous disease in left upper lobe of the lung and in the mediastinum. 9. Enlargement of the left and to lesser extent right lobes of the thyroid gland. 10. Cervical spondylosis. Keyla  **This report has been created using voice recognition software. It may contain minor errors which are inherent in voice recognition technology. ** Final report electronically signed by DR Yfn Marquez on 8/2/2021 9:44 AM    CT HEAD WO CONTRAST    Result Date: 8/2/2021  PROCEDURE: CT HEAD WO CONTRAST CLINICAL INFORMATION: AMS . COMPARISON: 1/3/2021 TECHNIQUE: 2-D multiplanar noncontrast CT brain All CT scans at this facility use dose modulation, iterative reconstruction, and/or weight-based dosing when appropriate to reduce radiation dose to as low as reasonably achievable. FINDINGS: There is no evidence of hemorrhage. There is no extra-axial fluid collection. There is mild severity crosstable vessel ischemic disease changes in the white matter. Ventricles are normal. No midline shift or mass effect. Calvarium is intact. Mucous retention cyst or polyp base of the left maxillary sinus otherwise paranasal sinuses and mastoid air cells are clear. Stable CT brain no acute process **This report has been created using voice recognition software. It may contain minor errors which are inherent in voice recognition technology. ** Final report electronically signed by Dr. Paula Rizvi on 8/2/2021 9:18 AM    CTA NECK W WO CONTRAST    Addendum Date: 8/2/2021     ADDENDUM #1  The study was reviewed again at the request of the referring provider to assess for inflammation in the left side of the neck. There is increased density in the left parotid gland consistent with hepatitis. There is punctate calcification in the left parotid gland. There is increased density in the skin and subcutaneous soft tissues over the left side of the neck extending posteriorly. These findings are consistent with edema and/or cellulitis. There is no drainable fluid collection. There are small lymph nodes behind the angle the mandible on the left side and in the posterior triangle of the neck on the left side. There is an enlarged left lobe of the thyroid gland. Result Date: 8/2/2021  PROCEDURE: CTA HEAD W WO CONTRAST, CTA NECK W WO CONTRAST CLINICAL INFORMATION: AMS. COMPARISON: CT scan of the brain obtained on the same day. TECHNIQUE: 1 mm axial images were obtained through the head and neck after the fast bolus administration of contrast. A noncontrast localizer was obtained. 3-D reconstructions were performed on a dedicated 3-D workstation. These include multiplanar MPR images and multiplanar MIP images. Centerline reconstructions were obtained of the carotid systems. Isovue intravenous contrast was given. All CT scans at this facility use dose modulation, iterative reconstruction, and/or weight-based dosing when appropriate to reduce radiation dose to as low as reasonably achievable. FINDINGS: CTA NECK: Aortic arch and branches: There is atherosclerotic calcification in aortic arch and at the origin of the brachiocephalic, left common carotid and left subclavian arteries Right common carotid artery/ICA: There is a small amount of calcified plaque involving the right carotid bifurcation. There is no significant hemodynamic stenosis in the right common and internal carotid arteries. Left common carotid artery/ICA: There is no significant hemodynamic stenosis in the left common and internal carotid arteries. Vertebral arteries: There is antegrade flow in the right and left vertebral arteries. There is atherosclerotic calcification in the cavernous segments of both internal carotid arteries. This no evidence of severe stenosis CTA HEAD: Internal carotid arteries: There is atherosclerotic calcification in the cavernous segments of both internal carotid arteries. There is no evidence of severe stenosis. . Middle cerebral arteries: Antegrade flow in the middle cerebral arteries bilaterally. Anterior cerebral arteries: Antegrade flow in the anterior cerebral arteries bilaterally. Vertebral arteries: There is some calcification in the distal left vertebral artery. There is antegrade flow in the right and left vertebral arteries. Basilar artery: No significant stenosis. Superior cerebellar arteries: Antegrade flow in the superior cerebellar arteries bilaterally. Stiven Anup Posterior cerebral arteries: Antegrade flow in the posterior cerebral arteries bilaterally No aneurysms, stenoses or occlusions are noted. The superior sagittal sinus, vein of Nelson, internal cerebral veins, straight sinus, transverse sinuses and sigmoid sinuses are patent. Axial source data: There is evidence for granulomatous disease in left upper lobe of the lung and in the mediastinum. There is enlargement of the left and to lesser extent right lobes of thyroid gland. Cervical spondylosis. 1. Abnormal left parotid gland which is enlarged and demonstrates increased density consistent with peritonitis. There is punctate calcification in the left parotid gland. 2. Increased density in the skin and subcutaneous soft tissues over the left side of neck consistent with edema and/or cellulitis. 3. Enlarged lymph nodes adjacent to the angle of mandible on the left side and in the posterior triangle of the neck. 4. Enlarged left lobe of the thyroid gland Final report electronically signed by DR Andressa Espinoza on 8/2/2021 10:29 AM  ORIGINAL REPORT  PROCEDURE: CTA HEAD W WO CONTRAST, CTA NECK W WO CONTRAST CLINICAL INFORMATION: AMS. COMPARISON: CT scan of the brain obtained on the same day. TECHNIQUE: 1 mm axial images were obtained through the head and neck after the fast bolus administration of contrast. A noncontrast localizer was obtained. 3-D reconstructions were performed on a dedicated 3-D workstation. These include multiplanar MPR images and multiplanar MIP images.   Centerline reconstructions were obtained of the carotid systems. Isovue intravenous contrast was given. All CT scans at this facility use dose modulation, iterative reconstruction, and/or weight-based dosing when appropriate to reduce radiation dose to as low as reasonably achievable. FINDINGS: CTA NECK: Aortic arch and branches: There is atherosclerotic calcification in aortic arch and at the origin of the brachiocephalic, left common carotid and left subclavian arteries Right common carotid artery/ICA: There is a small amount of calcified plaque involving the right carotid bifurcation. There is no significant hemodynamic stenosis in the right common and internal carotid arteries. Left common carotid artery/ICA: There is no significant hemodynamic stenosis in the left common and internal carotid arteries. Vertebral arteries: There is antegrade flow in the right and left vertebral arteries. There is atherosclerotic calcification in the cavernous segments of both internal carotid arteries. This no evidence of severe stenosis CTA HEAD: Internal carotid arteries: There is atherosclerotic calcification in the cavernous segments of both internal carotid arteries. There is no evidence of severe stenosis. . Middle cerebral arteries: Antegrade flow in the middle cerebral arteries bilaterally. Anterior cerebral arteries: Antegrade flow in the anterior cerebral arteries bilaterally. Vertebral arteries: There is some calcification in the distal left vertebral artery. There is antegrade flow in the right and left vertebral arteries. Basilar artery: No significant stenosis. Superior cerebellar arteries: Antegrade flow in the superior cerebellar arteries bilaterally. Tomy Hilt Posterior cerebral arteries: Antegrade flow in the posterior cerebral arteries bilaterally No aneurysms, stenoses or occlusions are noted. The superior sagittal sinus, vein of Nelson, internal cerebral veins, straight sinus, transverse sinuses and sigmoid sinuses are patent. Axial source data:  There dependent ([facility] [E11.9, Z79.4] 11/07/2014       Electronically signed by ARNOL Grey CNP on 8/3/2021 at 8:16 AM

## 2021-08-03 NOTE — PROGRESS NOTES
Department of Otolaryngology  Progress Note     Chief Complaint:  Left face/neck swelling    SUBJECTIVE:  No improvement per patient in left neck pain and swelling today. She has not had any sialogogues as ordered yesterday. She is not tolerating massage well due to tenderness. She currently has heating pad on. WBC 15.2 today. Afebrile. Much more alert and able to converse today. Awaiting IR for FNA to obtain culture. Initial HPI 8/2:   The patient is a 76 y.o. female who was admitted to [facility] this morning for treatment of left parotid infection and evaluation of ongoing confusion. Patient states swelling present for \"a while\"; brother reported to ER that swelling present for about a week, but progressively worsening. Patient denies having this type of problem in the past.  She is unable to provide much detail; very sleepy and drifts off during conversation. Per ER note, her brother notes that she has been confused, which she tends to do with urinary tract infections. No family is present at this time. Patient with history of type II DM; glucose 165 on admission. WBC 13.2. Temperature 99. Currently on Unasyn 3gm and Vancomycin. A complete multi-organ review of systems was performed using a new patient questionnaire, and reviewed by me.   ENT:  negative except as noted in HPI  CONSTITUTIONAL:  negative except as noted in HPI  EYES:  negative except as noted in HPI  RESPIRATORY:  negative except as noted in HPI  CARDIOVASCULAR:  negative except as noted in HPI  GASTROINTESTINAL:  negative except as noted in HPI  GENITOURINARY:  negative except as noted in HPI  MUSCULOSKELETAL:  negative except as noted in HPI  SKIN:  negative except as noted in HPI  ENDOCRINE/METABOLIC: negative except as noted in HPI  HEMATOLOGIC/LYMPHATIC:  negative except as noted in HPI  ALLERGY/IMMUN: negative except as noted in HPI  NEUROLOGICAL:  negative except as noted in HPI  BEHAVIOR/PSYCH:  negative except as noted in HPI    OBJECTIVE      Physical  VITALS:  /74   Pulse 93   Temp 98.2 °F (36.8 °C) (Oral)   Resp 18   Ht 5' 9\" (1.753 m)   Wt 224 lb 6.9 oz (101.8 kg)   LMP 02/20/2001   SpO2 96%   BMI 33.14 kg/m²     Patient sleeping upon arrival.  On room air. Light snoring, no witnessed apneas. Respirations easy. Awakens readily to name. Alert and cooperative; able to carry on conversation without falling asleep today and no confusion. Obvious left face/neck swelling. Left parotid, down through left submandibular and submental area indurated with overlying erythema. Very tender to palpation. Oral mucous membranes very dry. Unable to express any material or saliva from left parotid duct. Data  CBC with Differential:    Lab Results   Component Value Date    WBC 15.2 08/03/2021    RBC 2.82 08/03/2021    HGB 9.2 08/03/2021    HCT 30.2 08/03/2021     08/03/2021    .1 08/03/2021    MCH 32.6 08/03/2021    MCHC 30.5 08/03/2021    RDW 13.8 10/26/2017    NRBC 0 08/03/2021    SEGSPCT 85.9 08/03/2021    MONOPCT 7.6 08/03/2021    MONOSABS 1.2 08/03/2021    LYMPHSABS 0.8 08/03/2021    EOSABS 0.0 08/03/2021    BASOSABS 0.1 08/03/2021       Radiology Review:  CTA Neck W WO Contrast 8/2/2021 0944         ** ADDENDUM #1 **       The study was reviewed again at the request of the referring provider to    assess for inflammation in the left side of the neck.       There is increased density in the left parotid gland consistent with    hepatitis. There is punctate calcification in the left parotid gland. There is increased density in the skin and subcutaneous soft tissues over    the left side of the neck extending    posteriorly. These findings are consistent with edema and/or cellulitis. There is no drainable fluid collection.  There are small lymph nodes behind    the angle the mandible on the left side and in the posterior triangle of    the neck on the left side. There    is an enlarged left lobe of the thyroid gland.       Narrative    PROCEDURE: CTA HEAD W WO CONTRAST, CTA NECK W WO CONTRAST         CLINICAL INFORMATION: AMS.      COMPARISON: CT scan of the brain obtained on the same day.         TECHNIQUE: 1 mm axial images were obtained through the head and neck after the fast bolus administration of contrast. A noncontrast localizer was obtained. 3-D reconstructions were performed on a dedicated 3-D workstation. These include multiplanar MPR     images and multiplanar MIP images.  Centerline reconstructions were obtained of the carotid systems. Isovue intravenous contrast was given.         All CT scans at this facility use dose modulation, iterative reconstruction, and/or weight-based dosing when appropriate to reduce radiation dose to as low as reasonably achievable.         FINDINGS:              CTA NECK:         Aortic arch and branches: There is atherosclerotic calcification in aortic arch and at the origin of the brachiocephalic, left common carotid and left subclavian arteries         Right common carotid artery/ICA: There is a small amount of calcified plaque involving the right carotid bifurcation. There is no significant hemodynamic stenosis in the right common and internal carotid arteries.         Left common carotid artery/ICA: There is no significant hemodynamic stenosis in the left common and internal carotid arteries.         Vertebral arteries: There is antegrade flow in the right and left vertebral arteries.         There is atherosclerotic calcification in the cavernous segments of both internal carotid arteries. This no evidence of severe stenosis         CTA HEAD:              Internal carotid arteries: There is atherosclerotic calcification in the cavernous segments of both internal carotid arteries. There is no evidence of severe stenosis. .         Middle cerebral arteries: Antegrade flow in the middle cerebral arteries bilaterally. include multiplanar MPR     images and multiplanar MIP images.  Centerline reconstructions were obtained of the carotid systems. Isovue intravenous contrast was given.         All CT scans at this facility use dose modulation, iterative reconstruction, and/or weight-based dosing when appropriate to reduce radiation dose to as low as reasonably achievable.         FINDINGS:         CTA NECK:    Aortic arch and branches: There is atherosclerotic calcification in aortic arch and at the origin of the brachiocephalic, left common carotid and left subclavian arteries         Right common carotid artery/ICA: There is a small amount of calcified plaque involving the right carotid bifurcation. There is no significant hemodynamic stenosis in the right common and internal carotid arteries.         Left common carotid artery/ICA: There is no significant hemodynamic stenosis in the left common and internal carotid arteries.         Vertebral arteries: There is antegrade flow in the right and left vertebral arteries.         There is atherosclerotic calcification in the cavernous segments of both internal carotid arteries. This no evidence of severe stenosis         CTA HEAD:    Internal carotid arteries: There is atherosclerotic calcification in the cavernous segments of both internal carotid arteries. There is no evidence of severe stenosis. .         Middle cerebral arteries: Antegrade flow in the middle cerebral arteries bilaterally.         Anterior cerebral arteries: Antegrade flow in the anterior cerebral arteries bilaterally.         Vertebral arteries: There is some calcification in the distal left vertebral artery. There is antegrade flow in the right and left vertebral arteries.         Basilar artery: No significant stenosis.         Superior cerebellar arteries: Antegrade flow in the superior cerebellar arteries bilaterally. .         Posterior cerebral arteries: Antegrade flow in the posterior cerebral arteries bilaterally         No aneurysms, stenoses or occlusions are noted.         The superior sagittal sinus, vein of Nelson, internal cerebral veins, straight sinus, transverse sinuses and sigmoid sinuses are patent.         Axial source data: There is evidence for granulomatous disease in left upper lobe of the lung and in the mediastinum. There is enlargement of the left and to lesser extent right lobes of thyroid gland. Cervical spondylosis.         IMPRESSION:         1. Calcification in the right carotid bulb. No significant hemodynamic stenosis in the right common and internal carotid arteries. 2. No significant hemodynamic stenosis in the left common and internal carotid arteries. 3. Antegrade flow in the right and left vertebral arteries. 4. Atherosclerotic calcification in the aortic arch and at the origins of the brachiocephalic, left common carotid left subclavian arteries. 5. Atherosclerotic calcification in the cavernous segments of both internal carotid arteries. No evidence of severe stenosis. 6. Atherosclerotic calcification in the distal left vertebral artery. Antegrade flow in both distal vertebral, basilar and posterior cerebral arteries. 7. Otherwise negative CTA of the brain. 8. Evidence for granulomatous disease in left upper lobe of the lung and in the mediastinum. 9. Enlargement of the left and to lesser extent right lobes of the thyroid gland. 10. Cervical spondylosis. .         **This report has been created using voice recognition software. It may contain minor errors which are inherent in voice recognition technology. **         Final report electronically signed by  [facility] on 8/2/2021 9:44 AM           Inpatient Medications  Current Facility-Administered Medications: warfarin (COUMADIN) tablet 3 mg, 3 mg, Oral, Once  sodium chloride flush 0.9 % injection 5-40 mL, 5-40 mL, Intravenous, 2 times per day  sodium chloride flush 0.9 % injection 5-40 mL, 5-40 mL, Intravenous, PRN  0.9 % sodium chloride infusion, 25 mL, Intravenous, PRN  ondansetron (ZOFRAN-ODT) disintegrating tablet 4 mg, 4 mg, Oral, Q8H PRN **OR** ondansetron (ZOFRAN) injection 4 mg, 4 mg, Intravenous, Q6H PRN  polyethylene glycol (GLYCOLAX) packet 17 g, 17 g, Oral, Daily PRN  acetaminophen (TYLENOL) tablet 650 mg, 650 mg, Oral, Q6H PRN **OR** acetaminophen (TYLENOL) suppository 650 mg, 650 mg, Rectal, Q6H PRN  ampicillin-sulbactam (UNASYN) 3000 mg ivpb minibag, 3,000 mg, Intravenous, Q8H  insulin lispro (HUMALOG) injection vial 0-12 Units, 0-12 Units, Subcutaneous, TID WC  insulin lispro (HUMALOG) injection vial 0-6 Units, 0-6 Units, Subcutaneous, Nightly  glucose (GLUTOSE) 40 % oral gel 15 g, 15 g, Oral, PRN  dextrose 50 % IV solution, 12.5 g, Intravenous, PRN  glucagon (rDNA) injection 1 mg, 1 mg, Intramuscular, PRN  dextrose 5 % solution, 100 mL/hr, Intravenous, PRN  HYDROcodone-acetaminophen (NORCO) 5-325 MG per tablet 1 tablet, 1 tablet, Oral, Q6H PRN  allopurinol (ZYLOPRIM) tablet 100 mg, 100 mg, Oral, Daily  alogliptin (NESINA) tablet 6.25 mg, 6.25 mg, Oral, Daily  ascorbic acid (VITAMIN C) tablet 500 mg, 500 mg, Oral, BID  aspirin EC tablet 81 mg, 81 mg, Oral, Daily  atorvastatin (LIPITOR) tablet 40 mg, 40 mg, Oral, Nightly  pantoprazole (PROTONIX) tablet 40 mg, 40 mg, Oral, QAM AC  digoxin (LANOXIN) tablet 62.5 mcg, 62.5 mcg, Oral, Daily  DULoxetine (CYMBALTA) extended release capsule 60 mg, 60 mg, Oral, Daily  fenofibrate (TRIGLIDE) tablet 160 mg, 160 mg, Oral, Daily  folic acid (FOLVITE) tablet 1 mg, 1 mg, Oral, Daily  furosemide (LASIX) tablet 40 mg, 40 mg, Oral, Daily  gabapentin (NEURONTIN) capsule 100 mg, 100 mg, Oral, TID  metoprolol succinate (TOPROL XL) extended release tablet 100 mg, 100 mg, Oral, Daily  levothyroxine (SYNTHROID) tablet 75 mcg, 75 mcg, Oral, Daily  valsartan (DIOVAN) tablet 80 mg, 80 mg, Oral, Daily  zinc sulfate (ZINCATE) capsule 50 mg, 50 mg, Oral, Daily  warfarin (COUMADIN) daily dosing (placeholder), , Other, RX Placeholder  insulin glargine (LANTUS) injection vial 15 Units, 15 Units, Subcutaneous, Daily  insulin lispro (HUMALOG) injection vial 2 Units, 2 Units, Subcutaneous, TID WC    ASSESSMENT AND PLAN    Acute left parotiditis  - Continues on IV Vancomycin and IV Unasyn 3gm  - Unable to obtain culture, no material expressed. IR to get culture through FNA of left parotid.  Spoke with Dr Loida Zhang who felt this could easily be done and will not need to hold Warfarin.  - Following is important to help promote salivation and decrease swelling/pain; patient will need assistance:     - Good oral hygiene      - Increased water/PO fluid intake      - Apply heat to left face/neck      - Massage of left face/neck (from in front of the ear toward the front of the mouth)      - Sialagogues (lemon juice, lemon wedges, sour candies) to promote salivation    Electronically signed by ARNOL Marie - CNP on 8/3/2021 at 4:31 PM

## 2021-08-03 NOTE — PLAN OF CARE
Problem: Falls - Risk of:  Goal: Will remain free from falls  Description: Will remain free from falls  Outcome: Ongoing  Note: Patient will remain free of falls. Fall protocol in place, non-skid socks on bilaterally, bed in lowest position, bed alarm on. No falls this shift. Problem: Skin Integrity:  Goal: Will show no infection signs and symptoms  Description: Will show no infection signs and symptoms  Outcome: Ongoing  Note: Patient remains afebrile. No further signs/symptoms noted pertaining to current infection. Patient is receiving scheduled antibiotics. Problem: Skin Integrity:  Goal: Absence of new skin breakdown  Description: Absence of new skin breakdown  Outcome: Ongoing  Note: No evidence of new skin breakdown. Patient has pillow support, elevated foot of bed, as well as bilateral heels off of bed. Encouraging turns every 2 hours. Problem: DISCHARGE BARRIERS  Goal: Patient's continuum of care needs are met  8/3/2021 0310 by Messi Valdez RN  Outcome: Ongoing  Note: Patient is from home with sister but possibly needs rehab at discharge. Problem: Pain:  Goal: Control of acute pain  Description: Control of acute pain  Outcome: Ongoing  Note: Patient received morphine in the ED. Per patient, pain level is tolerable at a 5 and lower. Patient is offered assistance with repositioning when uncomfortable and is asked about current pain level during hourly rounding. Will continue to use 0-10 pain scale. Care plan reviewed with patient. Patient verbalizes understanding of the plan of care and contribute to goal setting.

## 2021-08-03 NOTE — CARE COORDINATION
8/3/21, 1:42 PM EDT  DISCHARGE PLANNING EVALUATION:    Isaiah Pfeiffer       Admitted: 8/2/2021/ [address] [address] day: 1   Location: [facility]27/027-[id number] Reason for admit: Parotiditis [K11.20]   PMH:  has a past medical history of CAD (coronary artery disease), CRI (chronic renal insufficiency), Difficult intubation, DM (diabetes mellitus) ([facility] [address] GERD (gastroesophageal reflux disease), H/O gastric bypass, Hyperlipidemia, Hypertension, Hypothyroidism, Neuropathy, Obesity, Osteoarthritis, Paroxysmal atrial fibrillation ([facility] [address] Prolonged emergence from general anesthesia, Sleep apnea, Sleep apnea, and Visit for screening mammogram.  Procedure: Planning FNA of left parotid today. Barriers to Discharge: To ER with ear pain and confusion. WBC's 13.2. Creat 1.4. Noted Parotitis. ENT consulted and has seen with recommendations. Good oral hygiene. Promote salivation. Increase po fluid. Heat to left face and neck. Vanc and Unasyn. FNA of left parotid planned for today. PCP: ARNOL White CNP  Readmission Risk Score: 38%    Patient Goals/Plan/Treatment Preferences: Current with [employer] [facility] for aides. From home with sister. SW following for home going needs. Transportation/Food Security/Housekeeping Addressed:  No issues identified.

## 2021-08-04 NOTE — PROGRESS NOTES
(!) 139/97   Pulse 97   Temp 97.9 °F (36.6 °C) (Oral)   Resp 18   Ht 5' 9\" (1.753 m)   Wt 223 lb 5.2 oz (101.3 kg)   LMP 02/20/2001   SpO2 99%   BMI 32.98 kg/m²     Patient awake, alert and cooperative.  On room air.   Respirations easy. Awakens readily to name. Left face/neck swelling.  Left parotid, down through left submandibular and submental area indurated with overlying erythema.  Submental area less indurated today. Tender to palpation.    Oral mucous membranes very dry.  Unable to express any material or saliva from left parotid duct. Data  CBC with Differential:    Lab Results   Component Value Date    WBC 11.3 08/04/2021    RBC 2.80 08/04/2021    HGB 9.1 08/04/2021    HCT 29.1 08/04/2021     08/04/2021    .9 08/04/2021    MCH 32.5 08/04/2021    MCHC 31.3 08/04/2021    RDW 13.8 10/26/2017    NRBC 0 08/03/2021    SEGSPCT 85.9 08/03/2021    MONOPCT 7.6 08/03/2021    MONOSABS 1.2 08/03/2021    LYMPHSABS 0.8 08/03/2021    EOSABS 0.0 08/03/2021    BASOSABS 0.1 08/03/2021     Radiology Review:  CTA Neck W WO Contrast 8/2/2021 0944           ** ADDENDUM #1 **       The study was reviewed again at the request of the referring provider to    assess for inflammation in the left side of the neck.       There is increased density in the left parotid gland consistent with    hepatitis. There is punctate calcification in the left parotid gland. There is increased density in the skin and subcutaneous soft tissues over    the left side of the neck extending    posteriorly. These findings are consistent with edema and/or cellulitis. There is no drainable fluid collection. There are small lymph nodes behind    the angle the mandible on the left side and in the posterior triangle of    the neck on the left side. There    is an enlarged left lobe of the thyroid gland.       Narrative     PROCEDURE: CTA HEAD W WO CONTRAST, CTA NECK W WO CONTRAST           CLINICAL INFORMATION: AMS.          COMPARISON: CT scan of the brain obtained on the same day.           TECHNIQUE: 1 mm axial images were obtained through the head and neck after the fast bolus administration of contrast. A noncontrast localizer was obtained. 3-D reconstructions were performed on a dedicated 3-D workstation. These include multiplanar MPR      images and multiplanar MIP images.  Centerline reconstructions were obtained of the carotid systems. Isovue intravenous contrast was given.           All CT scans at this facility use dose modulation, iterative reconstruction, and/or weight-based dosing when appropriate to reduce radiation dose to as low as reasonably achievable.           FINDINGS:                 CTA NECK:           Aortic arch and branches: There is atherosclerotic calcification in aortic arch and at the origin of the brachiocephalic, left common carotid and left subclavian arteries           Right common carotid artery/ICA: There is a small amount of calcified plaque involving the right carotid bifurcation. There is no significant hemodynamic stenosis in the right common and internal carotid arteries.           Left common carotid artery/ICA: There is no significant hemodynamic stenosis in the left common and internal carotid arteries.           Vertebral arteries: There is antegrade flow in the right and left vertebral arteries.           There is atherosclerotic calcification in the cavernous segments of both internal carotid arteries. This no evidence of severe stenosis           CTA HEAD:                 Internal carotid arteries: There is atherosclerotic calcification in the cavernous segments of both internal carotid arteries. There is no evidence of severe stenosis. .           Middle cerebral arteries: Antegrade flow in the middle cerebral arteries bilaterally.           Anterior cerebral arteries: Antegrade flow in the anterior cerebral arteries bilaterally.           Vertebral arteries:  There is some calcification in the distal left vertebral artery. There is antegrade flow in the right and left vertebral arteries.           Basilar artery: No significant stenosis.           Superior cerebellar arteries: Antegrade flow in the superior cerebellar arteries bilaterally. .           Posterior cerebral arteries: Antegrade flow in the posterior cerebral arteries bilaterally           No aneurysms, stenoses or occlusions are noted.           The superior sagittal sinus, vein of Nelson, internal cerebral veins, straight sinus, transverse sinuses and sigmoid sinuses are patent.           Axial source data: There is evidence for granulomatous disease in left upper lobe of the lung and in the mediastinum. There is enlargement of the left and to lesser extent right lobes of thyroid gland. Cervical spondylosis.           Impression     1. Abnormal left parotid gland which is enlarged and demonstrates increased density consistent with peritonitis. There is punctate calcification in the left parotid gland.     2. Increased density in the skin and subcutaneous soft tissues over the left side of neck consistent with edema and/or cellulitis.     3. Enlarged lymph nodes adjacent to the angle of mandible on the left side and in the posterior triangle of the neck.     4. Enlarged left lobe of the thyroid gland           Final report electronically signed by DR Shiraz Cummins on 8/2/2021 10:29 AM           ** ORIGINAL REPORT **     PROCEDURE: CTA HEAD W WO CONTRAST, CTA NECK W WO CONTRAST           CLINICAL INFORMATION: AMS.           COMPARISON: CT scan of the brain obtained on the same day.           TECHNIQUE: 1 mm axial images were obtained through the head and neck after the fast bolus administration of contrast. A noncontrast localizer was obtained. 3-D reconstructions were performed on a dedicated 3-D workstation.  These include multiplanar MPR      images and multiplanar MIP images.  Centerline reconstructions were obtained of the carotid sagittal sinus, vein of Nelson, internal cerebral veins, straight sinus, transverse sinuses and sigmoid sinuses are patent.           Axial source data: There is evidence for granulomatous disease in left upper lobe of the lung and in the mediastinum. There is enlargement of the left and to lesser extent right lobes of thyroid gland. Cervical spondylosis.           IMPRESSION:           1. Calcification in the right carotid bulb. No significant hemodynamic stenosis in the right common and internal carotid arteries.     2. No significant hemodynamic stenosis in the left common and internal carotid arteries.     3. Antegrade flow in the right and left vertebral arteries.     4. Atherosclerotic calcification in the aortic arch and at the origins of the brachiocephalic, left common carotid left subclavian arteries.     5. Atherosclerotic calcification in the cavernous segments of both internal carotid arteries. No evidence of severe stenosis.     6. Atherosclerotic calcification in the distal left vertebral artery. Antegrade flow in both distal vertebral, basilar and posterior cerebral arteries.     7. Otherwise negative CTA of the brain.     8. Evidence for granulomatous disease in left upper lobe of the lung and in the mediastinum.     9. Enlargement of the left and to lesser extent right lobes of the thyroid gland.     10. Cervical spondylosis. .           **This report has been created using voice recognition software. It may contain minor errors which are inherent in voice recognition technology. **           Final report electronically signed by DR Ynf Marquez on 8/2/2021 9:44 AM       Inpatient Medications  Current Facility-Administered Medications: sodium chloride flush 0.9 % injection 5-40 mL, 5-40 mL, Intravenous, 2 times per day  sodium chloride flush 0.9 % injection 5-40 mL, 5-40 mL, Intravenous, PRN  0.9 % sodium chloride infusion, 25 mL, Intravenous, PRN  ondansetron (ZOFRAN-ODT) disintegrating tablet 4 mg, 4 mg, Oral, Q8H PRN **OR** ondansetron (ZOFRAN) injection 4 mg, 4 mg, Intravenous, Q6H PRN  polyethylene glycol (GLYCOLAX) packet 17 g, 17 g, Oral, Daily PRN  acetaminophen (TYLENOL) tablet 650 mg, 650 mg, Oral, Q6H PRN **OR** acetaminophen (TYLENOL) suppository 650 mg, 650 mg, Rectal, Q6H PRN  ampicillin-sulbactam (UNASYN) 3000 mg ivpb minibag, 3,000 mg, Intravenous, Q8H  insulin lispro (HUMALOG) injection vial 0-12 Units, 0-12 Units, Subcutaneous, TID WC  insulin lispro (HUMALOG) injection vial 0-6 Units, 0-6 Units, Subcutaneous, Nightly  glucose (GLUTOSE) 40 % oral gel 15 g, 15 g, Oral, PRN  dextrose 50 % IV solution, 12.5 g, Intravenous, PRN  glucagon (rDNA) injection 1 mg, 1 mg, Intramuscular, PRN  dextrose 5 % solution, 100 mL/hr, Intravenous, PRN  HYDROcodone-acetaminophen (NORCO) 5-325 MG per tablet 1 tablet, 1 tablet, Oral, Q6H PRN  allopurinol (ZYLOPRIM) tablet 100 mg, 100 mg, Oral, Daily  alogliptin (NESINA) tablet 6.25 mg, 6.25 mg, Oral, Daily  ascorbic acid (VITAMIN C) tablet 500 mg, 500 mg, Oral, BID  aspirin EC tablet 81 mg, 81 mg, Oral, Daily  atorvastatin (LIPITOR) tablet 40 mg, 40 mg, Oral, Nightly  pantoprazole (PROTONIX) tablet 40 mg, 40 mg, Oral, QAM AC  digoxin (LANOXIN) tablet 62.5 mcg, 62.5 mcg, Oral, Daily  DULoxetine (CYMBALTA) extended release capsule 60 mg, 60 mg, Oral, Daily  fenofibrate (TRIGLIDE) tablet 160 mg, 160 mg, Oral, Daily  folic acid (FOLVITE) tablet 1 mg, 1 mg, Oral, Daily  furosemide (LASIX) tablet 40 mg, 40 mg, Oral, Daily  gabapentin (NEURONTIN) capsule 100 mg, 100 mg, Oral, TID  metoprolol succinate (TOPROL XL) extended release tablet 100 mg, 100 mg, Oral, Daily  levothyroxine (SYNTHROID) tablet 75 mcg, 75 mcg, Oral, Daily  valsartan (DIOVAN) tablet 80 mg, 80 mg, Oral, Daily  zinc sulfate (ZINCATE) capsule 50 mg, 50 mg, Oral, Daily  warfarin (COUMADIN) daily dosing (placeholder), , Other, RX Placeholder  insulin glargine (LANTUS) injection vial 15 Units, 15 Units, Subcutaneous, Daily  insulin lispro (HUMALOG) injection vial 2 Units, 2 Units, Subcutaneous, TID WC    ASSESSMENT AND PLAN    Acute left parotiditis  - Continues on IV Vancomycin and IV Unasyn 3gm  - Await culture results from FNA to narrow antibiotic therapy  - Will consider repeat imaging tomorrow if no improvement  - Following is important to help promote salivation and decrease swelling/pain; patient will need assistance:     - Good oral hygiene      - Increased water/PO fluid intake      - Apply heat to left face/neck      - Massage of left face/neck (from in front of the ear toward the front of the mouth)      - Sialagogues (lemon juice, lemon wedges, sour candies) to promote salivation      Electronically signed by ARNOL Espinoza CNP on 8/4/2021 at 7:41 AM

## 2021-08-04 NOTE — PLAN OF CARE
Problem: Falls - Risk of:  Goal: Will remain free from falls  Description: Will remain free from falls  8/4/2021 0500 by Michael Charles RN  Outcome: Ongoing  Note: Fall protocol in place. Side rails up x2. Bed aarm on. Call light in reach. Non-skid socks on bilaterally. Problem: Skin Integrity:  Goal: Absence of new skin breakdown  Description: Absence of new skin breakdown  8/4/2021 0500 by Michael Charles RN  Outcome: Ongoing  Note: No new s/s of skin breakdown. Q2 hr turns. If turns are refused, patient is educated. Heels elevated off of bed with pillows. Will continue to turn Q2 hrs and assess skin Q4 hrs. Problem: DISCHARGE BARRIERS  Goal: Patient's continuum of care needs are met  8/4/2021 0500 by Michael Charles RN  Outcome: Ongoing  Note: Patients needs have been met throughout shift. Problem: Pain:  Goal: Control of acute pain  Description: Control of acute pain  8/4/2021 0500 by Michael Charles RN  Outcome: Ongoing  Note: Patient has used non-pharmacological pain relief methods throughout shift such as; repositioning, turning off lights, and resting. Will continue to reassess pain every hour using the 0-10 scale. Problem: Infection - Methicillin-Resistant Staphylococcus Aureus Infection:  Goal: Absence of methicillin-resistant Staphylococcus aureus infection  Description: Absence of methicillin-resistant Staphylococcus aureus infection  8/4/2021 0500 by Michael Charles RN  Outcome: Ongoing     Care plan reviewed with patient and family. Patient and family verbalize understanding of the plan of care and contribute to goal setting.

## 2021-08-04 NOTE — PROGRESS NOTES
Physician Progress Note      PATIENT:               Francesca Nava  CSN #:                  [id number]  :                       1955  ADMIT DATE:       2021 5:44 AM  DISCH DATE:  RESPONDING  PROVIDER #:        Charley Viera MD          QUERY TEXT:    Dr Odilia Balderas, Dr Maddison Alberts,    Pt admitted with Acute parotiditis. Noted documentation of: \"There is   increased density in the left parotid gland consistent with hepatitis\" by   Radiologist Dr Ruth Gaspar on CTA Report. If possible, please document in progress   notes and discharge summary:    The medical record reflects the following:  Risk Factors: HTN, DARWIN, CAD, AFIB, DM  Clinical Indicators: Acute parotiditis. Per CTA Radiologist: There is   increased density in the left parotid gland consistent with  hepatitis. Treatment: Rest & hydration, 0.9 NS  1000 ml bolus, IV ATB. Options provided:  -- Acute parotiditis due to Hepatitis confirmed present on admission  -- Hepatitis ruled out  -- Other - I will add my own diagnosis  -- Disagree - Not applicable / Not valid  -- Disagree - Clinically unable to determine / Unknown  -- Refer to Clinical Documentation Reviewer    PROVIDER RESPONSE TEXT:    Provider disagreed with this query.   consistent with acute parotiditis    Query created by: Royce Casillas on 8/3/2021 6:50 AM      Electronically signed by:  Charley Viera MD 2021 4:07 PM

## 2021-08-04 NOTE — CONSULTS
BIOPSY  9-28-12    left tongue and lingual tonsil    OTHER SURGICAL HISTORY  11/8/2014    LEFT FEMUR RETROGRADE NAILING    OTHER SURGICAL HISTORY  4/23/2015    HARDWARE REMOVAL LEFT FEMUR, INSERTION OF ANTIBIOTIC SPACER    PATELLA SURGERY  7/11/13    fractues rt patella     FL COLON CA SCRN NOT HI RSK IND Left 1/24/2018    COLONOSCOPY performed by Peggy Hudson MD at [address] [facility] TAG REMOVAL  9/25/12    mole removal    SLEEVE GASTRECTOMY  09/15/2015    Robotic    TOENAIL EXCISION      removal of great toe nails bilateral-still has toenails-had ingrown toenails and had trimmed out    [address] THYROID CYST ASPIRATION AND OR INJECTION  5/21/2021    US THYROID CYST ASPIRATION AND OR INJECTION 5/21/2021 [facility] ULTRASOUND         MEDICATIONS:       Scheduled Meds:   ceFAZolin  2,000 mg Intravenous Q8H    sodium chloride flush  5-40 mL Intravenous 2 times per day    insulin lispro  0-12 Units Subcutaneous TID WC    insulin lispro  0-6 Units Subcutaneous Nightly    allopurinol  100 mg Oral Daily    alogliptin  6.25 mg Oral Daily    ascorbic acid  500 mg Oral BID    aspirin  81 mg Oral Daily    atorvastatin  40 mg Oral Nightly    pantoprazole  40 mg Oral QAM AC    digoxin  62.5 mcg Oral Daily    DULoxetine  60 mg Oral Daily    fenofibrate  160 mg Oral Daily    folic acid  1 mg Oral Daily    furosemide  40 mg Oral Daily    gabapentin  100 mg Oral TID    metoprolol succinate  100 mg Oral Daily    levothyroxine  75 mcg Oral Daily    valsartan  80 mg Oral Daily    zinc sulfate  50 mg Oral Daily    warfarin (COUMADIN) daily dosing (placeholder)   Other RX Placeholder    insulin glargine  15 Units Subcutaneous Daily    insulin lispro  2 Units Subcutaneous TID WC     Continuous Infusions:   sodium chloride 75 mL/hr at 08/04/21 1538    sodium chloride      dextrose       PRN Meds:morphine, docusate sodium, sodium chloride flush, sodium chloride, ondansetron **OR** ondansetron, polyethylene glycol, acetaminophen **OR** acetaminophen, glucose, dextrose, glucagon (rDNA), dextrose, HYDROcodone-acetaminophen  Allergies:   ALLERGIES:    Patient has no known allergies. SOCIAL HISTORY:     TOBACCO:   reports that she quit smoking about 14 years ago. She has a 30.00 pack-year smoking history. She has never used smokeless tobacco.     ETOH:   reports no history of alcohol use. Patient currently lives with family       FAMILY HISTORY:         Problem Relation Age of Onset    Asthma Sister     Asthma Brother     Heart Disease Father     Other Mother     High Blood Pressure Other     Cancer Maternal Uncle         stomach    Diabetes Maternal Grandmother     High Blood Pressure Maternal Grandmother     Diabetes Maternal Grandfather     Stroke Neg Hx        REVIEW OF SYSTEMS:     Constitutional: no fever, no night sweats, + fatigue, no weight loss. Head: no head ache , no head injury, no migranes. Eye: no eye discharge, blurring of vision, no double vision,no eye pain. Ears: no hearing difficulty, no tinnitus  Mouth/throat: Dry mouth, the rest as noted in HPI. Respiratory: no cough no chest pain,no shortness of breath,no wheezing  CVS: no palpitation, no chest pain,   GI: no abdominal pain, no nausea , no vomiting, no constipation,no diarrhea. KATALINA: no dysuria, frequency and urgency, no hematuria, no kidney stones  Musculoskeletal: As noted in HPI. Endocrine: no polyuria, polydipsia, no cold or heat intolerance  Hematology: no anemia, no easy brusing or bleeding, no hx of clotting disorder  Dermatology: no skin rash, no skin lesions, no pruritis,  Neurological:no headaches,no dizziness, no seizure, no numbness.   Psychiatry: no depression, no anxiety,no panic attacks, no suicide ideation    PHYSICAL EXAM:     BP (!) 111/53   Pulse 88   Temp 97.7 °F (36.5 °C) (Oral)   Resp 18   Ht 5' 9\" (1.753 m)   Wt 223 lb 5.2 oz (101.3 kg) LMP 02/20/2001   SpO2 97%   BMI 32.98 kg/m²   General apperance:  Awake, alert, obese not in distress. HEENT: Lightly pale conjunctiva, unicteric sclera, dry  oral mucosa. Chest:.  Bilateral air entry  Cardiovascular:  RRR ,S1S2, no murmur or gallop. Abdomen:  Soft, non tender to palpation. Extremities: Bilateral leg edema  Skin:  Warm and dry. CNS: Awake and oriented        LABS:     CBC:   Recent Labs     08/02/21  0615 08/03/21  0536 08/04/21  0517   WBC 13.2* 15.2* 11.3*   HGB 10.4* 9.2* 9.1*    197 203     BMP:    Recent Labs     08/02/21  0615 08/03/21  0536 08/04/21  0517    133* 133*   K 4.4 4.9 4.5    99 98   CO2 21* 19* 23   BUN 29* 28* 41*   CREATININE 1.4* 1.5* 1.8*   GLUCOSE 165* 236* 216*     Calcium:  Recent Labs     08/04/21  0517   CALCIUM 8.6     Recent Labs     08/04/21  0633 08/04/21  1116 08/04/21  1606   POCGLU 213* 216* 247*     HgbA1C: No results for input(s): LABA1C in the last 72 hours.   INR:   Recent Labs     08/04/21  0517   INR 3.13*     Hepatic:   Recent Labs     08/02/21  0615   ALKPHOS 79   ALT 15   AST 30   PROT 7.8   BILITOT 1.4*   LABALBU 4.1     Amylase and Lipase:  Recent Labs     08/02/21  0615   AMYLASE 240*   UA:   Recent Labs     08/03/21  1532   PHUR 5.0   COLORU YELLOW   PROTEINU 100*   BLOODU NEGATIVE   RBCUA 0-2   WBCUA NONE SEEN   BACTERIA NONE SEEN   NITRU NEGATIVE   GLUCOSEU 100*   BILIRUBINUR NEGATIVE   UROBILINOGEN 1.0   KETUA NEGATIVE         IMAGING:    Micro:   Lab Results   Component Value Date    BC No growth-preliminary  08/02/2021       Problem list of patient      Patient Active Problem List   Diagnosis Code    Diabetes mellitus ([facility] E11.9    Hypertension I10    Hyperlipidemia E78.5    Neuropathy G62.9    Osteoarthritis M19.90    Proteinuria R80.9    Arthritis M19.90    Morbid obesity ([facility] [address] E66.01    Allergic rhinitis J30.9    Chronic kidney disease, stage 4 (severe) (HCC) N18.4    Diabetes mellitus type 2, insulin dependent ([facility] E11.9, Z79.4    DARWIN on CPAP G47.33, Z99.89    History of sleeve gastrectomy Z90.3    Pneumonia J18.9    Morbid obesity with BMI of 50.0-59.9, adult ([facility]) E66.01, Z68.43    Coronary artery disease involving native heart without angina pectoris I25.10    Multinodular goiter E04.2    Bilateral renal cysts N28.1    Dyspnea R06.00    Atrial fibrillation ([facility]) I48.91    Acute diastolic CHF (congestive heart failure), NYHA class 2 ([facility]) I50.31    CKD (chronic kidney disease) stage 3, GFR 30-59 ml/min N18.30    Shortness of breath R06.02    Chronic anemia D64.9    Paroxysmal A-fib ([facility]) I48.0    Chronic kidney disease (CKD), stage III (moderate) ([facility]) N18.30    Nonischemic cardiomyopathy ([facility]) I42.8    Mild tricuspid regurgitation I07.1    Debility R53.81    Dietary noncompliance Z91.11    Long term current use of anticoagulant Z79.01    Severe left ventricular systolic dysfunction L79.4    Anticoagulated on Coumadin P31.82    Metabolic encephalopathy B04.81    Acute cystitis without hematuria N30.00    Hyperkalemia E87.5    KEEGAN (acute kidney injury) ([facility] N17.9    Altered mental status R41.82    AMS (altered mental status) R41.82    Encounter for therapeutic drug monitoring Z51.81    Closed fracture of distal end of left femur, initial encounter ([facility] S72.402A    Parotiditis K11.20           Impression and Recommendation:   MSSA bacteremia  Left parotitis  We will continue IV Ancef, lemon juice  We will continue to follow patient    Thank you Soco Slaughter MD for allowing me to participate in this patient's care.     Harrison Jarquin MD, MD,FACP 8/4/2021 7:38 PM

## 2021-08-04 NOTE — DISCHARGE INSTR - COC
Continuity of Care Form    Patient Name: Michael Abrams   :  1955  MRN:  [id number]    Admit date:  2021  Discharge date:  ***    Code Status Order: Full Code   Advance Directives:     Admitting Physician:  No admitting provider for patient encounter. PCP: ARNOL Walden - CNP    Discharging Nurse: [facility] Unit/Room#: 6K-27/027-A  Discharging Unit Phone Number: ***    Emergency Contact:   Extended Emergency Contact Information  Primary Emergency Contact: Jayne Rob  Address: [address] [place] [address] [address] Phone: [phone]  Mobile Phone: [phone]  Relation: Brother/Sister  Hearing or visual needs: None  Other needs: None  Preferred language: English   needed? No  Secondary Emergency Contact: Mary Villatoro, [address] [facility] Phone: [phone]  Mobile Phone: [phone]  Relation: Brother/Sister  Preferred language: English   needed?  No    Past Surgical History:  Past Surgical History:   Procedure Laterality Date    BACK SURGERY      xs 2    CATARACT REMOVAL Right 14    Dr. Ivon Swain EKG 12-LEAD  2015         ENDOSCOPY, COLON, DIAGNOSTIC      EYE SURGERY      FOOT SURGERY      left foot    HYSTERECTOMY, TOTAL ABDOMINAL      MOUTH BIOPSY  12    left tongue and lingual tonsil    OTHER SURGICAL HISTORY  2014    LEFT FEMUR RETROGRADE NAILING    OTHER SURGICAL HISTORY  2015    HARDWARE REMOVAL LEFT FEMUR, INSERTION OF ANTIBIOTIC SPACER    PATELLA SURGERY  13    fractues rt patella     MI COLON CA SCRN NOT HI RSK IND Left 2018    COLONOSCOPY performed by Payton Bajwa MD at [address] [place] [address] TAG REMOVAL  12    mole removal    SLEEVE GASTRECTOMY  09/15/2015    Robotic    TOENAIL EXCISION      removal of great toe nails bilateral-still has toenails-had ingrown toenails and had trimmed out     TONSILLECTOMY      UPPER GASTROINTESTINAL ENDOSCOPY      US THYROID CYST ASPIRATION AND OR INJECTION  5/21/2021    US THYROID CYST ASPIRATION AND OR INJECTION 5/21/2021 [facility] ULTRASOUND       Immunization History:   Immunization History   Administered Date(s) Administered    PPD Test 07/01/2013, 07/12/2013    Pneumococcal Conjugate 13-valent ([id number]) 04/10/2019    Pneumococcal Polysaccharide (Hqjhaneua08) 09/21/2015, 04/26/2021    Tdap (Boostrix, Adacel) 11/07/2017       Active Problems:  Patient Active Problem List   Diagnosis Code    Diabetes mellitus ([facility] E11.9    Hypertension I10    Hyperlipidemia E78.5    Neuropathy G62.9    Osteoarthritis M19.90    Proteinuria R80.9    Arthritis M19.90    Morbid obesity ([facility] E66.01    Allergic rhinitis J30.9    Chronic kidney disease, stage 4 (severe) ([facility]) N18.4    Diabetes mellitus type 2, insulin dependent ([facility]) E11.9, Z79.4    DARWIN on CPAP G47.33, Z99.89    History of sleeve gastrectomy Z90.3    Pneumonia J18.9    Morbid obesity with BMI of 50.0-59.9, adult ([facility]) E66.01, Z68.43    Coronary artery disease involving native heart without angina pectoris I25.10    Multinodular goiter E04.2    Bilateral renal cysts N28.1    Dyspnea R06.00    Atrial fibrillation ([facility]) I48.91    Acute diastolic CHF (congestive heart failure), NYHA class 2 ([facility]) I50.31    CKD (chronic kidney disease) stage 3, GFR 30-59 ml/min N18.30    Shortness of breath R06.02    Chronic anemia D64.9    Paroxysmal A-fib ([facility]) I48.0    Chronic kidney disease (CKD), stage III (moderate) ([facility]) N18.30    Nonischemic cardiomyopathy ([facility]) I42.8    Mild tricuspid regurgitation I07.1    Debility R53.81    Dietary noncompliance Z91.11    Long term current use of anticoagulant Z79.01    Severe left ventricular systolic dysfunction X28.0    Anticoagulated on Coumadin Z01.11    Metabolic encephalopathy A51.21    Acute cystitis without hematuria N30.00    Hyperkalemia E87.5    KEEGAN (acute kidney injury) [facility] N17.9    Altered mental status R41.82    AMS (altered mental status) R41.82    Encounter for therapeutic drug monitoring Z51.81    Closed fracture of distal end of left femur, initial encounter ([facility] S72.402A    Parotiditis K11.20       Isolation/Infection:   Isolation          No Isolation        Patient Infection Status     Infection Onset Added Last Indicated Last Indicated By Review Planned Expiration Resolved Resolved By    None active    Resolved    COVID-19 Rule Out 08/02/21 08/02/21 08/02/21 COVID-19, Rapid (Ordered)   08/02/21 Rule-Out Test Resulted    COVID-19 Rule Out 04/10/21 04/10/21 04/10/21 COVID-19, Rapid (Ordered)   04/10/21 Rule-Out Test Resulted    COVID-19 Rule Out 01/29/21 01/29/21 01/29/21 COVID-19 (Ordered)   01/29/21 Rule-Out Test Resulted    COVID-19 Rule Out 01/03/21 01/03/21 01/03/21 COVID-19 (Ordered)   01/03/21 Rule-Out Test Resulted    COVID-19 Rule Out 12/15/20 12/15/20 12/15/20 COVID-19 (Ordered)   12/15/20 Rule-Out Test Resulted    ESBL (Extended Spectrum Beta Lactamase)  10/30/17 10/30/17 Eddie Gaona RN   05/17/19 Alex Arriola RN    E-coli urine 10/17          Nurse Assessment:  Last Vital Signs: BP (!) 152/91   Pulse 84   Temp 98.2 °F (36.8 °C) (Oral)   Resp 16   Ht 5' 9\" (1.753 m)   Wt 223 lb 5.2 oz (101.3 kg)   LMP 02/20/2001   SpO2 97%   BMI 32.98 kg/m²     Last documented pain score (0-10 scale): Pain Level: 0  Last Weight:   Wt Readings from Last 1 Encounters:   08/04/21 223 lb 5.2 oz (101.3 kg)     Mental Status:  {IP PT MENTAL STATUS:20030}    IV Access:  { TASIA IV ACCESS:747538592}    Nursing Mobility/ADLs:  Walking   {[facility] RWGW:479834972}  Transfer  {[facility] GFHS:286177027}  Bathing  {[facility] DME IVYK:054191476}  Dressing  {[facility] DME BEYH:589479186}  Toileting  {CHP DME CKJJ:010176193}  Feeding  {CHP DME WQBH:802003048}  Med Admin  {CHP DME EMTA:465758378}  Med Delivery   {[facility] MED Delivery:054701240}    Wound Care Documentation and Therapy:  Wound 21 Buttocks Right (Active)   Number of days: 212       Wound 21 Buttocks Inner (Active)   Wound Etiology Pressure Stage  1 21 0800   Wound Assessment Pink/red; Other (Comment) 21 0800   Drainage Amount None 21 0800   Odor None 21 0800   Danielle-wound Assessment Non-blanchable erythema 21 0800   Number of days: 190        Elimination:  Continence:   · Bowel: {YES / MB:47110}  · Bladder: {YES / AX:57749}  Urinary Catheter: {Urinary Catheter:573669998}   Colostomy/Ileostomy/Ileal Conduit: {YES / JU:85444}       Date of Last BM: ***    Intake/Output Summary (Last 24 hours) at 2021  Last data filed at 2021 0429  Gross per 24 hour   Intake 1069.6 ml   Output 3500 ml   Net -2430.4 ml     I/O last 3 completed shifts:   In: 1069.6 [P.O.:955; I.V.:114.6]  Out: 3500 [Urine:3500]    Safety Concerns:     [employer] Safety Concerns:545728885}    Impairments/Disabilities:      {[facility] Impairments/Disabilities:398768501}    Nutrition Therapy:  Current Nutrition Therapy:   [employer] Diet List:856163319}    Routes of Feeding: {CHP DME Other Feedings:151138536}  Liquids: {Slp liquid thickness:69798}  Daily Fluid Restriction: {CHP DME Yes amt example:104943353}  Last Modified Barium Swallow with Video (Video Swallowing Test): {Done Not Done FJWI:713876421}    Treatments at the Time of Hospital Discharge:   Respiratory Treatments: ***  Oxygen Therapy:  {Therapy; copd oxygen:63103}  Ventilator:    { CC Vent HJTW:923557452}    Rehab Therapies: {THERAPEUTIC INTERVENTION:4426154958}  Weight Bearing Status/Restrictions: { CC Weight Bearin}  Other Medical Equipment (for information only, NOT a DME order):  {EQUIPMENT:056612043}  Other Treatments: ***    Patient's personal belongings (please select all that are sent with patient):  {[facility] DME Belongings:464703829}    RN SIGNATURE:  {Esignature:074208746}    CASE MANAGEMENT/SOCIAL WORK SECTION    Inpatient Status Date: 8/2/21    Readmission Risk Assessment Score:  Readmission Risk              Risk of Unplanned Readmission:  38           Discharging to Facility/ Agency   · Name: [facility]  · Address: [address] [place] [address]. [address] [place] [address], [address]  · Phone:[phone]  · Fax:[phone]    Dialysis Facility (if applicable)   · Name:  · Address:  · Dialysis Schedule:  · Phone:  · Fax:    / signature: Electronically signed by JORI Gerard on 8/4/21 at 9:22 AM EDT    PHYSICIAN SECTION    Prognosis: {Prognosis:4972017856}    Condition at Discharge: [address]is Bradshaw Patient Condition:621749855}    Rehab Potential (if transferring to Rehab): {Prognosis:2456313625}    Recommended Labs or Other Treatments After Discharge: ***    Physician Certification: I certify the above information and transfer of Jasmine Buck  is necessary for the continuing treatment of the diagnosis listed and that she requires {Admit to Appropriate Level of Care:05347} for {GREATER/LESS:305373278} 30 days.      Update Admission H&P: {CHP DME Changes in PYVML:656523018}    PHYSICIAN SIGNATURE:  {Esignature:721540375}

## 2021-08-04 NOTE — PROGRESS NOTES
Clinical Pharmacy Note    Warfarin consult follow-up    Recent Labs     08/04/21  0517   INR 3.13*     Recent Labs     08/02/21  0615 08/03/21  0536 08/04/21  0517   HGB 10.4* 9.2* 9.1*   HCT 34.2* 30.2* 29.1*    197 203       Significant Drug-Drug Interactions:  New warfarin drug-drug interactions: none  Discontinued drug-drug interactions: none  Current warfarin drug-drug interactions: aspirin 81 mg daily, levothyroxine 75mcg (), fenofibrate (), allopurinol ()     Date INR Warfarin Dose   08/2/2021 2.94 2.5mg    08/3/2021  2.55 3 mg     08/4/2021 3.13  No warfarin                                       Notes:                   Daily PT/INR until stable within therapeutic range.      Phyllis Jacome, PharmD  8/4/2021  10:19 AM

## 2021-08-04 NOTE — CARE COORDINATION
8/4/21, 8:46 AM EDT    DISCHARGE PLANNING EVALUATION    SW was received order that pt would like ECF at discharge. Full assessment completed 8/2/21, see for details. KYLAH Walls stated pt is requesting [facility]. SW completed referral with [facility], they do have beds and are able to accept pt.

## 2021-08-04 NOTE — PLAN OF CARE
Problem: Falls - Risk of:  Goal: Will remain free from falls  Description: Will remain free from falls  8/4/2021 1510 by Alysia Hernandez RN  Outcome: Ongoing  Note: Call light within reach. Side rails up x2. Bed alarm on. Non skid slippers available. Hourly rounding     Problem: Skin Integrity:  Goal: Will show no infection signs and symptoms  Description: Will show no infection signs and symptoms  Outcome: Ongoing  Note: Patient remains afebrile this shift. Ancef ordered per blood cultures. Will continue to monitor. Problem: Skin Integrity:  Goal: Absence of new skin breakdown  Description: Absence of new skin breakdown  8/4/2021 1510 by Alysia Hernandez RN  Outcome: Ongoing  Note: Patient free from new skin breakdown. Heels elevated off bed on pillows. Patient educated on turning every 2 hours. Will continue to monitor. Problem: DISCHARGE BARRIERS  Goal: Patient's continuum of care needs are met  8/4/2021 1510 by Alysia Hernandez RN  Outcome: Ongoing  Note: Patient actively participated in bedside report. Care board updated throughout shift and educated patient. Will continue to assess. Social work on case. Problem: Pain:  Goal: Pain level will decrease  Description: Pain level will decrease  Outcome: Ongoing  Note: Patient educated on pain control. IV morphine and oral norco on board. Pain level assessed via 0-10 scale. Heat applied prn. Will continue to reassess. Problem: Infection - Methicillin-Resistant Staphylococcus Aureus Infection:  Goal: Absence of methicillin-resistant Staphylococcus aureus infection  Description: Absence of methicillin-resistant Staphylococcus aureus infection  8/4/2021 1510 by Alysia Hernandez RN  Outcome: Ongoing     Problem: Infection - Methicillin-Resistant Staphylococcus Aureus Infection:  Intervention: Monitor temperature  Note: Patient remains afebrile this shift. Will continue to monitor. Care plan reviewed with patient.   Patient verbalize understanding of the plan

## 2021-08-04 NOTE — PROGRESS NOTES
Hospitalist Progress Note    Patient:  Beba Orta      Unit/Bed:6K-27/027-A    YOB: 1955    MRN: [id number]       Acct: [de-identified]     PCP: ARNOL Ferro CNP    Date of Admission: 8/2/2021    Assessment/Plan:    Anticipated Discharge in :    C[address] [address] Problems    Diagnosis Date Noted    Atrial fibrillation [facility]) [I48.91]      Priority: High    Parotiditis [K11.20] 08/02/2021    Chronic kidney disease (CKD), stage III (moderate) ([facility] [N18.30]     Diabetes mellitus type 2, insulin dependent ([facility] [E11.9, Z79.4] 11/07/2014     Acute left-sided parotiditis s/p FNA 8/3  ENT consult  Patient started on Vanc and Unasyn, switched to Ancef  Awaiting final culture and sensitivity  Continue sialogogues  ID  Consulted    MSSA Bacteremia (POA)  ID consulted  Started on Ancef on 8/4    Acute metabolic encephalopathy, resolved    Hyponatremia, mild  Na 133, likely due to hyperglycemia  Continue to monitor    Chronic atrial fibrillation  on aspirin, Lanoxin, Toprol and Coumadin with therapeutic INR 2.55 and pharmacy dosing    Diabetes mellitus type 2  on Nesina; on medium dose sliding scale along with hypoglycemia protocol and monitor    Essential hypertension  Continue Toprol    CKD stage IIIb   Monitor creatinine after exposure to constrast  IVF hydration  BMP in AM    Diabetic neuropathy  Continue Cymbalta and Neurontin    Obesity with BMI 33.14  Diet and lifestyle changes    Constipation  Docusate and miralax prn  Chief Complaint:   L neck pain and swelling    Hospital Course:  Per H&P dated 8/2: \"The patient is a 75 y.o. female who presents with complaints of increased confusion over the last several days as well as worsening left sided neck pain and swelling.  Brother is at bedside and providing history states that he first noticed some erythema and swelling about a week ago. Sherif Hawley states over the last it is increased in size, it is become very hard and tender.  He reports that she Oral Daily    levothyroxine  75 mcg Oral Daily    valsartan  80 mg Oral Daily    zinc sulfate  50 mg Oral Daily    warfarin (COUMADIN) daily dosing (placeholder)   Other RX Placeholder    insulin glargine  15 Units Subcutaneous Daily    insulin lispro  2 Units Subcutaneous TID      PRN Meds: sodium chloride flush, sodium chloride, ondansetron **OR** ondansetron, polyethylene glycol, acetaminophen **OR** acetaminophen, glucose, dextrose, glucagon (rDNA), dextrose, HYDROcodone-acetaminophen      Intake/Output Summary (Last 24 hours) at 8/4/2021 0839  Last data filed at 8/4/2021 0429  Gross per 24 hour   Intake 1069.6 ml   Output 3500 ml   Net -2430.4 ml       Diet:  ADULT DIET; Regular; 4 carb choices (60 gm/meal)    Exam:  BP (!) 152/91   Pulse 84   Temp 98.2 °F (36.8 °C) (Oral)   Resp 16   Ht 5' 9\" (1.753 m)   Wt 223 lb 5.2 oz (101.3 kg)   LMP 02/20/2001   SpO2 97%   BMI 32.98 kg/m²     General appearance: No apparent distress, appears stated age and cooperative, obese  HEENT: Pupils equal, round, and reactive to light. Conjunctivae/corneas clear. Swelling, redness and tenderness on L parotid  Neck: Supple, with full range of motion. No jugular venous distention. Trachea midline. Respiratory:  Normal respiratory effort. Clear to auscultation, bilaterally without Rales/Wheezes/Rhonchi. Cardiovascular: Regular rate and rhythm with normal S1/S2 without murmurs, rubs or gallops. Abdomen: Soft, non-tender, non-distended with normal bowel sounds. Musculoskeletal: passive and active ROM x 4 extremities. Skin: Skin color, texture, turgor normal.  No rashes or lesions. Neurologic:  Neurovascularly intact without any focal sensory/motor deficits.  Cranial nerves: II-XII intact, grossly non-focal.  Psychiatric: Alert and oriented, thought content appropriate, normal insight  Capillary Refill: Brisk,< 3 seconds   Peripheral Pulses: +2 palpable, equal bilaterally       Labs:   Recent Labs     08/02/21  0615

## 2021-08-04 NOTE — FLOWSHEET NOTE
08/03/21 2336   Provider Notification   Reason for Communication Evaluate   Provider Name Dr. Ravi Encarnacion   Provider Notification Physician   Method of Communication Secure Message   Response See orders   Notification Time [phone]- Dr. Ravi Encarnacion paged regarding patient needing home CPAP order. Refer to order review for home CPAP order.

## 2021-08-04 NOTE — CARE COORDINATION
8/4/21, 11:03 AM EDT    DISCHARGE ON GOING Pr[address] 125 day: 2  Location: [facility]27/027-A Reason for admit: Parotiditis [K11.20]   Procedure: 8-3-21 FNA of left parotid. Barriers to Discharge: BC drawn yesterday has come back +. ID consulted. Unasyn continues for now. WBC's 11.8 and Hgb 9.1 today. Creat 1.8. SW following for discharge disposition. PT/OT ordered. PCP: ARNOL Whipple CNP  Readmission Risk Score: 38%  Patient Goals/Plan/Treatment Preferences: From home with sister but feeling need for ECF now. SW following for placement. ECF requesting clarification regarding reference to possible Hepatitis in CT note. Spoke with Rodney Alexander RN regarding this so she will speak to provider regarding this concern and get confirmation if hepatitis is part of pt diagnosis.

## 2021-08-05 NOTE — PROGRESS NOTES
[facility]  INPATIENT OCCUPATIONAL THERAPY  STRZ RENAL TELEMETRY 6K  EVALUATION    Time:   Time In: 1010  Time Out: 2497  Timed Code Treatment Minutes: 24 Minutes  Minutes: 32    Date: 2021  Patient Name: Dee Dee Murillo,   Gender: female      MRN: [id number]  : 1955  (76 y.o.)  Referring Practitioner: Senthil Toussaint MD  Diagnosis: Parotiditis  Additional Pertinent Hx: Patient admitted with diagnosis of parotiditis. Restrictions/Precautions:  Restrictions/Precautions: Fall Risk    Subjective  Chart Reviewed: Yes, Orders, History and Physical  Patient assessed for rehabilitation services?: Yes    Subjective: RN approved OT session. Patient seated on [facility] with tech present upon OT arrival. Agreeable to therapy, cooperative throughout. Pain:  Pain Assessment  Patient Currently in Pain: Yes  Pain Level: 7    Vitals: Vitals not assessed per clinical judgement    Social/Functional History:  Lives With: Family (with sister)  Type of Home: House  Home Layout: One level  Home Access: Level entry  Home Equipment: Wheelchair-manual, Rolling walker (uses primarily w/c PTA)   Bathroom Shower/Tub: Walk-in shower  Bathroom Toilet: Handicap height  Bathroom Equipment: Grab bars in shower, Shower chair  Bathroom Accessibility: Walker accessible  IADL Comments: Pt has aides 2x/wk for 2 hrs/day that assist with homemaking tasks except laundry. Sister does laundry. Sister and brother will get groceries. Pt reports she does not have any homemaking responsibilities.  Reports ind with ADLs and primarily furniture walks short distances, otherwise all other mobility in her wheelchair       ADL Assistance: [facility] [address]: Needs assistance (pt reports sister used to do, also has aides who assist with everything except laundry 2x/wk for 2 hours/day)  Ambulation Assistance: Independent (limited distance for transfers only and sometimes uses walker to transfer)  Transfer Assistance: Independent    Active : No  Patient's  Info: Brother or sister drives to appointments, grocery store, etc.     Additional Comments: per pt pain in BLE from hx of injuries and fear of falling limits her distance amb for several years    VISION:Corrected    HEARING:  WFL    COGNITION: Impaired Memory and Tangential - pt reporting impaired memory after hx of UTI    RANGE OF MOTION:  Right Upper Extremity: WFL  Left Upper Extremity:  Impaired - shoulder flexion actively approx 85-90*   *pt with decreased flexion    STRENGTH:  Bilateral Upper Extremity:  Impaired - grossly deconditioned - pt with 3-/5    SENSATION:   WFL    ADL:   Upper Extremity Dressing: Minimal Assistance. to change hospital gown  Toileting: Supervision. for seated pericares, CGA for standing clothing management  Toilet Transfer: [address] Freddy Gurrola BALANCE:  Sitting Balance:  Supervision. on [facility]  Standing Balance: Contact Guard Assistance. with no AD    BED MOBILITY:  Not Tested    TRANSFERS:  Sit to Stand:  Contact Guard Assistance. Stand to Sit: Contact Guard Assistance. FUNCTIONAL MOBILITY:  Assistive Device: None  Assist Level:  Contact Guard Assistance. Distance: BSC>recliner  **Patient with slow pace, no LOB. Furniture walking with use of bedrail, BSC armrest and recliner arm rests. Pt with good safety awareness and no cues for safety. Exercise:  Pt declining BUE exercises despite max encouragement. Activity Tolerance:  Patient tolerance of  treatment: fair. Pt self-limiting with reports of decreased endurance. Pt states \"even the smallest things makes me tired. \"      Assessment:  Assessment: Patient presents with the following performance deficits resulting in overall decreased safety and independence in all self care, transfer, and mobility tasks. Patient requires increased assist due to decreased endurance and strength needed for mobility and ADL/IADL tasks.  Patient would benefit from skilled OT services throughout admission and after discharge to achieve highest level of independence prior to dc to home environment. Performance deficits / Impairments: Decreased functional mobility , Decreased endurance, Decreased ADL status, Decreased balance, Decreased strength, Decreased safe awareness  Prognosis: Fair  REQUIRES OT FOLLOW UP: Yes    Treatment Initiated: Treatment and education initiated within context of evaluation. Evaluation time included review of current medical information, gathering information related to past medical, social and functional history, completion of standardized testing, formal and informal observation of tasks, assessment of data and development of plan of care and goals. Treatment time included skilled education and facilitation of tasks to increase safety and independence with ADL's for improved functional independence and quality of life. Discharge Recommendations:  [address], Patient would benefit from continued therapy after discharge    Patient Education:  OT Education: OT Role, Plan of Care, Home Exercise Program, Transfer Training    Equipment Recommendations:  Equipment Needed: No (defer to next level of care)    Plan:  Times per week: 3-5x  Current Treatment Recommendations: Strengthening, Balance Training, Functional Mobility Training, Endurance Training, Self-Care / ADL. See long-term goal time frame for expected duration of plan of care. If no long-term goals established, a short length of stay is anticipated. Goals:  Patient goals : Did not state  Short term goals  Time Frame for Short term goals: By discharge  Short term goal 1: Patient to increase activity tolerance to/from BSC/recliner to increase indep in home environment. Short term goal 2: Patient to tolerate dynamic standing task >4 minutes with 1-2UE release to increase indep in hygiene after toileting task.   Short term goal 3: Patient to complete BADL routine with no > min A and compensatory techniques prn to increase indep in ADL routine. Short term goal 4: Patient to tolerate BUE AROM/AAROM HEP with min cues for technique to increase strength and endurance needed for all mobility and self care completion. Following session, patient left in safe position with all fall risk precautions in place.

## 2021-08-05 NOTE — PROGRESS NOTES
Clinical Pharmacy Note    Warfarin consult follow-up    Recent Labs     08/05/21  0452   INR 4.30*     Recent Labs     08/03/21  0536 08/04/21  0517 08/05/21  0452   HGB 9.2* 9.1* 8.3*   HCT 30.2* 29.1* 27.6*    203 195       Significant Drug-Drug Interactions:  New warfarin drug-drug interactions: none  Discontinued drug-drug interactions: none  Current warfarin drug-drug interactions: aspirin 81 mg daily, levothyroxine 75mcg (), fenofibrate (), allopurinol ()     Date INR Warfarin Dose   08/2/2021 2.94 2.5mg    08/3/2021  2.55 3 mg    08/4/2021   3.13 No warfarin    08/5/2021  4.30 No warfarin                              Notes:                   Daily PT/INR until stable within therapeutic range.

## 2021-08-05 NOTE — PROGRESS NOTES
Hospitalist Progress Note    Patient:  Sena Paz      Unit/Bed:6K-27/027-A    YOB: 1955    MRN: [id number]       Acct: [de-identified]     PCP: ARNOL Trimble CNP    Date of Admission: 8/2/2021    Assessment/Plan:    Anticipated Discharge in :    MARGIE/[facility]by Luisjac[facility] [address] Problems    Diagnosis Date Noted    Atrial fibrillation [facility]) [I48.91]      Priority: High    Parotiditis [K11.20] 08/02/2021    Chronic kidney disease (CKD), stage III (moderate) ([facility] [address] [N18.30]     Diabetes mellitus type 2, insulin dependent ([facility] [address] [E11.9, Z79.4] 11/07/2014     Acute left-sided parotiditis s/p FNA 8/3  ENT consult  Patient started on Vanc and Unasyn, switched to Ancef  Awaiting final culture and sensitivity  Continue sialogogues  ID  Consulted    MSSA Bacteremia (POA)  ID consulted  Started on Ancef on 8/4    Acute metabolic encephalopathy, resolved    Hyponatremia, mild  Na 133, likely due to hyperglycemia  Continue to monitor    Chronic atrial fibrillation  on aspirin, Lanoxin, Toprol and Coumadin  Pharmacy to dose coumadin    Diabetes mellitus type 2  Increase Lantus dose to 36U and Humalog 5U TID  Humalog SS moderate dose  Resume Nesina  Hypoglycemia protocol    Essential hypertension  Continue Toprol    CKD stage IIIb   Stable at this time  Monitor creatinine after exposure to constrast  If CT contrast is being planned, we'll have to hydrate pre and post exposure, and consider nephro referral  BMP in AM    Diabetic neuropathy  Continue Cymbalta and Neurontin    Obesity with BMI 33.14  Diet and lifestyle changes    Constipation  Docusate and miralax prn    Chief Complaint:   L neck pain and swelling    Hospital Course:  Per H&P dated 8/2: \"The patient is a 75 y.o. female who presents with complaints of increased confusion over the last several days as well as worsening left sided neck pain and swelling.  Brother is at bedside and providing history states that he first noticed some erythema and swelling about a week ago. Deb Sparks states over the last it is increased in size, it is become very hard and tender.  He reports that she has had several hospitalizations with recurrent infections and has never recovered mentally from this. Deb Sparks states that she has had confusion ongoing for the last year however is noted increased amount over the last several days. Reports that she was extremely cold, no reports of fevers, nausea or vomiting.  Patient awakens when calling her by her name and denies any shortness of breath, difficulty swallowing or breathing.  Patient reports she complained of some lightheadedness earlier today. Brother also reports that she has chronic lower extremity swelling, follows up with nephrology but feels that they are a little more swollen today than usual.\"     8/3--> ENT saw yesterday and unable to obtain culture as no material was expressed so plan is to have IR get culture through FNA of left parotid and no need to hold Coumadin per note; hemodynamically stable however did have some RVR earlier which is better controlled now    Subjective:   Patient seen and examined, appears comfortable in bed, without any signs of cardiorespiratory distress. VS stable, afebrile, saturating well on room air. Blood cultures x2 positive, growing MSSA. Pain 8/10 on left parotid area.  Glucose elevated, will increase insulin dose today    Medications:  Reviewed    Infusion Medications    sodium chloride 75 mL/hr at 08/05/21 0526    sodium chloride      dextrose       Scheduled Medications    ceFAZolin  2,000 mg Intravenous Q8H    sodium chloride flush  5-40 mL Intravenous 2 times per day    insulin lispro  0-12 Units Subcutaneous TID WC    insulin lispro  0-6 Units Subcutaneous Nightly    allopurinol  100 mg Oral Daily    alogliptin  6.25 mg Oral Daily    ascorbic acid  500 mg Oral BID    aspirin  81 mg Oral Daily    atorvastatin  40 mg Oral Nightly    pantoprazole  40 mg Oral QAM AC    digoxin  62.5 mcg Oral Daily    DULoxetine  60 mg Oral Daily    fenofibrate  160 mg Oral Daily    folic acid  1 mg Oral Daily    furosemide  40 mg Oral Daily    gabapentin  100 mg Oral TID    metoprolol succinate  100 mg Oral Daily    levothyroxine  75 mcg Oral Daily    valsartan  80 mg Oral Daily    zinc sulfate  50 mg Oral Daily    warfarin (COUMADIN) daily dosing (placeholder)   Other RX Placeholder    insulin glargine  15 Units Subcutaneous Daily    insulin lispro  2 Units Subcutaneous TID      PRN Meds: morphine, docusate sodium, diphenhydrAMINE, sodium chloride flush, sodium chloride, ondansetron **OR** ondansetron, polyethylene glycol, acetaminophen **OR** acetaminophen, glucose, dextrose, glucagon (rDNA), dextrose, HYDROcodone-acetaminophen      Intake/Output Summary (Last 24 hours) at 8/5/2021 0723  Last data filed at 8/5/2021 0108  Gross per 24 hour   Intake 846.61 ml   Output 2450 ml   Net -1603.39 ml       Diet:  ADULT DIET; Regular; 4 carb choices (60 gm/meal)    Exam:  BP (!) 137/56   Pulse 69   Temp 98.2 °F (36.8 °C) (Oral)   Resp 16   Ht 5' 9\" (1.753 m)   Wt 223 lb 5.2 oz (101.3 kg)   LMP 02/20/2001   SpO2 95%   BMI 32.98 kg/m²     General appearance: No apparent distress, appears stated age and cooperative, obese  HEENT: Pupils equal, round, and reactive to light. Conjunctivae/corneas clear. Swelling, redness and tenderness on L parotid  Neck: Supple, with full range of motion. No jugular venous distention. Trachea midline. Respiratory:  Normal respiratory effort. Clear to auscultation, bilaterally without Rales/Wheezes/Rhonchi. Cardiovascular: Regular rate and rhythm with normal S1/S2 without murmurs, rubs or gallops. Abdomen: Soft, non-tender, non-distended with normal bowel sounds. Musculoskeletal: passive and active ROM x 4 extremities. Skin: Skin color, texture, turgor normal.  No rashes or lesions. Neurologic:  Neurovascularly intact without any focal sensory/motor deficits. Cranial nerves: II-XII intact, grossly non-focal.  Psychiatric: Alert and oriented, thought content appropriate, normal insight  Capillary Refill: Brisk,< 3 seconds   Peripheral Pulses: +2 palpable, equal bilaterally       Labs:   Recent Labs     08/03/21 0536 08/04/21 0517 08/05/21 0452   WBC 15.2* 11.3* 6.1   HGB 9.2* 9.1* 8.3*   HCT 30.2* 29.1* 27.6*    203 195     Recent Labs     08/03/21 0536 08/04/21 0517 08/05/21 0452   * 133* 131*   K 4.9 4.5 4.2   CL 99 98 99   CO2 19* 23 20*   BUN 28* 41* 54*   CREATININE 1.5* 1.8* 1.8*   CALCIUM 8.9 8.6 7.7*     No results for input(s): AST, ALT, BILIDIR, BILITOT, ALKPHOS in the last 72 hours. Recent Labs     08/03/21 0536 08/04/21 0517 08/05/21 0452   INR 2.55* 3.13* 4.30*     No results for input(s): CKTOTAL, TROPONINI in the last 72 hours. Urinalysis:      Lab Results   Component Value Date    NITRU NEGATIVE 08/03/2021    WBCUA NONE SEEN 08/03/2021    BACTERIA NONE SEEN 08/03/2021    RBCUA 0-2 08/03/2021    BLOODU NEGATIVE 08/03/2021    SPECGRAV 1.020 01/26/2020    GLUCOSEU 100 08/03/2021       Radiology:      Diet: ADULT DIET;  Regular; 4 carb choices (60 gm/meal)    DVT prophylaxis: [] Lovenox                                 [] SCDs                                 [] SQ Heparin                                 [] Encourage ambulation           [] Already on Anticoagulation     Disposition:    [] Home       [] TCU       [] Rehab       [] Psych       [] SNF       [] [place]       [] Other-    Code Status: Full Code    PT/OT Eval Status:       Electronically signed by Leslie Mcclendon MD on 8/5/2021 at 7:23 AM

## 2021-08-05 NOTE — PROGRESS NOTES
Department of Otolaryngology  Progress Note    Chief Complaint:  Left face/neck swelling    SUBJECTIVE:  Patient reports left parotid pain and swelling slightly improved today. She still has not used any sialogogues; she is hoping that family will bring in some today for her. She is using her heating pad and performing massage of gland. WBC down to 6.1 today (from 11.3). Remains afebrile. FNA for culture per IR 8/4; awaiting result. Dr [facility] on board - patient changed to IV Ancef yesterday. Initial HPI 8/2:  The patient is a 76 y.o. female who was admitted to [address] [place] [address] this morning for treatment of left parotid infection and evaluation of ongoing confusion.  Patient states swelling present for \"a while\"; brother reported to ER that swelling present for about a week, but progressively worsening.  Patient denies having this type of problem in the past. Ubaldo Higginbotham is unable to provide much detail; very sleepy and drifts off during conversation.  Per ER note, her brother notes that she has been confused, which she tends to do with urinary tract infections.  No family is present at this time. Patient with history of type II DM; glucose 165 on admission.  WBC 13.2. Temperature 99.  Currently on Unasyn 3gm and Vancomycin. A complete multi-organ review of systems was performed using a new patient questionnaire, and reviewed by me.   ENT:  negative except as noted in HPI  CONSTITUTIONAL:  negative except as noted in HPI  EYES:  negative except as noted in HPI  RESPIRATORY:  negative except as noted in HPI  CARDIOVASCULAR:  negative except as noted in HPI  GASTROINTESTINAL:  negative except as noted in HPI  GENITOURINARY:  negative except as noted in HPI  MUSCULOSKELETAL:  negative except as noted in HPI  SKIN:  negative except as noted in HPI  ENDOCRINE/METABOLIC: negative except as noted in HPI  HEMATOLOGIC/LYMPHATIC:  negative except as noted in HPI  ALLERGY/IMMUN: negative except as noted in HPI  NEUROLOGICAL:  negative except as noted in HPI  BEHAVIOR/PSYCH:  negative except as noted in HPI    OBJECTIVE      Physical  VITALS:  BP (!) 140/64   Pulse 82   Temp 97.3 °F (36.3 °C) (Oral)   Resp 16   Ht 5' 9\" (1.753 m)   Wt 223 lb 5.2 oz (101.3 kg)   LMP 02/20/2001   SpO2 96%   BMI 32.98 kg/m²     Patient awake, alert and cooperative. Pleasant Sayres in bedside chair. On room air.   Respirations easy. Left parotid induration and overlying erythema, improved today. Erythema extends down through left submandibular and submental areas, but no longer indurated. Not as tender to palpation today; able to massage gland. Unable to express any material purulent material from duct, but saliva flowing today. Oral mucous membranes moist today. Data  CBC with Differential:    Lab Results   Component Value Date    WBC 6.1 08/05/2021    RBC 2.59 08/05/2021    HGB 8.3 08/05/2021    HCT 27.6 08/05/2021     08/05/2021    .6 08/05/2021    MCH 32.0 08/05/2021    MCHC 30.1 08/05/2021    RDW 13.8 10/26/2017    NRBC 0 08/03/2021    SEGSPCT 85.9 08/03/2021    MONOPCT 7.6 08/03/2021    MONOSABS 1.2 08/03/2021    LYMPHSABS 0.8 08/03/2021    EOSABS 0.0 08/03/2021    BASOSABS 0.1 08/03/2021     Radiology Review:  CTA Neck W WO Contrast 8/2/2021 0944           ** ADDENDUM #1 **       The study was reviewed again at the request of the referring provider to    assess for inflammation in the left side of the neck.       There is increased density in the left parotid gland consistent with    hepatitis. There is punctate calcification in the left parotid gland. There is increased density in the skin and subcutaneous soft tissues over    the left side of the neck extending    posteriorly. These findings are consistent with edema and/or cellulitis. There is no drainable fluid collection.  There are small lymph nodes behind    the angle the mandible on the left side and in the posterior triangle of    the neck on the left side. There    is an enlarged left lobe of the thyroid gland.       Narrative     PROCEDURE: CTA HEAD W WO CONTRAST, CTA NECK W WO CONTRAST           CLINICAL INFORMATION: AMS.           COMPARISON: CT scan of the brain obtained on the same day.           TECHNIQUE: 1 mm axial images were obtained through the head and neck after the fast bolus administration of contrast. A noncontrast localizer was obtained. 3-D reconstructions were performed on a dedicated 3-D workstation. These include multiplanar MPR      images and multiplanar MIP images.  Centerline reconstructions were obtained of the carotid systems. Isovue intravenous contrast was given.           All CT scans at this facility use dose modulation, iterative reconstruction, and/or weight-based dosing when appropriate to reduce radiation dose to as low as reasonably achievable.           FINDINGS:                 CTA NECK:           Aortic arch and branches: There is atherosclerotic calcification in aortic arch and at the origin of the brachiocephalic, left common carotid and left subclavian arteries           Right common carotid artery/ICA: There is a small amount of calcified plaque involving the right carotid bifurcation. There is no significant hemodynamic stenosis in the right common and internal carotid arteries.           Left common carotid artery/ICA: There is no significant hemodynamic stenosis in the left common and internal carotid arteries.           Vertebral arteries: There is antegrade flow in the right and left vertebral arteries.           There is atherosclerotic calcification in the cavernous segments of both internal carotid arteries. This no evidence of severe stenosis           CTA HEAD:                 Internal carotid arteries: There is atherosclerotic calcification in the cavernous segments of both internal carotid arteries. There is no evidence of severe stenosis. .           Middle cerebral arteries: Antegrade flow in the middle cerebral arteries bilaterally.           Anterior cerebral arteries: Antegrade flow in the anterior cerebral arteries bilaterally.           Vertebral arteries: There is some calcification in the distal left vertebral artery. There is antegrade flow in the right and left vertebral arteries.           Basilar artery: No significant stenosis.           Superior cerebellar arteries: Antegrade flow in the superior cerebellar arteries bilaterally. .           Posterior cerebral arteries: Antegrade flow in the posterior cerebral arteries bilaterally           No aneurysms, stenoses or occlusions are noted.           The superior sagittal sinus, vein of Nelson, internal cerebral veins, straight sinus, transverse sinuses and sigmoid sinuses are patent.           Axial source data: There is evidence for granulomatous disease in left upper lobe of the lung and in the mediastinum. There is enlargement of the left and to lesser extent right lobes of thyroid gland. Cervical spondylosis.           Impression     1. Abnormal left parotid gland which is enlarged and demonstrates increased density consistent with peritonitis. There is punctate calcification in the left parotid gland.     2. Increased density in the skin and subcutaneous soft tissues over the left side of neck consistent with edema and/or cellulitis.     3. Enlarged lymph nodes adjacent to the angle of mandible on the left side and in the posterior triangle of the neck.     4. Enlarged left lobe of the thyroid gland           Final report electronically signed by DR Fredy Warner on 8/2/2021 10:29 AM           ** ORIGINAL REPORT **     PROCEDURE: CTA HEAD W WO CONTRAST, CTA NECK W WO CONTRAST           CLINICAL INFORMATION: AMS.           COMPARISON: CT scan of the brain obtained on the same day.           TECHNIQUE: 1 mm axial images were obtained through the head and neck after the fast bolus administration of contrast. A noncontrast localizer was obtained. 3-D reconstructions were performed on a dedicated 3-D workstation. These include multiplanar MPR      images and multiplanar MIP images.  Centerline reconstructions were obtained of the carotid systems. Isovue intravenous contrast was given.           All CT scans at this facility use dose modulation, iterative reconstruction, and/or weight-based dosing when appropriate to reduce radiation dose to as low as reasonably achievable.           FINDINGS:           CTA NECK:     Aortic arch and branches: There is atherosclerotic calcification in aortic arch and at the origin of the brachiocephalic, left common carotid and left subclavian arteries           Right common carotid artery/ICA: There is a small amount of calcified plaque involving the right carotid bifurcation. There is no significant hemodynamic stenosis in the right common and internal carotid arteries.           Left common carotid artery/ICA: There is no significant hemodynamic stenosis in the left common and internal carotid arteries.           Vertebral arteries: There is antegrade flow in the right and left vertebral arteries.           There is atherosclerotic calcification in the cavernous segments of both internal carotid arteries. This no evidence of severe stenosis           CTA HEAD:     Internal carotid arteries: There is atherosclerotic calcification in the cavernous segments of both internal carotid arteries. There is no evidence of severe stenosis. .           Middle cerebral arteries: Antegrade flow in the middle cerebral arteries bilaterally.           Anterior cerebral arteries: Antegrade flow in the anterior cerebral arteries bilaterally.           Vertebral arteries: There is some calcification in the distal left vertebral artery. There is antegrade flow in the right and left vertebral arteries.           Basilar artery: No significant stenosis.            Superior cerebellar arteries: Antegrade flow in the superior cerebellar arteries bilaterally. .           Posterior cerebral arteries: Antegrade flow in the posterior cerebral arteries bilaterally           No aneurysms, stenoses or occlusions are noted.           The superior sagittal sinus, vein of Nelson, internal cerebral veins, straight sinus, transverse sinuses and sigmoid sinuses are patent.           Axial source data: There is evidence for granulomatous disease in left upper lobe of the lung and in the mediastinum. There is enlargement of the left and to lesser extent right lobes of thyroid gland. Cervical spondylosis.           IMPRESSION:           1. Calcification in the right carotid bulb. No significant hemodynamic stenosis in the right common and internal carotid arteries.     2. No significant hemodynamic stenosis in the left common and internal carotid arteries.     3. Antegrade flow in the right and left vertebral arteries.     4. Atherosclerotic calcification in the aortic arch and at the origins of the brachiocephalic, left common carotid left subclavian arteries.     5. Atherosclerotic calcification in the cavernous segments of both internal carotid arteries. No evidence of severe stenosis.     6. Atherosclerotic calcification in the distal left vertebral artery. Antegrade flow in both distal vertebral, basilar and posterior cerebral arteries.     7. Otherwise negative CTA of the brain.     8. Evidence for granulomatous disease in left upper lobe of the lung and in the mediastinum.     9. Enlargement of the left and to lesser extent right lobes of the thyroid gland.     10. Cervical spondylosis. .           **This report has been created using voice recognition software. It may contain minor errors which are inherent in voice recognition technology. **           Final report electronically signed by DR Laura Santana on 8/2/2021 9:44 AM       Inpatient Medications  Current Facility-Administered Medications: insulin glargine (LANTUS) injection vial 36 Units, 36 Units, Subcutaneous, Daily  insulin lispro (HUMALOG) injection vial 5 Units, 5 Units, Subcutaneous, TID WC  morphine (PF) injection 2 mg, 2 mg, Intravenous, Q2H PRN  docusate sodium (COLACE) capsule 100 mg, 100 mg, Oral, BID PRN  ceFAZolin (ANCEF) 2000 mg in dextrose 5 % 50 mL IVPB, 2,000 mg, Intravenous, Q8H  0.9 % sodium chloride infusion, , Intravenous, Continuous  diphenhydrAMINE (BENADRYL) injection 25 mg, 25 mg, Intravenous, Q6H PRN  sodium chloride flush 0.9 % injection 5-40 mL, 5-40 mL, Intravenous, 2 times per day  sodium chloride flush 0.9 % injection 5-40 mL, 5-40 mL, Intravenous, PRN  0.9 % sodium chloride infusion, 25 mL, Intravenous, PRN  ondansetron (ZOFRAN-ODT) disintegrating tablet 4 mg, 4 mg, Oral, Q8H PRN **OR** ondansetron (ZOFRAN) injection 4 mg, 4 mg, Intravenous, Q6H PRN  polyethylene glycol (GLYCOLAX) packet 17 g, 17 g, Oral, Daily PRN  acetaminophen (TYLENOL) tablet 650 mg, 650 mg, Oral, Q6H PRN **OR** acetaminophen (TYLENOL) suppository 650 mg, 650 mg, Rectal, Q6H PRN  insulin lispro (HUMALOG) injection vial 0-12 Units, 0-12 Units, Subcutaneous, TID WC  insulin lispro (HUMALOG) injection vial 0-6 Units, 0-6 Units, Subcutaneous, Nightly  glucose (GLUTOSE) 40 % oral gel 15 g, 15 g, Oral, PRN  dextrose 50 % IV solution, 12.5 g, Intravenous, PRN  glucagon (rDNA) injection 1 mg, 1 mg, Intramuscular, PRN  dextrose 5 % solution, 100 mL/hr, Intravenous, PRN  HYDROcodone-acetaminophen (NORCO) 5-325 MG per tablet 1 tablet, 1 tablet, Oral, Q6H PRN  allopurinol (ZYLOPRIM) tablet 100 mg, 100 mg, Oral, Daily  alogliptin (NESINA) tablet 6.25 mg, 6.25 mg, Oral, Daily  ascorbic acid (VITAMIN C) tablet 500 mg, 500 mg, Oral, BID  aspirin EC tablet 81 mg, 81 mg, Oral, Daily  atorvastatin (LIPITOR) tablet 40 mg, 40 mg, Oral, Nightly  pantoprazole (PROTONIX) tablet 40 mg, 40 mg, Oral, QAM AC  digoxin (LANOXIN) tablet 62.5 mcg, 62.5 mcg, Oral, Daily  DULoxetine (CYMBALTA) extended release capsule 60 mg, 60 mg, Oral, Daily  fenofibrate (TRIGLIDE) tablet 160 mg, 160 mg, Oral, Daily  folic acid (FOLVITE) tablet 1 mg, 1 mg, Oral, Daily  furosemide (LASIX) tablet 40 mg, 40 mg, Oral, Daily  gabapentin (NEURONTIN) capsule 100 mg, 100 mg, Oral, TID  metoprolol succinate (TOPROL XL) extended release tablet 100 mg, 100 mg, Oral, Daily  levothyroxine (SYNTHROID) tablet 75 mcg, 75 mcg, Oral, Daily  valsartan (DIOVAN) tablet 80 mg, 80 mg, Oral, Daily  zinc sulfate (ZINCATE) capsule 50 mg, 50 mg, Oral, Daily  warfarin (COUMADIN) daily dosing (placeholder), , Other, RX Placeholder    ASSESSMENT AND PLAN    Acute left parotiditis  - IV Ancef per ID  - Await culture results from FNA, hopefully able to recover some bacteria  - Will hold on repeat imaging as long as she continues to improve  - Following is important to help promote salivation and decrease swelling/pain; patient will need assistance:     - Good oral hygiene      - Increased water/PO fluid intake      - Apply heat to left face/neck      - Massage of left face/neck (from in front of the ear toward the front of the mouth)      - Sialagogues (lemon juice, lemon wedges, sour candies) to promote salivation. This was      ordered 3 days ago and patient still does not have available to her.       Electronically signed by ARNOL Hawkins CNP on 8/5/2021 at 4:54 PM

## 2021-08-05 NOTE — PROGRESS NOTES
[address] [address]  INPATIENT PHYSICAL THERAPY  EVALUATION  [facility] RENAL TELEMETRY 6K - 5A-09/681-Z    Time In: 2859  Time Out: 2987  Timed Code Treatment Minutes: 23 Minutes  Minutes: 33          Date: 2021  Patient Name: Odell Vega,  Gender:  female        MRN: [id number]  : 1955  (76 y.o.)      Referring Practitioner: Dr. Angelia Wong  Diagnosis: parotiditis  Additional Pertinent Hx: admit with above diagnosis, MSSA bacteria     Restrictions/Precautions:  Restrictions/Precautions: Fall Risk    Subjective:  Chart Reviewed: Yes  Patient assessed for rehabilitation services?: Yes  Subjective: pleasant, cooperative    General:    Hearing: Within functional limits         Pain: 7/10: left side of jaw per pt    Vitals: Vitals not assessed per clinical judgement, see nursing flowsheet    Social/Functional History:    Lives With: Family (with sister)  Type of Home: House  Home Layout: One level  Home Access: Level entry  Home Equipment: Gaamirah Montoya, Rolling walker (uses primarily w/c PTA)                   Ambulation Assistance: Independent (limited distance for transfers only and sometimes uses walker to transfer)  Transfer Assistance: Independent          Additional Comments: per pt pain in BLE from hx of injuries and fear of falling limits her distance amb for several years    OBJECTIVE:  Range of Motion:  Bilateral Lower Extremity: WFL except chronic left foot drop    Strength:  Bilateral Lower Extremity: WFL, generalized weakness and left foot drop noted    Balance:  Static Sitting Balance:  Modified Independent  Dynamic Sitting Balance: Supervision, reach in SRAVANI  Static Standing Balance: Contact Guard Assistance, 1-2UE support on furniture    Bed Mobility:  Rolling to Left: Supervision   Supine to Sit: Stand By Assistance  Scooting: Stand By Assistance  HOB up 20 degrees and use BR  Transfers:  Sit to Stand: Contact Guard Assistance  Stand to [employer] Assistance    Ambulation:  Contact Guard Assistance, to SBA  Distance: 3'x3  Surface: Level Tile  Device:1-2UE support on furniture  Gait Deviations: Forward Flexed Posture, Slow Mary, Decreased Step Length Bilaterally, Decreased Heel Strike on Left and Mild Path Deviations    Exercise:  Reviewed seated BLE AP, LAQ, marches, hip ABD/ADD to cont throughout the day    Functional Outcome Measures: Completed  AM-PAC Inpatient Mobility Raw Score : 14  AM-PAC Inpatient T-Scale Score : 38.1    ASSESSMENT:  Activity Tolerance:  Patient tolerance of  treatment: good. Treatment Initiated: Treatment and education initiated within context of evaluation. Evaluation time included review of current medical information, gathering information related to past medical, social and functional history, completion of standardized testing, formal and informal observation of tasks, assessment of data and development of plan of care and goals. Treatment time included skilled education and facilitation of tasks to increase safety and independence with functional mobility for improved independence and quality of life. Assessment:   Body structures, Functions, Activity limitations: Decreased functional mobility , Decreased endurance, Decreased balance, Decreased strength, Increased pain  Assessment: pt with pain at left side jaw, generalized weakness, fearful of falling, dec balance, inc assist for safe mobility, IV lines, recommend cont PT to inc pt I with functional mobility  Prognosis: Good    REQUIRES PT FOLLOW UP: Yes    Discharge Recommendations:  Discharge Recommendations: Continue to assess pending progress, Patient would benefit from continued therapy after discharge    Patient Education:  PT Education: Goals, PT Role, Plan of Care, Home Exercise Program, Functional Mobility Training    Equipment Recommendations:  Equipment Needed: No    Plan:  Times per week: 3-5X GM  Times per day: Daily  Specific instructions for Next Treatment: therex and mobility    Goals:  Patient goals : feel better  Short term goals  Time Frame for Short term goals: by discharge  Short term goal 1: bed mobility with MOD I to get in/out of bed  Short term goal 2: transfer with MOD I to get in/out of chairs  Short term goal 3: amb >5'x1 with RW and MOD I to walk safely bed to/from w/c in home  Short term goal 4: w/c mobility >100'x1, on level surface, and MOD I to manuever safely in home  Long term goals  Time Frame for Long term goals : no LTGs set secondary to short ELOS    Following session, patient left in safe position with all fall risk precautions in place.

## 2021-08-05 NOTE — PLAN OF CARE
Problem: Falls - Risk of:  Goal: Will remain free from falls  Description: Will remain free from falls  Outcome: Ongoing  Note: Call light within reach. Side rails up x2. Bed alarm on. Non skid slippers available. Problem: Skin Integrity:  Goal: Absence of new skin breakdown  Description: Absence of new skin breakdown  Outcome: Ongoing  Note: Patient free from skin breakdown. Patient will turn self and make frequent positional changes. Will continue to monitor. Problem: DISCHARGE BARRIERS  Goal: Patient's continuum of care needs are met  Outcome: Ongoing  Note: Patient plans to be discharged to home when medically stable. Problem: Pain:  Goal: Control of chronic pain  Description: Control of chronic pain  Outcome: Ongoing  Note: Patient will ask for pain medication at regular intervals to manage chronic pain and use hot/cold compresses. Problem: Infection - Methicillin-Resistant Staphylococcus Aureus Infection:  Goal: Absence of methicillin-resistant Staphylococcus aureus infection  Description: Absence of methicillin-resistant Staphylococcus aureus infection  Outcome: Ongoing  Note: Patient will continue to monitor for s/s of MRSA and notify HCP  Care plan reviewed with patient and patient verbalize understanding of the plan of care and contribute to goal setting.

## 2021-08-05 NOTE — OP NOTE
PICC Line Insertion:      Pre-Procedure Diagnosis:  infection    Procedure Performed: PICC line insertion. IV team to follow     Anesthesia: local     Findings: successful    Immediate Complications:  None    Estimated Blood Loss: minimal    SEE DICTATED PROCEDURE NOTE FOR COMPLETE DETAILS.     Jaxson Murphy MD   8/5/2021 4:55 PM

## 2021-08-05 NOTE — CARE COORDINATION
8/5/21, 8:10 AM EDT    DISCHARGE ON GOING EVALUATION    Lenora Wong day: 3  Location: -27/027-A Reason for admit: Parotiditis [K11.20]   Procedure: 08/03 FNA of left parotid per IR   Barriers to Discharge: Left parotitis, + culture on 08/04, continue IV Ancef per ID, lemon juice, PT and OT added, diabetic mgmt, Benadryl after receiving prn MS. Remains on room air. Na 131, creatinine 1.8, Hgb 8.3, + BM. Heat to left side neck. INR 4.3, pharmacy dosing Coumadin. PCP: ARNOL Felipe CNP  Readmission Risk Score: 43%  Patient Goals/Plan/Treatment Preferences: accepted at [facility]; SW follows.

## 2021-08-05 NOTE — PROGRESS NOTES
1610 Patient received in IR for PICC line insertion. 1615 This procedure has been fully reviewed with the patient and written informed consent has been obtained. 1623 This RN called and spoke with Dr. Lukasz Stover about the pt getting a PICC line how long and if a arm PICC would be okay. Dr. Lukasz Stover stated that an arm PICC would work. 1635 Procedure started with Dr. Tami Israel Procedure completed; patient tolerated well.   1702 Report called to [facility]  [phone] Patient on bed; comfort ensured. 1711 Patient taken to  via bed.

## 2021-08-05 NOTE — FLOWSHEET NOTE
08/04/21 2241   Provider Notification   Reason for Communication Patient request   Provider Name Tyree   Provider Notification Physician   Method of Communication Secure Message   Response Waiting for response   Notification Time [id number]     Physician notified of patient's request for benadryl for itching after receiving prn morphine. Waiting for response.

## 2021-08-05 NOTE — PROGRESS NOTES
Oral Nightly    pantoprazole  40 mg Oral QAM AC    digoxin  62.5 mcg Oral Daily    DULoxetine  60 mg Oral Daily    fenofibrate  160 mg Oral Daily    folic acid  1 mg Oral Daily    furosemide  40 mg Oral Daily    gabapentin  100 mg Oral TID    metoprolol succinate  100 mg Oral Daily    levothyroxine  75 mcg Oral Daily    valsartan  80 mg Oral Daily    zinc sulfate  50 mg Oral Daily    warfarin (COUMADIN) daily dosing (placeholder)   Other RX Placeholder      sodium chloride 75 mL/hr at 08/05/21 0526    sodium chloride      dextrose       morphine, docusate sodium, diphenhydrAMINE, sodium chloride flush, sodium chloride, ondansetron **OR** ondansetron, polyethylene glycol, acetaminophen **OR** acetaminophen, glucose, dextrose, glucagon (rDNA), dextrose, HYDROcodone-acetaminophen      LABS:     CBC:   Recent Labs     08/03/21 0536 08/04/21  0517 08/05/21  0452   WBC 15.2* 11.3* 6.1   HGB 9.2* 9.1* 8.3*    203 195     BMP:    Recent Labs     08/03/21  0536 08/04/21  0517 08/05/21  0452   * 133* 131*   K 4.9 4.5 4.2   CL 99 98 99   CO2 19* 23 20*   BUN 28* 41* 54*   CREATININE 1.5* 1.8* 1.8*   GLUCOSE 236* 216* 252*     Calcium:  Recent Labs     08/05/21  0452   CALCIUM 7.7*      Recent Labs     08/04/21 1952 08/05/21  0703 08/05/21  1159   POCGLU 258* 241* 290*     HgbA1C: No results for input(s): LABA1C in the last 72 hours.   INR:   Recent Labs     08/03/21  0536 08/04/21  0517 08/05/21  0452   INR 2.55* 3.13* 4.30*        CULTURES:   UA:   Recent Labs     08/03/21  1532   PHUR 5.0   COLORU YELLOW   PROTEINU 100*   BLOODU NEGATIVE   RBCUA 0-2   WBCUA NONE SEEN   BACTERIA NONE SEEN   NITRU NEGATIVE   GLUCOSEU 100*   BILIRUBINUR NEGATIVE   UROBILINOGEN 1.0   KETUA NEGATIVE     Micro:   Lab Results   Component Value Date    BC No growth-preliminary  08/04/2021          Problem list of patient:     Patient Active Problem List   Diagnosis Code    Diabetes mellitus ([address] E11.9    Hypertension I10    Hyperlipidemia E78.5    Neuropathy G62.9    Osteoarthritis M19.90    Proteinuria R80.9    Arthritis M19.90    Morbid obesity ([facility]) E66.01    Allergic rhinitis J30.9    Chronic kidney disease, stage 4 (severe) ([facility]) N18.4    Diabetes mellitus type 2, insulin dependent ([facility]) E11.9, Z79.4    DARWIN on CPAP G47.33, Z99.89    History of sleeve gastrectomy Z90.3    Pneumonia J18.9    Morbid obesity with BMI of 50.0-59.9, adult ([facility]) E66.01, Z68.43    Coronary artery disease involving native heart without angina pectoris I25.10    Multinodular goiter E04.2    Bilateral renal cysts N28.1    Dyspnea R06.00    Atrial fibrillation ([facility]) I48.91    Acute diastolic CHF (congestive heart failure), NYHA class 2 ([facility]) I50.31    CKD (chronic kidney disease) stage 3, GFR 30-59 ml/min N18.30    Shortness of breath R06.02    Chronic anemia D64.9    Paroxysmal A-fib ([facility]) I48.0    Chronic kidney disease (CKD), stage III (moderate) ([facility]) N18.30    Nonischemic cardiomyopathy ([facility]) I42.8    Mild tricuspid regurgitation I07.1    Debility R53.81    Dietary noncompliance Z91.11    Long term current use of anticoagulant Z79.01    Severe left ventricular systolic dysfunction M25.8    Anticoagulated on Coumadin F06.40    Metabolic encephalopathy N16.61    Acute cystitis without hematuria N30.00    Hyperkalemia E87.5    KEEGAN (acute kidney injury) ([facility] [address] N17.9    Altered mental status R41.82    AMS (altered mental status) R41.82    Encounter for therapeutic drug monitoring Z51.81    Closed fracture of distal end of left femur, initial encounter ([facility] S72.402A    Parotiditis K11.20         ASSESSMENT/PLAN   MSSA bacteremia  Left parotidites likely due to dry oral mucosa: slowly improving.   Continue current treatment      Vance Caceres MD, MD, FACP 8/5/2021 1:55 PM

## 2021-08-06 NOTE — PROGRESS NOTES
Hospitalist Progress Note    Patient:  Beba Orta      Unit/Bed:6K-27/027-A    YOB: 1955    MRN: [id number]       Acct: [de-identified]     PCP: ARNOL Ferro CNP    Date of Admission: 8/2/2021    Assessment/Plan:    Anticipated Discharge in :    MARGIE/[facility] [address] Problems    Diagnosis Date Noted    Atrial fibrillation [facility]) [I48.91]      Priority: High    Parotiditis [K11.20] 08/02/2021    Chronic kidney disease (CKD), stage III (moderate) ([facility] [N18.30]     Diabetes mellitus type 2, insulin dependent ([facility] [E11.9, Z79.4] 11/07/2014     Acute left-sided parotiditis s/p FNA 8/3  ENT consult  Patient started on Vanc and Unasyn, switched to Ancef  Awaiting final culture and sensitivity  Continue sialogogues  ID  Consulted    MSSA Bacteremia (POA)  ID consulted  Started on Ancef on 8/4    Acute metabolic encephalopathy, resolved    Hyponatremia, mild  Na 133, likely due to hyperglycemia  Continue to monitor    Chronic atrial fibrillation  on aspirin, Lanoxin, Toprol and Coumadin  Pharmacy to dose coumadin    Diabetes mellitus type 2  Increase Lantus dose to 36U and Humalog 5U TID  Humalog SS moderate dose  Resume Nesina  Hypoglycemia protocol    Essential hypertension  Continue Toprol    CKD stage IIIb   Stable at this time  Monitor creatinine after exposure to constrast  If CT contrast is being planned, we'll have to hydrate pre and post exposure, and consider nephro referral  BMP in AM    Diabetic neuropathy  Continue Cymbalta and Neurontin    Obesity with BMI 33.14  Diet and lifestyle changes    Constipation  Docusate and miralax prn    Chief Complaint:   L neck pain and swelling    Hospital Course:  Per H&P dated 8/2: \"The patient is a 75 y.o. female who presents with complaints of increased confusion over the last several days as well as worsening left sided neck pain and swelling.  Brother is at bedside and providing history states that he first noticed some erythema and swelling about a week ago. Ezra Rodriguez states over the last it is increased in size, it is become very hard and tender.  He reports that she has had several hospitalizations with recurrent infections and has never recovered mentally from this. Ezra Rodriguez states that she has had confusion ongoing for the last year however is noted increased amount over the last several days. Reports that she was extremely cold, no reports of fevers, nausea or vomiting.  Patient awakens when calling her by her name and denies any shortness of breath, difficulty swallowing or breathing.  Patient reports she complained of some lightheadedness earlier today. Brother also reports that she has chronic lower extremity swelling, follows up with nephrology but feels that they are a little more swollen today than usual.\"     8/3--> ENT saw yesterday and unable to obtain culture as no material was expressed so plan is to have IR get culture through FNA of left parotid and no need to hold Coumadin per note; hemodynamically stable however did have some RVR earlier which is better controlled now    Subjective:   Patient seen and examined, appears comfortable in bed, without any signs of cardiorespiratory distress. VS stable, afebrile, saturating well on room air. Blood cultures x2 positive, growing MSSA. Pain 8/10 on left parotid area. Glucose elevated. Patient still complains of pain on the left jaw/neck but seems to be better.     Medications:  Reviewed    Infusion Medications    sodium chloride 75 mL/hr at 08/05/21 2025    sodium chloride      dextrose       Scheduled Medications    insulin glargine  36 Units Subcutaneous Daily    insulin lispro  5 Units Subcutaneous TID WC    ceFAZolin  2,000 mg Intravenous Q8H    sodium chloride flush  5-40 mL Intravenous 2 times per day    insulin lispro  0-12 Units Subcutaneous TID WC    insulin lispro  0-6 Units Subcutaneous Nightly    allopurinol  100 mg Oral Daily    alogliptin  6.25 mg Oral Daily    ascorbic acid  500 mg Oral BID    aspirin  81 mg Oral Daily    atorvastatin  40 mg Oral Nightly    pantoprazole  40 mg Oral QAM AC    digoxin  62.5 mcg Oral Daily    DULoxetine  60 mg Oral Daily    fenofibrate  160 mg Oral Daily    folic acid  1 mg Oral Daily    furosemide  40 mg Oral Daily    gabapentin  100 mg Oral TID    metoprolol succinate  100 mg Oral Daily    levothyroxine  75 mcg Oral Daily    valsartan  80 mg Oral Daily    zinc sulfate  50 mg Oral Daily    warfarin (COUMADIN) daily dosing (placeholder)   Other RX Placeholder     PRN Meds: morphine, docusate sodium, diphenhydrAMINE, sodium chloride flush, sodium chloride, ondansetron **OR** ondansetron, polyethylene glycol, acetaminophen **OR** acetaminophen, glucose, dextrose, glucagon (rDNA), dextrose, HYDROcodone-acetaminophen      Intake/Output Summary (Last 24 hours) at 8/6/2021 1539  Last data filed at 8/6/2021 1522  Gross per 24 hour   Intake 3592.67 ml   Output 1400 ml   Net 2192.67 ml       Diet:  ADULT DIET; Regular; 4 carb choices (60 gm/meal)    Exam:  BP (!) 125/58   Pulse 72   Temp 97.8 °F (36.6 °C) (Oral)   Resp 16   Ht 5' 9\" (1.753 m)   Wt 224 lb 6.9 oz (101.8 kg)   LMP 02/20/2001   SpO2 100%   BMI 33.14 kg/m²     General appearance: No apparent distress, appears stated age and cooperative, obese  HEENT: Pupils equal, round, and reactive to light. Conjunctivae/corneas clear. Swelling, redness and tenderness on L parotid  Neck: Supple, with full range of motion. No jugular venous distention. Trachea midline. Respiratory:  Normal respiratory effort. Clear to auscultation, bilaterally without Rales/Wheezes/Rhonchi. Cardiovascular: Regular rate and rhythm with normal S1/S2 without murmurs, rubs or gallops. Abdomen: Soft, non-tender, non-distended with normal bowel sounds. Musculoskeletal: passive and active ROM x 4 extremities. Skin: Skin color, texture, turgor normal.  No rashes or lesions.   Neurologic:  Neurovascularly intact without any focal sensory/motor deficits. Cranial nerves: II-XII intact, grossly non-focal.  Psychiatric: Alert and oriented, thought content appropriate, normal insight  Capillary Refill: Brisk,< 3 seconds   Peripheral Pulses: +2 palpable, equal bilaterally       Labs:   Recent Labs     08/04/21 0517 08/05/21  0452 08/06/21  0634   WBC 11.3* 6.1 6.1   HGB 9.1* 8.3* 8.7*   HCT 29.1* 27.6* 28.4*    195 223     Recent Labs     08/04/21  0517 08/05/21  0452 08/06/21  0634   * 131* 138   K 4.5 4.2 4.8   CL 98 99 104   CO2 23 20* 24   BUN 41* 54* 51*   CREATININE 1.8* 1.8* 1.7*   CALCIUM 8.6 7.7* 8.0*     Recent Labs     08/06/21  0634   AST 15   ALT <5*   BILIDIR 0.3   BILITOT 0.5   ALKPHOS 62     Recent Labs     08/04/21 0517 08/05/21  0452 08/06/21  0634   INR 3.13* 4.30* 4.66*     No results for input(s): CKTOTAL, TROPONINI in the last 72 hours. Urinalysis:      Lab Results   Component Value Date    NITRU NEGATIVE 08/03/2021    WBCUA NONE SEEN 08/03/2021    BACTERIA NONE SEEN 08/03/2021    RBCUA 0-2 08/03/2021    BLOODU NEGATIVE 08/03/2021    SPECGRAV 1.020 01/26/2020    GLUCOSEU 100 08/03/2021       Radiology:      Diet: ADULT DIET;  Regular; 4 carb choices (60 gm/meal)    DVT prophylaxis: [] Lovenox                                 [] SCDs                                 [] SQ Heparin                                 [] Encourage ambulation           [] Already on Anticoagulation     Disposition:    [] Home       [] TCU       [] Rehab       [] Psych       [] SNF       [] [place]       [] Other-    Code Status: Full Code    PT/OT Eval Status:       Electronically signed by Samuel Willoughby MD on 8/6/2021 at 3:39 PM

## 2021-08-06 NOTE — CARE COORDINATION
8/6/21, 2:42 PM EDT    [facility]  Location: -27/027-A Reason for admit: Parotiditis [K11.20]   Procedure: 08/03 FNA of left parotid per IR   Barriers to Discharge: Left parotitis, + culture on 08/04, continue IV Ancef per ID, lemon juice, PT /OT, diabetic mgmt. On room air. Na 138, creatinine 1.7, Hgb 8.7, + BM. Heat to left side neck. INR 4.3, pharmacy dosing Coumadin. Hospitalist, ID, and ENT. PCP: ARNOL Colindres CNP  Readmission Risk Score: 44%  Patient Goals/Plan/Treatment Preferences: accepted at [facility]; SW follows.

## 2021-08-06 NOTE — PROGRESS NOTES
Department of Otolaryngology  Progress Note    Chief Complaint:  Left face/neck swelling    SUBJECTIVE:  Patient reports left parotid pain and swelling improved today. She is now using lemon juice packets from the cafe, which she does not find to be sour. She is using her heating pad and performing massage of gland. + bacteremia with MSSA. WBC down to 6.1 yesteray (from 11.3). Remains afebrile. No bacteria recovered on FNA. Continues on IV Ancef. Initial HPI 8/2:  The patient is a 76 y.o. female who was admitted to [address] [place] [address] this morning for treatment of left parotid infection and evaluation of ongoing confusion.  Patient states swelling present for \"a while\"; brother reported to ER that swelling present for about a week, but progressively worsening.  Patient denies having this type of problem in the past. Albertina Mendoza is unable to provide much detail; very sleepy and drifts off during conversation.  Per ER note, her brother notes that she has been confused, which she tends to do with urinary tract infections.  No family is present at this time. Patient with history of type II DM; glucose 165 on admission.  WBC 13.2. Temperature 99.  Currently on Unasyn 3gm and Vancomycin. A complete multi-organ review of systems was performed using a new patient questionnaire, and reviewed by me.   ENT:  negative except as noted in HPI  CONSTITUTIONAL:  negative except as noted in HPI  EYES:  negative except as noted in HPI  RESPIRATORY:  negative except as noted in HPI  CARDIOVASCULAR:  negative except as noted in HPI  GASTROINTESTINAL:  negative except as noted in HPI  GENITOURINARY:  negative except as noted in HPI  MUSCULOSKELETAL:  negative except as noted in HPI  SKIN:  negative except as noted in HPI  ENDOCRINE/METABOLIC: negative except as noted in HPI  HEMATOLOGIC/LYMPHATIC:  negative except as noted in HPI  ALLERGY/IMMUN: negative except as noted in HPI  NEUROLOGICAL:  negative except as enlarged left lobe of the thyroid gland.       Narrative     PROCEDURE: CTA HEAD W WO CONTRAST, CTA NECK W WO CONTRAST           CLINICAL INFORMATION: AMS.           COMPARISON: CT scan of the brain obtained on the same day.           TECHNIQUE: 1 mm axial images were obtained through the head and neck after the fast bolus administration of contrast. A noncontrast localizer was obtained. 3-D reconstructions were performed on a dedicated 3-D workstation. These include multiplanar MPR      images and multiplanar MIP images.  Centerline reconstructions were obtained of the carotid systems. Isovue intravenous contrast was given.           All CT scans at this facility use dose modulation, iterative reconstruction, and/or weight-based dosing when appropriate to reduce radiation dose to as low as reasonably achievable.           FINDINGS:                 CTA NECK:           Aortic arch and branches: There is atherosclerotic calcification in aortic arch and at the origin of the brachiocephalic, left common carotid and left subclavian arteries           Right common carotid artery/ICA: There is a small amount of calcified plaque involving the right carotid bifurcation. There is no significant hemodynamic stenosis in the right common and internal carotid arteries.           Left common carotid artery/ICA: There is no significant hemodynamic stenosis in the left common and internal carotid arteries.           Vertebral arteries: There is antegrade flow in the right and left vertebral arteries.           There is atherosclerotic calcification in the cavernous segments of both internal carotid arteries. This no evidence of severe stenosis           CTA HEAD:                 Internal carotid arteries: There is atherosclerotic calcification in the cavernous segments of both internal carotid arteries. There is no evidence of severe stenosis. .           Middle cerebral arteries: Antegrade flow in the middle cerebral arteries bilaterally.           Anterior cerebral arteries: Antegrade flow in the anterior cerebral arteries bilaterally.           Vertebral arteries: There is some calcification in the distal left vertebral artery. There is antegrade flow in the right and left vertebral arteries.           Basilar artery: No significant stenosis.           Superior cerebellar arteries: Antegrade flow in the superior cerebellar arteries bilaterally. .           Posterior cerebral arteries: Antegrade flow in the posterior cerebral arteries bilaterally           No aneurysms, stenoses or occlusions are noted.           The superior sagittal sinus, vein of Nelson, internal cerebral veins, straight sinus, transverse sinuses and sigmoid sinuses are patent.           Axial source data: There is evidence for granulomatous disease in left upper lobe of the lung and in the mediastinum. There is enlargement of the left and to lesser extent right lobes of thyroid gland. Cervical spondylosis.           Impression     1. Abnormal left parotid gland which is enlarged and demonstrates increased density consistent with peritonitis. There is punctate calcification in the left parotid gland.     2. Increased density in the skin and subcutaneous soft tissues over the left side of neck consistent with edema and/or cellulitis.     3. Enlarged lymph nodes adjacent to the angle of mandible on the left side and in the posterior triangle of the neck.     4. Enlarged left lobe of the thyroid gland           Final report electronically signed by DR Andressa Espinoza on 8/2/2021 10:29 AM           ** ORIGINAL REPORT **     PROCEDURE: CTA HEAD W WO CONTRAST, CTA NECK W WO CONTRAST           CLINICAL INFORMATION: AMS.           COMPARISON: CT scan of the brain obtained on the same day.           TECHNIQUE: 1 mm axial images were obtained through the head and neck after the fast bolus administration of contrast. A noncontrast localizer was obtained.  3-D reconstructions were performed on a dedicated 3-D workstation. These include multiplanar MPR      images and multiplanar MIP images.  Centerline reconstructions were obtained of the carotid systems. Isovue intravenous contrast was given.           All CT scans at this facility use dose modulation, iterative reconstruction, and/or weight-based dosing when appropriate to reduce radiation dose to as low as reasonably achievable.           FINDINGS:           CTA NECK:     Aortic arch and branches: There is atherosclerotic calcification in aortic arch and at the origin of the brachiocephalic, left common carotid and left subclavian arteries           Right common carotid artery/ICA: There is a small amount of calcified plaque involving the right carotid bifurcation. There is no significant hemodynamic stenosis in the right common and internal carotid arteries.           Left common carotid artery/ICA: There is no significant hemodynamic stenosis in the left common and internal carotid arteries.           Vertebral arteries: There is antegrade flow in the right and left vertebral arteries.           There is atherosclerotic calcification in the cavernous segments of both internal carotid arteries. This no evidence of severe stenosis           CTA HEAD:     Internal carotid arteries: There is atherosclerotic calcification in the cavernous segments of both internal carotid arteries. There is no evidence of severe stenosis. .           Middle cerebral arteries: Antegrade flow in the middle cerebral arteries bilaterally.           Anterior cerebral arteries: Antegrade flow in the anterior cerebral arteries bilaterally.           Vertebral arteries: There is some calcification in the distal left vertebral artery. There is antegrade flow in the right and left vertebral arteries.           Basilar artery: No significant stenosis.           Superior cerebellar arteries: Antegrade flow in the superior cerebellar arteries bilaterally. .         Posterior cerebral arteries: Antegrade flow in the posterior cerebral arteries bilaterally           No aneurysms, stenoses or occlusions are noted.           The superior sagittal sinus, vein of Nelson, internal cerebral veins, straight sinus, transverse sinuses and sigmoid sinuses are patent.           Axial source data: There is evidence for granulomatous disease in left upper lobe of the lung and in the mediastinum. There is enlargement of the left and to lesser extent right lobes of thyroid gland. Cervical spondylosis.           IMPRESSION:           1. Calcification in the right carotid bulb. No significant hemodynamic stenosis in the right common and internal carotid arteries.     2. No significant hemodynamic stenosis in the left common and internal carotid arteries.     3. Antegrade flow in the right and left vertebral arteries.     4. Atherosclerotic calcification in the aortic arch and at the origins of the brachiocephalic, left common carotid left subclavian arteries.     5. Atherosclerotic calcification in the cavernous segments of both internal carotid arteries. No evidence of severe stenosis.     6. Atherosclerotic calcification in the distal left vertebral artery. Antegrade flow in both distal vertebral, basilar and posterior cerebral arteries.     7. Otherwise negative CTA of the brain.     8. Evidence for granulomatous disease in left upper lobe of the lung and in the mediastinum.     9. Enlargement of the left and to lesser extent right lobes of the thyroid gland.     10. Cervical spondylosis. .           **This report has been created using voice recognition software. It may contain minor errors which are inherent in voice recognition technology. **           Final report electronically signed by DR Marcus Smith on 8/2/2021 9:44 AM       Inpatient Medications  Current Facility-Administered Medications: insulin glargine (LANTUS) injection vial 36 Units, 36 Units, Subcutaneous, Daily  insulin lispro (HUMALOG) injection vial 5 Units, 5 Units, Subcutaneous, TID WC  morphine (PF) injection 2 mg, 2 mg, Intravenous, Q2H PRN  docusate sodium (COLACE) capsule 100 mg, 100 mg, Oral, BID PRN  ceFAZolin (ANCEF) 2000 mg in dextrose 5 % 50 mL IVPB, 2,000 mg, Intravenous, Q8H  0.9 % sodium chloride infusion, , Intravenous, Continuous  diphenhydrAMINE (BENADRYL) injection 25 mg, 25 mg, Intravenous, Q6H PRN  sodium chloride flush 0.9 % injection 5-40 mL, 5-40 mL, Intravenous, 2 times per day  sodium chloride flush 0.9 % injection 5-40 mL, 5-40 mL, Intravenous, PRN  0.9 % sodium chloride infusion, 25 mL, Intravenous, PRN  ondansetron (ZOFRAN-ODT) disintegrating tablet 4 mg, 4 mg, Oral, Q8H PRN **OR** ondansetron (ZOFRAN) injection 4 mg, 4 mg, Intravenous, Q6H PRN  polyethylene glycol (GLYCOLAX) packet 17 g, 17 g, Oral, Daily PRN  acetaminophen (TYLENOL) tablet 650 mg, 650 mg, Oral, Q6H PRN **OR** acetaminophen (TYLENOL) suppository 650 mg, 650 mg, Rectal, Q6H PRN  insulin lispro (HUMALOG) injection vial 0-12 Units, 0-12 Units, Subcutaneous, TID WC  insulin lispro (HUMALOG) injection vial 0-6 Units, 0-6 Units, Subcutaneous, Nightly  glucose (GLUTOSE) 40 % oral gel 15 g, 15 g, Oral, PRN  dextrose 50 % IV solution, 12.5 g, Intravenous, PRN  glucagon (rDNA) injection 1 mg, 1 mg, Intramuscular, PRN  dextrose 5 % solution, 100 mL/hr, Intravenous, PRN  HYDROcodone-acetaminophen (NORCO) 5-325 MG per tablet 1 tablet, 1 tablet, Oral, Q6H PRN  allopurinol (ZYLOPRIM) tablet 100 mg, 100 mg, Oral, Daily  alogliptin (NESINA) tablet 6.25 mg, 6.25 mg, Oral, Daily  ascorbic acid (VITAMIN C) tablet 500 mg, 500 mg, Oral, BID  aspirin EC tablet 81 mg, 81 mg, Oral, Daily  atorvastatin (LIPITOR) tablet 40 mg, 40 mg, Oral, Nightly  pantoprazole (PROTONIX) tablet 40 mg, 40 mg, Oral, QAM AC  digoxin (LANOXIN) tablet 62.5 mcg, 62.5 mcg, Oral, Daily  DULoxetine (CYMBALTA) extended release capsule 60 mg, 60 mg, Oral, Daily  fenofibrate (TRIGLIDE) tablet 160 mg, 160 mg, Oral, Daily  folic acid (FOLVITE) tablet 1 mg, 1 mg, Oral, Daily  furosemide (LASIX) tablet 40 mg, 40 mg, Oral, Daily  gabapentin (NEURONTIN) capsule 100 mg, 100 mg, Oral, TID  metoprolol succinate (TOPROL XL) extended release tablet 100 mg, 100 mg, Oral, Daily  levothyroxine (SYNTHROID) tablet 75 mcg, 75 mcg, Oral, Daily  valsartan (DIOVAN) tablet 80 mg, 80 mg, Oral, Daily  zinc sulfate (ZINCATE) capsule 50 mg, 50 mg, Oral, Daily  warfarin (COUMADIN) daily dosing (placeholder), , Other, RX Placeholder    ASSESSMENT AND PLAN    Acute left parotiditis  - IV Ancef per ID  - Will hold on repeat imaging as long as she continues to improve  - Following is important to help promote salivation and decrease swelling/pain; patient will need assistance:     - Good oral hygiene      - Increased water/PO fluid intake      - Apply heat to left face/neck      - Massage of left face/neck (from in front of the ear toward the front of the mouth)      - Sialagogues (lemon juice, lemon wedges, sour candies) to promote salivation. This was      ordered 3 days ago and patient still does not have available to her.       Electronically signed by ARNOL Thompson CNP on 8/6/2021 at 3:20 PM

## 2021-08-06 NOTE — PROGRESS NOTES
Clinical Pharmacy Note    Warfarin consult follow-up    Recent Labs     08/06/21  0634   INR 4.66*     Recent Labs     08/04/21  0517 08/05/21  0452 08/06/21  0634   HGB 9.1* 8.3* 8.7*   HCT 29.1* 27.6* 28.4*    195 223       Significant Drug-Drug Interactions:  New warfarin drug-drug interactions: none  Discontinued drug-drug interactions: none  Current warfarin drug-drug interactions: aspirin 81 mg daily, levothyroxine 75mcg (), fenofibrate (), allopurinol ()     Date INR Warfarin Dose   08/2/2021 2.94 2.5mg    08/3/2021  2.55 3 mg    08/4/2021   3.13 No warfarin    08/5/2021  4.30 No warfarin     08/6/2021 4.66  No warfarin                      Notes:                   Daily PT/INR until stable within therapeutic range. Fecal occult ordered to assess for GI bleeding.        Phyllis Jacome, PharmD  8/6/2021  1:58 PM

## 2021-08-06 NOTE — PROGRESS NOTES
Progress note: Infectious diseases    Patient - Michael Abrams,  Age - 76 y.o.    - 1955      Room Number - 0K-55/708-I   MRN -  [id number]   Acct # - [de-identified]  Date of Admission -  2021  5:44 AM    SUBJECTIVE:   She has no new complaints  The left parotid is still red and swollen, the redness over the submandibular area is better  OBJECTIVE   VITALS    height is 5' 9\" (1.753 m) and weight is 224 lb 6.9 oz (101.8 kg). Her oral temperature is 98.8 °F (37.1 °C). Her blood pressure is 128/67 and her pulse is 84. Her respiration is 18 and oxygen saturation is 99%.        Wt Readings from Last 3 Encounters:   21 224 lb 6.9 oz (101.8 kg)   21 223 lb 12.8 oz (101.5 kg)   21 229 lb (103.9 kg)       I/O (24 Hours)    Intake/Output Summary (Last 24 hours) at 2021 0815  Last data filed at 2021 0417  Gross per 24 hour   Intake 2503.24 ml   Output 1000 ml   Net 1503.24 ml       General Appearance  Awake, alert, oriented,  not  In acute distress  HEENT - normocephalic, atraumatic, pink conjunctiva,  anicteric sclera  Neck - the redness on the left side of the face and neck is less, enlarged and firm left parotid gland  Lungs -  Bilateral   air entry, no rhonchi, no wheeze  Cardiovascular - Heart sounds are normal.     Abdomen - soft, not distended, nontender,   Neurologic -oriented  Skin - No bruising or bleeding  Extremities - No edema, no cyanosis, clubbing     MEDICATIONS:      insulin glargine  36 Units Subcutaneous Daily    insulin lispro  5 Units Subcutaneous TID WC    ceFAZolin  2,000 mg Intravenous Q8H    sodium chloride flush  5-40 mL Intravenous 2 times per day    insulin lispro  0-12 Units Subcutaneous TID WC    insulin lispro  0-6 Units Subcutaneous Nightly    allopurinol  100 mg Oral Daily    alogliptin  6.25 mg Oral Daily    ascorbic acid  500 mg Oral BID    aspirin  81 mg Oral Daily    atorvastatin  40 mg Oral Nightly    pantoprazole  40 mg Oral QAM AC    digoxin  62.5 mcg Oral Daily    DULoxetine  60 mg Oral Daily    fenofibrate  160 mg Oral Daily    folic acid  1 mg Oral Daily    furosemide  40 mg Oral Daily    gabapentin  100 mg Oral TID    metoprolol succinate  100 mg Oral Daily    levothyroxine  75 mcg Oral Daily    valsartan  80 mg Oral Daily    zinc sulfate  50 mg Oral Daily    warfarin (COUMADIN) daily dosing (placeholder)   Other RX Placeholder      sodium chloride 75 mL/hr at 08/05/21 2025    sodium chloride      dextrose       morphine, docusate sodium, diphenhydrAMINE, sodium chloride flush, sodium chloride, ondansetron **OR** ondansetron, polyethylene glycol, acetaminophen **OR** acetaminophen, glucose, dextrose, glucagon (rDNA), dextrose, HYDROcodone-acetaminophen      LABS:     CBC:   Recent Labs     08/04/21  0517 08/05/21  0452 08/06/21  0634   WBC 11.3* 6.1 6.1   HGB 9.1* 8.3* 8.7*    195 223     BMP:    Recent Labs     08/04/21  0517 08/05/21  0452 08/06/21  0634   * 131* 138   K 4.5 4.2 4.8   CL 98 99 104   CO2 23 20* 24   BUN 41* 54* 51*   CREATININE 1.8* 1.8* 1.7*   GLUCOSE 216* 252* 189*     Calcium:  Recent Labs     08/06/21  0634   CALCIUM 8.0*      Recent Labs     08/05/21  1728 08/05/21 2032 08/06/21  0647   POCGLU 139* 170* 197*     HgbA1C: No results for input(s): LABA1C in the last 72 hours.   INR:   Recent Labs     08/04/21  0517 08/05/21  0452 08/06/21  0634   INR 3.13* 4.30* 4.66*        CULTURES:   UA:   Recent Labs     08/03/21  1532   PHUR 5.0   COLORU YELLOW   PROTEINU 100*   BLOODU NEGATIVE   RBCUA 0-2   WBCUA NONE SEEN   BACTERIA NONE SEEN   NITRU NEGATIVE   GLUCOSEU 100*   BILIRUBINUR NEGATIVE   UROBILINOGEN 1.0   KETUA NEGATIVE     Micro:   Lab Results   Component Value Date    BC No growth-preliminary  08/04/2021          Problem list of patient:     Patient Active Problem List   Diagnosis Code    Diabetes mellitus ([facility] E11.9    Hypertension I10    Hyperlipidemia E78.5    Neuropathy G62.9    Osteoarthritis M19.90    Proteinuria R80.9    Arthritis M19.90    Morbid obesity ([facility]) E66.01    Allergic rhinitis J30.9    Chronic kidney disease, stage 4 (severe) ([facility]) N18.4    Diabetes mellitus type 2, insulin dependent ([facility]) E11.9, Z79.4    DARWIN on CPAP G47.33, Z99.89    History of sleeve gastrectomy Z90.3    Pneumonia J18.9    Morbid obesity with BMI of 50.0-59.9, adult ([facility]) E66.01, Z68.43    Coronary artery disease involving native heart without angina pectoris I25.10    Multinodular goiter E04.2    Bilateral renal cysts N28.1    Dyspnea R06.00    Atrial fibrillation ([facility]) I48.91    Acute diastolic CHF (congestive heart failure), NYHA class 2 ([facility]) I50.31    CKD (chronic kidney disease) stage 3, GFR 30-59 ml/min N18.30    Shortness of breath R06.02    Chronic anemia D64.9    Paroxysmal A-fib ([facility]) I48.0    Chronic kidney disease (CKD), stage III (moderate) ([facility]) N18.30    Nonischemic cardiomyopathy ([facility]) I42.8    Mild tricuspid regurgitation I07.1    Debility R53.81    Dietary noncompliance Z91.11    Long term current use of anticoagulant Z79.01    Severe left ventricular systolic dysfunction G07.3    Anticoagulated on Coumadin C91.41    Metabolic encephalopathy C69.60    Acute cystitis without hematuria N30.00    Hyperkalemia E87.5    KEEGAN (acute kidney injury) ([facility] N17.9    Altered mental status R41.82    AMS (altered mental status) R41.82    Encounter for therapeutic drug monitoring Z51.81    Closed fracture of distal end of left femur, initial encounter ([facility] S72.402A    Parotiditis K11.20         ASSESSMENT/PLAN   MSSA bacteremia  Left parotidites likely due to dry oral mucosa: slowly improving. Continue current treatment  Will continue iv antibiotics for now.       Lizzy Garcia MD, MD, FACP 8/6/2021 8:15 AM

## 2021-08-06 NOTE — PROGRESS NOTES
[facility] [address] RENAL TELEMETRY [facility]  Occupational Therapy  Daily Note  Time:   Time In: 5990  Time Out: 1500  Timed Code Treatment Minutes: 29 Minutes  Minutes: 29          Date: 2021  Patient Name: Yamilet Dias,   Gender: female      Room: [facility]27/027-A  MRN: [id number]  : 1955  (76 y.o.)  Referring Practitioner: Aviva White MD  Diagnosis: Parotiditis  Additional Pertinent Hx: Patient admitted with diagnosis of parotiditis. Restrictions/Precautions:  Restrictions/Precautions: Fall Risk     SUBJECTIVE: pt sleeping in bed when arrived. Pt agreeable to OT     PAIN: pt did not state any pain during session     Vitals: Vitals not assessed per clinical judgement, see nursing flowsheet    COGNITION: Decreased Problem Solving and Decreased Safety Awareness    ADL:   Bathing: Contact Guard Assistance. pt wanted to stand and wipe angelika and bottom area due to she was soiled wtih urine   Lower Extremity Dressing: Moderate Assistance. don and doff depends sitting EOB . BALANCE:  Standing Balance: Contact Guard Assistance. at [facility]    BED MOBILITY:  Supine to Sit: Stand By Assistance able to complete with HOB elevated     TRANSFERS:  Sit to Stand:  Contact Guard Assistance. from EOB   Stand to Sit: Contact Guard Assistance. to EOB and recliner     FUNCTIONAL MOBILITY:  Assistive Device: Rolling Walker  Assist Level:  Contact Guard Assistance. Distance: from EOB to recliner   Pt had no LOB, good posture      ADDITIONAL ACTIVITIES:  .Patient identified one of their personal goals is to be able to sustain functional standing positions in order to complete various ADL and IADL skills in standing position, such as sinkside grooming or washing dishes. Dynamic standing task was then facilitated to challenge 1 hand release . Patient required CGA , and demo'ed an endurance of 5-7  minutes. ASSESSMENT:     Activity Tolerance:  Patient tolerance of  treatment: good.        Discharge Recommendations: Subacute/Skilled Nursing Facility, Patient would benefit from continued therapy after discharge   Equipment Recommendations: Equipment Needed: No (defer to next level of care)  Plan: Times per week: 3-5x  Current Treatment Recommendations: Strengthening, Balance Training, Functional Mobility Training, Endurance Training, Self-Care / ADL    Patient Education  Patient Education: ADL's and Importance of Increasing Activity    Goals  Short term goals  Time Frame for Short term goals: By discharge  Short term goal 1: Patient to increase activity tolerance to/from BSC/recliner to increase indep in home environment. Short term goal 2: Patient to tolerate dynamic standing task >4 minutes with 1-2UE release to increase indep in hygiene after toileting task. Short term goal 3: Patient to complete BADL routine with no > min A and compensatory techniques prn to increase indep in ADL routine. Short term goal 4: Patient to tolerate BUE AROM/AAROM HEP with min cues for technique to increase strength and endurance needed for all mobility and self care completion. Following session, patient left in safe position with all fall risk precautions in place.

## 2021-08-06 NOTE — CARE COORDINATION
8/6/21, 10:04 AM EDT    Patient goals/plan/ treatment preferences discussed by  and . Patient goals/plan/ treatment preferences reviewed with patient/ family. Patient/ family verbalize understanding of discharge plan and are in agreement with goal/plan/treatment preferences. Understanding was demonstrated using the teach back method. AVS provided by RN at time of discharge, which includes all necessary medical information pertaining to the patients current course of illness, treatment, post-discharge goals of care, and treatment preferences. Services After Discharge  Services At/After Discharge: In [facility], Nursing Services, [facility], Skilled Therapy ([address])   IMM Letter  IMM Letter given to Patient/Family/Significant other/Guardian/POA/by[de-identified] Intake. IMM Letter date given[de-identified] 08/02/21  IMM Letter time given[de-identified] 1     DANI spoke with Dr. Santana Cosby regarding anticipated discharge. Discussed possible weekend discharge. Physician aware that [address] can accept at anytime. Pt will also require Covid test.    DANI left message with Maria De Jesus Lugo at [address] of possible weekend discharge. Pt will be SLO under her Medicare benefit. Blue envelope on chart with weekend discharge instructions along with transportation forms/phone/fax numbers. RN Melanie Guerra is aware.

## 2021-08-07 NOTE — PROGRESS NOTES
Clinical Pharmacy Note    Warfarin consult follow-up    Recent Labs     08/07/21  0517   INR 4.68*     Recent Labs     08/05/21  0452 08/06/21  0634 08/07/21  0517   HGB 8.3* 8.7* 7.9*   HCT 27.6* 28.4* 25.7*    223 220       Significant Drug-Drug Interactions:  New warfarin drug-drug interactions: none  Discontinued drug-drug interactions: none  Current warfarin drug-drug interactions: aspirin 81 mg daily, levothyroxine 75mcg (), fenofibrate (), allopurinol ()     Date INR Warfarin Dose   08/2/2021 2.94 2.5mg    08/3/2021  2.55 3 mg    08/4/2021   3.13 No warfarin    08/5/2021  4.30 No warfarin     08/6/2021 4.66  No warfarin     8/7/2021  4.68   No warfarin             Notes:                   Daily PT/INR until stable within therapeutic range. Jonathan Bergeronmagi PAGE, BCPS, BCCCP 8/7/2021 3:16 PM

## 2021-08-07 NOTE — PROGRESS NOTES
Department of Otolaryngology  Progress Note    Chief Complaint:  Left face/neck swelling    SUBJECTIVE:  Patient reports left parotid pain and swelling improved today. She is now using lemon candies. She is using her heating pad and performing massage of gland. + bacteremia with MSSA. WBC down to 4.5. Remains afebrile. No bacteria recovered on FNA. Continues on IV Ancef. Initial HPI 8/2:  The patient is a 76 y.o. female who was [facility] for treatment of left parotid infection and evaluation of ongoing confusion.  Patient states swelling present for \"a while\"; brother reported to ER that swelling present for about a week, but progressively worsening.  Patient denies having this type of problem in the past. Meet Alexandra is unable to provide much detail; very sleepy and drifts off during conversation.  Per ER note, her brother notes that she has been confused, which she tends to do with urinary tract infections.  No family is present at this time. Patient with history of type II DM; glucose 165 on admission.  WBC 13.2. Temperature 99.  Currently on Unasyn 3gm and Vancomycin. OBJECTIVE      Physical  VITALS:  /61   Pulse 67   Temp 98 °F (36.7 °C) (Axillary)   Resp 16   Ht 5' 9\" (1.753 m)   Wt 224 lb 6.9 oz (101.8 kg)   LMP 02/20/2001   SpO2 97%   BMI 33.14 kg/m²     Patient awake, alert and cooperative. Bang Gonsales in bedside chair. On room air.   Respirations easy. Left parotid induration significantly improved from prior. Erythema resolved. Not as tender to palpation today; able to massage gland without causing severe pain. Unable to express any material purulent material from duct, but clear saliva flowing.    Oral mucous membranes moist.  On nasal CPAP    Data  CBC with Differential:    Lab Results   Component Value Date    WBC 4.5 08/07/2021    RBC 2.47 08/07/2021    HGB 7.9 08/07/2021    HCT 25.7 08/07/2021     08/07/2021    .0 08/07/2021    MCH 32.0 08/07/2021    MCHC 30.7 08/07/2021    RDW 13.8 10/26/2017    NRBC 0 08/03/2021    SEGSPCT 85.9 08/03/2021    MONOPCT 7.6 08/03/2021    MONOSABS 1.2 08/03/2021    LYMPHSABS 0.8 08/03/2021    EOSABS 0.0 08/03/2021    BASOSABS 0.1 08/03/2021     Radiology Review:  CTA Neck W WO Contrast 8/2/2021 0944           ** ADDENDUM #1 **       The study was reviewed again at the request of the referring provider to    assess for inflammation in the left side of the neck.       There is increased density in the left parotid gland consistent with    hepatitis. There is punctate calcification in the left parotid gland. There is increased density in the skin and subcutaneous soft tissues over    the left side of the neck extending    posteriorly. These findings are consistent with edema and/or cellulitis. There is no drainable fluid collection. There are small lymph nodes behind    the angle the mandible on the left side and in the posterior triangle of    the neck on the left side. There    is an enlarged left lobe of the thyroid gland.       Narrative     PROCEDURE: CTA HEAD W WO CONTRAST, CTA NECK W WO CONTRAST           CLINICAL INFORMATION: AMS.           COMPARISON: CT scan of the brain obtained on the same day.           TECHNIQUE: 1 mm axial images were obtained through the head and neck after the fast bolus administration of contrast. A noncontrast localizer was obtained. 3-D reconstructions were performed on a dedicated 3-D workstation. These include multiplanar MPR      images and multiplanar MIP images.  Centerline reconstructions were obtained of the carotid systems. Isovue intravenous contrast was given.           All CT scans at this facility use dose modulation, iterative reconstruction, and/or weight-based dosing when appropriate to reduce radiation dose to as low as reasonably achievable.           FINDINGS:                 CTA NECK:           Aortic arch and branches:  There is atherosclerotic calcification in aortic arch and at the origin of the brachiocephalic, left common carotid and left subclavian arteries           Right common carotid artery/ICA: There is a small amount of calcified plaque involving the right carotid bifurcation. There is no significant hemodynamic stenosis in the right common and internal carotid arteries.           Left common carotid artery/ICA: There is no significant hemodynamic stenosis in the left common and internal carotid arteries.           Vertebral arteries: There is antegrade flow in the right and left vertebral arteries.           There is atherosclerotic calcification in the cavernous segments of both internal carotid arteries. This no evidence of severe stenosis           CTA HEAD:                 Internal carotid arteries: There is atherosclerotic calcification in the cavernous segments of both internal carotid arteries. There is no evidence of severe stenosis. .           Middle cerebral arteries: Antegrade flow in the middle cerebral arteries bilaterally.           Anterior cerebral arteries: Antegrade flow in the anterior cerebral arteries bilaterally.           Vertebral arteries: There is some calcification in the distal left vertebral artery. There is antegrade flow in the right and left vertebral arteries.           Basilar artery: No significant stenosis.           Superior cerebellar arteries: Antegrade flow in the superior cerebellar arteries bilaterally. .           Posterior cerebral arteries: Antegrade flow in the posterior cerebral arteries bilaterally           No aneurysms, stenoses or occlusions are noted.           The superior sagittal sinus, vein of Nelson, internal cerebral veins, straight sinus, transverse sinuses and sigmoid sinuses are patent.           Axial source data: There is evidence for granulomatous disease in left upper lobe of the lung and in the mediastinum.  There is enlargement of the left and to lesser extent right lobes of thyroid gland. Cervical spondylosis.           Impression     1. Abnormal left parotid gland which is enlarged and demonstrates increased density consistent with peritonitis. There is punctate calcification in the left parotid gland.     2. Increased density in the skin and subcutaneous soft tissues over the left side of neck consistent with edema and/or cellulitis.     3. Enlarged lymph nodes adjacent to the angle of mandible on the left side and in the posterior triangle of the neck.     4. Enlarged left lobe of the thyroid gland           Final report electronically signed by DR Leila Carlisle on 8/2/2021 10:29 AM           ** ORIGINAL REPORT **     PROCEDURE: CTA HEAD W WO CONTRAST, CTA NECK W WO CONTRAST           CLINICAL INFORMATION: AMS.           COMPARISON: CT scan of the brain obtained on the same day.           TECHNIQUE: 1 mm axial images were obtained through the head and neck after the fast bolus administration of contrast. A noncontrast localizer was obtained. 3-D reconstructions were performed on a dedicated 3-D workstation. These include multiplanar MPR      images and multiplanar MIP images.  Centerline reconstructions were obtained of the carotid systems. Isovue intravenous contrast was given.           All CT scans at this facility use dose modulation, iterative reconstruction, and/or weight-based dosing when appropriate to reduce radiation dose to as low as reasonably achievable.           FINDINGS:           CTA NECK:     Aortic arch and branches: There is atherosclerotic calcification in aortic arch and at the origin of the brachiocephalic, left common carotid and left subclavian arteries           Right common carotid artery/ICA: There is a small amount of calcified plaque involving the right carotid bifurcation.  There is no significant hemodynamic stenosis in the right common and internal carotid arteries.           Left common carotid artery/ICA: There is no significant hemodynamic stenosis in the left common and internal carotid arteries.           Vertebral arteries: There is antegrade flow in the right and left vertebral arteries.           There is atherosclerotic calcification in the cavernous segments of both internal carotid arteries. This no evidence of severe stenosis           CTA HEAD:     Internal carotid arteries: There is atherosclerotic calcification in the cavernous segments of both internal carotid arteries. There is no evidence of severe stenosis. .           Middle cerebral arteries: Antegrade flow in the middle cerebral arteries bilaterally.           Anterior cerebral arteries: Antegrade flow in the anterior cerebral arteries bilaterally.           Vertebral arteries: There is some calcification in the distal left vertebral artery. There is antegrade flow in the right and left vertebral arteries.           Basilar artery: No significant stenosis.           Superior cerebellar arteries: Antegrade flow in the superior cerebellar arteries bilaterally. .           Posterior cerebral arteries: Antegrade flow in the posterior cerebral arteries bilaterally           No aneurysms, stenoses or occlusions are noted.           The superior sagittal sinus, vein of Nelson, internal cerebral veins, straight sinus, transverse sinuses and sigmoid sinuses are patent.           Axial source data: There is evidence for granulomatous disease in left upper lobe of the lung and in the mediastinum. There is enlargement of the left and to lesser extent right lobes of thyroid gland. Cervical spondylosis.           IMPRESSION:           1. Calcification in the right carotid bulb. No significant hemodynamic stenosis in the right common and internal carotid arteries.     2. No significant hemodynamic stenosis in the left common and internal carotid arteries.     3.  Antegrade flow in the right and left vertebral arteries.     4. Atherosclerotic calcification in the aortic arch and at the origins of the brachiocephalic, left common carotid left subclavian arteries.     5. Atherosclerotic calcification in the cavernous segments of both internal carotid arteries. No evidence of severe stenosis.     6. Atherosclerotic calcification in the distal left vertebral artery. Antegrade flow in both distal vertebral, basilar and posterior cerebral arteries.     7. Otherwise negative CTA of the brain.     8. Evidence for granulomatous disease in left upper lobe of the lung and in the mediastinum.     9. Enlargement of the left and to lesser extent right lobes of the thyroid gland.     10. Cervical spondylosis. .           **This report has been created using voice recognition software. It may contain minor errors which are inherent in voice recognition technology. **           Final report electronically signed by DR Ian Rolon on 8/2/2021 9:44 AM       Inpatient Medications  Current Facility-Administered Medications: insulin glargine (LANTUS) injection vial 36 Units, 36 Units, Subcutaneous, Daily  insulin lispro (HUMALOG) injection vial 5 Units, 5 Units, Subcutaneous, TID WC  morphine (PF) injection 2 mg, 2 mg, Intravenous, Q2H PRN  docusate sodium (COLACE) capsule 100 mg, 100 mg, Oral, BID PRN  ceFAZolin (ANCEF) 2000 mg in dextrose 5 % 50 mL IVPB, 2,000 mg, Intravenous, Q8H  diphenhydrAMINE (BENADRYL) injection 25 mg, 25 mg, Intravenous, Q6H PRN  sodium chloride flush 0.9 % injection 5-40 mL, 5-40 mL, Intravenous, 2 times per day  sodium chloride flush 0.9 % injection 5-40 mL, 5-40 mL, Intravenous, PRN  0.9 % sodium chloride infusion, 25 mL, Intravenous, PRN  ondansetron (ZOFRAN-ODT) disintegrating tablet 4 mg, 4 mg, Oral, Q8H PRN **OR** ondansetron (ZOFRAN) injection 4 mg, 4 mg, Intravenous, Q6H PRN  polyethylene glycol (GLYCOLAX) packet 17 g, 17 g, Oral, Daily PRN  acetaminophen (TYLENOL) tablet 650 mg, 650 mg, Oral, Q6H PRN **OR** acetaminophen (TYLENOL) suppository 650 mg, 650 mg, Rectal, Q6H PRN  insulin lispro (HUMALOG) injection vial 0-12 Units, 0-12 Units, Subcutaneous, TID WC  insulin lispro (HUMALOG) injection vial 0-6 Units, 0-6 Units, Subcutaneous, Nightly  glucose (GLUTOSE) 40 % oral gel 15 g, 15 g, Oral, PRN  dextrose 50 % IV solution, 12.5 g, Intravenous, PRN  glucagon (rDNA) injection 1 mg, 1 mg, Intramuscular, PRN  dextrose 5 % solution, 100 mL/hr, Intravenous, PRN  HYDROcodone-acetaminophen (NORCO) 5-325 MG per tablet 1 tablet, 1 tablet, Oral, Q6H PRN  allopurinol (ZYLOPRIM) tablet 100 mg, 100 mg, Oral, Daily  alogliptin (NESINA) tablet 6.25 mg, 6.25 mg, Oral, Daily  ascorbic acid (VITAMIN C) tablet 500 mg, 500 mg, Oral, BID  aspirin EC tablet 81 mg, 81 mg, Oral, Daily  atorvastatin (LIPITOR) tablet 40 mg, 40 mg, Oral, Nightly  pantoprazole (PROTONIX) tablet 40 mg, 40 mg, Oral, QAM AC  digoxin (LANOXIN) tablet 62.5 mcg, 62.5 mcg, Oral, Daily  DULoxetine (CYMBALTA) extended release capsule 60 mg, 60 mg, Oral, Daily  fenofibrate (TRIGLIDE) tablet 160 mg, 160 mg, Oral, Daily  folic acid (FOLVITE) tablet 1 mg, 1 mg, Oral, Daily  furosemide (LASIX) tablet 40 mg, 40 mg, Oral, Daily  gabapentin (NEURONTIN) capsule 100 mg, 100 mg, Oral, TID  metoprolol succinate (TOPROL XL) extended release tablet 100 mg, 100 mg, Oral, Daily  levothyroxine (SYNTHROID) tablet 75 mcg, 75 mcg, Oral, Daily  valsartan (DIOVAN) tablet 80 mg, 80 mg, Oral, Daily  zinc sulfate (ZINCATE) capsule 50 mg, 50 mg, Oral, Daily  warfarin (COUMADIN) daily dosing (placeholder), , Other, RX Placeholder    ASSESSMENT AND PLAN    Acute left parotiditis  - No acute surgical intervention required or likely  - IV Ancef per ID  - Will hold on repeat imaging as long as she continues to improve  - Following is important to help promote salivation and decrease swelling/pain; patient will need assistance:     - Good oral hygiene      - Adequate water/PO fluid intake      - Apply heat to left face/neck      - Massage of left face/neck (from in front of the ear toward the

## 2021-08-07 NOTE — PLAN OF CARE
Problem: Falls - Risk of:  Goal: Will remain free from falls  Description: Will remain free from falls  Outcome: Ongoing  Goal: Absence of physical injury  Description: Absence of physical injury  Outcome: Ongoing     Problem: Skin Integrity:  Goal: Will show no infection signs and symptoms  Description: Will show no infection signs and symptoms  Outcome: Ongoing  Goal: Absence of new skin breakdown  Description: Absence of new skin breakdown  Outcome: Ongoing     Problem: DISCHARGE BARRIERS  Goal: Patient's continuum of care needs are met  Outcome: Ongoing     Problem: Pain:  Goal: Pain level will decrease  Description: Pain level will decrease  Outcome: Ongoing  Goal: Control of acute pain  Description: Control of acute pain  Outcome: Ongoing  Goal: Control of chronic pain  Description: Control of chronic pain  Outcome: Ongoing     Problem: Infection - Methicillin-Resistant Staphylococcus Aureus Infection:  Goal: Absence of methicillin-resistant Staphylococcus aureus infection  Description: Absence of methicillin-resistant Staphylococcus aureus infection  Outcome: Ongoing     Problem: Musculor/Skeletal Functional Status  Goal: Highest potential functional level  Outcome: Ongoing

## 2021-08-07 NOTE — PROGRESS NOTES
Hospitalist Progress Note    Patient:  Michael Abrams      Unit/Bed:6K-27/027-A    YOB: 1955    MRN: [id number]       Acct: [de-identified]     PCP: ARNOL Walden CNP    Date of Admission: 8/2/2021    Assessment/Plan:    Anticipated Discharge in :    MARGIE/[facility] Problems    Diagnosis Date Noted    Atrial fibrillation [facility]) [I48.91]      Priority: High    Parotiditis [K11.20] 08/02/2021    Chronic kidney disease (CKD), stage III (moderate) ([facility] [N18.30]     Diabetes mellitus type 2, insulin dependent ([facility] [E11.9, Z79.4] 11/07/2014     Acute left-sided parotiditis s/p FNA 8/3, improving  ENT consult  Patient started on Vanc and Unasyn, switched to Ancef  Continue sialogogues, warm compress and gentle massage  ID  Consulted as well    MSSA Bacteremia (POA)  ID consulted  Started on Ancef on 8/4    Acute metabolic encephalopathy, resolved    Hyponatremia, resolved  Na 133, likely due to hyperglycemia  Now at 140    Chronic atrial fibrillation  on aspirin, Lanoxin, Toprol and Coumadin  Pharmacy to dose coumadin  INR 4.68    Diabetes mellitus type 2  Increase Lantus dose to 36U and Humalog 5U TID  Humalog SS moderate dose  Resume Nesina  Hypoglycemia protocol    Essential hypertension  Continue Toprol    CKD stage IIIb   Stable at this time  Monitor creatinine after exposure to constrast  If CT contrast is being planned, we'll have to hydrate pre and post exposure, and consider nephro referral  BMP in AM    Diabetic neuropathy  Continue Cymbalta and Neurontin    Obesity with BMI 33.14  Diet and lifestyle changes    Constipation  Docusate and miralax prn    Chief Complaint:   L neck pain and swelling    Hospital Course:  Per H&P dated 8/2: \"The patient is a 75 y.o. female who presents with complaints of increased confusion over the last several days as well as worsening left sided neck pain and swelling.  Brother is at bedside and providing history states that he first noticed some

## 2021-08-08 NOTE — PROGRESS NOTES
Department of Otolaryngology  Progress Note    Chief Complaint:  Left face/neck swelling    SUBJECTIVE:  Patient reports left parotid pain and swelling improved today. She is now using lemon candies. She is using her heating pad and performing massage of gland. + bacteremia with MSSA. WBC is 5.1. Remains afebrile. No bacteria recovered on FNA. She complained of some left-sided ear pain. Initial HPI 8/2:  The patient is a 76 y.o. female who was admitted to [address] [place] [address] this morning for treatment of left parotid infection and evaluation of ongoing confusion.  Patient states swelling present for \"a while\"; brother reported to ER that swelling present for about a week, but progressively worsening.  Patient denies having this type of problem in the past. Storm Garcia is unable to provide much detail; very sleepy and drifts off during conversation.  Per ER note, her brother notes that she has been confused, which she tends to do with urinary tract infections.  No family is present at this time. Patient with history of type II DM; glucose 165 on admission.  WBC 13.2. Temperature 99.  Currently on Unasyn 3gm and Vancomycin. OBJECTIVE      Physical  VITALS:  BP (!) 155/57   Pulse 61   Temp 97.6 °F (36.4 °C) (Oral)   Resp 16   Ht 5' 9\" (1.753 m)   Wt 224 lb 6.9 oz (101.8 kg)   LMP 02/20/2001   SpO2 97%   BMI 33.14 kg/m²    OTHER PERTINENT EXAM FINDINGS:  GENERAL: Awake, NAD, Non-toxic appearing. Patient is alert and oriented toperson, place and time. No respiratory distress. No nasal voice, no hoarseness. Patient awake, alert and cooperative. Nat Leiva in bedside chair. On room air. NEUROLOGICAL: GCS 15, Cranial nerves II-VI, VIII-XII grossly intact,  Facial nerve (VII) w/ House-Brackman Grade 1 of 6 bilaterally, No evidence of nystagmus or gross asymmetry    PSYCHIATRIC: Mood and Affect appropriate. HEAD: NC/AT. Left parotid induration improved from prior. Erythema resolved.     SKIN: No overtly ulcerated, cellulitic, or indurated lesions of the head or neck. EARS: Pinna well-formed. External auditory canals patent without excess cerumen. Membranes intact without evidence of infection or effusion. NOSE: Dorsum w/o scar or deformity  ORAL CAVITY: No mucosal masses/lesions appreciated,   Not as tender to palpation today; able to massage gland without causing pain. Unable to express any material purulent material from duct, but clear saliva flowing. OROPHARYNX: No mucosal masses or lesions appreciated. NECK: Soft, supple. No crepitus or masses appreciated,  Trachea midline. CHEST: Breathing is unlabored without retraction, on nasal CPAP      Data  CBC with Differential:    Lab Results   Component Value Date    WBC 5.1 08/08/2021    RBC 2.44 08/08/2021    HGB 7.9 08/08/2021    HCT 25.1 08/08/2021     08/08/2021    .9 08/08/2021    MCH 32.4 08/08/2021    MCHC 31.5 08/08/2021    RDW 13.8 10/26/2017    NRBC 0 08/03/2021    SEGSPCT 85.9 08/03/2021    MONOPCT 7.6 08/03/2021    MONOSABS 1.2 08/03/2021    LYMPHSABS 0.8 08/03/2021    EOSABS 0.0 08/03/2021    BASOSABS 0.1 08/03/2021     Radiology Review:  CTA Neck W WO Contrast 8/2/2021 0944           ** ADDENDUM #1 **       The study was reviewed again at the request of the referring provider to    assess for inflammation in the left side of the neck.       There is increased density in the left parotid gland consistent with    hepatitis. There is punctate calcification in the left parotid gland. There is increased density in the skin and subcutaneous soft tissues over    the left side of the neck extending    posteriorly. These findings are consistent with edema and/or cellulitis. There is no drainable fluid collection. There are small lymph nodes behind    the angle the mandible on the left side and in the posterior triangle of    the neck on the left side.  There    is an enlarged left lobe of the thyroid gland.       Narrative     PROCEDURE: CTA HEAD W WO CONTRAST, CTA NECK W WO CONTRAST           CLINICAL INFORMATION: AMS.           COMPARISON: CT scan of the brain obtained on the same day.           TECHNIQUE: 1 mm axial images were obtained through the head and neck after the fast bolus administration of contrast. A noncontrast localizer was obtained. 3-D reconstructions were performed on a dedicated 3-D workstation. These include multiplanar MPR      images and multiplanar MIP images.  Centerline reconstructions were obtained of the carotid systems. Isovue intravenous contrast was given.           All CT scans at this facility use dose modulation, iterative reconstruction, and/or weight-based dosing when appropriate to reduce radiation dose to as low as reasonably achievable.           FINDINGS:                 CTA NECK:           Aortic arch and branches: There is atherosclerotic calcification in aortic arch and at the origin of the brachiocephalic, left common carotid and left subclavian arteries           Right common carotid artery/ICA: There is a small amount of calcified plaque involving the right carotid bifurcation. There is no significant hemodynamic stenosis in the right common and internal carotid arteries.           Left common carotid artery/ICA: There is no significant hemodynamic stenosis in the left common and internal carotid arteries.           Vertebral arteries: There is antegrade flow in the right and left vertebral arteries.           There is atherosclerotic calcification in the cavernous segments of both internal carotid arteries. This no evidence of severe stenosis           CTA HEAD:                 Internal carotid arteries: There is atherosclerotic calcification in the cavernous segments of both internal carotid arteries. There is no evidence of severe stenosis. .           Middle cerebral arteries: Antegrade flow in the middle cerebral arteries bilaterally.           Anterior cerebral arteries: Antegrade flow in the anterior cerebral arteries bilaterally.           Vertebral arteries: There is some calcification in the distal left vertebral artery. There is antegrade flow in the right and left vertebral arteries.           Basilar artery: No significant stenosis.           Superior cerebellar arteries: Antegrade flow in the superior cerebellar arteries bilaterally. .           Posterior cerebral arteries: Antegrade flow in the posterior cerebral arteries bilaterally           No aneurysms, stenoses or occlusions are noted.           The superior sagittal sinus, vein of Nelson, internal cerebral veins, straight sinus, transverse sinuses and sigmoid sinuses are patent.           Axial source data: There is evidence for granulomatous disease in left upper lobe of the lung and in the mediastinum. There is enlargement of the left and to lesser extent right lobes of thyroid gland. Cervical spondylosis.           Impression     1. Abnormal left parotid gland which is enlarged and demonstrates increased density consistent with peritonitis. There is punctate calcification in the left parotid gland.     2. Increased density in the skin and subcutaneous soft tissues over the left side of neck consistent with edema and/or cellulitis.     3. Enlarged lymph nodes adjacent to the angle of mandible on the left side and in the posterior triangle of the neck.     4. Enlarged left lobe of the thyroid gland           Final report electronically signed by DR Shiraz Cummins on 8/2/2021 10:29 AM           ** ORIGINAL REPORT **     PROCEDURE: CTA HEAD W WO CONTRAST, CTA NECK W WO CONTRAST           CLINICAL INFORMATION: AMS.           COMPARISON: CT scan of the brain obtained on the same day.           TECHNIQUE: 1 mm axial images were obtained through the head and neck after the fast bolus administration of contrast. A noncontrast localizer was obtained. 3-D reconstructions were performed on a dedicated 3-D workstation.  These include cerebral arteries bilaterally           No aneurysms, stenoses or occlusions are noted.           The superior sagittal sinus, vein of Nelson, internal cerebral veins, straight sinus, transverse sinuses and sigmoid sinuses are patent.           Axial source data: There is evidence for granulomatous disease in left upper lobe of the lung and in the mediastinum. There is enlargement of the left and to lesser extent right lobes of thyroid gland. Cervical spondylosis.           IMPRESSION:           1. Calcification in the right carotid bulb. No significant hemodynamic stenosis in the right common and internal carotid arteries.     2. No significant hemodynamic stenosis in the left common and internal carotid arteries.     3. Antegrade flow in the right and left vertebral arteries.     4. Atherosclerotic calcification in the aortic arch and at the origins of the brachiocephalic, left common carotid left subclavian arteries.     5. Atherosclerotic calcification in the cavernous segments of both internal carotid arteries. No evidence of severe stenosis.     6. Atherosclerotic calcification in the distal left vertebral artery. Antegrade flow in both distal vertebral, basilar and posterior cerebral arteries.     7. Otherwise negative CTA of the brain.     8. Evidence for granulomatous disease in left upper lobe of the lung and in the mediastinum.     9. Enlargement of the left and to lesser extent right lobes of the thyroid gland.     10. Cervical spondylosis. .           **This report has been created using voice recognition software. It may contain minor errors which are inherent in voice recognition technology. **           Final report electronically signed by DR Kenisha Malik on 8/2/2021 9:44 AM       Inpatient Medications  Current Facility-Administered Medications: insulin glargine (LANTUS) injection vial 36 Units, 36 Units, Subcutaneous, Daily  insulin lispro (HUMALOG) injection vial 5 Units, 5 Units, Subcutaneous, TID WC  morphine (PF) injection 2 mg, 2 mg, Intravenous, Q2H PRN  docusate sodium (COLACE) capsule 100 mg, 100 mg, Oral, BID PRN  ceFAZolin (ANCEF) 2000 mg in dextrose 5 % 50 mL IVPB, 2,000 mg, Intravenous, Q8H  diphenhydrAMINE (BENADRYL) injection 25 mg, 25 mg, Intravenous, Q6H PRN  sodium chloride flush 0.9 % injection 5-40 mL, 5-40 mL, Intravenous, 2 times per day  sodium chloride flush 0.9 % injection 5-40 mL, 5-40 mL, Intravenous, PRN  0.9 % sodium chloride infusion, 25 mL, Intravenous, PRN  ondansetron (ZOFRAN-ODT) disintegrating tablet 4 mg, 4 mg, Oral, Q8H PRN **OR** ondansetron (ZOFRAN) injection 4 mg, 4 mg, Intravenous, Q6H PRN  polyethylene glycol (GLYCOLAX) packet 17 g, 17 g, Oral, Daily PRN  acetaminophen (TYLENOL) tablet 650 mg, 650 mg, Oral, Q6H PRN **OR** acetaminophen (TYLENOL) suppository 650 mg, 650 mg, Rectal, Q6H PRN  insulin lispro (HUMALOG) injection vial 0-12 Units, 0-12 Units, Subcutaneous, TID WC  insulin lispro (HUMALOG) injection vial 0-6 Units, 0-6 Units, Subcutaneous, Nightly  glucose (GLUTOSE) 40 % oral gel 15 g, 15 g, Oral, PRN  dextrose 50 % IV solution, 12.5 g, Intravenous, PRN  glucagon (rDNA) injection 1 mg, 1 mg, Intramuscular, PRN  dextrose 5 % solution, 100 mL/hr, Intravenous, PRN  HYDROcodone-acetaminophen (NORCO) 5-325 MG per tablet 1 tablet, 1 tablet, Oral, Q6H PRN  allopurinol (ZYLOPRIM) tablet 100 mg, 100 mg, Oral, Daily  alogliptin (NESINA) tablet 6.25 mg, 6.25 mg, Oral, Daily  ascorbic acid (VITAMIN C) tablet 500 mg, 500 mg, Oral, BID  aspirin EC tablet 81 mg, 81 mg, Oral, Daily  atorvastatin (LIPITOR) tablet 40 mg, 40 mg, Oral, Nightly  pantoprazole (PROTONIX) tablet 40 mg, 40 mg, Oral, QAM AC  digoxin (LANOXIN) tablet 62.5 mcg, 62.5 mcg, Oral, Daily  DULoxetine (CYMBALTA) extended release capsule 60 mg, 60 mg, Oral, Daily  fenofibrate (TRIGLIDE) tablet 160 mg, 160 mg, Oral, Daily  folic acid (FOLVITE) tablet 1 mg, 1 mg, Oral, Daily  furosemide (LASIX) tablet 40 mg, 40 mg,

## 2021-08-08 NOTE — PROGRESS NOTES
Hospitalist Progress Note    Patient:  [facility]      Unit/Bed:6K-27/027-A    YOB: 1955    MRN: [id number]       Acct: [de-identified]     PCP: ARNOL Cadet CNP    Date of Admission: 8/2/2021    Assessment/Plan:    Anticipated Discharge in :    MARGIE/Colby Luisjaclyn [facility]son [address] Problems    Diagnosis Date Noted    Atrial fibrillation [facility]) [I48.91]      Priority: High    Parotiditis [K11.20] 08/02/2021    Chronic kidney disease (CKD), stage III (moderate) ([facility] [address] [N18.30]     Diabetes mellitus type 2, insulin dependent ([facility] [address] [E11.9, Z79.4] 11/07/2014     Acute left-sided parotiditis s/p FNA 8/3, improving  ENT consult  Patient started on Vanc and Unasyn, switched to Ancef  Continue sialogogues, warm compress and gentle massage  ID  Consulted as well    MSSA Bacteremia (POA)  ID consulted  Started on Ancef on 8/4    Acute metabolic encephalopathy, resolved    Hyponatremia, resolved  Na 133, likely due to hyperglycemia  Now at 140    Chronic atrial fibrillation  on aspirin, Lanoxin, Toprol and Coumadin  Pharmacy to dose coumadin  INR 3.31    Diabetes mellitus type 2  Increase Lantus dose to 36U and Humalog 5U TID  Humalog SS moderate dose  Resume Nesina  Hypoglycemia protocol    Essential hypertension  Continue Toprol    CKD stage IIIb   Stable at this time  Monitor creatinine after exposure to constrast  If CT contrast is being planned, we'll have to hydrate pre and post exposure, and consider nephro referral  BMP in AM    Diabetic neuropathy  Continue Cymbalta and Neurontin    Obesity with BMI 33.14  Diet and lifestyle changes    Constipation  Docusate and miralax prn    Chief Complaint:   L neck pain and swelling    Hospital Course:  Per H&P dated 8/2: \"The patient is a 75 y.o. female who presents with complaints of increased confusion over the last several days as well as worsening left sided neck pain and swelling.  Brother is at bedside and providing history states that he first noticed some erythema and swelling about a week ago. Aruna Lombardi states over the last it is increased in size, it is become very hard and tender.  He reports that she has had several hospitalizations with recurrent infections and has never recovered mentally from this. Aruna Lombardi states that she has had confusion ongoing for the last year however is noted increased amount over the last several days. Reports that she was extremely cold, no reports of fevers, nausea or vomiting.  Patient awakens when calling her by her name and denies any shortness of breath, difficulty swallowing or breathing.  Patient reports she complained of some lightheadedness earlier today. Brother also reports that she has chronic lower extremity swelling, follows up with nephrology but feels that they are a little more swollen today than usual.\"     8/3--> ENT saw yesterday and unable to obtain culture as no material was expressed so plan is to have IR get culture through FNA of left parotid and no need to hold Coumadin per note; hemodynamically stable however did have some RVR earlier which is better controlled now    Subjective:   Patient seen and examined, appears comfortable in bed, without any signs of cardiorespiratory distress. VS stable, afebrile, saturating well on room air. Blood cultures x2 positive, growing MSSA. Swelling and redness on left neck is much better. Glucose is better.  Monitor hgb    Medications:  Reviewed    Infusion Medications    sodium chloride Stopped (08/07/21 1055)    dextrose       Scheduled Medications    insulin glargine  36 Units Subcutaneous Daily    insulin lispro  5 Units Subcutaneous TID WC    ceFAZolin  2,000 mg Intravenous Q8H    sodium chloride flush  5-40 mL Intravenous 2 times per day    insulin lispro  0-12 Units Subcutaneous TID WC    insulin lispro  0-6 Units Subcutaneous Nightly    allopurinol  100 mg Oral Daily    alogliptin  6.25 mg Oral Daily    ascorbic acid  500 mg Oral BID    aspirin  81 mg Oral Daily  atorvastatin  40 mg Oral Nightly    pantoprazole  40 mg Oral QAM AC    digoxin  62.5 mcg Oral Daily    DULoxetine  60 mg Oral Daily    fenofibrate  160 mg Oral Daily    folic acid  1 mg Oral Daily    furosemide  40 mg Oral Daily    gabapentin  100 mg Oral TID    metoprolol succinate  100 mg Oral Daily    levothyroxine  75 mcg Oral Daily    valsartan  80 mg Oral Daily    zinc sulfate  50 mg Oral Daily    warfarin (COUMADIN) daily dosing (placeholder)   Other RX Placeholder     PRN Meds: morphine, docusate sodium, diphenhydrAMINE, sodium chloride flush, sodium chloride, ondansetron **OR** ondansetron, polyethylene glycol, acetaminophen **OR** acetaminophen, glucose, dextrose, glucagon (rDNA), dextrose, HYDROcodone-acetaminophen      Intake/Output Summary (Last 24 hours) at 8/8/2021 0912  Last data filed at 8/8/2021 8942  Gross per 24 hour   Intake 1300.26 ml   Output 4300 ml   Net -2999.74 ml       Diet:  ADULT DIET; Regular; 4 carb choices (60 gm/meal)    Exam:  BP (!) 155/57   Pulse 61   Temp 97.6 °F (36.4 °C) (Oral)   Resp 16   Ht 5' 9\" (1.753 m)   Wt 224 lb 6.9 oz (101.8 kg)   LMP 02/20/2001   SpO2 97%   BMI 33.14 kg/m²     General appearance: No apparent distress, appears stated age and cooperative, obese  HEENT: Pupils equal, round, and reactive to light. Conjunctivae/corneas clear. Swelling and tenderness on L parotid improving  Neck: Supple, with full range of motion. No jugular venous distention. Trachea midline. Respiratory:  Normal respiratory effort. Clear to auscultation, bilaterally without Rales/Wheezes/Rhonchi. Cardiovascular: Regular rate and rhythm with normal S1/S2 without murmurs, rubs or gallops. Abdomen: Soft, non-tender, non-distended with normal bowel sounds. Musculoskeletal: passive and active ROM x 4 extremities. Skin: Skin color, texture, turgor normal.  No rashes or lesions. Neurologic:  Neurovascularly intact without any focal sensory/motor deficits.  Cranial nerves: II-XII intact, grossly non-focal.  Psychiatric: Alert and oriented, thought content appropriate, normal insight  Capillary Refill: Brisk,< 3 seconds   Peripheral Pulses: +2 palpable, equal bilaterally       Labs:   Recent Labs     08/06/21  0634 08/06/21  0634 08/07/21  0517 08/07/21  1545 08/08/21  0515   WBC 6.1  --  4.5*  --  5.1   HGB 8.7*   < > 7.9* 8.6* 7.9*   HCT 28.4*   < > 25.7* 27.3* 25.1*     --  220  --  226    < > = values in this interval not displayed. Recent Labs     08/06/21  0634 08/07/21  0517 08/08/21  0515    140 140   K 4.8 4.5 4.3    108 105   CO2 24 23 26   BUN 51* 54* 44*   CREATININE 1.7* 1.7* 1.5*   CALCIUM 8.0* 8.3* 9.0     Recent Labs     08/06/21  0634   AST 15   ALT <5*   BILIDIR 0.3   BILITOT 0.5   ALKPHOS 62     Recent Labs     08/06/21  0634 08/07/21  0517 08/08/21  0515   INR 4.66* 4.68* 3.31*     No results for input(s): Kevon Hu in the last 72 hours. Urinalysis:      Lab Results   Component Value Date    NITRU NEGATIVE 08/03/2021    WBCUA NONE SEEN 08/03/2021    BACTERIA NONE SEEN 08/03/2021    RBCUA 0-2 08/03/2021    BLOODU NEGATIVE 08/03/2021    SPECGRAV 1.020 01/26/2020    GLUCOSEU 100 08/03/2021       Radiology:      Diet: ADULT DIET;  Regular; 4 carb choices (60 gm/meal)    DVT prophylaxis: [] Lovenox                                 [] SCDs                                 [] SQ Heparin                                 [] Encourage ambulation           [] Already on Anticoagulation     Disposition:    [] Home       [] TCU       [] Rehab       [] Psych       [] SNF       [] [place]       [] Other-    Code Status: Full Code    PT/OT Eval Status:       Electronically signed by Tosin Farrell MD on 8/8/2021 at 9:12 AM

## 2021-08-08 NOTE — PROGRESS NOTES
Clinical Pharmacy Note    Warfarin consult follow-up    Recent Labs     08/08/21  0515   INR 3.31*     Recent Labs     08/06/21  0634 08/06/21  0634 08/07/21  0517 08/07/21  1545 08/08/21  0515   HGB 8.7*   < > 7.9* 8.6* 7.9*   HCT 28.4*   < > 25.7* 27.3* 25.1*     --  220  --  226    < > = values in this interval not displayed. Significant Drug-Drug Interactions:  New warfarin drug-drug interactions: none  Discontinued drug-drug interactions: none  Current warfarin drug-drug interactions: aspirin 81 mg daily, levothyroxine 75mcg (), fenofibrate (HM), allopurinol (HM)     Date INR Warfarin Dose   08/2/2021 2.94 2.5mg    08/3/2021  2.55 3 mg    08/4/2021   3.13 No warfarin    08/5/2021  4.30 No warfarin     08/6/2021 4.66  No warfarin     8/7/2021  4.68   No warfarin    8/8/2021   3.31  1 mg     Notes:                   Daily PT/INR until stable within therapeutic range. Marcie Calderón [facility]. D., BCPS, BCCCP 8/8/2021 12:09 PM

## 2021-08-08 NOTE — PLAN OF CARE
Martin Salas RN  Outcome: Ongoing     Problem: Musculor/Skeletal Functional Status  Goal: Highest potential functional level  8/7/2021 2206 by Brittney Dasilva RN  Outcome: Ongoing  8/7/2021 2040 by Kevin Robles RN  Outcome: Ongoing

## 2021-08-09 NOTE — PROGRESS NOTES
Progress note: Infectious diseases    Patient - Ellie Morris,  Age - 76 y.o.    - 1955      Room Number - 0R-21/755-H   MRN -  [id number]   Acct # - [de-identified]  Date of Admission -  2021  5:44 AM    SUBJECTIVE:   No new issues  No fever  The swelling and pain has resolved  OBJECTIVE   VITALS    height is 5' 9\" (1.753 m) and weight is 211 lb 12.8 oz (96.1 kg). Her oral temperature is 97.5 °F (36.4 °C). Her blood pressure is 149/70 (abnormal) and her pulse is 55. Her respiration is 16 and oxygen saturation is 100%.        Wt Readings from Last 3 Encounters:   21 211 lb 12.8 oz (96.1 kg)   21 223 lb 12.8 oz (101.5 kg)   21 229 lb (103.9 kg)       I/O (24 Hours)    Intake/Output Summary (Last 24 hours) at 2021 1239  Last data filed at 2021 0600  Gross per 24 hour   Intake 261.1 ml   Output 2025 ml   Net -1763.9 ml       General Appearance  Awake, alert, oriented,  not  In acute distress  HEENT - normocephalic, atraumatic, pink conjunctiva,  anicteric sclera  Neck -the swelling is almost resolved, no redness  Lungs -  Bilateral   air entry, no rhonchi, no wheeze  Cardiovascular - Heart sounds are normal.     Abdomen - soft, not distended, nontender,   Neurologic -oriented  Skin - No bruising or bleeding  Extremities - No edema, no cyanosis, clubbing     MEDICATIONS:      warfarin  2 mg Oral Once    insulin glargine  36 Units Subcutaneous Daily    insulin lispro  5 Units Subcutaneous TID WC    ceFAZolin  2,000 mg Intravenous Q8H    sodium chloride flush  5-40 mL Intravenous 2 times per day    insulin lispro  0-12 Units Subcutaneous TID WC    insulin lispro  0-6 Units Subcutaneous Nightly    allopurinol  100 mg Oral Daily    alogliptin  6.25 mg Oral Daily    ascorbic acid  500 mg Oral BID    aspirin  81 mg Oral Daily    atorvastatin  40 mg Oral Nightly    pantoprazole  40 mg Oral QAM AC    digoxin  62.5 mcg Oral Daily    DULoxetine  60 mg Oral Daily    fenofibrate  160 mg Oral Daily    folic acid  1 mg Oral Daily    furosemide  40 mg Oral Daily    gabapentin  100 mg Oral TID    metoprolol succinate  100 mg Oral Daily    levothyroxine  75 mcg Oral Daily    valsartan  80 mg Oral Daily    zinc sulfate  50 mg Oral Daily    warfarin (COUMADIN) daily dosing (placeholder)   Other RX Placeholder      sodium chloride Stopped (08/07/21 1055)    dextrose       morphine, docusate sodium, diphenhydrAMINE, sodium chloride flush, sodium chloride, ondansetron **OR** ondansetron, polyethylene glycol, acetaminophen **OR** acetaminophen, glucose, dextrose, glucagon (rDNA), dextrose, HYDROcodone-acetaminophen      LABS:     CBC:   Recent Labs     08/07/21  0517 08/07/21  0517 08/07/21  1545 08/08/21  0515 08/09/21  0520   WBC 4.5*  --   --  5.1 4.6*   HGB 7.9*   < > 8.6* 7.9* 8.1*     --   --  226 220    < > = values in this interval not displayed. BMP:    Recent Labs     08/07/21  0517 08/08/21  0515 08/09/21  0520    140 141   K 4.5 4.3 4.1    105 104   CO2 23 26 28   BUN 54* 44* 44*   CREATININE 1.7* 1.5* 1.5*   GLUCOSE 153* 90 87     Calcium:  Recent Labs     08/09/21  0520   CALCIUM 9.5      Recent Labs     08/08/21  1951 08/09/21  0634 08/09/21  1106   POCGLU 134* 102 116*     HgbA1C: No results for input(s): LABA1C in the last 72 hours. INR:   Recent Labs     08/07/21  0517 08/08/21  0515 08/09/21  0520   INR 4.68* 3.31* 2.67*        CULTURES:   UA:   No results for input(s): SPECGRAV, PHUR, COLORU, CLARITYU, MUCUS, PROTEINU, BLOODU, RBCUA, WBCUA, BACTERIA, NITRU, GLUCOSEU, BILIRUBINUR, UROBILINOGEN, KETUA, LABCAST, LABCASTTY, AMORPHOS in the last 72 hours.     Invalid input(s): CRYSTALS  Micro:   Lab Results   Component Value Date    BC No growth-preliminary  08/04/2021          Problem list of patient:     Patient Active Problem List   Diagnosis Code    Diabetes mellitus ([facility] E11.9    Hypertension I10    Hyperlipidemia E78.5    Neuropathy G62.9    Osteoarthritis M19.90    Proteinuria R80.9    Arthritis M19.90    Morbid obesity ([facility]) E66.01    Allergic rhinitis J30.9    Chronic kidney disease, stage 4 (severe) ([facility]) N18.4    Diabetes mellitus type 2, insulin dependent ([facility]) E11.9, Z79.4    DARWIN on CPAP G47.33, Z99.89    History of sleeve gastrectomy Z90.3    Pneumonia J18.9    Morbid obesity with BMI of 50.0-59.9, adult ([facility]) E66.01, Z68.43    Coronary artery disease involving native heart without angina pectoris I25.10    Multinodular goiter E04.2    Bilateral renal cysts N28.1    Dyspnea R06.00    Atrial fibrillation ([facility]) I48.91    Acute diastolic CHF (congestive heart failure), NYHA class 2 ([facility]) I50.31    CKD (chronic kidney disease) stage 3, GFR 30-59 ml/min N18.30    Shortness of breath R06.02    Chronic anemia D64.9    Paroxysmal A-fib ([facility]) I48.0    Chronic kidney disease (CKD), stage III (moderate) ([facility]) N18.30    Nonischemic cardiomyopathy ([facility]) I42.8    Mild tricuspid regurgitation I07.1    Debility R53.81    Dietary noncompliance Z91.11    Long term current use of anticoagulant Z79.01    Severe left ventricular systolic dysfunction Y79.3    Anticoagulated on Coumadin U42.06    Metabolic encephalopathy Q75.44    Acute cystitis without hematuria N30.00    Hyperkalemia E87.5    KEEGAN (acute kidney injury) ([facility] N17.9    Altered mental status R41.82    AMS (altered mental status) R41.82    Encounter for therapeutic drug monitoring Z51.81    Closed fracture of distal end of left femur, initial encounter ([facility] S72.402A    Parotiditis K11.20         ASSESSMENT/PLAN   MSSA bacteremia: clinically better  Left parotidites likely due to dry oral mucosa: improved  Ok with discharge plan with oral dicloxacillin  Encouraged on oral hydration.   Andrews Aiken MD, MD, FACP 8/9/2021 12:39 PM

## 2021-08-09 NOTE — CARE COORDINATION
8/9/21, 3:02 PM EDT    DISCHARGE ON GOING EVALUATION    Deb Thorpe day: 7  Location: -27/027-A Reason for admit: Parotiditis [K11.20]   Procedure: 08/03 FNA of left parotid per IR   Barriers to Discharge: ENT and ID following, PT/OT, Ancef q 8 hr., DM management, Coumadin per pharmacy dosing, prn medications, carb choice diet, oxygen, up as tolerated. PCP: ARNOL Osborn CNP  Readmission Risk Score: 41%  Patient Goals/Plan/Treatment Preferences: Jabier Duran is from home residing with her sister. She is current with [employer] services and plans to return home with [facility] services added. Previously she was planned for SNF placement. Her [employer]ORT ., Char Moreau, called this writer and requests to be contacted upon discharge ([phone]).

## 2021-08-09 NOTE — PROGRESS NOTES
Department of Otolaryngology  Progress Note    Chief Complaint:  Left face/neck swelling    SUBJECTIVE:  Patient feels significantly improved today, some occasional left discomfort. Taking PO without difficulty. Currently on IV Ancef. WBC 4.6. Initial HPI 8/2:  The patient is a 76 y.o. female who was admitted to [address] [place] [address] this morning for treatment of left parotid infection and evaluation of ongoing confusion.  Patient states swelling present for \"a while\"; brother reported to ER that swelling present for about a week, but progressively worsening.  Patient denies having this type of problem in the past. Meet Morris is unable to provide much detail; very sleepy and drifts off during conversation.  Per ER note, her brother notes that she has been confused, which she tends to do with urinary tract infections.  No family is present at this time. Patient with history of type II DM; glucose 165 on admission.  WBC 13.2. Temperature 99.  Currently on Unasyn 3gm and Vancomycin. OBJECTIVE    Physical  VITALS:  BP (!) 149/70   Pulse 55   Temp 97.5 °F (36.4 °C) (Oral)   Resp 16   Ht 5' 9\" (1.753 m)   Wt 211 lb 12.8 oz (96.1 kg)   LMP 02/20/2001   SpO2 100%   BMI 31.28 kg/m²      Left parotid swelling significantly less than before with resolution of erythema. Gland itself is firm, but resolution of surrounding cellulitis.   Left ear exam normal.    Data  CBC with Differential:    Lab Results   Component Value Date    WBC 4.6 08/09/2021    RBC 2.54 08/09/2021    HGB 8.1 08/09/2021    HCT 26.2 08/09/2021     08/09/2021    .1 08/09/2021    MCH 31.9 08/09/2021    MCHC 30.9 08/09/2021    RDW 13.8 10/26/2017    NRBC 0 08/03/2021    SEGSPCT 85.9 08/03/2021    MONOPCT 7.6 08/03/2021    MONOSABS 1.2 08/03/2021    LYMPHSABS 0.8 08/03/2021    EOSABS 0.0 08/03/2021    BASOSABS 0.1 08/03/2021     Radiology Review:  CTA Neck W WO Contrast 8/2/2021 0944           ** ADDENDUM #1 **       The study was reviewed again at the request of the referring provider to    assess for inflammation in the left side of the neck.       There is increased density in the left parotid gland consistent with    hepatitis. There is punctate calcification in the left parotid gland. There is increased density in the skin and subcutaneous soft tissues over    the left side of the neck extending    posteriorly. These findings are consistent with edema and/or cellulitis. There is no drainable fluid collection. There are small lymph nodes behind    the angle the mandible on the left side and in the posterior triangle of    the neck on the left side. There    is an enlarged left lobe of the thyroid gland.       Narrative     PROCEDURE: CTA HEAD W WO CONTRAST, CTA NECK W WO CONTRAST           CLINICAL INFORMATION: AMS.           COMPARISON: CT scan of the brain obtained on the same day.           TECHNIQUE: 1 mm axial images were obtained through the head and neck after the fast bolus administration of contrast. A noncontrast localizer was obtained. 3-D reconstructions were performed on a dedicated 3-D workstation. These include multiplanar MPR      images and multiplanar MIP images.  Centerline reconstructions were obtained of the carotid systems. Isovue intravenous contrast was given.           All CT scans at this facility use dose modulation, iterative reconstruction, and/or weight-based dosing when appropriate to reduce radiation dose to as low as reasonably achievable.           FINDINGS:                 CTA NECK:           Aortic arch and branches: There is atherosclerotic calcification in aortic arch and at the origin of the brachiocephalic, left common carotid and left subclavian arteries           Right common carotid artery/ICA: There is a small amount of calcified plaque involving the right carotid bifurcation.  There is no significant hemodynamic stenosis in the right common and internal carotid arteries.         Left common carotid artery/ICA: There is no significant hemodynamic stenosis in the left common and internal carotid arteries.           Vertebral arteries: There is antegrade flow in the right and left vertebral arteries.           There is atherosclerotic calcification in the cavernous segments of both internal carotid arteries. This no evidence of severe stenosis           CTA HEAD:                 Internal carotid arteries: There is atherosclerotic calcification in the cavernous segments of both internal carotid arteries. There is no evidence of severe stenosis. .           Middle cerebral arteries: Antegrade flow in the middle cerebral arteries bilaterally.           Anterior cerebral arteries: Antegrade flow in the anterior cerebral arteries bilaterally.           Vertebral arteries: There is some calcification in the distal left vertebral artery. There is antegrade flow in the right and left vertebral arteries.           Basilar artery: No significant stenosis.           Superior cerebellar arteries: Antegrade flow in the superior cerebellar arteries bilaterally. .           Posterior cerebral arteries: Antegrade flow in the posterior cerebral arteries bilaterally           No aneurysms, stenoses or occlusions are noted.           The superior sagittal sinus, vein of Nelson, internal cerebral veins, straight sinus, transverse sinuses and sigmoid sinuses are patent.           Axial source data: There is evidence for granulomatous disease in left upper lobe of the lung and in the mediastinum. There is enlargement of the left and to lesser extent right lobes of thyroid gland. Cervical spondylosis.           Impression     1. Abnormal left parotid gland which is enlarged and demonstrates increased density consistent with peritonitis. There is punctate calcification in the left parotid gland.     2.  Increased density in the skin and subcutaneous soft tissues over the left side of neck consistent with edema and/or cellulitis.     3. Enlarged lymph nodes adjacent to the angle of mandible on the left side and in the posterior triangle of the neck.     4. Enlarged left lobe of the thyroid gland           Final report electronically signed by DR Andressa Espinoza on 8/2/2021 10:29 AM           ** ORIGINAL REPORT **     PROCEDURE: CTA HEAD W WO CONTRAST, CTA NECK W WO CONTRAST           CLINICAL INFORMATION: AMS.           COMPARISON: CT scan of the brain obtained on the same day.           TECHNIQUE: 1 mm axial images were obtained through the head and neck after the fast bolus administration of contrast. A noncontrast localizer was obtained. 3-D reconstructions were performed on a dedicated 3-D workstation. These include multiplanar MPR      images and multiplanar MIP images.  Centerline reconstructions were obtained of the carotid systems. Isovue intravenous contrast was given.           All CT scans at this facility use dose modulation, iterative reconstruction, and/or weight-based dosing when appropriate to reduce radiation dose to as low as reasonably achievable.           FINDINGS:           CTA NECK:     Aortic arch and branches: There is atherosclerotic calcification in aortic arch and at the origin of the brachiocephalic, left common carotid and left subclavian arteries           Right common carotid artery/ICA: There is a small amount of calcified plaque involving the right carotid bifurcation. There is no significant hemodynamic stenosis in the right common and internal carotid arteries.           Left common carotid artery/ICA: There is no significant hemodynamic stenosis in the left common and internal carotid arteries.           Vertebral arteries: There is antegrade flow in the right and left vertebral arteries.           There is atherosclerotic calcification in the cavernous segments of both internal carotid arteries. This no evidence of severe stenosis           CTA HEAD:     Internal carotid arteries:  There is atherosclerotic calcification in the cavernous segments of both internal carotid arteries. There is no evidence of severe stenosis. .           Middle cerebral arteries: Antegrade flow in the middle cerebral arteries bilaterally.           Anterior cerebral arteries: Antegrade flow in the anterior cerebral arteries bilaterally.           Vertebral arteries: There is some calcification in the distal left vertebral artery. There is antegrade flow in the right and left vertebral arteries.           Basilar artery: No significant stenosis.           Superior cerebellar arteries: Antegrade flow in the superior cerebellar arteries bilaterally. .           Posterior cerebral arteries: Antegrade flow in the posterior cerebral arteries bilaterally           No aneurysms, stenoses or occlusions are noted.           The superior sagittal sinus, vein of Nelson, internal cerebral veins, straight sinus, transverse sinuses and sigmoid sinuses are patent.           Axial source data: There is evidence for granulomatous disease in left upper lobe of the lung and in the mediastinum. There is enlargement of the left and to lesser extent right lobes of thyroid gland. Cervical spondylosis.           IMPRESSION:           1. Calcification in the right carotid bulb. No significant hemodynamic stenosis in the right common and internal carotid arteries.     2. No significant hemodynamic stenosis in the left common and internal carotid arteries.     3. Antegrade flow in the right and left vertebral arteries.     4. Atherosclerotic calcification in the aortic arch and at the origins of the brachiocephalic, left common carotid left subclavian arteries.     5. Atherosclerotic calcification in the cavernous segments of both internal carotid arteries. No evidence of severe stenosis.     6. Atherosclerotic calcification in the distal left vertebral artery. Antegrade flow in both distal vertebral, basilar and posterior cerebral arteries.     7. Otherwise negative CTA of the brain.     8. Evidence for granulomatous disease in left upper lobe of the lung and in the mediastinum.     9. Enlargement of the left and to lesser extent right lobes of the thyroid gland.     10. Cervical spondylosis. .           **This report has been created using voice recognition software. It may contain minor errors which are inherent in voice recognition technology. **           Final report electronically signed by DR Marcus Smith on 8/2/2021 9:44 AM       Inpatient Medications  Current Facility-Administered Medications: warfarin (COUMADIN) tablet 2 mg, 2 mg, Oral, Once  insulin glargine (LANTUS) injection vial 36 Units, 36 Units, Subcutaneous, Daily  insulin lispro (HUMALOG) injection vial 5 Units, 5 Units, Subcutaneous, TID WC  morphine (PF) injection 2 mg, 2 mg, Intravenous, Q2H PRN  docusate sodium (COLACE) capsule 100 mg, 100 mg, Oral, BID PRN  ceFAZolin (ANCEF) 2000 mg in dextrose 5 % 50 mL IVPB, 2,000 mg, Intravenous, Q8H  diphenhydrAMINE (BENADRYL) injection 25 mg, 25 mg, Intravenous, Q6H PRN  sodium chloride flush 0.9 % injection 5-40 mL, 5-40 mL, Intravenous, 2 times per day  sodium chloride flush 0.9 % injection 5-40 mL, 5-40 mL, Intravenous, PRN  0.9 % sodium chloride infusion, 25 mL, Intravenous, PRN  ondansetron (ZOFRAN-ODT) disintegrating tablet 4 mg, 4 mg, Oral, Q8H PRN **OR** ondansetron (ZOFRAN) injection 4 mg, 4 mg, Intravenous, Q6H PRN  polyethylene glycol (GLYCOLAX) packet 17 g, 17 g, Oral, Daily PRN  acetaminophen (TYLENOL) tablet 650 mg, 650 mg, Oral, Q6H PRN **OR** acetaminophen (TYLENOL) suppository 650 mg, 650 mg, Rectal, Q6H PRN  insulin lispro (HUMALOG) injection vial 0-12 Units, 0-12 Units, Subcutaneous, TID WC  insulin lispro (HUMALOG) injection vial 0-6 Units, 0-6 Units, Subcutaneous, Nightly  glucose (GLUTOSE) 40 % oral gel 15 g, 15 g, Oral, PRN  dextrose 50 % IV solution, 12.5 g, Intravenous, PRN  glucagon (rDNA) injection 1 mg, 1 mg,

## 2021-08-09 NOTE — DISCHARGE SUMMARY
LOV 01/24/18   course:    Acute left-sided parotiditis s/p FNA 8/3, improving  ENT and ID consulted  Ancef switched to dicloxacillin on discharge  Continue sialogogues, warm compress and gentle massage     MSSA Bacteremia (POA)  ID consulted  Started on Ancef on 8/4, switched to dicloxacillin     Acute metabolic encephalopathy, resolved     Hyponatremia, resolved     Chronic atrial fibrillation  on aspirin, Lanoxin, Toprol and Coumadin  Pharmacy to dose coumadin     Diabetes mellitus type 2  Increase Lantus dose to 36U and Humalog 5U TID  Humalog SS moderate dose  Resume Nesina  Hypoglycemia protocol     Essential hypertension  Continue Toprol     CKD stage IIIb   Stable at this time     Diabetic neuropathy  Continue Cymbalta and Neurontin     Obesity with BMI 33.14  Diet and lifestyle changes     Constipation  Docusate and miralax prn    Patient discharged stable to home with HH/PT/OT. She refused rehab/SNF placement.     Exam:     Vitals:  Vitals:    08/09/21 0414 08/09/21 0600 08/09/21 1015 08/09/21 1238   BP: (!) 155/69  (!) 177/79 (!) 149/70   Pulse: 61  84 55   Resp: 18  18 16   Temp: 98.1 °F (36.7 °C)  98 °F (36.7 °C) 97.5 °F (36.4 °C)   TempSrc: Oral  Oral Oral   SpO2: 99%  100% 100%   Weight:  211 lb 12.8 oz (96.1 kg)     Height:         Weight: Weight: 211 lb 12.8 oz (96.1 kg)     24 hour intake/output:    Intake/Output Summary (Last 24 hours) at 8/9/2021 1653  Last data filed at 8/9/2021 1238  Gross per 24 hour   Intake 193.5 ml   Output 2725 ml   Net -2531.5 ml     General appearance: No apparent distress, appears stated age and cooperative, obese  HEENT: Pupils equal, round, and reactive to light. Conjunctivae/corneas clear. Swelling and tenderness on L parotid improved significantly  Neck: Supple, with full range of motion. No jugular venous distention. Trachea midline. Respiratory:  Normal respiratory effort. Clear to auscultation, bilaterally without Rales/Wheezes/Rhonchi.   Cardiovascular: Regular rate and rhythm with normal S1/S2 without murmurs, rubs or gallops. Abdomen: Soft, non-tender, non-distended with normal bowel sounds. Musculoskeletal: passive and active ROM x 4 extremities. Skin: Skin color, texture, turgor normal.  No rashes or lesions. Neurologic:  Neurovascularly intact without any focal sensory/motor deficits. Cranial nerves: II-XII intact, grossly non-focal.  Psychiatric: Alert and oriented, thought content appropriate, normal insight  Capillary Refill: Brisk,< 3 seconds   Peripheral Pulses: +2 palpable, equal bilaterally     Labs: For convenience and continuity at follow-up the following most recent labs are provided:      CBC:    Lab Results   Component Value Date    WBC 4.6 08/09/2021    HGB 8.1 08/09/2021    HCT 26.2 08/09/2021     08/09/2021       Renal:    Lab Results   Component Value Date     08/09/2021    K 4.1 08/09/2021    K 4.9 08/03/2021     08/09/2021    CO2 28 08/09/2021    BUN 44 08/09/2021    CREATININE 1.5 08/09/2021    CALCIUM 9.5 08/09/2021    PHOS 2.8 04/29/2020         Significant Diagnostic Studies    Radiology:          Consults:     IP CONSULT TO OTOLARYNGOLOGY  IP CONSULT TO RESPIRATORY CARE  IP CONSULT TO SOCIAL WORK  PHARMACY TO DOSE WARFARIN  IP CONSULT TO SOCIAL WORK  IP CONSULT TO INFECTIOUS DISEASES  IP CONSULT TO HOME CARE NEEDS    Disposition:    [x] Home       [] TCU       [] Rehab       [] Psych       [] SNF       [] [place]       [] Other-    Condition at Discharge: Stable    Code Status:  Full Code     Patient Instructions:    Discharge lab work: Activity: activity as tolerated  Diet: ADULT DIET;  Regular; 4 carb choices (60 gm/meal)      Follow-up visits:   [address]  [phone]             Discharge Medications:      Puja Strickland   [place] Medication Instructions [id number]    Printed on:08/09/21 1114   Medication Information                      acetaminophen (TYLENOL) 500 MG tablet  Take 1 tablet by mouth 4 times daily as needed for Pain             alendronate (FOSAMAX) 70 MG tablet  take 1 tablet by mouth every week             allopurinol (ZYLOPRIM) 100 MG tablet  Take 1 tablet by mouth daily             alogliptin (NESINA) 6.25 MG TABS tablet  Take 1 tablet by mouth daily             Ascorbic Acid (VITAMIN C) 500 MG CAPS  Take 1 tablet by mouth 2 times daily             aspirin (RA ASPIRIN EC) 81 MG EC tablet  Take 1 tablet by mouth daily             atorvastatin (LIPITOR) 40 MG tablet  take 1 tablet by mouth at bedtime             Calcium Carbonate-Vitamin D (CALCIUM-VITAMIN D) 500-200 MG-UNIT per tablet  Take 1 tablet by mouth 2 times daily (with meals)              Cholecalciferol (VITAMIN D3) 50 MCG (2000 UT) CAPS  Take 2,000 Units by mouth daily             Continuous Blood Gluc Sensor (Hello IncSTYLE LINNETTE 14 DAY SENSOR) MISC  1 Device by Does not apply route continuous             dexlansoprazole (DEXILANT) 60 MG CPDR delayed release capsule  Take 1 capsule by mouth daily             dicloxacillin (DYNAPEN) 250 MG capsule  Take 2 capsules by mouth 4 times daily for 14 days             digoxin (LANOXIN) 125 MCG tablet  Take 0.5 tablets by mouth daily             Dulaglutide (TRULICITY SC)  Inject 8.18 mg into the skin once a week Usually on mondays             DULoxetine (CYMBALTA) 60 MG extended release capsule  take 1 capsule by mouth once daily             fenofibrate (TRICOR) 145 MG tablet  take 1 tablet by mouth once daily             FEROSUL 325 (65 Fe) MG tablet  take 1 tablet by mouth three times a day with food             folic acid (FOLVITE) 1 MG tablet  take 1 tablet by mouth once daily             furosemide (LASIX) 40 MG tablet  Take 40 mg by mouth daily              gabapentin (NEURONTIN) 100 MG capsule  Take 100 mg by mouth 3 times daily.              Incontinence Supply Disposable (INCONTINENCE BRIEF LARGE) MISC  Use prn for incontinence Incontinence Supply Disposable (POISE PAD) PADS  Use as needed             insulin lispro protamine & lispro (HUMALOG MIX 75/25 KWIKPEN) (75-25) 100 UNIT per ML SUPN injection pen  Inject 0.25 mLs into the skin 2 times daily (with meals) 25 units in the moring and 35 units in the evening. Max daily dose is 60 units. levothyroxine (SYNTHROID) 50 MCG tablet  Take 1.5 tablets by mouth Daily             meclizine (ANTIVERT) 25 MG tablet  Take 25 mg by mouth as needed              metoprolol succinate (TOPROL XL) 100 MG extended release tablet  take 1 tablet by mouth once daily             Misc. Devices MISC  Washable bed pads             Misc. Devices MISC  Baby wipes             Misc. Devices MISC  Chucks as needed             nystatin (MYCOSTATIN) 584882 UNIT/GM powder  Apply 3 times daily. RA VITAMIN B-12 100 MCG tablet  take 1 tablet by mouth once daily             valsartan (DIOVAN) 80 MG tablet  Take 1 tablet by mouth daily             warfarin (COUMADIN) 3 MG tablet  Take 1 tablet by mouth daily As directed by [facility] Coumadin clinic. 30 days = 45 tabs             zinc sulfate (ZINCATE) 220 (50 Zn) MG capsule  Take 50 mg by mouth daily                 Time Spent on discharge is more than 45 minutes in the examination, evaluation, counseling and review of medications and discharge plan. Signed: Thank you Corinne Raja, APRN - CNP for the opportunity to be involved in this patient's care.     Electronically signed by Pinky Lara MD on 8/9/2021 at 4:53 PM

## 2021-08-09 NOTE — PLAN OF CARE
Albertina Pérez RN  Outcome: Ongoing     Problem: Musculor/Skeletal Functional Status  Goal: Highest potential functional level  8/8/2021 2234 by Holly Gilman RN  Outcome: Ongoing  8/8/2021 2014 by Adilene Almodovar RN  Outcome: Ongoing

## 2021-08-09 NOTE — PROGRESS NOTES
[address] [address]  INPATIENT PHYSICAL THERAPY  DAILY NOTE  STRZ RENAL TELEMETRY 6K - 3X-68/114-D    Time In: 1050  Time Out: 1129  Timed Code Treatment Minutes: 44 Minutes  Minutes: 39          Date: 2021  Patient Name: Dorian Torres,  Gender:  female        MRN: [id number]  : 1955  (76 y.o.)     Referring Practitioner: Dr. Anton Cruz  Diagnosis: parotiditis  Additional Pertinent Hx: admit with above diagnosis, MSSA bacteria     Prior Level of Function:  Lives With: Family (with sister)  Type of Home: House  Home Layout: One level  Home Access: Level entry  Home Equipment: Wheelchair-manual, Rolling walker (uses primarily w/c PTA)   Bathroom Shower/Tub: Walk-in shower  Bathroom Toilet: Handicap height  Bathroom Equipment: Grab bars in shower, Shower chair  Bathroom Accessibility: Walker accessible    ADL Assistance: Independent  Homemaking Assistance: Needs assistance (pt reports sister used to do, also has aides who assist with everything except laundry 2x/wk for 2 hours/day)  Ambulation Assistance: Independent (limited distance for transfers only and sometimes uses walker to transfer)  Transfer Assistance: Independent  Active : No  IADL Comments: Pt has aides 2x/wk for 2 hrs/day that assist with homemaking tasks except laundry. Sister does laundry. Sister and brother will get groceries. Pt reports she does not have any homemaking responsibilities. Reports ind with ADLs and primarily furniture walks short distances, otherwise all other mobility in her wheelchair  Additional Comments: per pt pain in BLE from hx of injuries and fear of falling limits her distance amb for several years    Restrictions/Precautions:  Restrictions/Precautions: Fall Risk     SUBJECTIVE: RN approved therapy session. Patient supine in bed upon arrival. Patient pleasant and agreeable to therapy session.     PAIN: 0/10:     Vitals: Vitals not assessed per clinical judgement, see nursing flowsheet    OBJECTIVE:  Bed Mobility:  Supine to Sit: Stand By Assistance, with head of bed raised    Transfers:  Sit to Stand: [employer], with increased time for completion  Stand to [place] [id number], with increased time for completion    Ambulation:  Contact Guard Assistance, with increased time for completion  Distance: 5'x1  Surface: Level Tile  Device:Rolling Walker  Gait Deviations: Forward Flexed Posture, Slow Mary and Decreased Heel Strike on Left    Exercise:  Patient was guided in 1 set(s) 15 reps of exercise to both lower extremities. Glut sets, Quad sets, Heelslides, Hip abduction/adduction, Seated marches, Seated heel/toe raises, Long arc quads and Seated abduction/adduction. Exercises were completed for increased independence with functional mobility. Functional Outcome Measures: Completed  AM-PAC Inpatient Mobility Raw Score : 16  AM-PAC Inpatient T-Scale Score : 40.78    ASSESSMENT:  Assessment: Patient progressing toward established goals. Activity Tolerance:  Patient tolerance of  treatment: good. Patient limited by decreased strength and decreased endurance.      Equipment Recommendations:Equipment Needed: No  Discharge Recommendations: Home health PT Continue to assess pending progress, Patient would benefit from continued therapy after discharge    Plan: Times per week: 3-5X GM  Times per day: Daily  Specific instructions for Next Treatment: therex and mobility    Patient Education  Patient Education: Plan of Care, Gait, Verbal Exercise Instruction    Goals:  Patient goals : feel better  Short term goals  Time Frame for Short term goals: by discharge  Short term goal 1: bed mobility with MOD I to get in/out of bed  Short term goal 2: transfer with MOD I to get in/out of chairs  Short term goal 3: amb >5'x1 with RW and MOD I to walk safely bed to/from w/c in home  Short term goal 4: w/c mobility >100'x1, on level surface, and MOD I to manuever safely in home  Long term goals  Time Frame

## 2021-08-09 NOTE — PROGRESS NOTES
Clinical Pharmacy Note    Warfarin consult follow-up    Recent Labs     08/09/21  0520   INR 2.67*     Recent Labs     08/07/21  0517 08/07/21  0517 08/07/21  1545 08/08/21  0515 08/09/21  0520   HGB 7.9*   < > 8.6* 7.9* 8.1*   HCT 25.7*   < > 27.3* 25.1* 26.2*     --   --  226 220    < > = values in this interval not displayed. Significant Drug-Drug Interactions:  New warfarin drug-drug interactions: none  Discontinued drug-drug interactions: none  Current warfarin drug-drug interactions: aspirin 81 mg daily, levothyroxine 75mcg (HM), fenofibrate (HM), allopurinol (HM)     Date INR Warfarin Dose   08/2/2021 2.94 2.5mg    08/3/2021  2.55 3 mg    08/4/2021   3.13 No warfarin    08/5/2021  4.30 No warfarin     08/6/2021 4.66  No warfarin     8/7/2021  4.68   No warfarin    8/8/2021   3.31  1 mg   08/09/21 2.67 2 mg     Notes:                   Daily PT/INR until stable within therapeutic range.       Karyn Casiano, PharmD  8/9/2021  11:21 AM

## 2021-08-09 NOTE — PLAN OF CARE
Problem: Falls - Risk of:  Goal: Will remain free from falls  Description: Will remain free from falls  8/8/2021 2234 by Denny Del Toro RN  Outcome: Ongoing  8/8/2021 2234 by Denny Del Toro RN  Outcome: Ongoing  8/8/2021 2014 by Margot Wu RN  Outcome: Ongoing  Goal: Absence of physical injury  Description: Absence of physical injury  8/8/2021 2234 by Denny Del Toro RN  Outcome: Ongoing  8/8/2021 2234 by Denny Del Toro RN  Outcome: Ongoing  8/8/2021 2014 by Margot Wu RN  Outcome: Ongoing     Problem: Skin Integrity:  Goal: Will show no infection signs and symptoms  Description: Will show no infection signs and symptoms  8/8/2021 2234 by Denny Del Toro RN  Outcome: Ongoing  8/8/2021 2234 by Denny Del Toro RN  Outcome: Ongoing  8/8/2021 2014 by Margot Wu RN  Outcome: Ongoing  Goal: Absence of new skin breakdown  Description: Absence of new skin breakdown  8/8/2021 2234 by Denny Del Toro RN  Outcome: Ongoing  8/8/2021 2234 by Denny Del Toro RN  Outcome: Ongoing  8/8/2021 2014 by Margot uW RN  Outcome: Ongoing     Problem: DISCHARGE BARRIERS  Goal: Patient's continuum of care needs are met  8/8/2021 2234 by Denny Del Toro RN  Outcome: Ongoing  8/8/2021 2234 by Denny Del Toro RN  Outcome: Ongoing  8/8/2021 2014 by Margot Wu RN  Outcome: Ongoing     Problem: Pain:  Goal: Pain level will decrease  Description: Pain level will decrease  8/8/2021 2234 by Denny Del Toro RN  Outcome: Ongoing  8/8/2021 2234 by Denny Del Toro RN  Outcome: Ongoing  8/8/2021 2014 by Margot Wu RN  Outcome: Ongoing  Goal: Control of acute pain  Description: Control of acute pain  8/8/2021 2234 by Denny Del Toro RN  Outcome: Ongoing  8/8/2021 2234 by Denny Del Toro RN  Outcome: Ongoing  8/8/2021 2014 by Margot Hockey, RN  Outcome: Ongoing  Goal: Control of chronic pain  Description: Control of chronic pain  8/8/2021 2234 by Landy Al RN  Outcome: Ongoing  8/8/2021 2234 by Landy Al RN  Outcome: Ongoing  8/8/2021 2014 by Alejandra Tomlin RN  Outcome: Ongoing     Problem: Infection - Methicillin-Resistant Staphylococcus Aureus Infection:  Goal: Absence of methicillin-resistant Staphylococcus aureus infection  Description: Absence of methicillin-resistant Staphylococcus aureus infection  8/8/2021 2234 by Landy lA RN  Outcome: Ongoing  8/8/2021 2234 by Landy Al RN  Outcome: Ongoing  8/8/2021 2014 by Alejandra Tomlin RN  Outcome: Ongoing     Problem: Musculor/Skeletal Functional Status  Goal: Highest potential functional level  8/8/2021 2234 by Landy Al RN  Outcome: Ongoing  8/8/2021 2234 by Landy Al RN  Outcome: Ongoing  8/8/2021 2014 by Alejandra Tomlin RN  Outcome: Ongoing

## 2021-08-09 NOTE — CARE COORDINATION
8/9/21, 2:06 PM EDT    DISCHARGE PLANNING EVALUATION    SW was updated by Dr. Carolyn Winter that pt was now wanting to return home with [place]. SW met with pt, confirmed that she does not want to go to . Pt stated she is doing much better and feels she can manage with [place] and [facility]. SW updated [employer] with . SW updated Physician on need for [place] order.

## 2021-08-10 NOTE — TELEPHONE ENCOUNTER
[facility] Transitions Initial Follow Up Call    Outreach made within 2 business days of discharge: Yes    Patient: Amilcar Elizabeth Patient : 1955   MRN: [id number]  Reason for Admission: There are no discharge diagnoses documented for the most recent discharge. Discharge Date: 21       Spoke with: Margarita Blair    Discharge department/facility: [facility]    TCM Interactive Patient Contact:  Was patient able to fill all prescriptions: Yes  Was patient instructed to bring all medications to the follow-up visit: Yes  Is patient taking all medications as directed in the discharge summary?  Yes  Does patient understand their discharge instructions: Yes  Does patient have questions or concerns that need addressed prior to 7-14 day follow up office visit: no    Scheduled appointment with PCP within 7-14 days  appt scheduled    Follow Up  Future Appointments   Date Time Provider Adonis Dolan   2021  1:00 PM Roscoe Martínez MD N [address] [facility]   2021  2:00 PM [facility]   2021  3:20 PM Ashley Edwards APRN - [address] [address]   2021  2:00 PM Minh Scherer RN [address] [facility]   10/1/2021  2:15 PM Roscoe Martínez MD N [facility] Heart [facility]   10/21/2021  2:40 PM ARNOL Felipe - [address] [facility] [address] [facility]   2022  1:15 PM Hanny Narayanan   2022  3:00 PM Jackelin Russo [facility] Darrel Tellez LPN

## 2021-08-10 NOTE — CARE COORDINATION
8/10/21, 7:47 AM EDT    Patient goals/plan/ treatment preferences discussed by  and . Patient goals/plan/ treatment preferences reviewed with patient/ family. Patient/ family verbalize understanding of discharge plan and are in agreement with goal/plan/treatment preferences. Understanding was demonstrated using the teach back method. AVS provided by RN at time of discharge, which includes all necessary medical information pertaining to the patients current course of illness, treatment, post-discharge goals of care, and treatment preferences. Services After Discharge  Services At/After Discharge: Meals on Wheels, [address], Nursing Services, [place], PT, Safety Alert ([facility], [facility])   IMM Letter  IMM Letter given to Patient/Family/Significant other/Guardian/POA/by[de-identified] copy delivered to patient by mgr. Sanchez  IMM Letter date given[de-identified] 08/09/21  IMM Letter time given[de-identified] 1     Pt discharged home with sister 8/9/21. DANI notified Randell Connor with [facility] . DANI notified Sergey Hunter.

## 2021-08-10 NOTE — CARE COORDINATION
Care Transitions Outreach Attempt    Call within 2 business days of discharge: Yes   Attempted to reach patient for transitions of care follow up. Unable to reach patient. Patient: Sharath Monroy Patient : 1955 MRN: [id number]    Last Discharge [facility]       Complaint Diagnosis Description Type Department Provider    21 Mass; Otalgia; Dizziness; Altered Mental Status Parotitis . .. ED to Hosp-Admission (Discharged) (ADMITTED) HEMANT Hylton MD; Jimbo Sandy. ..               Noted following upcoming appointments from discharge chart review:   [facility] follow up appointment(s):   Future Appointments   Date Time Provider Adonis Dolan   2021  1:00 PM Matt Sanford   2021  2:00 PM Yolanda Gomez, [facility]   2021  3:20 PM Skyla Ware APRN - [address] [facility]   2021  2:00 PM Swetha Iraheta RN SRPX Physic [facility]   10/1/2021  2:15 PM MD ANTONY Sanford Heart [facility]   10/21/2021  2:40 PM ARNOL Beal - [address] [facility]   2022  1:15 PM Hanny Licea   2022  3:00 PM Hair Vásquez MD N [address]     Non-[facility] follow up appointment(s): judi

## 2021-08-10 NOTE — CARE COORDINATION
8/10/21, 7:27 AM EDT    Patient goals/plan/ treatment preferences discussed by  and . Patient goals/plan/ treatment preferences reviewed with patient/ family. Patient/ family verbalize understanding of discharge plan and are in agreement with goal/plan/treatment preferences. Understanding was demonstrated using the teach back method. AVS provided by RN at time of discharge, which includes all necessary medical information pertaining to the patients current course of illness, treatment, post-discharge goals of care, and treatment preferences. Services After Discharge  Services At/After Discharge: [facility], [facility], [facility], Skilled Therapy ([address])   IMM Letter  IMM Letter given to Patient/Family/Significant other/Guardian/POA/by[de-identified] copy delivered to patient by mgr. Sanchez  IMM Letter date given[de-identified] 08/09/21  IMM Letter time given[de-identified] [id number]

## 2021-08-11 NOTE — CARE COORDINATION
Care Transitions Outreach Attempt    Call within 2 business days of discharge: Yes   Attempted initial 24 hour transitional call to patient. Left VM to return call directly to [phone]. If no return call, CTN will sign off-2nd attempt. Patient: Dee Dee Murillo Patient : 1955 MRN: [id number]    Last Discharge [address] [address]       Complaint Diagnosis Description Type Department Provider    21 Mass; Otalgia; Dizziness; Altered Mental Status Parotitis . .. ED to Hosp-Admission (Discharged) (ADMITTED) HEMANT Toussaint MD; Kalie Blanca. ..               Noted following upcoming appointments from discharge chart review:   [facility] follow up appointment(s):   Future Appointments   Date Time Provider Adonis Dolan   2021  1:00 PM Matt Garza   2021  2:00 PM NISA Sotelo [facility]   2021  3:20 PM Tobias Dias APRN - [address] [facility] [address] [facility] - [place]   2021  2:00 PM Theodore Coreas RN SRPX Physic [facility] - [place]   10/1/2021  2:15 PM MD ANTONY Garza SRPX Heart [facility] - [place]   10/21/2021  2:40 PM Tobias Dias APRN - [address] [address] - [place]   2022  1:15 PM Hanny Gonzalez   2022  3:00 PM Hair Castellon MD N [place] [address]     Non-[facility] follow up appointment(s): judi

## 2021-08-18 NOTE — PROGRESS NOTES
Medication Management [address]  [phone] (phone)  [phone] (fax)    Ms. Ivan Eduardo is a 76 y.o.  female with history of Afib who presents today for anticoagulation monitoring and adjustment. Patient verifies current dosing regimen and tablet strength. No missed or extra doses. Patient denies s/s bleeding/bruising/swelling/SOB/chest pain. Patient had blocked saliva gland that caused hospital admission from 8/2/21 - 8/9/21 (track board updated with coumadin dosing from hospital). Discharged to home and went back to normal dosing of 3.75mg W and 2.5mg all other days. Bruising from in the hospital.   No blood in urine or stool. No dietary changes. No changes in medication/OTC agents/Herbals. Patient was prescribed dicloxacillin 250mg 2 capsules BID x14 days. (started 8/10/21). No change in alcohol use or tobacco use. No change in activity level. Patient denies headaches/dizziness/lightheadedness/falls. No vomiting/diarrhea or acute illness. No Procedures scheduled in the future at this time. Assessment:   Lab Results   Component Value Date    INR 1.50 (H) 08/18/2021    INR 2.67 (H) 08/09/2021    INR 3.31 (H) 08/08/2021     INR subtherapeutic   Recent Labs     08/18/21  1438   INR 1.50*       Plan:  Coumadin 5mg today (8/18-usually 3.75mg), 3.75mg (8/19 & 8/20-usually 2.5mg) then continue Coumadin 2.5mg daily except 3.75mg on W.  Recheck INR in 1 week(s) due to subtherapeutic INR. Patient reminded to call the Anticoagulation Clinic with any signs or symptoms of bleeding or with any medication changes. Patient given instructions utilizing the teach back method. After visit summary printed and reviewed with patient. Discharged ambulatory in no apparent distress.     Reviewed plan with Delgado EllisD    Alexi Harada, PharmD    For Pharmacy Admin Tracking Only     Intervention Detail: Dose Adjustment: 1, reason: Therapy Optimization   Total # of Interventions Recommended: 1   Total # of Interventions Accepted: 1   Time Spent (min): 20

## 2021-08-18 NOTE — PROGRESS NOTES
Post-Discharge Transitional Care Management Services or Hospital Follow Up      Deisy Uriarte   YOB: 1955    Date of Office Visit:  8/18/2021  Date of Hospital Admission: 8/2/21  Date of Hospital Discharge: 8/9/21  Risk of hospital readmission (high >=14%.  Medium >=10%) :Readmission Risk Score: 41      Care management risk score Rising risk (score 2-5) and Complex Care (Scores >=6): 4     Non face to face  following discharge, date last encounter closed (first attempt may have been earlier): 8/11/2021 11:04 AM    Call initiated 2 business days of discharge: Yes    Patient Active Problem List   Diagnosis    Diabetes mellitus ([address]    Hypertension    Hyperlipidemia    Neuropathy    Osteoarthritis    Proteinuria    Arthritis    Morbid obesity ([address]    Allergic rhinitis    Chronic kidney disease, stage 4 (severe) ([address]    Diabetes mellitus type 2, insulin dependent ([address]    DARWIN on CPAP    History of sleeve gastrectomy    Pneumonia    Morbid obesity with BMI of 50.0-59.9, adult ([address]    Coronary artery disease involving native heart without angina pectoris    Multinodular goiter    Bilateral renal cysts    Dyspnea    Atrial fibrillation (HCC)    Acute diastolic CHF (congestive heart failure), NYHA class 2 (HCC)    CKD (chronic kidney disease) stage 3, GFR 30-59 ml/min    Shortness of breath    Chronic anemia    Paroxysmal A-fib (HCC)    Chronic kidney disease (CKD), stage III (moderate) (HCC)    Nonischemic cardiomyopathy (HCC)    Mild tricuspid regurgitation    Debility    Dietary noncompliance    Long term current use of anticoagulant    Severe left ventricular systolic dysfunction    Anticoagulated on Coumadin    Metabolic encephalopathy    Acute cystitis without hematuria    Hyperkalemia    KEEGAN (acute kidney injury) ([address]    Altered mental status    AMS (altered mental status)    Encounter for therapeutic drug monitoring    Closed fracture of distal end of left femur, initial encounter ([facility]    Parotiditis       No Known Allergies    Medications listed as ordered at the time of discharge from hospital   [facility] Medication Instructions KLEBER:    Printed on:08/18/21 3668   Medication Information                      acetaminophen (TYLENOL) 500 MG tablet  Take 1 tablet by mouth 4 times daily as needed for Pain             alendronate (FOSAMAX) 70 MG tablet  take 1 tablet by mouth every week             allopurinol (ZYLOPRIM) 100 MG tablet  Take 1 tablet by mouth daily             Ascorbic Acid (VITAMIN C) 500 MG CAPS  Take 1 tablet by mouth 2 times daily             aspirin (RA ASPIRIN EC) 81 MG EC tablet  Take 1 tablet by mouth daily             atorvastatin (LIPITOR) 40 MG tablet  1 tab po daily             Calcium Carbonate-Vitamin D (CALCIUM-VITAMIN D) 500-200 MG-UNIT per tablet  Take 1 tablet by mouth 2 times daily (with meals)              Cholecalciferol (VITAMIN D3) 50 MCG (2000 UT) CAPS  Take 2,000 Units by mouth daily             Continuous Blood Gluc Sensor (Cube RouteSTYLE LINNETTE 14 DAY SENSOR) MISC  1 Device by Does not apply route continuous             dexlansoprazole (DEXILANT) 60 MG CPDR delayed release capsule  Take 1 capsule by mouth daily             dicloxacillin (DYNAPEN) 250 MG capsule  Take 2 capsules by mouth 4 times daily for 14 days             digoxin (LANOXIN) 125 MCG tablet  Take 0.5 tablets by mouth daily             Dulaglutide (TRULICITY SC)  Inject 4.07 mg into the skin once a week Usually on mondays             DULoxetine (CYMBALTA) 60 MG extended release capsule  take 1 capsule by mouth once daily             fenofibrate (TRICOR) 145 MG tablet  take 1 tablet by mouth once daily             FEROSUL 325 (65 Fe) MG tablet  take 1 tablet by mouth three times a day with food             folic acid (FOLVITE) 1 MG tablet  take 1 tablet by mouth once daily             furosemide (LASIX) 40 MG tablet  Take 40 mg by mouth daily gabapentin (NEURONTIN) 100 MG capsule  Take 100 mg by mouth 3 times daily. Incontinence Supply Disposable (INCONTINENCE BRIEF LARGE) MISC  Use prn for incontinence             Incontinence Supply Disposable (POISE PAD) PADS  Use as needed             insulin lispro protamine & lispro (HUMALOG MIX 75/25 KWIKPEN) (75-25) 100 UNIT per ML SUPN injection pen  Inject 0.25 mLs into the skin 2 times daily (with meals) 25 units in the moring and 35 units in the evening. Max daily dose is 60 units. levothyroxine (SYNTHROID) 50 MCG tablet  Take 1.5 tablets by mouth Daily             meclizine (ANTIVERT) 25 MG tablet  Take 25 mg by mouth as needed              metoprolol succinate (TOPROL XL) 100 MG extended release tablet  take 1 tablet by mouth once daily             Misc. Devices MISC  Washable bed pads             Misc. Devices MISC  Baby wipes             Misc. Devices MISC  Chucks as needed             nystatin (MYCOSTATIN) 083709 UNIT/GM powder  Apply 3 times daily. RA VITAMIN B-12 100 MCG tablet  take 1 tablet by mouth once daily             valsartan (DIOVAN) 80 MG tablet  Take 1 tablet by mouth daily             warfarin (COUMADIN) 3 MG tablet  Take 1 tablet by mouth daily As directed by [facility] Coumad[facility].  30 days = 45 tabs             zinc sulfate (ZINCATE) 220 (50 Zn) MG capsule  Take 50 mg by mouth daily                   Medications marked \"taking\" at this time  Outpatient Medications Marked as Taking for the 8/18/21 encounter (Office Visit) with ARNOL Whipple CNP   Medication Sig Dispense Refill    valsartan (DIOVAN) 80 MG tablet Take 1 tablet by mouth daily 30 tablet 3    dexlansoprazole (DEXILANT) 60 MG CPDR delayed release capsule Take 1 capsule by mouth daily 90 capsule 3    fenofibrate (TRICOR) 145 MG tablet take 1 tablet by mouth once daily 90 tablet 3    atorvastatin (LIPITOR) 40 MG tablet 1 tab po daily 90 tablet 3    dicloxacillin (DYNAPEN) 250 MG capsule Take 2 capsules by mouth 4 times daily for 14 days 112 capsule 0    gabapentin (NEURONTIN) 100 MG capsule Take 100 mg by mouth 3 times daily.  levothyroxine (SYNTHROID) 50 MCG tablet Take 1.5 tablets by mouth Daily 90 tablet 1    insulin lispro protamine & lispro (HUMALOG MIX 75/25 KWIKPEN) (75-25) 100 UNIT per ML SUPN injection pen Inject 0.25 mLs into the skin 2 times daily (with meals) 25 units in the moring and 35 units in the evening. Max daily dose is 60 units. 5 pen 3    alendronate (FOSAMAX) 70 MG tablet take 1 tablet by mouth every week 12 tablet 3    FEROSUL 325 (65 Fe) MG tablet take 1 tablet by mouth three times a day with food 90 tablet 3    metoprolol succinate (TOPROL XL) 100 MG extended release tablet take 1 tablet by mouth once daily 90 tablet 1    folic acid (FOLVITE) 1 MG tablet take 1 tablet by mouth once daily 90 tablet 4    allopurinol (ZYLOPRIM) 100 MG tablet Take 1 tablet by mouth daily 90 tablet 3    Continuous Blood Gluc Sensor (FREESTYLE LINNETTE 14 DAY SENSOR) MISC 1 Device by Does not apply route continuous 6 each 1    Ascorbic Acid (VITAMIN C) 500 MG CAPS Take 1 tablet by mouth 2 times daily      Cholecalciferol (VITAMIN D3) 50 MCG (2000 UT) CAPS Take 2,000 Units by mouth daily      zinc sulfate (ZINCATE) 220 (50 Zn) MG capsule Take 50 mg by mouth daily      Incontinence Supply Disposable (INCONTINENCE BRIEF LARGE) MISC Use prn for incontinence 5 packet 2    Incontinence Supply Disposable (POISE PAD) PADS Use as needed 50 each 2    Misc. Devices MISC Washable bed pads 50 each 2    Misc. Devices MISC Baby wipes 200 each 2    Misc. Devices MISC Chucks as needed 200 each 2    furosemide (LASIX) 40 MG tablet Take 40 mg by mouth daily       meclizine (ANTIVERT) 25 MG tablet Take 25 mg by mouth as needed       warfarin (COUMADIN) 3 MG tablet Take 1 tablet by mouth daily As directed by [facility].  30 days = 45 tabs      acetaminophen (TYLENOL) 500 MG tablet Take 1 tablet by mouth 4 times daily as needed for Pain 120 tablet 0    DULoxetine (CYMBALTA) 60 MG extended release capsule take 1 capsule by mouth once daily 90 capsule 3    nystatin (MYCOSTATIN) 165893 UNIT/GM powder Apply 3 times daily. (Patient taking differently: prn) 60 g 2    RA VITAMIN B-12 100 MCG tablet take 1 tablet by mouth once daily 90 tablet 3    Dulaglutide (TRULICITY SC) Inject 2.10 mg into the skin once a week Usually on mondays      aspirin (RA ASPIRIN EC) 81 MG EC tablet Take 1 tablet by mouth daily 30 tablet 3    digoxin (LANOXIN) 125 MCG tablet Take 0.5 tablets by mouth daily 30 tablet 0    Calcium Carbonate-Vitamin D (CALCIUM-VITAMIN D) 500-200 MG-UNIT per tablet Take 1 tablet by mouth 2 times daily (with meals)           Medications patient taking as of now reconciled against medications ordered at time of hospital discharge: Yes  Pt is unsure of a few of her meds, she Is going to go home and check her pill bottles,  She was advised to stop taking allopurinol, unsure about nesina, will check and lanoxin this was ordered by a physician in [facility]  Chief Complaint   Patient presents with    Follow-Up from [address] [place] [address]     no new concerns        History of Present illness - Follow up of Hospital diagnosis(es): parotiditis     Inpatient course: Discharge summary reviewed- see chart.   Per H&P dated 8/2: \"The patient is a 75 y.o. female who presents with complaints of increased confusion over the last several days as well as worsening left sided neck pain and swelling.  Brother is at bedside and providing history states that he first noticed some erythema and swelling about a week ago. [facility] states over the last it is increased in size, it is become very hard and tender.  He reports that she has had several hospitalizations with recurrent infections and has never recovered mentally from this. [facility] states that she has had confusion ongoing for the last year however is noted increased amount over the last several days. Reports that she was extremely cold, no reports of fevers, nausea or vomiting.  Patient awakens when calling her by her name and denies any shortness of breath, difficulty swallowing or breathing.  Patient reports she complained of some lightheadedness earlier today. Brother also reports that she has chronic lower extremity swelling, follows up with nephrology but feels that they are a little more swollen today than usual.\"     Treatment course:     Acute left-sided parotiditis s/p FNA 8/3, improving  ENT and ID consulted  Ancef switched to dicloxacillin on discharge  Continue sialogogues, warm compress and gentle massage     MSSA Bacteremia (POA)  ID consulted  Started on Ancef on 8/4, switched to dicloxacillin     Acute metabolic encephalopathy, resolved     Hyponatremia, resolved     Chronic atrial fibrillation  on aspirin, Lanoxin, Toprol and Coumadin  Pharmacy to dose coumadin     Diabetes mellitus type 2  Increase Lantus dose to 36U and Humalog 5U TID  Humalog SS moderate dose  Resume Nesina  Hypoglycemia protocol     Essential hypertension  Continue Toprol     CKD stage IIIb   Stable at this time     Diabetic neuropathy  Continue Cymbalta and Neurontin     Obesity with BMI 33.14  Diet and lifestyle changes     Constipation  Docusate and miralax prn     Patient discharged stable to home with HH/PT/OT. She refused rehab/SNF placement.   Interval history/Current status: improved     Lab Results   Component Value Date    WBC 4.6 (L) 08/09/2021    HGB 8.1 (L) 08/09/2021    HCT 26.2 (L) 08/09/2021    .1 (H) 08/09/2021     08/09/2021     Lab Results   Component Value Date     08/09/2021    K 4.1 08/09/2021    K 4.9 08/03/2021     08/09/2021    CO2 28 08/09/2021    BUN 44 08/09/2021    CREATININE 1.5 08/09/2021    GLUCOSE 87 08/09/2021    GLUCOSE 213 03/27/2012    CALCIUM 9.5 08/09/2021      Lab Results   Component Value Date    CHOL 122 07/21/2021 CHOL 125 04/12/2021    CHOL 109 12/16/2020     Lab Results   Component Value Date    TRIG 446 (H) 07/21/2021    TRIG 276 04/12/2021    TRIG 190 12/16/2020     Lab Results   Component Value Date    HDL 19 07/21/2021    HDL 18 (A) 04/12/2021    HDL 17 12/16/2020     Lab Results   Component Value Date    LDLCALC SEE BELOW 07/21/2021    LDLCALC 52 04/12/2021    LDLCALC 54 12/16/2020     Lab Results   Component Value Date    VLDL 55 04/12/2021    VLDL 37 06/05/2019    VLDL 26 11/05/2013     Lab Results   Component Value Date    CHOLHDLRATIO 3.3 11/05/2013    CHOLHDLRATIO 3.7 02/22/2013       A comprehensive review of systems was negative except for what was noted in the HPI. GERD    HPI:     Symptoms:  heartburn  Length of symptoms: 1 years  Current therapy and response:  dexilant working well  Recent change in symptoms? No  Symptoms associated with certain foods? Yes  Alcohol use? No  Tobacco use? No    Abdominal Pain? No  Mid Back Pain? No  Melena? No      States her sugars are stable she does take trulicity weekly and hs her own NPH scale she uses. Also follow with Endocrinology for diabetes management. Narrative   PROCEDURE: CTA HEAD W WO CONTRAST, CTA NECK W WO CONTRAST       CLINICAL INFORMATION: AMS.     COMPARISON: CT scan of the brain obtained on the same day.       TECHNIQUE: 1 mm axial images were obtained through the head and neck after the fast bolus administration of contrast. A noncontrast localizer was obtained. 3-D reconstructions were performed on a dedicated 3-D workstation. These include multiplanar MPR    images and multiplanar MIP images.  Centerline reconstructions were obtained of the carotid systems.  Isovue intravenous contrast was given.       All CT scans at this facility use dose modulation, iterative reconstruction, and/or weight-based dosing when appropriate to reduce radiation dose to as low as reasonably achievable.       FINDINGS:           CTA NECK:       Aortic arch and branches: There is atherosclerotic calcification in aortic arch and at the origin of the brachiocephalic, left common carotid and left subclavian arteries       Right common carotid artery/ICA: There is a small amount of calcified plaque involving the right carotid bifurcation. There is no significant hemodynamic stenosis in the right common and internal carotid arteries.       Left common carotid artery/ICA: There is no significant hemodynamic stenosis in the left common and internal carotid arteries.       Vertebral arteries: There is antegrade flow in the right and left vertebral arteries.       There is atherosclerotic calcification in the cavernous segments of both internal carotid arteries. This no evidence of severe stenosis       CTA HEAD:           Internal carotid arteries: There is atherosclerotic calcification in the cavernous segments of both internal carotid arteries. There is no evidence of severe stenosis. .       Middle cerebral arteries: Antegrade flow in the middle cerebral arteries bilaterally.       Anterior cerebral arteries: Antegrade flow in the anterior cerebral arteries bilaterally.       Vertebral arteries: There is some calcification in the distal left vertebral artery. There is antegrade flow in the right and left vertebral arteries.       Basilar artery: No significant stenosis.       Superior cerebellar arteries: Antegrade flow in the superior cerebellar arteries bilaterally. .       Posterior cerebral arteries: Antegrade flow in the posterior cerebral arteries bilaterally       No aneurysms, stenoses or occlusions are noted.       The superior sagittal sinus, vein of Nelson, internal cerebral veins, straight sinus, transverse sinuses and sigmoid sinuses are patent.           Axial source data: There is evidence for granulomatous disease in left upper lobe of the lung and in the mediastinum. There is enlargement of the left and to lesser extent right lobes of thyroid gland.  Cervical spondylosis.                 Vitals:    08/18/21 1527   BP: (!) 138/90   Pulse: 83   Resp: 16   Temp: 99 °F (37.2 °C)   TempSrc: Oral   SpO2: 99%   Weight: 225 lb (102.1 kg)   Height: 5' 9\" (1.753 m)     Body mass index is 33.23 kg/m². Wt Readings from Last 3 Encounters:   08/18/21 225 lb (102.1 kg)   08/09/21 211 lb 12.8 oz (96.1 kg)   07/22/21 223 lb 12.8 oz (101.5 kg)     BP Readings from Last 3 Encounters:   08/18/21 (!) 138/90   08/18/21 (!) 147/90   08/09/21 (!) 149/70        Physical Exam:  General Appearance: alert and oriented to person, place and time, well-developed and well-nourished, in no acute distress  Skin: warm and dry, no rash or erythema  Neck: neck supple and non tender without mass, no thyromegaly or thyroid nodules, no cervical lymphadenopathy and pt continues with mild left parotid tenderness with palpation and a firm area that is about 50 cent piece size, no erythema and no  Left sided anterior cervical lymphadenopathy    Pulmonary/Chest: clear to auscultation bilaterally- no wheezes, rales or rhonchi, normal air movement, no respiratory distress  Cardiovascular: normal rate, normal S1 and S2, no gallops, intact distal pulses and no carotid bruits  Abdomen: soft, non-tender, non-distended, normal bowel sounds, no masses or organomegaly  Extremities: no cyanosis, no clubbing and no edema    Assessment/Plan:  1. Hospital discharge follow-up    - NM DISCHARGE MEDS RECONCILED W/ CURRENT OUTPATIENT MED LIST    2. Parotiditis  Improved  Finish out atb  Come back in 3 weeks for recheck  May need to consider re scan to see if stone in place  Pt in agreement with plan   - Basic Metabolic Panel; Future    3. Gastroesophageal reflux disease  gerd diet   - dexlansoprazole (DEXILANT) 60 MG CPDR delayed release capsule; Take 1 capsule by mouth daily  Dispense: 90 capsule; Refill: 3    4.  Hyperlipidemia, unspecified hyperlipidemia type  Low fat low cholesterol diet  - fenofibrate (TRICOR) 145 MG tablet; take 1 tablet by mouth once daily  Dispense: 90 tablet; Refill: 3  - atorvastatin (LIPITOR) 40 MG tablet; 1 tab po daily  Dispense: 90 tablet; Refill: 3    5. Type 2 diabetes mellitus with diabetic polyneuropathy, with long-term current use of insulin (HCC)  Stable   Continue with IM for management  - fenofibrate (TRICOR) 145 MG tablet; take 1 tablet by mouth once daily  Dispense: 90 tablet; Refill: 3    6. Iron deficiency anemia, unspecified iron deficiency anemia type  Await new labs MVI  - CBC With Auto Differential; Future  - Basic Metabolic Panel;  Future        Medical Decision Making: high complexity

## 2021-08-18 NOTE — PROGRESS NOTES
[id number] [facility]  [address] [place] [id number]  Dept: [phone]  Dept Fax: [phone]  Loc: [phone]    Visit Date: 8/18/2021    Julianne Membreno is a 76 y.o. female who presents todayfor:  Chief Complaint   Patient presents with    Check-Up    Hypertension    Cardiomyopathy    Coronary Artery Disease   had worsening EF  Known 100% RCA  Deciding on ICD  No chest pain   Some baseline dyspnea  No dizziness  No syncope  Renal patient       HPI:  HPI  Past Medical History:   Diagnosis Date    CAD (coronary artery disease)     CRI (chronic renal insufficiency)     Difficult intubation     excess skin in back of throat     DM (diabetes mellitus) ([facility] [address] 1994    GERD (gastroesophageal reflux disease)     H/O gastric bypass     Hyperlipidemia     Hypertension     Hypothyroidism     Neuropathy     Obesity     Osteoarthritis     Paroxysmal atrial fibrillation (HCC)     Prolonged emergence from general anesthesia     Sleep apnea     uses cpap    Sleep apnea     Visit for screening mammogram       Past Surgical History:   Procedure Laterality Date    BACK SURGERY      xs 2    CATARACT REMOVAL Right 7/24/14    Dr. Duarte Labrador EKG 12-LEAD  9/18/2015         ENDOSCOPY, COLON, DIAGNOSTIC      EYE SURGERY      FOOT SURGERY      left foot    HYSTERECTOMY, TOTAL ABDOMINAL      MOUTH BIOPSY  9-28-12    left tongue and lingual tonsil    OTHER SURGICAL HISTORY  11/8/2014    LEFT FEMUR RETROGRADE NAILING    OTHER SURGICAL HISTORY  4/23/2015    HARDWARE REMOVAL LEFT FEMUR, INSERTION OF ANTIBIOTIC SPACER    PATELLA SURGERY  7/11/13    fractues rt patella     OR COLON CA SCRN NOT  [address] Left 1/24/2018    COLONOSCOPY performed by Charles Oconnell MD at [address] [facility] TAG REMOVAL  9/25/12    mole removal    SLEEVE GASTRECTOMY  09/15/2015    Robotic    TOENAIL EXCISION      removal of great toe nails bilateral-still has toenails-had ingrown toenails and had trimmed out     TONSILLECTOMY      UPPER GASTROINTESTINAL ENDOSCOPY      US THYROID CYST ASPIRATION AND OR INJECTION  2021    US THYROID CYST ASPIRATION AND OR INJECTION 2021 [facility] ULTRASOUND     Family History   Problem Relation Age of Onset    Asthma Sister     Asthma Brother     Heart Disease Father     Other Mother     High Blood Pressure Other     Cancer Maternal Uncle         stomach    Diabetes Maternal Grandmother     High Blood Pressure Maternal Grandmother     Diabetes Maternal Grandfather     Stroke Neg Hx      Social History     Tobacco Use    Smoking status: Former Smoker     Packs/day: 1.50     Years: 20.00     Pack years: 30.00     Quit date: 2007     Years since quittin.5    Smokeless tobacco: Never Used   Substance Use Topics    Alcohol use: No     Alcohol/week: 0.0 standard drinks      Current Outpatient Medications   Medication Sig Dispense Refill    dicloxacillin (DYNAPEN) 250 MG capsule Take 2 capsules by mouth 4 times daily for 14 days 112 capsule 0    gabapentin (NEURONTIN) 100 MG capsule Take 100 mg by mouth 3 times daily.  levothyroxine (SYNTHROID) 50 MCG tablet Take 1.5 tablets by mouth Daily 90 tablet 1    insulin lispro protamine & lispro (HUMALOG MIX 75/25 KWIKPEN) (75-25) 100 UNIT per ML SUPN injection pen Inject 0.25 mLs into the skin 2 times daily (with meals) 25 units in the moring and 35 units in the evening. Max daily dose is 60 units.  5 pen 3    alendronate (FOSAMAX) 70 MG tablet take 1 tablet by mouth every week 12 tablet 3    FEROSUL 325 (65 Fe) MG tablet take 1 tablet by mouth three times a day with food 90 tablet 3    metoprolol succinate (TOPROL XL) 100 MG extended release tablet take 1 tablet by mouth once daily 90 tablet 1    folic acid (FOLVITE) 1 MG tablet take 1 tablet by mouth once daily 90 tablet 4    allopurinol (ZYLOPRIM) 100 MG tablet Take 1 tablet by mouth daily 90 tablet 3    Continuous Blood Gluc Sensor (FREESTYLE LINNETTE 14 DAY SENSOR) MISC 1 Device by Does not apply route continuous 6 each 1    valsartan (DIOVAN) 80 MG tablet Take 1 tablet by mouth daily 30 tablet 3    Ascorbic Acid (VITAMIN C) 500 MG CAPS Take 1 tablet by mouth 2 times daily      Cholecalciferol (VITAMIN D3) 50 MCG (2000 UT) CAPS Take 2,000 Units by mouth daily      zinc sulfate (ZINCATE) 220 (50 Zn) MG capsule Take 50 mg by mouth daily      alogliptin (NESINA) 6.25 MG TABS tablet Take 1 tablet by mouth daily 30 tablet 0    Incontinence Supply Disposable (INCONTINENCE BRIEF LARGE) MISC Use prn for incontinence 5 packet 2    Incontinence Supply Disposable (POISE PAD) PADS Use as needed 50 each 2    Misc. Devices MISC Washable bed pads 50 each 2    Misc. Devices MISC Baby wipes 200 each 2    Misc. Devices MISC Chucks as needed 200 each 2    furosemide (LASIX) 40 MG tablet Take 40 mg by mouth daily       meclizine (ANTIVERT) 25 MG tablet Take 25 mg by mouth as needed       warfarin (COUMADIN) 3 MG tablet Take 1 tablet by mouth daily As directed by [facility]. 30 days = 45 tabs      acetaminophen (TYLENOL) 500 MG tablet Take 1 tablet by mouth 4 times daily as needed for Pain 120 tablet 0    DULoxetine (CYMBALTA) 60 MG extended release capsule take 1 capsule by mouth once daily 90 capsule 3    dexlansoprazole (DEXILANT) 60 MG CPDR delayed release capsule Take 1 capsule by mouth daily 90 capsule 3    nystatin (MYCOSTATIN) 642585 UNIT/GM powder Apply 3 times daily.  (Patient taking differently: prn) 60 g 2    fenofibrate (TRICOR) 145 MG tablet take 1 tablet by mouth once daily 90 tablet 3    atorvastatin (LIPITOR) 40 MG tablet take 1 tablet by mouth at bedtime 90 tablet 3    RA VITAMIN B-12 100 MCG tablet take 1 tablet by mouth once daily 90 tablet 3    Dulaglutide (TRULICITY SC) Inject 2.50 mg into the skin once a week Usually on mondays      aspirin (RA ASPIRIN EC) 81 MG EC tablet Take 1 tablet by mouth daily 30 tablet 3    digoxin (LANOXIN) 125 MCG tablet Take 0.5 tablets by mouth daily 30 tablet 0    Calcium Carbonate-Vitamin D (CALCIUM-VITAMIN D) 500-200 MG-UNIT per tablet Take 1 tablet by mouth 2 times daily (with meals)        No current facility-administered medications for this visit. No Known Allergies  Health Maintenance   Topic Date Due    Hepatitis C screen  Never done    COVID-19 Vaccine (1) Never done    Shingles Vaccine (1 of 2) Never done    Diabetic retinal exam  08/30/2019    Breast cancer screen  08/28/2020    Annual Wellness Visit (AWV)  09/16/2021    Flu vaccine (1) 09/01/2021    Low dose CT lung screening  09/24/2021    Diabetic foot exam  03/18/2022    A1C test (Diabetic or Prediabetic)  07/21/2022    Lipid screen  07/21/2022    Potassium monitoring  08/09/2022    Creatinine monitoring  08/09/2022    DTaP/Tdap/Td vaccine (2 - Td or Tdap) 11/07/2027    Colon cancer screen colonoscopy  01/24/2028    DEXA (modify frequency per FRAX score)  Completed    Pneumococcal 65+ yrs at Risk Vaccine  Completed    Hepatitis A vaccine  Aged Out    Hib vaccine  Aged Out    Meningococcal (ACWY) vaccine  Aged Out       Subjective:  Review of Systems  General:   No fever, no chills, No fatigue or weight loss  Pulmonary:    baseline dyspnea, no wheezing  Cardiac:    Denies recent chest pain,   GI:     No nausea or vomiting, no abdominal pain  Neuro:    No dizziness or light headedness,   Musculoskeletal:  No recent active issues  Extremities:   No edema, no obvious claudication       Objective:  Physical Exam  BP (!) 147/90   Pulse 97   LMP 02/20/2001   General:   Well developed, well nourished  Lungs:   Clear to auscultation  Heart:    Normal S1 S2, Slight murmur.  no rubs, no gallops  Abdomen:   Soft, non tender, no organomegalies, positive bowel sounds  Extremities:   No edema, no cyanosis, good peripheral pulses  Neurological:

## 2021-08-19 NOTE — PROGRESS NOTES
Diabetes Mellitus Type II, Initial Visit:   Dr. Mckeon Fitting  Patient here for an initial evaluation of Type 2 diabetes mellitus. The patient was initially diagnosed with Type 2 diabetes mellitus 1994. Known diabetic complications: nephropathy, peripheral neuropathy, cardiovascular disease and peripheral vascular disease  Cardiovascular risk factors: advanced age (older than 54 for men, 72 for women), diabetes mellitus, dyslipidemia, family history of premature cardiovascular disease, obesity (BMI >= 30 kg/m2), sedentary lifestyle and smoking/ tobacco exposure  Current diabetic medications include Humalog 75/25 insulin, Nesina, Trulicity. Eye exam current (within one year): no 4-5 yrs ago; Dr. Jesús Sorensen  Weight trend: Lost over 100 pounds over the past 1-2 yrs                Gastric sleeve surgery 2015 with 100# wt loss and                  Gained it back. Has lost that same weight over the                   Past 1-2 years  Prior visit with dietician: no  Current diet: Food delivery [address]) 7 meals per week                        Bkfst 11am oatmeal-equal/ 2 toast                                         Pancakes 2-3/ butter/ SF syrup                                         3 hawiaan rolls                                          3-4 pieces toast                       SnackLunch 4 x per week- puffed corn/ beef                                   jerky or popcorn Or veggies                                              Dinner 4-5pm Fish sandwich/ palomo/ diet pop                                Snacks --carrots/ celery with chip dip                                        SF jello or SF pudding                                        Puffed corn                                         Salsa chips or crackers                                            Bed 12-2am                        Drinks -   Current exercise: ADL's- minimal activity. In wheelchair most of the day. Self-care per self.   Current monitoring regimen: home blood tests - 4 times daily                      CGM -[facility] blood sugar records: Blood sugars lowest during the night with nocturnal lows 3-4 times per week. Glucose levels elevate throughout the day with food intake per YAZMIN SABA [facility] CGM download. Any episodes of hypoglycemia? yes - 3-5 times per week; highest risk during the night/ fasting    Is She on ACE inhibitor or angiotensin II receptor blocker? Yes   valsartan (Diovan)    Focus:  Initial visit for Diabetes education. Recent A1C 5.5%. Reports weight loss of approx 100lbs over the past 1+ years. WQith weight loss, Leesa Carrasco is struggling with low blood sugars during the night and more elevations during the day when she is eating. We reviewed the action of her insulin therapy and how to manage the insulin doses/ carb intake for better controlled BS's. She is eating at bedtime to avoid lows, which is beneficial to reduce/ stop. Exercise is limited-in a wheelchair most of the day. Discussed exercise to start building strength--maintain/ increase mobility. Follow up 2 weeks with RD. Plan:  Reviewed physiology of DM, preventing complications, carbs, exercise, treating low BS, insulin action  Humalog 75/25  --take 30 units both morning and supper (5pm)  Limit bedtime snacking (after 5pm) to only 20 grams carbohydrate               --can also have veggies and minimal dip and sugar free jello         Limit snacking between meals  Goal --have about 45-55 grams  Exercise every day/ 2 times per day -10 minutes after breakfast                                                And 10 minutes after dinner                  --stretch bands 5 minutes on arms and 5 minutes on legs  Always carry something with you for low blood sugars                  3 rolls smarties. 4 glucose tablets, 1/2 cup juice or regular pop  Leesa Carrasco voiced understanding of the above instructions via teach back and willingness to participate in the above plan of care.  Time spent in direct contact with patient 60 minutes.

## 2021-08-19 NOTE — PATIENT INSTRUCTIONS
Humalog 75/25  --take 30 units both morning and supper (5pm)  Limit bedtime snacking (after 5pm) to only 20 grams carbohydrate               --can also have veggies and minimal dip and sugar free jello         Limit snacking between meals  Goal --have about 45-55 grams  Exercise every day/ 2 times per day -10 minutes after breakfast                                                And 10 minutes after dinner                  --stretch bands 5 minutes on arms and 5 minutes on legs  Always carry something with you for low blood sugars                  3 rolls smarties.  4 glucose tablets, 1/2 cup juice or regular pop

## 2021-08-25 NOTE — PROGRESS NOTES
Medication Management [address]  [phone] (phone)  [phone] (fax)    Ms. Rupesh José is a 76 y.o.  female with history of Afib who presents today for anticoagulation monitoring and adjustment. Patient verifies current dosing regimen and tablet strength. No missed or extra doses. Took 1.5 tabs instead of 2 tabs last Wed (8/18). Patient denies s/s bleeding/bruising/swelling/SOB/chest pain. No blood in urine or stool. No dietary changes. No changes in OTC agents/Herbals. 2-3 more days of dicloxacillin left. Stopped taking allopurinol (med list updated). No change in alcohol use or tobacco use. No change in activity level. Patient denies headaches/dizziness/lightheadedness/falls. No vomiting/diarrhea or acute illness. No Procedures scheduled in the future at this time. Assessment:   Lab Results   Component Value Date    INR 1.30 (H) 08/25/2021    INR 1.50 (H) 08/18/2021    INR 2.67 (H) 08/09/2021     INR subtherapeutic   Recent Labs     08/25/21  1513   INR 1.30*   Patient's subtherapeutic x2    Plan:  Coumadin 5mg today then increase Coumadin 5mg MF and 2.5mg TuWThSaSu (20% increase). Recheck INR in 1 week(s) due to subtherapeutic INRs x2. Patient reminded to call the Anticoagulation Clinic with signs or symptoms of bleeding or with any medication changes. Patient given instructions utilizing the teach back method. After visit summary printed and reviewed with patient. Discharged in wheel chair in no apparent distress.     Reviewed plan with Alban Wick, DelgadoD    Delgado PhamD    For Pharmacy Admin Tracking Only     Intervention Detail: Dose Adjustment: 1, reason: Therapy Optimization   Total # of Interventions Recommended: 1   Total # of Interventions Accepted: 1   Time Spent (min): 20

## 2021-09-02 NOTE — TELEPHONE ENCOUNTER
Called patient to reschedule her appointment. She stated she does not feel like she needs to come to the diabetes clinic. We will not continue her diabetes care. I did let her know if she changed her mind, we will get her scheduled back in. Patient verbalized understanding. She will be inactive in our clinic.  Thank you for your referral.

## 2021-09-07 NOTE — PROGRESS NOTES
Medication Management [address]  [phone] (phone)  [phone] (fax)    Ms. Cyn Scherer is a 76 y.o.  female with history of Afib who presents today for anticoagulation monitoring and adjustment. Patient verifies current dosing regimen and tablet strength. No missed or extra doses. Patient denies s/s bleeding/bruising/swelling/SOB/chest pain  No blood in urine or stool. No dietary changes. No changes in medication/OTC agents/Herbals. Patient reports she has been watching foods with vitamin K from the list we gave her at her last appointment. No change in alcohol use or tobacco use. No change in activity level. Patient denies headaches/dizziness/lightheadedness/falls. No vomiting/diarrhea or acute illness. No Procedures scheduled in the future at this time. Assessment:   Lab Results   Component Value Date    INR 2.40 (H) 09/07/2021    INR 1.30 (H) 09/01/2021    INR 1.30 (H) 08/25/2021     INR therapeutic   Recent Labs     09/07/21  1538   INR 2.40*     First therapeutic INR following dose change and a boost.     Plan:  Continue Coumadin 5 mg MWF 2.5 mg TThSaSun. Recheck INR in 9 days. Plan discussed with Eric Javed RPh. Patient reminded to call the Anticoagulation Clinic with any signs or symptoms of bleeding or with any medication changes. Patient given instructions utilizing the teach back method. After visit summary printed and reviewed with patient. Discharged in wheelchair in no apparent distress.     Zoë Cox, Scarlett  9/7/2021  3:43 PM      For Pharmacy Admin Tracking Only     Time Spent (min): 20

## 2021-09-16 NOTE — PROGRESS NOTES
Medication Management [address]  [phone] (phone)  [phone] (fax)    Ms. Monica Leon is a 76 y.o.  female with history of Afib who presents today for anticoagulation monitoring and adjustment. Patient verifies current dosing regimen and tablet strength. No missed or extra doses. Patient denies s/s bleeding/bruising/swelling/SOB/chest pain  No blood in urine or stool. No dietary changes. No changes in medication/OTC agents/Herbals. No change in alcohol use or tobacco use. No change in activity level. Patient denies headaches/dizziness/lightheadedness/falls. No vomiting/diarrhea or acute illness. No Procedures scheduled in the future at this time. Heart ultrasound next month. Assessment:   Lab Results   Component Value Date    INR 3.80 (H) 09/16/2021    INR 2.40 (H) 09/07/2021    INR 1.30 (H) 09/01/2021     INR supratherapeutic   Recent Labs     09/16/21  1450   INR 3.80*     Patient had been presenting with subtherapeutic INRs and recently had 2 regimen increases. She presents today with supratherapeutic INR. Patient interview completed and discussed with pharmacist by Poli WHITFIELD Student. Plan:  HOLD Coumadin today then decrease Coumadin 5 mg MoFr and 2.5 mg SuTuWeThSa (10% decrease). Recheck INR in 1 week(s). Patient reminded to call the Anticoagulation Clinic with signs or symptoms of bleeding or with any medication changes. Patient given instructions utilizing the teach back method. After visit summary printed and reviewed with patient. Discharged in wheel chair in no apparent distress.     For Pharmacy Admin Tracking Only     Intervention Detail: Dose Adjustment: 1, reason: Therapy Optimization   Total # of Interventions Recommended: 1   Total # of Interventions Accepted: 1   Time Spent (min): 20

## 2021-09-23 NOTE — PROGRESS NOTES
Medication Management [address]   [phone] (phone)  [phone] (fax)    Ms. Chance Elizabeth is a 76 y.o.  female with history of Afib who presents today for anticoagulation monitoring and adjustment. Patient verifies current dosing regimen and tablet strength. No missed or extra doses. Patient denies s/s bruising/swelling/SOB/chest pain. States she scraped her hand this weekend and it slowly bleed for a couple days, but has stopped. No blood in urine or stool. No dietary changes. No changes in medication/OTC agents/Herbals. No change in alcohol use or tobacco use. No change in activity level. Patient denies headaches/dizziness/lightheadedness/falls. No vomiting/diarrhea or acute illness. No Procedures scheduled in the future at this time. Assessment:   Lab Results   Component Value Date    INR 3.00 (H) 09/23/2021    INR 3.80 (H) 09/16/2021    INR 2.40 (H) 09/07/2021     INR therapeutic   Recent Labs     09/23/21  1452   INR 3.00*       Plan:  Continue Coumadin 5 mg MoFr and 2.5 mg SuTuWeThSa. Recheck INR in 2 week(s). Patient reminded to call the Anticoagulation Clinic with any signs or symptoms of bleeding or with any medication changes. Patient given instructions utilizing the teach back method. After visit summary printed and reviewed with patient. Discharged in wheelchair in no apparent distress.     For Pharmacy Admin Tracking Only     Time Spent (min): 15

## 2021-09-27 NOTE — TELEPHONE ENCOUNTER
----- Message from Rossy Duarte sent at 9/24/2021 11:53 AM EDT -----  Subject: Refill Request    QUESTIONS  Name of Medication? insulin lispro protamine & lispro (HUMALOG MIX 75/25   KWIKPEN) (75-25) 100 UNIT per ML SUPN injection pen  Patient-reported dosage and instructions? 75/25 pens  How many days do you have left? 4  Preferred Pharmacy? [address] phone number (if available)? [phone]  Additional Information for Provider? Pt has one pen left. Pt wants to   double check that these refill scripts were faxed [facility].  ---------------------------------------------------------------------------  --------------  [address]  What is the best way for the office to contact you?  OK to leave message on   voicemail, OK to respond with electronic message via [facility] portal (only   for patients who have registered [facility] account)  Preferred Call Back Phone Number? [phone]

## 2021-09-27 NOTE — TELEPHONE ENCOUNTER
I did refill thi sin July did she pick it up? Should have refills left. I sent to [facility] [address] with refills. Did she contact them to see if they have it?  If not  I can reorder

## 2021-09-27 NOTE — TELEPHONE ENCOUNTER
Patient called again this morning a bit agitated that this request had been sent in to the office a few times.   I informed her that this medication was refused by P[facility]et on 09.22.2021 due to [facility] not being the ordering provider  Outpatient Medication Detail     Disp Refills Start End    HUMALOG MIX 75/25 KWIKPEN (75-25) 100 UNIT/ML SUPN injection pen [Pharmacy Med Name: HUMALOG MIX 75-25 KWIKPEN] 15 mL  9/22/2021     Request refused: Refill not appropriate (provider not at this practice)    Sig: inject 30 units subcutaneously every morning and 50 units every evening MAXIMUM DAILY DOSE OF 80 units

## 2021-10-07 NOTE — PROGRESS NOTES
After pharmacist chart review, the following recommendations are made:       Switch from Novolog 75/25- 25 units in the AM and 35 units in the PM   -60 units TDD -> 20% reduction 48 units   -Recommend Lantus 32 units QAM and Humalog 8 units BID before meals + Group 1 SSI    STR Group 1 Correction Scale  Blood Sugar Dose fast acting insulin    None   140-199 1 unit   200-249 2 units   250-299 3 units   300-349 4 units   350-399 5 units   400 and above 6 units         For Pharmacy Admin Tracking Only   CPA in place:  No   Intervention Detail: Dose Adjustment: 1, reason: Therapy Optimization   Total # of Interventions Recommended: 1   Total # of Interventions Accepted: 1   Time Spent (min): [address] ([place]) Arden Hendricks, PharmD, [facility]  Internal Medicine Clinical Pharmacist  [phone]

## 2021-10-07 NOTE — PROGRESS NOTES
pts bs was checked and was 54 intially. She was given 3 glucose tablets, orange juice. Water, peanut butter crackers. BS recheck 15 minutes after food and drinks glucose was 104. Dr. Amanda Rendon and Dr. Melanie Nash was notified and pt was released from the office.

## 2021-10-07 NOTE — PROGRESS NOTES
[address] [facility] Internal Medicine   [facility] [address]  [address] [facility], [facility], [place]  Dept: [phone]  Dept Fax: 7 [facility] (1955) is a 76 y.o. female Established patient, here for evaluation of the following chief complaint(s):  Chief Complaint   Patient presents with    Diabetes    Other     MNG        ASSESSMENT AND PLAN     1. Diabetes mellitus type 2, insulin dependent (HCC)  -     POCT Glucose    Pt was on protamine/lispro 75/25 taking 25 units in the morning and 35 units in the evening. Based on her most recent download 08/19/2021, this regimen was not adequately managing pt's diabetes resulting pt spending 11% of the time below 70 mg/dL and 7% of the time below 54 mg/dL. While pt was in office, she became hypoglycemic. At this time, pt will benefit from switching to a basal-bolus regimen for better diabetes management. Plan to start 32 units glargine every morning, 8 units lispro with meals, with an insulin sensitivity sliding scale of 1:50 (Group 1) when blood glucose is above 200 mg/dL pending insurance coverage. No follow-ups on file. with Javon Martel MD     HPI     Chief Complaint   Patient presents with    Diabetes    Other     MNG     H/o HTN, HLD, Neuropathy, DARWIN on CPAP, CKD Stage 3b, GERD, Hypothyroidism, Systolic Heart Failure EF 30% 04/2021 s/p AICD placement, Obesity s/p robotic-assisted sleeve gastrectomy by Dr. Odell Carlson 09/15/15, CAD, PAF on warfarin, and DM since 1994 who presents today for refills on diabetes medications. Last Hgb A1c 5.5 07/21/2021. Uses Free style camelia to monitor glucose levels. Last downloaded 08/19/2021. Pt spent 11% below 70 mg/dL. #Uncontrolled IDDMT2 A1c 5.5 in 07/21/2021 c/b diabetic nephropathy and neuropathy  Takes protamine lispro 75/25 Kwikpen.  25 Units at morning and 35 Units at night  Diabetic Nephropathy: mAlb:Cr 1248 07/01/2021  Diabetic Retinopathy: 08/30/2019 - no retinopathy  Diabetic Neuropathy: Bilateral LE sensation decreased to pinprick. During patient visit today, patient exhibited symptoms of hypoglycemia which included warmth and extremity tingling. Fingerstick glucose checked and resulted at 54 mg/dL. Patient was subsequently given 3 glucose tabs, sugar water, orange juice, and crackers. 15 minutes following administration, patient's recheck blood glucose was 104 mg/dL. Patient was alert and oriented x3 and conversing coherently. Symptoms had resolved. Medications:    Current Outpatient Medications:     insulin lispro protamine & lispro (HUMALOG MIX 75/25 KWIKPEN) (75-25) 100 UNIT per ML SUPN injection pen, 30 units in the moring and 40 units in the evening. Max daily dose is 70 units. , Disp: 5 pen, Rfl: 0    TRULICITY 8.23 IR/8.2VE SOPN, inject 0.5 milliliters ( 0.75 milligrams ) subcutaneously every week IN THE ABDOMEN THIGHS OR OUTER AREA OF UPPER ARM ROTATE INJECTION SITES, Disp: 6 mL, Rfl: 3    warfarin (COUMADIN) 2.5 MG tablet, Take as directed by [facility]'s [address] [facility]. 45 tabs = 30 days, Disp: 45 tablet, Rfl: 5    alogliptin (NESINA) 6.25 MG TABS tablet, Take 6.25 mg by mouth daily, Disp: , Rfl:     valsartan (DIOVAN) 80 MG tablet, Take 1 tablet by mouth daily, Disp: 30 tablet, Rfl: 3    dexlansoprazole (DEXILANT) 60 MG CPDR delayed release capsule, Take 1 capsule by mouth daily, Disp: 90 capsule, Rfl: 3    fenofibrate (TRICOR) 145 MG tablet, take 1 tablet by mouth once daily, Disp: 90 tablet, Rfl: 3    atorvastatin (LIPITOR) 40 MG tablet, 1 tab po daily, Disp: 90 tablet, Rfl: 3    levothyroxine (SYNTHROID) 50 MCG tablet, Take 1.5 tablets by mouth Daily, Disp: 90 tablet, Rfl: 1    alendronate (FOSAMAX) 70 MG tablet, take 1 tablet by mouth every week, Disp: 12 tablet, Rfl: 3    FEROSUL 325 (65 Fe) MG tablet, take 1 tablet by mouth three times a day with food, Disp: 90 tablet, Rfl: 3    metoprolol succinate (TOPROL XL) 100 MG extended release tablet, take 1 tablet by mouth once daily, Disp: 90 tablet, Rfl: 1    folic acid (FOLVITE) 1 MG tablet, take 1 tablet by mouth once daily, Disp: 90 tablet, Rfl: 4    Continuous Blood Gluc Sensor (FREESTYLE LINNETTE 14 DAY SENSOR) MISC, 1 Device by Does not apply route continuous, Disp: 6 each, Rfl: 1    Ascorbic Acid (VITAMIN C) 500 MG CAPS, Take 1 tablet by mouth 2 times daily, Disp: , Rfl:     Cholecalciferol (VITAMIN D3) 50 MCG (2000 UT) CAPS, Take 2,000 Units by mouth daily, Disp: , Rfl:     zinc sulfate (ZINCATE) 220 (50 Zn) MG capsule, Take 50 mg by mouth daily, Disp: , Rfl:     Incontinence Supply Disposable (INCONTINENCE BRIEF LARGE) MISC, Use prn for incontinence, Disp: 5 packet, Rfl: 2    Incontinence Supply Disposable (POISE PAD) PADS, Use as needed, Disp: 50 each, Rfl: 2    Misc. Devices MISC, Washable bed pads, Disp: 50 each, Rfl: 2    Misc. Devices MISC, Baby wipes, Disp: 200 each, Rfl: 2    Misc. Devices MISC, Chucks as needed, Disp: 200 each, Rfl: 2    meclizine (ANTIVERT) 25 MG tablet, Take 25 mg by mouth as needed , Disp: , Rfl:     acetaminophen (TYLENOL) 500 MG tablet, Take 1 tablet by mouth 4 times daily as needed for Pain, Disp: 120 tablet, Rfl: 0    DULoxetine (CYMBALTA) 60 MG extended release capsule, take 1 capsule by mouth once daily, Disp: 90 capsule, Rfl: 3    nystatin (MYCOSTATIN) 715619 UNIT/GM powder, Apply 3 times daily. (Patient taking differently: prn), Disp: 60 g, Rfl: 2    RA VITAMIN B-12 100 MCG tablet, take 1 tablet by mouth once daily, Disp: 90 tablet, Rfl: 3    aspirin (RA ASPIRIN EC) 81 MG EC tablet, Take 1 tablet by mouth daily, Disp: 30 tablet, Rfl: 3    digoxin (LANOXIN) 125 MCG tablet, Take 0.5 tablets by mouth daily, Disp: 30 tablet, Rfl: 0    Calcium Carbonate-Vitamin D (CALCIUM-VITAMIN D) 500-200 MG-UNIT per tablet, Take 1 tablet by mouth 2 times daily (with meals) , Disp: , Rfl:     The patient has No Known Allergies.     Past Medical History:  Marzena Sinha  has a past medical history of CAD (coronary artery disease), CRI (chronic renal insufficiency), Difficult intubation, DM (diabetes mellitus) ([facility] [address] GERD (gastroesophageal reflux disease), H/O gastric bypass, Hyperlipidemia, Hypertension, Hypothyroidism, Neuropathy, Obesity, Osteoarthritis, Paroxysmal atrial fibrillation (HCC), Prolonged emergence from general anesthesia, Sleep apnea, Sleep apnea, and Visit for screening mammogram.    Past Surgical History:  The patient  has a past surgical history that includes back surgery; Foot surgery; Colonoscopy; Upper gastrointestinal endoscopy; Mouth Biopsy (9-28-12); Skin tag removal (9/25/12); Tonsillectomy; Patella surgery (7/11/13); Cataract removal (Right, 7/24/14); toenail excision; other surgical history (11/8/2014); other surgical history (4/23/2015); Sleeve Gastrectomy (09/15/2015); EKG 12 Lead (9/18/2015); Hysterectomy, total abdominal; pr colon ca scrn not hi rsk ind (Left, 1/24/2018); Endoscopy, colon, diagnostic; eye surgery; and US ASP/INJ THYROID CYST (5/21/2021). Family History: This patient's family history includes Asthma in her brother and sister; Cancer in her maternal uncle; Diabetes in her maternal grandfather and maternal grandmother; Heart Disease in her father; High Blood Pressure in her maternal grandmother and another family member; Other in her mother. Social History:  Author Jonn  reports that she quit smoking about 14 years ago. She has a 30.00 pack-year smoking history. She has never used smokeless tobacco. She reports that she does not drink alcohol and does not use drugs.      Health Maintenance   Topic Date Due    Hepatitis C screen  Never done    COVID-19 Vaccine (1) Never done    Shingles Vaccine (1 of 2) Never done    Diabetic retinal exam  08/30/2019    Breast cancer screen  08/28/2020    Flu vaccine (1) Never done    Annual Wellness Visit (AWV)  09/16/2021    Low dose CT lung screening  09/24/2021    Diabetic foot exam  03/18/2022    A1C test (Diabetic or Prediabetic)  07/21/2022    Lipid screen  07/21/2022    Potassium monitoring  08/09/2022    Creatinine monitoring  08/09/2022    DTaP/Tdap/Td vaccine (2 - Td or Tdap) 11/07/2027    Colon cancer screen colonoscopy  01/24/2028    DEXA (modify frequency per FRAX score)  Completed    Pneumococcal 65+ yrs at Risk Vaccine  Completed    Hepatitis A vaccine  Aged Out    Hib vaccine  Aged Out    Meningococcal (ACWY) vaccine  Aged Out     ROS:  Review of Systems - negative except for the aforementioned. PHYSICAL EXAMINATION     Vitals:    10/07/21 1431   BP: 134/84   Pulse: 68   Temp: 97.4 °F (36.3 °C)       Body mass index is 33.09 kg/m². Physical Exam  Vitals and nursing note reviewed. Constitutional:       General: She is awake. Appearance: Normal appearance. She is obese. She is not ill-appearing or diaphoretic. HENT:      Head: Normocephalic and atraumatic. Right Ear: External ear normal.      Left Ear: External ear normal.      Nose: Nose normal.      Mouth/Throat:      Mouth: Mucous membranes are moist.      Pharynx: Oropharynx is clear. No oropharyngeal exudate or posterior oropharyngeal erythema. Eyes:      General: Lids are normal. No scleral icterus. Extraocular Movements: Extraocular movements intact. Pupils: Pupils are equal, round, and reactive to light. Cardiovascular:      Rate and Rhythm: Normal rate and regular rhythm. Pulses: Normal pulses. Radial pulses are 2+ on the right side and 2+ on the left side. Posterior tibial pulses are 2+ on the right side and 2+ on the left side. Heart sounds: Normal heart sounds. No murmur heard. No friction rub. No gallop. Pulmonary:      Effort: Pulmonary effort is normal.      Breath sounds: Normal breath sounds. No wheezing, rhonchi or rales. Abdominal:      General: Bowel sounds are normal.      Palpations: Abdomen is soft. Tenderness: There is no abdominal tenderness.  There is no guarding or rebound. Musculoskeletal:      Cervical back: Normal range of motion and neck supple. Right lower leg: No edema. Left lower leg: No edema. Skin:     General: Skin is warm and dry. Neurological:      General: No focal deficit present. Mental Status: She is alert and oriented to person, place, and time. Mental status is at baseline. GCS: GCS eye subscore is 4. GCS verbal subscore is 5. GCS motor subscore is 6. Psychiatric:         Attention and Perception: Attention and perception normal.         Mood and Affect: Mood and affect normal.         Speech: Speech normal.         Behavior: Behavior normal. Behavior is cooperative. Thought Content: Thought content normal.         Cognition and Memory: Cognition and memory normal.         Judgment: Judgment normal.         MOST RECENT DIAGNOSTIC DATA     Labs Reviewed 10/7/2021:    Lab Results   Component Value Date    WBC 4.6 (L) 08/09/2021    HGB 8.1 (L) 08/09/2021    HCT 26.2 (L) 08/09/2021     08/09/2021    CHOL 122 07/21/2021    TRIG 446 (H) 07/21/2021    HDL 19 07/21/2021    ALT <5 (L) 08/06/2021    AST 15 08/06/2021     08/09/2021    K 4.1 08/09/2021     08/09/2021    CREATININE 1.5 (H) 08/09/2021    BUN 44 (H) 08/09/2021    CO2 28 08/09/2021    TSH 0.085 (L) 08/02/2021    INR 3.00 (H) 09/23/2021    LABA1C 5.5 07/21/2021    LABMICR 70.37 07/01/2021     Radiology  No new    Other Studies  As above    Assessment and plan created with review by Dr. Shanice Aquino MD.  All findings, labs and other data reviewed in conjunction with Dr. Shanice Aquino MD.    An electronic signature was used to authenticate this note.     Electronically signed by Ayleen Choi MD on 10/7/2021 at 4:09 PM  Internal Medicine PGY-1  [address], [address]

## 2021-10-14 NOTE — TELEPHONE ENCOUNTER
Called Haley Dill to review BG readings since conversion from mixed insulin to basal/bolus. Overall, she's doing well & reports her sugars have improved. Blood Sugar Trends:  Fasting AM: 115, 174  Pre-dinner: 165, 161, 118  Nighttime: 243    Range: Lowest Readin (morning) - 243    Hypoglycemia: Lows are less frequent    Per [facility] only dispensed a single pen of Humalog/18 DS. Haley Dill is using more than this due to sliding scale. Will send new prescription with max daily dose.     For Pharmacy Admin Tracking Only   Time Spent (min): [address] ([address]) Fish Dominguez, PharmD, [facility]  Internal Medicine Clinical Pharmacist  [phone]

## 2021-10-14 NOTE — PROGRESS NOTES
Medication Management [address]  [phone] (phone)  [phone] (fax)    Ms. Amirah Smith is a 76 y.o.  female with history of Afib who presents today for anticoagulation monitoring and adjustment. Patient verifies current dosing regimen and tablet strength. No missed or extra doses. Patient denies s/s bleeding/bruising/swelling/SOB/chest pain  No blood in urine or stool.  - Has not had any salads in a couple of weeks. Does like to eat beets  No changes in medication/OTC agents/Herbals. No change in alcohol use or tobacco use. No change in activity level. Patient denies headaches/dizziness/lightheadedness/falls. No vomiting/diarrhea or acute illness. No Procedures scheduled in the future at this time. Assessment:   Lab Results   Component Value Date    INR 4.20 (H) 10/14/2021    INR 3.00 (H) 09/23/2021    INR 3.80 (H) 09/16/2021     INR supratherapeutic   Recent Labs     10/14/21  1450   INR 4.20*       Patient interview completed and discussed with pharmacist by Alexei Johnson PharmD candidate     Plan:  Hold today, then decrease Coumadin 3.75mg MF and 2.5mg TWThSaS (11.1% decrease). Recheck INR in 12 day(s). Patient reminded to call the Anticoagulation Clinic with signs or symptoms of bleeding or with any medication changes. Patient given instructions utilizing the teach back method. After visit summary printed and reviewed with patient. Discharged ambulatory in no apparent distress.     For Pharmacy Admin Tracking Only     Intervention Detail: Dose Adjustment: 1, reason: Therapy De-escalation   Total # of Interventions Recommended: 1   Total # of Interventions Accepted: 1   Time Spent (min): 20

## 2021-10-21 PROBLEM — N17.9 AKI (ACUTE KIDNEY INJURY) (HCC): Status: RESOLVED | Noted: 2021-01-01 | Resolved: 2021-01-01

## 2021-10-21 PROBLEM — R41.82 ALTERED MENTAL STATUS: Status: RESOLVED | Noted: 2021-01-01 | Resolved: 2021-01-01

## 2021-10-21 PROBLEM — R41.82 AMS (ALTERED MENTAL STATUS): Status: RESOLVED | Noted: 2021-01-01 | Resolved: 2021-01-01

## 2021-10-21 PROBLEM — Z91.119 DIETARY NONCOMPLIANCE: Status: RESOLVED | Noted: 2019-09-16 | Resolved: 2021-01-01

## 2021-10-21 PROBLEM — E87.5 HYPERKALEMIA: Status: RESOLVED | Noted: 2021-01-01 | Resolved: 2021-01-01

## 2021-10-21 PROBLEM — S72.402A CLOSED FRACTURE OF DISTAL END OF LEFT FEMUR, INITIAL ENCOUNTER (HCC): Status: RESOLVED | Noted: 2021-01-01 | Resolved: 2021-01-01

## 2021-10-21 PROBLEM — K11.20 PAROTIDITIS: Status: RESOLVED | Noted: 2021-01-01 | Resolved: 2021-01-01

## 2021-10-21 NOTE — PROGRESS NOTES
[facility] [place] [address] MEDICINE  [address] [place] [address]  Dept: [phone]  Dept Fax: [phone]  Loc: [phone]    Óscar Pabon is a 78 y.o. femalewho presents today for her medical conditions/complaints as noted below. Clemmie Homes c/o of 3 Month Follow-Up (DM follow up), Health Maintenance (no recent mammogram or DM eye exam), and Other (left side from buttocks down to legs. Throbs and starts to hurt, started 2 days ago )      HPI:      Pt here for a 6 month check up     Diabetes Type 2    Glucose control:   Does patient check blood glucoses at home? Yes  Report of hypoglycemia: no  Lab Results       Component                Value               Date                       LABA1C                   6.0 (H)             10/21/2021            No results found for: EAG    Symptoms  Polyuria, Polydipsia or Polyphagia? No  Chest Pain, SOB, or Palpitations? -  No  New Vision complaints? No  Paresthesias of the extremities? Has intermittent neuropathy of LE takes cymbalta for this and it does help t coontrol it. Medications  Current medication were reviewed. Compliant with medications? yes  Medication side effects? No  On ACE-I or ARB? Yes  On antiplatelet therapy? Yes  On Statin? Yes    Last Diabetic Eye Exam: needs to scheduled Dr. Parish Ceja managing her diabetes Lab Results       Component                Value               Date                       LABA1C                   6.0 (H)             10/21/2021            No results found for: EAG    Exercise  Exercise? No  Wt Readings from Last 3 Encounters:  10/21/21 : 234 lb 3.2 oz (106.2 kg)  10/07/21 : 224 lb 3.2 oz (101.7 kg)  08/19/21 : 225 lb 9.6 oz (102.3 kg)      Diet discipline?:  Low salt, fat, sugar diet? Yes     Has developed left sciatica pain, sits in her wheelchair a lot and when she lays down in bed at night her left leg will throb and pain will shoot down her left leg.  Denies N/T of the left leg has had a left femur fracture. Will have chronic swelling of LLE. Moses chest pain or sob. Blood pressure control:  BP Readings from Last 3 Encounters:  10/21/21 : 130/72  10/07/21 : 134/84  08/19/21 : 130/72      Lab Results       Component                Value               Date                       LABMICR                  70.37               07/01/2021              Lab Results       Component                Value               Date                       LDLCALC                  SEE BELOW           07/21/2021              GERD    HPI:     Symptoms:  heartburn  Length of symptoms: 1 years  Current therapy and response:  dexilant working well  Recent change in symptoms? No  Symptoms associated with certain foods? Yes  Alcohol use? No  Tobacco use? No    Abdominal Pain? No  Mid Back Pain? No  Melena? No    Due for yearly lung ct screening pt agreeable. Due for mammogram she will schedule this. On chronic anticoagulation             Current Outpatient Medications   Medication Sig Dispense Refill    insulin lispro, 1 Unit Dial, (HUMALOG KWIKPEN) 100 UNIT/ML SOPN Inject 8 Units two times per day. Max daily dose: 48 Units    This replaces the previous prescription [employer] [facility]. 5 pen 1    HYDROcodone-acetaminophen (NORCO) 5-325 MG per tablet Take 1-2 tablets by mouth every 8 hours as needed for Pain for up to 7 days.  20 tablet 0    DULoxetine (CYMBALTA) 60 MG extended release capsule 1 tab po once a day 90 capsule 3    zoster recombinant adjuvanted vaccine (SHINGRIX) 50 MCG/0.5ML SUSR injection Inject 0.5 mLs into the muscle See Admin Instructions for 1 day 0.5 mL 0    Continuous Blood Gluc Sensor (FREESTYLE LINNETTE 14 DAY SENSOR) MISC 1 Device by Does not apply route continuous 6 each 1    insulin glargine (LANTUS SOLOSTAR) 100 UNIT/ML injection pen Inject 32 Units into the skin every morning This will replace her current insulin regiment (75/25) 5 pen 3    TRULICITY 9.46 MG/0.5ML SOPN inject 0.5 milliliters ( 0.75 milligrams ) subcutaneously every week IN THE ABDOMEN THIGHS OR OUTER AREA OF UPPER ARM ROTATE INJECTION SITES 6 mL 3    warfarin (COUMADIN) 2.5 MG tablet Take as directed by [address] [place] [address]. 45 tabs = 30 days 45 tablet 5    valsartan (DIOVAN) 80 MG tablet Take 1 tablet by mouth daily 30 tablet 3    dexlansoprazole (DEXILANT) 60 MG CPDR delayed release capsule Take 1 capsule by mouth daily 90 capsule 3    fenofibrate (TRICOR) 145 MG tablet take 1 tablet by mouth once daily 90 tablet 3    atorvastatin (LIPITOR) 40 MG tablet 1 tab po daily 90 tablet 3    levothyroxine (SYNTHROID) 50 MCG tablet Take 1.5 tablets by mouth Daily 90 tablet 1    alendronate (FOSAMAX) 70 MG tablet take 1 tablet by mouth every week 12 tablet 3    FEROSUL 325 (65 Fe) MG tablet take 1 tablet by mouth three times a day with food 90 tablet 3    metoprolol succinate (TOPROL XL) 100 MG extended release tablet take 1 tablet by mouth once daily 90 tablet 1    folic acid (FOLVITE) 1 MG tablet take 1 tablet by mouth once daily 90 tablet 4    Ascorbic Acid (VITAMIN C) 500 MG CAPS Take 1 tablet by mouth 2 times daily      Cholecalciferol (VITAMIN D3) 50 MCG (2000 UT) CAPS Take 2,000 Units by mouth daily       zinc sulfate (ZINCATE) 220 (50 Zn) MG capsule Take 50 mg by mouth daily      Incontinence Supply Disposable (INCONTINENCE BRIEF LARGE) MISC Use prn for incontinence 5 packet 2    Incontinence Supply Disposable (POISE PAD) PADS Use as needed 50 each 2    Misc. Devices MISC Washable bed pads 50 each 2    Misc. Devices MISC Baby wipes 200 each 2    Misc. Devices MISC Chucks as needed 200 each 2    meclizine (ANTIVERT) 25 MG tablet Take 25 mg by mouth as needed       acetaminophen (TYLENOL) 500 MG tablet Take 1 tablet by mouth 4 times daily as needed for Pain 120 tablet 0    nystatin (MYCOSTATIN) 066720 UNIT/GM powder Apply 3 times daily.  (Patient taking differently: prn) 60 g 2  RA VITAMIN B-12 100 MCG tablet take 1 tablet by mouth once daily 90 tablet 3    aspirin (RA ASPIRIN EC) 81 MG EC tablet Take 1 tablet by mouth daily 30 tablet 3    digoxin (LANOXIN) 125 MCG tablet Take 0.5 tablets by mouth daily 30 tablet 0    Calcium Carbonate-Vitamin D (CALCIUM-VITAMIN D) 500-200 MG-UNIT per tablet Take 1 tablet by mouth 2 times daily (with meals)       insulin lispro, 1 Unit Dial, (HUMALOG KWIKPEN) 100 UNIT/ML SOPN 8 Units twice daily before meals. This will replace her current insulin regiment (75/25). 5 PENS. 1 REFILL. E11.9. (Patient not taking: Reported on 10/21/2021) 5 pen 1     No current facility-administered medications for this visit.           Past Medical History:   Diagnosis Date    CAD (coronary artery disease)     CRI (chronic renal insufficiency)     Difficult intubation     excess skin in back of throat     DM (diabetes mellitus) ([facility] [address] 1994    GERD (gastroesophageal reflux disease)     H/O gastric bypass     Hyperlipidemia     Hypertension     Hypothyroidism     Neuropathy     Obesity     Osteoarthritis     Paroxysmal atrial fibrillation (HCC)     Prolonged emergence from general anesthesia     Sleep apnea     uses cpap    Sleep apnea     Visit for screening mammogram       Past Surgical History:   Procedure Laterality Date    BACK SURGERY      xs 2    CATARACT REMOVAL Right 7/24/14    Dr. Yazmin Armstrong EKG 12-LEAD  9/18/2015         ENDOSCOPY, COLON, DIAGNOSTIC      EYE SURGERY      FOOT SURGERY      left foot    HYSTERECTOMY, TOTAL ABDOMINAL      MOUTH BIOPSY  9-28-12    left tongue and lingual tonsil    OTHER SURGICAL HISTORY  11/8/2014    LEFT FEMUR RETROGRADE NAILING    OTHER SURGICAL HISTORY  4/23/2015    HARDWARE REMOVAL LEFT FEMUR, INSERTION OF ANTIBIOTIC SPACER    PATELLA SURGERY  7/11/13    fractues rt patella     WI COLON CA SCRN NOT HI RSK IND Left 1/24/2018    COLONOSCOPY performed by Wally Champion MD at [facility]    SKIN TAG REMOVAL  12    mole removal    SLEEVE GASTRECTOMY  09/15/2015    Robotic    TOENAIL EXCISION      removal of great toe nails bilateral-still has toenails-had ingrown toenails and had trimmed out     TONSILLECTOMY      UPPER GASTROINTESTINAL ENDOSCOPY      US THYROID CYST ASPIRATION AND OR INJECTION  2021    US THYROID CYST ASPIRATION AND OR INJECTION 2021 [facility]     Family History   Problem Relation Age of Onset    Asthma Sister     Asthma Brother     Heart Disease Father     Other Mother     High Blood Pressure Other     Cancer Maternal Uncle         stomach    Diabetes Maternal Grandmother     High Blood Pressure Maternal Grandmother     Diabetes Maternal Grandfather     Stroke Neg Hx      Social History     Tobacco Use    Smoking status: Former Smoker     Packs/day: 1.50     Years: 20.00     Pack years: 30.00     Quit date: 2007     Years since quittin.7    Smokeless tobacco: Never Used   Substance Use Topics    Alcohol use: No     Alcohol/week: 0.0 standard drinks        No Known Allergies    Health Maintenance   Topic Date Due    COVID-19 Vaccine (1) Never done    Shingles Vaccine (1 of 2) Never done    Diabetic retinal exam  2019    Breast cancer screen  2020    Flu vaccine (1) Never done   [employer] Visit (AWV)  2021    Low dose CT lung screening  2021    Diabetic foot exam  2022    Lipid screen  2022    Potassium monitoring  2022    Creatinine monitoring  2022    A1C test (Diabetic or Prediabetic)  10/21/2022    DTaP/Tdap/Td vaccine (2 - Td or Tdap) 2027    Colon cancer screen colonoscopy  2028    DEXA (modify frequency per FRAX score)  Completed    Pneumococcal 65+ yrs at Risk Vaccine  Completed    Hepatitis A vaccine  Aged Out    Hib vaccine  Aged Out    Meningococcal (ACWY) vaccine  Aged Out    Hepatitis C screen  Discontinued Subjective:      Review of Systems   Constitutional: Negative for chills, fatigue and fever. HENT: Negative. Eyes: Positive for redness (states she woke up with her right eye being red, it does not hurt not draining). Respiratory: Negative. Cardiovascular: Negative. Gastrointestinal: Negative for abdominal distention and abdominal pain. Genitourinary: Negative for difficulty urinating and dysuria. Musculoskeletal: Positive for arthralgias, back pain and myalgias. Skin: Negative. Neurological: Positive for weakness (LLE) and numbness. Negative for dizziness, facial asymmetry, light-headedness and headaches. Psychiatric/Behavioral: Negative for self-injury, sleep disturbance and suicidal ideas. The patient is not nervous/anxious. Objective:      /72   Pulse 88   Temp 98.3 °F (36.8 °C) (Oral)   Resp 14   Ht 5' 6.5\" (1.689 m)   Wt 234 lb 3.2 oz (106.2 kg)   LMP 2001   SpO2 100%   BMI 37.23 kg/m²      Physical Exam  Vitals and nursing note reviewed. Constitutional:       Appearance: She is obese. She is not ill-appearing. HENT:      Right Ear: Tympanic membrane, ear canal and external ear normal.      Left Ear: Tympanic membrane, ear canal and external ear normal.      Nose: Nose normal.   Cardiovascular:      Rate and Rhythm: Normal rate and regular rhythm. Pulses: Normal pulses. Heart sounds: Normal heart sounds. No murmur heard. Comments: Negative homans, no warmth or redness of LLE  Pulmonary:      Effort: Pulmonary effort is normal. No respiratory distress. Breath sounds: Normal breath sounds. No wheezing. Abdominal:      General: Abdomen is flat. Bowel sounds are normal. There is no distension. Palpations: Abdomen is soft. Tenderness: There is no abdominal tenderness. Musculoskeletal:      Left lower le+ Edema present.       Comments: Pain with palpation of left sciatic area and pain with palpation of left lateral trochanter area. Skin:     General: Skin is dry. Capillary Refill: Capillary refill takes less than 2 seconds. Neurological:      General: No focal deficit present. Mental Status: She is alert. Psychiatric:         Mood and Affect: Mood normal.         Behavior: Behavior normal.         Thought Content: Thought content normal.         Judgment: Judgment normal.          Assessment/Plan:           1. Left leg pain  Flares up from time to time   - HYDROcodone-acetaminophen (NORCO) 5-325 MG per tablet; Take 1-2 tablets by mouth every 8 hours as needed for Pain for up to 7 days. Dispense: 20 tablet; Refill: 0    2. Neuropathy  Controlled with cymbalta  - DULoxetine (CYMBALTA) 60 MG extended release capsule; 1 tab po once a day  Dispense: 90 capsule; Refill: 3    3. History of tobacco abuse    - CT LUNG SCREEN [Initial/Annual]; Future    4. Personal history of tobacco use    - AL VISIT TO DISCUSS LUNG CA SCREEN W LDCT  - CT Lung Screen (Annual); Future    5. Trochanteric bursitis of left hip  Likely r/t sitting in wheelchair  Can use norco sparingly  Stretches given to her to perform  Walk more     Pt in agreement with plan   Return in about 6 months (around 4/21/2022) for MAW. Reccommended tobaccocessation options including pharmacologic methods, counseled great than 3 minutesduring this visit:  Yes[]  No  []       Patient given educational materials -see patient instructions. Discussed use, benefit, and side effects of prescribedmedications. All patient questions answered. Pt voiced understanding. Reviewedhealth maintenance. Instructed to continue current medications, diet and exercise. Patient agreed with treatment plan. Follow up as directed.        Electronicallysigned by ARNOL Nielsen CNP on 10/21/2021 at 3:17 PM        Low Dose CT (LDCT) Lung Screening criteria met:     Age 55-77(Medicare) or 50-80 (USPSTF)   Pack year smoking >30 (Medicare) or >20 (USPSTF)   Still smoking or less than 15 year since quit   No sign or symptoms of lung cancer   > 11 months since last LDCT     Risks and benefits of lung cancer screening with LDCT scans discussed:    Significance of positive screen - False-positive LDCT results often occur. 95% of all positive results do not lead to a diagnosis of cancer. Usually further imaging can resolve most false-positive results; however, some patients may require invasive procedures. Over diagnosis risk - 10% to 12% of screen-detected lung cancer cases are over diagnosedthat is, the cancer would not have been detected in the patient's lifetime without the screening. Need for follow up screens annually to continue lung cancer screening effectiveness     Risks associated with radiation from annual LDCT- Radiation exposure is about the same as for a mammogram, which is about 1/3 of the annual background radiation exposure from everyday life. Starting screening at age 54 is not likely to increase cancer risk from radiation exposure. Patients with comorbidities resulting in life expectancy of < 10 years, or that would preclude treatment of an abnormality identified on CT, should not be screened due to lack of benefit.     To obtain maximal benefit from this screening, smoking cessation and long-term abstinence from smoking is critical

## 2021-10-23 NOTE — ED PROVIDER NOTES
COLONOSCOPY performed by Matilde Medina MD at [address] [facility] TAG REMOVAL  9/25/12    mole removal    SLEEVE GASTRECTOMY  09/15/2015    Robotic    TOENAIL EXCISION      removal of great toe nails bilateral-still has toenails-had ingrown toenails and had trimmed out    [address]  THYROID CYST ASPIRATION AND OR INJECTION  5/21/2021     THYROID CYST ASPIRATION AND OR INJECTION 5/21/2021 [facility] ULTRASOUND         MEDICATIONS     Current Facility-Administered Medications:     cephALEXin (KEFLEX) capsule 500 mg, 500 mg, Oral, 4 times per day, Noemy Tyler MD, 500 mg at 10/23/21 1929    Current Outpatient Medications:     insulin lispro, 1 Unit Dial, (HUMALOG KWIKPEN) 100 UNIT/ML SOPN, Inject 8 Units two times per day. Max daily dose: 48 Units  This replaces the previous prescription Ap Mckeon., Disp: 5 pen, Rfl: 1    HYDROcodone-acetaminophen (NORCO) 5-325 MG per tablet, Take 1-2 tablets by mouth every 8 hours as needed for Pain for up to 7 days. , Disp: 20 tablet, Rfl: 0    DULoxetine (CYMBALTA) 60 MG extended release capsule, 1 tab po once a day, Disp: 90 capsule, Rfl: 3    insulin lispro, 1 Unit Dial, (HUMALOG KWIKPEN) 100 UNIT/ML SOPN, 8 Units twice daily before meals. This will replace her current insulin regiment (75/25).  5 PENS. 1 REFILL. E11.9. (Patient not taking: Reported on 10/21/2021), Disp: 5 pen, Rfl: 1    Continuous Blood Gluc Sensor (FREESTYLE LINNETTE 14 DAY SENSOR) [facility], 1 Device by Does not apply route continuous, Disp: 6 each, Rfl: 1    insulin glargine (LANTUS SOLOSTAR) 100 UNIT/ML injection pen, Inject 32 Units into the skin every morning This will replace her current insulin regiment (75/25), Disp: 5 pen, Rfl: 3    TRULICITY 0.65 MH/8.7HQ SOPN, inject 0.5 milliliters ( 0.75 milligrams ) subcutaneously every week IN THE ABDOMEN THIGHS OR OUTER AREA OF UPPER ARM ROTATE INJECTION SITES, Disp: 6 mL, Rfl: 3    warfarin (COUMADIN) 2.5 MG tablet, [facility] [address] [facility]. 45 tabs = 30 days, Disp: 45 tablet, Rfl: 5    valsartan (DIOVAN) 80 MG tablet, Take 1 tablet by mouth daily, Disp: 30 tablet, Rfl: 3    dexlansoprazole (DEXILANT) 60 MG CPDR delayed release capsule, Take 1 capsule by mouth daily, Disp: 90 capsule, Rfl: 3    fenofibrate (TRICOR) 145 MG tablet, take 1 tablet by mouth once daily, Disp: 90 tablet, Rfl: 3    atorvastatin (LIPITOR) 40 MG tablet, 1 tab po daily, Disp: 90 tablet, Rfl: 3    levothyroxine (SYNTHROID) 50 MCG tablet, Take 1.5 tablets by mouth Daily, Disp: 90 tablet, Rfl: 1    alendronate (FOSAMAX) 70 MG tablet, take 1 tablet by mouth every week, Disp: 12 tablet, Rfl: 3    FEROSUL 325 (65 Fe) MG tablet, take 1 tablet by mouth three times a day with food, Disp: 90 tablet, Rfl: 3    metoprolol succinate (TOPROL XL) 100 MG extended release tablet, take 1 tablet by mouth once daily, Disp: 90 tablet, Rfl: 1    folic acid (FOLVITE) 1 MG tablet, take 1 tablet by mouth once daily, Disp: 90 tablet, Rfl: 4    Ascorbic Acid (VITAMIN C) 500 MG CAPS, Take 1 tablet by mouth 2 times daily, Disp: , Rfl:     Cholecalciferol (VITAMIN D3) 50 MCG (2000 UT) CAPS, Take 2,000 Units by mouth daily , Disp: , Rfl:     zinc sulfate (ZINCATE) 220 (50 Zn) MG capsule, Take 50 mg by mouth daily, Disp: , Rfl:     Incontinence Supply Disposable (INCONTINENCE BRIEF LARGE) MISC, Use prn for incontinence, Disp: 5 packet, Rfl: 2    Incontinence Supply Disposable (POISE PAD) PADS, Use as needed, Disp: 50 each, Rfl: 2    Misc. Devices MISC, Washable bed pads, Disp: 50 each, Rfl: 2    Misc. Devices MISC, Baby wipes, Disp: 200 each, Rfl: 2    Misc.  Devices MISC, Chucks as needed, Disp: 200 each, Rfl: 2    meclizine (ANTIVERT) 25 MG tablet, Take 25 mg by mouth as needed , Disp: , Rfl:     acetaminophen (TYLENOL) 500 MG tablet, Take 1 tablet by mouth 4 times daily as needed for Pain, Disp: 120 tablet, Rfl: 0    nystatin (MYCOSTATIN) 864256 UNIT/GM powder, Apply 3 times daily. (Patient taking differently: prn), Disp: 60 g, Rfl: 2    RA VITAMIN B-12 100 MCG tablet, take 1 tablet by mouth once daily, Disp: 90 tablet, Rfl: 3    aspirin (RA ASPIRIN EC) 81 MG EC tablet, Take 1 tablet by mouth daily, Disp: 30 tablet, Rfl: 3    digoxin (LANOXIN) 125 MCG tablet, Take 0.5 tablets by mouth daily, Disp: 30 tablet, Rfl: 0    Calcium Carbonate-Vitamin D (CALCIUM-VITAMIN D) 500-200 MG-UNIT per tablet, Take 1 tablet by mouth 2 times daily (with meals) , Disp: , Rfl:       SOCIAL HISTORY     Social History     Social History Narrative    Not on file     Social History     Tobacco Use    Smoking status: Former Smoker     Packs/day: 1.50     Years: 20.00     Pack years: 30.00     Quit date: 2007     Years since quittin.7    Smokeless tobacco: Never Used   Vaping Use    Vaping Use: Never used   Substance Use Topics    Alcohol use: No     Alcohol/week: 0.0 standard drinks    Drug use: No         ALLERGIES   No Known Allergies      FAMILY HISTORY     Family History   Problem Relation Age of Onset    Asthma Sister     Asthma Brother     Heart Disease Father     Other Mother     High Blood Pressure Other     Cancer Maternal Uncle         stomach    Diabetes Maternal Grandmother     High Blood Pressure Maternal Grandmother     Diabetes Maternal Grandfather     Stroke Neg Hx          PREVIOUS RECORDS   Previous records reviewed: Patient was seen was permanent on 2020 for sciatic pain. Uyen Michael PHYSICAL EXAM     ED Triage Vitals [10/23/21 1817]   BP Temp Temp src Pulse Resp SpO2 Height Weight   (!) 110/54 98.2 °F (36.8 °C) -- 72 18 97 % 5' 6\" (1.676 m) 234 lb (106.1 kg)     Initial vital signs and nursing assessment reviewed and normal. Body mass index is 37.77 kg/m². Pulsoximetry is normal per my interpretation.     Additional Vital Signs:  Vitals:    10/23/21 2113   BP: (!) 112/93   Pulse: 65   Resp: 22   Temp: SpO2: 96%       Physical Exam  Vitals and nursing note reviewed. Constitutional:       Appearance: She is obese. HENT:      Head: Normocephalic and atraumatic. Comments: Right eye injected     Mouth/Throat:      Mouth: Mucous membranes are moist.      Pharynx: Oropharynx is clear. Eyes:      Extraocular Movements: Extraocular movements intact. Pupils: Pupils are equal, round, and reactive to light. Cardiovascular:      Rate and Rhythm: Normal rate and regular rhythm. Pulses: Normal pulses. Heart sounds: Normal heart sounds, S1 normal and S2 normal.   Pulmonary:      Effort: Pulmonary effort is normal. No tachypnea or accessory muscle usage. Breath sounds: Normal breath sounds and air entry. Abdominal:      General: Bowel sounds are normal.      Palpations: Abdomen is soft. Musculoskeletal:      Cervical back: Normal range of motion and neck supple. Right lower leg: 3+ Edema present. Left lower leg: 3+ Edema present. Skin:     General: Skin is warm and dry. Capillary Refill: Capillary refill takes less than 2 seconds. Neurological:      General: No focal deficit present. Mental Status: She is alert and oriented to person, place, and time. She is confused. GCS: GCS eye subscore is 4. GCS verbal subscore is 5. GCS motor subscore is 6. Cranial Nerves: Cranial nerves are intact. Sensory: Sensation is intact. Motor: Motor function is intact. Coordination: Coordination is intact. Psychiatric:         Attention and Perception: Attention normal.         Mood and Affect: Mood normal.         Speech: Speech normal.         Behavior: Behavior is cooperative. Thought Content: Thought content normal.         Cognition and Memory: Memory is not impaired. MEDICAL DECISION MAKING   Initial Assessment:   1. Patient is a 77-year-old female presenting with complaint of confusion, mental status, and complaint of vertigo. Patient physical exam remarkable for skin changes insistent with chronic venous stasis as well as 3+ pitting edema. Patient's NIHSS score was 0. Patient denies so blood glucose was 112. Patient has significant past medical history and multiple comorbidities. Differential diagnosis includes metabolic encephalopathy, CO2 retention, sepsis, CHF exacerbation, COPD exacerbation, hyperglycemia, basilar stroke  Plan:    Altered mental status work-up, EKG, trend point-of-care glucose, head CT without contrast,.         ED RESULTS   Laboratory results:  Labs Reviewed   CBC WITH AUTO DIFFERENTIAL - Abnormal; Notable for the following components:       Result Value    WBC 2.9 (*)     RBC 2.50 (*)     Hemoglobin 8.1 (*)     Hematocrit 27.2 (*)     .8 (*)     MCHC 29.8 (*)     RDW-CV 15.9 (*)     RDW-SD 63.5 (*)     Platelets 327 (*)     Lymphocytes Absolute 0.5 (*)     All other components within normal limits   BASIC METABOLIC PANEL W/ REFLEX TO MG FOR LOW K - Abnormal; Notable for the following components:    Sodium 133 (*)     Potassium reflex Magnesium 5.3 (*)     CO2 19 (*)     BUN 40 (*)     CREATININE 1.8 (*)     All other components within normal limits   HEPATIC FUNCTION PANEL - Abnormal; Notable for the following components:    Albumin 3.1 (*)     Bilirubin, Direct 0.5 (*)     ALT 9 (*)     Total Protein 5.8 (*)     All other components within normal limits   TROPONIN - Abnormal; Notable for the following components:    Troponin T 0.012 (*)     All other components within normal limits   BRAIN NATRIURETIC PEPTIDE - Abnormal; Notable for the following components:    Pro-BNP 35776.0 (*)     All other components within normal limits   TSH WITH REFLEX - Abnormal; Notable for the following components:    TSH 0.061 (*)     All other components within normal limits   PROTIME-INR - Abnormal; Notable for the following components:    INR 2.68 (*)     All other components within normal limits   APTT - Abnormal; Notable for the following components:    aPTT 45.8 (*)     All other components within normal limits   URINE WITH REFLEXED MICRO - Abnormal; Notable for the following components:    Protein,  (*)     All other components within normal limits   GLOMERULAR FILTRATION RATE, ESTIMATED - Abnormal; Notable for the following components:    Est, Glom Filt Rate 28 (*)     All other components within normal limits   TROPONIN - Abnormal; Notable for the following components:    Troponin T 0.011 (*)     All other components within normal limits   BLOOD GAS, VENOUS - Abnormal; Notable for the following components:    PCO2, MIXED VENOUS 35 (*)     PO2, Mixed 17 (*)     HCO3, Mixed 19 (*)     Base Exc, Mixed -5.7 (*)     All other components within normal limits   GLUCOSE, WHOLE BLOOD - Abnormal; Notable for the following components:    Glucose, Whole Blood 54 (*)     All other components within normal limits   POCT GLUCOSE - Abnormal; Notable for the following components:    POC Glucose 122 (*)     All other components within normal limits   POCT GLUCOSE - Abnormal; Notable for the following components:    POC Glucose 134 (*)     All other components within normal limits   POCT GLUCOSE - Abnormal; Notable for the following components:    POC Glucose 153 (*)     All other components within normal limits   POCT GLUCOSE - Normal   POCT GLUCOSE - Normal   CULTURE, BLOOD 1   CULTURE, BLOOD 2   DIGOXIN LEVEL   ETHANOL   URINE DRUG SCREEN   AMMONIA   ANION GAP   LACTATE, SEPSIS   SCAN OF BLOOD SMEAR   T4, FREE   POCT GLUCOSE       Radiologic studies results:  CT HEAD WO CONTRAST   Final Result   1. No acute intracranial abnormality. **This report has been created using voice recognition software. It may contain minor errors which are inherent in voice recognition technology. **      Final report electronically signed by Dr Delicia Muller on 10/23/2021 9:15 PM      XR CHEST PORTABLE   Final Result      1.  Mild prominence of the interstitial pulmonary markings is a nonspecific finding. May relate to chronic underlying process. 2. Mild to moderate cardiomegaly. **This report has been created using voice recognition software. It may contain minor errors which are inherent in voice recognition technology. **      Final report electronically signed by Dr Seven Almanzar on 10/23/2021 8:25 PM          ED Medications administered this visit:   Medications   cephALEXin (KEFLEX) capsule 500 mg (500 mg Oral Given 10/23/21 1929)   0.9 % sodium chloride bolus (500 mLs IntraVENous New Bag 10/23/21 1929)   dextrose 50 % IV solution (25 g IntraVENous Given 10/23/21 2036)         ED COURSE     ED Course as of Oct 23 2224   Sat Oct 23, 2021   1849 Point-of-care ultrasound demonstrates IVC compressibility index of 25% as well as significant cobblestoning suggestive of cellulitis in the left lower extremity. [EVERARDO]   2144 Patient had episode of hypoglycemia was given food and an amp of D50. We will continue to monitor with repeat point-of-care glucose every 30 minutes for 4 checks. [EVERARDO]   2144 Patient's initial troponin slightly elevated 0.01 2 repeat troponin downtrend 0.011. [EVERARDO]   2144 Brain Natriuretic Peptide(!):    Pro-BNP 85051.0(!) [EVERARDO]   2145 Patient's anemia at baseline.    CBC Auto Differential(!):    WBC 2.9(!)   RBC 2.50(!)   Hemoglobin Quant 8.1(!)   Hematocrit 27.2(!)   .8(!)   MCH 32.4   MCHC 29.8(!)   RDW-CV 15.9(!)   RDW-SD 63.5(!)   Platelet Count 682(!)   MPV 11.0   Seg Neutrophils 67.0   Lymphocytes 17.2   Monocytes 14.5   Eosinophils 0.3   Basophils 0.7   Immature Granulocytes 0.3   Segs Absolute 1.9   Lymphocytes Absolute 0.5(!)   Monocytes Absolute 0.4   Eosinophils Absolute 0.0   Basophils Absolute 0.0   Immature Grans (Abs) 0... [EVERARDO]   9077 Basic Metabolic Panel w/ Reflex to MG(!):    Sodium 133(!)   Potassium 5.3(!)   Chloride 104   CO2 19(!)   Glucose 108   BUN 40(!)   Creatinine 1.8(!)   Calcium 8.6 [EVERARDO]   2145 Protime-INR(!):    INR 2.68(!) [EVERARDO]   2145 TSH with Reflex(!):    TSH 0.061(!) [EVERARDO]   2145 Brain Natriuretic Peptide(!):    Pro-BNP 87278.0(!) [EVERARDO]   2145 Blood gas, venous(!):    PH MIXED 7.35   PCO2, MIXED VENOUS 35(!)   PO2, Mixed 17(!)   HCO3, Mixed 19(!)   Base Exc, Mixed -5.7(!)   O2 Sat, Mixed 22   COLLECTED BY: [id number] [EVERARDO]   2145 Hospitalist contacted for admission and accepted admit. [EVERARDO]      ED Course User Index  [EVERARDO] Jodie Acosta MD           MEDICATION CHANGES     New Prescriptions    No medications on file         FINAL DISPOSITION     Final diagnoses:   Acute metabolic encephalopathy   Acute renal failure superimposed on chronic kidney disease, unspecified CKD stage, unspecified acute renal failure type ([facility] [address]   Hypoglycemia   Type 2 diabetes mellitus with hypoglycemia without coma, with long-term current use of insulin ([facility])     Condition: condition: stable  Dispo: Admit to med/surg floor      This transcription was electronically signed. Parts of this transcriptions may have been dictated by use of voice recognition software and electronically transcribed, and parts may have been transcribed with the assistance of an ED scribe. The transcription may contain errors not detected in proofreading. Please refer to my supervising physician's documentation if my documentation differs.     Electronically Signed: Lali Rojas MD, 10/23/21, 10:24 PM         Jodie Acosta MD  Resident  10/23/21 0480       Jodie Acosta MD  Resident  10/23/21 [address] [place] [address] Briana Michaud MD  Resident  10/23/21 0836

## 2021-10-23 NOTE — ED TRIAGE NOTES
Pt to ED due to altered mental status and back pain. Pt states that she had some flexeril and a codeine, which is her sisters, and now she is feeling \"loopy. \" Pt states that she started to feel \"weird\" and her back hurt more starting Monday. No distress noted. Respirations even and unlabored. Dr. Taurus Xie at bedside to evaluate pt per this RN. EKG done. VSS.

## 2021-10-23 NOTE — ED NOTES
Pt appears to be resting comfortably on cot with eyes closed. Pt easily arouseable. Pt denies any needs or concerns at this time. No distress noted. Respirations even and unlabored.  Call light in reach     Paola Desouza RN  10/23/21 1914

## 2021-10-23 NOTE — ED NOTES
ED nurse-to-nurse bedside report    Chief Complaint   Patient presents with    Altered Mental Status    Back Pain      LOC: alert and orientated to name and place  Vital signs   Vitals:    10/23/21 1817 10/23/21 1913   BP: (!) 110/54 (!) 103/57   Pulse: 72 62   Resp: 18 18   Temp: 98.2 °F (36.8 °C)    SpO2: 97% 96%   Weight: 234 lb (106.1 kg)    Height: 5' 6\" (1.676 m)       Pain:    Pain Interventions: NA  Pain Goal: 2/10  Oxygen: No    Current needs required RA   Telemetry: Yes  LDAs:   Peripheral IV 10/23/21 Left Antecubital (Active)   Site Assessment Dry;Clean; Intact 10/23/21 1913   Line Status Flushed 10/23/21 1913   Dressing Status Clean;Dry; Intact 10/23/21 1913     Continuous Infusions:   Mobility: Requires assistance * 2  Morales Fall Risk Score:    Fall Risk 10/7/2021 9/15/2020 12/3/2019   2 or more falls in past year? no no no   Fall with injury in past year? no no yes     Fall Interventions: side rails up x2, call light In place  Report given to: KYLAH Bolivar RN  10/23/21 8819

## 2021-10-23 NOTE — ED NOTES
RN medicated pt per [facility], pt resting in cot with respirations even and unlabored. RN assisted pt into a new gown and provided pt with a warm blanket. Pt voices no concern or need at this time. Call light is within reach. Will continue to monitor.      Anjum Lara RN  10/23/21 1943

## 2021-10-23 NOTE — ED NOTES
ED nurse-to-nurse bedside report    Chief Complaint   Patient presents with    Altered Mental Status    Back Pain      LOC: alert and orientated to name, place, date  Vital signs   Vitals:    10/23/21 1817 10/23/21 1913 10/23/21 1930   BP: (!) 110/54 (!) 103/57 (!) 110/58   Pulse: 72 62 65   Resp: 18 18 15   Temp: 98.2 °F (36.8 °C)     SpO2: 97% 96% 98%   Weight: 234 lb (106.1 kg)     Height: 5' 6\" (1.676 m)        Pain:    Pain Interventions: not applicable   Pain Goal: 0  Oxygen: No    Current needs required room air    Telemetry: Yes  LDAs:   Peripheral IV 10/23/21 Left Antecubital (Active)   Site Assessment Dry;Clean; Intact 10/23/21 1913   Line Status Flushed 10/23/21 1913   Dressing Status Clean;Dry; Intact 10/23/21 1913     Continuous Infusions:   Mobility: Requires assistance * 1  Morales Fall Risk Score:    Fall Risk 10/7/2021 9/15/2020 12/3/2019   2 or more falls in past year? no no no   Fall with injury in past year? no no yes     Fall Interventions: side rails raised x2, call light is within reach  Report given to: Leticia Winn RN  10/23/21 1944

## 2021-10-24 NOTE — ED NOTES
ED to inpatient nurses report    Chief Complaint   Patient presents with    Altered Mental Status    Back Pain      Present to ED from Home  LOC: alert and orientated to name, place, date  Vital signs   Vitals:    10/23/21 1930 10/23/21 2023 10/23/21 2113 10/23/21 2240   BP: (!) 110/58 (!) 117/57 (!) 112/93 129/65   Pulse: 65 57 65 63   Resp: 15 18 22 18   Temp:       SpO2: 98% 97% 96% 95%   Weight:       Height:          Oxygen Baseline Room air    Current needs required None Bipap/Cpap No  LDAs:   Peripheral IV 10/23/21 Left Antecubital (Active)   Site Assessment Clean;Dry; Intact 10/23/21 2240   Line Status Infusing 10/23/21 2240   Dressing Status Clean;Dry; Intact 10/23/21 2240     Mobility: Requires assistance * 2  Pending ED orders: None  Present condition: Stable    Electronically signed by Marissa Henriquez RN on 10/23/2021 at 10:45 PM     Marissa Henriquez RN  10/23/21 2246

## 2021-10-24 NOTE — ED NOTES
Pt in bed sleeping. Responds to voice. Updated on plan of care.      Dalton Muhammad RN  10/23/21 7785

## 2021-10-24 NOTE — PROGRESS NOTES
ng/mL    #Paroxysmal Non-valvular Atrial Fibrillation on Warfarin: Stable  EKG on admit: Afib. [id number]. Rate control at home: metoprolol succinate 100 mg daily. Rhythm control at home: none. Plan:  -Daily BMP + serum magnesium  -Maintain potassium > 4.0; Initiate potassium replacement protocol  -Maintain magnesium > 2.0; Initiate magnesium replacement protocol    #Non-obstructive CAD: Stable  [facility] on 07/03/2019: 50 disease in proximal, mid, and distal RCA. On ASA 81 mg.  Plan: Continue ASA 81 mg    #Elevated troponin: Stable  0.012, 0.011, equivocal. Likely 2/2 demand ischemia  Plan: Noted    #Hypertriglyceridemia: Stable  On fenofibrate  Plan: Continue fenofibrate    Dispo: Med/Surg    Fluids: Encourage PO fluid intake  Electrolyte: Replete as needed  Nutrition: Regular, 4g carb  GI: none  DVT ppx:  lovenox  PT/OT/SLP: PT/OT as needed    Code status: Full Code No additional code details    Subjective     Pt is resting comfortably in bed    Metsa 36 is a 78 y.o. female presented with encephalopathy 2/2 hypoglycemia.     Objective     Vitals:     /65   Pulse 56   Temp 97.6 °F (36.4 °C) (Oral)   Resp 16   Ht 5' 7\" (1.702 m)   Wt 236 lb 15.9 oz (107.5 kg)   LMP 02/20/2001   SpO2 97%   BMI 37.12 kg/m²     Intake and Output:       Intake/Output Summary (Last 24 hours) at 10/24/2021 0750  Last data filed at 10/23/2021 2241  Gross per 24 hour   Intake 500 ml   Output --   Net 500 ml       Outpatient Medications:     Scheduled Meds:   warfarin (COUMADIN) daily dosing (placeholder)   Other RX Placeholder    influenza virus vaccine  0.5 mL IntraMUSCular Prior to discharge    cephALEXin  500 mg Oral 4 times per day    ascorbic acid  500 mg Oral BID    aspirin  81 mg Oral Daily    atorvastatin  40 mg Oral Nightly    oyster shell calcium w/D  1 tablet Oral BID WC    Vitamin D  2,000 Units Oral Daily    digoxin  62.5 mcg Oral Daily    DULoxetine  60 mg Oral Daily    fenofibrate  160 mg Oral Daily    ferrous sulfate  325 mg Oral Daily with breakfast    folic acid  1 mg Oral Daily    levothyroxine  75 mcg Oral Daily    metoprolol succinate  100 mg Oral Daily    valsartan  80 mg Oral Daily    zinc sulfate  50 mg Oral Daily    sodium chloride flush  5-40 mL IntraVENous 2 times per day    furosemide  40 mg IntraVENous Daily    pantoprazole  40 mg Oral QAM AC     Continuous Infusions:   sodium chloride      dextrose       PRN Meds:[Held by provider] HYDROcodone-acetaminophen, meclizine, sodium chloride flush, sodium chloride, acetaminophen **OR** acetaminophen, glucose, dextrose, glucagon (rDNA), dextrose    Physical Exam:     Physical Exam  Vitals and nursing note reviewed. Constitutional:       General: She is awake. Appearance: Normal appearance. She is not ill-appearing or diaphoretic. HENT:      Head: Normocephalic and atraumatic. Right Ear: External ear normal.      Left Ear: External ear normal.      Nose: Nose normal.      Mouth/Throat:      Mouth: Mucous membranes are moist.      Pharynx: Oropharynx is clear. No oropharyngeal exudate or posterior oropharyngeal erythema. Eyes:      General: Lids are normal. No scleral icterus. Extraocular Movements: Extraocular movements intact. Pupils: Pupils are equal, round, and reactive to light. Cardiovascular:      Rate and Rhythm: Normal rate and regular rhythm. Pulses: Normal pulses. Radial pulses are 2+ on the right side and 2+ on the left side. Posterior tibial pulses are 2+ on the right side and 2+ on the left side. Heart sounds: Normal heart sounds. No murmur heard. No friction rub. No gallop. Pulmonary:      Effort: Pulmonary effort is normal.      Breath sounds: Normal breath sounds. No wheezing, rhonchi or rales. Abdominal:      General: Bowel sounds are normal.      Palpations: Abdomen is soft. Tenderness: There is no abdominal tenderness.  There is no guarding or rebound. Musculoskeletal:      Cervical back: Normal range of motion and neck supple. Right lower leg: No edema. Left lower leg: No edema. Skin:     General: Skin is warm and dry. Neurological:      General: No focal deficit present. Mental Status: She is alert and oriented to person, place, and time. Mental status is at baseline. GCS: GCS eye subscore is 4. GCS verbal subscore is 5. GCS motor subscore is 6. Psychiatric:         Attention and Perception: Attention and perception normal.         Mood and Affect: Mood and affect normal.         Speech: Speech normal.         Behavior: Behavior normal. Behavior is cooperative. Thought Content:  Thought content normal.         Cognition and Memory: Cognition and memory normal.         Judgment: Judgment normal.         Labs:     Results for orders placed or performed during the hospital encounter of 10/23/21   VRE Screen by PCR    Specimen: Rectal Swab   Result Value Ref Range    Vancomycin Resistant Enterococcus NEGATIVE    CBC Auto Differential   Result Value Ref Range    WBC 2.9 (L) 4.8 - 10.8 thou/mm3    RBC 2.50 (L) 4.20 - 5.40 mill/mm3    Hemoglobin 8.1 (L) 12.0 - 16.0 gm/dl    Hematocrit 27.2 (L) 37.0 - 47.0 %    .8 (H) 81.0 - 99.0 fL    MCH 32.4 26.0 - 33.0 pg    MCHC 29.8 (L) 32.2 - 35.5 gm/dl    RDW-CV 15.9 (H) 11.5 - 14.5 %    RDW-SD 63.5 (H) 35.0 - 45.0 fL    Platelets 208 (L) 081 - 400 thou/mm3    MPV 11.0 9.4 - 12.4 fL    Seg Neutrophils 67.0 %    Lymphocytes 17.2 %    Monocytes 14.5 %    Eosinophils 0.3 %    Basophils 0.7 %    Immature Granulocytes 0.3 %    Segs Absolute 1.9 1 - 7 thou/mm3    Lymphocytes Absolute 0.5 (L) 1.0 - 4.8 thou/mm3    Monocytes Absolute 0.4 0.4 - 1.3 thou/mm3    Eosinophils Absolute 0.0 0.0 - 0.4 thou/mm3    Basophils Absolute 0.0 0.0 - 0.1 thou/mm3    Immature Grans (Abs) 0.01 0.00 - 0.07 thou/mm3    nRBC 0 /100 wbc   Basic Metabolic Panel w/ Reflex to MG   Result Value Ref Range    Sodium 133 (L) 135 - 145 meq/L    Potassium reflex Magnesium 5.3 (H) 3.5 - 5.2 meq/L    Chloride 104 98 - 111 meq/L    CO2 19 (L) 23 - 33 meq/L    Glucose 108 70 - 108 mg/dL    BUN 40 (H) 7 - 22 mg/dL    CREATININE 1.8 (H) 0.4 - 1.2 mg/dL    Calcium 8.6 8.5 - 10.5 mg/dL   Hepatic Function Panel   Result Value Ref Range    Albumin 3.1 (L) 3.5 - 5.1 g/dL    Total Bilirubin 1.0 0.3 - 1.2 mg/dL    Bilirubin, Direct 0.5 (H) 0.0 - 0.3 mg/dL    Alkaline Phosphatase 44 38 - 126 U/L    AST 21 5 - 40 U/L    ALT 9 (L) 11 - 66 U/L    Total Protein 5.8 (L) 6.1 - 8.0 g/dL   Troponin   Result Value Ref Range    Troponin T 0.012 (A) ng/ml   Brain Natriuretic Peptide   Result Value Ref Range    Pro-BNP 35205.0 (H) 0.0 - 900.0 pg/mL   Digoxin Level   Result Value Ref Range    Digoxin Lvl 0.5 0.5 - 2.0 ng/mL   TSH with Reflex   Result Value Ref Range    TSH 0.061 (L) 0.400 - 4.200 uIU/mL   Protime-INR   Result Value Ref Range    INR 2.68 (H) 0.85 - 1.13   APTT   Result Value Ref Range    aPTT 45.8 (H) 22.0 - 38.0 seconds   Ethanol   Result Value Ref Range    ETHYL ALCOHOL, SERUM < 0.01 0.00 %   Urine Drug Screen   Result Value Ref Range    AMPHETAMINE+METHAMPHETAMINE URINE SCREEN Negative NEGATIVE    Barbiturate Quant, Ur Negative NEGATIVE    Benzodiazepine Quant, Ur Negative NEGATIVE    Cannabinoid Quant, Ur Negative NEGATIVE    Cocaine Metab Quant, Ur Negative NEGATIVE    Opiates, Urine POSITIVE NEGATIVE    Oxycodone Negative NEGATIVE    PCP Quant, Ur Negative NEGATIVE   Ammonia   Result Value Ref Range    Ammonia 21 11 - 60 umol/L   Urine with Reflexed Micro   Result Value Ref Range    Glucose, Ur NEGATIVE NEGATIVE mg/dl    Bilirubin Urine NEGATIVE NEGATIVE    Ketones, Urine NEGATIVE NEGATIVE    Specific Gravity, Urine 1.014 1.002 - 1.030    Blood, Urine NEGATIVE NEGATIVE    pH, UA 5.0 5.0 - 9.0    Protein,  (A) NEGATIVE    Urobilinogen, Urine 0.2 0.0 - 1.0 eu/dl    Nitrite, Urine NEGATIVE NEGATIVE    Leukocyte Esterase, Urine NEGATIVE NEGATIVE    Color, UA YELLOW STRAW-YELLOW    Character, Urine CLEAR CLEAR-SL CLOUD    RBC, UA 0-2 0-2/hpf /hpf    WBC, UA 0-2 0-4/hpf /hpf    Epithelial Cells, UA NONE SEEN 3-5/hpf /hpf    Bacteria, UA NONE SEEN FEW/NONE SEEN /hpf    Casts UA NONE SEEN NONE SEEN /lpf    Crystals, UA NONE SEEN NONE SEEN    Renal Epithelial, UA NONE SEEN NONE SEEN    Yeast, UA NONE SEEN NONE SEEN    CASTS 2 NONE SEEN NONE SEEN /lpf    MISCELLANEOUS 2 NONE SEEN    Anion Gap   Result Value Ref Range    Anion Gap 10.0 8.0 - 16.0 meq/L   Glomerular Filtration Rate, Estimated   Result Value Ref Range    Est, Glom Filt Rate 28 (A) ml/min/1.73m2   Lactate, Sepsis   Result Value Ref Range    Lactic Acid, Sepsis 0.8 0.5 - 1.9 mmol/L   Scan of Blood Smear   Result Value Ref Range    SCAN OF BLOOD SMEAR see below    T4, Free   Result Value Ref Range    T4 Free 1.22 0.93 - 1.76 ng/dL   Troponin   Result Value Ref Range    Troponin T 0.011 (A) ng/ml   Blood gas, venous   Result Value Ref Range    PH MIXED 7.35 7.31 - 7.41    PCO2, MIXED VENOUS 35 (L) 41 - 51 mmhg    PO2, Mixed 17 (L) 25 - 40 mmhg    HCO3, Mixed 19 (L) 23 - 28 mmol/l    Base Exc, Mixed -5.7 (L) -2.0 - 3.0 mmol/l    O2 Sat, Mixed 22 %    COLLECTED BY: [id number]    Glucose, Whole Blood   Result Value Ref Range    Glucose, Whole Blood 54 (L) 70 - 108 mg/dl   MRSA by PCR   Result Value Ref Range    MRSA SCREEN RT-PCR NEGATIVE    Basic Metabolic Panel w/ Reflex to MG x3   Result Value Ref Range    Sodium 133 (L) 135 - 145 meq/L    Potassium reflex Magnesium 5.4 (H) 3.5 - 5.2 meq/L    Chloride 104 98 - 111 meq/L    CO2 21 (L) 23 - 33 meq/L    Glucose 60 (L) 70 - 108 mg/dL    BUN 41 (H) 7 - 22 mg/dL    CREATININE 1.7 (H) 0.4 - 1.2 mg/dL    Calcium 8.4 (L) 8.5 - 10.5 mg/dL   CBC x3   Result Value Ref Range    WBC 3.1 (L) 4.8 - 10.8 thou/mm3    RBC 2.52 (L) 4.20 - 5.40 mill/mm3    Hemoglobin 8.0 (L) 12.0 - 16.0 gm/dl    Hematocrit 27.4 (L) 37.0 - 47.0 %    .7 (H) 81.0 - 99.0 fL    MCH 31.7 26.0 - 33.0 pg    MCHC 29.2 (L) 32.2 - 35.5 gm/dl    RDW-CV 16.3 (H) 11.5 - 14.5 %    RDW-SD 65.5 (H) 35.0 - 45.0 fL    Platelets 968 (L) 805 - 400 thou/mm3    MPV 11.5 9.4 - 12.4 fL   Troponin   Result Value Ref Range    Troponin T 0.013 (A) ng/ml   Vitamin B12   Result Value Ref Range    Vitamin B-12 1002 (H) 211 - 911 pg/mL   Procalcitonin   Result Value Ref Range    Procalcitonin 0.19 (H) 0.01 - 0.09 ng/mL   Anion Gap   Result Value Ref Range    Anion Gap 8.0 8.0 - 16.0 meq/L   Glomerular Filtration Rate, Estimated   Result Value Ref Range    Est, Glom Filt Rate 30 (A) ml/min/1.73m2   POCT Glucose   Result Value Ref Range    POC Glucose 122 (H) 70 - 108 mg/dl   POCT glucose   Result Value Ref Range    Glucose 97 mg/dL   POCT glucose   Result Value Ref Range    POC Glucose 97 70 - 108 mg/dl   POCT glucose   Result Value Ref Range    Glucose 134 mg/dL   POCT glucose   Result Value Ref Range    POC Glucose 134 (H) 70 - 108 mg/dl   POCT Glucose   Result Value Ref Range    POC Glucose 153 (H) 70 - 108 mg/dl   POCT Glucose   Result Value Ref Range    POC Glucose 83 70 - 108 mg/dl   POCT glucose   Result Value Ref Range    POC Glucose 60 (L) 70 - 108 mg/dl   POCT glucose   Result Value Ref Range    POC Glucose 106 70 - 108 mg/dl   POCT glucose   Result Value Ref Range    POC Glucose 69 (L) 70 - 108 mg/dl   POCT glucose   Result Value Ref Range    POC Glucose 103 70 - 108 mg/dl   EKG Emergency   Result Value Ref Range    Ventricular Rate 61 BPM    QRS Duration 114 ms    Q-T Interval 408 ms    QTc Calculation (Bazett) 410 ms    R Axis 7 degrees    T Axis -109 degrees       Diagnostics:     Imaging:  Echocardiogram limited  Result Date: 10/15/2021  Findings   Mitral Valve  Structurally normal mitral valve. Aortic Valve  Aortic valve appears tricuspid. Aortic valve leaflets are somewhat thickened.    Tricuspid Valve  Tricuspid valve is structurally normal.   Pulmonic Valve  The pulmonic valve was not well visualized . Pulmonic valve is structurally normal.   Left Atrium  Mildly dilated left atrium. Left Ventricle  Ejection fraction is visually estimated at 25%. There was severe global hypokinesis of the left ventricle. Right Atrium  Right atrial size was normal.   Right Ventricle  The right ventricular size was normal with normal systolic function and  wall thickness. Pericardial Effusion  No evidence of any pericardial effusion. CT HEAD WO CONTRAST  Result Date: 10/23/2021  1. No acute intracranial abnormality. **This report has been created using voice recognition software. It may contain minor errors which are inherent in voice recognition technology. ** Final report electronically signed by Dr Jw Tang on 10/23/2021 9:15 PM    XR CHEST PORTABLE  Result Date: 10/23/2021  1. Mild prominence of the interstitial pulmonary markings is a nonspecific finding. May relate to chronic underlying process. 2. Mild to moderate cardiomegaly. **This report has been created using voice recognition software. It may contain minor errors which are inherent in voice recognition technology. ** Final report electronically signed by Dr Jw Tang on 10/23/2021 8:25 PM    EKG:   As above    Signed:  Dede Eduardo MD  Internal Medicine, PGY-1  10/24/2021  7:50 AM    Staff: Dr. Dodie Duncan DO

## 2021-10-24 NOTE — ED NOTES
Pt in bed with eyes closed. Responds to voice. A&Ox4. Call light in reach.      Franky Billingsley RN  10/23/21 2024

## 2021-10-24 NOTE — ED PROVIDER NOTES
[facility] [address] [address] EMERGENCY DEPT  ED Attending Physician Attestation     I performed a history and physical examination of the patient and discussed management with the Resident. I reviewed the Resident's note and agree with the documented findings and plan of care. Any areas of disagreement are noted on the chart. I was personally present for the key portions of any procedures and have documented in the chart those procedures where I was not present during the key portions. I have reviewed the emergency nurses triage note and agree with the chief complaint, past medical history, past surgical history, allergies, medications, social and family history as documented unless otherwise noted below. For Advanced Practice Clinician cases, I have personally evaluated this patient and have completed at least one key elements of the E/M. Additional findings and any areas of disagreement are as noted.     History     78 y.o. female    Confusion  Intermittent vertiginous symptoms  Patient is a poor historian  No other family available to provide history  Previous history of confusion  Denies diplopia, denies headache, denies nausea or vomiting      Vitals:    Vitals:    10/23/21 1913 10/23/21 1930 10/23/21 2023 10/23/21 2113   BP: (!) 103/57 (!) 110/58 (!) 117/57 (!) 112/93   Pulse: 62 65 57 65   Resp: 18 15 18 22   Temp:       SpO2: 96% 98% 97% 96%   Weight:       Height:           Known Problems:    Patient Active Problem List   Diagnosis    Hypertension    Hyperlipidemia    Neuropathy    Osteoarthritis    Proteinuria    Arthritis    Morbid obesity (HCC)    Allergic rhinitis    Chronic kidney disease, stage 4 (severe) (HCC)    Diabetes mellitus type 2, insulin dependent (HCC)    DARWIN on CPAP    History of sleeve gastrectomy    Pneumonia    Morbid obesity with BMI of 50.0-59.9, adult ([facility] [address]    Coronary artery disease involving native heart without angina pectoris    Multinodular goiter    Bilateral renal cysts    Dyspnea    Acute diastolic CHF (congestive heart failure), NYHA class 2 (HCC)    CKD (chronic kidney disease) stage 3, GFR 30-59 ml/min    Shortness of breath    Chronic anemia    Paroxysmal A-fib (HCC)    Nonischemic cardiomyopathy (HCC)    Mild tricuspid regurgitation    Debility    Long term current use of anticoagulant    Severe left ventricular systolic dysfunction    Anticoagulated on Coumadin    Metabolic encephalopathy    Acute cystitis without hematuria    Encounter for therapeutic drug monitoring     Physical Exam     Gen:     Non-toxic, well appearing. Mildly confused. Neck leaning to the left, but has good range of motion of the head and neck and sits up when asked to do so  Head:   AT/NC               EOMI, LINDA. No nystagmus               Oropharynx Clear, mucous membranes moist  Neck:    Supple, no meningismus; No LAD  Chest:  CTAB    HRRR no mcg  Abd:      SNT/ND; No rebound/guarding/rigidity. Neg McBurneys  Extrem: No edema, neg homans. Neuro:   Alert, Awake, no lateralizing deficts               CN's grossly intact bilaterally. No nystagmus. Normal finger-to-nose    DIAGNOSTIC RESULTS   RADIOLOGY:   CT HEAD WO CONTRAST   Final Result   1. No acute intracranial abnormality. **This report has been created using voice recognition software. It may contain minor errors which are inherent in voice recognition technology. **      Final report electronically signed by Dr Charlie Clark on 10/23/2021 9:15 PM      XR CHEST PORTABLE   Final Result      1. Mild prominence of the interstitial pulmonary markings is a nonspecific finding. May relate to chronic underlying process. 2. Mild to moderate cardiomegaly. **This report has been created using voice recognition software. It may contain minor errors which are inherent in voice recognition technology. **      Final report electronically signed by Dr Charlie Clark on 10/23/2021 8:25 PM          LABS:  Labs Reviewed   CBC WITH AUTO DIFFERENTIAL - Abnormal; Notable for the following components:       Result Value    WBC 2.9 (*)     RBC 2.50 (*)     Hemoglobin 8.1 (*)     Hematocrit 27.2 (*)     .8 (*)     MCHC 29.8 (*)     RDW-CV 15.9 (*)     RDW-SD 63.5 (*)     Platelets 827 (*)     Lymphocytes Absolute 0.5 (*)     All other components within normal limits   BASIC METABOLIC PANEL W/ REFLEX TO MG FOR LOW K - Abnormal; Notable for the following components:    Sodium 133 (*)     Potassium reflex Magnesium 5.3 (*)     CO2 19 (*)     BUN 40 (*)     CREATININE 1.8 (*)     All other components within normal limits   HEPATIC FUNCTION PANEL - Abnormal; Notable for the following components:    Albumin 3.1 (*)     Bilirubin, Direct 0.5 (*)     ALT 9 (*)     Total Protein 5.8 (*)     All other components within normal limits   TROPONIN - Abnormal; Notable for the following components:    Troponin T 0.012 (*)     All other components within normal limits   BRAIN NATRIURETIC PEPTIDE - Abnormal; Notable for the following components:    Pro-BNP 77504.0 (*)     All other components within normal limits   TSH WITH REFLEX - Abnormal; Notable for the following components:    TSH 0.061 (*)     All other components within normal limits   PROTIME-INR - Abnormal; Notable for the following components:    INR 2.68 (*)     All other components within normal limits   APTT - Abnormal; Notable for the following components:    aPTT 45.8 (*)     All other components within normal limits   URINE WITH REFLEXED MICRO - Abnormal; Notable for the following components:    Protein,  (*)     All other components within normal limits   GLOMERULAR FILTRATION RATE, ESTIMATED - Abnormal; Notable for the following components:    Est, Glom Filt Rate 28 (*)     All other components within normal limits   TROPONIN - Abnormal; Notable for the following components:    Troponin T 0.011 (*)     All other components within normal limits   BLOOD GAS, VENOUS - Abnormal; Notable for the following components:    PCO2, MIXED VENOUS 35 (*)     PO2, Mixed 17 (*)     HCO3, Mixed 19 (*)     Base Exc, Mixed -5.7 (*)     All other components within normal limits   GLUCOSE, WHOLE BLOOD - Abnormal; Notable for the following components:    Glucose, Whole Blood 54 (*)     All other components within normal limits   POCT GLUCOSE - Abnormal; Notable for the following components:    POC Glucose 122 (*)     All other components within normal limits   POCT GLUCOSE - Normal   POCT GLUCOSE - Normal   CULTURE, BLOOD 1   CULTURE, BLOOD 2   DIGOXIN LEVEL   ETHANOL   URINE DRUG SCREEN   AMMONIA   ANION GAP   LACTATE, SEPSIS   SCAN OF BLOOD SMEAR   T4, FREE   POCT GLUCOSE   POCT GLUCOSE       EMERGENCY DEPARTMENT COURSE:     ED Course as of Oct 23 2129   Sat Oct 23, 2021   1849 Point-of-care ultrasound demonstrates IVC compressibility index of 25% as well as significant cobblestoning suggestive of cellulitis in the left lower extremity. [EVERARDO]      ED Course User Index  [EVERARDO] Rosa Maria Hernandez MD       Medical Decision-Making:   Initial evaluation nondiagnostic. Patient did have a drop in her blood sugar to 54 on recheck on venous gas, treated appropriately in the ED  I question the vertigo etiology. Considered cerebellar stroke, although imaging so far is negative, likely will need MRI as CT can miss this. No objective findings on her clinical exam of a stroke.   Bilateral pedal leg edema appears chronic    [facility]  Attending Emergency Physician        Eliseo Burgess DO  10/23/21 2137

## 2021-10-24 NOTE — PROGRESS NOTES
Clinical Pharmacy Note    Rishi Rich is a 78 y.o. female for whom pharmacy has been asked to manage warfarin therapy. Reason for Admission: AMS    Referring physician: Dr Arturo Byrd  Warfarin dose PTA: 3.75 mg MF, 2.5mg [id number]   Indication: AF  Goal INR: 2-3  [id number]:   Warfarin followed by: Joe Cross    Past Medical History:   Diagnosis Date    CAD (coronary artery disease)     CRI (chronic renal insufficiency)     Difficult intubation     excess skin in back of throat     DM (diabetes mellitus) ([facility] [address] 1994    GERD (gastroesophageal reflux disease)     H/O gastric bypass     Hyperlipidemia     Hypertension     Hypothyroidism     Neuropathy     Obesity     Osteoarthritis     Paroxysmal atrial fibrillation ([facility] [address]     Prolonged emergence from general anesthesia     Sleep apnea     uses cpap    Sleep apnea     Visit for screening mammogram               Recent Labs     10/23/21  1830   INR 2.68*     Recent Labs     10/23/21  1830 10/24/21  0323   HGB 8.1* 8.0*   HCT 27.2* 27.4*   * 117*     Current warfarin drug-drug interactions: aspirin    Date INR Warfarin Dose   10/23/21 2.68 ? Patient unsure if she took her dose   10/24/2021 --- 2.5 mg                              PT/INR or POC-INR will be monitored routinely until therapeutic INR is achieved    Thank you for the consult.      Delgado FortuneD, BCPS  10/24/2021  12:49 PM

## 2021-10-24 NOTE — PROGRESS NOTES
2 person skin assessment Alesha RN  Patient denies need at this time. Bed alarm on. Oriented to room.

## 2021-10-24 NOTE — PROGRESS NOTES
PHARMACY NOTE  Janetaram Daylin was ordered Fosamax. Per the [address], this medication is non-formulary and not stocked by pharmacy. The medication can be reordered at discharge.      Lilibeth Morrow RPHPharmD  10/23/2021   11:41 PM

## 2021-10-24 NOTE — H&P
[facility]    HISTORY AND PHYSICAL EXAM    PATIENT NAME:  Doe Mao    MRN:  [id number]  SERVICE DATE:  10/23/2021   SERVICE TIME:  11:29 PM    Primary Care Physician: Tera Holter, APRN - CNP     SUBJECTIVE  CHIEF COMPLAINT:  Altered mental status    HPI:  Doe Mao is a 78 y.o.,  female PMH CAD, CKD 3B, IDDM2, HLD, HTN, Hypothyroidism, paroxysmal atrial fibrillation, UTIs, and sleep apnea who presents with complaints of altered mental status and has had progressive weakness over the last several weeks with occasional vertigo. The patient reports that about 24 hours ago, she was starting to lose sense of where she was and became \"confused\" to the point where she was \"talking off the wall\" and becoming sleepy. Of note the patient is a poor historian and has given different accounts of her symptoms to different providers. The patient has a history of her sugars dropping sporadically and has never had a previous history of DKA or HHS. She lives with her sister who monitors her daily needs, and the patient uses a wheelchair and walker at home. She denies any current fever, chills, previous history of seizures, chest pain, shortness of breath, abdominal, diarrhea, constipation, hematemesis, or hematochezia, and is currently being treated for left leg pain with norco. The patient was previously in the ED a few days ago for concerns for DVT and cellulitis and was discharged without antibiotics. Per the patient, she says that she manages her own medications but has no idea what she is on them for and recently took herself off lasix earlier this month of her own free will. The patient was also recently (2 days ago) prescribed norco for leg and back pain and has been taking a higher dosage of the medication than reported. In the ED, the patient's vitals were significant for temperature 98.2, respiratory rate 18, pulse 72, blood pressure 110/54 and SPO2 97%.  The patient's labs were significant for sodium 133, potassium 5.3, CO2 19, BUN 40, creatinine 1.8, GFR 28,  total protein 5.8, BNP 53,173, troponin 0.012 and 0.011, albumin 3.1, ALT 9, direct bilirubin 0.5, TSH 0.061, urine drug screen positive for opiates, WBC 2.9, hemoglobin 8.1, .8, platelet count 081 and INR 2.68. The patient had a chest x-ray that showed interstitial pulmonary markings and CT head that was negative for any acute process. The patient became hypoglycemic in the ED with sugar of 54 and was given an amp of D50 and sugars resolved.           PAST MEDICAL HISTORY:    Past Medical History:   Diagnosis Date    CAD (coronary artery disease)     CRI (chronic renal insufficiency)     Difficult intubation     excess skin in back of throat     DM (diabetes mellitus) ([facility] [address] 1994    GERD (gastroesophageal reflux disease)     H/O gastric bypass     Hyperlipidemia     Hypertension     Hypothyroidism     Neuropathy     Obesity     Osteoarthritis     Paroxysmal atrial fibrillation (HCC)     Prolonged emergence from general anesthesia     Sleep apnea     uses cpap    Sleep apnea     Visit for screening mammogram      PAST SURGICAL HISTORY:    Past Surgical History:   Procedure Laterality Date    BACK SURGERY      xs 2    CATARACT REMOVAL Right 7/24/14    Dr. Eliza Romero EKG 12-LEAD  9/18/2015         ENDOSCOPY, COLON, DIAGNOSTIC      EYE SURGERY      FOOT SURGERY      left foot    HYSTERECTOMY, TOTAL ABDOMINAL      MOUTH BIOPSY  9-28-12    left tongue and lingual tonsil    OTHER SURGICAL HISTORY  11/8/2014    LEFT FEMUR RETROGRADE NAILING    OTHER SURGICAL HISTORY  4/23/2015    HARDWARE REMOVAL LEFT FEMUR, INSERTION OF ANTIBIOTIC SPACER    PATELLA SURGERY  7/11/13    fractues rt patella     PA COLON CA SCRN NOT  [address] [facility] Left 1/24/2018    COLONOSCOPY performed by Salima Salazar MD at [id number] [place] [address] TAG REMOVAL  9/25/12    mole removal    SLEEVE GASTRECTOMY  09/15/2015 Robotic    TOENAIL EXCISION      removal of great toe nails bilateral-still has toenails-had ingrown toenails and had trimmed out     TONSILLECTOMY      UPPER GASTROINTESTINAL ENDOSCOPY      US THYROID CYST ASPIRATION AND OR INJECTION  2021    US THYROID CYST ASPIRATION AND OR INJECTION 2021 [facility]TR[facility]     FAMILY HISTORY:    Family History   Problem Relation Age of Onset    Asthma Sister     Asthma Brother     Heart Disease Father     Other Mother     High Blood Pressure Other     Cancer Maternal Uncle         stomach    Diabetes Maternal Grandmother     High Blood Pressure Maternal Grandmother     Diabetes Maternal Grandfather     Stroke Neg Hx      SOCIAL HISTORY:    Social History     Socioeconomic History    Marital status: Single     Spouse name: Not on file    Number of children: 0    Years of education: Not on file    Highest education level: Not on file   Occupational History    Occupation: retired   Tobacco Use    Smoking status: Former Smoker     Packs/day: 1.50     Years: 20.00     Pack years: 30.00     Quit date: 2007     Years since quittin.7    Smokeless tobacco: Never Used   Vaping Use    Vaping Use: Never used   Substance and Sexual Activity    Alcohol use: No     Alcohol/week: 0.0 standard drinks    Drug use: No    Sexual activity: Never   Other Topics Concern    Not on file   Social History Narrative    Not on file     Social Determinants of Health     Financial Resource Strain: High Risk    Difficulty of Paying Living Expenses: Hard   Food Insecurity: No Food Insecurity    Worried About Running Out of Food in the Last Year: Never true    Jamir of Food in the Last Year: Never true   Transportation Needs:     Lack of Transportation (Medical):      Lack of Transportation (Non-Medical):    Physical Activity:     Days of Exercise per Week:     Minutes of Exercise per Session:    Stress:     Feeling of Stress :    Social Connections:     Frequency of Communication with Friends and Family:     Frequency of Social Gatherings with Friends and Family:     Attends Mosque Services:     Active Member of Clubs or Organizations:     Attends Club or Organization Meetings:     Marital Status:    Intimate Partner Violence:     Fear of Current or Ex-Partner:     Emotionally Abused:     Physically Abused:     Sexually Abused:      MEDICATIONS:   Prior to Admission medications    Medication Sig Start Date End Date Taking? Authorizing Provider   insulin lispro, 1 Unit Dial, (HUMALOG KWIKPEN) 100 UNIT/ML SOPN Inject 8 Units two times per day. Max daily dose: 48 Units    This replaces the previous prescription [facility]. 10/21/21   Natalio De Paz MD   HYDROcodone-acetaminophen (NORCO) 5-325 MG per tablet Take 1-2 tablets by mouth every 8 hours as needed for Pain for up to 7 days. 10/21/21 10/28/21  Fannie Patches, APRN - CNP   DULoxetine (CYMBALTA) 60 MG extended release capsule 1 tab po once a day 10/21/21   Fannie Patches, APRN - CNP   insulin lispro, 1 Unit Dial, (HUMALOG KWIKPEN) 100 UNIT/ML SOPN 8 Units twice daily before meals. This will replace her current insulin regiment (75/25). 5 PENS. 1 REFILL. E11.9.   Patient not taking: Reported on 10/21/2021 10/18/21   Kehinde Bolanos MD   Continuous Blood Gluc Sensor (FREESTYLE LINNETTE 14 DAY SENSOR) MISC 1 Device by Does not apply route continuous 10/7/21   Naatlio De Paz MD   insulin glargine (LANTUS SOLOSTAR) 100 UNIT/ML injection pen Inject 32 Units into the skin every morning This will replace her current insulin regiment (75/25) 10/7/21   Natalio De Paz MD   TRULICITY 8.12 CP/1.4CC SOPN inject 0.5 milliliters ( 0.75 milligrams ) subcutaneously every week IN THE ABDOMEN THIGHS OR OUTER AREA OF UPPER ARM ROTATE INJECTION SITES 9/17/21   Taet Austin MD   warfarin (COUMADIN) 2.5 MG tablet Take as directed by [address] [place] [address]. 45 tabs = 30 days 8/27/21   Phuong Vasquez MD   valsartan (DIOVAN) 80 MG tablet Take 1 tablet by mouth daily 8/18/21   Jeremiah Guerrero MD   dexlansoprazole (DEXILANT) 60 MG CPDR delayed release capsule Take 1 capsule by mouth daily 8/18/21   ARNOL Vaughan Mai, CNP   fenofibrate (TRICOR) 145 MG tablet take 1 tablet by mouth once daily 8/18/21   ARNOL Vaughan Mai, CNP   atorvastatin (LIPITOR) 40 MG tablet 1 tab po daily 8/18/21   ARNOL Vaughan Mai, CNP   levothyroxine (SYNTHROID) 50 MCG tablet Take 1.5 tablets by mouth Daily 7/22/21   ARNOL Vaughan Mai, CNP   alendronate (FOSAMAX) 70 MG tablet take 1 tablet by mouth every week 6/14/21   ARNOL Felix CNP   FEROSUL 325 (65 Fe) MG tablet take 1 tablet by mouth three times a day with food 6/7/21   ARNOL Vaughan Mai, CNP   metoprolol succinate (TOPROL XL) 100 MG extended release tablet take 1 tablet by mouth once daily 6/4/21   Jeremiah Guerrero MD   folic acid (FOLVITE) 1 MG tablet take 1 tablet by mouth once daily 5/24/21   ARNOL Vaughan Mai, CNP   Ascorbic Acid (VITAMIN C) 500 MG CAPS Take 1 tablet by mouth 2 times daily    Historical Provider, MD   Cholecalciferol (VITAMIN D3) 50 MCG (2000 UT) CAPS Take 2,000 Units by mouth daily     Historical Provider, MD   zinc sulfate (ZINCATE) 220 (50 Zn) MG capsule Take 50 mg by mouth daily    Historical Provider, MD   Incontinence Supply Disposable (INCONTINENCE BRIEF LARGE) MISC Use prn for incontinence 4/1/21   ARNOL Vaughan Mai, CNP   Incontinence Supply Disposable (POISE PAD) PADS Use as needed 4/1/21   ARNOL Vaughan Mai, CNP   Misc. Devices MISC Washable bed pads 4/1/21   ARNOL Vaughan Mai, CNP   Misc. Devices MISC Baby wipes 4/1/21   ARNOL Vaughan Mai, CNP   Misc.  Devices MISC Chucks as needed 4/1/21   ARNOL Vaughan Mai, CNP   meclizine (ANTIVERT) 25 MG tablet Take 25 mg by mouth as needed     Historical Provider, MD   acetaminophen (TYLENOL) 500 MG tablet Take 1 tablet by mouth 4 times daily as needed for Pain 12/30/20   González Suggs, MD   nystatin (MYCOSTATIN) 582037 UNIT/GM powder Apply 3 times daily. Patient taking differently: prn 9/15/20   Valentin PerezARNOL CNP   RA VITAMIN B-12 100 MCG tablet take 1 tablet by mouth once daily 5/11/20   Valentin ChrisARNOL CNP   aspirin (RA ASPIRIN EC) 81 MG EC tablet Take 1 tablet by mouth daily 1/7/20   Elma JoseARNOL CNP   digoxin (LANOXIN) 125 MCG tablet Take 0.5 tablets by mouth daily 10/8/19   Elma JoseARNOL CNP   Calcium Carbonate-Vitamin D (CALCIUM-VITAMIN D) 500-200 MG-UNIT per tablet Take 1 tablet by mouth 2 times daily (with meals)     Historical Provider, MD       ALLERGIES: Patient has no known allergies. REVIEW OF SYSTEM:   Review of Systems   Constitutional: Positive for activity change. HENT: Negative. Eyes: Negative. Respiratory: Negative. Cardiovascular: Positive for leg swelling. Gastrointestinal: Negative. Endocrine: Negative. Genitourinary: Negative. Musculoskeletal: Positive for back pain. Skin: Negative. Neurological: Positive for weakness. Psychiatric/Behavioral: Positive for confusion and decreased concentration. OBJECTIVE  PHYSICAL EXAM:   Physical Exam  Constitutional:       Appearance: She is obese. Comments: Lethargic; somnolent; answering questions on prompting but otherwise alert and oriented   HENT:      Head: Normocephalic and atraumatic. Nose: Nose normal.      Mouth/Throat:      Pharynx: Oropharynx is clear. Eyes:      General:         Right eye: Discharge present. Extraocular Movements: Extraocular movements intact. Comments: Mild right eye injection noted   Cardiovascular:      Rate and Rhythm: Normal rate. Rhythm irregular. Pulses: Normal pulses. Heart sounds: Normal heart sounds. Pulmonary:      Effort: Pulmonary effort is normal.      Breath sounds: Normal breath sounds.       Comments: Crackles heard more so on the right side of the chest as opposed to the left side  Musculoskeletal:         General: Normal range of motion. Cervical back: Normal range of motion and neck supple. Right lower leg: Edema present. Left lower leg: Edema present. Comments: 3+ pitting edema bilaterally   Skin:     General: Skin is dry. Capillary Refill: Capillary refill takes 2 to 3 seconds. Comments: Faded out rash noted on the left leg consistent with cellulitis   Neurological:      General: No focal deficit present. BP (!) 121/59   Pulse 64   Temp 98.3 °F (36.8 °C) (Oral)   Resp 18   Ht 5' 7\" (1.702 m)   Wt 236 lb 15.9 oz (107.5 kg)   LMP 02/20/2001   SpO2 98%   BMI 37.12 kg/m²     DATA:     Diagnostic tests reviewed for today's visit:    Most recent labs and imaging results reviewed.      LABS:    Recent Results (from the past 24 hour(s))   EKG Emergency    Collection Time: 10/23/21  6:15 PM   Result Value Ref Range    Ventricular Rate 61 BPM    QRS Duration 114 ms    Q-T Interval 408 ms    QTc Calculation (Bazett) 410 ms    R Axis 7 degrees    T Axis -109 degrees   CBC Auto Differential    Collection Time: 10/23/21  6:30 PM   Result Value Ref Range    WBC 2.9 (L) 4.8 - 10.8 thou/mm3    RBC 2.50 (L) 4.20 - 5.40 mill/mm3    Hemoglobin 8.1 (L) 12.0 - 16.0 gm/dl    Hematocrit 27.2 (L) 37.0 - 47.0 %    .8 (H) 81.0 - 99.0 fL    MCH 32.4 26.0 - 33.0 pg    MCHC 29.8 (L) 32.2 - 35.5 gm/dl    RDW-CV 15.9 (H) 11.5 - 14.5 %    RDW-SD 63.5 (H) 35.0 - 45.0 fL    Platelets 164 (L) 366 - 400 thou/mm3    MPV 11.0 9.4 - 12.4 fL    Seg Neutrophils 67.0 %    Lymphocytes 17.2 %    Monocytes 14.5 %    Eosinophils 0.3 %    Basophils 0.7 %    Immature Granulocytes 0.3 %    Segs Absolute 1.9 1 - 7 thou/mm3    Lymphocytes Absolute 0.5 (L) 1.0 - 4.8 thou/mm3    Monocytes Absolute 0.4 0.4 - 1.3 thou/mm3    Eosinophils Absolute 0.0 0.0 - 0.4 thou/mm3    Basophils Absolute 0.0 0.0 - 0.1 thou/mm3    Immature Grans (Abs) 0.01 0.00 - 0.07 thou/mm3    nRBC 0 /100 wbc Basic Metabolic Panel w/ Reflex to MG    Collection Time: 10/23/21  6:30 PM   Result Value Ref Range    Sodium 133 (L) 135 - 145 meq/L    Potassium reflex Magnesium 5.3 (H) 3.5 - 5.2 meq/L    Chloride 104 98 - 111 meq/L    CO2 19 (L) 23 - 33 meq/L    Glucose 108 70 - 108 mg/dL    BUN 40 (H) 7 - 22 mg/dL    CREATININE 1.8 (H) 0.4 - 1.2 mg/dL    Calcium 8.6 8.5 - 10.5 mg/dL   Hepatic Function Panel    Collection Time: 10/23/21  6:30 PM   Result Value Ref Range    Albumin 3.1 (L) 3.5 - 5.1 g/dL    Total Bilirubin 1.0 0.3 - 1.2 mg/dL    Bilirubin, Direct 0.5 (H) 0.0 - 0.3 mg/dL    Alkaline Phosphatase 44 38 - 126 U/L    AST 21 5 - 40 U/L    ALT 9 (L) 11 - 66 U/L    Total Protein 5.8 (L) 6.1 - 8.0 g/dL   Troponin    Collection Time: 10/23/21  6:30 PM   Result Value Ref Range    Troponin T 0.012 (A) ng/ml   Brain Natriuretic Peptide    Collection Time: 10/23/21  6:30 PM   Result Value Ref Range    Pro-BNP 77848.0 (H) 0.0 - 900.0 pg/mL   Digoxin Level    Collection Time: 10/23/21  6:30 PM   Result Value Ref Range    Digoxin Lvl 0.5 0.5 - 2.0 ng/mL   TSH with Reflex    Collection Time: 10/23/21  6:30 PM   Result Value Ref Range    TSH 0.061 (L) 0.400 - 4.200 uIU/mL   Protime-INR    Collection Time: 10/23/21  6:30 PM   Result Value Ref Range    INR 2.68 (H) 0.85 - 1.13   APTT    Collection Time: 10/23/21  6:30 PM   Result Value Ref Range    aPTT 45.8 (H) 22.0 - 38.0 seconds   Ethanol    Collection Time: 10/23/21  6:30 PM   Result Value Ref Range    ETHYL ALCOHOL, SERUM < 0.01 0.00 %   Anion Gap    Collection Time: 10/23/21  6:30 PM   Result Value Ref Range    Anion Gap 10.0 8.0 - 16.0 meq/L   Glomerular Filtration Rate, Estimated    Collection Time: 10/23/21  6:30 PM   Result Value Ref Range    Est, Glom Filt Rate 28 (A) ml/min/1.73m2   Scan of Blood Smear    Collection Time: 10/23/21  6:30 PM   Result Value Ref Range    SCAN OF BLOOD SMEAR see below    T4, Free    Collection Time: 10/23/21  6:30 PM   Result Value Ref Range    T4 Free 1.22 0.93 - 1.76 ng/dL   POCT Glucose    Collection Time: 10/23/21  6:31 PM   Result Value Ref Range    POC Glucose 122 (H) 70 - 108 mg/dl   Urine Drug Screen    Collection Time: 10/23/21  6:35 PM   Result Value Ref Range    AMPHETAMINE+METHAMPHETAMINE URINE SCREEN Negative NEGATIVE    Barbiturate Quant, Ur Negative NEGATIVE    Benzodiazepine Quant, Ur Negative NEGATIVE    Cannabinoid Quant, Ur Negative NEGATIVE    Cocaine Metab Quant, Ur Negative NEGATIVE    Opiates, Urine POSITIVE NEGATIVE    Oxycodone Negative NEGATIVE    PCP Quant, Ur Negative NEGATIVE   Urine with Reflexed Micro    Collection Time: 10/23/21  6:35 PM   Result Value Ref Range    Glucose, Ur NEGATIVE NEGATIVE mg/dl    Bilirubin Urine NEGATIVE NEGATIVE    Ketones, Urine NEGATIVE NEGATIVE    Specific Gravity, Urine 1.014 1.002 - 1.030    Blood, Urine NEGATIVE NEGATIVE    pH, UA 5.0 5.0 - 9.0    Protein,  (A) NEGATIVE    Urobilinogen, Urine 0.2 0.0 - 1.0 eu/dl    Nitrite, Urine NEGATIVE NEGATIVE    Leukocyte Esterase, Urine NEGATIVE NEGATIVE    Color, UA YELLOW STRAW-YELLOW    Character, Urine CLEAR CLEAR-SL CLOUD    RBC, UA 0-2 0-2/hpf /hpf    WBC, UA 0-2 0-4/hpf /hpf    Epithelial Cells, UA NONE SEEN 3-5/hpf /hpf    Bacteria, UA NONE SEEN FEW/NONE SEEN /hpf    Casts UA NONE SEEN NONE SEEN /lpf    Crystals, UA NONE SEEN NONE SEEN    Renal Epithelial, UA NONE SEEN NONE SEEN    Yeast, UA NONE SEEN NONE SEEN    CASTS 2 NONE SEEN NONE SEEN /lpf    MISCELLANEOUS 2 NONE SEEN    Ammonia    Collection Time: 10/23/21  6:40 PM   Result Value Ref Range    Ammonia 21 11 - 60 umol/L   Lactate, Sepsis    Collection Time: 10/23/21  7:42 PM   Result Value Ref Range    Lactic Acid, Sepsis 0.8 0.5 - 1.9 mmol/L   Troponin    Collection Time: 10/23/21  8:19 PM   Result Value Ref Range    Troponin T 0.011 (A) ng/ml   Blood gas, venous    Collection Time: 10/23/21  8:27 PM   Result Value Ref Range    PH MIXED 7.35 7.31 - 7.41    PCO2, MIXED VENOUS 35 (L) 41 - 51 mmhg    PO2, Mixed 17 (L) 25 - 40 mmhg    HCO3, Mixed 19 (L) 23 - 28 mmol/l    Base Exc, Mixed -5.7 (L) -2.0 - 3.0 mmol/l    O2 Sat, Mixed 22 %    COLLECTED BY: [id number]    Glucose, Whole Blood    Collection Time: 10/23/21  8:27 PM   Result Value Ref Range    Glucose, Whole Blood 54 (L) 70 - 108 mg/dl   POCT glucose    Collection Time: 10/23/21  8:47 PM   Result Value Ref Range    Glucose 97 mg/dL   POCT glucose    Collection Time: 10/23/21  8:47 PM   Result Value Ref Range    POC Glucose 97 70 - 108 mg/dl   POCT glucose    Collection Time: 10/23/21  9:15 PM   Result Value Ref Range    Glucose 134 mg/dL   POCT glucose    Collection Time: 10/23/21  9:15 PM   Result Value Ref Range    POC Glucose 134 (H) 70 - 108 mg/dl   POCT Glucose    Collection Time: 10/23/21  9:45 PM   Result Value Ref Range    POC Glucose 153 (H) 70 - 108 mg/dl       IMAGING:  CT HEAD WO CONTRAST    Result Date: 10/23/2021  PROCEDURE: CT HEAD WO CONTRAST CLINICAL INFORMATION:concern for vertigo and confusion. COMPARISON: CT head 8/2/2021 TECHNIQUE: 5 mm axial imaging through the head without IV contrast. All CT scans at this facility use dose modulation, iterative reconstruction, and/or weight based dosing when appropriate to reduce the radiation dose to as low as reasonably achievable. FINDINGS: Mild intracranial atherosclerotic calcifications noted. Very mild cerebral volume loss. A few scattered white matter hypodensities are seen which are nonspecific. Old infarcts seen in the bilateral basal ganglion. No ventriculomegaly. No midline shift or mass effect. No acute intracranial hemorrhage. No intracranial collection. Gray-white differentiation is unremarkable. The posterior fossa is unremarkable. The craniocervical junction is unremarkable. No acute bony abnormality. Mild mucosal thickening of the ethmoid sinuses. Otherwise, the remaining visualized paranasal sinuses are clear. The  mastoid air cells are clear.  There has been right lens surgery. Otherwise the orbits are unremarkable. 1. No acute intracranial abnormality. **This report has been created using voice recognition software. It may contain minor errors which are inherent in voice recognition technology. ** Final report electronically signed by Dr Fanny Haddad on 10/23/2021 9:15 PM    XR CHEST PORTABLE    Result Date: 10/23/2021  PROCEDURE: XR CHEST PORTABLE CLINICAL INFORMATION: ams COMPARISON: Chest radiograph 1/25/2021 TECHNIQUE: AP portable chest radiograph performed. FINDINGS: Mild prominence of the interstitial pulmonary markings. No focal infiltrate is seen. Aortic arch calcifications. Cardiac silhouette is mildly to moderately enlarged. No pleural effusion. No pneumothorax. No acute bony abnormality. Calcified granuloma is again seen in the left upper lobe. The bones appear markedly demineralized. Degenerative changes of the thoracic spine. 1. Mild prominence of the interstitial pulmonary markings is a nonspecific finding. May relate to chronic underlying process. 2. Mild to moderate cardiomegaly. **This report has been created using voice recognition software. It may contain minor errors which are inherent in voice recognition technology. ** Final report electronically signed by Dr Fanny Haddad on 10/23/2021 8:25 PM      VTE Prophylaxis: Already on anticoagulation    ASSESSMENT AND PLAN    Active Problems:  1. Acute recurrent/intermittent encephalopathy, secondary to iatrogenic hypoglycemia  CT head negative. Afebrile. No clinical signs/symptoms of infection. UA negative. Resolving LLE cellulitis unlikely contributing. Received dextrose in the ED for BG in the 50s. Recently initiated on new glargine (32 units daily)/lispro (8 units TID with meals) regimen. HbA1C 5.5 on 7/21/21; 6.0 on 10/21/21. No active oral/IM agents listed. Protecting airway.  *Recent norco use could be contributing.  -Admit to stepdown unit for close clinical monitoring  -Insulin discontinued. Would not recommend continuing at discharge due to recurrent hypoglycemic episodes and A1C < 8.0. Poor candidate for metformin due to baseline eGFR < 30. May benefit from alternative oral/IM regimen if needed - will defer to outpatient provider on follow up. -POCT Accu-Cheks every 4 hours until BG stabilizes  -Hypoglycemia protocol  -Routine neuro checks  -Up with assistance, fall precautions    2. Insulin dependent type 2 diabetes mellitus  -See above    3. CKD stage IIIb: Patient follows with Dr. Lei Barragan; at baseline  -Avoid nephrotoxins  -BMP daily    4. Chronic systolic heart failure (most recent EF 25%): 3+ pitting edema (chronic, per patient), but otherwise appears compensated. BNP at baseline. No pulmonary edema on CXR. -BB, ARB, lasix resumed  -Plans to follow up with EP for AICD placement    5. Resolving LLE cellulitis  -Keflex resumed  -Monitor clinically    6. Elevated troponin likely secondary to supply/demand mismatch / history of CAD: Previously negative. Patient's first troponin was 0.012 and second down-trended slightly to 0.011  -Denies chest pain. EKG significant for atrial fibrillation but no ischemic findings  -Has previous history of CAD and is due to have an ICD placed  -Aspirin resumed    7. Primary hypertension: Mild diastolic hypotension on arrival. MAPs maintained.  -Continue valsartan, toprol with hold parameters  -Monitor BP    8. Hypothyroidism: last TSH check was 0.061, free T4 was normal  -Continue levothyroxine     9. Hyperlipidemia: last total cholesterol was normal but triglycerides were elevated at 446  -Continue fenofibrate  -Continue atorvastatin    10. Paroxsymal atrial fibrillation, currently Afib and rate controlled (anticoagulated on warfarin)  -On digoxin (level therapeutic on arrival) and toprol.  Continued with paramaters  -INR therapeutic on arrival  -Continue warfarin; pharmacy to dose; goal INR 2-3  -Daily INRs  -Patient follows with warfarin clinic  -On telemetry      11. GERD  -Continue pantoprazole    12. Vertigo  -Continue meclizine    13. Chronic macrocytic anemia: stable  -Continue folate supplementation  -Continue ferrous sulfate tablets  -Will check vitamin B12  -Continue to monitor with daily CBC's    14. Mild non-anion gap metabolic acidosis: CO2 19  -Etiology unclear, but appears chronic/intermittent  -Will continue to check daily    15. Physical deconditioning: patient uses walker and wheelchair at home and lives with sister  -PT/OT consulted; appreciate recs  -Social work consulted; appreciate recs    16. Obstructive sleep apnea: uses CPAP  -Ordered home CPAP    17. History of tobacco abuse: quit smoking in 2007; was previously 30 pack year smoker; noted and stable    18. Electrolyte changes including mild hyponatremia and mild hyperkalemia: noted  -Continue to monitor with daily BMP's    19. Chronic neuropathy  -Continue duloxetine    20. Trochanteric bursitis of left hip: likely from sitting in wheelchair  -Was recently prescribed norco for this along with chronic low back pain  -Holding norco due to #1.  Consider de-escalating/substituting for non-narcotic therapy at discharge      Plan of care discussed with: patient    SIGNATURE: Brian Coleman DO  DATE: October 23, 2021  TIME: 11:29 PM   Marquita Cast MD  - supervising physician

## 2021-10-25 NOTE — CARE COORDINATION
DISCHARGE/PLANNING EVALUATION  10/25/21, 10:48 AM EDT    Reason for Referral: Discharge planning  Mental Status: Alert and Oriented  Decision Making: Self  Family/Social/Home Environment: Patient lives with her sister in a house. She reported that she has supportive family and friends in the area. Current Services including food security, transportation and housekeeping: Patient is current with [employer] [facility] and has [address] She reported that her [place] is through [facility]. Current Equipment: wheelchair, walker, shower chair, grab bars  Payment Source: Medicare and Medicaid  Concerns or Barriers to Discharge: None  Post acute provider list with quality measures, geographic area and applicable managed care information provided. Questions regarding selection process answered: Offered    Teach Back Method used with patient regarding care plan and needs. Patient verbalize understanding of the plan of care and contribute to goal setting. Patient goals, treatment preferences and discharge plan: Patient reported that she plans to discharge home with current services, [employer] and [facility]. SW called and left voicemail for Victorina Dues at [facility] and requested a call back.     Electronically signed by JORI Ruiz on 10/25/2021 at 10:48 AM

## 2021-10-25 NOTE — PROGRESS NOTES
[address] ICU STEPDOWN TELEMETRY 4K  EVALUATION    Time:   Time In: 0840  Time Out: 5768  Timed Code Treatment Minutes: 8 Minutes  Minutes: 23          Date: 10/25/2021  Patient Name: Yokasta Tejeda,   Gender: female      MRN: [id number]  : 1955  (78 y.o.)  Referring Practitioner: Yasmin Diallo DO  Diagnosis: metabolic encephalopathy  Additional Pertinent Hx: Per H&P on 10/23:Karla Nava is a 78 y.o.,  female PMH CAD, CKD 3B, IDDM2, HLD, HTN, Hypothyroidism, paroxysmal atrial fibrillation, UTIs, and sleep apnea who presents with complaints of altered mental status and has had progressive weakness over the last several weeks with occasional vertigo. The patient reports that about 24 hours ago, she was starting to lose sense of where she was and became \"confused\" to the point where she was \"talking off the wall\" and becoming sleepy. The patient was previously in the ED a few days ago for concerns for DVT and cellulitis and was discharged without antibiotics. Restrictions/Precautions:  Restrictions/Precautions: General Precautions, Fall Risk    Subjective  Chart Reviewed: Yes, Orders, Progress Notes, History and Physical, Previous Admission  Patient assessed for rehabilitation services?: Yes  Family / Caregiver Present: No    Subjective: Pt supine in bed upon arrival, initially refusing therapy session stating \"I just don't feel well. My stomach is upset, I can't do anything now. \" Pt then requested to use bedpan, with encouragement was agreeable to use BSC.     Pain:  Pain Assessment  Patient Currently in Pain: Yes (pt c/o L hip pain, attributing to scriatica)  Pain Assessment:  (did not quantify)  Pain Location: Hip;Leg  Pain Orientation: Left  Pain Descriptors: Burning  Pain Frequency: Intermittent    Vitals: Vitals not assessed per clinical judgement, see nursing flowsheet    Social/Functional History:  Lives With: Family (sister)  Home Layout: One level  Home Access: Level entry  Home Equipment: Rolling walker, Wheelchair-manual   Bathroom Shower/Tub: Walk-in shower  Bathroom Toilet: Handicap height  Bathroom Equipment: Grab bars in shower, Shower chair    Receives Help From: Home health, Family (HHA 2x/week for 2 hours to assist with IADLs (besides laundry))  ADL Assistance: Independent  Homemaking Assistance: Needs assistance (sister completes all)  Ambulation Assistance: Independent (short distances only)  Transfer Assistance: Independent          Additional Comments: Reports ind with ADLs and primarily furniture walks short distances, otherwise all other mobility in her wheelchair    VISION:WFL    HEARING:  WFL    COGNITION: Decreased Insight and Decreased Safety Awareness    RANGE OF MOTION:  Bilateral Upper Extremity:  Impaired - decreased at shoulders; distally WFL    STRENGTH:  Bilateral Upper Extremity:  Impaired - grossly deconditioned    ADL:   Grooming: Supervision. use of hand  while seated  Toileting: Stand By Assistance.  hygiene  Toilet Transfer: [employer]. to/from [facility]. BALANCE:  Sitting Balance:  Supervision. Standing Balance: Contact Guard Assistance. utilized bedrail or armrests of [facility] for stability    BED MOBILITY:  Supine to Sit: Moderate Assistance, with head of bed raised, with verbal cues , with increased time for completion ; assist to elevate trunk  Scooting: Supervision ; advancing hips forward to EOB    TRANSFERS:  Sit to Stand:  [employer], with increased time for completion, cues for hand placement. Stand to Sit: [employer], cues for hand placement. FUNCTIONAL MOBILITY:  Assistive Device: None (furniture walks with use of bedrail or armrests of BS for support, refuses use of RW)  Assist Level:  Contact Guard Assistance. Distance: EOB<>BSC  Forward flexed posture, minimal cues for safety     Activity Tolerance:  Patient tolerance of  treatment: fair. Refused any further activity due to fatigue and not feeling well. Assessment:  Assessment: Pt presents requiring increased assistance for ADLs, transfers, and functional mobility compared to PLOF. Pt will continue to benefit from OT services to improve independence with these tasks, in addition to overall strength/endurance to facilitate return to PLOF. Performance deficits / Impairments: Decreased functional mobility , Decreased ADL status, Decreased strength, Decreased endurance, Decreased balance  Prognosis: Fair  REQUIRES OT FOLLOW UP: Yes  Decision Making: Medium Complexity    Treatment Initiated: Treatment and education initiated within context of evaluation. Evaluation time included review of current medical information, gathering information related to past medical, social and functional history, completion of standardized testing, formal and informal observation of tasks, assessment of data and development of plan of care and goals. Treatment time included skilled education and facilitation of tasks to increase safety and independence with ADL's for improved functional independence and quality of life. Discharge Recommendations:  24 hour supervision or assist, Patient would benefit from continued therapy after discharge (would benefit from [place] stay if agreeable)    Patient Education:  OT Education: OT Role, Plan of Care, Transfer Training (importance of increasing activity)    Equipment Recommendations:  Equipment Needed: No    Plan:  Times per week: 3-5x  Current Treatment Recommendations: Strengthening, Balance Training, Functional Mobility Training, ROM, Endurance Training, Safety Education & Training, Patient/Caregiver Education & Training, Equipment Evaluation, Education, & procurement, Self-Care / ADL. See long-term goal time frame for expected duration of plan of care. If no long-term goals established, a short length of stay is anticipated.     Goals:     Short term goals  Time Frame for Short term goals: by discharge  Short term goal 1: Pt will safely navigate to/from [facility] or chair with supervision in prep for toileting tasks. Short term goal 2: Pt will complete LB ADLs with supervision to increase independence with donning pants/socks. Short term goal 3: Pt will complete functional transfers with supervision in prep for [facility] transfers. Short term goal 4: Pt will tolerate dynamic standing X2 minutes with supervision in prep for toileting/clothing mgmt         Following session, patient left in safe position with all fall risk precautions in place.

## 2021-10-25 NOTE — PROGRESS NOTES
[address] ICU STEPDOWN TELEMETRY 4K  Speech - Language - Cognitive Evaluation    SLP Individual Minutes  Time In: 1430  Time Out: 4486  Minutes: 25  Timed Code Treatment Minutes: 0 Minutes       Date: 10/25/2021  Patient Name: Vashti Choi      CSN: [id number]   : 1955  (78 y.o.)  Gender: female   Referring Physician:  Isha Cesar DO  Diagnosis: metabolic encephalopathy  Secondary Diagnosis: Cognitive-linguistic impairment  Precautions: Fall precaution  History of Present Illness/Injury: Pt presenting to [facility] with the above diagnosis. Per further chart review \"Karla Wakefield is a 78 y.o.,  female PMH CAD, CKD 3B, IDDM2, HLD, HTN, Hypothyroidism, paroxysmal atrial fibrillation, UTIs, and sleep apnea who presents with complaints of altered mental status and has had progressive weakness over the last several weeks with occasional vertigo. The patient reports that about 24 hours ago, she was starting to lose sense of where she was and became \"confused\" to the point where she was \"talking off the wall\" and becoming sleepy. Of note the patient is a poor historian and has given different accounts of her symptoms to different providers. The patient has a history of her sugars dropping sporadically and has never had a previous history of DKA or HHS. She lives with her sister who monitors her daily needs, and the patient uses a wheelchair and walker at home. She denies any current fever, chills, previous history of seizures, chest pain, shortness of breath, abdominal, diarrhea, constipation, hematemesis, or hematochezia, and is currently being treated for left leg pain with norco. The patient was previously in the ED a few days ago for concerns for DVT and cellulitis and was discharged without antibiotics.  Per the patient, she says that she manages her own medications but has no idea what she is on them for and recently took herself off lasix earlier this month of her own free will. The patient was also recently (2 days ago) prescribed norco for leg and back pain and has been taking a higher dosage of the medication than reported. \"  Past Medical History:   Diagnosis Date    CAD (coronary artery disease)     CRI (chronic renal insufficiency)     Difficult intubation     excess skin in back of throat     DM (diabetes mellitus) ([facility] [address] 1994    GERD (gastroesophageal reflux disease)     H/O gastric bypass     Hyperlipidemia     Hypertension     Hypothyroidism     Neuropathy     Obesity     Osteoarthritis     Paroxysmal atrial fibrillation (HCC)     Prolonged emergence from general anesthesia     Sleep apnea     uses cpap    Sleep apnea     Visit for screening mammogram        Pain: 3/10 - Pain location: left leg    Subjective:  Pt was seen in bed this date. No family present at time of evaluation. Pt was alert and oriented but lethargic and closing eyes/falling asleep several times during the evaluation (pt believing this was medicine related \"they just gave me medication and it makes me tired\"). SOCIAL HISTORY:   Living Arrangements: with sister  Work History: Retired  Education Level: 10th grade  Driving Status: Does not drive  Finance Management: Independent *pt may be poor historian per chart review: pt likely receives assistance  Medication Management: Independent *pt may be poor historian per chart review: pt likely receives assistance  ADL's: Independent.  *pt may be poor historian per chart review: pt likely receives assistance  Hobbies: Computer games  Vision Status: uses \"readers\"  Hearing: no hearing aids  Home Layout: One level  Home Access: Level entry  Home Equipment: Rolling walker, Wheelchair-manual    ORAL MOTOR:  Facial / Labial WFL    Lingual WFL    Dentition WFL    Velum Not Tested    Vocal Quality WFL    Sensation Not Tested    Cough Not Tested      SPEECH / VOICE:  Speech and Voice appear to be grossly intact for basic and complex daily communication    LANGUAGE:  Receptive:  1 Step Commands: 2/2  2 Step Commands: 2/2  Simple Yes/No Questions: 3/3  Complex Yes/No Questions: 3/3  Receptive language skills appear to be grossly intact for basic and MIN complex daily communication. Expressive:  Sentence Completion (open-ended): 2/2 *required repetition of question  Confrontational Namin/6  Responsive Namin/2 *required repetition of question  Conversational Speech: FAIR: off topic, scattered at times  Expressive language skills appear to be grossly intact for basic and MIN complex daily communication. COGNITION:  Jl Cognitive Assessment [facility]) version 7.1 completed. Pt scored /30. Normal is greater than or equal to 26/30. Inclusion of +1 point given highest level of education achieved less than/equal to 12th grade or GED with limited-0 post-secondary schooling   Orientation:   Immediate Recall: Trial 1: 3/5, Trial 2: 4/5 *required a significant time delay but recalled 4/5  Short-Term Recall: 0/5 IND, 3/5 given MIN A via category cue, 2/5 given MAX A via FO3  Divergent Naming: 3 WPM  Problem Solving: POOR-FAIR  Reasoning: Verbal reasonin/2; POOR-FAIR  Thought Organization: POOR-FAIR  Insight: POOR-FAIR  Attention: 0/1  Math Computation: 0/1  Executive Functionin/5    SWALLOWING:  Current Diet: Regular + thin  WFL       RECOMMENDATIONS/ASSESSMENT:  DIAGNOSTIC IMPRESSIONS:  Patient presents with a Moderate Cognitive-Linguistic Impairment evidenced by deficits in the following areas: memory (immediate, delayed, working), basic attention (selective, sustained, alternating, dividing), organizational naming (divergent, convergent), visual/verbal reasoning, problem solving/insight, thought organization, and executive functioning.  The pt demonstrated difficulty with topic maintenance during tasks and remaining focused on the task at hand (when thinking about her answer, pt often forgot what she was doing), requiring repetitions of directions or redirections to the task. Pt noted to perseverate on previously completed task requiring redirection and prompting to continue with current tasks, demonstrating POOR sustained attention. Pt described current cognitive function to be similar to before this admission to [facility]. Pt required a moderate time delay to respond to prompts at times. Expressive and receptive language was intact for basic and MIN complex communication of daily wants/needs. No dysarthria/dysphonia noted. Pt had no complaints for swallow function and is tolerating current diet. Recommend continued skilled ST to address the listed areas of impairment to improve pt's ability to safely return to management and completion of ADLs/iADLs (if pt does them independently at baseline) and express desires for wants/needs. Rehabilitation Potential: fair-good    EDUCATION:  Learner: Patient  Education:  Reviewed results and recommendations of this evaluation, Reviewed recommendations for follow-up and Education Related to [employer] and Wellness  Evaluation of Education: Verbalizes understanding and Family not present    PLAN:  Skilled SLP intervention on acute care 3-5 x per week or until goals met and/or pt plateaus in function. Specific interventions for next session may include: Memory (immediate, delayed, working), attention (sustained, selective, alternating, divided), organizational naming. PATIENT GOAL:    Did not state. Will further assess during treatment.     SHORT TERM GOALS:  Short-term Goals  Timeframe for Short-term Goals: 2 weeks  Goal 1: Patient will complete BASIC and MIN complex memory tasks (immediate, delayed, working) with 70% accuracy and MOD A to improve pt's recall/retention of personal and newly learned info  Goal 2: Patient will complete BASIC attention tasks (selective, sustained attention) with fewer than 5 errors/redirections to improve efficiency and focus when completing daily tasks  Goal 3: Patient will complete BASIC organizational naming (divergent, converget) and thought processing tasks with 70% accuracy and MOD A to improve pt's lexical retrieval, processing speed, and thought organization  Goal 4: Patient will complete BASIC and MIN complex problem solving, visual/verbal reasoning, and executive functioning tasks with 70% accuracy and MOD A to improve pt's independence with the safe completion of ADLs/iADLs    LONG TERM GOALS:  No LT goals recommended d/t anticipated short BEATRICE RichterS., Speech Therapy Student  Davina Diehl MA., [address] [place] [address] / #[phone]

## 2021-10-25 NOTE — PROGRESS NOTES
baseline 1.8. Secondary to DM Nephropathy vs HTN arteriosclerosis. Plan:  -Strict I&Os  -Avoid nephrotoxic substances  -BMP daily  -Will consider nephrology consult    #Chronic Macrocytic Anemia: Stable  Hgb 8.1, .8. 10/24/21 B12 1002. Plan:  -Continue folate supplementation  -Continue ferrous sulfate tabs  -Daily CBC  -Transfuse if Hgb <7.0 mg/dL or <8.0 and s/s. #Chronic Systolic + Diastolic Heart Failure with EF 25% on 10/15/2021, NYHA Class III: Stable  Pro-BNP on 10/23/2021 was 24987 from baseline of >70,000. CXR on 10/23/2021 demonstrated mild interstitial pulmonary markings which are chronic + cardiomegaly. GDMT at home: metoprolol succinate 100 mg daily and valsartan 80 mg daily. Diuretics at home: none. On Digoxin 125 mcg daily. Follows in HF Clinic: Yes. AICD in place. Digoxin level 10/23/2021 0.5. Plan:   -Strict I&Os  -Daily weights  -Continue digoxin; daily levels, maintain b/t 0.5 - 0.9 ng/mL    #Paroxysmal Non-valvular Atrial Fibrillation on Warfarin: Stable  EKG on admit: Afib. [id number] 5. Rate control at home: metoprolol succinate 100 mg daily. Rhythm control at home: none. Plan:  -Daily BMP + serum magnesium  -Maintain potassium > 4.0; Initiate potassium replacement protocol  -Maintain magnesium > 2.0; Initiate magnesium replacement protocol    #Acquired Hypothyroidism: Stable  On levothyroxine  Plan: Continue levothyroxine. #Non-obstructive CAD: Stable  [facility] on 07/03/2019: 50 disease in proximal, mid, and distal RCA.  On ASA 81 mg.  Plan: Continue ASA 81 mg    #Elevated troponin: Stable  0.012, 0.011, equivocal. Likely 2/2 demand ischemia  Plan: Noted    #Hypertriglyceridemia: Stable  On fenofibrate  Plan: Continue fenofibrate    #DARWIN on CPAP: Stable  Plan: Continue CPAP    Dispo: Med/Surg    Fluids: Encourage PO fluid intake  Electrolyte: Replete as needed  Nutrition: Regular, 4g carb  GI: none  DVT ppx:  lovenox  PT/OT/SLP: PT/OT as needed    Code status: Full Code No additional code details    Subjective     Pt is resting comfortably in bed. No acute events overnight. Did say that she was feeling \"way worse\" however this resolved in the afternoon without additional interventional therapies. Hospital Course     Fredi Reyna is a 78 y.o. female presented with encephalopathy 2/2 hypoglycemia. Discontinued insulin.      Objective     Vitals:     /74   Pulse 90   Temp 98.1 °F (36.7 °C) (Oral)   Resp 20   Ht 5' 7\" (1.702 m)   Wt 229 lb 11.5 oz (104.2 kg)   LMP 02/20/2001   SpO2 98%   BMI 35.98 kg/m²     Intake and Output:       Intake/Output Summary (Last 24 hours) at 10/25/2021 1718  Last data filed at 10/25/2021 1100  Gross per 24 hour   Intake 800 ml   Output 1900 ml   Net -1100 ml       Outpatient Medications:     Scheduled Meds:   warfarin  4 mg Oral Once    warfarin (COUMADIN) daily dosing (placeholder)   Other RX Placeholder    influenza virus vaccine  0.5 mL IntraMUSCular Prior to discharge    insulin lispro  0-6 Units SubCUTAneous TID WC    insulin lispro  0-3 Units SubCUTAneous Nightly    cephALEXin  500 mg Oral 4 times per day    ascorbic acid  500 mg Oral BID    aspirin  81 mg Oral Daily    atorvastatin  40 mg Oral Nightly    oyster shell calcium w/D  1 tablet Oral BID WC    Vitamin D  2,000 Units Oral Daily    digoxin  62.5 mcg Oral Daily    DULoxetine  60 mg Oral Daily    fenofibrate  160 mg Oral Daily    ferrous sulfate  325 mg Oral Daily with breakfast    folic acid  1 mg Oral Daily    levothyroxine  75 mcg Oral Daily    metoprolol succinate  100 mg Oral Daily    valsartan  80 mg Oral Daily    zinc sulfate  50 mg Oral Daily    sodium chloride flush  5-40 mL IntraVENous 2 times per day    furosemide  40 mg IntraVENous Daily    pantoprazole  40 mg Oral QAM AC     Continuous Infusions:   sodium chloride      dextrose       PRN Meds:muscle rub, [Held by provider] HYDROcodone-acetaminophen, meclizine, sodium chloride flush, sodium chloride, acetaminophen **OR** acetaminophen, glucose, dextrose, glucagon (rDNA), dextrose    Physical Exam:     Physical Exam  Vitals and nursing note reviewed. Constitutional:       General: She is awake. Appearance: Normal appearance. She is not ill-appearing or diaphoretic. HENT:      Head: Normocephalic and atraumatic. Right Ear: External ear normal.      Left Ear: External ear normal.      Nose: Nose normal.      Mouth/Throat:      Mouth: Mucous membranes are moist.      Pharynx: Oropharynx is clear. No oropharyngeal exudate or posterior oropharyngeal erythema. Eyes:      General: Lids are normal. No scleral icterus. Extraocular Movements: Extraocular movements intact. Pupils: Pupils are equal, round, and reactive to light. Cardiovascular:      Rate and Rhythm: Normal rate and regular rhythm. Pulses: Normal pulses. Radial pulses are 2+ on the right side and 2+ on the left side. Posterior tibial pulses are 2+ on the right side and 2+ on the left side. Heart sounds: Normal heart sounds. No murmur heard. No friction rub. No gallop. Pulmonary:      Effort: Pulmonary effort is normal.      Breath sounds: Normal breath sounds. No wheezing, rhonchi or rales. Abdominal:      General: Bowel sounds are normal.      Palpations: Abdomen is soft. Tenderness: There is no abdominal tenderness. There is no guarding or rebound. Musculoskeletal:      Cervical back: Normal range of motion and neck supple. Right lower leg: No edema. Left lower leg: No edema. Skin:     General: Skin is warm and dry. Neurological:      General: No focal deficit present. Mental Status: She is alert and oriented to person, place, and time. Mental status is at baseline. GCS: GCS eye subscore is 4. GCS verbal subscore is 5. GCS motor subscore is 6.    Psychiatric:         Attention and Perception: Attention and perception normal.         Mood and Affect: Mood and affect normal.         Speech: Speech normal.         Behavior: Behavior normal. Behavior is cooperative. Thought Content:  Thought content normal.         Cognition and Memory: Cognition and memory normal.         Judgment: Judgment normal.         Labs:     Results for orders placed or performed during the hospital encounter of 10/23/21   Culture, Blood 1    Specimen: Blood   Result Value Ref Range    Blood Culture, Routine No growth-preliminary     Culture, Blood 2    Specimen: Blood   Result Value Ref Range    Blood Culture, Routine No growth-preliminary     VRE Screen by PCR    Specimen: Rectal Swab   Result Value Ref Range    Vancomycin Resistant Enterococcus NEGATIVE    Culture, MRSA, Screening    Specimen: Rectum   Result Value Ref Range    MRSA SCREEN No MRSA isolated     CBC Auto Differential   Result Value Ref Range    WBC 2.9 (L) 4.8 - 10.8 thou/mm3    RBC 2.50 (L) 4.20 - 5.40 mill/mm3    Hemoglobin 8.1 (L) 12.0 - 16.0 gm/dl    Hematocrit 27.2 (L) 37.0 - 47.0 %    .8 (H) 81.0 - 99.0 fL    MCH 32.4 26.0 - 33.0 pg    MCHC 29.8 (L) 32.2 - 35.5 gm/dl    RDW-CV 15.9 (H) 11.5 - 14.5 %    RDW-SD 63.5 (H) 35.0 - 45.0 fL    Platelets 548 (L) 250 - 400 thou/mm3    MPV 11.0 9.4 - 12.4 fL    Seg Neutrophils 67.0 %    Lymphocytes 17.2 %    Monocytes 14.5 %    Eosinophils 0.3 %    Basophils 0.7 %    Immature Granulocytes 0.3 %    Segs Absolute 1.9 1 - 7 thou/mm3    Lymphocytes Absolute 0.5 (L) 1.0 - 4.8 thou/mm3    Monocytes Absolute 0.4 0.4 - 1.3 thou/mm3    Eosinophils Absolute 0.0 0.0 - 0.4 thou/mm3    Basophils Absolute 0.0 0.0 - 0.1 thou/mm3    Immature Grans (Abs) 0.01 0.00 - 0.07 thou/mm3    nRBC 0 /100 wbc   Basic Metabolic Panel w/ Reflex to MG   Result Value Ref Range    Sodium 133 (L) 135 - 145 meq/L    Potassium reflex Magnesium 5.3 (H) 3.5 - 5.2 meq/L    Chloride 104 98 - 111 meq/L    CO2 19 (L) 23 - 33 meq/L    Glucose 108 70 - 108 mg/dL    BUN 40 (H) 7 - 22 mg/dL    CREATININE 1.8 (H) 0.4 - 1.2 mg/dL    Calcium 8.6 8.5 - 10.5 mg/dL   Hepatic Function Panel   Result Value Ref Range    Albumin 3.1 (L) 3.5 - 5.1 g/dL    Total Bilirubin 1.0 0.3 - 1.2 mg/dL    Bilirubin, Direct 0.5 (H) 0.0 - 0.3 mg/dL    Alkaline Phosphatase 44 38 - 126 U/L    AST 21 5 - 40 U/L    ALT 9 (L) 11 - 66 U/L    Total Protein 5.8 (L) 6.1 - 8.0 g/dL   Troponin   Result Value Ref Range    Troponin T 0.012 (A) ng/ml   Brain Natriuretic Peptide   Result Value Ref Range    Pro-BNP 80488.0 (H) 0.0 - 900.0 pg/mL   Digoxin Level   Result Value Ref Range    Digoxin Lvl 0.5 0.5 - 2.0 ng/mL   TSH with Reflex   Result Value Ref Range    TSH 0.061 (L) 0.400 - 4.200 uIU/mL   Protime-INR   Result Value Ref Range    INR 2.68 (H) 0.85 - 1.13   APTT   Result Value Ref Range    aPTT 45.8 (H) 22.0 - 38.0 seconds   Ethanol   Result Value Ref Range    ETHYL ALCOHOL, SERUM < 0.01 0.00 %   Urine Drug Screen   Result Value Ref Range    AMPHETAMINE+METHAMPHETAMINE URINE SCREEN Negative NEGATIVE    Barbiturate Quant, Ur Negative NEGATIVE    Benzodiazepine Quant, Ur Negative NEGATIVE    Cannabinoid Quant, Ur Negative NEGATIVE    Cocaine Metab Quant, Ur Negative NEGATIVE    Opiates, Urine POSITIVE NEGATIVE    Oxycodone Negative NEGATIVE    PCP Quant, Ur Negative NEGATIVE   Ammonia   Result Value Ref Range    Ammonia 21 11 - 60 umol/L   Urine with Reflexed Micro   Result Value Ref Range    Glucose, Ur NEGATIVE NEGATIVE mg/dl    Bilirubin Urine NEGATIVE NEGATIVE    Ketones, Urine NEGATIVE NEGATIVE    Specific Gravity, Urine 1.014 1.002 - 1.030    Blood, Urine NEGATIVE NEGATIVE    pH, UA 5.0 5.0 - 9.0    Protein,  (A) NEGATIVE    Urobilinogen, Urine 0.2 0.0 - 1.0 eu/dl    Nitrite, Urine NEGATIVE NEGATIVE    Leukocyte Esterase, Urine NEGATIVE NEGATIVE    Color, UA YELLOW STRAW-YELLOW    Character, Urine CLEAR CLEAR-SL CLOUD    RBC, UA 0-2 0-2/hpf /hpf    WBC, UA 0-2 0-4/hpf /hpf    Epithelial Cells, UA NONE SEEN 3-5/hpf /hpf    Bacteria, UA NONE SEEN FEW/NONE SEEN /hpf    Casts UA NONE SEEN NONE SEEN /lpf    Crystals, UA NONE SEEN NONE SEEN    Renal Epithelial, UA NONE SEEN NONE SEEN    Yeast, UA NONE SEEN NONE SEEN    CASTS 2 NONE SEEN NONE SEEN /lpf    MISCELLANEOUS 2 NONE SEEN    Anion Gap   Result Value Ref Range    Anion Gap 10.0 8.0 - 16.0 meq/L   Glomerular Filtration Rate, Estimated   Result Value Ref Range    Est, Glom Filt Rate 28 (A) ml/min/1.73m2   Lactate, Sepsis   Result Value Ref Range    Lactic Acid, Sepsis 0.8 0.5 - 1.9 mmol/L   Scan of Blood Smear   Result Value Ref Range    SCAN OF BLOOD SMEAR see below    T4, Free   Result Value Ref Range    T4 Free 1.22 0.93 - 1.76 ng/dL   Troponin   Result Value Ref Range    Troponin T 0.011 (A) ng/ml   Blood gas, venous   Result Value Ref Range    PH MIXED 7.35 7.31 - 7.41    PCO2, MIXED VENOUS 35 (L) 41 - 51 mmhg    PO2, Mixed 17 (L) 25 - 40 mmhg    HCO3, Mixed 19 (L) 23 - 28 mmol/l    Base Exc, Mixed -5.7 (L) -2.0 - 3.0 mmol/l    O2 Sat, Mixed 22 %    COLLECTED BY: [id number]    Glucose, Whole Blood   Result Value Ref Range    Glucose, Whole Blood 54 (L) 70 - 108 mg/dl   MRSA by PCR   Result Value Ref Range    MRSA SCREEN RT-PCR NEGATIVE    Basic Metabolic Panel w/ Reflex to MG x3   Result Value Ref Range    Sodium 133 (L) 135 - 145 meq/L    Potassium reflex Magnesium 5.4 (H) 3.5 - 5.2 meq/L    Chloride 104 98 - 111 meq/L    CO2 21 (L) 23 - 33 meq/L    Glucose 60 (L) 70 - 108 mg/dL    BUN 41 (H) 7 - 22 mg/dL    CREATININE 1.7 (H) 0.4 - 1.2 mg/dL    Calcium 8.4 (L) 8.5 - 10.5 mg/dL   CBC x3   Result Value Ref Range    WBC 3.1 (L) 4.8 - 10.8 thou/mm3    RBC 2.52 (L) 4.20 - 5.40 mill/mm3    Hemoglobin 8.0 (L) 12.0 - 16.0 gm/dl    Hematocrit 27.4 (L) 37.0 - 47.0 %    .7 (H) 81.0 - 99.0 fL    MCH 31.7 26.0 - 33.0 pg    MCHC 29.2 (L) 32.2 - 35.5 gm/dl    RDW-CV 16.3 (H) 11.5 - 14.5 %    RDW-SD 65.5 (H) 35.0 - 45.0 fL    Platelets 462 (L) 396 - 400 thou/mm3    MPV 11.5 9.4 - 12.4 fL   Troponin   Result Value Ref Range    Troponin T 0.013 (A) ng/ml   Vitamin B12   Result Value Ref Range    Vitamin B-12 1002 (H) 211 - 911 pg/mL   Procalcitonin   Result Value Ref Range    Procalcitonin 0.19 (H) 0.01 - 0.09 ng/mL   Anion Gap   Result Value Ref Range    Anion Gap 8.0 8.0 - 16.0 meq/L   Glomerular Filtration Rate, Estimated   Result Value Ref Range    Est, Glom Filt Rate 30 (A) ml/min/1.73m2   CBC x3   Result Value Ref Range    WBC 2.7 (L) 4.8 - 10.8 thou/mm3    RBC 2.63 (L) 4.20 - 5.40 mill/mm3    Hemoglobin 8.5 (L) 12.0 - 16.0 gm/dl    Hematocrit 28.5 (L) 37.0 - 47.0 %    .4 (H) 81.0 - 99.0 fL    MCH 32.3 26.0 - 33.0 pg    MCHC 29.8 (L) 32.2 - 35.5 gm/dl    RDW-CV 16.4 (H) 11.5 - 14.5 %    RDW-SD 65.3 (H) 35.0 - 45.0 fL    Platelets 973 (L) 904 - 400 thou/mm3    MPV 11.2 9.4 - 12.4 fL   Protime-INR   Result Value Ref Range    INR 2.13 (H) 0.85 - 4.83   Basic Metabolic Panel w/ Reflex to MG   Result Value Ref Range    Sodium 132 (L) 135 - 145 meq/L    Potassium reflex Magnesium 6.2 (HH) 3.5 - 5.2 meq/L    Chloride 103 98 - 111 meq/L    CO2 18 (L) 23 - 33 meq/L    Glucose 163 (H) 70 - 108 mg/dL    BUN 41 (H) 7 - 22 mg/dL    CREATININE 1.8 (H) 0.4 - 1.2 mg/dL    Calcium 8.8 8.5 - 10.5 mg/dL   Anion Gap   Result Value Ref Range    Anion Gap 11.0 8.0 - 16.0 meq/L   Glomerular Filtration Rate, Estimated   Result Value Ref Range    Est, Glom Filt Rate 28 (A) GM/JPI/2.36M9   Basic Metabolic Panel   Result Value Ref Range    Sodium 133 (L) 135 - 145 meq/L    Potassium 4.8 3.5 - 5.2 meq/L    Chloride 102 98 - 111 meq/L    CO2 19 (L) 23 - 33 meq/L    Glucose 248 (H) 70 - 108 mg/dL    BUN 41 (H) 7 - 22 mg/dL    CREATININE 1.8 (H) 0.4 - 1.2 mg/dL    Calcium 8.8 8.5 - 10.5 mg/dL   Anion Gap   Result Value Ref Range    Anion Gap 12.0 8.0 - 16.0 meq/L   Glomerular Filtration Rate, Estimated   Result Value Ref Range    Est, Glom Filt Rate 28 (A) ml/min/1.73m2   POCT Glucose   Result Value Ref Range    POC Glucose 122 (H) 70 - 108 mg/dl   POCT glucose   Result Value Ref Range    Glucose 97 mg/dL   POCT glucose   Result Value Ref Range    POC Glucose 97 70 - 108 mg/dl   POCT glucose   Result Value Ref Range    Glucose 134 mg/dL   POCT glucose   Result Value Ref Range    POC Glucose 134 (H) 70 - 108 mg/dl   POCT Glucose   Result Value Ref Range    POC Glucose 153 (H) 70 - 108 mg/dl   POCT Glucose   Result Value Ref Range    POC Glucose 83 70 - 108 mg/dl   POCT glucose   Result Value Ref Range    POC Glucose 60 (L) 70 - 108 mg/dl   POCT glucose   Result Value Ref Range    POC Glucose 106 70 - 108 mg/dl   POCT glucose   Result Value Ref Range    POC Glucose 69 (L) 70 - 108 mg/dl   POCT glucose   Result Value Ref Range    POC Glucose 103 70 - 108 mg/dl   POCT glucose   Result Value Ref Range    POC Glucose 58 (L) 70 - 108 mg/dl   POCT Glucose   Result Value Ref Range    POC Glucose 124 (H) 70 - 108 mg/dl   POCT glucose   Result Value Ref Range    POC Glucose 137 (H) 70 - 108 mg/dl   POCT glucose   Result Value Ref Range    POC Glucose 211 (H) 70 - 108 mg/dl   POCT glucose   Result Value Ref Range    POC Glucose 167 (H) 70 - 108 mg/dl   POCT glucose   Result Value Ref Range    POC Glucose 170 (H) 70 - 108 mg/dl   POCT glucose   Result Value Ref Range    POC Glucose 147 (H) 70 - 108 mg/dl   POCT glucose   Result Value Ref Range    POC Glucose 191 (H) 70 - 108 mg/dl   POCT Glucose   Result Value Ref Range    POC Glucose 193 (H) 70 - 108 mg/dl   POCT glucose   Result Value Ref Range    POC Glucose 243 (H) 70 - 108 mg/dl   EKG Emergency   Result Value Ref Range    Ventricular Rate 61 BPM    QRS Duration 114 ms    Q-T Interval 408 ms    QTc Calculation (Bazett) 410 ms    R Axis 7 degrees    T Axis -109 degrees   EKG 12 Lead   Result Value Ref Range    Ventricular Rate 93 BPM    Atrial Rate 44 BPM    QRS Duration 122 ms    Q-T Interval 360 ms    QTc Calculation (Bazett) 447 ms    R Axis 5 degrees    T Axis 152 degrees       Diagnostics: Imaging:  Echocardiogram limited  Result Date: 10/15/2021  Findings   Mitral Valve  Structurally normal mitral valve. Aortic Valve  Aortic valve appears tricuspid. Aortic valve leaflets are somewhat thickened. Tricuspid Valve  Tricuspid valve is structurally normal.   Pulmonic Valve  The pulmonic valve was not well visualized . Pulmonic valve is structurally normal.   Left Atrium  Mildly dilated left atrium. Left Ventricle  Ejection fraction is visually estimated at 25%. There was severe global hypokinesis of the left ventricle. Right Atrium  Right atrial size was normal.   Right Ventricle  The right ventricular size was normal with normal systolic function and  wall thickness. Pericardial Effusion  No evidence of any pericardial effusion. CT HEAD WO CONTRAST  Result Date: 10/23/2021  1. No acute intracranial abnormality. **This report has been created using voice recognition software. It may contain minor errors which are inherent in voice recognition technology. ** Final report electronically signed by Dr Michela Rodriguez on 10/23/2021 9:15 PM    XR CHEST PORTABLE  Result Date: 10/23/2021  1. Mild prominence of the interstitial pulmonary markings is a nonspecific finding. May relate to chronic underlying process. 2. Mild to moderate cardiomegaly. **This report has been created using voice recognition software. It may contain minor errors which are inherent in voice recognition technology. ** Final report electronically signed by Dr Michela Rodriguez on 10/23/2021 8:25 PM    EKG:   As above    Signed:  Pretty Saleh MD  Internal Medicine, PGY-1  10/25/2021  5:18 PM    Staff: Dr. Rebeka Ayon MD

## 2021-10-25 NOTE — PROGRESS NOTES
Clinical Pharmacy Note    Warfarin consult follow-up    Recent Labs     10/25/21  0436   INR 2.13*     Recent Labs     10/23/21  1830 10/24/21  0323 10/25/21  0436   HGB 8.1* 8.0* 8.5*   HCT 27.2* 27.4* 28.5*   * 117* 121*       Significant Drug-Drug Interactions:  New warfarin drug-drug interactions: ascorbic acid (HM), fenofibrate (HM), levothyroxine (HM)  Discontinued drug-drug interactions: none  Date INR Warfarin Dose   10/23/21 2.68 ? Patient unsure if she took her dose   10/24/2021 --- 2.5 mg    10/25/2021  2.13 4 mg     10/26/2021 --  2.5 mg                             Notes:                   PT/INR or POC-INR will be monitored routinely until therapeutic INR is achieved. Next INR will be 10/27.     Sloan Cranker, PharmD  10/25/2021  1:42 PM

## 2021-10-25 NOTE — CARE COORDINATION
10/25/21, 2:55 PM EDT  DISCHARGE PLANNING EVALUATION:    Karli Mcintosh       Admitted: 10/23/2021/ [facility] day: 2   Location: [facility]25/025-A Reason for admit: Metabolic encephalopathy [S58.09]  Hypoglycemia [E16.2]  Type 2 diabetes mellitus with hypoglycemia without coma, with long-term current use of insulin (HCC) [E11.649, G17.5]  Acute metabolic encephalopathy [U41.69]  Acute renal failure superimposed on chronic kidney disease, unspecified CKD stage, unspecified acute renal failure type ([facility] [N17.9, N18.9]   PMH:  has a past medical history of CAD (coronary artery disease), CRI (chronic renal insufficiency), Difficult intubation, DM (diabetes mellitus) ([facility] GERD (gastroesophageal reflux disease), H/O gastric bypass, Hyperlipidemia, Hypertension, Hypothyroidism, Neuropathy, Obesity, Osteoarthritis, Paroxysmal atrial fibrillation ([facility] Prolonged emergence from general anesthesia, Sleep apnea, Sleep apnea, and Visit for screening mammogram.  Barriers to Discharge:  Encephalopathy/Hypoglycemia w history EF 25% and CHF K+ 6.2, H 8.5, WBC 2.7, , elevated BNP; monitor. IV Diuresing continued  PCP: ARNOL Hercules CNP  Readmission Risk Score: 29.1%    Patient Goals/Plan/Treatment Preferences: plans home w sister [place]; has Passport services w  [employer] [facility], Meals Program, ERS, walker, WC, CPAP; has [facility] (nsg, aide, therapy); current w [facility]; therapy recommends SNF; SW following  Transportation/Food Security/Housekeeping Addressed:  No issues identified.

## 2021-10-25 NOTE — PROGRESS NOTES
Physician Progress Note      PATIENT:               Robert Perales  CSN #:                  [id number]  :                       1955  ADMIT DATE:       10/23/2021 6:13 PM  [address] [place] DATE:  RESPONDING  PROVIDER #:        Migel Levy          QUERY TEXT:    Patient admitted with confusion and AMS. Documentation reflects \"Acute   metabolic encephalopathy\"  in ED Final notes by Shane Erwin MD, on 10/23/21. If possible, please document in the progress notes and discharge summary if   Acute metabolic encephalopathy was: The medical record reflects the following:  Risk Factors: AMS  Clinical Indicators: Final Shane Erwin MD,  : Acute metabolic   encephalopathy  and H/P: Acute recurrent/intermittent encephalopathy,   secondary to iatrogenic hypoglycemia CT head negative. Treatment: CT head, Neruo checks and bed alarms. Thank you. Please call if you have any questions. (P) [phone]. Signed   by Tasha Quesada RN Clinical , CRCR  Options provided:  -- Acute metabolic encephalopathy confirmed after study  -- Acute metabolic encephalopathy treated and resolved  -- Acute metabolic encephalopathy ruled out after study  -- Other - I will add my own diagnosis  -- Disagree - Not applicable / Not valid  -- Disagree - Clinically unable to determine / Unknown  -- Refer to Clinical Documentation Reviewer    PROVIDER RESPONSE TEXT:    Acute metabolic encephalopathy treated and resolved. Query created by: Trista Blackwood on 10/25/2021 10:31 AM      QUERY TEXT:    Patient admitted with AMS and Acute recurrent/intermittent encephalopathy,   secondary to iatrogenic hypoglycemia, noted to have (low RBCs, WBC count or   neutrophils and low platelet count):  WBC 2.9/3.1/2.7, RBC 2.502.52/2.63, and    Platelets 994/914/685 . If possible, please document in progress notes and   discharge summary if you are evaluating and/or treating any of the following:     The medical record reflects the following:  Risk Factors: CKD  Clinical Indicators: WBC 2.9/3.1/2.7, RBC 2.502.52/2.63, Platelets 237/633/517  Treatment: IV fluids and lab monitoring. Thank you. Please call if you have any questions. (P) [phone].   Signed   by Samuel Monte RN Clinical , CRCR  Options provided:  -- Pancytopenia  -- Pancytopenia not clinically significant  -- Other - I will add my own diagnosis  -- Disagree - Not applicable / Not valid  -- Disagree - Clinically unable to determine / Unknown  -- Refer to Clinical Documentation Reviewer    PROVIDER RESPONSE TEXT:    Leukopenia    Query created by: María Elena Holliday on 10/25/2021 10:33 AM      Electronically signed by:  Jessica Shaw 10/25/2021 1:46 PM

## 2021-10-25 NOTE — PROGRESS NOTES
[facility]  PHYSICAL THERAPY MISSED TREATMENT NOTE  [facility] ICU STEPDOWN TELEMETRY 4K    Date: 10/25/2021  Patient Name: Allen Winters        MRN: [id number]   : 1955  (78 y.o.)  Gender: female                REASON FOR MISSED TREATMENT:  Patient refused treatment. Pt reports she has had therapy before and it doesn't help. Pt refusing PT at this time. Pt educated on importance of therapy, but pt states she has had 3 falls and so\" she just wont do it. \" Educated pt that PT will continue to attempt to see, pt said \"that's fine, but i'm not doing it\". Will try back as able. Call received from [facility] [address] asking PT to evaluate for BPPV. Discussed with pt signs and symptoms. Pt reports to PT she has tested positive for vertigo in the past but is not experiencing much dizziness at this time. Pt isn't able to describe the dizziness but does deny \"room spinning\". Pt isn't able to describe when this dizziness occurs and states it doesn't happen often because she doesn't get out of bed much. Pt also falling asleep several times, mid conversation. PT described the testing procedure for BPPV and pt declined at this time. Pt asking PT to try back tomorrow.      Yannick Perez PT, DPT

## 2021-10-26 NOTE — CARE COORDINATION
10/26/21, 7:35 AM EDT  DISCHARGE PLANNING EVALUATION:    Tutu Patel       Admitted: 10/23/2021/ [address] Martinez day: 3   Location: [address] [place] [address] Reason for admit: Metabolic encephalopathy [L02.57]  Hypoglycemia [E16.2]  Type 2 diabetes mellitus with hypoglycemia without coma, with long-term current use of insulin (HCC) [E11.649, J55.3]  Acute metabolic encephalopathy [Q31.67]  Acute renal failure superimposed on chronic kidney disease, unspecified CKD stage, unspecified acute renal failure type ([facility] [N17.9, N18.9]   PMH:  has a past medical history of CAD (coronary artery disease), CRI (chronic renal insufficiency), Difficult intubation, DM (diabetes mellitus) ([facility] GERD (gastroesophageal reflux disease), H/O gastric bypass, Hyperlipidemia, Hypertension, Hypothyroidism, Neuropathy, Obesity, Osteoarthritis, Paroxysmal atrial fibrillation ([facility] Prolonged emergence from general anesthesia, Sleep apnea, Sleep apnea, and Visit for screening mammogram.    Barriers to Discharge:    Encephalopathy/Hypoglycemia w history EF 25% and CHF. (+) Covid-19 infection 10/25, Creatinine 1.8, H 8.7, WBC 2.5, ; monitor. Client moved to ; updated Laquita Dominguez CM  PCP: Iline Phalen, APRN - CNP  Readmission Risk Score: 29.3%    Patient Goals/Plan/Treatment Preferences:   plans home w sister Maire Kocher; has Passport services w CM Charolet Hamman, Meals Program, ERS, walker, WC, CPAP; has [facility] (nsg, aide, therapy); current w [facility]; therapy recommends SNF; SW following  Transportation/Food Security/Housekeeping Addressed:  No issues identified.

## 2021-10-26 NOTE — PROGRESS NOTES
[address] [place] [address] 8B  Occupational Therapy  Daily Note  Time:   Time In: 1232  Time Out: 4914  Timed Code Treatment Minutes: 26 Minutes  Minutes: 26          Date: 10/26/2021  Patient Name: Fredi Reyna,   Gender: female      Room: 8B-38/038-A  MRN: [id number]  : 1955  (78 y.o.)  Referring Practitioner: Marii Bryant DO  Diagnosis: metabolic encephalopathy  Additional Pertinent Hx: Per H&P on 10/23:Karla Walker is a 78 y.o.,  female PMH CAD, CKD 3B, IDDM2, HLD, HTN, Hypothyroidism, paroxysmal atrial fibrillation, UTIs, and sleep apnea who presents with complaints of altered mental status and has had progressive weakness over the last several weeks with occasional vertigo. The patient reports that about 24 hours ago, she was starting to lose sense of where she was and became \"confused\" to the point where she was \"talking off the wall\" and becoming sleepy. The patient was previously in the ED a few days ago for concerns for DVT and cellulitis and was discharged without antibiotics. Restrictions/Precautions:  Restrictions/Precautions: General Precautions, Fall Risk     SUBJECTIVE: Pt. Nurse okayed OT treatment. Pt. In bed upon arrival agreeable to participate in OT services. PAIN: 6/10:LLE    Vitals: Vitals not assessed per clinical judgement, see nursing flowsheet  Oxygen: 95% RA    COGNITION: Decreased Insight and Decreased Safety Awareness    ADL:   No ADL's completed this session. Pinky Angles BALANCE:  Sitting Balance:  Stand By Assistance. while seated at EOB  Standing Balance: [employer], with cues for safety. static standing at EOB    BED MOBILITY:  Supine to Sit: Stand By Assistance, with head of bed raised, with rail, with verbal cues , with increased time for completion  \  Sit to Supine: Stand By Assistance, with verbal cues     Scooting: hercules used       TRANSFERS:  Sit to Stand:  Contact Guard Assistance. Stand to Sit: Contact Guard Assistance. ADDITIONAL ACTIVITIES:  Patient completed BUE strengthening exercises with skilled education on HEP: completed x10 reps x1 set with AROM in all joints and all planes in order to improve UE strength and activity tolerance required for BADL routine and toilet / shower transfers. Patient tolerated , requiring frequent  rest breaks. Patient also required visual and verbal  cues for technique. ASSESSMENT:     Activity Tolerance:  Patient tolerance of  treatment: fair. Discharge Recommendations: Patient would benefit from continued OT at discharge and [facility] benefit from [facility] stay if Pt. available. Equipment Recommendations: Equipment Needed: No  Plan: Times per week: 3-5x  Current Treatment Recommendations: Strengthening, Balance Training, Functional Mobility Training, ROM, Endurance Training, Safety Education & Training, Patient/Caregiver Education & Training, Equipment Evaluation, Education, & procurement, Self-Care / ADL    Patient Education  Patient Education: Home Exercise Program, Importance of Increasing Activity and Assistive Device Safety         Goals  Short term goals  Time Frame for Short term goals: by discharge  Short term goal 1: Pt will safely navigate to/from [facility] or chair with supervision in prep for toileting tasks. Short term goal 2: Pt will complete LB ADLs with supervision to increase independence with donning pants/socks. Short term goal 3: Pt will complete functional transfers with supervision in prep for [facility] transfers. Short term goal 4: Pt will tolerate dynamic standing X2 minutes with supervision in prep for toileting/clothing mgmt    Following session, patient left in safe position with all fall risk precautions in place.

## 2021-10-26 NOTE — PROGRESS NOTES
[facility]  INPATIENT PHYSICAL THERAPY  EVALUATION  [facility] MED SURG 8B - 8B-38/038-A    Time In: 915  Time Out: 940  Timed Code Treatment Minutes: 9 Minutes  Minutes: 25          Date: 10/26/2021  Patient Name: Abbe Johnson,  Gender:  female        MRN: [id number]  : 1955  (78 y.o.)      Referring Practitioner: Dr. Marline Diego  Diagnosis: metabolic encephalopathy  Additional Pertinent Hx: Per H&P on 10/23:Karla Angelo Ba is a 78 y.o.,  female PMH CAD, CKD 3B, IDDM2, HLD, HTN, Hypothyroidism, paroxysmal atrial fibrillation, UTIs, and sleep apnea who presents with complaints of altered mental status and has had progressive weakness over the last several weeks with occasional vertigo. The patient reports that about 24 hours ago, she was starting to lose sense of where she was and became \"confused\" to the point where she was \"talking off the wall\" and becoming sleepy. The patient was previously in the ED a few days ago for concerns for DVT and cellulitis and was discharged without antibiotics.  +COVID-19     Restrictions/Precautions:  Restrictions/Precautions: General Precautions, Fall Risk    Subjective:  Chart Reviewed: Yes  Patient assessed for rehabilitation services?: Yes  Subjective: per pt able to taste her breakfast this am, pt stated needs to return home because her sister would struggle financially if she went to SNF    General:    Hearing: Within functional limits         Pain: LLE-not rated but worse than baseline per pt    Vitals: Oxygen: room air    Social/Functional History:    Lives With: Family (sister)  Home Layout: One level  Home Access: Level entry  Home Equipment: Rolling walker, [employer], Lift chair     Bathroom Shower/Tub: Walk-in shower  Bathroom Toilet: Handicap height  Bathroom Equipment: Grab bars in shower, Shower chair    Receives Help From: Home health, Family (HHA 2x/week for 2 hours to assist with IADLs (besides laundry))  ADL Assistance: Independent  Homemaking Assistance: Needs assistance (sister completes all)  Ambulation Assistance: Independent (short distances only)  Transfer Assistance: Independent          Additional Comments: Reports ind with ADLs and primarily furniture walks short distances, otherwise all other mobility in her wheelchair    OBJECTIVE:  Range of Motion:  Bilateral Lower Extremity: WFL    Strength:  Bilateral Lower Extremity: WFL, generalized weakness    Balance:  Static Sitting Balance:  Modified Independent  Static Standing Balance: Contact Guard Assistance, to SBA with BUE support on furniture    Bed Mobility:  Rolling to Left: Stand By Assistance   Supine to Sit: Stand By Assistance  Scooting: Stand By Assistance  HOB up   Transfers:  Sit to Stand: Stand By Assistance  Stand to Sit:Stand By Assistance    Ambulation:  Stand By Assistance  Distance: 3'x1  Surface: Level Tile  Device:UE support on furniture  Gait Deviations: Forward Flexed Posture, Unsteady Gait and pt impulsive and \"plop\" or quick descent to chair at end of amb, fatigued quickly        Functional Outcome Measures: Completed  AM-PAC Inpatient Mobility Raw Score : 15  AM-PAC Inpatient T-Scale Score : 39.45    ASSESSMENT:  Activity Tolerance:  Patient tolerance of  treatment: good. Treatment Initiated: Treatment and education initiated within context of evaluation. Evaluation time included review of current medical information, gathering information related to past medical, social and functional history, completion of standardized testing, formal and informal observation of tasks, assessment of data and development of plan of care and goals. Treatment time included skilled education and facilitation of tasks to increase safety and independence with functional mobility for improved independence and quality of life. Assessment:   Body structures, Functions, Activity limitations: Decreased functional mobility , Decreased endurance, Decreased strength, Decreased balance, Increased pain  Assessment: pt on room air with +COVID-19, c/o chronic pain in LLE that is worse than baseline, generalized weakness, dec balance, inc assist for safe mobility, recommend cont PT to inc pt I with functional mobility  Prognosis: Good    REQUIRES PT FOLLOW UP: Yes    Discharge Recommendations:  Discharge Recommendations: Continue to assess pending progress, Patient would benefit from continued therapy after discharge (pt would benefit from SNF stay for 24 hr assist and safety however if continues to refuse would recommend [place])    Patient Education:  PT Education: Goals, PT Role, Plan of Care, Functional Mobility Training    Equipment Recommendations:  Equipment Needed: No    Plan:  Times per week: 3-5X GM  Times per day: Daily  Specific instructions for Next Treatment: therex and mobility    Goals:  Patient goals : return home  Short term goals  Time Frame for Short term goals: by discharge  Short term goal 1: bed mobility with MOD I to get in/out of bed  Short term goal 2: transfer with MOD I to get in/out of chairs  Short term goal 3: amb 5'x1 with UE support on furniture and MOD I to get bed to/from w/c safely in home  Long term goals  Time Frame for Long term goals : no LTGs set secondary to short ELOS    Following session, patient left in safe position with all fall risk precautions in place.

## 2021-10-26 NOTE — PROGRESS NOTES
Medicine Progress Note    Patient:  Jodi Carrera    Unit/Bed:8B-38/038-A  YOB: 1955  MRN: [id number]   Acct: [de-identified]   PCP: ARNOL Deras - CNP  Date of Admission: 10/23/2021    Assessment / Plan     Briefly, pt Jodi Carrera is a 78 y.o. female with a PMH of Non-obstructive CAD s/p C 07/2019, CKDS3B, IDDMT2 A1c 6.0 10/21/2021, HLD, HTN, Hypothyroidism, PAF, recurrent UTIs, and DARWIN who presented with encephalopathy 2/2 hypoglycemia. #COVID-19: Active  Pt described new symptoms: loss of smell and taste. COVID-19 test was ordered 10/25/2021 and the result was positive. Uncertain when pt was exposed. Plan:  -Transfer to [place] [id number] Med/Surg floor  -Droplet precautions  -Currently on RA and does not qualify for steroids, baricitinib, or tocilizumab  -Will perform further assessment if pt becomes short of breath. #Acute on Chronic Leukopenia: Active  WBC 2.7 on baseline 4.6. Likely 2/2 COVID-19. Plan: Monitor with daily CBC. #Well-controlled IDDMT2 A1c 6.0 in 10/21/2021: Active  Recently switched from insulin 75/25 to basal bolus regimen. Diabetic Nephropathy: mAlb:Cr 8355 07/01/2021  Diabetic Retinopathy: 08/30/2019 - no retinopathy  Diabetic Neuropathy: Bilateral LE sensation decreased to pinprick. Plan:  -Start Low Dose SSI Algorithm  -Hypoglycemia protocol  -POCT glucose qAC/HS   -Start glipizide 10 mg daily    #Acute on Intermittent Encephalopathy 2/2 Hypoglycemia: Resolving  BG (minimum) 54. Pt presented confused. Plan:   -Continue to monitor mentation with q4 neuro checks; de-escalated as appropriate  -Discontinue home insulin regimen    #LLE Cellulitis: Resolving  On chronic keflex  Plan: Continue keflex    #Rebound Hyperkalemia 2/2 Insulin Cessation: Resolved  10/25: serum potassium 6.2. EKG w/o peaked T-waves or increasing QTc. Gave Kayexalate, furosemide, and calcium gluconate x1. Resolved in PM to 4.8.      #Non-oliguric CKD Stage IIIB eGFR 30 mL/min/1.73m2: Stable   Serum creatinine 1.7 on baseline 1.8. Secondary to DM Nephropathy vs HTN arteriosclerosis. Plan:  -Strict I&Os  -Avoid nephrotoxic substances  -BMP daily  -Will consider nephrology consult    #Chronic Macrocytic Anemia: Stable  Hgb 8.1, .8. 10/24/21 B12 1002. Plan:  -Continue folate supplementation  -Continue ferrous sulfate tabs  -Daily CBC  -Transfuse if Hgb <7.0 mg/dL or <8.0 and s/s. #Chronic Systolic + Diastolic Heart Failure with EF 25% on 10/15/2021, NYHA Class III: Stable  Pro-BNP on 10/23/2021 was 10594 from baseline of >70,000. CXR on 10/23/2021 demonstrated mild interstitial pulmonary markings which are chronic + cardiomegaly. GDMT at home: metoprolol succinate 100 mg daily and valsartan 80 mg daily. Diuretics at home: none. On Digoxin 125 mcg daily. Follows in HF Clinic: Yes. AICD in place. Digoxin level 10/23/2021 0.5. Plan:   -Strict I&Os  -Daily weights  -Continue digoxin; daily levels, maintain b/t 0.5 - 0.9 ng/mL    #Paroxysmal Non-valvular Atrial Fibrillation on Warfarin: Stable  EKG on admit: Afib. [id number] 5. Rate control at home: metoprolol succinate 100 mg daily. Rhythm control at home: none. Plan:  -Daily BMP + serum magnesium  -Maintain potassium > 4.0; Initiate potassium replacement protocol  -Maintain magnesium > 2.0; Initiate magnesium replacement protocol    #Acquired Hypothyroidism: Stable  On levothyroxine  Plan: Continue levothyroxine. #Non-obstructive CAD: Stable  [facility] on 07/03/2019: 50 disease in proximal, mid, and distal RCA.  On ASA 81 mg.  Plan: Continue ASA 81 mg    #Elevated troponin: Stable  0.012, 0.011, equivocal. Likely 2/2 demand ischemia  Plan: Noted    #Hypertriglyceridemia: Stable  On fenofibrate  Plan: Continue fenofibrate    #DARWIN on CPAP: Stable  Plan: Continue CPAP    Dispo: Med/Surg    Fluids: Encourage PO fluid intake  Electrolyte: Replete as needed  Nutrition: Regular, 4g carb  GI: none  DVT ppx:  lovenox  PT/OT/SLP: PT/OT as needed    Code status: Full Code No additional code details    Subjective     Pt is resting comfortably in bed. No acute events overnight. Pt is feeling better today. Hospital Course     Vashti Choi is a 78 y.o. female presented with encephalopathy 2/2 hypoglycemia. Discontinued insulin.  Started glipizide    Objective     Vitals:     BP (!) 157/95   Pulse 97   Temp 98.1 °F (36.7 °C) (Oral)   Resp 18   Ht 5' 7\" (1.702 m)   Wt 229 lb 11.5 oz (104.2 kg)   LMP 02/20/2001   SpO2 97%   BMI 35.98 kg/m²     Intake and Output:     No intake or output data in the 24 hours ending 10/26/21 1410    Outpatient Medications:     Scheduled Meds:   glipiZIDE  10 mg Oral QAM AC    warfarin  2.5 mg Oral Once    warfarin (COUMADIN) daily dosing (placeholder)   Other RX Placeholder    influenza virus vaccine  0.5 mL IntraMUSCular Prior to discharge    insulin lispro  0-6 Units SubCUTAneous TID WC    insulin lispro  0-3 Units SubCUTAneous Nightly    cephALEXin  500 mg Oral 4 times per day    ascorbic acid  500 mg Oral BID    aspirin  81 mg Oral Daily    atorvastatin  40 mg Oral Nightly    oyster shell calcium w/D  1 tablet Oral BID WC    Vitamin D  2,000 Units Oral Daily    digoxin  62.5 mcg Oral Daily    DULoxetine  60 mg Oral Daily    fenofibrate  160 mg Oral Daily    ferrous sulfate  325 mg Oral Daily with breakfast    folic acid  1 mg Oral Daily    levothyroxine  75 mcg Oral Daily    metoprolol succinate  100 mg Oral Daily    valsartan  80 mg Oral Daily    zinc sulfate  50 mg Oral Daily    sodium chloride flush  5-40 mL IntraVENous 2 times per day    furosemide  40 mg IntraVENous Daily    pantoprazole  40 mg Oral QAM AC     Continuous Infusions:   sodium chloride      dextrose       PRN Meds:muscle rub, [Held by provider] HYDROcodone-acetaminophen, meclizine, sodium chloride flush, sodium chloride, acetaminophen **OR** acetaminophen, glucose, dextrose, glucagon (rDNA), dextrose    Physical Exam: Physical Exam  Vitals and nursing note reviewed. Constitutional:       General: She is awake. Appearance: Normal appearance. She is not ill-appearing or diaphoretic. HENT:      Head: Normocephalic and atraumatic. Right Ear: External ear normal.      Left Ear: External ear normal.      Nose: Nose normal.      Mouth/Throat:      Mouth: Mucous membranes are moist.      Pharynx: Oropharynx is clear. No oropharyngeal exudate or posterior oropharyngeal erythema. Eyes:      General: Lids are normal. No scleral icterus. Extraocular Movements: Extraocular movements intact. Pupils: Pupils are equal, round, and reactive to light. Cardiovascular:      Rate and Rhythm: Normal rate and regular rhythm. Pulses: Normal pulses. Radial pulses are 2+ on the right side and 2+ on the left side. Posterior tibial pulses are 2+ on the right side and 2+ on the left side. Heart sounds: Normal heart sounds. No murmur heard. No friction rub. No gallop. Pulmonary:      Effort: Pulmonary effort is normal.      Breath sounds: Normal breath sounds. No wheezing, rhonchi or rales. Abdominal:      General: Bowel sounds are normal.      Palpations: Abdomen is soft. Tenderness: There is no abdominal tenderness. There is no guarding or rebound. Musculoskeletal:      Cervical back: Normal range of motion and neck supple. Right lower leg: Edema (1+, pitting) present. Left lower leg: Edema (1+, pitting) present. Skin:     General: Skin is warm and dry. Neurological:      General: No focal deficit present. Mental Status: She is alert and oriented to person, place, and time. Mental status is at baseline. GCS: GCS eye subscore is 4. GCS verbal subscore is 5. GCS motor subscore is 6.       Coordination: Abnormal coordination:    Psychiatric:         Attention and Perception: Attention and perception normal.         Mood and Affect: Mood and affect normal. Speech: Speech normal.         Behavior: Behavior normal. Behavior is cooperative. Thought Content:  Thought content normal.         Cognition and Memory: Cognition and memory normal.         Judgment: Judgment normal.         Labs:     Results for orders placed or performed during the hospital encounter of 10/23/21   Culture, Blood 1    Specimen: Blood   Result Value Ref Range    Blood Culture, Routine No growth-preliminary     Culture, Blood 2    Specimen: Blood   Result Value Ref Range    Blood Culture, Routine No growth-preliminary     VRE Screen by PCR    Specimen: Rectal Swab   Result Value Ref Range    Vancomycin Resistant Enterococcus NEGATIVE    Culture, MRSA, Screening    Specimen: Rectum   Result Value Ref Range    MRSA SCREEN No MRSA isolated     COVID-19, Rapid    Specimen: Nasopharyngeal Swab   Result Value Ref Range    SARS-CoV-2, NAAT DETECTED (AA) NOT DETECTED   CBC Auto Differential   Result Value Ref Range    WBC 2.9 (L) 4.8 - 10.8 thou/mm3    RBC 2.50 (L) 4.20 - 5.40 mill/mm3    Hemoglobin 8.1 (L) 12.0 - 16.0 gm/dl    Hematocrit 27.2 (L) 37.0 - 47.0 %    .8 (H) 81.0 - 99.0 fL    MCH 32.4 26.0 - 33.0 pg    MCHC 29.8 (L) 32.2 - 35.5 gm/dl    RDW-CV 15.9 (H) 11.5 - 14.5 %    RDW-SD 63.5 (H) 35.0 - 45.0 fL    Platelets 033 (L) 660 - 400 thou/mm3    MPV 11.0 9.4 - 12.4 fL    Seg Neutrophils 67.0 %    Lymphocytes 17.2 %    Monocytes 14.5 %    Eosinophils 0.3 %    Basophils 0.7 %    Immature Granulocytes 0.3 %    Segs Absolute 1.9 1 - 7 thou/mm3    Lymphocytes Absolute 0.5 (L) 1.0 - 4.8 thou/mm3    Monocytes Absolute 0.4 0.4 - 1.3 thou/mm3    Eosinophils Absolute 0.0 0.0 - 0.4 thou/mm3    Basophils Absolute 0.0 0.0 - 0.1 thou/mm3    Immature Grans (Abs) 0.01 0.00 - 0.07 thou/mm3    nRBC 0 /100 wbc   Basic Metabolic Panel w/ Reflex to MG   Result Value Ref Range    Sodium 133 (L) 135 - 145 meq/L    Potassium reflex Magnesium 5.3 (H) 3.5 - 5.2 meq/L    Chloride 104 98 - 111 meq/L    CO2 19 WBC, UA 0-2 0-4/hpf /hpf    Epithelial Cells, UA NONE SEEN 3-5/hpf /hpf    Bacteria, UA NONE SEEN FEW/NONE SEEN /hpf    Casts UA NONE SEEN NONE SEEN /lpf    Crystals, UA NONE SEEN NONE SEEN    Renal Epithelial, UA NONE SEEN NONE SEEN    Yeast, UA NONE SEEN NONE SEEN    CASTS 2 NONE SEEN NONE SEEN /lpf    MISCELLANEOUS 2 NONE SEEN    Anion Gap   Result Value Ref Range    Anion Gap 10.0 8.0 - 16.0 meq/L   Glomerular Filtration Rate, Estimated   Result Value Ref Range    Est, Glom Filt Rate 28 (A) ml/min/1.73m2   Lactate, Sepsis   Result Value Ref Range    Lactic Acid, Sepsis 0.8 0.5 - 1.9 mmol/L   Scan of Blood Smear   Result Value Ref Range    SCAN OF BLOOD SMEAR see below    T4, Free   Result Value Ref Range    T4 Free 1.22 0.93 - 1.76 ng/dL   Troponin   Result Value Ref Range    Troponin T 0.011 (A) ng/ml   Blood gas, venous   Result Value Ref Range    PH MIXED 7.35 7.31 - 7.41    PCO2, MIXED VENOUS 35 (L) 41 - 51 mmhg    PO2, Mixed 17 (L) 25 - 40 mmhg    HCO3, Mixed 19 (L) 23 - 28 mmol/l    Base Exc, Mixed -5.7 (L) -2.0 - 3.0 mmol/l    O2 Sat, Mixed 22 %    COLLECTED BY: [id number]    Glucose, Whole Blood   Result Value Ref Range    Glucose, Whole Blood 54 (L) 70 - 108 mg/dl   MRSA by PCR   Result Value Ref Range    MRSA SCREEN RT-PCR NEGATIVE    Basic Metabolic Panel w/ Reflex to MG x3   Result Value Ref Range    Sodium 133 (L) 135 - 145 meq/L    Potassium reflex Magnesium 5.4 (H) 3.5 - 5.2 meq/L    Chloride 104 98 - 111 meq/L    CO2 21 (L) 23 - 33 meq/L    Glucose 60 (L) 70 - 108 mg/dL    BUN 41 (H) 7 - 22 mg/dL    CREATININE 1.7 (H) 0.4 - 1.2 mg/dL    Calcium 8.4 (L) 8.5 - 10.5 mg/dL   CBC x3   Result Value Ref Range    WBC 3.1 (L) 4.8 - 10.8 thou/mm3    RBC 2.52 (L) 4.20 - 5.40 mill/mm3    Hemoglobin 8.0 (L) 12.0 - 16.0 gm/dl    Hematocrit 27.4 (L) 37.0 - 47.0 %    .7 (H) 81.0 - 99.0 fL    MCH 31.7 26.0 - 33.0 pg    MCHC 29.2 (L) 32.2 - 35.5 gm/dl    RDW-CV 16.3 (H) 11.5 - 14.5 %    RDW-SD 65.5 (H) 35.0 - 45.0 fL    Platelets 563 (L) 686 - 400 thou/mm3    MPV 11.5 9.4 - 12.4 fL   Troponin   Result Value Ref Range    Troponin T 0.013 (A) ng/ml   Vitamin B12   Result Value Ref Range    Vitamin B-12 1002 (H) 211 - 911 pg/mL   Procalcitonin   Result Value Ref Range    Procalcitonin 0.19 (H) 0.01 - 0.09 ng/mL   Anion Gap   Result Value Ref Range    Anion Gap 8.0 8.0 - 16.0 meq/L   Glomerular Filtration Rate, Estimated   Result Value Ref Range    Est, Glom Filt Rate 30 (A) ml/min/1.73m2   CBC x3   Result Value Ref Range    WBC 2.7 (L) 4.8 - 10.8 thou/mm3    RBC 2.63 (L) 4.20 - 5.40 mill/mm3    Hemoglobin 8.5 (L) 12.0 - 16.0 gm/dl    Hematocrit 28.5 (L) 37.0 - 47.0 %    .4 (H) 81.0 - 99.0 fL    MCH 32.3 26.0 - 33.0 pg    MCHC 29.8 (L) 32.2 - 35.5 gm/dl    RDW-CV 16.4 (H) 11.5 - 14.5 %    RDW-SD 65.3 (H) 35.0 - 45.0 fL    Platelets 424 (L) 037 - 400 thou/mm3    MPV 11.2 9.4 - 12.4 fL   Protime-INR   Result Value Ref Range    INR 2.13 (H) 0.85 - 9.61   Basic Metabolic Panel w/ Reflex to MG   Result Value Ref Range    Sodium 132 (L) 135 - 145 meq/L    Potassium reflex Magnesium 6.2 (HH) 3.5 - 5.2 meq/L    Chloride 103 98 - 111 meq/L    CO2 18 (L) 23 - 33 meq/L    Glucose 163 (H) 70 - 108 mg/dL    BUN 41 (H) 7 - 22 mg/dL    CREATININE 1.8 (H) 0.4 - 1.2 mg/dL    Calcium 8.8 8.5 - 10.5 mg/dL   Anion Gap   Result Value Ref Range    Anion Gap 11.0 8.0 - 16.0 meq/L   Glomerular Filtration Rate, Estimated   Result Value Ref Range    Est, Glom Filt Rate 28 (A) YQ/PMH/9.74P6   Basic Metabolic Panel   Result Value Ref Range    Sodium 133 (L) 135 - 145 meq/L    Potassium 4.8 3.5 - 5.2 meq/L    Chloride 102 98 - 111 meq/L    CO2 19 (L) 23 - 33 meq/L    Glucose 248 (H) 70 - 108 mg/dL    BUN 41 (H) 7 - 22 mg/dL    CREATININE 1.8 (H) 0.4 - 1.2 mg/dL    Calcium 8.8 8.5 - 10.5 mg/dL   Anion Gap   Result Value Ref Range    Anion Gap 12.0 8.0 - 16.0 meq/L   Glomerular Filtration Rate, Estimated   Result Value Ref Range    Est, Glom Filt Rate 28 (A) ml/min/1.73m2   CBC auto differential   Result Value Ref Range    WBC 2.5 (L) 4.8 - 10.8 thou/mm3    RBC 2.64 (L) 4.20 - 5.40 mill/mm3    Hemoglobin 8.7 (L) 12.0 - 16.0 gm/dl    Hematocrit 27.8 (L) 37.0 - 47.0 %    .3 (H) 81.0 - 99.0 fL    MCH 33.0 26.0 - 33.0 pg    MCHC 31.3 (L) 32.2 - 35.5 gm/dl    RDW-CV 16.4 (H) 11.5 - 14.5 %    RDW-SD 63.0 (H) 35.0 - 45.0 fL    Platelets 063 (L) 208 - 400 thou/mm3    MPV 10.8 9.4 - 12.4 fL    Seg Neutrophils 68.2 %    Lymphocytes 17.1 %    Monocytes 13.9 %    Eosinophils 0.0 %    Basophils 0.4 %    Immature Granulocytes 0.4 %    Segs Absolute 1.7 (L) 1 - 7 thou/mm3    Lymphocytes Absolute 0.4 (L) 1.0 - 4.8 thou/mm3    Monocytes Absolute 0.3 (L) 0.4 - 1.3 thou/mm3    Eosinophils Absolute 0.0 0.0 - 0.4 thou/mm3    Basophils Absolute 0.0 0.0 - 0.1 thou/mm3    Immature Grans (Abs) 0.01 0.00 - 0.07 thou/mm3    nRBC 0 /100 wbc   Comprehensive Metabolic Panel w/ Reflex to MG   Result Value Ref Range    Glucose 270 (H) 70 - 108 mg/dL    CREATININE 1.8 (H) 0.4 - 1.2 mg/dL    BUN 42 (H) 7 - 22 mg/dL    Sodium 133 (L) 135 - 145 meq/L    Potassium reflex Magnesium 4.6 3.5 - 5.2 meq/L    Chloride 101 98 - 111 meq/L    CO2 20 (L) 23 - 33 meq/L    Calcium 8.7 8.5 - 10.5 mg/dL    AST 40 5 - 40 U/L    Alkaline Phosphatase 51 38 - 126 U/L    Total Protein 5.7 (L) 6.1 - 8.0 g/dL    Albumin 2.7 (L) 3.5 - 5.1 g/dL    Total Bilirubin 1.0 0.3 - 1.2 mg/dL    ALT 16 11 - 66 U/L   Anion Gap   Result Value Ref Range    Anion Gap 12.0 8.0 - 16.0 meq/L   Glomerular Filtration Rate, Estimated   Result Value Ref Range    Est, Glom Filt Rate 28 (A) ml/min/1.73m2   POCT Glucose   Result Value Ref Range    POC Glucose 122 (H) 70 - 108 mg/dl   POCT glucose   Result Value Ref Range    Glucose 97 mg/dL   POCT glucose   Result Value Ref Range    POC Glucose 97 70 - 108 mg/dl   POCT glucose   Result Value Ref Range    Glucose 134 mg/dL   POCT glucose   Result Value Ref Range    POC Glucose 134 (H) 70 - 108 mg/dl   POCT Glucose   Result Value Ref Range    POC Glucose 153 (H) 70 - 108 mg/dl   POCT Glucose   Result Value Ref Range    POC Glucose 83 70 - 108 mg/dl   POCT glucose   Result Value Ref Range    POC Glucose 60 (L) 70 - 108 mg/dl   POCT glucose   Result Value Ref Range    POC Glucose 106 70 - 108 mg/dl   POCT glucose   Result Value Ref Range    POC Glucose 69 (L) 70 - 108 mg/dl   POCT glucose   Result Value Ref Range    POC Glucose 103 70 - 108 mg/dl   POCT glucose   Result Value Ref Range    POC Glucose 58 (L) 70 - 108 mg/dl   POCT Glucose   Result Value Ref Range    POC Glucose 124 (H) 70 - 108 mg/dl   POCT glucose   Result Value Ref Range    POC Glucose 137 (H) 70 - 108 mg/dl   POCT glucose   Result Value Ref Range    POC Glucose 211 (H) 70 - 108 mg/dl   POCT glucose   Result Value Ref Range    POC Glucose 167 (H) 70 - 108 mg/dl   POCT glucose   Result Value Ref Range    POC Glucose 170 (H) 70 - 108 mg/dl   POCT glucose   Result Value Ref Range    POC Glucose 147 (H) 70 - 108 mg/dl   POCT glucose   Result Value Ref Range    POC Glucose 191 (H) 70 - 108 mg/dl   POCT Glucose   Result Value Ref Range    POC Glucose 193 (H) 70 - 108 mg/dl   POCT glucose   Result Value Ref Range    POC Glucose 243 (H) 70 - 108 mg/dl   POCT glucose   Result Value Ref Range    POC Glucose 199 (H) 70 - 108 mg/dl   POCT glucose   Result Value Ref Range    POC Glucose 242 (H) 70 - 108 mg/dl   POCT glucose   Result Value Ref Range    POC Glucose 247 (H) 70 - 108 mg/dl   EKG Emergency   Result Value Ref Range    Ventricular Rate 61 BPM    QRS Duration 114 ms    Q-T Interval 408 ms    QTc Calculation (Bazett) 410 ms    R Axis 7 degrees    T Axis -109 degrees   EKG 12 Lead   Result Value Ref Range    Ventricular Rate 93 BPM    Atrial Rate 44 BPM    QRS Duration 122 ms    Q-T Interval 360 ms    QTc Calculation (Bazett) 447 ms    R Axis 5 degrees    T Axis 152 degrees       Diagnostics:     Imaging:  Echocardiogram limited  Result Date: 10/15/2021  Findings   Mitral Valve  Structurally normal mitral valve. Aortic Valve  Aortic valve appears tricuspid. Aortic valve leaflets are somewhat thickened. Tricuspid Valve  Tricuspid valve is structurally normal.   Pulmonic Valve  The pulmonic valve was not well visualized . Pulmonic valve is structurally normal.   Left Atrium  Mildly dilated left atrium. Left Ventricle  Ejection fraction is visually estimated at 25%. There was severe global hypokinesis of the left ventricle. Right Atrium  Right atrial size was normal.   Right Ventricle  The right ventricular size was normal with normal systolic function and  wall thickness. Pericardial Effusion  No evidence of any pericardial effusion. CT HEAD WO CONTRAST  Result Date: 10/23/2021  1. No acute intracranial abnormality. **This report has been created using voice recognition software. It may contain minor errors which are inherent in voice recognition technology. ** Final report electronically signed by Dr Delicia Muller on 10/23/2021 9:15 PM    XR CHEST PORTABLE  Result Date: 10/23/2021  1. Mild prominence of the interstitial pulmonary markings is a nonspecific finding. May relate to chronic underlying process. 2. Mild to moderate cardiomegaly. **This report has been created using voice recognition software. It may contain minor errors which are inherent in voice recognition technology. ** Final report electronically signed by Dr Delicia Muller on 10/23/2021 8:25 PM    EKG:   As above    Signed:  Lissy Stewart MD  Internal Medicine, PGY-1  10/26/2021  2:10 PM    Staff: Dr. Dary Mccloud MD

## 2021-10-26 NOTE — TELEPHONE ENCOUNTER
Scheduling  CT Lung screening 11/18/21 @ [facility] @ 3:00pm   Arrive @ 2;30 to 1st floor outpt [facility] testing     Bring ID insurance cards and wear mask

## 2021-10-27 NOTE — FLOWSHEET NOTE
Stephen  PHYSICAL THERAPY MISSED TREATMENT NOTE  ACUTE CARE  [facility] 8B              Missed Treatment  Pt politely declines session this date due to fatigue.  Pt stating she cant do it despite encouragement

## 2021-10-27 NOTE — CARE COORDINATION
10/27/21, 3:13 PM EDT    DISCHARGE PLANNING EVALUATION      Spoke with patient regarding discharge plan. Recommending SNF for rehab prior to return home. Informed there are only tow SNF's that accept covid patient, [address] and [facility]. Patient was sleepy and lethargic, unable to have a conversation and make a decision. Patient agreeable to call sister for decisions. Spoke with Yamilet Neves, sister, informed of above. Yamilet Neves stated that she does not want patient to go to either SNF, wants her home. Patient is able to manage at home, per Yamilet Neves and she is able to assist.  Yamilet Neves would like [facility], nursing, PT & OT. Advised patient could be discharged tomorrow. Spoke with Jere Casas at [facility], informed of above. Will update tomorrow if patient is discharged and will have new [facility] orders.

## 2021-10-27 NOTE — FLOWSHEET NOTE
[facility] PROGRESS NOTE      Patient: Abbe Johnson  Room #: 9F-18/708-P            YOB: 1955  Age: 78 y.o. Gender: female            Admit Date & Time: 10/23/2021  6:13 PM    Assessment:  Jomar Avery is in bed and in isolation on the 8B Covid room due to high flow. With a Caper on this  went into her room. Jomar Avery seemed very tired. Interventions:  Prayer for her healing    Outcomes:  encouraged    Plan: 1. Care Plan:  Continue spiritual and emotional care for patient and family. Including prayers.      Electronically signed by Jeanette Wharton on 10/27/2021 at 5:03 PM.  [address] [facility]  [phone]

## 2021-10-27 NOTE — PROGRESS NOTES
Clinical Pharmacy Note    Warfarin consult follow-up    Recent Labs     10/27/21  0631   INR 2.17*     Recent Labs     10/25/21  0436 10/26/21  0405 10/27/21  0631   HGB 8.5* 8.7* 9.5*   HCT 28.5* 27.8* 30.9*   * 112* 117*     Significant Drug-Drug Interactions:  New warfarin drug-drug interactions: none  Discontinued drug-drug interactions: none  Current drug-drug interactions: ascorbic acid (HM), fenofibrate (HM),  levothyroxine (HM)    Date INR Warfarin Dose   10/23/21 2.68 ?  Patient unsure if she took her dose   10/24/2021 --- 2.5 mg    10/25/2021  2.13 4 mg     10/26/2021 --  2.5 mg    10/27/2021  2.17  2.5 mg                     Notes:                   PT/INR or POC-INR will be monitored routinely until therapeutic INR is achieved    Carly Treviño PharmD, BCPS, BCCCP  10/27/2021 2:23 PM

## 2021-10-27 NOTE — PROGRESS NOTES
Hospitalist Progress Note    Patient:  Vicki Argueta      Unit/Bed:8B-38/038-A    YOB: 1955    MRN: [id number]       Acct: [de-identified]     PCP: ARNOL Powell CNP    Date of Admission: 10/23/2021    Assessment/Plan:    1. COVID 19 infection:  O2 sats dropped to 89% today, more lethargic. Check CRP, procal, chest xray. Start dexamethasone. Pharmacy c/s for Remdesivir vs Barcitinib. Encourage pulmonary toiletry. 2. Acute hypoxemic respiratory failure: Secondary to COVID-19. Plan as above. Wean O2 when able. 3. Paroxysmal nonvalvular atrial fibrillation: Currently rate controlled, continue with metoprolol. Patient is on Coumadin, monitor INR daily. 4. Lethargy: DC pain medications. 5. DARWIN:  CPAP  6. LLE cellulitis: Patient is on chronic Keflex  7. Hypothyroidism: Continue with Synthroid   8. Hyperkalemia: resolved  9. Weakness: PT/OT  10. Disposition: Discharge planning, recommend the patient be evaluated for SNF however patient's sister states that she will take her home and is open to home health but not SNF placement. PT OT to reevaluate patient tomorrow. Subjective (past 24 hours):   Patient seen and examined at bedside, patient is very tired and lethargic today. Per staff patient received pain medications. They have been using bedside commode she is not ambulating very well. Patient denies shortness of breath and cough but cannot stay awake throughout our conversation.       Medications:  Reviewed    Infusion Medications    sodium chloride      dextrose       Scheduled Medications    warfarin  2.5 mg Oral Once    dexamethasone  6 mg IntraVENous Daily    glipiZIDE  10 mg Oral QAM AC    warfarin (COUMADIN) daily dosing (placeholder)   Other RX Placeholder    influenza virus vaccine  0.5 mL IntraMUSCular Prior to discharge    insulin lispro  0-6 Units SubCUTAneous TID WC    insulin lispro  0-3 Units SubCUTAneous Nightly    cephALEXin  500 mg Oral 4 times per day    ascorbic acid  500 mg Oral BID    aspirin  81 mg Oral Daily    atorvastatin  40 mg Oral Nightly    oyster shell calcium w/D  1 tablet Oral BID WC    Vitamin D  2,000 Units Oral Daily    digoxin  62.5 mcg Oral Daily    DULoxetine  60 mg Oral Daily    fenofibrate  160 mg Oral Daily    ferrous sulfate  325 mg Oral Daily with breakfast    folic acid  1 mg Oral Daily    levothyroxine  75 mcg Oral Daily    metoprolol succinate  100 mg Oral Daily    valsartan  80 mg Oral Daily    zinc sulfate  50 mg Oral Daily    sodium chloride flush  5-40 mL IntraVENous 2 times per day    furosemide  40 mg IntraVENous Daily    pantoprazole  40 mg Oral QAM AC     PRN Meds: muscle rub, meclizine, sodium chloride flush, sodium chloride, acetaminophen **OR** acetaminophen, glucose, dextrose, glucagon (rDNA), dextrose      Intake/Output Summary (Last 24 hours) at 10/27/2021 1452  Last data filed at 10/27/2021 0811  Gross per 24 hour   Intake 760 ml   Output --   Net 760 ml       Diet:  ADULT DIET; Regular; 4 carb choices (60 gm/meal)    Exam:  /77   Pulse 111   Temp 99.1 °F (37.3 °C) (Oral)   Resp 18   Ht 5' 7\" (1.702 m)   Wt 231 lb 1.6 oz (104.8 kg)   LMP 02/20/2001   SpO2 (!) 89%   BMI 36.20 kg/m²     General appearance: No apparent distress, appears stated age and cooperative. Respiratory:  Normal respiratory effort. Clear to auscultation, bilaterally without Rales/Wheezes/Rhonchi. Cardiovascular: Regular rate and rhythm with normal S1/S2 without murmurs, rubs or gallops. Abdomen: Soft, non-tender, non-distended with normal bowel sounds.   Extremities: no pedal edema  Skin:  no rashes    Labs:   Recent Labs     10/25/21  0436 10/26/21  0405 10/27/21  0631   WBC 2.7* 2.5* 2.8*   HGB 8.5* 8.7* 9.5*   HCT 28.5* 27.8* 30.9*   * 112* 117*     Recent Labs     10/25/21  1551 10/26/21  0405 10/27/21  0631   * 133* 131*   K 4.8 4.6 4.7    101 99   CO2 19* 20* 19*   BUN 41* 42* 38* CREATININE 1.8* 1.8* 1.6*   CALCIUM 8.8 8.7 8.6     Recent Labs     10/26/21  0405   AST 40   ALT 16   BILITOT 1.0   ALKPHOS 51     Recent Labs     10/25/21  0436 10/27/21  0631   INR 2.13* 2.17*     No results for input(s): Trista Height in the last 72 hours. No results for input(s): PROCAL in the last 72 hours. Microbiology:      Urinalysis:      Lab Results   Component Value Date    NITRU NEGATIVE 10/23/2021    WBCUA 0-2 10/23/2021    BACTERIA NONE SEEN 10/23/2021    RBCUA 0-2 10/23/2021    BLOODU NEGATIVE 10/23/2021    SPECGRAV 1.020 01/26/2020    GLUCOSEU NEGATIVE 10/23/2021       Radiology:  CT HEAD WO CONTRAST    Result Date: 10/23/2021  PROCEDURE: CT HEAD WO CONTRAST CLINICAL INFORMATION:concern for vertigo and confusion. COMPARISON: CT head 8/2/2021 TECHNIQUE: 5 mm axial imaging through the head without IV contrast. All CT scans at this facility use dose modulation, iterative reconstruction, and/or weight based dosing when appropriate to reduce the radiation dose to as low as reasonably achievable. FINDINGS: Mild intracranial atherosclerotic calcifications noted. Very mild cerebral volume loss. A few scattered white matter hypodensities are seen which are nonspecific. Old infarcts seen in the bilateral basal ganglion. No ventriculomegaly. No midline shift or mass effect. No acute intracranial hemorrhage. No intracranial collection. Gray-white differentiation is unremarkable. The posterior fossa is unremarkable. The craniocervical junction is unremarkable. No acute bony abnormality. Mild mucosal thickening of the ethmoid sinuses. Otherwise, the remaining visualized paranasal sinuses are clear. The  mastoid air cells are clear. There has been right lens surgery. Otherwise the orbits are unremarkable. 1. No acute intracranial abnormality. **This report has been created using voice recognition software. It may contain minor errors which are inherent in voice recognition technology. ** Final report electronically signed by Dr Rafa Mendoza on 10/23/2021 9:15 PM    XR CHEST PORTABLE    Result Date: 10/23/2021  PROCEDURE: XR CHEST PORTABLE CLINICAL INFORMATION: ams COMPARISON: Chest radiograph 1/25/2021 TECHNIQUE: AP portable chest radiograph performed. FINDINGS: Mild prominence of the interstitial pulmonary markings. No focal infiltrate is seen. Aortic arch calcifications. Cardiac silhouette is mildly to moderately enlarged. No pleural effusion. No pneumothorax. No acute bony abnormality. Calcified granuloma is again seen in the left upper lobe. The bones appear markedly demineralized. Degenerative changes of the thoracic spine. 1. Mild prominence of the interstitial pulmonary markings is a nonspecific finding. May relate to chronic underlying process. 2. Mild to moderate cardiomegaly. **This report has been created using voice recognition software. It may contain minor errors which are inherent in voice recognition technology. ** Final report electronically signed by Dr Rafa Mendoza on 10/23/2021 8:25 PM      DVT prophylaxis: [] Lovenox                                 [] SCDs                                 [] SQ Heparin                                 [] Encourage ambulation           [x] Already on Anticoagulation     Code Status: Full Code    Tele:   [x] yes             [] no    Active Hospital Problems    Diagnosis Date Noted    Acute metabolic encephalopathy [D35.90]     Acute renal failure superimposed on chronic kidney disease ([address] [N17.9, N18.9]     Type 2 diabetes mellitus with hypoglycemia without coma, with long-term current use of insulin ([address] [Y99.882, M91.0]     Metabolic encephalopathy [K20.68] 12/15/2020    Hypoglycemia [E16.2] 09/20/2019       Electronically signed by Meche Clancy MD on 10/27/2021 at 2:52 PM

## 2021-10-27 NOTE — CARE COORDINATION
Discharge planning update:    Room air oxygen with saturations at 89%. Tmax 99.1. PT/OT. Vitamin C,D,zinc, baricitinib, Asa, oral Keflex, IV Decadron, Lanoxin, Protonix. Pharmacy dosing Coumadin. INR 2.17, creatinine 1.6. Family declines ECF. Will go home with sister and new HH. Refer to SW note.     Electronically signed by Lora Lange RN on 10/27/2021 at 3:14 PM

## 2021-10-27 NOTE — FLOWSHEET NOTE
Hourly rounding on patient. Patient continues to be very sleepy and unable to stay awake. Patient requesting pain medication. Education given to patient about the pain medication and being very lethargic. Unable to give at this time. Dr. Harmony Velásquez notified.

## 2021-10-28 NOTE — PROGRESS NOTES
[facility] [address]  Occupational Therapy  Daily Note  Time:   Time In: 1177  Time Out: 1507  Timed Code Treatment Minutes: 45 Minutes  Minutes: 45          Date: 10/28/2021  Patient Name: Abbe Johnson,   Gender: female      Room: -38/038-A  MRN: [id number]  : 1955  (78 y.o.)  Referring Practitioner: Justyn Naidu DO  Diagnosis: metabolic encephalopathy  Additional Pertinent Hx: Per H&P on 10/23:Karla Angelo Ba is a 78 y.o.,  female PMH CAD, CKD 3B, IDDM2, HLD, HTN, Hypothyroidism, paroxysmal atrial fibrillation, UTIs, and sleep apnea who presents with complaints of altered mental status and has had progressive weakness over the last several weeks with occasional vertigo. The patient reports that about 24 hours ago, she was starting to lose sense of where she was and became \"confused\" to the point where she was \"talking off the wall\" and becoming sleepy. The patient was previously in the ED a few days ago for concerns for DVT and cellulitis and was discharged without antibiotics. Restrictions/Precautions:  Restrictions/Precautions: General Precautions, Fall Risk     SUBJECTIVE: Nurse Arti Lyoa ok'd session, In bed upon arrival, agreeable to OT session, patient declined sitting in bedside chair    PAIN: 210: L LE    Vitals: Patient on Room Air  upon arrival to room. Patient O2 sats at 97 %. Upon activity patient maintaining O2 at 100 %. Pt requiring 0 rest break(s) to recover. COGNITION: WFL    ADL:   Grooming: with set-up. brushed teeth sitting in up bed  Toiletin [address] for clothing management  Toilet Transfer: [address] STS, used grab bar. BED MOBILITY:  Supine to Sit: Supervision HOB elevated, used bedrail  Sit to Supine: Supervision      TRANSFERS:  Sit to Stand:  Contact Guard Assistance. EOB  Stand to Sit: Contact Guard Assistance.       FUNCTIONAL MOBILITY:  Assistive Device: Rolling Walker  Assist Level:  Contact Guard Assistance. Distance: To and from bathroom  Unbalanced gait, patient reported this is from several leg surgery     Guided patient through BUE AROM this date x10 reps x1 set in bed in order to increase BUE strength and improve activity tolerance for ADLs and homemaking tasks. ASSESSMENT:     Activity Tolerance:  Patient tolerance of  treatment: good. Discharge Recommendations: Home with Home Health OT  Equipment Recommendations: Equipment Needed: No  Plan: Times per week: 3-5x  Current Treatment Recommendations: Strengthening, Balance Training, Functional Mobility Training, ROM, Endurance Training, Safety Education & Training, Patient/Caregiver Education & Training, Equipment Evaluation, Education, & procurement, Self-Care / ADL    Patient Education  Patient Education: ADL's and Home Exercise Program    Goals  Short term goals  Time Frame for Short term goals: by discharge  Short term goal 1: Pt will safely navigate to/from [facility] or chair with supervision in prep for toileting tasks. Short term goal 2: Pt will complete LB ADLs with supervision to increase independence with donning pants/socks. Short term goal 3: Pt will complete functional transfers with supervision in prep for [facility] transfers. Short term goal 4: Pt will tolerate dynamic standing X2 minutes with supervision in prep for toileting/clothing mgmt    Following session, patient left in safe position with all fall risk precautions in place.

## 2021-10-28 NOTE — PROGRESS NOTES
Writer called sister Eyvonne Gaucher, updated on patient being discharged. HH called and notified of patient discharge, faxed information to [place].

## 2021-10-28 NOTE — DISCHARGE INSTR - MEDS
Recommendations for discharge:   Date Warfarin Dose   10/28/21 (given prior to discharge)   10/29/21 2.5mg   10/30/21 2.5mg   10/31/21 2.5mg   11/1/21 INR     Provider dosing warfarin: [facility]    Recheck INR:  Monday, November 1st. Continued [address] will draw your INR at home and someone from the coumadin clinic will call you with your new instructions.

## 2021-10-28 NOTE — DISCHARGE SUMMARY
Hospital Medicine Discharge Summary      Patient Identification:  Name: Twan Sutton  : 1955  MRN: [id number]  Account: [de-identified]    Patient's PCP: ARNOL Lin CNP    Admit Date: 10/23/2021    Discharge Date:   10/28/21    Admitting Physician: Brenden Carlson MD    Discharge Physician: Bean Salgado MD    HPI:  Twan Sutton is a 78 y.o. female with \"PMH CAD, CKD 3B, IDDM2, HLD, HTN, Hypothyroidism, paroxysmal atrial fibrillation, UTIs, and sleep apnea who presents with complaints of altered mental status and has had progressive weakness over the last several weeks with occasional vertigo. The patient reports that about 24 hours ago, she was starting to lose sense of where she was and became \"confused\" to the point where she was \"talking off the wall\" and becoming sleepy. Of note the patient is a poor historian and has given different accounts of her symptoms to different providers. The patient has a history of her sugars dropping sporadically and has never had a previous history of DKA or HHS. She lives with her sister who monitors her daily needs, and the patient uses a wheelchair and walker at home. She denies any current fever, chills, previous history of seizures, chest pain, shortness of breath, abdominal, diarrhea, constipation, hematemesis, or hematochezia, and is currently being treated for left leg pain with norco. The patient was previously in the ED a few days ago for concerns for DVT and cellulitis and was discharged without antibiotics. Per the patient, she says that she manages her own medications but has no idea what she is on them for and recently took herself off lasix earlier this month of her own free will.  The patient was also recently (2 days ago) prescribed norco for leg and back pain and has been taking a higher dosage of the medication than reported.      In the ED, the patient's vitals were significant for temperature 98.2, respiratory rate 18, pulse 72, blood pressure 110/54 and SPO2 97%. The patient's labs were significant for sodium 133, potassium 5.3, CO2 19, BUN 40, creatinine 1.8, GFR 28,  total protein 5.8, BNP 53,173, troponin 0.012 and 0.011, albumin 3.1, ALT 9, direct bilirubin 0.5, TSH 0.061, urine drug screen positive for opiates, WBC 2.9, hemoglobin 8.1, .8, platelet count 310 and INR 2.68. The patient had a chest x-ray that showed interstitial pulmonary markings and CT head that was negative for any acute process. The patient became hypoglycemic in the ED with sugar of 54 and was given an amp of D50 and sugars resolved. \" - Dr. Anne-Marie Nieto, DO    Please see chart for more details regarding HPI. Hospital Course:   Raul Reid is a 78 y.o. female w/ PMH of Non-obstructive CAD s/p C 07/2019, CKDS3B, IDDMT2 A1c 6.0 10/21/2021, HLD, HTN, Hypothyroidism, PAF, recurrent UTIs, and DARWIN who presented to [address] [place] [address] with encephalopathy 2/2 suspected hypoglycemia on 10/23. On 10/25 pt was noted to exhibit COVID-19 PNA-like symptoms and she was found to be COVID-19 positive. She developed a leukopenia of WBC minimum 0.7 but was otherwise asymptomatic. She was noted to be hyperglycemic secondary to initiation of decadron. Pt was also started on baricitinib. She will be discharged on her previous insulin regimen. She will be discharged with a complete course of decadron. The patient was stable for discharge - all consultants were contacted and in agreement with plan for discharge. Appropriate follow up appointment was arranged prior to discharge. Please see below or view chart for more details from hospital course. Discharge Diagnoses:    #COVID-19: Active  Pt described new symptoms: loss of smell and taste. COVID-19 test was ordered 10/25/2021 and the result was positive. Uncertain when pt was exposed. CRP 7.27, 7.63. Started on decadron and baricitinib on 10/27/2021 due to brief requirement of O2 for less than 1 day.  Pt subsequently improved without oxygen requirement at rest or with activity. She was tolerating ambulation nicely.      #Acute on Chronic Leukopenia: Active  WBC 2.7 on baseline 4.6. Likely 2/2 COVID-19. 10/27 WBC 0.7.     #Well-controlled IDDMT2 A1c 6.0 in 10/21/2021: Active  Recently switched from insulin 75/25 to basal bolus regimen. Diabetic Nephropathy: mAlb:Cr 1248 07/01/2021  Diabetic Retinopathy: 08/30/2019 - no retinopathy  Diabetic Neuropathy: Bilateral LE sensation decreased to pinprick. Was briefly started on glipizide but this was subsequently discontinued due to initiation of decadron and need for tighter glycemic control. #Acute on Intermittent Encephalopathy 2/2 Hypoglycemia: Resolving  BG (minimum) 54. Pt presented confused.      #LLE Cellulitis: Resolving  On chronic keflex     #Rebound Hyperkalemia 2/2 Insulin Cessation: Resolved  10/25: serum potassium 6.2. EKG w/o peaked T-waves or increasing QTc. Gave Kayexalate, furosemide, and calcium gluconate x1. Resolved in PM to 4.8.      #Non-oliguric CKD Stage IIIB eGFR 30 mL/min/1.73m2: Stable   Serum creatinine 1.7 on baseline 1.8. Secondary to DM Nephropathy vs HTN arteriosclerosis.      #Chronic Macrocytic Anemia: Stable  Hgb 8.1, .8. 10/24/21 B12 1002.      #Chronic Systolic + Diastolic Heart Failure with EF 25% on 10/15/2021, NYHA Class III: Stable  Pro-BNP on 10/23/2021 was 59294 from baseline of >70,000. CXR on 10/23/2021 demonstrated mild interstitial pulmonary markings which are chronic + cardiomegaly. GDMT at home: metoprolol succinate 100 mg daily and valsartan 80 mg daily. Diuretics at home: none. On Digoxin 125 mcg daily. Follows in HF Clinic: Yes. AICD in place. Digoxin level 10/23/2021 0.5.     #Paroxysmal Non-valvular Atrial Fibrillation on Warfarin: Stable  EKG on admit: Afib. [id number] 5. Rate control at home: metoprolol succinate 100 mg daily. Rhythm control at home: none.     #Acquired Hypothyroidism: Stable  On levothyroxine     #Non-obstructive CAD: Stable  [facility] on 07/03/2019: 50 disease in proximal, mid, and distal RCA. On ASA 81 mg.     #Elevated troponin: Stable  0.012, 0.011, equivocal. Likely 2/2 demand ischemia     #Hypertriglyceridemia: Stable  On fenofibrate     #DARWIN on CPAP: Stable  Continue CPAP    The patient was seen and examined on day of discharge and this discharge summary is in conjunction with any daily progress note from day of discharge. The patient understood, was in agreement, and verbalized the plan of care at time of discharge. Exam:    Vitals:   Vitals:    10/28/21 0555 10/28/21 0845 10/28/21 1312 10/28/21 1530   BP:  (!) 108/58 135/75 129/60   Pulse:  96 84 70   Resp:  16     Temp:  98 °F (36.7 °C) 98.4 °F (36.9 °C) 98.4 °F (36.9 °C)   TempSrc:  Axillary Oral Oral   SpO2:  98% 99% 94%   Weight: 205 lb 7 oz (93.2 kg)      Height:         Weight: Weight: 205 lb 7 oz (93.2 kg)    24 hour intake/output:No intake or output data in the 24 hours ending 10/28/21 1607    Physical Exam  Vitals and nursing note reviewed. Constitutional:       General: She is awake. Appearance: Normal appearance. She is not ill-appearing or diaphoretic. HENT:      Head: Normocephalic and atraumatic. Right Ear: External ear normal.      Left Ear: External ear normal.      Nose: Nose normal.      Mouth/Throat:      Mouth: Mucous membranes are moist.      Pharynx: Oropharynx is clear. No oropharyngeal exudate or posterior oropharyngeal erythema. Eyes:      General: Lids are normal. No scleral icterus. Extraocular Movements: Extraocular movements intact. Pupils: Pupils are equal, round, and reactive to light. Cardiovascular:      Rate and Rhythm: Normal rate and regular rhythm. Pulses: Normal pulses. Radial pulses are 2+ on the right side and 2+ on the left side. Posterior tibial pulses are 2+ on the right side and 2+ on the left side. Heart sounds: Normal heart sounds. No murmur heard. No friction rub. No gallop. Pulmonary:      Effort: Pulmonary effort is normal.      Breath sounds: Normal breath sounds. No wheezing, rhonchi or rales. Abdominal:      General: Bowel sounds are normal.      Palpations: Abdomen is soft. Tenderness: There is no abdominal tenderness. There is no guarding or rebound. Musculoskeletal:      Cervical back: Normal range of motion and neck supple. Right lower leg: No edema. Left lower leg: No edema. Skin:     General: Skin is warm and dry. Neurological:      General: No focal deficit present. Mental Status: She is alert and oriented to person, place, and time. Mental status is at baseline. GCS: GCS eye subscore is 4. GCS verbal subscore is 5. GCS motor subscore is 6. Psychiatric:         Attention and Perception: Attention and perception normal.         Mood and Affect: Mood and affect normal.         Speech: Speech normal.         Behavior: Behavior normal. Behavior is cooperative. Thought Content: Thought content normal.         Cognition and Memory: Cognition and memory normal.         Judgment: Judgment normal.         Labs: For convenience and continuity at follow-up the following most recent labs are provided:      CBC:    Lab Results   Component Value Date    WBC 0.7 10/28/2021    HGB 9.8 10/28/2021    HCT 31.4 10/28/2021    PLT 91 10/28/2021       Renal:    Lab Results   Component Value Date     10/28/2021    K 4.3 10/28/2021    CL 97 10/28/2021    CO2 20 10/28/2021    BUN 47 10/28/2021    CREATININE 1.6 10/28/2021    CALCIUM 8.4 10/28/2021    PHOS 2.8 04/29/2020         Significant Diagnostic Studies    Radiology:   XR CHEST PORTABLE   Final Result      Worsening right perihilar airspace opacities suggestive of worsening pneumonia. Follow-up to ensure resolution is advised. **This report has been created using voice recognition software.   It may contain minor errors which are inherent in voice recognition technology. **      Final report electronically signed by Dr Mak Pickering on 10/27/2021 3:43 PM      CT HEAD WO CONTRAST   Final Result   1. No acute intracranial abnormality. **This report has been created using voice recognition software. It may contain minor errors which are inherent in voice recognition technology. **      Final report electronically signed by Dr Michela Rodriguez on 10/23/2021 9:15 PM      XR CHEST PORTABLE   Final Result      1. Mild prominence of the interstitial pulmonary markings is a nonspecific finding. May relate to chronic underlying process. 2. Mild to moderate cardiomegaly. **This report has been created using voice recognition software. It may contain minor errors which are inherent in voice recognition technology. **      Final report electronically signed by Dr Michela Rodriguez on 10/23/2021 8:25 PM        Consults:     IP CONSULT TO SOCIAL WORK  IP CONSULT TO PHARMACY  IP CONSULT TO SOCIAL WORK  IP CONSULT TO SOCIAL WORK  IP CONSULT TO PHARMACY    Disposition:    [x] Home with isolation       [] TCU       [] Rehab       [] Psych       [] SNF       [] [place]       [] Other-    Condition at Discharge: fair    Code Status:  Full Code   Tobacco:  DVT Prophylaxis:  Vaccinations:  Admitted for: (Hypoglycemia and COVID-19 positivity)    Patient Instructions:  Discharge lab work: CBC and BMP in 1 week; f/u outpatient with Dr. Leslie Grubbs in 1 week. Activity: activity as tolerated  Diet: ADULT DIET;  Regular; 4 carb choices (60 gm/meal)    Follow-up visits:   Fannie Garsia, APRN - CNP  [address] [address] [phone]    On 11/3/2021  Your appointment time is at 12:40, Arrive 15 minutes early, please bring photo ID and medications    [facility]  [address] [address] [facility], [address] [place] [address] [phone]    In 1 week  Insulin regimen adjustment. Discharge Medications:        Medication List      START taking these medications    baricitinib 2 MG Tabs tablet  Commonly known as: OLUMIANT  Take 1 tablet by mouth daily for 12 doses  Start taking on: October 29, 2021     dexamethasone 6 MG tablet  Commonly known as: DECADRON  Take 1 tablet by mouth daily (with breakfast) for 9 days        CHANGE how you take these medications    nystatin 257915 UNIT/GM powder  Commonly known as: MYCOSTATIN  Apply 3 times daily. What changed: additional instructions        CONTINUE taking these medications    acetaminophen 500 MG tablet  Commonly known as: TYLENOL  Take 1 tablet by mouth 4 times daily as needed for Pain     alendronate 70 MG tablet  Commonly known as: FOSAMAX  take 1 tablet by mouth every week     aspirin 81 MG EC tablet  Commonly known as: RA Aspirin EC  Take 1 tablet by mouth daily     atorvastatin 40 MG tablet  Commonly known as: LIPITOR  1 tab po daily     calcium-vitamin D 500-200 MG-UNIT per tablet     dexlansoprazole 60 MG Cpdr delayed release capsule  Commonly known as: DEXILANT  Take 1 capsule by mouth daily     digoxin 125 MCG tablet  Commonly known as: LANOXIN  Take 0.5 tablets by mouth daily     DULoxetine 60 MG extended release capsule  Commonly known as: CYMBALTA  1 tab po once a day     fenofibrate 145 MG tablet  Commonly known as: TRICOR  take 1 tablet by mouth once daily     FeroSul 325 (65 Fe) MG tablet  Generic drug: ferrous sulfate  take 1 tablet by mouth three times a day with food     folic acid 1 MG tablet  Commonly known as: FOLVITE  take 1 tablet by mouth once daily     FreeStyle Valerie 14 Day Sensor Misc  1 Device by Does not apply route continuous     HYDROcodone-acetaminophen 5-325 MG per tablet  Commonly known as: Norco  Take 1-2 tablets by mouth every 8 hours as needed for Pain for up to 7 days.      * Incontinence Brief Large Misc  Use prn for incontinence     * Poise Pad Pads  Use as needed     * insulin lispro (1 Unit Dial) 100 UNIT/ML Sopn  Commonly known as: HumaLOG KwikPen  8 Units twice daily before meals. This will replace her current insulin regiment (75/25). 5 PENS. 1 REFILL. E11.9.     * insulin lispro (1 Unit Dial) 100 UNIT/ML Sopn  Commonly known as: HumaLOG KwikPen  Inject 8 Units two times per day. Max daily dose: 48 Units    This replaces the previous prescription Rebekah Sen. Lantus SoloStar 100 UNIT/ML injection pen  Generic drug: insulin glargine  Inject 32 Units into the skin every morning This will replace her current insulin regiment (75/25)     levothyroxine 50 MCG tablet  Commonly known as: SYNTHROID  Take 1.5 tablets by mouth Daily     meclizine 25 MG tablet  Commonly known as: ANTIVERT     metoprolol succinate 100 MG extended release tablet  Commonly known as: TOPROL XL  take 1 tablet by mouth once daily     * Misc. Devices Misc  Washable bed pads     * Misc. Devices Misc  Baby wipes     * Misc. Devices Misc  Chucks as needed     RA Vitamin B-12 100 MCG tablet  Generic drug: cyanocobalamin  take 1 tablet by mouth once daily     Trulicity 6.09 RG/0.6OV Sopn  Generic drug: Dulaglutide  inject 0.5 milliliters ( 0.75 milligrams ) subcutaneously every week IN THE ABDOMEN THIGHS OR OUTER AREA OF UPPER ARM ROTATE INJECTION SITES     valsartan 80 MG tablet  Commonly known as: Diovan  Take 1 tablet by mouth daily     Vitamin C 500 MG Caps     Vitamin D3 50 MCG (2000 UT) Caps     warfarin 2.5 MG tablet  Commonly known as: Coumadin  Take as directed. If you are unsure how to take this medication, talk to your nurse or doctor. Original instructions: Take as directed by [address] [facility]. 45 tabs = 30 days     zinc sulfate 220 (50 Zn) MG capsule  Commonly known as: ZINCATE         * This list has 7 medication(s) that are the same as other medications prescribed for you. Read the directions carefully, and ask your doctor or other care provider to review them with you. Where to Get Your Medications      These medications were sent to [address] [facility], [facility]  [address] [place] [id number]    Phone: [phone]   · baricitinib 2 MG Tabs tablet  · dexamethasone 6 MG tablet         Discharge Time:  Time spent on discharge is >35 minutes in the examination, evaluation, counseling, and review of medications and discharge plan. The hospital course was discussed with the patient and all questions and concerns were addressed at that time. The patient was in agreement with and verbalized understanding of the plan of care and had no additional questions or complaints. The patient was instructed to follow-up with any scheduled outpatient appointments or to report to the nearest Emergency Department if new or worsening symptoms should arise. Thank you ARNOL Arzola CNP for the opportunity to be involved in this patient's care.     Signed:    Electronically signed by Theo Cruz MD on 10/28/2021 at 4:07 PM

## 2021-10-28 NOTE — PROGRESS NOTES
Clinical Pharmacy Note    Warfarin consult follow-up    Recent Labs     10/28/21  0816   INR 2.04*     Recent Labs     10/26/21  0405 10/27/21  0631 10/28/21  0816   HGB 8.7* 9.5* 9.8*   HCT 27.8* 30.9* 31.4*   * 117* 91*       Significant Drug-Drug Interactions:  New warfarin drug-drug interactions: dexamethasone  Discontinued drug-drug interactions: none  Current drug-drug interactions: aspirin (HM), levothyroxine (HM), fenofibrate (HM), Vitamin C (HM)    Date INR Warfarin Dose   10/23/21 2.68 ? Patient unsure if she took her dose   10/24/2021 --- 2.5 mg    10/25/2021  2.13 4 mg     10/26/2021 --  2.5 mg    10/27/2021  2.17  2.5 mg    10/28/21  2.04  4 mg        Notes:                   PT/INR or POC-INR will be monitored on 10/29/21 and routinely until therapeutic INR is achieved.     Viktor Wing, PharmD  11:58 AM  10/28/2021

## 2021-10-28 NOTE — PROGRESS NOTES
Clinical Pharmacy Note                                Warfarin Discharge Recommendations      Pt discharged from [facility] today after admission for AMS/COVID-19    INR today:  Recent Labs     10/28/21  0816   INR 2.04*       Coumadin 2.5 mg tabs    Interacting medications at discharge: dexamethasone    INR goal during admission: 2.0-3.0    Recommendations for discharge:   Date Warfarin Dose   10/28/21 4mg (given prior to discharge)   10/29/21 2.5mg   10/30/21 2.5mg   10/31/21 2.5mg   11/1/21 INR     Provider dosing warfarin: [facility]    Recheck INR:  Monday, November 1st. Continued [address] will draw your INR at home and someone from the coumadin clinic will call you with your new instructions.       Chuck López, PharmD  4:57 PM  10/28/2021

## 2021-10-28 NOTE — PROGRESS NOTES
Writer reviewed discharge instructions with patient including follow up appointments, medication education. Verbalized understanding. Patient discharged home with home health.

## 2021-10-28 NOTE — CARE COORDINATION
Discharge planning update:    Afebrile. Room air oxygen with saturations at 99%. PT/OT. Vitamin C,D,zinc, baricitinib, Asa, oral Keflex, IV Decadron, Lanoxin, Protonix. Pharmacy dosing. INR 2.04, WBC 0.7, procalcitonin 0.63. Family declines ECF. Will go home with sister and new HH.  Refer to SW note

## 2021-10-29 NOTE — CARE COORDINATION
Care Transitions Outreach Attempt    Call within 2 business days of discharge: Yes   Attempted to reach patient for transitions of care follow up. Unable to reach patient. Patient: Jodi Carrera Patient : 1955 MRN: [id number]    Last Discharge [facility]       Complaint Diagnosis Description Type Department Provider    10/23/21 Altered Mental Status; Back Pain Acute metabolic encephalopathy . .. ED to Hosp-Admission (Discharged) (ADMITTED) Doris Solano MD; Katina Rahman. .. Was this an external facility discharge?  No Discharge Facility: [facility]    Noted following upcoming appointments from discharge chart review:   [facility] follow up appointment(s):   Future Appointments   Date Time Provider Adonis Dolan   2021 10:20 AM [facility] [place]   11/3/2021 12:40 PM ARNOL Deras - [address] [facility] [place]   2021  2:20 PM Mine Travis MD [facility] Physic [facility] - [place]   11/10/2021  1:45 PM Zachery Rebolledo MD N [facility] Heart [facility] [place]   2021  3:00 PM STR CT IMAGING RM1  [facility] OUT EXP STR Radiolog   2022  1:15 PM Senthil Krause, [place] [address]   2022  3:00 PM Hair Mendoza [place], MD N [facility], [facility]. P - [place]   2022 12:15 PM Aria Betancur MD N SRPX Heart [facility] [place]   2022  2:40 PM ARNOL Deras 86     Non-[facility] follow up appointment(s):

## 2021-10-29 NOTE — PROGRESS NOTES
Physician Progress Note      PATIENT:               Trinity Alfaro  CSN #:                  [id number]  :                       1955  ADMIT DATE:       10/23/2021 6:13 PM  [address] [place] DATE:        10/28/2021 5:48 PM  RESPONDING  PROVIDER #:        Jennifer Camara          QUERY TEXT:    Pt admitted with confusion and altered mental status and was COVID 19   positive. Pt noted to have 10 CXR: Worsening right perihilar airspace   opacities suggestive of worsening pneumonia. If possible, please document in   the progress notes and discharge summary if you are evaluating and/or treating   any of the following: The medical record reflects the following:  Risk Factors: Acute hypoxemic respiratory failure: Secondary to COVID-19  Clinical Indicators: 10-27 CXR: Worsening right perihilar airspace opacities   suggestive of worsening pneumonia. Treatment: oxygen, Keflex, and Encourage pulmonary toiletry. Thank you. Please call if you have any questions. (P) [phone]. Signed   by Saba Cee RN Clinical , CRCR  Options provided:  -- COVID-19 pneumonia confirmed after study  -- COVID-19 pneumonia treated and resolved  -- COVID-19 pneumonia ruled out after study  -- Bacterial pneumonia  -- Viral pneumonia  -- Other - I will add my own diagnosis  -- Disagree - Not applicable / Not valid  -- Disagree - Clinically unable to determine / Unknown  -- Refer to Clinical Documentation Reviewer    PROVIDER RESPONSE TEXT:    COVID-19 pneumonia, confirmed after study, treated, resolving.     Query created by: Vicki Da Silva on 10/28/2021 9:36 AM      Electronically signed by:  Jennifer Camara 10/29/2021 6:27 AM

## 2021-11-01 PROBLEM — J12.82 PNEUMONIA DUE TO COVID-19 VIRUS: Status: ACTIVE | Noted: 2021-01-01

## 2021-11-01 PROBLEM — U07.1 PNEUMONIA DUE TO COVID-19 VIRUS: Status: ACTIVE | Noted: 2021-01-01

## 2021-11-01 NOTE — ED NOTES
Pulse ox at 84% on 3L NC. Blood sugar 41. Notified Dr. Zack Johnson. Patient alert in bed.  Increased oxygen to 6L NC     Avery Schreiber RN  11/01/21 1092

## 2021-11-01 NOTE — ACP (ADVANCE CARE PLANNING)
Advance Care Planning     Advance Care Planning Activator (Inpatient)  Conversation Note      Date of ACP Conversation: 11/1/2021     Conversation Conducted with: Patient with Decision Making Capacity    ACP Activator: Ira Haley:     Current Designated Health Care Decision Maker:     Primary Decision Maker: Kenny Rodriguez - Brother/Sister - [phone]    Secondary Decision Maker (Active): Aaron Maradiaga - Brother/Sister - [phone]      Care Preferences    Ventilation: \"If you were in your present state of health and suddenly became very ill and were unable to breathe on your own, what would your preference be about the use of a ventilator (breathing machine) if it were available to you? \"      Would the patient desire the use of ventilator (breathing machine)?: yes    \"If your health worsens and it becomes clear that your chance of recovery is unlikely, what would your preference be about the use of a ventilator (breathing machine) if it were available to you? \"     Would the patient desire the use of ventilator (breathing machine)?: No      Resuscitation  \"CPR works best to restart the heart when there is a sudden event, like a heart attack, in someone who is otherwise healthy. Unfortunately, CPR does not typically restart the heart for people who have serious health conditions or who are very sick. \"    \"In the event your heart stopped as a result of an underlying serious health condition, would you want attempts to be made to restart your heart (answer \"yes\" for attempt to resuscitate) or would you prefer a natural death (answer \"no\" for do not attempt to resuscitate)? \" yes       [] Yes   [x] No   Educated Patient / Hector Amador regarding differences between Advance Directives and portable DNR orders.     Length of ACP Conversation in minutes:  23  Conversation Outcomes:  [x] ACP discussion completed  [] Existing advance directive reviewed with patient; no changes to patient's previously recorded wishes  [] New Advance Directive completed  [] Portable Do Not Rescitate prepared for Provider review and signature  [] POLST/POST/MOLST/MOST prepared for Provider review and signature      Follow-up plan:    [x] Schedule follow-up conversation to continue planning  [] Referred individual to Provider for additional questions/concerns   [] Advised patient/agent/surrogate to review completed ACP document and update if needed with changes in condition, patient preferences or care setting    [x] This note routed to one or more involved healthcare providers     Darlin Disla is currently in room alone. Has Advance Directives on file. Sister Jake is her Healthcare POA. Darlin Disla is alert and oriented. At the present time Darlin Disla states she will remain a Full Code, but wants to think about her long term wishes. Spiritual Care consult placed to follow up. COVID +. Diagnosed on 10/25/2021.

## 2021-11-01 NOTE — ED TRIAGE NOTES
Presents to ED with c/o fatigue. States she has been in bed since yesterday and she ate and drank yesterday. Alert and oriented. Respirations easy and unlabored. EKG complete. IV in place. Pulse ox 87% on room air. Placed patient on 4L NC and pulse ox to 95%.

## 2021-11-01 NOTE — PROGRESS NOTES
This RN contacted [employer]	Stewart, PA regarding patient's HR 130s-160s a-fib w/ RVR and rectal temp of 102.5. EKG obtained and rectal tylenol given. Patient packed in ice. Awaiting temperature recheck. [employer]	Stewart, PA up to floor to assess patient. Lactic and blood cultures ordered.

## 2021-11-01 NOTE — ED NOTES
Patient resting in bed. Respirations easy and unlabored. No distress noted. Call light within reach. Patient on eamon flow. Updated on POC.       Nitin Garland, KYLAH  11/01/21 1276

## 2021-11-01 NOTE — ED PROVIDER NOTES
[facility]          Pt Name: Twan Sutton  MRN: [id number]  [place] 1955  Date of evaluation: 11/1/2021  Treating Resident Physician: Bean Salgado MD  Supervising Physician: Dr. Nasreen Smith, [address] [place] [address]       Chief Complaint   Patient presents with    Fatigue     History obtained from the patient and chart review    HISTORY OF PRESENT ILLNESS      Twan Sutton is a 78 y.o. female who presents to the emergency department for evaluation of fatigue and shortness of breath starting yesterday. Patient has history of uncontrolled insulin-dependent diabetes mellitus type 2 complicated by frequent hypoglycemia episodes, recent diagnosis of COVID-19 on 10/25/2021 with concurrent same day presentation of symptoms including loss of taste and lethargy. Patient was recently discharged 10/28/2021 after hospitalization for acute metabolic encephalopathy secondary to hypoglycemia which subsequently resolved with titration of insulin regimen. At discharge, patient was started on Decadron 6 mg daily due to brief hypoxia which subsequently resolved to patient's baseline. She was also started on baricitinib concurrently. Today she presents with a new oxygen requirement of 4 L nasal cannula required to maintain oxygen saturation above 92%. Denies fevers, chills, nausea, vomiting, headaches, dizziness, chest pain, back pain, abdominal pain, lower extremity pain, dysuria, hematuria, suprapubic tenderness, flank pain, constipation, or diarrhea. The patient has no other acute complaints at this time. REVIEW OF SYSTEMS     14 point review of systems negative except for the aforementioned.     PAST MEDICAL AND SURGICAL HISTORY     Past Medical History:   Diagnosis Date    CAD (coronary artery disease)     CRI (chronic renal insufficiency)     Difficult intubation     excess skin in back of throat     DM (diabetes mellitus) ([facility] [address] 1994    GERD (gastroesophageal reflux disease)     H/O gastric bypass     Hyperlipidemia     Hypertension     Hypothyroidism     Neuropathy     Obesity     Osteoarthritis     Paroxysmal atrial fibrillation (HCC)     Prolonged emergence from general anesthesia     Sleep apnea     uses cpap    Sleep apnea     Visit for screening mammogram      Past Surgical History:   Procedure Laterality Date    BACK SURGERY      xs 2    CATARACT REMOVAL Right 7/24/14    Dr. Gabbi Dougherty     COLONOSCOPY      EKG 12-LEAD  9/18/2015         ENDOSCOPY, COLON, DIAGNOSTIC      EYE SURGERY      FOOT SURGERY      left foot    HYSTERECTOMY, TOTAL ABDOMINAL      MOUTH BIOPSY  9-28-12    left tongue and lingual tonsil    OTHER SURGICAL HISTORY  11/8/2014    LEFT FEMUR RETROGRADE NAILING    OTHER SURGICAL HISTORY  4/23/2015    HARDWARE REMOVAL LEFT FEMUR, INSERTION OF ANTIBIOTIC SPACER    PATELLA SURGERY  7/11/13    fractues rt patella     [place] COLON CA SCRN NOT  [address] IND Left 1/24/2018    COLONOSCOPY performed by Dorinda Chaves MD at [facility]    SKIN TAG REMOVAL  9/25/12    mole removal    SLEEVE GASTRECTOMY  09/15/2015    Robotic    TOENAIL EXCISION      removal of great toe nails bilateral-still has toenails-had ingrown toenails and had trimmed out     [address] INJECTION  5/21/2021    US THYROID CYST ASPIRATION AND OR INJECTION 5/21/2021 [facility] ULTRASOUND         MEDICATIONS     Current Facility-Administered Medications:     ipratropium-albuterol (DUONEB) nebulizer solution 1 ampule, 1 ampule, Inhalation, Once, Rishabh Berkowitz MD    Current Outpatient Medications:     baricitinib (OLUMIANT) 2 MG TABS tablet, Take 1 tablet by mouth daily for 12 doses, Disp: 12 tablet, Rfl: 0    dexamethasone (DECADRON) 6 MG tablet, Take 1 tablet by mouth daily (with breakfast) for 9 days, Disp: 9 tablet, Rfl: 0    insulin lispro, 1 Unit Dial, (HUMALOG KWIKPEN) 100 UNIT/ML SOPN, Inject 8 Units two times per day. Max daily dose: 48 Units  This replaces the previous prescription [facility] Disp: 5 pen, Rfl: 1    DULoxetine (CYMBALTA) 60 MG extended release capsule, 1 tab po once a day, Disp: 90 capsule, Rfl: 3    insulin lispro, 1 Unit Dial, (HUMALOG KWIKPEN) 100 UNIT/ML SOPN, 8 Units twice daily before meals. This will replace her current insulin regiment (75/25).  5 PENS. 1 REFILL. E11.9., Disp: 5 pen, Rfl: 1    Continuous Blood Gluc Sensor (FREESTYLE LINNETTE 14 DAY SENSOR) MISC, 1 Device by Does not apply route continuous, Disp: 6 each, Rfl: 1    insulin glargine (LANTUS SOLOSTAR) 100 UNIT/ML injection pen, Inject 32 Units into the skin every morning This will replace her current insulin regiment (75/25), Disp: 5 pen, Rfl: 3    TRULICITY 0.21 IJ/4.9BO SOPN, inject 0.5 milliliters ( 0.75 milligrams ) subcutaneously every week IN THE ABDOMEN THIGHS OR OUTER AREA OF UPPER ARM ROTATE INJECTION SITES, Disp: 6 mL, Rfl: 3    warfarin (COUMADIN) 2.5 MG tablet, Take as directed by [facility] [address] 45 tabs = 30 days, Disp: 45 tablet, Rfl: 5    valsartan (DIOVAN) 80 MG tablet, Take 1 tablet by mouth daily, Disp: 30 tablet, Rfl: 3    dexlansoprazole (DEXILANT) 60 MG CPDR delayed release capsule, Take 1 capsule by mouth daily, Disp: 90 capsule, Rfl: 3    fenofibrate (TRICOR) 145 MG tablet, take 1 tablet by mouth once daily, Disp: 90 tablet, Rfl: 3    atorvastatin (LIPITOR) 40 MG tablet, 1 tab po daily, Disp: 90 tablet, Rfl: 3    levothyroxine (SYNTHROID) 50 MCG tablet, Take 1.5 tablets by mouth Daily, Disp: 90 tablet, Rfl: 1    alendronate (FOSAMAX) 70 MG tablet, take 1 tablet by mouth every week, Disp: 12 tablet, Rfl: 3    FEROSUL 325 (65 Fe) MG tablet, take 1 tablet by mouth three times a day with food, Disp: 90 tablet, Rfl: 3    metoprolol succinate (TOPROL XL) 100 MG extended release tablet, take 1 tablet by mouth once daily, Disp: 90 tablet, Rfl: 1    folic acid (FOLVITE) 1 MG tablet, take 1 tablet by mouth once daily, Disp: 90 tablet, Rfl: 4    Ascorbic Acid (VITAMIN C) 500 MG CAPS, Take 1 tablet by mouth 2 times daily, Disp: , Rfl:     Cholecalciferol (VITAMIN D3) 50 MCG (2000 UT) CAPS, Take 2,000 Units by mouth daily , Disp: , Rfl:     zinc sulfate (ZINCATE) 220 (50 Zn) MG capsule, Take 50 mg by mouth daily, Disp: , Rfl:     Incontinence Supply Disposable (INCONTINENCE BRIEF LARGE) MISC, Use prn for incontinence, Disp: 5 packet, Rfl: 2    Incontinence Supply Disposable (POISE PAD) PADS, Use as needed, Disp: 50 each, Rfl: 2    Misc. Devices MISC, Washable bed pads, Disp: 50 each, Rfl: 2    Misc. Devices MISC, Baby wipes, Disp: 200 each, Rfl: 2    Misc. Devices MISC, Chucks as needed, Disp: 200 each, Rfl: 2    meclizine (ANTIVERT) 25 MG tablet, Take 25 mg by mouth as needed , Disp: , Rfl:     acetaminophen (TYLENOL) 500 MG tablet, Take 1 tablet by mouth 4 times daily as needed for Pain, Disp: 120 tablet, Rfl: 0    nystatin (MYCOSTATIN) 690216 UNIT/GM powder, Apply 3 times daily.  (Patient taking differently: prn), Disp: 60 g, Rfl: 2    RA VITAMIN B-12 100 MCG tablet, take 1 tablet by mouth once daily, Disp: 90 tablet, Rfl: 3    aspirin (RA ASPIRIN EC) 81 MG EC tablet, Take 1 tablet by mouth daily, Disp: 30 tablet, Rfl: 3    digoxin (LANOXIN) 125 MCG tablet, Take 0.5 tablets by mouth daily, Disp: 30 tablet, Rfl: 0    Calcium Carbonate-Vitamin D (CALCIUM-VITAMIN D) 500-200 MG-UNIT per tablet, Take 1 tablet by mouth 2 times daily (with meals) , Disp: , Rfl:       SOCIAL HISTORY     Social History     Social History Narrative    Not on file     Social History     Tobacco Use    Smoking status: Former Smoker     Packs/day: 1.50     Years: 20.00     Pack years: 30.00     Quit date: 2007     Years since quittin.7    Smokeless tobacco: Never Used   Vaping Use    Vaping Use: Never used   Substance Use Topics    Alcohol use: No     Alcohol/week: 0.0 standard drinks    Drug use: No       ALLERGIES   No Known Allergies      FAMILY HISTORY     Family History   Problem Relation Age of Onset    Asthma Sister     Asthma Brother     Heart Disease Father     Other Mother     High Blood Pressure Other     Cancer Maternal Uncle         stomach    Diabetes Maternal Grandmother     High Blood Pressure Maternal Grandmother     Diabetes Maternal Grandfather     Stroke Neg Hx          PREVIOUS RECORDS   Previous records reviewed:  Labs, notes, and imaging . PHYSICAL EXAM     ED Triage Vitals [11/01/21 1106]   BP Temp Temp Source Pulse Resp SpO2 Height Weight   93/67 97.5 °F (36.4 °C) Oral 123 21 (!) 86 % 5' 6\" (1.676 m) 230 lb (104.3 kg)     Initial vital signs and nursing assessment reviewed and  Significant for SPO2 of 86%, tachycardia, and tachypnea . Body mass index is 37.12 kg/m². Pulsoximetry is  86% with appropriate waveforms  per my interpretation. Additional Vital Signs:  Vitals:    11/01/21 1147   BP: 113/79   Pulse: 108   Resp: 27   Temp:    SpO2: 95%       Physical Exam        MEDICAL DECISION MAKING   Initial Assessment:   Acute hypoxic respiratory failure secondary to Covid pneumonia. Low suspicion for PE at this time. Elevated Troponin 0.40 from baseline 0.2. New SOB. Will trend.    Plan:   CBC  BMP  EKG  CXR  Duoneb x1  1L LR x1.       ED RESULTS   Laboratory results:  Labs Reviewed   CBC WITH AUTO DIFFERENTIAL - Abnormal; Notable for the following components:       Result Value    WBC 3.0 (*)     RBC 3.51 (*)     Hemoglobin 11.1 (*)     Hematocrit 36.8 (*)     .8 (*)     MCHC 30.2 (*)     RDW-CV 16.0 (*)     RDW-SD 62.1 (*)     All other components within normal limits   BASIC METABOLIC PANEL - Abnormal; Notable for the following components:    CO2 21 (*)     BUN 58 (*)     CREATININE 1.7 (*)     All other components within normal limits   TROPONIN - Abnormal; Notable for the following components:    Troponin T 0.040 (*)     All other components within normal limits   GLOMERULAR FILTRATION

## 2021-11-01 NOTE — PROGRESS NOTES
Consult received. Palliative care will follow up tomorrow after patient is admitted to inpatient floor.

## 2021-11-01 NOTE — TELEPHONE ENCOUNTER
Spoke with [employer]i [employer] care coordinator-who also called to pt to f/u    Pt's b/s is 64-she gave pt some peanut butter crackers  She has a cough and wheezing  pt's sister is calling the squad now.

## 2021-11-01 NOTE — H&P
Hospitalist History and Physical          Patient: Chrissy Douglas  : 1955  MRN: [id number]     Acct: [de-identified]    PCP: Fannie Garsia APRN - CNP  Date of Admission: 2021  Date of Service: Pt seen/examined on 21  and Admitted to Inpatient with expected LOS greater than two midnights due to medical therapy. Hospital Problems         Last Modified POA    Pneumonia due to COVID-19 virus 2021 Yes          Assessment and Plan:   Covid 19 pneumonia with acute hypoxic respiratory failure: Patient initially diagnosed on 10/25, recent hospitalization discharged on room air 10/28 however readmitted with hypoxia. Worsening infiltrates on CXR  o Check procal, consider ABX  o Continue decadron, given 1x 10mg IV then 6mg oral daily (started last stay)  o Continue baricitinib, started last stay  o Monitor CRP, currently 5  o Supportive care, breathing treatments  o Anticoagulation with coumadin  o Now on HFNC, will wean if able - if ongoing tachypnea low threshold for ICU evaluation.  AFIB with RVR: continue digoxin, change toprol to lopressor while inpatient. Check dig level. Pharmacy consult for coumadin dosing.  LBBB noted, seen previously   HTN hx with relative hypotension, hold diaovan   Cardiomyopathy, HFrEF: currently she is euvolemic/hypovolemic, EF 25% per 10/15/21 echo   o Strict I/O. On toprol outpatient, lopressor here with hold parameters. Cloteal Davey o Hold diovan  o No indication to diurese at this point  o Lifevest?   Hx fo IDDM with labile glucose: likely that steroids and poor oral intake are playing role. Hx of hypoglycemia  o Hold lantus  o Currently on d10 infusion in ED, will likely need stopped. o Medium dose correction insulin and 5u with meals for correction  o Monitor situation closely. \   Scant Hemoptysis: noted this morning, no blood clots. Reports sore throat from coughing - suspect this is the etiology in setting of AC. CT chest if worsening noted.   Avoid CTA with CKD and V/Q unreliable in setting of covid infection. Eval for LE DVT with doppler   Hx of Gastric Bypass: noted   DARWIN: cpap if tolerated   CKD 3: at baseline, will monitor renal function   Acute on chronic troponin elevation: likely secondary to hypoxia in setting of covid/tachycardia and CKD contributing. No new ischemic changes on EKG. Continue on coumadin, BB for rate control. Repeat to trend.  Hypothyroidism: continue synthroid. Check TSH          =======================================================================      Chief Complaint:  SOB    History Of Present Illness:  Óscar Pabon is a 78 y.o. female who presents to [facility] with shortness of breath. Patient was recently hospitalized from 10/25-10/28 at [facility] for Covid pneumonia, treated briefly with steroids and baricitinib, and discharged ultimately on room air and a course of oral steroids and baricitinib. Patient reports after recent discharge from hospital, started feeling more short of breath a few days after returning home. There was no fevers, chills or chest pressure, but did report worsening cough, SOB at rest and minimal exertion, and orthopnea. She also reported pleuritic type chest pain with deep inspiration. Her cough was non-productive, but did report some hemoptysis that began today. Patient reports pain in the legs, particularly in the buttocks region - she thinks sciatica. Denies dizziness or headaches. No nausea, vomiting or diarrhea. Reports fatigue/weakness, and poor appetite with impaired taste and smell. ED course: In the ED, patient was noted to be tachycardic with tachypnea, satting 86% on room air, placed on 4 L however required titration to high flow nasal cannula. Chest x-ray revealed bilateral pulmonary infiltrates which have worsened since previous imaging on 27 October. She was noted to have hypoglycemia in the ED and was given D50 x1 for a glucose of 41.  She was started on a D10 drip. Past Medical History:        Diagnosis Date    CAD (coronary artery disease)     CRI (chronic renal insufficiency)     Difficult intubation     excess skin in back of throat     DM (diabetes mellitus) ([facility] [address] 1994    GERD (gastroesophageal reflux disease)     H/O gastric bypass     Hyperlipidemia     Hypertension     Hypothyroidism     Neuropathy     Obesity     Osteoarthritis     Paroxysmal atrial fibrillation (HCC)     Prolonged emergence from general anesthesia     Sleep apnea     uses cpap    Sleep apnea     Visit for screening mammogram        Past Surgical History:        Procedure Laterality Date    BACK SURGERY      xs 2    CATARACT REMOVAL Right 7/24/14    Dr. Gregory Pastrana EKG 12-LEAD  9/18/2015         ENDOSCOPY, COLON, DIAGNOSTIC      EYE SURGERY      FOOT SURGERY      left foot    HYSTERECTOMY, TOTAL ABDOMINAL      MOUTH BIOPSY  9-28-12    left tongue and lingual tonsil    OTHER SURGICAL HISTORY  11/8/2014    LEFT FEMUR RETROGRADE NAILING    OTHER SURGICAL HISTORY  4/23/2015    HARDWARE REMOVAL LEFT FEMUR, INSERTION OF ANTIBIOTIC SPACER    PATELLA SURGERY  7/11/13    fractues rt patella     [place] COLON CA SCRN NOT  [address] IND Left 1/24/2018    COLONOSCOPY performed by Hany Meng MD at [address] [facility] TAG REMOVAL  9/25/12    mole removal    SLEEVE GASTRECTOMY  09/15/2015    Robotic    TOENAIL EXCISION      removal of great toe nails bilateral-still has toenails-had ingrown toenails and had trimmed out    [address] THYROID CYST ASPIRATION AND OR INJECTION  5/21/2021     [facility]       Medications Prior to Admission:   Prior to Admission medications    Medication Sig Start Date End Date Taking?  Authorizing Provider   baricitinib (OLUMIANT) 2 MG TABS tablet Take 1 tablet by mouth daily for 12 doses 10/29/21 11/10/21 Luna Maradiaga MD   dexamethasone (DECADRON) 6 MG tablet Take 1 tablet by mouth daily (with breakfast) for 9 days 10/28/21 11/6/21  Luna Maradiaga MD   insulin lispro, 1 Unit Dial, (HUMALOG KWIKPEN) 100 UNIT/ML SOPN Inject 8 Units two times per day. Max daily dose: 48 Units    This replaces the previous prescription Cindy Shirley. 10/21/21   Luna Maradiaga MD   DULoxetine (CYMBALTA) 60 MG extended release capsule 1 tab po once a day 10/21/21   Cresenciano Bitters, APRN - CNP   insulin lispro, 1 Unit Dial, (HUMALOG KWIKPEN) 100 UNIT/ML SOPN 8 Units twice daily before meals. This will replace her current insulin regiment (75/25).   5 PENS. 1 REFILL. E11.9. 10/18/21   Tate Austin MD   Continuous Blood Gluc Sensor (FREESTYLE LINNETTE 14 DAY SENSOR) MISC 1 Device by Does not apply route continuous 10/7/21   Luna Maradiaga MD   insulin glargine (LANTUS SOLOSTAR) 100 UNIT/ML injection pen Inject 32 Units into the skin every morning This will replace her current insulin regiment (75/25) 10/7/21   Luna Maradiaga MD   TRULICITY 5.01 PI/4.0XG SOPN inject 0.5 milliliters ( 0.75 milligrams ) subcutaneously every week IN THE ABDOMEN THIGHS OR OUTER AREA OF UPPER ARM ROTATE INJECTION SITES 9/17/21   Tate Austin MD   warfarin (COUMADIN) 2.5 MG tablet Take as directed by [address] [place] [address]. 45 tabs = 30 days 8/27/21   Mark Monzon MD   valsartan (DIOVAN) 80 MG tablet Take 1 tablet by mouth daily 8/18/21   Ishan Mayer MD   dexlansoprazole (DEXILANT) 60 MG CPDR delayed release capsule Take 1 capsule by mouth daily 8/18/21   Cresenciano Bitters, APRN - CNP   fenofibrate (TRICOR) 145 MG tablet take 1 tablet by mouth once daily 8/18/21   Cresenciano Bitters, APRN - CNP   atorvastatin (LIPITOR) 40 MG tablet 1 tab po daily 8/18/21   Cresenciano Bitters, APRN - CNP   levothyroxine (SYNTHROID) 50 MCG tablet Take 1.5 tablets by mouth Daily 7/22/21   ARNOL العلي CNP   alendronate (FOSAMAX) 70 MG tablet take 1 tablet by mouth every week Historical Provider, MD       Allergies:  Patient has no known allergies. Social History:    The patient currently lives at home. Tobacco use:   reports that she quit smoking about 14 years ago. She has a 30.00 pack-year smoking history. She has never used smokeless tobacco.  Alcohol use:   reports no history of alcohol use. Drug use:  reports no history of drug use. Family History:   as follows:      Problem Relation Age of Onset    Asthma Sister     Asthma Brother     Heart Disease Father     Other Mother     High Blood Pressure Other     Cancer Maternal Uncle         stomach    Diabetes Maternal Grandmother     High Blood Pressure Maternal Grandmother     Diabetes Maternal Grandfather     Stroke Neg Hx        Review of Systems:   Pertinent positives and negatives as noted in the HPI. All other systems reviewed and negative. Physical Exam:    /84   Pulse 106   Temp 97.5 °F (36.4 °C) (Oral)   Resp 30   Ht 5' 6\" (1.676 m)   Wt 230 lb (104.3 kg)   LMP 02/20/2001   SpO2 95%   BMI 37.12 kg/m²       General appearance: Ill appearing, moderate distress. Overweight. Eyes:  Pupils equal, round, and reactive to light. Conjunctivae/corneas clear. HENT: Head normal in appearance. External nares normal.  Oral mucosa moist without lesions. Hearing grossly intact. Neck: Supple, with full range of motion. Trachea midline. No gross JVD appreciated. Respiratory:  Tachypneic with dminished sounds overall, no wheezing or rales appreciated. Cardiovascular: Tachy rate, regular rhythm with normal S1/S2 without murmurs. No lower extremity edema. Abdomen: Soft, non-tender, non-distended with normal bowel sounds. Musculoskeletal: There is no joint swelling or tenderness. Normal tone. No abnormal movements. Skin: Warm and dry. No rashes or lesions. Neurologic:  No focal sensory/motor deficits in the upper and lower extremities. Cranial nerves:  grossly non-focal 2-12.      Psychiatric:

## 2021-11-01 NOTE — TELEPHONE ENCOUNTER
Patient's sister called. Patient not feeling well sister said she may be taking her to the ER. I asked her to please let us know if she goes to the ER and to please have them do her INR. Sister understands.

## 2021-11-01 NOTE — TELEPHONE ENCOUNTER
[facility] 45 Transitions Initial Follow Up Call    Outreach made within 2 business days of discharge: Yes    Patient: Chrissy Douglas Patient : 1955   MRN: [id number]  Reason for Admission: There are no discharge diagnoses documented for the most recent discharge. Discharge Date: 10/28/21       Spoke with: Italo Robb    Discharge department/facility: [facility]    TCM Interactive Patient Contact:  Was patient able to fill all prescriptions: Yes  Was patient instructed to bring all medications to the follow-up visit: Yes  Is patient taking all medications as directed in the discharge summary?  Yes  Does patient understand their discharge instructions: Yes  Does patient have questions or concerns that need addressed prior to 7-14 day follow up office visit: yes - pt is not eating or drinking very much/pt weak  Her sister will call the squad today if she doesn't feel any better when she wakes up today    Scheduled appointment with PCP within 7-14 days  appt switched to VV/appt confirmed  Follow Up  Future Appointments   Date Time Provider Adonis Dolan   2021 10:20 AM [place] [facility] [place]   11/3/2021 12:40 PM Fannie Garsia, APRN - [address] [facility] [place]   2021  2:20 PM Gwen Neves MD SRPX Physic [facility] [place]   11/10/2021  1:45 PM Priya Prakash MD N SRPX Heart P - [place]   2021  3:00 PM STR CT IMAGING RM1  OP [facility] STRZ OUT EXP STR Radiolog   2022  1:15 PM aHnny Livingston   2022  3:00 PM Lynda Santamaria MD N [address] [facility] [place]   2022 12:15 PM Marylin Moreno MD N SRPX Heart [facility] [place]   2022  2:40 PM Dmitry Murrell 19       Angel Lopes LPN

## 2021-11-01 NOTE — TELEPHONE ENCOUNTER
Reviewed below note, please have her sister check her blood sugar to see if hypoglycemic, also have her drink something with electrolytes gatorade without sugar, any other symptoms?  Coughing fever etc thanks

## 2021-11-01 NOTE — ED NOTES
Bed: 012A  Expected date: 11/1/21  Expected time: 10:52 AM  Means of arrival: [facility]  Comments:     Smita Linn RN  11/01/21 1056

## 2021-11-01 NOTE — ED NOTES
ED to inpatient nurses report    Chief Complaint   Patient presents with    Fatigue      Present to ED from home  LOC: alert and orientated to name, place, date  Vital signs   Vitals:    11/01/21 1615 11/01/21 1717 11/01/21 1800 11/01/21 1834   BP: 102/71 (!) 142/97  127/78   Pulse: 116 118  127   Resp: 26 28 25 23   Temp:       TempSrc:       SpO2: 96% 98%  97%   Weight:       Height:          Oxygen Baseline none    Current needs required high flow Bipap/Cpap No  LDAs:   Peripheral IV 10/23/21 Left Antecubital (Active)       Peripheral IV 11/01/21 Right Antecubital (Active)   Site Assessment Clean;Dry; Intact 11/01/21 1114   Dressing Status Clean;Dry; Intact 11/01/21 1114       Peripheral IV 11/01/21 Left Hand (Active)   Site Assessment Clean;Dry; Intact 11/01/21 1832   Dressing Status Dry;Clean; Intact 11/01/21 1832     Mobility: Requires assistance * 2      Electronically signed by Cindi Hopkins RN on 11/1/2021 at 6:36 PM       Cindi Hopkins RN  11/01/21 1836

## 2021-11-01 NOTE — ED NOTES
Notified Dr. Nataliia Wilkinson of pulse ox 90% on 6L NC.  Called respiratory to place patient on high flow     Imani Thomason RN  11/01/21 5214

## 2021-11-01 NOTE — CARE COORDINATION
[place] 45 Transitions Initial Follow Up Call    Call within 2 business days of discharge: Yes    Patient: Cory Zavala Patient : 1955   MRN: [id number]  Reason for Admission: altered mental status  Discharge Date: 10/28/21 RARS: Readmission Risk Score: 28.4      Last Discharge [facility]       Complaint Diagnosis Description Type Department Provider    10/23/21 Altered Mental Status; Back Pain Acute metabolic encephalopathy . .. ED to Hosp-Admission (Discharged) (ADMITTED) Dell Farrar MD; Emmanuel Mcclendon. .. Spoke with: Carolyne Rodriguez sister this morning and she is crying saying Haley Dill has gone downhill over the weekend. She said she is not eating or drinking and her FBS are very low 64 this morning. She has developed a cough and is wheezing. Gely the [facility] nurse is on her way to the home but Donte Kohler called 911 saying Haley Dill is not waking up just moaning. PCP office notified.      Facility: [email or web address] services provided:  Scheduled appointment with PCP-11/3/21    Care Transitions 24 Hour Call    Do you have any ongoing symptoms?: Yes  Patient-reported symptoms: Cough, Fatigue, Weakness, Pain, Shortness of Breath  Interventions for patient-reported symptoms: Other  Do you have a copy of your discharge instructions?: Yes  Do you have all of your prescriptions and are they filled?: Yes  Have you been contacted by a [address]?: No  Have you scheduled your follow up appointment?: Yes  How are you going to get to your appointment?: Other  Were you discharged with any Home Care or Post Acute Services: Yes  Post Acute Services: Home Health (Comment: [facility])  Do you feel like you have everything you need to keep you well at home?: No  Care Transitions Interventions  Diabetes Education: Completed           Transitions of Care Initial Call      Challenges to be reviewed by the provider   Additional needs identified to be addressed with provider: Yes             Method of communication with provider : phone      Advance Care Planning:   Does patient have an Advance Directive: on file. Was this a readmission? No  Patient stated reason for admission: altered mental status  Patients top risk factors for readmission: ineffective coping, lack of knowledge about disease, medical condition-Covid, DM, CKD, CAD, multiple health system providers and polypharmacy    Care Transition Nurse (CTN) contacted the family by telephone to perform post hospital discharge assessment. Verified name and  with family as identifiers. Provided introduction to self, and explanation of the CTN role. CTN reviewed discharge instructions, medical action plan and red flags with family who verbalized understanding. Family given an opportunity to ask questions and does not have any further questions or concerns at this time. Were discharge instructions available to patient? Yes. Reviewed appropriate site of care based on symptoms and resources available to patient including: PCP, Specialist and Home health. The family agrees to contact the PCP office for questions related to their healthcare. Medication reconciliation was performed with family, who verbalizes understanding of administration of home medications. Advised obtaining a 90-day supply of all daily and as-needed medications. Covid Risk Education     Educated patient about risk for severe COVID-19 due to risk factors according to CDC guidelines. CTN reviewed discharge instructions, medical action plan and red flag symptoms with the family who verbalized understanding. Discussed COVID vaccination status: No. Education provided on COVID-19 vaccination as appropriate. Discussed exposure protocols and quarantine with CDC Guidelines. Family was given an opportunity to verbalize any questions and concerns and agrees to contact CTN or health care provider for questions related to their healthcare.     Reviewed and educated family on any new and changed medications related to discharge diagnosis. Was patient discharged with a pulse oximeter? unknown Discussed and confirmed pulse oximeter discharge instructions and when to notify provider or seek emergency care. CTN provided contact information. No further follow-up call indicated based on severity of symptoms and risk factors.   Plan for next call: Pt. to ER today      Follow Up  Future Appointments   Date Time Provider Adonis Kelsi   11/1/2021 10:20 AM Michela Turner [facility] [place]   11/3/2021 12:40 PM ARNOL Johnson - [facility] [place]   11/4/2021  2:20 PM Shannan Pike MD SRPX Physic [facility]   11/10/2021  1:45 PM Brittney Paulino MD N [facility] [place]   11/18/2021  3:00 PM STR CT IMAGING RM1  OP [facility] STRZ OUT EXP STR Radiolog   1/19/2022  1:15 PM Blossom Pond PA-C N Pulm Med [facility]   1/20/2022  3:00 PM [facility] [place] [facility]   2/28/2022 12:15 PM Sixto Hallman [facility] [place]   4/21/2022  2:40 PM ARNOL Johnson - [address] [place] [address] Drive, RN

## 2021-11-01 NOTE — ED NOTES
Patient resting in bed. Respirations easy and unlabored. No distress noted. Call light within reach. Updated on POC. Will monitor.       Cindi Hopkins RN  11/01/21 4380

## 2021-11-02 PROBLEM — J96.00 ACUTE RESPIRATORY FAILURE DUE TO COVID-19 (HCC): Status: ACTIVE | Noted: 2021-01-01

## 2021-11-02 NOTE — DISCHARGE SUMMARY
DEATH SUMMARY      Patient:  Sheryl Myers  YOB: 1955  MRN: [id number]   Acct: [de-identified]    Primary Care Physician: ARNOL Arzola CNP    Admit date:  11/1/2021    Discharge date:   11/2/2021    Admission Condition:critical    Discharge Diagnoses:   Acute hypoxic hypercapnic respiratory failure  COVID-19 pneumonia  Suspected superimposed bacterial pneumonia  Combined respiratory/metabolic acidosis  Lactic acidosis  Severe sepsis  Undifferentiated shock  KEEGAN  Chronic systolic HFrEF  Chronic Afib  DM type II  DARWIN, history of  CKD stage IV  CAD, history of  HTN, history of  HLD, history of  Hypothyroidism, history of  Morbid obesity, history of       HPI:-  Brett Montano is a 77 y/o female former-smoker with PMH of difficult intubation, chronic systolic HFrEF (EF 52%), DARWIN, CKD stage IV, CAD, Afib on coumadin, DM type II, HTN, HLD, osteoarthritis, GERD, gastric bypass, Hypothyroidism, obesity. Patient presented to [facility] on 11/1/21 for worsening dyspnea. She had been recently hospitalized from 10/25-10/28 for COVID-19 pneumonia. At that time she received steroids and baricitinib. She was subsequently discharged  On room air with prescriptions for oral steroids and baricitinib. Since then, she had progressive dyspnea, worsening cough, and pleuritic chest pain. She was hypoxic, tachypnic, and tachycardic on arrival, requiring quick escalation to high flow nasal cannula support. CXR showed bilateral infiltrates which had worsened since previous study 10/27/21. Patient was admitted to the hospitalist service. She was resumed on decadron and baricitinib, and started on zosyn and vanc for suspected superimposed bacterial pneumonia. Overnight 11/1/21 to 11/2/21 patient developed worsening hypoxia and tachypnea, fever to 102.5, along with Afib with RVR. Labs were obtained and were notable for a lactic acid of 9.8 and procal of 4.19.  BiPAP was applied to patient yet she remained hypoxic and in significant respiratory distress despite high settings. She was transferred to ICU and underwent intubation on arrival without complication. ET tube was confirmed by capnography, bilateral auscultation, SpO2 response and stat CXR. No pneumothorax was seen but multifocal pneumonia appeared worsened. Approximately 5-10 minutes after intubation patient did become hypotensive, for which fluids were bolused 999 mL/h. Levophed was started. Patient quickly became bradycardic, however, for which 1 mg atropine was given, followed by an additional dose 1 mg atropine 5 minutes later for recurrent bradycardia. 1 amp sodium bicarb was given and patient was started on TCP with good capture and recovery of BP. Intrinsic rate improved to 100 so TCP was stopped. Vitals stabilized. VBG was obtained and revealed pH 6.98 PCO2 55 PO2 52 HCO3 13. Patient was administered multiple additional amps of bicarb and stat order was placed for sodium bicarb drip (150 mEq concentration with 150 mL/h infusion rate). Ventilator was optimized with increase of peep to 14, Pcontrol 16 with maintenance of  mL, rate increased from 12 to 20. Stat labs were ordered including lactic acid repeat, CMP, Mg, Phos, ICa, CBC, procal. Vitals remained stable, however I was called back to the bedside aabout 10 minutes later for recurrence of bradycardia with hypotension. Multiple additional amps of sodium bicarb were given with good vital sign response initially. By this time bicarb infusion arrived from pharmacy and was able to be started. Patient continued to have bradycardia episodes that became non-responsive to bicarb. Epi was given. TCP was initiated again, but no pulse was generated that time. Patient developed PEA and CPR was started. ACLS protocol followed with epi and bicarb given. ROSC was obtained after 1 round of ACLS. CVC and arterial lines were placed by Caitlyn Lyons CNP. Labs were still pending.  However, Istat bedside machine was notable for K 5.1. This was treated with insulin, dextrose, calcium gluconate protocol. ECG showed known LBBB. Repeat ABG was obtained showing improvement to pH 7.16 PCO2 61 PO2 99 HCCO3 22. Adequate oxygenation lowered clinical suspicion for PE. Patient then developed unstable Afib RVR so amiodarone bolus and drip were ordered. However, patient bradycardia recurred so this was not given. PEA arrest happened again and CPR initiated again. ALCS followed. Epi drip was ordered and started. Family was called and they elected to pursue [facility]. ROSC was obtained after 1 round. However, asystole occurred at 5574. Patient was [facility] at that time. TOD was 0634 on 21. Cause of Death:   Undifferentiated shock  Combined respiratory and metabolic acidosis  Acute hypoxic hypercapnic respiratory failure  COVID-19 pneumonia with suspected superimposed bacterial pneumonia    Time of Death:  172 on 21    Disposition:      Time Spent:  35 minutes    Case discussed with Dr. Ana Suh. Electronically signed by ARNOL Arcos CNP on 21 at 8:08 AM EDT  Patient seen by me. Case discussed with nurse practitioner. .  Italicized font represents changes to the note made by me.     Electronically signed by Dorothy Ames MD on 11/3/2021 at 8:11 AM

## 2021-11-02 NOTE — PROGRESS NOTES
Spoke with patient's sister. She helps the patient make healthcare decisions and the patient wanted her input. She would like to proceed with all measures that this time to save her sisters life. Will transfer to ICU for intubation.     Glo Guidry PA-C

## 2021-11-02 NOTE — TELEPHONE ENCOUNTER
----- Message from Cody Millard sent at 2021 10:47 AM EDT -----  Subject: Message to Provider    QUESTIONS  Information for Provider? Pt's sister called to inform PCP that Krishnan Settler  this morning at 6:30am in the hospital. SisterVeronica can be   reached at [phone].  ---------------------------------------------------------------------------  --------------  [employer] INFO  What is the best way for the office to contact you? OK to leave message on   voicemail  Preferred Call Back Phone Number? [phone]  ---------------------------------------------------------------------------  --------------  SCRIPT ANSWERS  Relationship to Patient? Other  Representative Name? sisterRosana  Is the Representative on the appropriate HIPAA document in Epic?  Yes

## 2021-11-02 NOTE — CARE COORDINATION
Notified of patient admissions via [facility], notes reviewed. Patient was [facility] at that time. TOD was 0634 on 11/2/21. CT episode resolved.     Jackelyn Mackey [phone]  Care Transitions Nurse

## 2021-11-02 NOTE — SIGNIFICANT EVENT
Patient had suffered multiple episodes of cardiac arrest.  I did talk to her family and explained that we had performed CPR multiple times. Family asked that we make the patient comfortable. They did request that we kept the patient alive until their arrival but I did explain that that may require repeated episodes of CPR. They stated they understood and did request that we let the patient pass peacefully. Sister stated that there would be up soon. Electronically signed by To Kennedy.  Mikael Rojo CNP on 11/2/2021 at 9:37 AM

## 2021-11-02 NOTE — DEATH NOTES
[address] [place] [address]  Notice of Patient Passing      Patient Name- Isra Number- [de-identified]   Attending Physician- Mallory Cordova    Admitted on-2021 10:58 AM     On 2021 at 634 patient was found in 4B04 with:   Absence of vital signs. Absence of neurological response. Confirmed time of death at 56. Physician or On-call Physician notified of time of death- yes    Family present at time of death- no   Spiritual care present at time of death- yes, Kevin Marie    Physician was notified and orders were obtained to release the body. Post-Mortem documentation completed; form printed, signed, and given to admitting.     Garrett Jacques RN RN Nursing Supervisor/ Manager  21   7:14 AM    ________________________________________________________________________    Name of  Home: [place] Phone Number: [phone]    Who Will Sign Death Certificate:     [x] Dr. Darinel Fowler

## 2021-11-02 NOTE — PROGRESS NOTES
Notified [facility] that patient was on 40L 100%. O2 sat 85% . Asked for bipap orders. Notified that labs were back and that the lactic acid was 9.8. Received orders for bipap and bolus. Notified her that patient was pulling off the high flow. Resource nurse rounding at that time. [facility] came to floor to assess patient. Received orders to transfer to .

## 2021-11-02 NOTE — CARE COORDINATION
11/2/21, 10:23 AM EDT    DISCHARGE PLANNING EVALUATION    SW notified [employer] CM of pt passing away this am.

## 2021-11-02 NOTE — PROCEDURES
ICU PROCEDURE - ENDOTRACHEAL INTUBATION    Dallas Ahumada     MRN#: [id number]  21      Accani [email or web address]     : 1955      INDICATION: Emergent intubation. Patient presented to ICU in respiratory distress on high BiPAP settings 20/10 with FiO2 100%. Patient was hypoxic with SpO2 maintaining mid 80's and had tachypnea of RR 30 and accessory muscle use. TIME OUT: taken    Permission obtained, risks/benefits reviewed:    ANESTHESIA:   [x]Ketamine  []Ativan  [] Morphine  [x]Propofol  []Other medications:      ESTIMATED BLOOD LOSS:  None. COMPLICATIONS:  [x]N/A  [] Other:    LARYNGOSCOPIC AIRWAY GRADE (CORMACK-LEHANE):[x]1  []2a  []2b []3  []4        INTUBATION EQUIPMENT USED:  [x] Direct laryngoscope only    OUTCOME: Successful placement of #  7.5  Taperguard Evac endotracheal via   [x]Oral route    INSERTION DEPTH:  23    cm from   [x]lip           CONFIRMATION OF TUBE POSITION:   [x]Capnography - Strong & repeatable exhaled CO2 detection   [x]Multiple point auscultation   [x]SpO2 response   [x]STAT X-ray   []Bronchoscopic assessment    UNUSUAL FINDINGS:    PROCEDURE:     Using direct laryngoscopy, the vocal cords were visualized and the endotracheal tube was placed through the cords under direct vision. Good breath sounds were auscultated bilaterally without sounds over abdomen. Appropriate strong & repeatable exhaled CO2 detection was confirmed.        Electronically signed by ARNOL Blanc CNP on 2021 at 4:41 AM

## 2021-11-02 NOTE — PROCEDURES
ICU PROCEDURE - CVC PLACEMENT:    Risks and benefits to the procedure were discussed. Alternatives and their risks were discussed as well. INDICATION: Cardiac arrest    SITE: Right Subclavian Vein      CONSENT: was obtained after explaining indication/risks to patient and/or next of kin    TIME OUT: taken    STERILE PREP: Prior to the procedure all involved washed their hands. Full maximum sterile field/barrier technique was followed (with cap and mask, gloves and sterile gown, as well as broad field sterile drapes). Local disinfection was performed with broad field application of orange dyed chloraprep solution, which was allowed to dry fully prior to the initiation of the procedure. LOCAL ANESTHETIC: Aqueous lidocaine 1%     ESTIMATED BLOOD LOSS: Minimal    ULTRASOUND GUIDANCE USED: No    PROCEDURE:  Using modified seldinger technique, the appropriate vessel was located with the introducer needle subsequent to the use of a 22g x 1.5 inch \"\" needle. The flexible J-tip wire was passed without difficulty or resistance, followed by minimal skin incisions over the introducer needle. The introducer needle was withdrawn and discarded, maintaining manual control of the guidewire at all times. After dilation of the tract, a preflushed 3 lumen CVC catheter was advanced over the guidewire without difficulty or resistance to its full extent. The guidewire was withdrawn and discarded and good return of nonpulsatile, dark venous blood assured through all lumes, with easy flushing of the lumens. The line was sutured in place then the site was reprepped with a  chlorprep solution followed by a Biopatch device. After hemostasis was assured, an op site was applied and all sharps and materials were discarded appropriately. EBL < 5 ml. COMPLICATIONS: None    POST PROCEDURE CHEST XRAY HAS BEEN ORDERED    Electronically signed by     Worley Dakin. Nolene Duke - CNP on 11/2/2021 at 6:17 AM

## 2021-11-02 NOTE — PROCEDURES
Arterial Line:     Indication: Cardiac arrest     Complications: None     EBL: less then 5 cc    Procedure:  After informed consent was obtained, the right femoral approach was utilized. Arterial pulsation was identified. Patient subsequently was prepped and draped in sterile fashion utilizing chlorhexidine prep, sterile gown, sterile gloves, mask, hair net, and sterile whole-body drape. Patient received 2 cc of local anesthesia with lidocaine. Introducer needle was advanced subcutaneously until arterial flow was obtained. Utilizing modified Seldinger technique, guidewire was placed through the introducer needle. Introducer needle was withdrawn leaving the guidewire in place. Dilator was placed over the guidewire and removed. Arterial catheter was placed into the lumen. Guidewire removed. Secondary cleansing at the site was obtained with chlorhexidine. Catheter was secured to the skin utilizing 2.0 silk. Electronically signed by Amaya Rojo CNP on 11/2/2021 at 6:17 AM

## 2021-11-02 NOTE — PROGRESS NOTES
Patient's lactic is markedly elevated. In the setting of EF 25% and stage 4 kidney disease will proceed with 30mg/kg bolus based on ideal weight and slowing the rate down to infusion over 3 hours. Patient also to be placed on BiPAP due to hypoxia on high flow nasal cannula. Will closely monitor patient's respiratory status.     Tara Sutherland PA-C

## 2021-11-02 NOTE — FLOWSHEET NOTE
[id number] -[id number] See sedation narrator charting by Kalyan Davey, RN for intubation. 7872 - propofol gtt started at 10 mcg/kg/min  0420 - patient hypotensive. Fluids infusing @ 999ml/hr per Jason Paxton, CNP.  0423 - HR 30s, 1 mg atropine per Jason Paxton, CNP  3435 - levophed gtt started at 5 mcg/min. Propofol gtt stopped. 0425 - levophed gtt increased  0428 - HR 30s. 1 mg atropine given per Jason Paxton, CNP  0430 - 1 mg epinephrine Jason Paxton, CNP  2614 - 1 amp sodium bicarbonate given per Jason Paxton, CNP. Transcutaneous pacing initiated with rate of 60, 50 MA. Pulse generated. 7689 - 1 amp sodium bicarbonate per Jason Paxton, CNP. Patient intrinsic rate 100s. Pacing discontinued. 9205 - levophed gtt decreased. 0444 - propofol restarted at 10 mcg/kg/min for ventilator compliance per Jason Paxton, CNP.  0687 - levophed increased. Jason Paxton called at this time d/t consistent bradycardia. 4670 -1 amp sodium bicarbonate per Jason Paxton, [place] 3826 - IO inserted to Rt tibial tuberosity by Kalyan Davey, RN  0500- levophed increased. 0505 - levophed increase  0508 - 1 amp sodium bicarbonate per Jason Paxton, CNP  0510 - 1 amp sodium bicarbonate per Jason Paxton, CNP  9184 - levophed increased  0513 - propofol stopped at this time. 0515 - 2 amps sodium bicarbonate given per Jason Paxton, CNP  5013 - 1 mg epinephrine. 0518 - levophed increased. 2654 - transcutaneous pacing initiated for bradycardia. Rate 70, MAs increased as needed with no pulse generated. 0522 - unable to palpate pulse. NIBP ongoing. Jason Matta CNP with US for bedside echo. 9615 - 92/76  0372 - patient PEA, no pulse. Code blue called. CPR initiated. 0525 - 1 mg epinephrine. Hannah Lagos CNP and ICU residents at bedside. 1688 - 1 amp sodium bicarbonate. Pulse check . Pulses found by doppler  0528 - 1 amp sodium bicarbonate. [facility], CNP preparing for CVC placement. 0532 - SpO2 31% bagging continued. 0534 - 1 amp dextrose.  Dr. Maynor Rubio performing bedside echo. 0536 - 10 units insulin. CVC placed R subclavian. Labs sent. 0040 - 121/98  0539 - bagging continued. Obtaining ABG. Patient's sister Ilsa Gonzalez, updated by house supervisor. 0541 - ABG drawn from Rt femoral artery by Caitlyn Lyons CNP  6703 - EKG obtained- LBBB  0545 - XR called for stat. Abg: pO2 99   0547 - HR afib RVR SpO2 40% preparing for cardioversion. 4509 - amiodarone bolus started at this time. \  5520 - amiodarone stopped due to HR 50s a fib  0554  - HR 52  0555 - CPR initiated, PEA.   0556 - 1 mg epinephrine. Epinephrine gtt started at 3 mcg/min. 9282 - pulse check. Pulses found by doppler. 8037 - R femoral arterial line placed by Caitlyn Lyons CNP  3531 - PEA, CPR initiated. 1 mg epinephrine.  1981 - epinephrine gtt increased. Caitlyn Lyons CNP spoke with patient's sister, will stop care if unable to stabilize. 2923 - pulse check. Pulsatility on arterial line BP 190s. HR 180s.  0618 - calcium chloride administration started. 0618 - SBP 180s. Epi gtt stopped per Caitlyn Lyons CNP. Code status changed to DNR comfort care. 0926 - patient asystole. Absence of heart tones confirmed by two RNs, no spontaneous breathing. 2500 - family arriving at bedside. Chaplain Brown providing support.

## 2021-11-02 NOTE — TELEPHONE ENCOUNTER
Pt sister phoned per request. I did talk with her and offered support. Aceashlee Leyvaolivia stated she did not have any needs at this time.

## 2021-11-03 LAB
ORGANISM: ABNORMAL
ORGANISM: ABNORMAL

## 2021-11-04 LAB — URINE CULTURE REFLEX: NORMAL

## 2021-12-30 NOTE — PROGRESS NOTES
Attending supervising physicians attestation statement:      I was present with the resident physician during the history and exam.  I Discussed the findings and plans with the resident physician  personally   After examining the patient, and agree with the resident'S note as outlined . Pt was in the IM resident clinic on 10/7/21  She was alert and oriented. , had hypoglycemia was given supplements, spoke with our nursing team and pt several times during the visit. Insulin regimen was decreased see orders, and IM resident note.      Electronically signed by Roberta White MD on 12/30/2021 at 1:48 PM

## 2022-06-02 NOTE — PROGRESS NOTES
Medication Management [address]  [phone] (phone)  [phone] (fax)      Ms. Delbert Arizmendi is a 71 y.o.  female with history of Afib who presents today for anticoagulation monitoring and adjustment. Patient verifies current dosing regimen and tablet strength. No missed or extra doses. Patient denies s/s bleeding/bruising/swelling/SOB/chest pain. Bruise on right wrist hit against wall is getting better. Instructed to make sure it gets smaller. Discussed  swelling but not new for her. No blood in urine or stool. No dietary changes. No changes in OTC agents/Herbals. Levothyroxine 75mcg daily added   No change in alcohol use or tobacco use. No change in activity level. Patient denies headaches/dizziness/lightheadedness/falls. No vomiting/diarrhea or acute illness. No Procedures scheduled in the future at this time. Assessment:   Lab Results   Component Value Date    INR 1.90 (H) 08/24/2020    INR 1.80 (H) 08/10/2020    INR 2.10 (H) 07/28/2020     INR subtherapeutic   Recent Labs     08/24/20  1317   INR 1.90*     This is second sub-therapeutic INR level. Plan:  Increase from Coumadin 3.75mg MF 2.5mg TuWThSS to Coumadin 3.75mg MWF 2.5mg TuThSS (6.3% increase). Recheck INR in 1 week. Patient reminded to call the Anticoagulation Clinic with signs or symptoms of bleeding or with any medication changes. Patient given instructions utilizing the teach back method. Discharged in no apparent distress. After visit summary printed and reviewed with patient.       Medications reviewed and updated on home medication list Yes    Influenza vaccine:     [] given    [x] declined   [] received previously   [] plans to receive at a later time   [x] refused    [] documented in [facility]: MED RECONCILIATION/REVIEW [address]: No  Total # of Interventions Recommended: 2  - Increased Dose #: Pt with hx of DM, A1c 11.6. Endocrine following. TSH results: 1.120.   - Low C peptide patient will need insulin and NOT oral agents on dc  - C/w mISS   - C/w Lispro 8U TID   - F/u endocrine recs

## 2022-12-16 NOTE — CARE COORDINATION
19, 7:25 AM      Emmanuel Kelly       Admitted from: ED 2019/ [address] day: 0   Location: [address] [place] [address]-A Reason for admit: Atrial fibrillation with RVR ([facility] [address] [I48.91] Status: Obs  Admit order signed?: yes  PMH:  has a past medical history of CAD (coronary artery disease), CRI (chronic renal insufficiency), Difficult intubation, DM (diabetes mellitus) ([facility] [address] GERD (gastroesophageal reflux disease), H/O gastric bypass, Hyperlipidemia, Hypertension, Hypothyroidism, Neuropathy, Obesity, Osteoarthritis, Paroxysmal atrial fibrillation ([facility] [address] Prolonged emergence from general anesthesia, Sleep apnea, and Visit for screening mammogram.  Procedure: None  Pertinent abnormal Imagin/17 CXR - Hazy appearance of both mid and lower lungs - consistent with pneumonia/pulmonary edema. Suspicious for tiny bilateral pleural effusions. No pulmonary vascular congestion seen. Medications:  Scheduled Meds:   warfarin (COUMADIN) daily dosing (placeholder)   Other RX Placeholder    allopurinol  100 mg Oral Daily    amitriptyline  10 mg Oral Nightly    aspirin  81 mg Oral Daily    atorvastatin  40 mg Oral Daily    DULoxetine  60 mg Oral Daily    fenofibrate  160 mg Oral Daily    furosemide  20 mg Oral Daily    gabapentin  100 mg Oral TID    hydrALAZINE  12.5 mg Oral BID    insulin glargine  20 Units Subcutaneous BID    insulin lispro protamine & lispro  40 Units Subcutaneous BID WC    isosorbide mononitrate  60 mg Oral Daily    pantoprazole  40 mg Oral QAM AC    sodium chloride flush  10 mL Intravenous 2 times per day    metoprolol succinate  25 mg Oral Daily    sacubitril-valsartan  1 tablet Oral BID     Continuous Infusions:   Pertinent Info/Orders/Treatment Plan:  Pt admitted through ED with complaints of SOB. Was seen at [facility] by new Cardiologist and medications were adjusted. Pt was to stop Cardizem and start Digoxin. The Digoxin was not available at her pharmacy.  She wears a Life Vest and it was alarming Clindamycin Counseling: I counseled the patient regarding use of clindamycin as an antibiotic for prophylactic and/or therapeutic purposes. Clindamycin is active against numerous classes of bacteria, including skin bacteria. Side effects may include nausea, diarrhea, gastrointestinal upset, rash, hives, yeast infections, and in rare cases, colitis.

## 2023-07-27 NOTE — PROGRESS NOTES
it; states she was notified ~ 3 years ago after gastric bypass surgery;. She is staying in the [facility] while she heals from a fractured left femur that occurred June 1 and is just using a knee immobilizer     In ED she was found to be in atrial fib with RVR; CTA chest (-) PE however small effusions and possibly pulmonary edema.     Cardiology consulted. Carlos Class are right perihilar and infrahilar infiltrates noted on chest x-ray, Augmentin started.  Patient was started on heparin and Coumadin.      7/5 Successful NICKIE with [facility] with Dr. Noreen Franklin. INR 1.01     7/8: cardiology plans for [facility]. nephro consulted for nephro clearance. Coumadin held. Heparin gtt continued.      7/9: s/p LHC which showed non-obstructive CAD. Coumadin resumed.      7/10: cardizem increased to 240 mg from 120 mg po daily    Endo consulted for DM and multiple thyroid nodules.      7/12 INR therapeutic at 2.14. Heparin drip stopped. Patient to be discharged on Life vest. Discussed with nephro, ok to be discharged, f/u as outpatient. Discharged stable and will follow-up with PCP, Cardio/EPS, Nephro and endocrinology as outpatient. Rpt BMP and INR as outpatient. Exam:     Vitals:  Vitals:    07/11/19 2300 07/12/19 0420 07/12/19 0900 07/12/19 1228   BP: (!) 116/57 137/71 (!) 160/66 122/67   Pulse: 72 74 92 83   Resp: 20 18 18 17   Temp: 98 °F (36.7 °C) 97.8 °F (36.6 °C) 97.5 °F (36.4 °C) 97.9 °F (36.6 °C)   TempSrc: Oral Oral Oral Oral   SpO2: 93% 93% 97% 99%   Weight:  (!) 325 lb 1.6 oz (147.5 kg)     Height:         Weight: Weight: (!) 325 lb 1.6 oz (147.5 kg)     24 hour intake/output:    Intake/Output Summary (Last 24 hours) at 7/12/2019 1624  Last data filed at 7/12/2019 0422  Gross per 24 hour   Intake 417.77 ml   Output --   Net 417.77 ml     General appearance: No apparent distress, appears stated age and cooperative. Morbidly obese  HEENT: Pupils equal, round, and reactive to light. Conjunctivae/corneas clear.   Neck: release tablet  Take 1 tablet by mouth daily             meclizine (ANTIVERT) 25 MG CHEW  Take 1 tablet by mouth 3 times daily as needed (vertigo)             metoprolol tartrate (LOPRESSOR) 25 MG tablet  take 1/2 tablet by mouth twice a day             Misc. Devices [facility]) MISC  1 Device by Does not apply route as needed (prn)             pantoprazole (PROTONIX) 40 MG tablet  take 1 tablet (40 mg) by oral route once daily             vitamin B-12 (CYANOCOBALAMIN) 100 MCG tablet  Take 1 tablet by mouth daily                 Time Spent on discharge is more than 45 minutes in the examination, evaluation, counseling and review of medications and discharge plan. Signed: Thank you ARNOL Mcgowan CNP for the opportunity to be involved in this patient's care.     Electronically signed by Fahad Perea MD on 7/12/2019 at 4:24 PM 27-Jul-2004

## 2023-12-20 NOTE — TELEPHONE ENCOUNTER
----- Message from ARNOL Carlson CNP sent at 3/18/2021  5:24 PM EDT -----  Let pt know her leg study was negative for a blood clot in her left leg. continue to elevate left leg, continue all meds until records received from [facility] [place] . Let her now I placed a cardiology for her to Dr. Odette Rizvi and she should be hearing from him. Will need to follow with Coumadin clinic for coumadin. I will be in touch with her after I get her labs back. REVIEW OF SYSTEMS:    CONSTITUTIONAL: No weakness, fevers or chills  EYES/ENT: No visual changes;  No vertigo or throat pain   NECK: No pain or stiffness  RESPIRATORY: No cough, wheezing, hemoptysis; No shortness of breath  CARDIOVASCULAR: No chest pain or palpitations  GASTROINTESTINAL: No abdominal or epigastric pain. No nausea, vomiting, or hematemesis; No diarrhea or constipation. No melena or hematochezia.  GENITOURINARY: No dysuria, frequency or hematuria  NEUROLOGICAL: No numbness or weakness  SKIN: No itching, rashes REVIEW OF SYSTEMS:    CONSTITUTIONAL: No weakness, fevers or chills  EYES/ENT: No visual changes;  No vertigo or throat pain   NECK: No pain or stiffness  RESPIRATORY: No cough, wheezing, hemoptysis; No shortness of breath  CARDIOVASCULAR: No chest pain + palpitations, + elevated blood pressure   GASTROINTESTINAL: No abdominal or epigastric pain. No nausea, vomiting, or hematemesis; No diarrhea or constipation. No melena or hematochezia.  GENITOURINARY: No dysuria, frequency or hematuria  NEUROLOGICAL: No numbness or weakness  SKIN: No itching, rashes

## (undated) DEVICE — CATHETER ETER IV 22GA L1IN POLYUR STR RADPQ INTROCAN SFTY

## (undated) DEVICE — TUBING IV STOPCOCK 48 CM 3 W

## (undated) DEVICE — SOLUTION IV 1000ML 0.45% SOD CHL PH 5 INJ USP VIAFLX PLAS

## (undated) DEVICE — SET LNR RED GRN W/ BASE CLEANASCOPE

## (undated) DEVICE — SET ADMIN 25ML L117IN PMP MOD CK VLV RLER CLMP 2 SMRTSITE

## (undated) DEVICE — ENDO KIT: Brand: MEDLINE INDUSTRIES, INC.

## (undated) DEVICE — CANISTER, RIGID, 2000CC: Brand: MEDLINE INDUSTRIES, INC.

## (undated) DEVICE — IV START KIT: Brand: MEDLINE INDUSTRIES, INC.